# Patient Record
Sex: FEMALE | Race: WHITE | Employment: OTHER | URBAN - NONMETROPOLITAN AREA
[De-identification: names, ages, dates, MRNs, and addresses within clinical notes are randomized per-mention and may not be internally consistent; named-entity substitution may affect disease eponyms.]

---

## 2017-02-01 RX ORDER — LANCETS 30 GAUGE
1 EACH MISCELLANEOUS DAILY
Qty: 100 EACH | Refills: 3 | Status: SHIPPED | OUTPATIENT
Start: 2017-02-01 | End: 2021-02-09 | Stop reason: SDUPTHER

## 2017-02-01 RX ORDER — DIPHENHYDRAMINE HCL 25 MG
TABLET ORAL
Qty: 1 KIT | Refills: 0 | Status: SHIPPED | OUTPATIENT
Start: 2017-02-01 | End: 2019-09-03 | Stop reason: ALTCHOICE

## 2017-03-01 RX ORDER — CYCLOBENZAPRINE HCL 10 MG
10 TABLET ORAL 2 TIMES DAILY PRN
Qty: 60 TABLET | Refills: 1 | Status: SHIPPED | OUTPATIENT
Start: 2017-03-01 | End: 2018-06-18 | Stop reason: ALTCHOICE

## 2017-03-21 ENCOUNTER — HOSPITAL ENCOUNTER (OUTPATIENT)
Dept: PHARMACY | Age: 72
Setting detail: THERAPIES SERIES
Discharge: HOME | End: 2017-03-21
Payer: MEDICARE

## 2017-03-21 VITALS
SYSTOLIC BLOOD PRESSURE: 122 MMHG | HEART RATE: 72 BPM | WEIGHT: 263.2 LBS | DIASTOLIC BLOOD PRESSURE: 80 MMHG | BODY MASS INDEX: 43.8 KG/M2

## 2017-03-21 DIAGNOSIS — Z79.01 LONG TERM CURRENT USE OF ANTICOAGULANT THERAPY: ICD-10-CM

## 2017-03-21 LAB — INR BLD: 2.1

## 2017-03-21 PROCEDURE — 85610 PROTHROMBIN TIME: CPT

## 2017-03-21 PROCEDURE — G0463 HOSPITAL OUTPT CLINIC VISIT: HCPCS

## 2017-03-28 ENCOUNTER — TELEPHONE (OUTPATIENT)
Dept: PHARMACY | Age: 72
End: 2017-03-28

## 2017-04-24 DIAGNOSIS — E11.9 TYPE 2 DIABETES MELLITUS WITHOUT COMPLICATION, WITHOUT LONG-TERM CURRENT USE OF INSULIN (HCC): ICD-10-CM

## 2017-04-25 RX ORDER — GLIPIZIDE 5 MG/1
5 TABLET, FILM COATED, EXTENDED RELEASE ORAL DAILY
Qty: 90 TABLET | Refills: 1 | Status: SHIPPED | OUTPATIENT
Start: 2017-04-25 | End: 2017-09-11 | Stop reason: SDUPTHER

## 2017-04-28 ENCOUNTER — OFFICE VISIT (OUTPATIENT)
Dept: FAMILY MEDICINE CLINIC | Age: 72
End: 2017-04-28
Payer: MEDICARE

## 2017-04-28 ENCOUNTER — HOSPITAL ENCOUNTER (OUTPATIENT)
Age: 72
Setting detail: SPECIMEN
Discharge: HOME | End: 2017-04-28
Payer: MEDICARE

## 2017-04-28 VITALS
WEIGHT: 263 LBS | OXYGEN SATURATION: 98 % | TEMPERATURE: 97.8 F | BODY MASS INDEX: 43.77 KG/M2 | SYSTOLIC BLOOD PRESSURE: 124 MMHG | DIASTOLIC BLOOD PRESSURE: 70 MMHG

## 2017-04-28 DIAGNOSIS — R30.0 DYSURIA: ICD-10-CM

## 2017-04-28 DIAGNOSIS — N30.00 ACUTE CYSTITIS WITHOUT HEMATURIA: Primary | ICD-10-CM

## 2017-04-28 LAB
BILIRUBIN, POC: ABNORMAL
BLOOD URINE, POC: ABNORMAL
CLARITY, POC: ABNORMAL
COLOR, POC: ABNORMAL
GLUCOSE URINE, POC: ABNORMAL
KETONES, POC: ABNORMAL
LEUKOCYTE EST, POC: ABNORMAL
NITRITE, POC: POSITIVE
PH, POC: 6
PROTEIN, POC: ABNORMAL
SPECIFIC GRAVITY, POC: 1.02
UROBILINOGEN, POC: 1

## 2017-04-28 PROCEDURE — G8427 DOCREV CUR MEDS BY ELIG CLIN: HCPCS | Performed by: NURSE PRACTITIONER

## 2017-04-28 PROCEDURE — 99213 OFFICE O/P EST LOW 20 MIN: CPT | Performed by: NURSE PRACTITIONER

## 2017-04-28 PROCEDURE — 81002 URINALYSIS NONAUTO W/O SCOPE: CPT | Performed by: NURSE PRACTITIONER

## 2017-04-28 PROCEDURE — 4040F PNEUMOC VAC/ADMIN/RCVD: CPT | Performed by: NURSE PRACTITIONER

## 2017-04-28 PROCEDURE — 1036F TOBACCO NON-USER: CPT | Performed by: NURSE PRACTITIONER

## 2017-04-28 PROCEDURE — 3014F SCREEN MAMMO DOC REV: CPT | Performed by: NURSE PRACTITIONER

## 2017-04-28 PROCEDURE — G8417 CALC BMI ABV UP PARAM F/U: HCPCS | Performed by: NURSE PRACTITIONER

## 2017-04-28 PROCEDURE — 87077 CULTURE AEROBIC IDENTIFY: CPT

## 2017-04-28 PROCEDURE — 1090F PRES/ABSN URINE INCON ASSESS: CPT | Performed by: NURSE PRACTITIONER

## 2017-04-28 PROCEDURE — 3017F COLORECTAL CA SCREEN DOC REV: CPT | Performed by: NURSE PRACTITIONER

## 2017-04-28 PROCEDURE — 87186 SC STD MICRODIL/AGAR DIL: CPT

## 2017-04-28 PROCEDURE — 87086 URINE CULTURE/COLONY COUNT: CPT

## 2017-04-28 PROCEDURE — 1123F ACP DISCUSS/DSCN MKR DOCD: CPT | Performed by: NURSE PRACTITIONER

## 2017-04-28 PROCEDURE — G8399 PT W/DXA RESULTS DOCUMENT: HCPCS | Performed by: NURSE PRACTITIONER

## 2017-04-28 RX ORDER — NITROFURANTOIN 25; 75 MG/1; MG/1
100 CAPSULE ORAL 2 TIMES DAILY
Qty: 10 CAPSULE | Refills: 0 | Status: SHIPPED | OUTPATIENT
Start: 2017-04-28 | End: 2017-05-03

## 2017-04-28 ASSESSMENT — ENCOUNTER SYMPTOMS
NAUSEA: 0
VOMITING: 0

## 2017-04-30 LAB
CULTURE: ABNORMAL
CULTURE: ABNORMAL
Lab: ABNORMAL
ORGANISM: ABNORMAL
SPECIMEN DESCRIPTION: ABNORMAL
SPECIMEN DESCRIPTION: ABNORMAL
STATUS: ABNORMAL

## 2017-05-02 ENCOUNTER — HOSPITAL ENCOUNTER (OUTPATIENT)
Dept: PHARMACY | Age: 72
Setting detail: THERAPIES SERIES
Discharge: HOME | End: 2017-05-02
Payer: MEDICARE

## 2017-05-02 VITALS
SYSTOLIC BLOOD PRESSURE: 116 MMHG | WEIGHT: 263.7 LBS | BODY MASS INDEX: 43.88 KG/M2 | HEART RATE: 68 BPM | DIASTOLIC BLOOD PRESSURE: 82 MMHG

## 2017-05-02 DIAGNOSIS — Z79.01 LONG TERM CURRENT USE OF ANTICOAGULANT THERAPY: ICD-10-CM

## 2017-05-02 DIAGNOSIS — I82.4Y9 DEEP VEIN THROMBOSIS (DVT) OF PROXIMAL LOWER EXTREMITY, UNSPECIFIED CHRONICITY, UNSPECIFIED LATERALITY (HCC): ICD-10-CM

## 2017-05-02 LAB — INR BLD: 1.7

## 2017-05-02 PROCEDURE — 99211 OFF/OP EST MAY X REQ PHY/QHP: CPT

## 2017-05-02 PROCEDURE — 85610 PROTHROMBIN TIME: CPT

## 2017-05-12 ENCOUNTER — TELEPHONE (OUTPATIENT)
Dept: FAMILY MEDICINE CLINIC | Age: 72
End: 2017-05-12

## 2017-05-12 ENCOUNTER — HOSPITAL ENCOUNTER (OUTPATIENT)
Age: 72
Discharge: HOME | End: 2017-05-12
Payer: MEDICARE

## 2017-05-12 ENCOUNTER — NURSE ONLY (OUTPATIENT)
Dept: FAMILY MEDICINE CLINIC | Age: 72
End: 2017-05-12
Payer: MEDICARE

## 2017-05-12 DIAGNOSIS — R10.9 FLANK PAIN: Primary | ICD-10-CM

## 2017-05-12 LAB
BILIRUBIN, POC: ABNORMAL
BLOOD URINE, POC: ABNORMAL
CLARITY, POC: ABNORMAL
COLOR, POC: YELLOW
GLUCOSE URINE, POC: ABNORMAL
KETONES, POC: ABNORMAL
LEUKOCYTE EST, POC: ABNORMAL
NITRITE, POC: POSITIVE
PH, POC: 7
PROTEIN, POC: ABNORMAL
SPECIFIC GRAVITY, POC: 1.02
UROBILINOGEN, POC: 0.2

## 2017-05-12 PROCEDURE — 87077 CULTURE AEROBIC IDENTIFY: CPT

## 2017-05-12 PROCEDURE — 87186 SC STD MICRODIL/AGAR DIL: CPT

## 2017-05-12 PROCEDURE — 87086 URINE CULTURE/COLONY COUNT: CPT

## 2017-05-12 PROCEDURE — 81003 URINALYSIS AUTO W/O SCOPE: CPT | Performed by: NURSE PRACTITIONER

## 2017-05-12 RX ORDER — CIPROFLOXACIN 500 MG/1
500 TABLET, FILM COATED ORAL 2 TIMES DAILY
Qty: 20 TABLET | Refills: 0 | Status: SHIPPED | OUTPATIENT
Start: 2017-05-12 | End: 2017-05-22

## 2017-05-23 ENCOUNTER — HOSPITAL ENCOUNTER (OUTPATIENT)
Dept: PHARMACY | Age: 72
Setting detail: THERAPIES SERIES
Discharge: HOME | End: 2017-05-23
Payer: MEDICARE

## 2017-05-23 VITALS — WEIGHT: 265.3 LBS | SYSTOLIC BLOOD PRESSURE: 132 MMHG | DIASTOLIC BLOOD PRESSURE: 80 MMHG | BODY MASS INDEX: 44.15 KG/M2

## 2017-05-23 DIAGNOSIS — Z79.01 LONG TERM CURRENT USE OF ANTICOAGULANT THERAPY: ICD-10-CM

## 2017-05-23 DIAGNOSIS — I82.4Y9 DEEP VEIN THROMBOSIS (DVT) OF PROXIMAL LOWER EXTREMITY, UNSPECIFIED CHRONICITY, UNSPECIFIED LATERALITY (HCC): ICD-10-CM

## 2017-05-23 LAB — INR BLD: 1.6

## 2017-05-23 PROCEDURE — 85610 PROTHROMBIN TIME: CPT

## 2017-05-23 PROCEDURE — 99211 OFF/OP EST MAY X REQ PHY/QHP: CPT

## 2017-06-01 ENCOUNTER — HOSPITAL ENCOUNTER (OUTPATIENT)
Age: 72
Discharge: HOME | End: 2017-06-01
Payer: MEDICARE

## 2017-06-01 DIAGNOSIS — E11.9 TYPE 2 DIABETES MELLITUS WITHOUT COMPLICATION, WITHOUT LONG-TERM CURRENT USE OF INSULIN (HCC): ICD-10-CM

## 2017-06-01 LAB
ANION GAP SERPL CALCULATED.3IONS-SCNC: 12 MMOL/L (ref 9–17)
BUN BLDV-MCNC: 28 MG/DL (ref 8–23)
BUN/CREAT BLD: 21 (ref 9–20)
CALCIUM SERPL-MCNC: 10 MG/DL (ref 8.6–10.4)
CHLORIDE BLD-SCNC: 102 MMOL/L (ref 98–107)
CHOLESTEROL/HDL RATIO: 2.5
CHOLESTEROL: 180 MG/DL
CO2: 28 MMOL/L (ref 20–31)
CREAT SERPL-MCNC: 1.36 MG/DL (ref 0.5–0.9)
ESTIMATED AVERAGE GLUCOSE: 137 MG/DL
GFR AFRICAN AMERICAN: 47 ML/MIN
GFR NON-AFRICAN AMERICAN: 39 ML/MIN
GFR SERPL CREATININE-BSD FRML MDRD: ABNORMAL ML/MIN/{1.73_M2}
GFR SERPL CREATININE-BSD FRML MDRD: ABNORMAL ML/MIN/{1.73_M2}
GLUCOSE BLD-MCNC: 152 MG/DL (ref 70–99)
HBA1C MFR BLD: 6.4 % (ref 4.8–5.9)
HDLC SERPL-MCNC: 72 MG/DL
LDL CHOLESTEROL: 91 MG/DL (ref 0–130)
PATIENT FASTING?: YES
POTASSIUM SERPL-SCNC: 4.3 MMOL/L (ref 3.7–5.3)
SODIUM BLD-SCNC: 142 MMOL/L (ref 135–144)
TRIGL SERPL-MCNC: 84 MG/DL
VLDLC SERPL CALC-MCNC: NORMAL MG/DL (ref 1–30)

## 2017-06-01 PROCEDURE — 80061 LIPID PANEL: CPT

## 2017-06-01 PROCEDURE — 36415 COLL VENOUS BLD VENIPUNCTURE: CPT

## 2017-06-01 PROCEDURE — 83036 HEMOGLOBIN GLYCOSYLATED A1C: CPT

## 2017-06-01 PROCEDURE — 80048 BASIC METABOLIC PNL TOTAL CA: CPT

## 2017-06-08 ENCOUNTER — OFFICE VISIT (OUTPATIENT)
Dept: FAMILY MEDICINE CLINIC | Age: 72
End: 2017-06-08
Payer: MEDICARE

## 2017-06-08 VITALS
SYSTOLIC BLOOD PRESSURE: 106 MMHG | DIASTOLIC BLOOD PRESSURE: 64 MMHG | WEIGHT: 267 LBS | HEIGHT: 67 IN | BODY MASS INDEX: 41.91 KG/M2

## 2017-06-08 DIAGNOSIS — I10 ESSENTIAL HYPERTENSION, BENIGN: ICD-10-CM

## 2017-06-08 DIAGNOSIS — E66.01 MORBID OBESITY WITH BMI OF 40.0-44.9, ADULT (HCC): ICD-10-CM

## 2017-06-08 DIAGNOSIS — H65.93 MIDDLE EAR EFFUSION, BILATERAL: ICD-10-CM

## 2017-06-08 DIAGNOSIS — E11.9 TYPE 2 DIABETES MELLITUS WITHOUT COMPLICATION, WITHOUT LONG-TERM CURRENT USE OF INSULIN (HCC): Primary | ICD-10-CM

## 2017-06-08 LAB
CREATININE URINE POCT: 100
MICROALBUMIN/CREAT 24H UR: 10 MG/G{CREAT}
MICROALBUMIN/CREAT UR-RTO: <30

## 2017-06-08 PROCEDURE — 1090F PRES/ABSN URINE INCON ASSESS: CPT | Performed by: FAMILY MEDICINE

## 2017-06-08 PROCEDURE — G8417 CALC BMI ABV UP PARAM F/U: HCPCS | Performed by: FAMILY MEDICINE

## 2017-06-08 PROCEDURE — 99214 OFFICE O/P EST MOD 30 MIN: CPT | Performed by: FAMILY MEDICINE

## 2017-06-08 PROCEDURE — G8427 DOCREV CUR MEDS BY ELIG CLIN: HCPCS | Performed by: FAMILY MEDICINE

## 2017-06-08 PROCEDURE — 3014F SCREEN MAMMO DOC REV: CPT | Performed by: FAMILY MEDICINE

## 2017-06-08 PROCEDURE — 1036F TOBACCO NON-USER: CPT | Performed by: FAMILY MEDICINE

## 2017-06-08 PROCEDURE — 4040F PNEUMOC VAC/ADMIN/RCVD: CPT | Performed by: FAMILY MEDICINE

## 2017-06-08 PROCEDURE — 82044 UR ALBUMIN SEMIQUANTITATIVE: CPT | Performed by: FAMILY MEDICINE

## 2017-06-08 PROCEDURE — 3017F COLORECTAL CA SCREEN DOC REV: CPT | Performed by: FAMILY MEDICINE

## 2017-06-08 PROCEDURE — 1123F ACP DISCUSS/DSCN MKR DOCD: CPT | Performed by: FAMILY MEDICINE

## 2017-06-08 PROCEDURE — 3044F HG A1C LEVEL LT 7.0%: CPT | Performed by: FAMILY MEDICINE

## 2017-06-08 PROCEDURE — G8399 PT W/DXA RESULTS DOCUMENT: HCPCS | Performed by: FAMILY MEDICINE

## 2017-06-08 RX ORDER — FLUTICASONE PROPIONATE 50 MCG
2 SPRAY, SUSPENSION (ML) NASAL DAILY
Qty: 1 BOTTLE | Refills: 3 | Status: SHIPPED | OUTPATIENT
Start: 2017-06-08 | End: 2019-09-03 | Stop reason: SDUPTHER

## 2017-06-08 RX ORDER — LOSARTAN POTASSIUM 25 MG/1
TABLET ORAL
Qty: 90 TABLET | Refills: 1 | Status: SHIPPED | OUTPATIENT
Start: 2017-06-08 | End: 2017-10-23 | Stop reason: SDUPTHER

## 2017-06-08 RX ORDER — ALLOPURINOL 100 MG/1
TABLET ORAL
Qty: 180 TABLET | Refills: 1 | Status: SHIPPED | OUTPATIENT
Start: 2017-06-08 | End: 2017-10-23 | Stop reason: SDUPTHER

## 2017-06-08 ASSESSMENT — ENCOUNTER SYMPTOMS
RESPIRATORY NEGATIVE: 1
GASTROINTESTINAL NEGATIVE: 1

## 2017-06-20 ENCOUNTER — HOSPITAL ENCOUNTER (OUTPATIENT)
Dept: PHARMACY | Age: 72
Setting detail: THERAPIES SERIES
Discharge: HOME | End: 2017-06-20
Payer: MEDICARE

## 2017-06-20 VITALS
WEIGHT: 265.4 LBS | BODY MASS INDEX: 41.57 KG/M2 | DIASTOLIC BLOOD PRESSURE: 68 MMHG | SYSTOLIC BLOOD PRESSURE: 110 MMHG | HEART RATE: 88 BPM

## 2017-06-20 DIAGNOSIS — I82.4Y9 DEEP VEIN THROMBOSIS (DVT) OF PROXIMAL LOWER EXTREMITY, UNSPECIFIED CHRONICITY, UNSPECIFIED LATERALITY (HCC): ICD-10-CM

## 2017-06-20 DIAGNOSIS — Z79.01 LONG TERM CURRENT USE OF ANTICOAGULANT THERAPY: ICD-10-CM

## 2017-06-20 DIAGNOSIS — Z79.01 LONG TERM (CURRENT) USE OF ANTICOAGULANTS: ICD-10-CM

## 2017-06-20 LAB — INR BLD: 2.3

## 2017-06-20 PROCEDURE — 85610 PROTHROMBIN TIME: CPT

## 2017-06-20 PROCEDURE — 99211 OFF/OP EST MAY X REQ PHY/QHP: CPT

## 2017-06-20 RX ORDER — WARFARIN SODIUM 5 MG/1
TABLET ORAL
Qty: 90 TABLET | Refills: 3 | Status: SHIPPED
Start: 2017-06-20 | End: 2017-06-20 | Stop reason: SDUPTHER

## 2017-06-20 RX ORDER — WARFARIN SODIUM 5 MG/1
TABLET ORAL
Qty: 90 TABLET | Refills: 3 | OUTPATIENT
Start: 2017-06-20 | End: 2017-08-01 | Stop reason: DRUGHIGH

## 2017-07-11 DIAGNOSIS — N18.30 BENIGN HYPERTENSION WITH CHRONIC KIDNEY DISEASE, STAGE III (HCC): ICD-10-CM

## 2017-07-11 DIAGNOSIS — I12.9 BENIGN HYPERTENSION WITH CHRONIC KIDNEY DISEASE, STAGE III (HCC): ICD-10-CM

## 2017-07-11 RX ORDER — RANITIDINE 150 MG/1
TABLET ORAL
Qty: 90 TABLET | Refills: 3 | Status: SHIPPED | OUTPATIENT
Start: 2017-07-11 | End: 2018-04-11 | Stop reason: SDUPTHER

## 2017-07-11 RX ORDER — OMEPRAZOLE 20 MG/1
CAPSULE, DELAYED RELEASE ORAL
Qty: 90 CAPSULE | Refills: 3 | Status: SHIPPED | OUTPATIENT
Start: 2017-07-11 | End: 2018-04-11 | Stop reason: SDUPTHER

## 2017-07-11 RX ORDER — PROPRANOLOL HYDROCHLORIDE 60 MG/1
TABLET ORAL
Qty: 180 TABLET | Refills: 3 | Status: SHIPPED | OUTPATIENT
Start: 2017-07-11 | End: 2018-04-11 | Stop reason: SDUPTHER

## 2017-07-11 RX ORDER — HYDROCHLOROTHIAZIDE 12.5 MG/1
CAPSULE, GELATIN COATED ORAL
Qty: 90 CAPSULE | Refills: 3 | Status: SHIPPED | OUTPATIENT
Start: 2017-07-11 | End: 2018-04-11 | Stop reason: SDUPTHER

## 2017-08-01 ENCOUNTER — HOSPITAL ENCOUNTER (OUTPATIENT)
Age: 72
Discharge: HOME | End: 2017-08-01
Payer: MEDICARE

## 2017-08-01 ENCOUNTER — HOSPITAL ENCOUNTER (OUTPATIENT)
Dept: PHARMACY | Age: 72
Setting detail: THERAPIES SERIES
Discharge: HOME | End: 2017-08-01
Payer: MEDICARE

## 2017-08-01 VITALS
HEART RATE: 79 BPM | WEIGHT: 266.5 LBS | DIASTOLIC BLOOD PRESSURE: 67 MMHG | BODY MASS INDEX: 41.74 KG/M2 | SYSTOLIC BLOOD PRESSURE: 125 MMHG

## 2017-08-01 DIAGNOSIS — Z79.01 LONG TERM CURRENT USE OF ANTICOAGULANT THERAPY: ICD-10-CM

## 2017-08-01 DIAGNOSIS — I82.4Y9 DEEP VEIN THROMBOSIS (DVT) OF PROXIMAL LOWER EXTREMITY, UNSPECIFIED CHRONICITY, UNSPECIFIED LATERALITY (HCC): ICD-10-CM

## 2017-08-01 DIAGNOSIS — Z79.01 LONG TERM (CURRENT) USE OF ANTICOAGULANTS: ICD-10-CM

## 2017-08-01 LAB
ALBUMIN SERPL-MCNC: 3.5 G/DL (ref 3.5–5.2)
ANION GAP SERPL CALCULATED.3IONS-SCNC: 11 MMOL/L (ref 9–17)
BUN BLDV-MCNC: 38 MG/DL (ref 8–23)
CALCIUM SERPL-MCNC: 10.1 MG/DL (ref 8.6–10.4)
CHLORIDE BLD-SCNC: 102 MMOL/L (ref 98–107)
CO2: 28 MMOL/L (ref 20–31)
CREAT SERPL-MCNC: 1.63 MG/DL (ref 0.5–0.9)
FERRITIN: 62 UG/L (ref 13–150)
GFR AFRICAN AMERICAN: 38 ML/MIN
GFR NON-AFRICAN AMERICAN: 31 ML/MIN
GFR SERPL CREATININE-BSD FRML MDRD: ABNORMAL ML/MIN/{1.73_M2}
GFR SERPL CREATININE-BSD FRML MDRD: ABNORMAL ML/MIN/{1.73_M2}
HCT VFR BLD CALC: 39.1 % (ref 36–46)
HEMOGLOBIN: 12.8 G/DL (ref 12–16)
INR BLD: 2.4
IRON SATURATION: 37 % (ref 20–55)
IRON: 117 UG/DL (ref 37–145)
PHOSPHORUS: 3 MG/DL (ref 2.6–4.5)
POTASSIUM SERPL-SCNC: 4.5 MMOL/L (ref 3.7–5.3)
PTH INTACT: 92.62 PG/ML (ref 15–65)
SODIUM BLD-SCNC: 141 MMOL/L (ref 135–144)
TOTAL IRON BINDING CAPACITY: 320 UG/DL (ref 250–450)
TRANSFERRIN: 278 MG/DL (ref 200–360)
UNSATURATED IRON BINDING CAPACITY: 203 UG/DL (ref 112–347)
VITAMIN D 25-HYDROXY: 42.9 NG/ML (ref 30–100)

## 2017-08-01 PROCEDURE — 82040 ASSAY OF SERUM ALBUMIN: CPT

## 2017-08-01 PROCEDURE — 85014 HEMATOCRIT: CPT

## 2017-08-01 PROCEDURE — 84100 ASSAY OF PHOSPHORUS: CPT

## 2017-08-01 PROCEDURE — 83550 IRON BINDING TEST: CPT

## 2017-08-01 PROCEDURE — 99211 OFF/OP EST MAY X REQ PHY/QHP: CPT

## 2017-08-01 PROCEDURE — 82306 VITAMIN D 25 HYDROXY: CPT

## 2017-08-01 PROCEDURE — 84466 ASSAY OF TRANSFERRIN: CPT

## 2017-08-01 PROCEDURE — 82310 ASSAY OF CALCIUM: CPT

## 2017-08-01 PROCEDURE — 80051 ELECTROLYTE PANEL: CPT

## 2017-08-01 PROCEDURE — 82565 ASSAY OF CREATININE: CPT

## 2017-08-01 PROCEDURE — 36415 COLL VENOUS BLD VENIPUNCTURE: CPT

## 2017-08-01 PROCEDURE — 85018 HEMOGLOBIN: CPT

## 2017-08-01 PROCEDURE — 83540 ASSAY OF IRON: CPT

## 2017-08-01 PROCEDURE — 83970 ASSAY OF PARATHORMONE: CPT

## 2017-08-01 PROCEDURE — 85610 PROTHROMBIN TIME: CPT

## 2017-08-01 PROCEDURE — 84520 ASSAY OF UREA NITROGEN: CPT

## 2017-08-01 PROCEDURE — 82728 ASSAY OF FERRITIN: CPT

## 2017-08-01 RX ORDER — WARFARIN SODIUM 5 MG/1
TABLET ORAL
Qty: 90 TABLET | Refills: 3 | Status: SHIPPED
Start: 2017-08-01 | End: 2017-12-12 | Stop reason: DRUGHIGH

## 2017-09-11 ENCOUNTER — TELEPHONE (OUTPATIENT)
Dept: FAMILY MEDICINE CLINIC | Age: 72
End: 2017-09-11

## 2017-09-11 ENCOUNTER — HOSPITAL ENCOUNTER (OUTPATIENT)
Age: 72
Discharge: HOME | End: 2017-09-11
Payer: MEDICARE

## 2017-09-11 DIAGNOSIS — R30.0 DYSURIA: ICD-10-CM

## 2017-09-11 DIAGNOSIS — R30.0 DYSURIA: Primary | ICD-10-CM

## 2017-09-11 DIAGNOSIS — E11.9 TYPE 2 DIABETES MELLITUS WITHOUT COMPLICATION, WITHOUT LONG-TERM CURRENT USE OF INSULIN (HCC): ICD-10-CM

## 2017-09-11 LAB
-: ABNORMAL
AMORPHOUS: ABNORMAL
BACTERIA: ABNORMAL
BILIRUBIN URINE: ABNORMAL
CASTS UA: ABNORMAL /LPF
COLOR: ABNORMAL
COMMENT UA: ABNORMAL
CRYSTALS, UA: ABNORMAL /HPF
EPITHELIAL CELLS UA: ABNORMAL /HPF
GLUCOSE URINE: NEGATIVE
KETONES, URINE: NEGATIVE
LEUKOCYTE ESTERASE, URINE: ABNORMAL
MUCUS: ABNORMAL
NITRITE, URINE: POSITIVE
OTHER OBSERVATIONS UA: ABNORMAL
PH UA: 6.5 (ref 5–8)
PROTEIN UA: ABNORMAL
RBC UA: ABNORMAL /HPF (ref 0–2)
RENAL EPITHELIAL, UA: ABNORMAL /HPF
SPECIFIC GRAVITY UA: 1.01 (ref 1–1.03)
TRICHOMONAS: ABNORMAL
TURBIDITY: ABNORMAL
URINE HGB: NEGATIVE
UROBILINOGEN, URINE: ABNORMAL
WBC UA: ABNORMAL /HPF
YEAST: ABNORMAL

## 2017-09-11 PROCEDURE — 87086 URINE CULTURE/COLONY COUNT: CPT

## 2017-09-11 PROCEDURE — 81001 URINALYSIS AUTO W/SCOPE: CPT

## 2017-09-11 RX ORDER — CEPHALEXIN 500 MG/1
500 CAPSULE ORAL 2 TIMES DAILY
Qty: 14 CAPSULE | Refills: 0 | Status: SHIPPED | OUTPATIENT
Start: 2017-09-11 | End: 2017-09-19 | Stop reason: ALTCHOICE

## 2017-09-11 RX ORDER — GLIPIZIDE 5 MG/1
TABLET, EXTENDED RELEASE ORAL
Qty: 90 TABLET | Refills: 1 | Status: SHIPPED | OUTPATIENT
Start: 2017-09-11 | End: 2018-01-29 | Stop reason: SDUPTHER

## 2017-09-12 LAB
CULTURE: NORMAL
CULTURE: NORMAL
Lab: NORMAL
SPECIMEN DESCRIPTION: NORMAL
SPECIMEN DESCRIPTION: NORMAL
STATUS: NORMAL

## 2017-09-19 ENCOUNTER — HOSPITAL ENCOUNTER (OUTPATIENT)
Dept: PHARMACY | Age: 72
Setting detail: THERAPIES SERIES
Discharge: HOME | End: 2017-09-19
Payer: MEDICARE

## 2017-09-19 VITALS
DIASTOLIC BLOOD PRESSURE: 78 MMHG | BODY MASS INDEX: 41.49 KG/M2 | WEIGHT: 264.9 LBS | SYSTOLIC BLOOD PRESSURE: 114 MMHG | HEART RATE: 82 BPM

## 2017-09-19 DIAGNOSIS — Z79.01 LONG TERM CURRENT USE OF ANTICOAGULANT THERAPY: ICD-10-CM

## 2017-09-19 DIAGNOSIS — I82.4Y9 DEEP VEIN THROMBOSIS (DVT) OF PROXIMAL LOWER EXTREMITY, UNSPECIFIED CHRONICITY, UNSPECIFIED LATERALITY (HCC): ICD-10-CM

## 2017-09-19 LAB — INR BLD: 2.5

## 2017-09-19 PROCEDURE — 85610 PROTHROMBIN TIME: CPT

## 2017-09-19 PROCEDURE — 99211 OFF/OP EST MAY X REQ PHY/QHP: CPT

## 2017-10-10 ENCOUNTER — HOSPITAL ENCOUNTER (OUTPATIENT)
Age: 72
Discharge: HOME | End: 2017-10-10
Payer: MEDICARE

## 2017-10-10 ENCOUNTER — OFFICE VISIT (OUTPATIENT)
Dept: FAMILY MEDICINE CLINIC | Age: 72
End: 2017-10-10
Payer: MEDICARE

## 2017-10-10 VITALS
SYSTOLIC BLOOD PRESSURE: 130 MMHG | BODY MASS INDEX: 42.85 KG/M2 | WEIGHT: 273 LBS | DIASTOLIC BLOOD PRESSURE: 78 MMHG | HEIGHT: 67 IN

## 2017-10-10 DIAGNOSIS — R60.0 BILATERAL EDEMA OF LOWER EXTREMITY: Primary | ICD-10-CM

## 2017-10-10 DIAGNOSIS — R20.9 DISTURBANCE OF SKIN SENSATION: ICD-10-CM

## 2017-10-10 DIAGNOSIS — Z23 NEED FOR INFLUENZA VACCINATION: ICD-10-CM

## 2017-10-10 DIAGNOSIS — E11.9 TYPE 2 DIABETES MELLITUS WITHOUT COMPLICATION, WITHOUT LONG-TERM CURRENT USE OF INSULIN (HCC): ICD-10-CM

## 2017-10-10 DIAGNOSIS — R53.83 EASY FATIGABILITY: ICD-10-CM

## 2017-10-10 DIAGNOSIS — B07.8 COMMON WART: ICD-10-CM

## 2017-10-10 DIAGNOSIS — R06.09 DYSPNEA ON EXERTION: ICD-10-CM

## 2017-10-10 LAB
ABSOLUTE EOS #: 0.3 K/UL (ref 0–0.4)
ABSOLUTE LYMPH #: 3.2 K/UL (ref 1–4.8)
ABSOLUTE MONO #: 0.7 K/UL (ref 0–1)
ALBUMIN SERPL-MCNC: 3.8 G/DL (ref 3.5–5.2)
ALBUMIN/GLOBULIN RATIO: ABNORMAL (ref 1–2.5)
ALP BLD-CCNC: 51 U/L (ref 35–104)
ALT SERPL-CCNC: 16 U/L (ref 5–33)
ANION GAP SERPL CALCULATED.3IONS-SCNC: 11 MMOL/L (ref 9–17)
AST SERPL-CCNC: 13 U/L
BASOPHILS # BLD: 1 %
BASOPHILS ABSOLUTE: 0.1 K/UL (ref 0–0.2)
BILIRUB SERPL-MCNC: 0.22 MG/DL (ref 0.3–1.2)
BNP INTERPRETATION: ABNORMAL
BUN BLDV-MCNC: 26 MG/DL (ref 8–23)
BUN/CREAT BLD: 17 (ref 9–20)
CALCIUM SERPL-MCNC: 10.1 MG/DL (ref 8.6–10.4)
CHLORIDE BLD-SCNC: 104 MMOL/L (ref 98–107)
CO2: 27 MMOL/L (ref 20–31)
CREAT SERPL-MCNC: 1.49 MG/DL (ref 0.5–0.9)
DIFFERENTIAL TYPE: YES
EOSINOPHILS RELATIVE PERCENT: 3 %
ESTIMATED AVERAGE GLUCOSE: 146 MG/DL
GFR AFRICAN AMERICAN: 42 ML/MIN
GFR NON-AFRICAN AMERICAN: 35 ML/MIN
GFR SERPL CREATININE-BSD FRML MDRD: ABNORMAL ML/MIN/{1.73_M2}
GFR SERPL CREATININE-BSD FRML MDRD: ABNORMAL ML/MIN/{1.73_M2}
GLUCOSE BLD-MCNC: 120 MG/DL (ref 70–99)
HBA1C MFR BLD: 6.7 % (ref 4.8–5.9)
HCT VFR BLD CALC: 38.3 % (ref 36–46)
HEMOGLOBIN: 12.5 G/DL (ref 12–16)
LYMPHOCYTES # BLD: 29 %
MCH RBC QN AUTO: 32.4 PG (ref 26–34)
MCHC RBC AUTO-ENTMCNC: 32.7 G/DL (ref 31–37)
MCV RBC AUTO: 99.3 FL (ref 80–100)
MONOCYTES # BLD: 6 %
PDW BLD-RTO: 15.8 % (ref 12.1–15.2)
PLATELET # BLD: 290 K/UL (ref 140–450)
PLATELET ESTIMATE: ABNORMAL
PMV BLD AUTO: ABNORMAL FL (ref 6–12)
POTASSIUM SERPL-SCNC: 4.4 MMOL/L (ref 3.7–5.3)
PRO-BNP: 366 PG/ML
RBC # BLD: 3.86 M/UL (ref 4–5.2)
RBC # BLD: ABNORMAL 10*6/UL
SEG NEUTROPHILS: 61 %
SEGMENTED NEUTROPHILS ABSOLUTE COUNT: 7.1 K/UL (ref 2.5–7)
SODIUM BLD-SCNC: 142 MMOL/L (ref 135–144)
TOTAL PROTEIN: 6.3 G/DL (ref 6.4–8.3)
TSH SERPL DL<=0.05 MIU/L-ACNC: 2.99 MIU/L (ref 0.3–5)
WBC # BLD: 11.4 K/UL (ref 3.5–11)
WBC # BLD: ABNORMAL 10*3/UL

## 2017-10-10 PROCEDURE — 99214 OFFICE O/P EST MOD 30 MIN: CPT | Performed by: FAMILY MEDICINE

## 2017-10-10 PROCEDURE — 36415 COLL VENOUS BLD VENIPUNCTURE: CPT

## 2017-10-10 PROCEDURE — 80053 COMPREHEN METABOLIC PANEL: CPT

## 2017-10-10 PROCEDURE — 4040F PNEUMOC VAC/ADMIN/RCVD: CPT | Performed by: FAMILY MEDICINE

## 2017-10-10 PROCEDURE — G8484 FLU IMMUNIZE NO ADMIN: HCPCS | Performed by: FAMILY MEDICINE

## 2017-10-10 PROCEDURE — 85025 COMPLETE CBC W/AUTO DIFF WBC: CPT

## 2017-10-10 PROCEDURE — 1036F TOBACCO NON-USER: CPT | Performed by: FAMILY MEDICINE

## 2017-10-10 PROCEDURE — 1090F PRES/ABSN URINE INCON ASSESS: CPT | Performed by: FAMILY MEDICINE

## 2017-10-10 PROCEDURE — 90686 IIV4 VACC NO PRSV 0.5 ML IM: CPT | Performed by: FAMILY MEDICINE

## 2017-10-10 PROCEDURE — G8427 DOCREV CUR MEDS BY ELIG CLIN: HCPCS | Performed by: FAMILY MEDICINE

## 2017-10-10 PROCEDURE — 3017F COLORECTAL CA SCREEN DOC REV: CPT | Performed by: FAMILY MEDICINE

## 2017-10-10 PROCEDURE — G8417 CALC BMI ABV UP PARAM F/U: HCPCS | Performed by: FAMILY MEDICINE

## 2017-10-10 PROCEDURE — G8399 PT W/DXA RESULTS DOCUMENT: HCPCS | Performed by: FAMILY MEDICINE

## 2017-10-10 PROCEDURE — 3014F SCREEN MAMMO DOC REV: CPT | Performed by: FAMILY MEDICINE

## 2017-10-10 PROCEDURE — 84443 ASSAY THYROID STIM HORMONE: CPT

## 2017-10-10 PROCEDURE — 83880 ASSAY OF NATRIURETIC PEPTIDE: CPT

## 2017-10-10 PROCEDURE — 17110 DESTRUCTION B9 LES UP TO 14: CPT | Performed by: FAMILY MEDICINE

## 2017-10-10 PROCEDURE — 3046F HEMOGLOBIN A1C LEVEL >9.0%: CPT | Performed by: FAMILY MEDICINE

## 2017-10-10 PROCEDURE — 83036 HEMOGLOBIN GLYCOSYLATED A1C: CPT

## 2017-10-10 PROCEDURE — 1123F ACP DISCUSS/DSCN MKR DOCD: CPT | Performed by: FAMILY MEDICINE

## 2017-10-10 PROCEDURE — G0008 ADMIN INFLUENZA VIRUS VAC: HCPCS | Performed by: FAMILY MEDICINE

## 2017-10-10 ASSESSMENT — ENCOUNTER SYMPTOMS: GASTROINTESTINAL NEGATIVE: 1

## 2017-10-10 NOTE — PATIENT INSTRUCTIONS
SURVEY:    You may be receiving a survey from "[employer]" regarding your visit today. Please complete the survey to enable us to provide the highest quality of care to you and your family. If you cannot score us as very good on any question, please call the office to discuss how we could have made your experience exceptional.     Thank you.

## 2017-10-10 NOTE — PROGRESS NOTES
needed (Arthritic pain.) 3/1/17   Best Dawson DO   glucose blood VI test strips (TRUE METRIX BLOOD GLUCOSE TEST) strip Use once daily and as needed. 2/1/17   Best Dawson DO   Lancets MISC 1 each by Does not apply route daily 2/1/17   Best Dawson DO   Blood Glucose Monitoring Suppl (TRUE METRIX AIR GLUCOSE METER) W/DEVICE KIT Use to test sugar once daily 2/1/17   Best Dawson DO   FREESTYLE LANCETS MISC Use to test daily 12/8/16   Best Dawson DO   glucose blood VI test strips (FREESTYLE LITE) strip Test blood sugar once daily and as needed 12/8/16   Best Dawson DO   acetaminophen (TYLENOL) 650 MG CR tablet Take 1,300 mg by mouth every 8 hours as needed for Pain    Historical Provider, MD   Magnesium 400 MG TABS Take 800 mg by mouth nightly     Historical Provider, MD   folic acid (FOLVITE) 575 MCG tablet Take 400 mcg by mouth daily 2/8/16   Historical Provider, MD   Calcium Carb-Cholecalciferol (CALCIUM 600/VITAMIN D3) 600-800 MG-UNIT TABS Take 2 tablets by mouth daily. Historical Provider, MD        Allergies:       Codeine; Percocet [oxycodone-acetaminophen]; Adhesive tape; and Norco [hydrocodone-acetaminophen]    Social History:     Tobacco:    reports that she has never smoked. She has never used smokeless tobacco.  Alcohol:      reports that she does not drink alcohol. Drug Use:  reports that she does not use drugs. Family History:     Family History   Problem Relation Age of Onset    Diabetes Father     Cancer Paternal Uncle      colon       Review of Systems:     Positive and Negative as described in HPI    Review of Systems   Constitutional: Positive for fatigue. Negative for fever. Gastrointestinal: Negative. Genitourinary: Negative. Physical Exam:     Vitals:  /78   Ht 5' 7\" (1.702 m)   Wt 273 lb (123.8 kg)   BMI 42.76 kg/m²   Physical Exam   Constitutional: She is oriented to person, place, and time.  She appears well-developed and

## 2017-10-11 ENCOUNTER — TELEPHONE (OUTPATIENT)
Dept: FAMILY MEDICINE CLINIC | Age: 72
End: 2017-10-11

## 2017-10-11 DIAGNOSIS — R53.83 EASY FATIGABILITY: Primary | ICD-10-CM

## 2017-10-11 RX ORDER — FUROSEMIDE 20 MG/1
20 TABLET ORAL DAILY
Qty: 30 TABLET | Refills: 3 | Status: SHIPPED | OUTPATIENT
Start: 2017-10-11 | End: 2018-06-18 | Stop reason: SDUPTHER

## 2017-10-16 ENCOUNTER — HOSPITAL ENCOUNTER (OUTPATIENT)
Dept: NUCLEAR MEDICINE | Age: 72
Discharge: HOME | End: 2017-10-16
Payer: MEDICARE

## 2017-10-16 ENCOUNTER — HOSPITAL ENCOUNTER (OUTPATIENT)
Dept: NON INVASIVE DIAGNOSTICS | Age: 72
Discharge: HOME | End: 2017-10-16
Payer: MEDICARE

## 2017-10-16 VITALS — HEART RATE: 80 BPM | DIASTOLIC BLOOD PRESSURE: 63 MMHG | SYSTOLIC BLOOD PRESSURE: 148 MMHG

## 2017-10-16 DIAGNOSIS — R53.83 EASY FATIGABILITY: ICD-10-CM

## 2017-10-16 PROCEDURE — 6360000002 HC RX W HCPCS: Performed by: INTERNAL MEDICINE

## 2017-10-16 PROCEDURE — 2580000003 HC RX 258: Performed by: INTERNAL MEDICINE

## 2017-10-16 PROCEDURE — 93017 CV STRESS TEST TRACING ONLY: CPT

## 2017-10-16 PROCEDURE — A9500 TC99M SESTAMIBI: HCPCS | Performed by: FAMILY MEDICINE

## 2017-10-16 PROCEDURE — 3430000000 HC RX DIAGNOSTIC RADIOPHARMACEUTICAL: Performed by: FAMILY MEDICINE

## 2017-10-16 PROCEDURE — 78452 HT MUSCLE IMAGE SPECT MULT: CPT

## 2017-10-16 RX ORDER — AMINOPHYLLINE DIHYDRATE 25 MG/ML
100 INJECTION, SOLUTION INTRAVENOUS
Status: ACTIVE | OUTPATIENT
Start: 2017-10-16 | End: 2017-10-16

## 2017-10-16 RX ORDER — AMINOPHYLLINE DIHYDRATE 25 MG/ML
50 INJECTION, SOLUTION INTRAVENOUS
Status: ACTIVE | OUTPATIENT
Start: 2017-10-16 | End: 2017-10-16

## 2017-10-16 RX ORDER — 0.9 % SODIUM CHLORIDE 0.9 %
10 VIAL (ML) INJECTION PRN
Status: DISCONTINUED | OUTPATIENT
Start: 2017-10-16 | End: 2017-10-17 | Stop reason: HOSPADM

## 2017-10-16 RX ADMIN — Medication 10 ML: at 09:22

## 2017-10-16 RX ADMIN — TETRAKIS(2-METHOXYISOBUTYLISOCYANIDE)COPPER(I) TETRAFLUOROBORATE 10 MILLICURIE: 1 INJECTION, POWDER, LYOPHILIZED, FOR SOLUTION INTRAVENOUS at 08:20

## 2017-10-16 RX ADMIN — REGADENOSON 0.4 MG: 0.08 INJECTION, SOLUTION INTRAVENOUS at 09:22

## 2017-10-16 RX ADMIN — TETRAKIS(2-METHOXYISOBUTYLISOCYANIDE)COPPER(I) TETRAFLUOROBORATE 30 MILLICURIE: 1 INJECTION, POWDER, LYOPHILIZED, FOR SOLUTION INTRAVENOUS at 09:23

## 2017-10-16 NOTE — PROGRESS NOTES
\"Some\" shortness of breath after pushing lexiscan. Denies any symptoms at present time. Skin warm and dry. Respirations even and unlabored.

## 2017-10-17 NOTE — PROCEDURES
[facility]                           [address], [place] [address]                             CARDIAC STRESS TEST    PATIENT NAME: Zeenat Dooley                      :             1945  MED REC NO:   [id number]                               ROOM:  ACCOUNT NO:   [de-identified]                            ADMISSION DATE:  10/16/2017  PROVIDER:     Chaparrita Goel      DATE OF STUDY:  10/16/2017    Cardiovascular Diagnostics Department    Ordering Provider:  Colby Olszewski, DO    Primary Care Provider:  Colby Olszewski, DO    Interpreting Physician:   Chaparrita Thomas. Bharathi Mead MD    MYOCARDIAL PERFUSION STRESS IMAGING    The stress ECG results are reported separately. NUCLEAR IMAGING RESULTS:  The overall quality of the study is fair. Mild attenuation artifact was seen. There is no evidence of abnormal  lung uptake. Additionally, the right ventricle appears normal.  The  left ventricular cavity is noted to be normal in size on stress  images. There is no evidence of transient ischemic dilatation (TID)  of the left ventricle. Gated SPECT imaging reveals normal myocardial thickening and wall  motion with a calculated left ventricular ejection fraction (EF) of  61%. The rest images demonstrated a small/moderate perfusion abnormality of  mild intensity in the inferior region(s) which is most likely due to  artifact. On stress imaging, a small/moderate perfusion abnormality of mild  intensity was noted in the lateral, inferior, and inferoseptal  region(s) which may be due to artifact. IMPRESSION:  1. Abnormal myocardial perfusion study. There is a small/moderate  perfusion defect of mild intensity in the lateral, inferior and  inferoseptal region(s) during stress imaging, which is most consistent  with ischemia but may be due to artifact.     2.  Global left ventricular systolic function was normal with an EF of  61% without regional wall

## 2017-10-19 ENCOUNTER — TELEPHONE (OUTPATIENT)
Dept: FAMILY MEDICINE CLINIC | Age: 72
End: 2017-10-19

## 2017-10-19 DIAGNOSIS — R94.39 ABNORMAL STRESS TEST: Primary | ICD-10-CM

## 2017-10-19 DIAGNOSIS — R53.83 EASY FATIGABILITY: ICD-10-CM

## 2017-10-19 NOTE — PROCEDURES
[facility]                           [address], [place] [address]                             CARDIAC STRESS TEST    PATIENT NAME: Homero Huerta                      :             1945  MED REC NO:   [id number]                               ROOM:  ACCOUNT NO:   [de-identified]                            ADMISSION DATE:  10/16/2017  PROVIDER:     Gilmar Fermin      DATE OF STUDY:  10/16/2017    NAME OF TEST:  LEXISCAN CARDIOLITE STRESS TEST:    INDICATION:  Chest pain. IMPRESSION:  1. We gave 0.4 mg of Lexiscan intravenously. 2.  This was followed in 20 seconds by Cardiolite infusion. 3.  There was no chest pain. 4.  There was no ST depression. 5.  It was an overall negative Lexiscan stress test.  6.  Cardiolite to follow. Carol Armenta    D: 10/19/2017 7:18:33       T: 10/19/2017 11:09:34     [email or web address]  Job#: [id number]     Doc#: [id number]    CC:  Dr. Saeid Haynes.

## 2017-10-20 NOTE — TELEPHONE ENCOUNTER
Patient notified and referral pending.
site     Irritable bowel syndrome     Seasonal allergies     Deep vein thrombosis (DVT) (HCC)     Long term current use of anticoagulant therapy     Gout     Rectocele     Tubular adenoma of colon     Hiatal hernia     Sigmoid diverticulosis     Sleep disorder     Chronic back pain     Constipation     Hypomagnesemia

## 2017-10-23 RX ORDER — ALLOPURINOL 100 MG/1
TABLET ORAL
Qty: 180 TABLET | Refills: 1 | Status: SHIPPED | OUTPATIENT
Start: 2017-10-23 | End: 2018-03-12 | Stop reason: SDUPTHER

## 2017-10-23 RX ORDER — LOSARTAN POTASSIUM 25 MG/1
TABLET ORAL
Qty: 90 TABLET | Refills: 1 | Status: SHIPPED | OUTPATIENT
Start: 2017-10-23 | End: 2018-03-12 | Stop reason: SDUPTHER

## 2017-10-25 ENCOUNTER — TELEPHONE (OUTPATIENT)
Dept: CARDIOLOGY CLINIC | Age: 72
End: 2017-10-25

## 2017-10-25 DIAGNOSIS — R06.02 SOB (SHORTNESS OF BREATH): ICD-10-CM

## 2017-10-25 DIAGNOSIS — I10 ESSENTIAL HYPERTENSION, BENIGN: Primary | ICD-10-CM

## 2017-10-25 DIAGNOSIS — R94.39 ABNORMAL STRESS TEST: ICD-10-CM

## 2017-10-31 ENCOUNTER — HOSPITAL ENCOUNTER (OUTPATIENT)
Dept: GENERAL RADIOLOGY | Age: 72
Discharge: HOME | End: 2017-10-31
Payer: MEDICARE

## 2017-10-31 ENCOUNTER — HOSPITAL ENCOUNTER (OUTPATIENT)
Age: 72
Discharge: HOME | End: 2017-10-31
Payer: MEDICARE

## 2017-10-31 ENCOUNTER — HOSPITAL ENCOUNTER (OUTPATIENT)
Dept: PHARMACY | Age: 72
Setting detail: THERAPIES SERIES
Discharge: HOME | End: 2017-10-31
Payer: MEDICARE

## 2017-10-31 VITALS
WEIGHT: 268.2 LBS | BODY MASS INDEX: 42.01 KG/M2 | SYSTOLIC BLOOD PRESSURE: 154 MMHG | HEART RATE: 69 BPM | DIASTOLIC BLOOD PRESSURE: 79 MMHG

## 2017-10-31 DIAGNOSIS — R06.02 SOB (SHORTNESS OF BREATH): ICD-10-CM

## 2017-10-31 DIAGNOSIS — I10 ESSENTIAL HYPERTENSION, BENIGN: ICD-10-CM

## 2017-10-31 DIAGNOSIS — R94.39 ABNORMAL STRESS TEST: ICD-10-CM

## 2017-10-31 DIAGNOSIS — I82.4Y9 DEEP VEIN THROMBOSIS (DVT) OF PROXIMAL LOWER EXTREMITY, UNSPECIFIED CHRONICITY, UNSPECIFIED LATERALITY (HCC): ICD-10-CM

## 2017-10-31 DIAGNOSIS — Z79.01 LONG TERM CURRENT USE OF ANTICOAGULANT THERAPY: ICD-10-CM

## 2017-10-31 LAB
EKG ATRIAL RATE: 63 BPM
EKG P AXIS: 41 DEGREES
EKG P-R INTERVAL: 150 MS
EKG Q-T INTERVAL: 386 MS
EKG QRS DURATION: 86 MS
EKG QTC CALCULATION (BAZETT): 395 MS
EKG T AXIS: 10 DEGREES
EKG VENTRICULAR RATE: 63 BPM
INR BLD: 2.4

## 2017-10-31 PROCEDURE — 71020 XR CHEST STANDARD TWO VW: CPT

## 2017-10-31 PROCEDURE — 85610 PROTHROMBIN TIME: CPT

## 2017-10-31 PROCEDURE — 99211 OFF/OP EST MAY X REQ PHY/QHP: CPT

## 2017-10-31 PROCEDURE — 93005 ELECTROCARDIOGRAM TRACING: CPT

## 2017-10-31 NOTE — PROGRESS NOTES
Fingerstick INR drawn per clinic protocol. Patient states no visible blood in urine and no black tarry stool. Ying Miguel states she had a pain injection in her back on 10/19/17 and missed 5 doses of warfarin prior to the injection. No change in diet. Ragini saw Dr. Skip Negron on 10/10/17 for bilateral leg/feet swelling and was prescribed lasix 20 mg daily as needed and received her seasonal influenza vaccine. Ying Miguel had a chemical stress test on 10/16/17, and CXR and EKG today, and will follow up with Dr. Anahy Traylor on 11/14/17. Will continue current weekly warfarin regimen and recheck INR in 6 week(s).

## 2017-11-09 ENCOUNTER — TELEPHONE (OUTPATIENT)
Dept: CARDIOLOGY CLINIC | Age: 72
End: 2017-11-09

## 2017-11-09 DIAGNOSIS — E55.9 VITAMIN D DEFICIENCY: ICD-10-CM

## 2017-11-09 DIAGNOSIS — E83.42 HYPOMAGNESEMIA: ICD-10-CM

## 2017-11-09 DIAGNOSIS — Z13.220 SCREENING FOR HYPERLIPIDEMIA: Primary | ICD-10-CM

## 2017-11-09 DIAGNOSIS — I10 ESSENTIAL HYPERTENSION, BENIGN: ICD-10-CM

## 2017-11-13 ENCOUNTER — HOSPITAL ENCOUNTER (OUTPATIENT)
Age: 72
Discharge: HOME | End: 2017-11-13
Payer: MEDICARE

## 2017-11-13 DIAGNOSIS — Z79.01 LONG TERM CURRENT USE OF ANTICOAGULANT THERAPY: ICD-10-CM

## 2017-11-13 DIAGNOSIS — I10 ESSENTIAL HYPERTENSION, BENIGN: ICD-10-CM

## 2017-11-13 DIAGNOSIS — Z13.220 SCREENING FOR HYPERLIPIDEMIA: ICD-10-CM

## 2017-11-13 DIAGNOSIS — E83.42 HYPOMAGNESEMIA: ICD-10-CM

## 2017-11-13 DIAGNOSIS — E55.9 VITAMIN D DEFICIENCY: ICD-10-CM

## 2017-11-13 DIAGNOSIS — E55.9 VITAMIN D DEFICIENCY: Primary | ICD-10-CM

## 2017-11-13 LAB
CHOLESTEROL/HDL RATIO: 2.5
CHOLESTEROL: 167 MG/DL
HDLC SERPL-MCNC: 66 MG/DL
LDL CHOLESTEROL: 76 MG/DL (ref 0–130)
MAGNESIUM: 1.6 MG/DL (ref 1.6–2.6)
PATIENT FASTING?: YES
TRIGL SERPL-MCNC: 126 MG/DL
VITAMIN D 25-HYDROXY: 40.5 NG/ML (ref 30–100)
VLDLC SERPL CALC-MCNC: NORMAL MG/DL (ref 1–30)

## 2017-11-13 PROCEDURE — 80061 LIPID PANEL: CPT

## 2017-11-13 PROCEDURE — 83735 ASSAY OF MAGNESIUM: CPT

## 2017-11-13 PROCEDURE — 36415 COLL VENOUS BLD VENIPUNCTURE: CPT

## 2017-11-13 PROCEDURE — 82306 VITAMIN D 25 HYDROXY: CPT

## 2017-11-14 ENCOUNTER — OFFICE VISIT (OUTPATIENT)
Dept: CARDIOLOGY CLINIC | Age: 72
End: 2017-11-14
Payer: MEDICARE

## 2017-11-14 ENCOUNTER — HOSPITAL ENCOUNTER (OUTPATIENT)
Age: 72
Discharge: HOME | End: 2017-11-14
Payer: MEDICARE

## 2017-11-14 VITALS
BODY MASS INDEX: 41.5 KG/M2 | HEART RATE: 98 BPM | WEIGHT: 265 LBS | OXYGEN SATURATION: 99 % | SYSTOLIC BLOOD PRESSURE: 140 MMHG | DIASTOLIC BLOOD PRESSURE: 80 MMHG

## 2017-11-14 DIAGNOSIS — R60.0 LEG EDEMA: Primary | ICD-10-CM

## 2017-11-14 DIAGNOSIS — R94.39 ABNORMAL STRESS TEST: ICD-10-CM

## 2017-11-14 DIAGNOSIS — R60.0 LEG EDEMA: ICD-10-CM

## 2017-11-14 LAB
ANION GAP SERPL CALCULATED.3IONS-SCNC: 16 MMOL/L (ref 9–17)
BUN BLDV-MCNC: 29 MG/DL (ref 8–23)
BUN/CREAT BLD: 21 (ref 9–20)
CALCIUM SERPL-MCNC: 10.6 MG/DL (ref 8.6–10.4)
CHLORIDE BLD-SCNC: 102 MMOL/L (ref 98–107)
CO2: 22 MMOL/L (ref 20–31)
CREAT SERPL-MCNC: 1.38 MG/DL (ref 0.5–0.9)
GFR AFRICAN AMERICAN: 46 ML/MIN
GFR NON-AFRICAN AMERICAN: 38 ML/MIN
GFR SERPL CREATININE-BSD FRML MDRD: ABNORMAL ML/MIN/{1.73_M2}
GFR SERPL CREATININE-BSD FRML MDRD: ABNORMAL ML/MIN/{1.73_M2}
GLUCOSE BLD-MCNC: 145 MG/DL (ref 70–99)
POTASSIUM SERPL-SCNC: 4.7 MMOL/L (ref 3.7–5.3)
SODIUM BLD-SCNC: 140 MMOL/L (ref 135–144)

## 2017-11-14 PROCEDURE — G8399 PT W/DXA RESULTS DOCUMENT: HCPCS | Performed by: INTERNAL MEDICINE

## 2017-11-14 PROCEDURE — 1123F ACP DISCUSS/DSCN MKR DOCD: CPT | Performed by: INTERNAL MEDICINE

## 2017-11-14 PROCEDURE — 36415 COLL VENOUS BLD VENIPUNCTURE: CPT

## 2017-11-14 PROCEDURE — 1090F PRES/ABSN URINE INCON ASSESS: CPT | Performed by: INTERNAL MEDICINE

## 2017-11-14 PROCEDURE — G8484 FLU IMMUNIZE NO ADMIN: HCPCS | Performed by: INTERNAL MEDICINE

## 2017-11-14 PROCEDURE — 4040F PNEUMOC VAC/ADMIN/RCVD: CPT | Performed by: INTERNAL MEDICINE

## 2017-11-14 PROCEDURE — 99204 OFFICE O/P NEW MOD 45 MIN: CPT | Performed by: INTERNAL MEDICINE

## 2017-11-14 PROCEDURE — 80048 BASIC METABOLIC PNL TOTAL CA: CPT

## 2017-11-14 PROCEDURE — 3017F COLORECTAL CA SCREEN DOC REV: CPT | Performed by: INTERNAL MEDICINE

## 2017-11-14 PROCEDURE — G8427 DOCREV CUR MEDS BY ELIG CLIN: HCPCS | Performed by: INTERNAL MEDICINE

## 2017-11-14 PROCEDURE — 1036F TOBACCO NON-USER: CPT | Performed by: INTERNAL MEDICINE

## 2017-11-14 PROCEDURE — 3014F SCREEN MAMMO DOC REV: CPT | Performed by: INTERNAL MEDICINE

## 2017-11-14 PROCEDURE — G8417 CALC BMI ABV UP PARAM F/U: HCPCS | Performed by: INTERNAL MEDICINE

## 2017-11-14 RX ORDER — AMLODIPINE BESYLATE 2.5 MG/1
2.5 TABLET ORAL DAILY
Qty: 30 TABLET | Refills: 6 | Status: SHIPPED | OUTPATIENT
Start: 2017-11-14 | End: 2017-11-20 | Stop reason: CLARIF

## 2017-11-14 NOTE — LETTER
Jj Wyatt M.D. [address] [place] [address], [facility]  [phone]          2017          DO Adonay [facility], [facility]    RE:  Bob Peres  : 1945    Dear Dr. Edel Hebert:    CHIEF COMPLAINT:  1.  Marked fatigue. 2.  Abnormal Cardiolite stress test with a moderate perfusion defect in the lateral, inferolateral wall consistent with ischemia with an intermediate risk for coronary artery disease. HISTORY OF PRESENT ILLNESS:  I had the pleasure of seeing Mrs. Jacob Peres in our office on 2017. She is a pleasant 82-year-old female who is fairly inactive and lives with her son. She has a history of mild chronic renal insufficiency with a creatinine in the 1.3 range. She has never had a cardiac catheterization, never had a Cardiolite stress test until recently. She began developing marked fatigue in the last several months associated also with some shortness of breath getting worse in the last several months. She notices her shortness of breath mainly when she attempts to do some walking. She occasionally has sharp discomfort, not necessarily related to activity. It is felt that her fatigue and her shortness of breath with activity is her anginal equivalent. She developed bilateral ankle edema and was given Lasix, which she took for 3 days with improvement. She still has mild edema in both lower ankles, but it is improved. She does not take her Lasix as she is afraid of hurting her kidneys. Because of her shortness of breath, she had a Lexiscan Cardiolite stress test done on 10/16/2017, which was abnormal showing an intermediate risk of coronary artery disease with inferior wall ischemia. She denies PND or orthopnea. Again, she is fairly inactive but is able to do some housework.   She has found doing housework very difficult in the last several months because of marked shortness of breath with activity, which is a distinct change. Her son has noted a marked loss of energy and increased shortness of breath over the last several months also, which has been worse in the last month. She does have a history of DVT and has been on Coumadin for the last 6 years. She does have a Valarie filter. CARDIAC RISK FACTORS:  Hypertension:  Positive. Diabetes:  Positive. Hyperlipidemia:  Positive. Other Family Members:  Negative. Smoking:  Negative. Peripheral Vascular Disease:  Negative. MEDICATIONS AT HOME:  She is currently on Zyloprim 100 mg b.i.d., Flexeril 10 mg b.i.d. p.r.n., Flonase p.r.n., Folvite 400 mcg daily, Lasix 20 mg p.r.n. for ankle edema, glipizide XL 5 mg daily, Microzide 12.5 mg daily, Cozaar 25 mg daily, magnesium 800 mg daily, Prilosec 20 mg daily, Inderal 60 mg b.i.d., Zantac 150 mg daily, and Coumadin as directed. PAST MEDICAL HISTORY:  She has noninsulin-dependent diabetes, history of hypertension, hyperlipidemia. She had DVT in the left leg 6 years ago,been on Coumadin since that time. She fell and fractured her elbow. She has had EGD, right total knee arthroplasty and left knee arthroplasty. She had a rotator cuff repair, partial nephrectomy with half of her left kidney removed. Hysterectomy. She had a breast biopsy in both left and right and she has a history of back surgery with fusion. Her chronic renal insufficiency is followed by Dr. Sravanthi Bain and she has CKD, stage III secondary to chronic interstitial nephritis. Her creatinine has been stable. FAMILY HISTORY:  Her father has diabetes. No significant coronary artery disease in her family. SOCIAL HISTORY:  She is 81years old, . She lives with her son, Olga Roman. He is her only child and actually got laid off the railroad yesterday and, therefore, is quite discouraged today.   She has no grandchildren as he is not . She is able to do some housework but has been very limited in the last several months because of marked shortness of breath. She does not smoke or drink alcohol. REVIEW OF SYSTEMS:  Cardiac as above. Other 10 systems reviewed including neurologic, cardiac, renal, musculoskeletal, and pulmonary. She has chronic back pain and arthritis in both knees but is able to walk with a cane. Denies any syncope or near syncope. No lightheadedness or dizziness. She does have ankle edema, which is better after taking Lasix for 3 days. PHYSICAL EXAMINATION:  VITAL SIGNS:  Her blood pressure was 140/80 with a heart rate of 90 and regular. Respiratory rate 18. O2 saturation 99%. Weight 265 pounds. GENERAL:  She is a pleasant 74-year-old female. Denied pain. She was oriented to person, place and time. Answered questions appropriately. Accompanied by her son, Raoul Hu, who she lives with. SKIN:  No unusual skin changes. HEENT:  The pupils are equally round and reactive to light and accommodation. Extraocular movements were intact. Mucous membranes were dry. NECK:  No JVD. Good carotid pulses. No carotid bruits. No lymphadenopathy or thyromegaly. CARDIOVASCULAR EXAM:  S1 and S2 were normal.  No S3 or S4. Soft systolic blowing type murmur. No diastolic murmur. PMI was normal.  No lifts, thrusts or pericardial friction rub. LUNGS:  Quite clear to auscultation and percussion. ABDOMEN:  Soft and nontender. Good bowel sounds. The aorta was not enlarged. No hepatomegaly, splenomegaly. EXTREMITIES:  Good femoral pulses. Good pedal pulses. She had mild pedal edema. Skin was warm and dry. No calf tenderness. Nail beds pink. Good cap refill. PULSES:  Bilateral symmetrical radial, brachial and carotid pulses. No carotid bruits. Good femoral and pedal pulses. NEUROLOGIC EXAM:  Within normal limits. PSYCHIATRIC EXAM:  Within normal limits. LABORATORY DATA:  Her sodium was 140, potassium 4.7, BUN 29, creatinine was 1.38, magnesium was 1.6, calcium 10.6. Cholesterol 167 with an HDL 66, LDL 76, triglycerides 126. Vitamin D was 40.5. Her hemoglobin A1c was 6.7. TSH 2.99. White count 11.4, hemoglobin 12.5 with a platelet count of 377,614. A Lexiscan Cardiolite stress test was done on 10/16/2017 that showed decreased perfusion in the inferoseptal region consistent with ischemia with an intermediate risk of CAD. Bedside echocardiogram showed normal LV function with mild mitral valve and tricuspid regurgitation by color flow and pulsed Doppler. It was somewhat difficult to image. Chest x-ray was unremarkable with previous spine fusion. There was an IVC filter in the abdomen. IMPRESSION:  1.  Marked shortness of breath in the last several months along with marked fatigue, which is her anginal equivalent with also chest pain now limited to walking approximately 30 to 40 feet before having to stop because of shortness of breath consistent with class III angina. 2.  Abnormal Cardiolite stress test on 10/16/2017 showing inferoseptal wall ischemia, intermediate risk of coronary artery disease. 3.  Noninsulin-dependent diabetes with a hemoglobin A1c of 6.7.  4.  Hypertension, well controlled. 5.  Pedal edema, which is controlled with Lasix intermittently. 6.  Chronic renal insufficiency with the creatinine about at baseline at 1.38, followed by Dr. Cy Ramirez. 7.  Arthritis of both knees making walking difficult. 8.  Normal LV function by bedside echocardiogram.    PLAN:  1. Add Norvasc 2.5 mg daily for maximum medical therapy for her class III angina. 2.  We will see again in 1 month and if she is still having symptoms consistent with class III angina manifested by marked shortness of breath with activity along with marked fatigue and her chest pain, then would consider cardiac catheterization.

## 2017-11-20 ENCOUNTER — TELEPHONE (OUTPATIENT)
Dept: CARDIOLOGY CLINIC | Age: 72
End: 2017-11-20

## 2017-11-20 NOTE — TELEPHONE ENCOUNTER
Patient called to state that she can't take the Amlopidine 2.5. She states that she feels weak and shaky. She states that she has pain in her neck and shoulder and these are symptoms she has when a medication does not agree with her. Lynnette Bhatti Please call patient. Lynnette Bhatti

## 2017-11-20 NOTE — TELEPHONE ENCOUNTER
Will stop amlodipine.   Will add Lopressor 25mg 1/2 tablet daily  Pt notified  \"dont give me many\" not sure that   Will work either-per pt  Per Dr. Enid Velasquez

## 2017-11-27 NOTE — PROGRESS NOTES
shortness of breath with activity, which is a distinct change. Her son has noted a marked loss of energy and increased shortness of breath over the last several months also, which has been worse in the last month. She does have a history of DVT and has been on Coumadin for the last 6 years. She does have a Valarie filter. CARDIAC RISK FACTORS:  Hypertension:  Positive. Diabetes:  Positive. Hyperlipidemia:  Positive. Other Family Members:  Negative. Smoking:  Negative. Peripheral Vascular Disease:  Negative. MEDICATIONS AT HOME:  She is currently on Zyloprim 100 mg b.i.d., Flexeril 10 mg b.i.d. p.r.n., Flonase p.r.n., Folvite 400 mcg daily, Lasix 20 mg p.r.n. for ankle edema, glipizide XL 5 mg daily, Microzide 12.5 mg daily, Cozaar 25 mg daily, magnesium 800 mg daily, Prilosec 20 mg daily, Inderal 60 mg b.i.d., Zantac 150 mg daily, and Coumadin as directed. PAST MEDICAL HISTORY:  She has noninsulin-dependent diabetes, history of hypertension, hyperlipidemia. She had DVT in the left leg 6 years ago,been on Coumadin since that time. She fell and fractured her elbow. She has had EGD, right total knee arthroplasty and left knee arthroplasty. She had a rotator cuff repair, partial nephrectomy with half of her left kidney removed. Hysterectomy. She had a breast biopsy in both left and right and she has a history of back surgery with fusion. Her chronic renal insufficiency is followed by Dr. Renuka Alonso and she has CKD, stage III secondary to chronic interstitial nephritis. Her creatinine has been stable. FAMILY HISTORY:  Her father has diabetes. No significant coronary artery disease in her family. SOCIAL HISTORY:  She is 81years old, . She lives with her son, Edna Miranda. He is her only child and actually got laid off the railroad yesterday and, therefore, is quite discouraged today. She has no grandchildren as he is not .   She is able to do some housework but has been very limited in the last several months because of marked shortness of breath. She does not smoke or drink alcohol. REVIEW OF SYSTEMS:  Cardiac as above. Other 10 systems reviewed including neurologic, cardiac, renal, musculoskeletal, and pulmonary. She has chronic back pain and arthritis in both knees but is able to walk with a cane. Denies any syncope or near syncope. No lightheadedness or dizziness. She does have ankle edema, which is better after taking Lasix for 3 days. PHYSICAL EXAMINATION:  VITAL SIGNS:  Her blood pressure was 140/80 with a heart rate of 90 and regular. Respiratory rate 18. O2 saturation 99%. Weight 265 pounds. GENERAL:  She is a pleasant 79-year-old female. Denied pain. She was oriented to person, place and time. Answered questions appropriately. Accompanied by her son, Luiza East, who she lives with. SKIN:  No unusual skin changes. HEENT:  The pupils are equally round and reactive to light and accommodation. Extraocular movements were intact. Mucous membranes were dry. NECK:  No JVD. Good carotid pulses. No carotid bruits. No lymphadenopathy or thyromegaly. CARDIOVASCULAR EXAM:  S1 and S2 were normal.  No S3 or S4. Soft systolic blowing type murmur. No diastolic murmur. PMI was normal.  No lifts, thrusts or pericardial friction rub. LUNGS:  Quite clear to auscultation and percussion. ABDOMEN:  Soft and nontender. Good bowel sounds. The aorta was not enlarged. No hepatomegaly, splenomegaly. EXTREMITIES:  Good femoral pulses. Good pedal pulses. She had mild pedal edema. Skin was warm and dry. No calf tenderness. Nail beds pink. Good cap refill. PULSES:  Bilateral symmetrical radial, brachial and carotid pulses. No carotid bruits. Good femoral and pedal pulses. NEUROLOGIC EXAM:  Within normal limits. PSYCHIATRIC EXAM:  Within normal limits.     LABORATORY DATA:  Her sodium was 140, potassium 4.7, BUN 29, creatinine was 1.38, magnesium was 1.6, calcium 10.6.  Cholesterol 167 with an HDL 66, LDL 76, triglycerides 126. Vitamin D was 40.5. Her hemoglobin A1c was 6.7. TSH 2.99. White count 11.4, hemoglobin 12.5 with a platelet count of 014,194. A Lexiscan Cardiolite stress test was done on 10/16/2017 that showed decreased perfusion in the inferoseptal region consistent with ischemia with an intermediate risk of CAD. Bedside echocardiogram showed normal LV function with mild mitral valve and tricuspid regurgitation by color flow and pulsed Doppler. It was somewhat difficult to image. Chest x-ray was unremarkable with previous spine fusion. There was an IVC filter in the abdomen. IMPRESSION:  1.  Marked shortness of breath in the last several months along with marked fatigue, which is her anginal equivalent with also chest pain now limited to walking approximately 30 to 40 feet before having to stop because of shortness of breath consistent with class III angina. 2.  Abnormal Cardiolite stress test on 10/16/2017 showing inferoseptal wall ischemia, intermediate risk of coronary artery disease. 3.  Noninsulin-dependent diabetes with a hemoglobin A1c of 6.7.  4.  Hypertension, well controlled. 5.  Pedal edema, which is controlled with Lasix intermittently. 6.  Chronic renal insufficiency with the creatinine about at baseline at 1.38, followed by Dr. Frederic Aponte. 7.  Arthritis of both knees making walking difficult. 8.  Normal LV function by bedside echocardiogram.    PLAN:  1. Add Norvasc 2.5 mg daily for maximum medical therapy for her class III angina. 2.  We will see again in 1 month and if she is still having symptoms consistent with class III angina manifested by marked shortness of breath with activity along with marked fatigue and her chest pain, then would consider cardiac catheterization. DISCUSSION:  Mrs. Jacob Peres has had marked change in her activity level in the last several months.   She has developed marked shortness of breath with

## 2017-12-05 ENCOUNTER — TELEPHONE (OUTPATIENT)
Dept: CARDIOLOGY CLINIC | Age: 72
End: 2017-12-05

## 2017-12-05 RX ORDER — ISOSORBIDE DINITRATE 5 MG/1
5 TABLET ORAL 2 TIMES DAILY
Qty: 60 TABLET | Refills: 0 | Status: SHIPPED | OUTPATIENT
Start: 2017-12-05 | End: 2017-12-29 | Stop reason: SDUPTHER

## 2017-12-05 NOTE — TELEPHONE ENCOUNTER
Pt called stating she can not take  The metoprolol either  Will try ISORDIL 5MG BID  Pt stated she wants to just not   Take anything for a couple day and  See how she does. And then will   Start the Isordil.  Also has appt on fri  Per dr. Vidya Canseco

## 2017-12-08 ENCOUNTER — OFFICE VISIT (OUTPATIENT)
Dept: FAMILY MEDICINE CLINIC | Age: 72
End: 2017-12-08
Payer: MEDICARE

## 2017-12-08 VITALS
WEIGHT: 261 LBS | OXYGEN SATURATION: 98 % | HEART RATE: 78 BPM | SYSTOLIC BLOOD PRESSURE: 112 MMHG | DIASTOLIC BLOOD PRESSURE: 70 MMHG | HEIGHT: 67 IN | BODY MASS INDEX: 40.97 KG/M2

## 2017-12-08 DIAGNOSIS — E11.21 DIABETIC NEPHROPATHY ASSOCIATED WITH TYPE 2 DIABETES MELLITUS (HCC): ICD-10-CM

## 2017-12-08 DIAGNOSIS — E11.9 TYPE 2 DIABETES MELLITUS WITHOUT COMPLICATION, WITHOUT LONG-TERM CURRENT USE OF INSULIN (HCC): Primary | ICD-10-CM

## 2017-12-08 DIAGNOSIS — R53.83 EASY FATIGABILITY: ICD-10-CM

## 2017-12-08 PROCEDURE — 1036F TOBACCO NON-USER: CPT | Performed by: FAMILY MEDICINE

## 2017-12-08 PROCEDURE — G8417 CALC BMI ABV UP PARAM F/U: HCPCS | Performed by: FAMILY MEDICINE

## 2017-12-08 PROCEDURE — 99213 OFFICE O/P EST LOW 20 MIN: CPT | Performed by: FAMILY MEDICINE

## 2017-12-08 PROCEDURE — 4040F PNEUMOC VAC/ADMIN/RCVD: CPT | Performed by: FAMILY MEDICINE

## 2017-12-08 PROCEDURE — 3017F COLORECTAL CA SCREEN DOC REV: CPT | Performed by: FAMILY MEDICINE

## 2017-12-08 PROCEDURE — 1090F PRES/ABSN URINE INCON ASSESS: CPT | Performed by: FAMILY MEDICINE

## 2017-12-08 PROCEDURE — G8484 FLU IMMUNIZE NO ADMIN: HCPCS | Performed by: FAMILY MEDICINE

## 2017-12-08 PROCEDURE — G8399 PT W/DXA RESULTS DOCUMENT: HCPCS | Performed by: FAMILY MEDICINE

## 2017-12-08 PROCEDURE — 3044F HG A1C LEVEL LT 7.0%: CPT | Performed by: FAMILY MEDICINE

## 2017-12-08 PROCEDURE — 3014F SCREEN MAMMO DOC REV: CPT | Performed by: FAMILY MEDICINE

## 2017-12-08 PROCEDURE — 1123F ACP DISCUSS/DSCN MKR DOCD: CPT | Performed by: FAMILY MEDICINE

## 2017-12-08 PROCEDURE — G8427 DOCREV CUR MEDS BY ELIG CLIN: HCPCS | Performed by: FAMILY MEDICINE

## 2017-12-08 ASSESSMENT — PATIENT HEALTH QUESTIONNAIRE - PHQ9
2. FEELING DOWN, DEPRESSED OR HOPELESS: 0
1. LITTLE INTEREST OR PLEASURE IN DOING THINGS: 0
SUM OF ALL RESPONSES TO PHQ QUESTIONS 1-9: 0
SUM OF ALL RESPONSES TO PHQ9 QUESTIONS 1 & 2: 0

## 2017-12-08 ASSESSMENT — ENCOUNTER SYMPTOMS: GASTROINTESTINAL NEGATIVE: 1

## 2017-12-08 NOTE — PROGRESS NOTES
METRIX AIR GLUCOSE METER) W/DEVICE KIT Use to test sugar once daily 2/1/17  Yes Mignon Byrd DO   acetaminophen (TYLENOL) 650 MG CR tablet Take 1,300 mg by mouth every 8 hours as needed for Pain   Yes Historical Provider, MD   Magnesium 400 MG TABS Take 800 mg by mouth nightly    Yes Historical Provider, MD   folic acid (FOLVITE) 229 MCG tablet Take 400 mcg by mouth daily 2/8/16  Yes Historical Provider, MD   Calcium Carb-Cholecalciferol (CALCIUM 600/VITAMIN D3) 600-800 MG-UNIT TABS Take 2 tablets by mouth daily. Yes Historical Provider, MD        Allergies:       Codeine; Percocet [oxycodone-acetaminophen]; Adhesive tape; and Norco [hydrocodone-acetaminophen]    Social History:     Tobacco:    reports that she has never smoked. She has never used smokeless tobacco.  Alcohol:      reports that she does not drink alcohol. Drug Use:  reports that she does not use drugs. Family History:     Family History   Problem Relation Age of Onset    Diabetes Father     Cancer Paternal Uncle      colon       Review of Systems:     Positive and Negative as described in HPI    Review of Systems   Constitutional: Negative. Gastrointestinal: Negative. Genitourinary: Negative. Physical Exam:     Vitals:  /70   Pulse 78   Ht 5' 7\" (1.702 m)   Wt 261 lb (118.4 kg)   SpO2 98%   BMI 40.88 kg/m²   Physical Exam   Constitutional: She is oriented to person, place, and time. She appears well-developed and well-nourished. No distress. HENT:   Head: Normocephalic and atraumatic. Mouth/Throat: Oropharynx is clear and moist.   Eyes: Conjunctivae and EOM are normal. Pupils are equal, round, and reactive to light. Neck: Neck supple. Cardiovascular: Normal rate, regular rhythm and normal heart sounds. No peripheral edema. Pulmonary/Chest: Effort normal and breath sounds normal.   Lymphadenopathy:     She has no cervical adenopathy. Neurological: She is alert and oriented to person, place, and time.

## 2017-12-12 ENCOUNTER — HOSPITAL ENCOUNTER (OUTPATIENT)
Dept: PHARMACY | Age: 72
Setting detail: THERAPIES SERIES
Discharge: HOME | End: 2017-12-12
Payer: MEDICARE

## 2017-12-12 VITALS
BODY MASS INDEX: 40.42 KG/M2 | DIASTOLIC BLOOD PRESSURE: 84 MMHG | SYSTOLIC BLOOD PRESSURE: 120 MMHG | WEIGHT: 258.1 LBS | HEART RATE: 78 BPM

## 2017-12-12 DIAGNOSIS — Z79.01 LONG TERM CURRENT USE OF ANTICOAGULANT THERAPY: ICD-10-CM

## 2017-12-12 DIAGNOSIS — I82.4Y9 DEEP VEIN THROMBOSIS (DVT) OF PROXIMAL LOWER EXTREMITY, UNSPECIFIED CHRONICITY, UNSPECIFIED LATERALITY (HCC): ICD-10-CM

## 2017-12-12 LAB — INR BLD: 3.4

## 2017-12-12 PROCEDURE — 99211 OFF/OP EST MAY X REQ PHY/QHP: CPT

## 2017-12-12 PROCEDURE — 85610 PROTHROMBIN TIME: CPT

## 2017-12-12 RX ORDER — WARFARIN SODIUM 5 MG/1
TABLET ORAL
Qty: 90 TABLET | Refills: 3 | Status: SHIPPED
Start: 2017-12-12 | End: 2018-09-25 | Stop reason: SDUPTHER

## 2017-12-12 NOTE — PROGRESS NOTES
Fingerstick INR drawn per clinic protocol. Patient states no visible blood in urine and no black tarry stool. Denies any missed doses of warfarin. Marry Contreras says that she had abnormal stress test and was started on Amlodipine 2.5 mg once daily per Dr. Bee Brasher on 11-. She says it made her \"weak\" and \"shaky\" so she called Dr. George Vieira office and was switched to Metoprolol 2.5 mg daily. She says this also made her \"feel the same way\" and she also developed \"pain in (her) neck and shoulder\". After calling Dr. Harley Hernández office again, her Metoprolol was also stopped and she was switched to Isordil 5 mg 2 times daily. She says she is still taking this medication \"for 2 more days\" until she sees Dr. Bee Brasher in the office this Thursday, 12-. She still says she \"feels the same\" (weak and shaky) and thinks the medications are making her feel this way. No change in other maintenance medications or in diet. Since her INR has jumped slightly supra-therapeutic today, we will decrease current weekly warfarin regimen (8%) as noted on her doing calendar and then we will recheck INR in 3 weeks.

## 2017-12-14 ENCOUNTER — HOSPITAL ENCOUNTER (OUTPATIENT)
Age: 72
Discharge: HOME | End: 2017-12-14
Payer: MEDICARE

## 2017-12-14 ENCOUNTER — OFFICE VISIT (OUTPATIENT)
Dept: CARDIOLOGY CLINIC | Age: 72
End: 2017-12-14
Payer: MEDICARE

## 2017-12-14 VITALS
OXYGEN SATURATION: 93 % | HEART RATE: 72 BPM | BODY MASS INDEX: 40.57 KG/M2 | DIASTOLIC BLOOD PRESSURE: 80 MMHG | WEIGHT: 259 LBS | SYSTOLIC BLOOD PRESSURE: 120 MMHG

## 2017-12-14 DIAGNOSIS — R60.0 LEG EDEMA: ICD-10-CM

## 2017-12-14 DIAGNOSIS — R06.02 SOB (SHORTNESS OF BREATH): Primary | ICD-10-CM

## 2017-12-14 LAB
ANION GAP SERPL CALCULATED.3IONS-SCNC: 15 MMOL/L (ref 9–17)
BUN BLDV-MCNC: 32 MG/DL (ref 8–23)
BUN/CREAT BLD: 20 (ref 9–20)
CALCIUM SERPL-MCNC: 10.6 MG/DL (ref 8.6–10.4)
CHLORIDE BLD-SCNC: 101 MMOL/L (ref 98–107)
CO2: 26 MMOL/L (ref 20–31)
CREAT SERPL-MCNC: 1.63 MG/DL (ref 0.5–0.9)
GFR AFRICAN AMERICAN: 38 ML/MIN
GFR NON-AFRICAN AMERICAN: 31 ML/MIN
GFR SERPL CREATININE-BSD FRML MDRD: ABNORMAL ML/MIN/{1.73_M2}
GFR SERPL CREATININE-BSD FRML MDRD: ABNORMAL ML/MIN/{1.73_M2}
GLUCOSE BLD-MCNC: 173 MG/DL (ref 70–99)
POTASSIUM SERPL-SCNC: 3.9 MMOL/L (ref 3.7–5.3)
SODIUM BLD-SCNC: 142 MMOL/L (ref 135–144)

## 2017-12-14 PROCEDURE — 36415 COLL VENOUS BLD VENIPUNCTURE: CPT

## 2017-12-14 PROCEDURE — 3014F SCREEN MAMMO DOC REV: CPT | Performed by: INTERNAL MEDICINE

## 2017-12-14 PROCEDURE — 4040F PNEUMOC VAC/ADMIN/RCVD: CPT | Performed by: INTERNAL MEDICINE

## 2017-12-14 PROCEDURE — 80048 BASIC METABOLIC PNL TOTAL CA: CPT

## 2017-12-14 PROCEDURE — 1036F TOBACCO NON-USER: CPT | Performed by: INTERNAL MEDICINE

## 2017-12-14 PROCEDURE — G8417 CALC BMI ABV UP PARAM F/U: HCPCS | Performed by: INTERNAL MEDICINE

## 2017-12-14 PROCEDURE — G8399 PT W/DXA RESULTS DOCUMENT: HCPCS | Performed by: INTERNAL MEDICINE

## 2017-12-14 PROCEDURE — 1123F ACP DISCUSS/DSCN MKR DOCD: CPT | Performed by: INTERNAL MEDICINE

## 2017-12-14 PROCEDURE — 99213 OFFICE O/P EST LOW 20 MIN: CPT | Performed by: INTERNAL MEDICINE

## 2017-12-14 PROCEDURE — 3017F COLORECTAL CA SCREEN DOC REV: CPT | Performed by: INTERNAL MEDICINE

## 2017-12-14 PROCEDURE — G8427 DOCREV CUR MEDS BY ELIG CLIN: HCPCS | Performed by: INTERNAL MEDICINE

## 2017-12-14 PROCEDURE — G8484 FLU IMMUNIZE NO ADMIN: HCPCS | Performed by: INTERNAL MEDICINE

## 2017-12-14 PROCEDURE — 1090F PRES/ABSN URINE INCON ASSESS: CPT | Performed by: INTERNAL MEDICINE

## 2017-12-14 NOTE — LETTER
Demond Bullock M.D. [address] [place] [address], [facility] [facility]  [phone]          2017          Dorene Rosenberg, DO  [address] [facility] [place], [place] [address]      RE:  Ranjith Aguilar  :     Dear Dr. Travis Eaton:    CHIEF COMPLAINT:  Fatigue, shortness of breath, abnormal stress test.    HISTORY OF PRESENT ILLNESS:  I had the pleasure of seeing Mrs. Samantha Guajardo in the office on 2017 for evaluation of her angina consistent with shortness of breath and loss of energy. My full H and P is from 2017. In general, she has developed marked fatigue in the last several months and shortness of breath with any activity. She does have chest pain, but it seems somewhat atypical.    Because of shortness of breath, we did a Cardiolite stress test on 10/16/2017, which was abnormal showing inferior wall ischemia with an intermediate risk of coronary artery disease. She does have a history of DVT and has been on Coumadin for the last 6 years. She has been on Inderal 60 mg b.i.d.  I added Norvasc 2.5 mg daily, which she could not tolerate. We added Isordil 5 mg b.i.d., which she has tolerated. When I see her today, she continues to have marked fatigue with shortness of breath. She has some dizziness. She does have some headaches with Imdur. Her fatigue has continued and her shortness of breath has continued. This is confirmed by her son and she still has marked change in her exercise capacity from where she was approximately 3 to 4 months ago. This is all consistent with class III angina.     MEDICATIONS:  Her medications are Tylenol p.r.n., Zyloprim 100 mg b.i.d., Flexeril 10 mg b.i.d. p.r.n., Flonase p.r.n., Lasix 20 mg daily p.r.n., glipizide XL 5 mg daily, Microzide 12.5 mg daily, Isordil 5 mg b.i.d., Cozaar 25 mg daily, magnesium 800 mg nightly, Prilosec 20 mg daily, PLAN:  1. Continue to consider cardiac catheterization versus continue medical therapy. 2.  We will reevaluate in January to review her symptoms and decide on cardiac catheterization. DISCUSSION:  Mrs. Yulia Vaca continues to struggle. Energy level is still poor and she continues to be short of breath. She has some chest pain that is somewhat atypical and I am not sure the chest pain itself is angina. We are somewhat at a standstill with antianginal medication. She is tolerating her Inderal and she is tolerating a very low dose of Isordil 5 mg at b.i.d. We have tried to increase the dose, which she does not tolerate as she states she gets dizzy. We have also tried to add calcium channel blocker, which she did not tolerate. I did discuss cardiac catheterization. She is somewhat hesitant and the other option is to continue medical therapy and watch for any change in symptoms. She wishes to wait at this time and I will reevaluate her in January. She is discouraged that her energy level is still low, but again I have been unable to increase her antianginal medication. She does have severe arthritis and does not wish to attempt cardiac rehab. Thank you very much for allowing me the privilege of seeing Mrs. Yulia Vaca. If you have any questions on my thoughts, please do not hesitate to contact me.     Sincerely,        Wayne Phillips    D: 12/15/2017 7:21:44       T: 12/15/2017 9:33:16     [id number]  Job#: [id number]     Doc#: [id number]

## 2017-12-14 NOTE — PROGRESS NOTES
Ov Dr Tyrel Lutz 1 mth follow up  Still having sob and dizziness   Nothing changed even on med  Cont have headaches  With isordil  Main concern fatigue  To call with list of medications  Will discuss cath after sons  Cath is done  Will see pt in January

## 2017-12-19 NOTE — PROGRESS NOTES
Jj Wyatt M.D. [address] [place] [address]  [phone]          2017          Rachel Corral, DO  [address] [address]      RE:  Victoria Stark  :     Dear Dr. Edel Hebert:    CHIEF COMPLAINT:  Fatigue, shortness of breath, abnormal stress test.    HISTORY OF PRESENT ILLNESS:  I had the pleasure of seeing Mrs. Jacob Peres in the office on 2017 for evaluation of her angina consistent with shortness of breath and loss of energy. My full H and P is from 2017. In general, she has developed marked fatigue in the last several months and shortness of breath with any activity. She does have chest pain, but it seems somewhat atypical.    Because of shortness of breath, we did a Cardiolite stress test on 10/16/2017, which was abnormal showing inferior wall ischemia with an intermediate risk of coronary artery disease. She does have a history of DVT and has been on Coumadin for the last 6 years. She has been on Inderal 60 mg b.i.d.  I added Norvasc 2.5 mg daily, which she could not tolerate. We added Isordil 5 mg b.i.d., which she has tolerated. When I see her today, she continues to have marked fatigue with shortness of breath. She has some dizziness. She does have some headaches with Imdur. Her fatigue has continued and her shortness of breath has continued. This is confirmed by her son and she still has marked change in her exercise capacity from where she was approximately 3 to 4 months ago. This is all consistent with class III angina.     MEDICATIONS:  Her medications are Tylenol p.r.n., Zyloprim 100 mg b.i.d., Flexeril 10 mg b.i.d. p.r.n., Flonase p.r.n., Lasix 20 mg daily p.r.n., glipizide XL 5 mg daily, Microzide 12.5 mg daily, Isordil 5 mg b.i.d., Cozaar 25 mg daily, magnesium 800 mg nightly, Prilosec 20 mg daily, Inderal 60 mg b.i.d., Zantac 150 mg daily and Coumadin as directed. PHYSICAL EXAMINATION:  VITAL SIGNS:  Her blood pressure was 120/80 with a heart rate of 72 and regular, respiratory rate 18, O2 saturation 93%, weight 259 pounds. GENERAL:  She is a pleasant 59-year-old female. Denied pain. She was oriented to person, place and time. Answered questions appropriately. SKIN:  No unusual skin changes. HEENT:  The pupils are equally round and reactive to light and accommodation. Extraocular movements were intact. Mucous membranes were dry. NECK:  No JVD. Good carotid pulses. No carotid bruits. No lymphadenopathy or thyromegaly. CARDIOVASCULAR EXAM:  S1 and S2 were normal.  No S3 or S4. Soft systolic blowing type murmur. No diastolic murmur. PMI was normal.  No lift, thrust, or pericardial friction rub. LUNGS:  Quite clear to auscultation and percussion. ABDOMEN:  Soft and nontender. Good bowel sounds. EXTREMITIES:  Good femoral pulses. Good pedal pulses. Mild pedal edema. Skin was warm and dry. No calf tenderness. Nail beds pink. Good cap refill. PULSES:  Bilateral symmetrical radial, brachial and carotid pulses. No carotid bruits. Good femoral and pedal pulses. NEUROLOGIC EXAM:  Within normal limits. PSYCHIATRIC EXAM:  Within normal limits. LABORATORY DATA:  From 12/14/2017, sodium 142, potassium 3.9, BUN 32, creatinine 1.62, calcium 10.6. IMPRESSION:  1.  Marked shortness of breath with marked loss of energy with marked decrease in exercise capacity over the last 3 months, all consistent with class III angina. 2.  Abnormal stress test on 10/16/2017 showing inferoseptal ischemia, intermediate risk of CAD. 3.  Continued symptoms with full medical therapy. 4.  Non-insulin dependent diabetes. 5.  Hypertension, well controlled. 6.  Chronic renal insufficiency followed by Dr. Sravanthi Bain with a creatinine of 1.6 at this time. 7.  Arthritis of both knees. 8.  Normal LV function. PLAN:  1.   Continue to consider cardiac

## 2017-12-21 ENCOUNTER — TELEPHONE (OUTPATIENT)
Dept: FAMILY MEDICINE CLINIC | Age: 72
End: 2017-12-21

## 2017-12-21 ENCOUNTER — NURSE ONLY (OUTPATIENT)
Dept: FAMILY MEDICINE CLINIC | Age: 72
End: 2017-12-21
Payer: MEDICARE

## 2017-12-21 DIAGNOSIS — R35.0 URINARY FREQUENCY: Primary | ICD-10-CM

## 2017-12-21 DIAGNOSIS — R30.0 BURNING WITH URINATION: ICD-10-CM

## 2017-12-21 LAB
BILIRUBIN, POC: ABNORMAL
BLOOD URINE, POC: ABNORMAL
CLARITY, POC: ABNORMAL
COLOR, POC: ABNORMAL
GLUCOSE URINE, POC: ABNORMAL
KETONES, POC: ABNORMAL
LEUKOCYTE EST, POC: ABNORMAL
NITRITE, POC: ABNORMAL
PH, POC: 7
PROTEIN, POC: >=300
SPECIFIC GRAVITY, POC: 1.02
UROBILINOGEN, POC: 0.2

## 2017-12-21 PROCEDURE — 81002 URINALYSIS NONAUTO W/O SCOPE: CPT | Performed by: FAMILY MEDICINE

## 2017-12-21 RX ORDER — CEPHALEXIN 500 MG/1
500 CAPSULE ORAL 2 TIMES DAILY
Qty: 14 CAPSULE | Refills: 0 | Status: SHIPPED | OUTPATIENT
Start: 2017-12-21 | End: 2018-01-04 | Stop reason: SDUPTHER

## 2017-12-21 NOTE — TELEPHONE ENCOUNTER
Essential hypertension, benign     Esophageal reflux     Generalized osteoarthrosis, unspecified site     Irritable bowel syndrome     Seasonal allergies     Deep vein thrombosis (DVT) (HCC)     Long term current use of anticoagulant therapy     Gout     Rectocele     Tubular adenoma of colon     Hiatal hernia     Sigmoid diverticulosis     Sleep disorder     Chronic back pain     Constipation     Hypomagnesemia     Leg edema     Abnormal stress test

## 2017-12-21 NOTE — TELEPHONE ENCOUNTER
----- Message from Adelina Sommer DO sent at 12/21/2017 11:05 AM EST -----  Keflex 500 bid x 7 please

## 2017-12-29 ENCOUNTER — TELEPHONE (OUTPATIENT)
Dept: FAMILY MEDICINE CLINIC | Age: 72
End: 2017-12-29

## 2017-12-29 RX ORDER — ISOSORBIDE DINITRATE 5 MG/1
5 TABLET ORAL 2 TIMES DAILY
Qty: 60 TABLET | Refills: 0 | Status: SHIPPED | OUTPATIENT
Start: 2017-12-29 | End: 2018-01-24 | Stop reason: SDUPTHER

## 2018-01-04 ENCOUNTER — TELEPHONE (OUTPATIENT)
Dept: FAMILY MEDICINE CLINIC | Age: 73
End: 2018-01-04

## 2018-01-04 RX ORDER — CEPHALEXIN 500 MG/1
500 CAPSULE ORAL 2 TIMES DAILY
Qty: 14 CAPSULE | Refills: 0 | Status: SHIPPED | OUTPATIENT
Start: 2018-01-04 | End: 2019-03-12 | Stop reason: SDUPTHER

## 2018-01-09 ENCOUNTER — HOSPITAL ENCOUNTER (OUTPATIENT)
Dept: PHARMACY | Age: 73
Setting detail: THERAPIES SERIES
Discharge: HOME | End: 2018-01-09
Payer: MEDICARE

## 2018-01-09 VITALS
BODY MASS INDEX: 40.22 KG/M2 | DIASTOLIC BLOOD PRESSURE: 82 MMHG | SYSTOLIC BLOOD PRESSURE: 130 MMHG | HEART RATE: 76 BPM | WEIGHT: 256.8 LBS

## 2018-01-09 DIAGNOSIS — I82.4Y9 DEEP VEIN THROMBOSIS (DVT) OF PROXIMAL LOWER EXTREMITY, UNSPECIFIED CHRONICITY, UNSPECIFIED LATERALITY (HCC): ICD-10-CM

## 2018-01-09 DIAGNOSIS — Z79.01 LONG TERM CURRENT USE OF ANTICOAGULANT THERAPY: ICD-10-CM

## 2018-01-09 LAB — INR BLD: 3.2

## 2018-01-09 PROCEDURE — 85610 PROTHROMBIN TIME: CPT

## 2018-01-09 PROCEDURE — 99211 OFF/OP EST MAY X REQ PHY/QHP: CPT

## 2018-01-11 ENCOUNTER — OFFICE VISIT (OUTPATIENT)
Dept: CARDIOLOGY CLINIC | Age: 73
End: 2018-01-11
Payer: MEDICARE

## 2018-01-11 VITALS
WEIGHT: 256 LBS | DIASTOLIC BLOOD PRESSURE: 80 MMHG | SYSTOLIC BLOOD PRESSURE: 140 MMHG | BODY MASS INDEX: 40.1 KG/M2 | OXYGEN SATURATION: 97 % | HEART RATE: 88 BPM

## 2018-01-11 DIAGNOSIS — E55.9 VITAMIN D DEFICIENCY DISEASE: ICD-10-CM

## 2018-01-11 DIAGNOSIS — E83.42 HYPOMAGNESEMIA: ICD-10-CM

## 2018-01-11 DIAGNOSIS — I10 ESSENTIAL HYPERTENSION, BENIGN: ICD-10-CM

## 2018-01-11 DIAGNOSIS — E11.9 TYPE 2 DIABETES MELLITUS WITHOUT COMPLICATION, WITHOUT LONG-TERM CURRENT USE OF INSULIN (HCC): Primary | ICD-10-CM

## 2018-01-11 PROCEDURE — 99212 OFFICE O/P EST SF 10 MIN: CPT | Performed by: INTERNAL MEDICINE

## 2018-01-11 PROCEDURE — 1090F PRES/ABSN URINE INCON ASSESS: CPT | Performed by: INTERNAL MEDICINE

## 2018-01-11 PROCEDURE — G8484 FLU IMMUNIZE NO ADMIN: HCPCS | Performed by: INTERNAL MEDICINE

## 2018-01-11 PROCEDURE — G8417 CALC BMI ABV UP PARAM F/U: HCPCS | Performed by: INTERNAL MEDICINE

## 2018-01-11 PROCEDURE — 4040F PNEUMOC VAC/ADMIN/RCVD: CPT | Performed by: INTERNAL MEDICINE

## 2018-01-11 PROCEDURE — 3014F SCREEN MAMMO DOC REV: CPT | Performed by: INTERNAL MEDICINE

## 2018-01-11 PROCEDURE — 3046F HEMOGLOBIN A1C LEVEL >9.0%: CPT | Performed by: INTERNAL MEDICINE

## 2018-01-11 PROCEDURE — 1036F TOBACCO NON-USER: CPT | Performed by: INTERNAL MEDICINE

## 2018-01-11 PROCEDURE — 1123F ACP DISCUSS/DSCN MKR DOCD: CPT | Performed by: INTERNAL MEDICINE

## 2018-01-11 PROCEDURE — 3017F COLORECTAL CA SCREEN DOC REV: CPT | Performed by: INTERNAL MEDICINE

## 2018-01-11 PROCEDURE — G8427 DOCREV CUR MEDS BY ELIG CLIN: HCPCS | Performed by: INTERNAL MEDICINE

## 2018-01-11 PROCEDURE — G8399 PT W/DXA RESULTS DOCUMENT: HCPCS | Performed by: INTERNAL MEDICINE

## 2018-01-11 NOTE — PROGRESS NOTES
Ov DR Karl Seth 1 mth follow up  Still sob and no energy  Being treated for UTI right now  occ chest pain different areas of  Chest   Still would like to hold off on heart cath  Until This spring  Needing injection for back this month  appt with DR Driver on 1/24  Will see in 4-5 mths.

## 2018-01-11 NOTE — LETTER
Marilou Mckinley M.D. [address] [place], [place] [address]  [phone]        2018        Kimberly Hopkins, DO  [address] [facility] [address]    RE:  Carron Shock  :      Dear Dr. Curry Benson:    CHIEF COMPLAINT:  Chest pain. HISTORY OF PRESENT ILLNESS:  I met with Mrs. Johnny Johnson to decide on cardiac catheterization. She continued to have chest pain, both with typical and atypical features. I wanted to try full medical therapy and if she continued to have discomfort, consider cardiac catheterization. When I see her today, she states that she is doing well enough that she does not want to proceed with a catheterization. She would like to continue medical therapy. She needs an injection in her back for severe back pain and does not want to complicate the issue. Therefore, I will hold off on any procedure at this time. I will see her back in approximately 5 months and we will make a decision at that time concerning cardiac catheterization. Thank you very much for allowing me the privilege of seeing Mrs. Johnny Johnson. If you have any questions on my thoughts, please do not hesitate to contact me.     Sincerely,        Derick Honeycutt    D: 2018 11:57:03       T: 2018 23:29:34     [id number]  Job#: [id number]     Doc#: [id number]

## 2018-01-24 RX ORDER — ISOSORBIDE DINITRATE 5 MG/1
5 TABLET ORAL 2 TIMES DAILY
Qty: 60 TABLET | Refills: 11 | Status: SHIPPED | OUTPATIENT
Start: 2018-01-24 | End: 2018-02-20 | Stop reason: SDUPTHER

## 2018-01-29 DIAGNOSIS — E11.9 TYPE 2 DIABETES MELLITUS WITHOUT COMPLICATION, WITHOUT LONG-TERM CURRENT USE OF INSULIN (HCC): ICD-10-CM

## 2018-01-30 RX ORDER — GLIPIZIDE 5 MG/1
TABLET, FILM COATED, EXTENDED RELEASE ORAL
Qty: 90 TABLET | Refills: 1 | Status: SHIPPED | OUTPATIENT
Start: 2018-01-30 | End: 2018-06-18 | Stop reason: SDUPTHER

## 2018-02-06 ENCOUNTER — HOSPITAL ENCOUNTER (OUTPATIENT)
Age: 73
Discharge: HOME | End: 2018-02-06
Payer: MEDICARE

## 2018-02-06 ENCOUNTER — HOSPITAL ENCOUNTER (OUTPATIENT)
Dept: PHARMACY | Age: 73
Setting detail: THERAPIES SERIES
Discharge: HOME | End: 2018-02-06
Payer: MEDICARE

## 2018-02-06 VITALS
HEART RATE: 74 BPM | BODY MASS INDEX: 39.08 KG/M2 | SYSTOLIC BLOOD PRESSURE: 122 MMHG | DIASTOLIC BLOOD PRESSURE: 78 MMHG | WEIGHT: 249.5 LBS

## 2018-02-06 DIAGNOSIS — Z79.01 LONG TERM CURRENT USE OF ANTICOAGULANT THERAPY: ICD-10-CM

## 2018-02-06 DIAGNOSIS — I82.4Y9 DEEP VEIN THROMBOSIS (DVT) OF PROXIMAL LOWER EXTREMITY, UNSPECIFIED CHRONICITY, UNSPECIFIED LATERALITY (HCC): ICD-10-CM

## 2018-02-06 LAB
ALBUMIN SERPL-MCNC: 3.8 G/DL (ref 3.5–5.2)
ANION GAP SERPL CALCULATED.3IONS-SCNC: 14 MMOL/L (ref 9–17)
BUN BLDV-MCNC: 38 MG/DL (ref 8–23)
CALCIUM SERPL-MCNC: 10.8 MG/DL (ref 8.6–10.4)
CHLORIDE BLD-SCNC: 101 MMOL/L (ref 98–107)
CO2: 26 MMOL/L (ref 20–31)
CREAT SERPL-MCNC: 1.69 MG/DL (ref 0.5–0.9)
CREATININE URINE: 213.9 MG/DL (ref 28–217)
GFR AFRICAN AMERICAN: 36 ML/MIN
GFR NON-AFRICAN AMERICAN: 30 ML/MIN
GFR SERPL CREATININE-BSD FRML MDRD: ABNORMAL ML/MIN/{1.73_M2}
GFR SERPL CREATININE-BSD FRML MDRD: ABNORMAL ML/MIN/{1.73_M2}
HCT VFR BLD CALC: 41 % (ref 36–46)
HEMOGLOBIN: 13.4 G/DL (ref 12–16)
INR BLD: 1.4
MAGNESIUM: 1.6 MG/DL (ref 1.6–2.6)
PHOSPHORUS: 3.4 MG/DL (ref 2.6–4.5)
POTASSIUM SERPL-SCNC: 4 MMOL/L (ref 3.7–5.3)
SODIUM BLD-SCNC: 141 MMOL/L (ref 135–144)
TOTAL PROTEIN, URINE: 25 MG/DL
URIC ACID: 5.4 MG/DL (ref 2.4–5.7)
VITAMIN D 25-HYDROXY: 45.6 NG/ML (ref 30–100)

## 2018-02-06 PROCEDURE — 82565 ASSAY OF CREATININE: CPT

## 2018-02-06 PROCEDURE — 85610 PROTHROMBIN TIME: CPT

## 2018-02-06 PROCEDURE — 82570 ASSAY OF URINE CREATININE: CPT

## 2018-02-06 PROCEDURE — 83735 ASSAY OF MAGNESIUM: CPT

## 2018-02-06 PROCEDURE — 85018 HEMOGLOBIN: CPT

## 2018-02-06 PROCEDURE — 82306 VITAMIN D 25 HYDROXY: CPT

## 2018-02-06 PROCEDURE — 85014 HEMATOCRIT: CPT

## 2018-02-06 PROCEDURE — 84520 ASSAY OF UREA NITROGEN: CPT

## 2018-02-06 PROCEDURE — 84156 ASSAY OF PROTEIN URINE: CPT

## 2018-02-06 PROCEDURE — 82040 ASSAY OF SERUM ALBUMIN: CPT

## 2018-02-06 PROCEDURE — 36415 COLL VENOUS BLD VENIPUNCTURE: CPT

## 2018-02-06 PROCEDURE — 99211 OFF/OP EST MAY X REQ PHY/QHP: CPT

## 2018-02-06 PROCEDURE — 83970 ASSAY OF PARATHORMONE: CPT

## 2018-02-06 PROCEDURE — 84550 ASSAY OF BLOOD/URIC ACID: CPT

## 2018-02-06 PROCEDURE — 84100 ASSAY OF PHOSPHORUS: CPT

## 2018-02-06 PROCEDURE — 82310 ASSAY OF CALCIUM: CPT

## 2018-02-06 PROCEDURE — 80051 ELECTROLYTE PANEL: CPT

## 2018-02-07 LAB — PTH INTACT: 62.95 PG/ML (ref 15–65)

## 2018-02-20 ENCOUNTER — HOSPITAL ENCOUNTER (OUTPATIENT)
Dept: PHARMACY | Age: 73
Setting detail: THERAPIES SERIES
Discharge: HOME | End: 2018-02-20
Payer: MEDICARE

## 2018-02-20 VITALS
SYSTOLIC BLOOD PRESSURE: 122 MMHG | DIASTOLIC BLOOD PRESSURE: 74 MMHG | BODY MASS INDEX: 38.45 KG/M2 | HEART RATE: 78 BPM | WEIGHT: 245.5 LBS

## 2018-02-20 DIAGNOSIS — I82.4Y9 DEEP VEIN THROMBOSIS (DVT) OF PROXIMAL LOWER EXTREMITY, UNSPECIFIED CHRONICITY, UNSPECIFIED LATERALITY (HCC): ICD-10-CM

## 2018-02-20 DIAGNOSIS — Z79.01 LONG TERM CURRENT USE OF ANTICOAGULANT THERAPY: ICD-10-CM

## 2018-02-20 LAB — INR BLD: 2

## 2018-02-20 PROCEDURE — 85610 PROTHROMBIN TIME: CPT

## 2018-02-20 PROCEDURE — 99211 OFF/OP EST MAY X REQ PHY/QHP: CPT

## 2018-02-20 RX ORDER — ISOSORBIDE DINITRATE 5 MG/1
5 TABLET ORAL 2 TIMES DAILY
Qty: 180 TABLET | Refills: 3 | Status: SHIPPED | OUTPATIENT
Start: 2018-02-20 | End: 2018-12-11 | Stop reason: SDUPTHER

## 2018-02-26 ENCOUNTER — OFFICE VISIT (OUTPATIENT)
Dept: CARDIOLOGY CLINIC | Age: 73
End: 2018-02-26
Payer: MEDICARE

## 2018-02-26 ENCOUNTER — HOSPITAL ENCOUNTER (OUTPATIENT)
Age: 73
Discharge: HOME | End: 2018-02-26
Payer: MEDICARE

## 2018-02-26 VITALS
DIASTOLIC BLOOD PRESSURE: 80 MMHG | BODY MASS INDEX: 38.06 KG/M2 | OXYGEN SATURATION: 98 % | HEART RATE: 108 BPM | SYSTOLIC BLOOD PRESSURE: 130 MMHG | WEIGHT: 243 LBS

## 2018-02-26 DIAGNOSIS — R94.39 ABNORMAL STRESS ECG: ICD-10-CM

## 2018-02-26 DIAGNOSIS — I82.4Y9 DEEP VEIN THROMBOSIS (DVT) OF PROXIMAL LOWER EXTREMITY, UNSPECIFIED CHRONICITY, UNSPECIFIED LATERALITY (HCC): Primary | ICD-10-CM

## 2018-02-26 DIAGNOSIS — I10 ESSENTIAL HYPERTENSION, BENIGN: ICD-10-CM

## 2018-02-26 DIAGNOSIS — E11.9 TYPE 2 DIABETES MELLITUS WITHOUT COMPLICATION, WITHOUT LONG-TERM CURRENT USE OF INSULIN (HCC): ICD-10-CM

## 2018-02-26 LAB
ABSOLUTE EOS #: 0.2 K/UL (ref 0–0.4)
ABSOLUTE IMMATURE GRANULOCYTE: ABNORMAL K/UL (ref 0–0.3)
ABSOLUTE LYMPH #: 3.1 K/UL (ref 1–4.8)
ABSOLUTE MONO #: 0.7 K/UL (ref 0–1)
ALBUMIN SERPL-MCNC: 3.9 G/DL (ref 3.5–5.2)
ALBUMIN/GLOBULIN RATIO: ABNORMAL (ref 1–2.5)
ALP BLD-CCNC: 48 U/L (ref 35–104)
ALT SERPL-CCNC: 29 U/L (ref 5–33)
ANION GAP SERPL CALCULATED.3IONS-SCNC: 13 MMOL/L (ref 9–17)
AST SERPL-CCNC: 20 U/L
BASOPHILS # BLD: 1 % (ref 0–2)
BASOPHILS ABSOLUTE: 0.1 K/UL (ref 0–0.2)
BILIRUB SERPL-MCNC: 0.51 MG/DL (ref 0.3–1.2)
BUN BLDV-MCNC: 37 MG/DL (ref 8–23)
BUN/CREAT BLD: 21 (ref 9–20)
CALCIUM SERPL-MCNC: 11.1 MG/DL (ref 8.6–10.4)
CHLORIDE BLD-SCNC: 102 MMOL/L (ref 98–107)
CHOLESTEROL/HDL RATIO: 2.8
CHOLESTEROL: 182 MG/DL
CO2: 28 MMOL/L (ref 20–31)
CREAT SERPL-MCNC: 1.75 MG/DL (ref 0.5–0.9)
DIFFERENTIAL TYPE: YES
EKG ATRIAL RATE: 76 BPM
EKG P AXIS: 50 DEGREES
EKG P-R INTERVAL: 154 MS
EKG Q-T INTERVAL: 372 MS
EKG QRS DURATION: 94 MS
EKG QTC CALCULATION (BAZETT): 418 MS
EKG R AXIS: 16 DEGREES
EKG T AXIS: 26 DEGREES
EKG VENTRICULAR RATE: 76 BPM
EOSINOPHILS RELATIVE PERCENT: 2 % (ref 0–5)
GFR AFRICAN AMERICAN: 35 ML/MIN
GFR NON-AFRICAN AMERICAN: 29 ML/MIN
GFR SERPL CREATININE-BSD FRML MDRD: ABNORMAL ML/MIN/{1.73_M2}
GFR SERPL CREATININE-BSD FRML MDRD: ABNORMAL ML/MIN/{1.73_M2}
GLUCOSE BLD-MCNC: 156 MG/DL (ref 70–99)
HCT VFR BLD CALC: 42.3 % (ref 36–46)
HDLC SERPL-MCNC: 66 MG/DL
HEMOGLOBIN: 13.8 G/DL (ref 12–16)
IMMATURE GRANULOCYTES: ABNORMAL %
LDL CHOLESTEROL: 92 MG/DL (ref 0–130)
LYMPHOCYTES # BLD: 31 % (ref 15–40)
MCH RBC QN AUTO: 32.7 PG (ref 26–34)
MCHC RBC AUTO-ENTMCNC: 32.7 G/DL (ref 31–37)
MCV RBC AUTO: 100 FL (ref 80–100)
MONOCYTES # BLD: 7 % (ref 4–8)
NRBC AUTOMATED: ABNORMAL PER 100 WBC
PATIENT FASTING?: YES
PDW BLD-RTO: 16.2 % (ref 12.1–15.2)
PLATELET # BLD: 256 K/UL (ref 140–450)
PLATELET ESTIMATE: ABNORMAL
PMV BLD AUTO: ABNORMAL FL (ref 6–12)
POTASSIUM SERPL-SCNC: 4.2 MMOL/L (ref 3.7–5.3)
RBC # BLD: 4.23 M/UL (ref 4–5.2)
RBC # BLD: ABNORMAL 10*6/UL
SEG NEUTROPHILS: 59 % (ref 47–75)
SEGMENTED NEUTROPHILS ABSOLUTE COUNT: 6 K/UL (ref 2.5–7)
SODIUM BLD-SCNC: 143 MMOL/L (ref 135–144)
TOTAL PROTEIN: 6.5 G/DL (ref 6.4–8.3)
TRIGL SERPL-MCNC: 119 MG/DL
VLDLC SERPL CALC-MCNC: NORMAL MG/DL (ref 1–30)
WBC # BLD: 10 K/UL (ref 3.5–11)
WBC # BLD: ABNORMAL 10*3/UL

## 2018-02-26 PROCEDURE — 3014F SCREEN MAMMO DOC REV: CPT | Performed by: INTERNAL MEDICINE

## 2018-02-26 PROCEDURE — 3017F COLORECTAL CA SCREEN DOC REV: CPT | Performed by: INTERNAL MEDICINE

## 2018-02-26 PROCEDURE — 36415 COLL VENOUS BLD VENIPUNCTURE: CPT

## 2018-02-26 PROCEDURE — 1036F TOBACCO NON-USER: CPT | Performed by: INTERNAL MEDICINE

## 2018-02-26 PROCEDURE — 99214 OFFICE O/P EST MOD 30 MIN: CPT | Performed by: INTERNAL MEDICINE

## 2018-02-26 PROCEDURE — 4040F PNEUMOC VAC/ADMIN/RCVD: CPT | Performed by: INTERNAL MEDICINE

## 2018-02-26 PROCEDURE — 85025 COMPLETE CBC W/AUTO DIFF WBC: CPT

## 2018-02-26 PROCEDURE — G8427 DOCREV CUR MEDS BY ELIG CLIN: HCPCS | Performed by: INTERNAL MEDICINE

## 2018-02-26 PROCEDURE — G8399 PT W/DXA RESULTS DOCUMENT: HCPCS | Performed by: INTERNAL MEDICINE

## 2018-02-26 PROCEDURE — 93005 ELECTROCARDIOGRAM TRACING: CPT

## 2018-02-26 PROCEDURE — G8484 FLU IMMUNIZE NO ADMIN: HCPCS | Performed by: INTERNAL MEDICINE

## 2018-02-26 PROCEDURE — 3046F HEMOGLOBIN A1C LEVEL >9.0%: CPT | Performed by: INTERNAL MEDICINE

## 2018-02-26 PROCEDURE — 1123F ACP DISCUSS/DSCN MKR DOCD: CPT | Performed by: INTERNAL MEDICINE

## 2018-02-26 PROCEDURE — G8417 CALC BMI ABV UP PARAM F/U: HCPCS | Performed by: INTERNAL MEDICINE

## 2018-02-26 PROCEDURE — 80053 COMPREHEN METABOLIC PANEL: CPT

## 2018-02-26 PROCEDURE — 80061 LIPID PANEL: CPT

## 2018-02-26 PROCEDURE — 1090F PRES/ABSN URINE INCON ASSESS: CPT | Performed by: INTERNAL MEDICINE

## 2018-02-26 NOTE — PROGRESS NOTES
Ov Dr. Nithya Velasquez for follow up  Wanting to proceed with heart cath  Her friend (Michael Husain) has scared  her and now wants to proceed  No chest pain  Sob is about the same-worse when walking  Was dizzy yesterday am  No edema      Will proceed with heart cath on March 7 900am  Will get labs and ekg done today
Respiratory rate 18. O2 saturation 98%. Weight 243 pounds. GENERAL:  She is a pleasant 68-year-old female. Denied pain. She was oriented to person, place and time. Answered questions appropriately. SKIN:  No unusual skin changes. HEENT:  The pupils are equally round and intact. Mucous membranes were dry. NECK:  No JVD. Good carotid pulses. No carotid bruits. No lymphadenopathy or thyromegaly. CARDIOVASCULAR EXAM:  S1 and S2 were normal.  No S3 or S4. Soft systolic blowing type murmur. No diastolic murmur. PMI was normal.  No lift, thrust, or pericardial friction rub. LUNGS:  Quite clear to auscultation and percussion. ABDOMEN:  Soft and nontender. Good bowel sounds. EXTREMITIES:  Good femoral pulses. Good pedal pulses. No pedal edema. Skin was warm and dry. No calf tenderness. Nail beds pink. Good cap refill. PULSES:  Bilateral symmetrical radial, brachial and carotid pulses. No carotid bruits. Good femoral and pedal pulses. NEUROLOGIC EXAM:  Within normal limits. PSYCHIATRIC EXAM:  Within normal limits. IMPRESSION:  1.  Marked fatigue and shortness of breath, which are her anginal equivalents along with chest pain, now able to walk approximately 30 feet before stopping with shortness of breath consistent with class III angina. 2.  Abnormal Cardiolite stress test on 10/16/2017 showing inferoseptal wall ischemia with intermediate risk of CAD. 3.  Non-insulin-dependent diabetes with a hemoglobin A1c of 6.7.  4.  Hypertension, well controlled. 5.  Chronic renal insufficiency with a baseline creatinine at approximately 1.38, followed by Dr. Dion Beatty. 6.  Status post arthroplasty of both knees. 7.  Normal LV function by bedside echocardiogram.    PLAN:  1. Proceed with a cardiac catheterization to define her anatomy in view of class III angina on full medical therapy.   2.  Stop Coumadin 5 days prior to the procedure and check an INR on Tuesday, the day before the

## 2018-02-27 ENCOUNTER — TELEPHONE (OUTPATIENT)
Dept: PHARMACY | Age: 73
End: 2018-02-27

## 2018-03-06 ENCOUNTER — HOSPITAL ENCOUNTER (OUTPATIENT)
Dept: PHARMACY | Age: 73
Setting detail: THERAPIES SERIES
Discharge: HOME | End: 2018-03-06
Payer: MEDICARE

## 2018-03-06 VITALS
WEIGHT: 243.1 LBS | BODY MASS INDEX: 38.07 KG/M2 | SYSTOLIC BLOOD PRESSURE: 112 MMHG | HEART RATE: 80 BPM | DIASTOLIC BLOOD PRESSURE: 72 MMHG

## 2018-03-06 DIAGNOSIS — I82.4Y9 DEEP VEIN THROMBOSIS (DVT) OF PROXIMAL LOWER EXTREMITY, UNSPECIFIED CHRONICITY, UNSPECIFIED LATERALITY (HCC): ICD-10-CM

## 2018-03-06 DIAGNOSIS — Z79.01 LONG TERM CURRENT USE OF ANTICOAGULANT THERAPY: ICD-10-CM

## 2018-03-06 LAB — INR BLD: 1.1

## 2018-03-06 PROCEDURE — 99211 OFF/OP EST MAY X REQ PHY/QHP: CPT

## 2018-03-06 PROCEDURE — 85610 PROTHROMBIN TIME: CPT

## 2018-03-08 ENCOUNTER — TELEPHONE (OUTPATIENT)
Dept: CARDIOLOGY CLINIC | Age: 73
End: 2018-03-08

## 2018-03-12 RX ORDER — ALLOPURINOL 100 MG/1
TABLET ORAL
Qty: 180 TABLET | Refills: 1 | Status: SHIPPED | OUTPATIENT
Start: 2018-03-12 | End: 2018-07-30 | Stop reason: SDUPTHER

## 2018-03-12 RX ORDER — LOSARTAN POTASSIUM 25 MG/1
TABLET ORAL
Qty: 90 TABLET | Refills: 1 | Status: SHIPPED | OUTPATIENT
Start: 2018-03-12 | End: 2018-07-30 | Stop reason: SDUPTHER

## 2018-03-20 ENCOUNTER — HOSPITAL ENCOUNTER (OUTPATIENT)
Dept: PHARMACY | Age: 73
Setting detail: THERAPIES SERIES
Discharge: HOME | End: 2018-03-20
Payer: MEDICARE

## 2018-03-20 VITALS — BODY MASS INDEX: 37.75 KG/M2 | WEIGHT: 241 LBS

## 2018-03-20 DIAGNOSIS — Z79.01 LONG TERM CURRENT USE OF ANTICOAGULANT THERAPY: ICD-10-CM

## 2018-03-20 DIAGNOSIS — I82.4Y9 DEEP VEIN THROMBOSIS (DVT) OF PROXIMAL LOWER EXTREMITY, UNSPECIFIED CHRONICITY, UNSPECIFIED LATERALITY (HCC): ICD-10-CM

## 2018-03-20 LAB — INR BLD: 1.7

## 2018-03-20 PROCEDURE — 99211 OFF/OP EST MAY X REQ PHY/QHP: CPT

## 2018-03-20 PROCEDURE — 85610 PROTHROMBIN TIME: CPT

## 2018-03-20 NOTE — PROGRESS NOTES
Fingerstick INR drawn per clinic protocol. Patient states no visible blood in urine and no black tarry stool. Ragini missed 4 doses of warfarin after her heart catheterization on 3/7/18 as instructed by Dr. Henrik Rodriguez and restarted warfarin on 3/11/18. No change in other maintenance medications. Brad Figueredo states she did have \"fried cabbage\" last night. Brad Figueredo continues to try to loose weight, and has lost 25 pounds in the last 7 months. Brad Figueredo states her heart catheterization was good and she is \"heartbroke\" she won't get to see Dr. Henrik Rodriguez anymore. Brad Figueredo has had 9 doses of warfarin since restarting prior to today's INR. Since Ragini's INR is slightly subtherapeutic, we will have her take warfarin 7.5 mg tonight, then resume current weekly warfarin regimen and recheck INR in 4 week(s).

## 2018-04-11 DIAGNOSIS — N18.30 BENIGN HYPERTENSION WITH CHRONIC KIDNEY DISEASE, STAGE III (HCC): ICD-10-CM

## 2018-04-11 DIAGNOSIS — I12.9 BENIGN HYPERTENSION WITH CHRONIC KIDNEY DISEASE, STAGE III (HCC): ICD-10-CM

## 2018-04-11 RX ORDER — PROPRANOLOL HYDROCHLORIDE 60 MG/1
TABLET ORAL
Qty: 180 TABLET | Refills: 3 | Status: SHIPPED | OUTPATIENT
Start: 2018-04-11 | End: 2019-01-28 | Stop reason: SDUPTHER

## 2018-04-11 RX ORDER — HYDROCHLOROTHIAZIDE 12.5 MG/1
CAPSULE, GELATIN COATED ORAL
Qty: 90 CAPSULE | Refills: 3 | Status: SHIPPED | OUTPATIENT
Start: 2018-04-11 | End: 2019-01-28 | Stop reason: SDUPTHER

## 2018-04-11 RX ORDER — RANITIDINE 150 MG/1
TABLET ORAL
Qty: 90 TABLET | Refills: 3 | Status: SHIPPED | OUTPATIENT
Start: 2018-04-11 | End: 2019-01-28 | Stop reason: SDUPTHER

## 2018-04-11 RX ORDER — OMEPRAZOLE 20 MG/1
CAPSULE, DELAYED RELEASE ORAL
Qty: 90 CAPSULE | Refills: 3 | Status: SHIPPED | OUTPATIENT
Start: 2018-04-11 | End: 2019-01-28 | Stop reason: SDUPTHER

## 2018-04-17 ENCOUNTER — HOSPITAL ENCOUNTER (OUTPATIENT)
Dept: PHARMACY | Age: 73
Setting detail: THERAPIES SERIES
Discharge: HOME | End: 2018-04-17
Payer: MEDICARE

## 2018-04-17 VITALS
BODY MASS INDEX: 37.62 KG/M2 | HEART RATE: 84 BPM | WEIGHT: 240.2 LBS | SYSTOLIC BLOOD PRESSURE: 114 MMHG | DIASTOLIC BLOOD PRESSURE: 76 MMHG

## 2018-04-17 DIAGNOSIS — I82.4Y9 DEEP VEIN THROMBOSIS (DVT) OF PROXIMAL LOWER EXTREMITY, UNSPECIFIED CHRONICITY, UNSPECIFIED LATERALITY (HCC): ICD-10-CM

## 2018-04-17 DIAGNOSIS — Z79.01 LONG TERM CURRENT USE OF ANTICOAGULANT THERAPY: ICD-10-CM

## 2018-04-17 LAB — INR BLD: 2.6

## 2018-04-17 PROCEDURE — 99211 OFF/OP EST MAY X REQ PHY/QHP: CPT

## 2018-04-17 PROCEDURE — 85610 PROTHROMBIN TIME: CPT

## 2018-05-22 ENCOUNTER — HOSPITAL ENCOUNTER (OUTPATIENT)
Dept: PHARMACY | Age: 73
Setting detail: THERAPIES SERIES
Discharge: HOME | End: 2018-05-22
Payer: MEDICARE

## 2018-05-22 VITALS
BODY MASS INDEX: 37.45 KG/M2 | SYSTOLIC BLOOD PRESSURE: 122 MMHG | DIASTOLIC BLOOD PRESSURE: 69 MMHG | HEART RATE: 75 BPM | WEIGHT: 239.1 LBS

## 2018-05-22 DIAGNOSIS — I82.4Y9 DEEP VEIN THROMBOSIS (DVT) OF PROXIMAL LOWER EXTREMITY, UNSPECIFIED CHRONICITY, UNSPECIFIED LATERALITY (HCC): ICD-10-CM

## 2018-05-22 DIAGNOSIS — Z79.01 LONG TERM CURRENT USE OF ANTICOAGULANT THERAPY: ICD-10-CM

## 2018-05-22 LAB — INR BLD: 3.1

## 2018-05-22 PROCEDURE — 99211 OFF/OP EST MAY X REQ PHY/QHP: CPT

## 2018-05-22 PROCEDURE — 85610 PROTHROMBIN TIME: CPT

## 2018-06-18 ENCOUNTER — OFFICE VISIT (OUTPATIENT)
Dept: FAMILY MEDICINE CLINIC | Age: 73
End: 2018-06-18
Payer: MEDICARE

## 2018-06-18 VITALS
DIASTOLIC BLOOD PRESSURE: 70 MMHG | SYSTOLIC BLOOD PRESSURE: 110 MMHG | BODY MASS INDEX: 37.2 KG/M2 | HEIGHT: 67 IN | WEIGHT: 237 LBS

## 2018-06-18 DIAGNOSIS — Z12.31 ENCOUNTER FOR SCREENING MAMMOGRAM FOR MALIGNANT NEOPLASM OF BREAST: ICD-10-CM

## 2018-06-18 DIAGNOSIS — Z86.718 HISTORY OF DVT OF LOWER EXTREMITY: ICD-10-CM

## 2018-06-18 DIAGNOSIS — M79.89 SWELLING OF LEFT LOWER EXTREMITY: ICD-10-CM

## 2018-06-18 DIAGNOSIS — E11.9 TYPE 2 DIABETES MELLITUS WITHOUT COMPLICATION, WITHOUT LONG-TERM CURRENT USE OF INSULIN (HCC): Primary | ICD-10-CM

## 2018-06-18 LAB
CREATININE URINE POCT: 200
HBA1C MFR BLD: 6 %
MICROALBUMIN/CREAT 24H UR: 30 MG/G{CREAT}
MICROALBUMIN/CREAT UR-RTO: <30

## 2018-06-18 PROCEDURE — 1090F PRES/ABSN URINE INCON ASSESS: CPT | Performed by: FAMILY MEDICINE

## 2018-06-18 PROCEDURE — 3017F COLORECTAL CA SCREEN DOC REV: CPT | Performed by: FAMILY MEDICINE

## 2018-06-18 PROCEDURE — G8427 DOCREV CUR MEDS BY ELIG CLIN: HCPCS | Performed by: FAMILY MEDICINE

## 2018-06-18 PROCEDURE — 82044 UR ALBUMIN SEMIQUANTITATIVE: CPT | Performed by: FAMILY MEDICINE

## 2018-06-18 PROCEDURE — 83036 HEMOGLOBIN GLYCOSYLATED A1C: CPT | Performed by: FAMILY MEDICINE

## 2018-06-18 PROCEDURE — 1036F TOBACCO NON-USER: CPT | Performed by: FAMILY MEDICINE

## 2018-06-18 PROCEDURE — 4040F PNEUMOC VAC/ADMIN/RCVD: CPT | Performed by: FAMILY MEDICINE

## 2018-06-18 PROCEDURE — 1123F ACP DISCUSS/DSCN MKR DOCD: CPT | Performed by: FAMILY MEDICINE

## 2018-06-18 PROCEDURE — 99214 OFFICE O/P EST MOD 30 MIN: CPT | Performed by: FAMILY MEDICINE

## 2018-06-18 PROCEDURE — G8399 PT W/DXA RESULTS DOCUMENT: HCPCS | Performed by: FAMILY MEDICINE

## 2018-06-18 PROCEDURE — G8417 CALC BMI ABV UP PARAM F/U: HCPCS | Performed by: FAMILY MEDICINE

## 2018-06-18 PROCEDURE — 3044F HG A1C LEVEL LT 7.0%: CPT | Performed by: FAMILY MEDICINE

## 2018-06-18 PROCEDURE — 2022F DILAT RTA XM EVC RTNOPTHY: CPT | Performed by: FAMILY MEDICINE

## 2018-06-18 RX ORDER — GLIPIZIDE 5 MG/1
TABLET, FILM COATED, EXTENDED RELEASE ORAL
Qty: 90 TABLET | Refills: 1 | Status: SHIPPED | OUTPATIENT
Start: 2018-06-18 | End: 2018-06-27 | Stop reason: SDUPTHER

## 2018-06-18 RX ORDER — FUROSEMIDE 20 MG/1
20 TABLET ORAL DAILY
Qty: 30 TABLET | Refills: 3 | Status: SHIPPED | OUTPATIENT
Start: 2018-06-18 | End: 2019-09-03 | Stop reason: SDUPTHER

## 2018-06-18 RX ORDER — POTASSIUM CHLORIDE 20 MEQ/1
20 TABLET, EXTENDED RELEASE ORAL DAILY
Qty: 30 TABLET | Refills: 3 | Status: SHIPPED | OUTPATIENT
Start: 2018-06-18 | End: 2019-09-03 | Stop reason: SDUPTHER

## 2018-06-18 ASSESSMENT — ENCOUNTER SYMPTOMS: GASTROINTESTINAL NEGATIVE: 1

## 2018-06-18 ASSESSMENT — PATIENT HEALTH QUESTIONNAIRE - PHQ9
SUM OF ALL RESPONSES TO PHQ QUESTIONS 1-9: 0
2. FEELING DOWN, DEPRESSED OR HOPELESS: 0
1. LITTLE INTEREST OR PLEASURE IN DOING THINGS: 0
SUM OF ALL RESPONSES TO PHQ9 QUESTIONS 1 & 2: 0

## 2018-06-19 ENCOUNTER — HOSPITAL ENCOUNTER (OUTPATIENT)
Dept: PHARMACY | Age: 73
Setting detail: THERAPIES SERIES
Discharge: HOME | End: 2018-06-19
Payer: MEDICARE

## 2018-06-19 ENCOUNTER — HOSPITAL ENCOUNTER (OUTPATIENT)
Dept: MAMMOGRAPHY | Age: 73
Discharge: HOME | End: 2018-06-21
Payer: MEDICARE

## 2018-06-19 VITALS
BODY MASS INDEX: 37.23 KG/M2 | WEIGHT: 237.7 LBS | SYSTOLIC BLOOD PRESSURE: 128 MMHG | HEART RATE: 66 BPM | DIASTOLIC BLOOD PRESSURE: 74 MMHG

## 2018-06-19 DIAGNOSIS — I82.4Y9 DEEP VEIN THROMBOSIS (DVT) OF PROXIMAL LOWER EXTREMITY, UNSPECIFIED CHRONICITY, UNSPECIFIED LATERALITY (HCC): ICD-10-CM

## 2018-06-19 DIAGNOSIS — Z12.31 ENCOUNTER FOR SCREENING MAMMOGRAM FOR MALIGNANT NEOPLASM OF BREAST: ICD-10-CM

## 2018-06-19 DIAGNOSIS — Z79.01 LONG TERM CURRENT USE OF ANTICOAGULANT THERAPY: ICD-10-CM

## 2018-06-19 LAB — INR BLD: 2.8

## 2018-06-19 PROCEDURE — 99211 OFF/OP EST MAY X REQ PHY/QHP: CPT

## 2018-06-19 PROCEDURE — 77067 SCR MAMMO BI INCL CAD: CPT

## 2018-06-19 PROCEDURE — 85610 PROTHROMBIN TIME: CPT

## 2018-06-20 DIAGNOSIS — E11.9 TYPE 2 DIABETES MELLITUS WITHOUT COMPLICATION, WITHOUT LONG-TERM CURRENT USE OF INSULIN (HCC): ICD-10-CM

## 2018-06-21 RX ORDER — GLIPIZIDE 5 MG/1
TABLET, FILM COATED, EXTENDED RELEASE ORAL
Qty: 90 TABLET | Refills: 1 | OUTPATIENT
Start: 2018-06-21

## 2018-06-25 ENCOUNTER — HOSPITAL ENCOUNTER (OUTPATIENT)
Dept: VASCULAR LAB | Age: 73
Discharge: HOME | End: 2018-06-27
Payer: MEDICARE

## 2018-06-25 ENCOUNTER — HOSPITAL ENCOUNTER (OUTPATIENT)
Dept: ULTRASOUND IMAGING | Age: 73
Discharge: HOME | End: 2018-06-27
Payer: MEDICARE

## 2018-06-25 DIAGNOSIS — R60.9 SWELLING: ICD-10-CM

## 2018-06-25 DIAGNOSIS — Z86.718 HISTORY OF DVT OF LOWER EXTREMITY: ICD-10-CM

## 2018-06-25 DIAGNOSIS — M79.89 SWELLING OF LEFT LOWER EXTREMITY: ICD-10-CM

## 2018-06-25 PROCEDURE — 93971 EXTREMITY STUDY: CPT

## 2018-06-27 ENCOUNTER — TELEPHONE (OUTPATIENT)
Dept: FAMILY MEDICINE CLINIC | Age: 73
End: 2018-06-27

## 2018-06-27 DIAGNOSIS — E11.9 TYPE 2 DIABETES MELLITUS WITHOUT COMPLICATION, WITHOUT LONG-TERM CURRENT USE OF INSULIN (HCC): ICD-10-CM

## 2018-06-27 RX ORDER — GLIPIZIDE 5 MG/1
TABLET, FILM COATED, EXTENDED RELEASE ORAL
Qty: 90 TABLET | Refills: 1 | Status: SHIPPED | OUTPATIENT
Start: 2018-06-27 | End: 2018-11-12 | Stop reason: SDUPTHER

## 2018-07-25 ENCOUNTER — HOSPITAL ENCOUNTER (OUTPATIENT)
Age: 73
Discharge: HOME | End: 2018-07-25
Payer: MEDICARE

## 2018-07-25 LAB
ALBUMIN SERPL-MCNC: 3.5 G/DL (ref 3.5–5.2)
ANION GAP SERPL CALCULATED.3IONS-SCNC: 13 MMOL/L (ref 9–17)
BUN BLDV-MCNC: 34 MG/DL (ref 8–23)
CALCIUM SERPL-MCNC: 10.2 MG/DL (ref 8.6–10.4)
CHLORIDE BLD-SCNC: 106 MMOL/L (ref 98–107)
CO2: 26 MMOL/L (ref 20–31)
CREAT SERPL-MCNC: 1.36 MG/DL (ref 0.5–0.9)
GFR AFRICAN AMERICAN: 47 ML/MIN
GFR NON-AFRICAN AMERICAN: 38 ML/MIN
GFR SERPL CREATININE-BSD FRML MDRD: ABNORMAL ML/MIN/{1.73_M2}
GFR SERPL CREATININE-BSD FRML MDRD: ABNORMAL ML/MIN/{1.73_M2}
HCT VFR BLD CALC: 38.6 % (ref 36–46)
HEMOGLOBIN: 12.8 G/DL (ref 12–16)
MAGNESIUM: 1.6 MG/DL (ref 1.6–2.6)
PHOSPHORUS: 3.2 MG/DL (ref 2.6–4.5)
POTASSIUM SERPL-SCNC: 4.4 MMOL/L (ref 3.7–5.3)
PTH INTACT: 83.16 PG/ML (ref 15–65)
SODIUM BLD-SCNC: 145 MMOL/L (ref 135–144)
URIC ACID: 5.3 MG/DL (ref 2.4–5.7)
VITAMIN D 25-HYDROXY: 35.6 NG/ML (ref 30–100)

## 2018-07-25 PROCEDURE — 83735 ASSAY OF MAGNESIUM: CPT

## 2018-07-25 PROCEDURE — 84100 ASSAY OF PHOSPHORUS: CPT

## 2018-07-25 PROCEDURE — 84550 ASSAY OF BLOOD/URIC ACID: CPT

## 2018-07-25 PROCEDURE — 84520 ASSAY OF UREA NITROGEN: CPT

## 2018-07-25 PROCEDURE — 85018 HEMOGLOBIN: CPT

## 2018-07-25 PROCEDURE — 80051 ELECTROLYTE PANEL: CPT

## 2018-07-25 PROCEDURE — 85014 HEMATOCRIT: CPT

## 2018-07-25 PROCEDURE — 36415 COLL VENOUS BLD VENIPUNCTURE: CPT

## 2018-07-25 PROCEDURE — 82310 ASSAY OF CALCIUM: CPT

## 2018-07-25 PROCEDURE — 82565 ASSAY OF CREATININE: CPT

## 2018-07-25 PROCEDURE — 82306 VITAMIN D 25 HYDROXY: CPT

## 2018-07-25 PROCEDURE — 82040 ASSAY OF SERUM ALBUMIN: CPT

## 2018-07-25 PROCEDURE — 83970 ASSAY OF PARATHORMONE: CPT

## 2018-07-30 RX ORDER — LOSARTAN POTASSIUM 25 MG/1
TABLET ORAL
Qty: 90 TABLET | Refills: 1 | Status: SHIPPED | OUTPATIENT
Start: 2018-07-30 | End: 2018-12-11 | Stop reason: SDUPTHER

## 2018-07-30 RX ORDER — ALLOPURINOL 100 MG/1
TABLET ORAL
Qty: 180 TABLET | Refills: 1 | Status: SHIPPED | OUTPATIENT
Start: 2018-07-30 | End: 2018-12-11 | Stop reason: SDUPTHER

## 2018-07-31 ENCOUNTER — HOSPITAL ENCOUNTER (OUTPATIENT)
Dept: PHARMACY | Age: 73
Setting detail: THERAPIES SERIES
Discharge: HOME | End: 2018-07-31
Payer: MEDICARE

## 2018-07-31 VITALS
BODY MASS INDEX: 36.82 KG/M2 | HEART RATE: 69 BPM | DIASTOLIC BLOOD PRESSURE: 74 MMHG | SYSTOLIC BLOOD PRESSURE: 112 MMHG | WEIGHT: 235.1 LBS

## 2018-07-31 DIAGNOSIS — Z79.01 LONG TERM CURRENT USE OF ANTICOAGULANT THERAPY: ICD-10-CM

## 2018-07-31 DIAGNOSIS — I82.4Y9 DEEP VEIN THROMBOSIS (DVT) OF PROXIMAL LOWER EXTREMITY, UNSPECIFIED CHRONICITY, UNSPECIFIED LATERALITY (HCC): ICD-10-CM

## 2018-07-31 LAB — INR BLD: 1.8

## 2018-07-31 PROCEDURE — 85610 PROTHROMBIN TIME: CPT

## 2018-07-31 PROCEDURE — 99211 OFF/OP EST MAY X REQ PHY/QHP: CPT

## 2018-07-31 NOTE — PROGRESS NOTES
Fingerstick INR drawn per clinic protocol. Patient states no visible blood in urine and no black tarry stool. Denies any missed doses of warfarin. No change in other maintenance medications or in diet. Ragini states she hasn't had coleslaw in a week. Tino Crum had a mammogram on 6/19/18. Tino Crum had an ultrasound of left lower leg and right groin on 6/25/18 that was negetive for DVT. Ragini has been stable of this weekly warfarin dosage x 8 months. Since her INR is slightly subtherapeutic today, we will have her take warfarin 5 mg tonight, then will continue current weekly warfarin regimen and recheck INR in 4 week(s). Patient acknowledges working in consult agreement with pharmacist as referred by his/her physician.

## 2018-07-31 NOTE — PATIENT INSTRUCTIONS
Boost today's dosage and then continue current dose of warfarin as instructed on dosing calendar provided. Continue to monitor urine and stool for signs and symptoms of bleeding. Please notify the clinic of any medication changes. Please remember to bring all medications (both prescription and OTC) to your next visit. Kindly notify the clinic if you are unable to make to your next appointment.

## 2018-08-28 ENCOUNTER — HOSPITAL ENCOUNTER (OUTPATIENT)
Dept: PHARMACY | Age: 73
Setting detail: THERAPIES SERIES
Discharge: HOME | End: 2018-08-28
Payer: MEDICARE

## 2018-08-28 VITALS
SYSTOLIC BLOOD PRESSURE: 113 MMHG | WEIGHT: 238.5 LBS | BODY MASS INDEX: 37.35 KG/M2 | HEART RATE: 72 BPM | DIASTOLIC BLOOD PRESSURE: 70 MMHG

## 2018-08-28 DIAGNOSIS — I82.4Y9 DEEP VEIN THROMBOSIS (DVT) OF PROXIMAL LOWER EXTREMITY, UNSPECIFIED CHRONICITY, UNSPECIFIED LATERALITY (HCC): ICD-10-CM

## 2018-08-28 DIAGNOSIS — Z79.01 LONG TERM CURRENT USE OF ANTICOAGULANT THERAPY: ICD-10-CM

## 2018-08-28 LAB — INR BLD: 2

## 2018-08-28 PROCEDURE — 99211 OFF/OP EST MAY X REQ PHY/QHP: CPT

## 2018-08-28 PROCEDURE — 85610 PROTHROMBIN TIME: CPT

## 2018-08-31 ENCOUNTER — APPOINTMENT (OUTPATIENT)
Dept: GENERAL RADIOLOGY | Age: 73
End: 2018-08-31
Payer: MEDICARE

## 2018-08-31 ENCOUNTER — HOSPITAL ENCOUNTER (EMERGENCY)
Age: 73
Discharge: HOME | End: 2018-08-31
Attending: INTERNAL MEDICINE
Payer: MEDICARE

## 2018-08-31 VITALS
OXYGEN SATURATION: 100 % | RESPIRATION RATE: 15 BRPM | BODY MASS INDEX: 37.05 KG/M2 | DIASTOLIC BLOOD PRESSURE: 80 MMHG | SYSTOLIC BLOOD PRESSURE: 152 MMHG | HEART RATE: 70 BPM | TEMPERATURE: 98 F | WEIGHT: 236.55 LBS
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
## 2018-08-31 DIAGNOSIS — I10 ESSENTIAL HYPERTENSION: ICD-10-CM

## 2018-08-31 DIAGNOSIS — R07.81 RIB PAIN ON LEFT SIDE: ICD-10-CM

## 2018-08-31 DIAGNOSIS — S51.011A SKIN TEAR OF RIGHT ELBOW WITHOUT COMPLICATION, INITIAL ENCOUNTER: ICD-10-CM

## 2018-08-31 DIAGNOSIS — S80.00XA CONTUSION OF KNEE, UNSPECIFIED LATERALITY, INITIAL ENCOUNTER: ICD-10-CM

## 2018-08-31 DIAGNOSIS — Z79.01 CURRENT USE OF LONG TERM ANTICOAGULATION: ICD-10-CM

## 2018-08-31 PROCEDURE — 71101 X-RAY EXAM UNILAT RIBS/CHEST: CPT

## 2018-08-31 PROCEDURE — 12001 RPR S/N/AX/GEN/TRNK 2.5CM/<: CPT

## 2018-08-31 PROCEDURE — 99284 EMERGENCY DEPT VISIT MOD MDM: CPT

## 2018-08-31 ASSESSMENT — PAIN DESCRIPTION - PAIN TYPE: TYPE: ACUTE PAIN

## 2018-08-31 ASSESSMENT — PAIN DESCRIPTION - ORIENTATION: ORIENTATION: LEFT

## 2018-08-31 ASSESSMENT — PAIN DESCRIPTION - LOCATION: LOCATION: RIB CAGE

## 2018-08-31 ASSESSMENT — PAIN SCALES - GENERAL: PAINLEVEL_OUTOF10: 2

## 2018-08-31 NOTE — ED PROVIDER NOTES
Neurological: Negative for dizziness, speech difficulty, weakness, numbness and headaches. Hematological: Positive for anticoagulation Negative for adenopathy. All other systems reviewed and are negative. Except as noted above the remainder of the review of systems was reviewed and negative. PAST MEDICAL HISTORY     Past Medical History:   Diagnosis Date    Allergic rhinitis     Chronic kidney disease, stage III (moderate)     Elbow fracture, left     Fall     Gastric ulcer     Gout     Hx of blood clots     Following last back surgery     Hypertension     Nausea & vomiting     Presence of IVC filter     Type II or unspecified type diabetes mellitus without mention of complication, not stated as uncontrolled          SURGICAL HISTORY       Past Surgical History:   Procedure Laterality Date    BACK SURGERY      BACK SURGERY      BACK SURGERY      BACK SURGERY      BACK SURGERY      BACK SURGERY      Fusion     BREAST BIOPSY Left     BREAST BIOPSY Right     CARDIAC CATHETERIZATION Left 03/07/2018    Dr. Funmilayo Calderon @ [facility]--There is 30% disease of the origin of the lateral anterior descending. Mild 10% -20% plaque disease in the circumflex & right coronary artery. Normal left ventricular functino, ejection fraction of 60%.       CARPAL TUNNEL RELEASE Right     CATARACT REMOVAL Right     CATARACT REMOVAL Left     COLONOSCOPY      CYSTOSCOPY      \"Multiple\"     EYE SURGERY Right     Removed fluid from behind eye    HYSTERECTOMY      KIDNEY SURGERY      left side 1/2 of kidney removed    PARTIAL NEPHRECTOMY Left     RETINAL DETACHMENT SURGERY Left     ROTATOR CUFF REPAIR      TOTAL KNEE ARTHROPLASTY Right     TOTAL KNEE ARTHROPLASTY Left     UPPER GASTROINTESTINAL ENDOSCOPY      VENA CAVA FILTER PLACEMENT           CURRENT MEDICATIONS       Current Discharge Medication List      CONTINUE these medications which have NOT CHANGED    Details   vitamin D 1000 units CAPS Take 1 capsule by mouth daily      allopurinol (ZYLOPRIM) 100 MG tablet TAKE 1 TABLET TWICE DAILY  Qty: 180 tablet, Refills: 1      losartan (COZAAR) 25 MG tablet TAKE 1 TABLET EVERY DAY  Qty: 90 tablet, Refills: 1      glipiZIDE (GLUCOTROL XL) 5 MG extended release tablet TAKE 1 TABLET EVERY DAY  Qty: 90 tablet, Refills: 1    Associated Diagnoses: Type 2 diabetes mellitus without complication, without long-term current use of insulin (HCC)      furosemide (LASIX) 20 MG tablet Take 1 tablet by mouth daily As needed for swelling  Qty: 30 tablet, Refills: 3      potassium chloride (KLOR-CON M) 20 MEQ extended release tablet Take 1 tablet by mouth daily On the days that you take lasix  Qty: 30 tablet, Refills: 3      hydrochlorothiazide (MICROZIDE) 12.5 MG capsule TAKE 1 CAPSULE EVERY DAY  Qty: 90 capsule, Refills: 3      omeprazole (PRILOSEC) 20 MG delayed release capsule TAKE 1 CAPSULE EVERY DAY  Qty: 90 capsule, Refills: 3    Associated Diagnoses: Benign hypertension with chronic kidney disease, stage III      ranitidine (ZANTAC) 150 MG tablet TAKE 1 TABLET EVERY NIGHT  Qty: 90 tablet, Refills: 3      propranolol (INDERAL) 60 MG tablet TAKE 1 TABLET TWICE DAILY  Qty: 180 tablet, Refills: 3      isosorbide dinitrate (ISORDIL) 5 MG tablet Take 1 tablet by mouth 2 times daily  Qty: 180 tablet, Refills: 3      warfarin (COUMADIN) 5 MG tablet Take 1/2 tablet on Tuesdays, Thursdays, and Saturdays and 1 whole tablet daily all other days or as directed by [employer] [facility]. Qty: 90 tablet, Refills: 3    Comments: Called in new Rx (90 days supply) to physician refill line at [address] [facility], 6- at 1630, TM  Associated Diagnoses: Long term current use of anticoagulant therapy      fluticasone (FLONASE) 50 MCG/ACT nasal spray 2 sprays by Nasal route daily  Qty: 1 Bottle, Refills: 3      glucose blood VI test strips (TRUE METRIX BLOOD GLUCOSE TEST) strip Use once daily and as needed.   Qty: 100 each, Refills: 3      Lancets MISC 1 each by Does not apply route daily  Qty: 100 each, Refills: 3      Blood Glucose Monitoring Suppl (TRUE METRIX AIR GLUCOSE METER) W/DEVICE KIT Use to test sugar once daily  Qty: 1 kit, Refills: 0      acetaminophen (TYLENOL) 650 MG CR tablet Take 1,300 mg by mouth every 8 hours as needed for Pain      Magnesium 400 MG TABS Take 800 mg by mouth nightly       folic acid (FOLVITE) 646 MCG tablet Take 400 mcg by mouth daily             ALLERGIES     Codeine; Percocet [oxycodone-acetaminophen]; Adhesive tape; and Norco [hydrocodone-acetaminophen]    FAMILY HISTORY       Family History   Problem Relation Age of Onset    Diabetes Father     Cancer Paternal Uncle         colon          SOCIAL HISTORY       Social History     Social History    Marital status:      Spouse name: N/A    Number of children: N/A    Years of education: N/A     Social History Main Topics    Smoking status: Never Smoker    Smokeless tobacco: Never Used    Alcohol use No    Drug use: No    Sexual activity: Not Asked     Other Topics Concern    None     Social History Narrative    None       SCREENINGS    Christy Coma Scale  Eye Opening: Spontaneous  Best Verbal Response: Oriented  Best Motor Response: Obeys commands  Christy Coma Scale Score: 15        PHYSICAL EXAM    (up to 7 for level 4, 8 or more for level 5)     ED Triage Vitals [08/31/18 0825]   BP Temp Temp Source Pulse Resp SpO2 Height Weight   (!) 159/76 98 °F (36.7 °C) Oral 79 20 100 % -- 236 lb 8.9 oz (107.3 kg)       Physical Exam  Physical Exam   Constitutional:  Appears Well, well-developed and well-nourished. No distress noted. Non toxic in appearance. Morbidly obese. HENT:     Head: Normocephalic and atraumatic. Right Ear: External ear normal. no Yepez sign. Left Ear: External ear normal. No Yepez sign    Nose: Nose normal.     Mouth/Throat: Oropharynx is clear and mucosa moist. No oropharyngeal exudate noted.    Eyes: Conjunctivae and EOM are normal. Pupils are equal, round, and reactive to light. No scleral icterus. Neck: Normal range of motion. Neck supple. No tracheal deviation present. Cardiovascular: Normal rate, regular rhythm, normal heart sounds and intact distal pulses. Exam reveals no gallop or friction rub. No murmur heard. Pulmonary/Chest: Effort normal and breath sounds are symmetric and normal. No respiratory distress. There are no wheezes, rales or rhonchi. There is minimal tenderness exhibited upon palpation of the left chest wall. along the axillary line without deformities. Abdominal: Soft. Bowel sounds are normal. No distension or no mass exhibitted. There is no tenderness, rebound, rigidity or guarding. Genitourinary:   No CVA tenderness   Musculoskeletal: Left knee full range of motion with tenderness and  ecchymosis on the lateral side of the patella. No edema, or deformity. well-healed surgical scar of the left knee   Lymphadenopathy:  No cervical adenopathy. Neurological:   alert and oriented to person, place, and time. Reflexes are normal.  There are no cranial nerve deficits. Normal muscle tone, motor and sensory function exhibitted. Coordination and gait normal.   Skin: Right proximal forearm there is a 2 cm skin tear, bleeding controlled. No foreign bodies done 5. Skin is warm and dry. No rash noted. No diaphoresis. No erythema. No pallor. Psychiatric:  normal mood and affect.  Behavior is  normal. Judgment and thought content normal.     DIAGNOSTIC RESULTS     EKG: All EKG's are interpreted by the Emergency Department Physician who either signs or Co-signs this chart in the absence of a cardiologist.    Not indicated    RADIOLOGY:   Non-plain film images such as CT, Ultrasound and MRI are read by the radiologist.     Interpretation per the Radiologist below, if available at the time of this note:    XR RIBS LEFT INCLUDE CHEST (MIN 3 VIEWS)   Preliminary Result   Preliminary report Tolerated well, no immediate complications  Comments:      Performed by Jennifer Vigil RN          Summation    Patient status post accidental fall at home with superficial skin tear of right arm, left knee contusion and left chest wall contusion. Patient is on anticoagulation which was recently monitored. No reports of head or neck injuries including left consciousness. I instructed the patient to followup with the PCP for evaluation of response to management of acute injury, and management of hypertension,    Patient Course:        ED Medications administered this visit:  Medications - No data to display    New Prescriptions from this visit:    Current Discharge Medication List          Follow-up:  Fredrick Jimenez DO  [address]     In 5 days      HOSP [facility]  [address] [address]  [phone]    As needed, If symptoms worsen        Final Impression:   1. Fall, initial encounter    2. Essential hypertension    3. Contusion of knee, unspecified laterality, initial encounter    4. Skin tear of right elbow without complication, initial encounter    5. Current use of long term anticoagulation    6. Rib pain on left side               (Please note that portions of this note were completed with a voice recognition program.  Efforts were made to edit the dictations but occasionally words are mis-transcribed.)    FINAL IMPRESSION      1. Fall, initial encounter    2. Essential hypertension    3. Contusion of knee, unspecified laterality, initial encounter    4. Skin tear of right elbow without complication, initial encounter    5. Current use of long term anticoagulation    6.  Rib pain on left side          DISPOSITION/PLAN   DISPOSITION        PATIENT REFERRED TO:  DO Racquel [address]     In 5 days      HOSP [facility] ED  [address] [address]  [phone]    As needed, If symptoms worsen      DISCHARGE MEDICATIONS:  Current Discharge Medication List             (Please note that portions of this note were completed with a voice recognition program.  Efforts were made to edit the dictations but occasionally words are mis-transcribed.)    Mazin Edwards MD (electronically signed)  Attending Emergency Physician            Mazin Edwards MD  08/31/18 [facility] [address], MD  08/31/18 4423

## 2018-09-04 ENCOUNTER — TELEPHONE (OUTPATIENT)
Dept: FAMILY MEDICINE CLINIC | Age: 73
End: 2018-09-04

## 2018-09-04 DIAGNOSIS — R07.81 RIB PAIN: Primary | ICD-10-CM

## 2018-09-04 RX ORDER — TRAMADOL HYDROCHLORIDE 50 MG/1
50 TABLET ORAL EVERY 6 HOURS PRN
Qty: 20 TABLET | Refills: 0 | Status: SHIPPED | OUTPATIENT
Start: 2018-09-04 | End: 2018-09-09

## 2018-09-04 NOTE — TELEPHONE ENCOUNTER
Pt reports today that she is still having a lot of pain from her fall. She states she tripped going into the bathroom and fell onto the toilet and hit her ribs.    Pt states she cannot sleep but they told her she didn't have any fractures   Please adivse

## 2018-09-04 NOTE — TELEPHONE ENCOUNTER
Patient left message on machine 08/31 asking for something for pain - she had a fall at home and went to the ER     Health Maintenance   Topic Date Due    Shingles Vaccine (1 of 2 - 2 Dose Series) 11/04/1997    Diabetic retinal exam  04/27/2017    Flu vaccine (1) 09/01/2018    Lipid screen  02/26/2019    Diabetic foot exam  06/18/2019    A1C test (Diabetic or Prediabetic)  06/18/2019    Diabetic microalbuminuria test  06/18/2019    Potassium monitoring  07/25/2019    Creatinine monitoring  07/25/2019    Breast cancer screen  06/19/2020    Colon cancer screen colonoscopy  02/27/2021    DTaP/Tdap/Td vaccine (2 - Td) 12/10/2025    DEXA (modify frequency per FRAX score)  Completed    Pneumococcal low/med risk  Completed    Hepatitis C screen  Addressed             (applicable per patient's age: Cancer Screenings, Depression Screening, Fall Risk Screening, Immunizations)    Hemoglobin A1C (%)   Date Value   06/18/2018 6.0   10/10/2017 6.7 (H)   06/01/2017 6.4 (H)     Microalb/Crt.  Ratio (mcg/mg creat)   Date Value   09/09/2014 7     LDL Cholesterol (mg/dL)   Date Value   02/26/2018 92     AST (U/L)   Date Value   02/26/2018 20     ALT (U/L)   Date Value   02/26/2018 29     BUN (mg/dL)   Date Value   07/25/2018 34 (H)      (goal A1C is < 7)   (goal LDL is <100) need 30-50% reduction from baseline     BP Readings from Last 3 Encounters:   08/31/18 (!) 152/80   08/28/18 113/70   07/31/18 112/74    (goal /80)      All Future Testing planned in [facility]:  Lab Frequency Next Occurrence   CBC Auto Differential Once 10/10/2017   Comprehensive Metabolic Panel Once 99/27/8100   TSH with Reflex Once 10/10/2017   Hemoglobin A1C Once 10/10/2017   Brain Natriuretic Peptide Once 10/10/2017   Protime-INR Once 02/26/2019       Next Visit Date:  Future Appointments  Date Time Provider Natanael Austin   9/25/2018 9:00 AM [facility] [place] [facility] [place]            Patient Active Problem List:     Type 2

## 2018-09-25 ENCOUNTER — HOSPITAL ENCOUNTER (OUTPATIENT)
Dept: PHARMACY | Age: 73
Setting detail: THERAPIES SERIES
Discharge: HOME | End: 2018-09-25
Payer: MEDICARE

## 2018-09-25 VITALS
WEIGHT: 236 LBS | BODY MASS INDEX: 36.96 KG/M2 | HEART RATE: 76 BPM | DIASTOLIC BLOOD PRESSURE: 65 MMHG | SYSTOLIC BLOOD PRESSURE: 107 MMHG

## 2018-09-25 DIAGNOSIS — Z79.01 LONG TERM CURRENT USE OF ANTICOAGULANT THERAPY: ICD-10-CM

## 2018-09-25 DIAGNOSIS — I82.4Y9 DEEP VEIN THROMBOSIS (DVT) OF PROXIMAL LOWER EXTREMITY, UNSPECIFIED CHRONICITY, UNSPECIFIED LATERALITY (HCC): ICD-10-CM

## 2018-09-25 LAB — INR BLD: 2.2

## 2018-09-25 PROCEDURE — 99212 OFFICE O/P EST SF 10 MIN: CPT

## 2018-09-25 PROCEDURE — 99211 OFF/OP EST MAY X REQ PHY/QHP: CPT

## 2018-09-25 PROCEDURE — 85610 PROTHROMBIN TIME: CPT

## 2018-09-25 RX ORDER — WARFARIN SODIUM 5 MG/1
TABLET ORAL
Qty: 90 TABLET | Refills: 3 | OUTPATIENT
Start: 2018-09-25 | End: 2018-11-20 | Stop reason: DRUGHIGH

## 2018-09-25 RX ORDER — TRAMADOL HYDROCHLORIDE 50 MG/1
50 TABLET ORAL EVERY 6 HOURS PRN
COMMUNITY
End: 2018-12-04 | Stop reason: ALTCHOICE

## 2018-09-25 NOTE — PROGRESS NOTES
Fingerstick INR drawn per clinic protocol. Patient states no visible blood in urine and no black tarry stool. Denies any missed doses of warfarin. No change in diet. Tino Crum states she fell on 8/31/18 and came to [facility] ED. She was prescribed tramadol 50 mg every 6 hours as needed for pain on 9/4/18. Tino Crum states she will get a shot in her back on 10/3/18 and will hold warfarin x 5 days before the procedure, as she has done in the past. Since Ragini's INR is therapeutic again today, we will continue current weekly warfarin regimen and recheck INR in 4 week(s). Patient acknowledges working in consult agreement with pharmacist as referred by his/her physician.

## 2018-10-23 ENCOUNTER — HOSPITAL ENCOUNTER (OUTPATIENT)
Dept: PHARMACY | Age: 73
Setting detail: THERAPIES SERIES
Discharge: HOME | End: 2018-10-23
Payer: MEDICARE

## 2018-10-23 VITALS
HEART RATE: 72 BPM | WEIGHT: 235.6 LBS | SYSTOLIC BLOOD PRESSURE: 132 MMHG | BODY MASS INDEX: 36.9 KG/M2 | DIASTOLIC BLOOD PRESSURE: 78 MMHG

## 2018-10-23 DIAGNOSIS — I82.4Y9 DEEP VEIN THROMBOSIS (DVT) OF PROXIMAL LOWER EXTREMITY, UNSPECIFIED CHRONICITY, UNSPECIFIED LATERALITY (HCC): ICD-10-CM

## 2018-10-23 DIAGNOSIS — Z79.01 LONG TERM CURRENT USE OF ANTICOAGULANT THERAPY: ICD-10-CM

## 2018-10-23 LAB — INR BLD: 2

## 2018-10-23 PROCEDURE — 99211 OFF/OP EST MAY X REQ PHY/QHP: CPT

## 2018-10-23 PROCEDURE — 85610 PROTHROMBIN TIME: CPT

## 2018-10-26 ENCOUNTER — OFFICE VISIT (OUTPATIENT)
Dept: FAMILY MEDICINE CLINIC | Age: 73
End: 2018-10-26
Payer: MEDICARE

## 2018-10-26 VITALS
OXYGEN SATURATION: 98 % | SYSTOLIC BLOOD PRESSURE: 122 MMHG | BODY MASS INDEX: 36.81 KG/M2 | HEART RATE: 72 BPM | DIASTOLIC BLOOD PRESSURE: 70 MMHG | TEMPERATURE: 97.4 F | WEIGHT: 235 LBS

## 2018-10-26 DIAGNOSIS — J01.00 ACUTE NON-RECURRENT MAXILLARY SINUSITIS: Primary | ICD-10-CM

## 2018-10-26 PROCEDURE — 1101F PT FALLS ASSESS-DOCD LE1/YR: CPT | Performed by: NURSE PRACTITIONER

## 2018-10-26 PROCEDURE — G8399 PT W/DXA RESULTS DOCUMENT: HCPCS | Performed by: NURSE PRACTITIONER

## 2018-10-26 PROCEDURE — G8484 FLU IMMUNIZE NO ADMIN: HCPCS | Performed by: NURSE PRACTITIONER

## 2018-10-26 PROCEDURE — 1036F TOBACCO NON-USER: CPT | Performed by: NURSE PRACTITIONER

## 2018-10-26 PROCEDURE — G8417 CALC BMI ABV UP PARAM F/U: HCPCS | Performed by: NURSE PRACTITIONER

## 2018-10-26 PROCEDURE — 4040F PNEUMOC VAC/ADMIN/RCVD: CPT | Performed by: NURSE PRACTITIONER

## 2018-10-26 PROCEDURE — 1090F PRES/ABSN URINE INCON ASSESS: CPT | Performed by: NURSE PRACTITIONER

## 2018-10-26 PROCEDURE — 99213 OFFICE O/P EST LOW 20 MIN: CPT | Performed by: NURSE PRACTITIONER

## 2018-10-26 PROCEDURE — G8427 DOCREV CUR MEDS BY ELIG CLIN: HCPCS | Performed by: NURSE PRACTITIONER

## 2018-10-26 PROCEDURE — 1123F ACP DISCUSS/DSCN MKR DOCD: CPT | Performed by: NURSE PRACTITIONER

## 2018-10-26 PROCEDURE — 3017F COLORECTAL CA SCREEN DOC REV: CPT | Performed by: NURSE PRACTITIONER

## 2018-10-26 RX ORDER — CEFDINIR 300 MG/1
300 CAPSULE ORAL 2 TIMES DAILY
Qty: 20 CAPSULE | Refills: 0 | Status: SHIPPED | OUTPATIENT
Start: 2018-10-26 | End: 2018-11-05

## 2018-10-26 ASSESSMENT — ENCOUNTER SYMPTOMS
SHORTNESS OF BREATH: 0
DIARRHEA: 0
SORE THROAT: 0
VOMITING: 0
NAUSEA: 0
HOARSE VOICE: 0
COUGH: 0
SINUS PRESSURE: 1

## 2018-11-12 DIAGNOSIS — E11.9 TYPE 2 DIABETES MELLITUS WITHOUT COMPLICATION, WITHOUT LONG-TERM CURRENT USE OF INSULIN (HCC): ICD-10-CM

## 2018-11-13 RX ORDER — GLIPIZIDE 5 MG/1
TABLET, FILM COATED, EXTENDED RELEASE ORAL
Qty: 90 TABLET | Refills: 1 | Status: SHIPPED | OUTPATIENT
Start: 2018-11-13 | End: 2019-04-01 | Stop reason: SDUPTHER

## 2018-11-20 ENCOUNTER — HOSPITAL ENCOUNTER (OUTPATIENT)
Dept: PHARMACY | Age: 73
Setting detail: THERAPIES SERIES
Discharge: HOME | End: 2018-11-20
Payer: MEDICARE

## 2018-11-20 VITALS
BODY MASS INDEX: 37.81 KG/M2 | DIASTOLIC BLOOD PRESSURE: 86 MMHG | HEART RATE: 72 BPM | WEIGHT: 241.4 LBS | SYSTOLIC BLOOD PRESSURE: 124 MMHG

## 2018-11-20 DIAGNOSIS — I82.4Y9 DEEP VEIN THROMBOSIS (DVT) OF PROXIMAL LOWER EXTREMITY, UNSPECIFIED CHRONICITY, UNSPECIFIED LATERALITY (HCC): ICD-10-CM

## 2018-11-20 DIAGNOSIS — Z79.01 LONG TERM CURRENT USE OF ANTICOAGULANT THERAPY: ICD-10-CM

## 2018-11-20 LAB — INR BLD: 1.5

## 2018-11-20 PROCEDURE — 99211 OFF/OP EST MAY X REQ PHY/QHP: CPT

## 2018-11-20 PROCEDURE — 85610 PROTHROMBIN TIME: CPT

## 2018-11-20 RX ORDER — WARFARIN SODIUM 5 MG/1
TABLET ORAL
Qty: 90 TABLET | Refills: 3 | Status: SHIPPED
Start: 2018-11-20 | End: 2018-12-04 | Stop reason: DRUGHIGH

## 2018-12-04 ENCOUNTER — HOSPITAL ENCOUNTER (OUTPATIENT)
Dept: PHARMACY | Age: 73
Setting detail: THERAPIES SERIES
Discharge: HOME | End: 2018-12-04
Payer: MEDICARE

## 2018-12-04 VITALS
SYSTOLIC BLOOD PRESSURE: 132 MMHG | DIASTOLIC BLOOD PRESSURE: 78 MMHG | HEART RATE: 72 BPM | WEIGHT: 238.5 LBS | BODY MASS INDEX: 37.35 KG/M2

## 2018-12-04 DIAGNOSIS — Z79.01 LONG TERM CURRENT USE OF ANTICOAGULANT THERAPY: ICD-10-CM

## 2018-12-04 DIAGNOSIS — I82.4Y9 DEEP VEIN THROMBOSIS (DVT) OF PROXIMAL LOWER EXTREMITY, UNSPECIFIED CHRONICITY, UNSPECIFIED LATERALITY (HCC): ICD-10-CM

## 2018-12-04 LAB — INR BLD: 1.9

## 2018-12-04 PROCEDURE — 85610 PROTHROMBIN TIME: CPT

## 2018-12-04 PROCEDURE — 99211 OFF/OP EST MAY X REQ PHY/QHP: CPT

## 2018-12-04 RX ORDER — WARFARIN SODIUM 5 MG/1
TABLET ORAL
Qty: 90 TABLET | Refills: 3 | Status: SHIPPED
Start: 2018-12-04 | End: 2019-01-22 | Stop reason: DRUGHIGH

## 2018-12-04 NOTE — PROGRESS NOTES
Fingerstick INR drawn per clinic protocol. Patient states no visible blood in urine and no black tarry stool. Denies any missed doses of warfarin. No change in other maintenance medications or in diet. Jory Tovar states she has been taking 2 tylenol up to twice daily. Jory Tovar has not got her seasonal influenza vaccine yet, but intends to. Ragini states she has NOT been eating any greens. Since Ragini's INR is slightly subtherapeutic, we will increase current weekly warfarin regimen by 8.3% and recheck INR in 2 week(s). Patient acknowledges working in consult agreement with pharmacist as referred by his/her physician.

## 2018-12-12 RX ORDER — ALLOPURINOL 100 MG/1
TABLET ORAL
Qty: 180 TABLET | Refills: 1 | Status: SHIPPED | OUTPATIENT
Start: 2018-12-12 | End: 2019-04-30 | Stop reason: SDUPTHER

## 2018-12-12 RX ORDER — ISOSORBIDE DINITRATE 5 MG/1
TABLET ORAL
Qty: 180 TABLET | Refills: 3 | Status: SHIPPED | OUTPATIENT
Start: 2018-12-12 | End: 2019-10-01 | Stop reason: SDUPTHER

## 2018-12-12 RX ORDER — LOSARTAN POTASSIUM 25 MG/1
TABLET ORAL
Qty: 90 TABLET | Refills: 1 | Status: SHIPPED | OUTPATIENT
Start: 2018-12-12 | End: 2019-04-30 | Stop reason: SDUPTHER

## 2018-12-12 NOTE — TELEPHONE ENCOUNTER
as needed. 2/1/17   Tiki Rosas, DO   Lancets MISC 1 each by Does not apply route daily 2/1/17   Tiki Rosas DO   Blood Glucose Monitoring Suppl (TRUE METRIX AIR GLUCOSE METER) W/DEVICE KIT Use to test sugar once daily 2/1/17   Tiki Rosas, DO   acetaminophen (TYLENOL) 650 MG CR tablet Take 1,300 mg by mouth every 8 hours as needed for Pain    Historical Provider, MD   Magnesium 400 MG TABS Take 800 mg by mouth nightly     Historical Provider, MD   folic acid (FOLVITE) 507 MCG tablet Take 400 mcg by mouth daily 2/8/16   Historical Provider, MD       Allergies:  Codeine; Percocet [oxycodone-acetaminophen]; Adhesive tape; and Norco [hydrocodone-acetaminophen]    Hemoglobin A1C (%)   Date Value   06/18/2018 6.0   10/10/2017 6.7 (H)   06/01/2017 6.4 (H)             ( goal A1C is < 7)   Microalb/Crt.  Ratio (mcg/mg creat)   Date Value   09/09/2014 7     LDL Cholesterol (mg/dL)   Date Value   02/26/2018 92   11/13/2017 76       (goal LDL is <100)   AST (U/L)   Date Value   02/26/2018 20     ALT (U/L)   Date Value   02/26/2018 29     BUN (mg/dL)   Date Value   07/25/2018 34 (H)     BP Readings from Last 3 Encounters:   12/04/18 132/78   11/20/18 124/86   10/26/18 122/70          (goal 120/80)

## 2018-12-18 ENCOUNTER — HOSPITAL ENCOUNTER (OUTPATIENT)
Dept: PHARMACY | Age: 73
Setting detail: THERAPIES SERIES
Discharge: HOME | End: 2018-12-18
Payer: MEDICARE

## 2018-12-18 VITALS
BODY MASS INDEX: 38.07 KG/M2 | WEIGHT: 243.1 LBS | DIASTOLIC BLOOD PRESSURE: 68 MMHG | HEART RATE: 74 BPM | SYSTOLIC BLOOD PRESSURE: 110 MMHG

## 2018-12-18 DIAGNOSIS — I82.4Y9 DEEP VEIN THROMBOSIS (DVT) OF PROXIMAL LOWER EXTREMITY, UNSPECIFIED CHRONICITY, UNSPECIFIED LATERALITY (HCC): ICD-10-CM

## 2018-12-18 DIAGNOSIS — Z79.01 LONG TERM CURRENT USE OF ANTICOAGULANT THERAPY: ICD-10-CM

## 2018-12-18 LAB — INR BLD: 2.3

## 2018-12-18 PROCEDURE — 85610 PROTHROMBIN TIME: CPT

## 2018-12-18 PROCEDURE — 99211 OFF/OP EST MAY X REQ PHY/QHP: CPT

## 2018-12-18 NOTE — PROGRESS NOTES
Fingerstick INR drawn per clinic protocol. Patient states no visible blood in urine and no black tarry stool. Denies any missed doses of warfarin. No change in other maintenance medications or in diet. Will continue current warfarin regimen and recheck INR in 5 weeks. Patient acknowledges working in consult agreement with pharmacist as referred by his/her physician.

## 2019-01-04 ENCOUNTER — OFFICE VISIT (OUTPATIENT)
Dept: PRIMARY CARE CLINIC | Age: 74
End: 2019-01-04
Payer: MEDICARE

## 2019-01-04 VITALS
DIASTOLIC BLOOD PRESSURE: 74 MMHG | BODY MASS INDEX: 38.22 KG/M2 | WEIGHT: 244 LBS | TEMPERATURE: 97.9 F | OXYGEN SATURATION: 98 % | SYSTOLIC BLOOD PRESSURE: 124 MMHG | HEART RATE: 88 BPM

## 2019-01-04 DIAGNOSIS — R05.9 COUGH: ICD-10-CM

## 2019-01-04 DIAGNOSIS — J06.9 VIRAL UPPER RESPIRATORY TRACT INFECTION: Primary | ICD-10-CM

## 2019-01-04 DIAGNOSIS — Z23 NEED FOR INFLUENZA VACCINATION: ICD-10-CM

## 2019-01-04 LAB
INFLUENZA A ANTIBODY: NEGATIVE
INFLUENZA B ANTIBODY: NEGATIVE

## 2019-01-04 PROCEDURE — G8427 DOCREV CUR MEDS BY ELIG CLIN: HCPCS | Performed by: NURSE PRACTITIONER

## 2019-01-04 PROCEDURE — 1101F PT FALLS ASSESS-DOCD LE1/YR: CPT | Performed by: NURSE PRACTITIONER

## 2019-01-04 PROCEDURE — 90686 IIV4 VACC NO PRSV 0.5 ML IM: CPT | Performed by: NURSE PRACTITIONER

## 2019-01-04 PROCEDURE — 99213 OFFICE O/P EST LOW 20 MIN: CPT | Performed by: NURSE PRACTITIONER

## 2019-01-04 PROCEDURE — G8482 FLU IMMUNIZE ORDER/ADMIN: HCPCS | Performed by: NURSE PRACTITIONER

## 2019-01-04 PROCEDURE — 1036F TOBACCO NON-USER: CPT | Performed by: NURSE PRACTITIONER

## 2019-01-04 PROCEDURE — 87804 INFLUENZA ASSAY W/OPTIC: CPT | Performed by: NURSE PRACTITIONER

## 2019-01-04 PROCEDURE — 1090F PRES/ABSN URINE INCON ASSESS: CPT | Performed by: NURSE PRACTITIONER

## 2019-01-04 PROCEDURE — G8399 PT W/DXA RESULTS DOCUMENT: HCPCS | Performed by: NURSE PRACTITIONER

## 2019-01-04 PROCEDURE — 1123F ACP DISCUSS/DSCN MKR DOCD: CPT | Performed by: NURSE PRACTITIONER

## 2019-01-04 PROCEDURE — G0008 ADMIN INFLUENZA VIRUS VAC: HCPCS | Performed by: NURSE PRACTITIONER

## 2019-01-04 PROCEDURE — 3017F COLORECTAL CA SCREEN DOC REV: CPT | Performed by: NURSE PRACTITIONER

## 2019-01-04 PROCEDURE — 4040F PNEUMOC VAC/ADMIN/RCVD: CPT | Performed by: NURSE PRACTITIONER

## 2019-01-04 PROCEDURE — G8417 CALC BMI ABV UP PARAM F/U: HCPCS | Performed by: NURSE PRACTITIONER

## 2019-01-04 ASSESSMENT — ENCOUNTER SYMPTOMS
SINUS PRESSURE: 0
VOMITING: 0
SORE THROAT: 1
DIARRHEA: 1
SHORTNESS OF BREATH: 1
COUGH: 1
RHINORRHEA: 1
NAUSEA: 0
WHEEZING: 1
SINUS PAIN: 0

## 2019-01-22 ENCOUNTER — OFFICE VISIT (OUTPATIENT)
Dept: FAMILY MEDICINE CLINIC | Age: 74
End: 2019-01-22
Payer: MEDICARE

## 2019-01-22 ENCOUNTER — HOSPITAL ENCOUNTER (OUTPATIENT)
Dept: PHARMACY | Age: 74
Setting detail: THERAPIES SERIES
Discharge: HOME | End: 2019-01-22
Payer: MEDICARE

## 2019-01-22 VITALS
BODY MASS INDEX: 38.37 KG/M2 | WEIGHT: 245 LBS | HEART RATE: 62 BPM | SYSTOLIC BLOOD PRESSURE: 108 MMHG | OXYGEN SATURATION: 98 % | TEMPERATURE: 97.8 F | DIASTOLIC BLOOD PRESSURE: 70 MMHG

## 2019-01-22 VITALS
BODY MASS INDEX: 38.39 KG/M2 | HEART RATE: 73 BPM | SYSTOLIC BLOOD PRESSURE: 134 MMHG | WEIGHT: 245.1 LBS | DIASTOLIC BLOOD PRESSURE: 76 MMHG

## 2019-01-22 DIAGNOSIS — I82.4Y9 DEEP VEIN THROMBOSIS (DVT) OF PROXIMAL LOWER EXTREMITY, UNSPECIFIED CHRONICITY, UNSPECIFIED LATERALITY (HCC): ICD-10-CM

## 2019-01-22 DIAGNOSIS — Z79.01 LONG TERM CURRENT USE OF ANTICOAGULANT THERAPY: ICD-10-CM

## 2019-01-22 DIAGNOSIS — S29.012A STRAIN OF LATISSIMUS DORSI MUSCLE, INITIAL ENCOUNTER: Primary | ICD-10-CM

## 2019-01-22 LAB
BILIRUBIN, POC: ABNORMAL
BLOOD URINE, POC: ABNORMAL
CLARITY, POC: CLEAR
COLOR, POC: YELLOW
GLUCOSE URINE, POC: ABNORMAL
INR BLD: 3.9
KETONES, POC: ABNORMAL
LEUKOCYTE EST, POC: ABNORMAL
NITRITE, POC: ABNORMAL
PH, POC: 7
PROTEIN, POC: ABNORMAL
SPECIFIC GRAVITY, POC: 1.02
UROBILINOGEN, POC: 0.2

## 2019-01-22 PROCEDURE — 99212 OFFICE O/P EST SF 10 MIN: CPT

## 2019-01-22 PROCEDURE — 1101F PT FALLS ASSESS-DOCD LE1/YR: CPT | Performed by: NURSE PRACTITIONER

## 2019-01-22 PROCEDURE — 1123F ACP DISCUSS/DSCN MKR DOCD: CPT | Performed by: NURSE PRACTITIONER

## 2019-01-22 PROCEDURE — 99213 OFFICE O/P EST LOW 20 MIN: CPT | Performed by: NURSE PRACTITIONER

## 2019-01-22 PROCEDURE — G8482 FLU IMMUNIZE ORDER/ADMIN: HCPCS | Performed by: NURSE PRACTITIONER

## 2019-01-22 PROCEDURE — G8427 DOCREV CUR MEDS BY ELIG CLIN: HCPCS | Performed by: NURSE PRACTITIONER

## 2019-01-22 PROCEDURE — 81002 URINALYSIS NONAUTO W/O SCOPE: CPT | Performed by: NURSE PRACTITIONER

## 2019-01-22 PROCEDURE — G8417 CALC BMI ABV UP PARAM F/U: HCPCS | Performed by: NURSE PRACTITIONER

## 2019-01-22 PROCEDURE — 4040F PNEUMOC VAC/ADMIN/RCVD: CPT | Performed by: NURSE PRACTITIONER

## 2019-01-22 PROCEDURE — 1090F PRES/ABSN URINE INCON ASSESS: CPT | Performed by: NURSE PRACTITIONER

## 2019-01-22 PROCEDURE — G8399 PT W/DXA RESULTS DOCUMENT: HCPCS | Performed by: NURSE PRACTITIONER

## 2019-01-22 PROCEDURE — 85610 PROTHROMBIN TIME: CPT

## 2019-01-22 PROCEDURE — 3017F COLORECTAL CA SCREEN DOC REV: CPT | Performed by: NURSE PRACTITIONER

## 2019-01-22 PROCEDURE — 1036F TOBACCO NON-USER: CPT | Performed by: NURSE PRACTITIONER

## 2019-01-22 RX ORDER — WARFARIN SODIUM 5 MG/1
TABLET ORAL
Qty: 90 TABLET | Refills: 3 | Status: SHIPPED
Start: 2019-01-22 | End: 2019-02-20 | Stop reason: DRUGHIGH

## 2019-01-22 RX ORDER — TIZANIDINE 4 MG/1
4 TABLET ORAL 3 TIMES DAILY PRN
Qty: 30 TABLET | Refills: 0 | Status: SHIPPED | OUTPATIENT
Start: 2019-01-22 | End: 2019-02-19 | Stop reason: SINTOL

## 2019-01-22 ASSESSMENT — ENCOUNTER SYMPTOMS
SHORTNESS OF BREATH: 0
COUGH: 0
VOMITING: 0
BOWEL INCONTINENCE: 0
DIARRHEA: 0
BACK PAIN: 1
NAUSEA: 0

## 2019-01-23 ENCOUNTER — TELEPHONE (OUTPATIENT)
Dept: FAMILY MEDICINE CLINIC | Age: 74
End: 2019-01-23

## 2019-01-23 RX ORDER — CYCLOBENZAPRINE HCL 10 MG
10 TABLET ORAL 2 TIMES DAILY PRN
Qty: 60 TABLET | Refills: 2 | Status: SHIPPED | OUTPATIENT
Start: 2019-01-23 | End: 2019-09-03 | Stop reason: SDUPTHER

## 2019-01-28 DIAGNOSIS — I12.9 BENIGN HYPERTENSION WITH CHRONIC KIDNEY DISEASE, STAGE III (HCC): ICD-10-CM

## 2019-01-28 DIAGNOSIS — N18.30 BENIGN HYPERTENSION WITH CHRONIC KIDNEY DISEASE, STAGE III (HCC): ICD-10-CM

## 2019-01-28 RX ORDER — RANITIDINE 150 MG/1
TABLET ORAL
Qty: 90 TABLET | Refills: 3 | Status: SHIPPED | OUTPATIENT
Start: 2019-01-28 | End: 2019-11-11 | Stop reason: SDUPTHER

## 2019-01-28 RX ORDER — OMEPRAZOLE 20 MG/1
CAPSULE, DELAYED RELEASE ORAL
Qty: 90 CAPSULE | Refills: 3 | Status: SHIPPED | OUTPATIENT
Start: 2019-01-28 | End: 2019-11-11 | Stop reason: SDUPTHER

## 2019-01-28 RX ORDER — PROPRANOLOL HYDROCHLORIDE 60 MG/1
TABLET ORAL
Qty: 180 TABLET | Refills: 3 | Status: SHIPPED | OUTPATIENT
Start: 2019-01-28 | End: 2019-11-11 | Stop reason: SDUPTHER

## 2019-01-28 RX ORDER — HYDROCHLOROTHIAZIDE 12.5 MG/1
CAPSULE, GELATIN COATED ORAL
Qty: 90 CAPSULE | Refills: 3 | Status: SHIPPED | OUTPATIENT
Start: 2019-01-28 | End: 2019-11-11 | Stop reason: SDUPTHER

## 2019-02-11 ENCOUNTER — HOSPITAL ENCOUNTER (OUTPATIENT)
Age: 74
Discharge: HOME | End: 2019-02-11
Payer: MEDICARE

## 2019-02-11 LAB
ALBUMIN SERPL-MCNC: 3.8 G/DL (ref 3.5–5.2)
ANION GAP SERPL CALCULATED.3IONS-SCNC: 10 MMOL/L (ref 9–17)
BUN BLDV-MCNC: 38 MG/DL (ref 8–23)
BUN/CREAT BLD: 26 (ref 9–20)
CALCIUM SERPL-MCNC: 10.2 MG/DL (ref 8.6–10.4)
CHLORIDE BLD-SCNC: 104 MMOL/L (ref 98–107)
CO2: 27 MMOL/L (ref 20–31)
CREAT SERPL-MCNC: 1.47 MG/DL (ref 0.5–0.9)
CREATININE URINE: 96 MG/DL (ref 28–217)
GFR AFRICAN AMERICAN: 42 ML/MIN
GFR NON-AFRICAN AMERICAN: 35 ML/MIN
GFR SERPL CREATININE-BSD FRML MDRD: ABNORMAL ML/MIN/{1.73_M2}
GFR SERPL CREATININE-BSD FRML MDRD: ABNORMAL ML/MIN/{1.73_M2}
GLUCOSE BLD-MCNC: 158 MG/DL (ref 70–99)
HCT VFR BLD CALC: 40 % (ref 36–46)
HEMOGLOBIN: 12.8 G/DL (ref 12–16)
MAGNESIUM: 1.8 MG/DL (ref 1.6–2.6)
PHOSPHORUS: 3 MG/DL (ref 2.6–4.5)
POTASSIUM SERPL-SCNC: 4.6 MMOL/L (ref 3.7–5.3)
PTH INTACT: 92.43 PG/ML (ref 15–65)
SODIUM BLD-SCNC: 141 MMOL/L (ref 135–144)
TOTAL PROTEIN, URINE: 11 MG/DL
URINE TOTAL PROTEIN CREATININE RATIO: 0.11 (ref 0–0.2)
VITAMIN D 25-HYDROXY: 34.6 NG/ML (ref 30–100)

## 2019-02-11 PROCEDURE — 85014 HEMATOCRIT: CPT

## 2019-02-11 PROCEDURE — 80069 RENAL FUNCTION PANEL: CPT

## 2019-02-11 PROCEDURE — 85018 HEMOGLOBIN: CPT

## 2019-02-11 PROCEDURE — 82570 ASSAY OF URINE CREATININE: CPT

## 2019-02-11 PROCEDURE — 82306 VITAMIN D 25 HYDROXY: CPT

## 2019-02-11 PROCEDURE — 36415 COLL VENOUS BLD VENIPUNCTURE: CPT

## 2019-02-11 PROCEDURE — 84156 ASSAY OF PROTEIN URINE: CPT

## 2019-02-11 PROCEDURE — 83970 ASSAY OF PARATHORMONE: CPT

## 2019-02-11 PROCEDURE — 83735 ASSAY OF MAGNESIUM: CPT

## 2019-02-19 ENCOUNTER — HOSPITAL ENCOUNTER (OUTPATIENT)
Dept: PHARMACY | Age: 74
Setting detail: THERAPIES SERIES
Discharge: HOME | End: 2019-02-19
Payer: MEDICARE

## 2019-02-19 VITALS
SYSTOLIC BLOOD PRESSURE: 129 MMHG | WEIGHT: 240.9 LBS | BODY MASS INDEX: 37.73 KG/M2 | HEART RATE: 73 BPM | DIASTOLIC BLOOD PRESSURE: 73 MMHG

## 2019-02-19 DIAGNOSIS — I82.4Y9 DEEP VEIN THROMBOSIS (DVT) OF PROXIMAL LOWER EXTREMITY, UNSPECIFIED CHRONICITY, UNSPECIFIED LATERALITY (HCC): ICD-10-CM

## 2019-02-19 DIAGNOSIS — Z79.01 LONG TERM CURRENT USE OF ANTICOAGULANT THERAPY: ICD-10-CM

## 2019-02-19 LAB — INR BLD: 1.8

## 2019-02-19 PROCEDURE — 85610 PROTHROMBIN TIME: CPT

## 2019-02-19 PROCEDURE — 99212 OFFICE O/P EST SF 10 MIN: CPT

## 2019-02-20 RX ORDER — WARFARIN SODIUM 5 MG/1
TABLET ORAL
Qty: 90 TABLET | Refills: 3 | Status: SHIPPED
Start: 2019-02-20 | End: 2019-05-29 | Stop reason: DRUGHIGH

## 2019-03-11 ENCOUNTER — HOSPITAL ENCOUNTER (OUTPATIENT)
Dept: VASCULAR LAB | Age: 74
Discharge: HOME | End: 2019-03-13
Payer: MEDICARE

## 2019-03-11 ENCOUNTER — OFFICE VISIT (OUTPATIENT)
Dept: FAMILY MEDICINE CLINIC | Age: 74
End: 2019-03-11
Payer: MEDICARE

## 2019-03-11 VITALS
WEIGHT: 239 LBS | SYSTOLIC BLOOD PRESSURE: 120 MMHG | BODY MASS INDEX: 37.51 KG/M2 | HEIGHT: 67 IN | DIASTOLIC BLOOD PRESSURE: 70 MMHG

## 2019-03-11 DIAGNOSIS — M79.605 ACUTE PAIN OF LEFT LOWER EXTREMITY: ICD-10-CM

## 2019-03-11 DIAGNOSIS — E11.9 TYPE 2 DIABETES MELLITUS WITHOUT COMPLICATION, WITH LONG-TERM CURRENT USE OF INSULIN (HCC): Primary | ICD-10-CM

## 2019-03-11 DIAGNOSIS — I83.812 VARICOSE VEINS OF LEFT LOWER EXTREMITY WITH PAIN: ICD-10-CM

## 2019-03-11 DIAGNOSIS — Z86.718 HISTORY OF DVT OF LOWER EXTREMITY: ICD-10-CM

## 2019-03-11 DIAGNOSIS — Z79.4 TYPE 2 DIABETES MELLITUS WITHOUT COMPLICATION, WITH LONG-TERM CURRENT USE OF INSULIN (HCC): Primary | ICD-10-CM

## 2019-03-11 LAB — HBA1C MFR BLD: 7.1 %

## 2019-03-11 PROCEDURE — 83036 HEMOGLOBIN GLYCOSYLATED A1C: CPT | Performed by: FAMILY MEDICINE

## 2019-03-11 PROCEDURE — G8417 CALC BMI ABV UP PARAM F/U: HCPCS | Performed by: FAMILY MEDICINE

## 2019-03-11 PROCEDURE — 1090F PRES/ABSN URINE INCON ASSESS: CPT | Performed by: FAMILY MEDICINE

## 2019-03-11 PROCEDURE — 1101F PT FALLS ASSESS-DOCD LE1/YR: CPT | Performed by: FAMILY MEDICINE

## 2019-03-11 PROCEDURE — 2022F DILAT RTA XM EVC RTNOPTHY: CPT | Performed by: FAMILY MEDICINE

## 2019-03-11 PROCEDURE — 3017F COLORECTAL CA SCREEN DOC REV: CPT | Performed by: FAMILY MEDICINE

## 2019-03-11 PROCEDURE — 3045F PR MOST RECENT HEMOGLOBIN A1C LEVEL 7.0-9.0%: CPT | Performed by: FAMILY MEDICINE

## 2019-03-11 PROCEDURE — 4040F PNEUMOC VAC/ADMIN/RCVD: CPT | Performed by: FAMILY MEDICINE

## 2019-03-11 PROCEDURE — G8427 DOCREV CUR MEDS BY ELIG CLIN: HCPCS | Performed by: FAMILY MEDICINE

## 2019-03-11 PROCEDURE — 99214 OFFICE O/P EST MOD 30 MIN: CPT | Performed by: FAMILY MEDICINE

## 2019-03-11 PROCEDURE — G8399 PT W/DXA RESULTS DOCUMENT: HCPCS | Performed by: FAMILY MEDICINE

## 2019-03-11 PROCEDURE — 1123F ACP DISCUSS/DSCN MKR DOCD: CPT | Performed by: FAMILY MEDICINE

## 2019-03-11 PROCEDURE — 93971 EXTREMITY STUDY: CPT

## 2019-03-11 PROCEDURE — 1036F TOBACCO NON-USER: CPT | Performed by: FAMILY MEDICINE

## 2019-03-11 PROCEDURE — G8482 FLU IMMUNIZE ORDER/ADMIN: HCPCS | Performed by: FAMILY MEDICINE

## 2019-03-11 ASSESSMENT — PATIENT HEALTH QUESTIONNAIRE - PHQ9
2. FEELING DOWN, DEPRESSED OR HOPELESS: 0
SUM OF ALL RESPONSES TO PHQ9 QUESTIONS 1 & 2: 0
SUM OF ALL RESPONSES TO PHQ QUESTIONS 1-9: 0
SUM OF ALL RESPONSES TO PHQ QUESTIONS 1-9: 0
1. LITTLE INTEREST OR PLEASURE IN DOING THINGS: 0

## 2019-03-11 ASSESSMENT — ENCOUNTER SYMPTOMS
RESPIRATORY NEGATIVE: 1
GASTROINTESTINAL NEGATIVE: 1

## 2019-03-12 ENCOUNTER — TELEPHONE (OUTPATIENT)
Dept: FAMILY MEDICINE CLINIC | Age: 74
End: 2019-03-12

## 2019-03-12 RX ORDER — GABAPENTIN 100 MG/1
100 CAPSULE ORAL 2 TIMES DAILY
Qty: 60 CAPSULE | Refills: 0 | Status: SHIPPED | OUTPATIENT
Start: 2019-03-12 | End: 2019-04-01 | Stop reason: SDUPTHER

## 2019-03-12 RX ORDER — CEPHALEXIN 500 MG/1
500 CAPSULE ORAL 2 TIMES DAILY
Qty: 14 CAPSULE | Refills: 0 | Status: SHIPPED | OUTPATIENT
Start: 2019-03-12 | End: 2019-03-19

## 2019-03-14 ENCOUNTER — TELEPHONE (OUTPATIENT)
Dept: PHARMACY | Age: 74
End: 2019-03-14

## 2019-03-18 ENCOUNTER — TELEPHONE (OUTPATIENT)
Dept: FAMILY MEDICINE CLINIC | Age: 74
End: 2019-03-18

## 2019-03-18 RX ORDER — GABAPENTIN 100 MG/1
200 CAPSULE ORAL 3 TIMES DAILY
Qty: 60 CAPSULE | Refills: 0 | Status: CANCELLED | OUTPATIENT
Start: 2019-03-18 | End: 2019-04-17

## 2019-03-20 ENCOUNTER — HOSPITAL ENCOUNTER (OUTPATIENT)
Dept: PHARMACY | Age: 74
Setting detail: THERAPIES SERIES
Discharge: HOME | End: 2019-03-20
Payer: MEDICARE

## 2019-03-20 VITALS — HEART RATE: 80 BPM | SYSTOLIC BLOOD PRESSURE: 143 MMHG | DIASTOLIC BLOOD PRESSURE: 81 MMHG

## 2019-03-20 DIAGNOSIS — Z79.01 LONG TERM CURRENT USE OF ANTICOAGULANT THERAPY: ICD-10-CM

## 2019-03-20 DIAGNOSIS — I82.4Y9 DEEP VEIN THROMBOSIS (DVT) OF PROXIMAL LOWER EXTREMITY, UNSPECIFIED CHRONICITY, UNSPECIFIED LATERALITY (HCC): ICD-10-CM

## 2019-03-20 LAB — INR BLD: 3.4

## 2019-03-20 PROCEDURE — 85610 PROTHROMBIN TIME: CPT

## 2019-03-20 PROCEDURE — 99211 OFF/OP EST MAY X REQ PHY/QHP: CPT

## 2019-04-01 DIAGNOSIS — E11.9 TYPE 2 DIABETES MELLITUS WITHOUT COMPLICATION, WITHOUT LONG-TERM CURRENT USE OF INSULIN (HCC): ICD-10-CM

## 2019-04-01 RX ORDER — GABAPENTIN 100 MG/1
200 CAPSULE ORAL 2 TIMES DAILY
Qty: 120 CAPSULE | Refills: 2 | Status: SHIPPED | OUTPATIENT
Start: 2019-04-01 | End: 2020-05-02

## 2019-04-01 NOTE — TELEPHONE ENCOUNTER
Gabapentin 100 mg    DM-matthew Eid said they do help her. She would also like to know if she can get an xray that sees if she has any blood clots in her lungs. Can this be ordered for her or does she need an appointment? please let Ragini know. Health Maintenance   Topic Date Due    Shingles Vaccine (1 of 2) 11/04/1997    Diabetic retinal exam  04/27/2017    Lipid screen  02/26/2019    Diabetic microalbuminuria test  06/18/2019    Diabetic foot exam  09/21/2019    Potassium monitoring  02/11/2020    Creatinine monitoring  02/11/2020    A1C test (Diabetic or Prediabetic)  03/11/2020    Breast cancer screen  06/19/2020    Colon cancer screen colonoscopy  02/27/2021    DTaP/Tdap/Td vaccine (2 - Td) 12/10/2025    DEXA (modify frequency per FRAX score)  Completed    Flu vaccine  Completed    Pneumococcal 65+ years Vaccine  Completed    Hepatitis C screen  Addressed             (applicable per patient's age: Cancer Screenings, Depression Screening, Fall Risk Screening, Immunizations)    Hemoglobin A1C (%)   Date Value   03/11/2019 7.1   06/18/2018 6.0   10/10/2017 6.7 (H)     Microalb/Crt.  Ratio (mcg/mg creat)   Date Value   09/09/2014 7     LDL Cholesterol (mg/dL)   Date Value   02/26/2018 92     AST (U/L)   Date Value   02/26/2018 20     ALT (U/L)   Date Value   02/26/2018 29     BUN (mg/dL)   Date Value   02/11/2019 38 (H)      (goal A1C is < 7)   (goal LDL is <100) need 30-50% reduction from baseline     BP Readings from Last 3 Encounters:   03/20/19 (!) 143/81   03/11/19 120/70   02/19/19 129/73    (goal /80)      All Future Testing planned in [facility]:  Lab Frequency Next Occurrence       Next Visit Date:  Future Appointments   Date Time Provider Natanael Austin   4/17/2019 11:00 AM [facility]W [facility]            Patient Active Problem List:     Type 2 diabetes mellitus without complication ([address]     Essential hypertension, benign     Esophageal reflux

## 2019-04-02 RX ORDER — GLIPIZIDE 5 MG/1
TABLET, FILM COATED, EXTENDED RELEASE ORAL
Qty: 90 TABLET | Refills: 1 | Status: SHIPPED | OUTPATIENT
Start: 2019-04-02 | End: 2019-08-19 | Stop reason: SDUPTHER

## 2019-04-05 ENCOUNTER — OFFICE VISIT (OUTPATIENT)
Dept: FAMILY MEDICINE CLINIC | Age: 74
End: 2019-04-05
Payer: MEDICARE

## 2019-04-05 VITALS
DIASTOLIC BLOOD PRESSURE: 70 MMHG | BODY MASS INDEX: 39.08 KG/M2 | HEART RATE: 90 BPM | OXYGEN SATURATION: 98 % | WEIGHT: 249 LBS | HEIGHT: 67 IN | SYSTOLIC BLOOD PRESSURE: 120 MMHG

## 2019-04-05 DIAGNOSIS — R47.89 WORD FINDING DIFFICULTY: ICD-10-CM

## 2019-04-05 DIAGNOSIS — Z95.828 PRESENCE OF IVC FILTER: ICD-10-CM

## 2019-04-05 DIAGNOSIS — D36.9 TUBULAR ADENOMA: ICD-10-CM

## 2019-04-05 DIAGNOSIS — R19.5 CHANGE IN STOOL CALIBER: Primary | ICD-10-CM

## 2019-04-05 DIAGNOSIS — R10.10 UPPER ABDOMINAL PAIN: ICD-10-CM

## 2019-04-05 PROCEDURE — 1090F PRES/ABSN URINE INCON ASSESS: CPT | Performed by: FAMILY MEDICINE

## 2019-04-05 PROCEDURE — G8427 DOCREV CUR MEDS BY ELIG CLIN: HCPCS | Performed by: FAMILY MEDICINE

## 2019-04-05 PROCEDURE — 1123F ACP DISCUSS/DSCN MKR DOCD: CPT | Performed by: FAMILY MEDICINE

## 2019-04-05 PROCEDURE — G8399 PT W/DXA RESULTS DOCUMENT: HCPCS | Performed by: FAMILY MEDICINE

## 2019-04-05 PROCEDURE — 1036F TOBACCO NON-USER: CPT | Performed by: FAMILY MEDICINE

## 2019-04-05 PROCEDURE — 3017F COLORECTAL CA SCREEN DOC REV: CPT | Performed by: FAMILY MEDICINE

## 2019-04-05 PROCEDURE — G8417 CALC BMI ABV UP PARAM F/U: HCPCS | Performed by: FAMILY MEDICINE

## 2019-04-05 PROCEDURE — 4040F PNEUMOC VAC/ADMIN/RCVD: CPT | Performed by: FAMILY MEDICINE

## 2019-04-05 PROCEDURE — 99214 OFFICE O/P EST MOD 30 MIN: CPT | Performed by: FAMILY MEDICINE

## 2019-04-05 NOTE — PROGRESS NOTES
Name: Marquez Umana  : 1945         Chief Complaint:     Chief Complaint   Patient presents with    Other     thinks she should have her IVC filter checked.  Memory Loss     worries since her mother had alzheimers       History of Present Illness:      Marquez Umana is a 72 y.o.  female who presents with Other (thinks she should have her IVC filter checked.) and Memory Loss (worries since her mother had alzheimers)      HPI     Stool changes recently, past couple wks lots of gas, and most of her stools are small squiggles. Occasional formed stool. Hadn't had any big diet changes or recent abx. H/o lower extremity Dvt x 2 and has IVC filter. Concerned there could be a problem with it. Has upper abd pain at times. Filter was placed by a dr (whom she didn't like) in the hospital but she followed up with Dr Nieves Charles afterwards. She does plan to see Dr Nieves Charles soon for LLE varicose veins anyway. She's been on coumadin. Concerned about memory trouble. Difficulty word-finding. One night woke up from falling asleep on the couch and asked son where \"dad\" (her  who passed away a few yrs ago) was. Other times feels like she's around people from her childhood. Doesn't have any actual visual or auditory hallucinations. Concerned because mom had alzheimer's.  Pt denies any times of disorientation or getting lost.    Past Medical History:     Past Medical History:   Diagnosis Date    Allergic rhinitis     Chronic kidney disease, stage III (moderate) ([address]     Elbow fracture, left     Fall     Gastric ulcer     Gout     Hx of blood clots     Following last back surgery     Hypertension     Nausea & vomiting     Presence of IVC filter     Type II or unspecified type diabetes mellitus without mention of complication, not stated as uncontrolled         Past Surgical History:     Past Surgical History:   Procedure Laterality Date    BACK SURGERY      BACK SURGERY      BACK SURGERY      BACK SURGERY  BACK SURGERY      BACK SURGERY      Fusion     BREAST BIOPSY Left     BREAST BIOPSY Right     CARDIAC CATHETERIZATION Left 03/07/2018    Dr. Sandra Durham @ [facility]--There is 30% disease of the origin of the lateral anterior descending. Mild 10% -20% plaque disease in the circumflex & right coronary artery. Normal left ventricular functino, ejection fraction of 60%.  CARPAL TUNNEL RELEASE Right     CATARACT REMOVAL Right     CATARACT REMOVAL Left     COLONOSCOPY      CYSTOSCOPY      \"Multiple\"     EYE SURGERY Right     Removed fluid from behind eye    HYSTERECTOMY      KIDNEY SURGERY      left side 1/2 of kidney removed    PARTIAL NEPHRECTOMY Left     RETINAL DETACHMENT SURGERY Left     ROTATOR CUFF REPAIR      TOTAL KNEE ARTHROPLASTY Right     TOTAL KNEE ARTHROPLASTY Left     UPPER GASTROINTESTINAL ENDOSCOPY      VENA CAVA FILTER PLACEMENT          Medications:       Prior to Admission medications    Medication Sig Start Date End Date Taking? Authorizing Provider   glipiZIDE (GLUCOTROL XL) 5 MG extended release tablet TAKE 1 TABLET EVERY DAY 4/2/19  Yes Cindy Pino, DO   gabapentin (NEURONTIN) 100 MG capsule Take 2 capsules by mouth 2 times daily for 90 days. 4/1/19 6/30/19 Yes Cindy Pino DO   warfarin (COUMADIN) 5 MG tablet Take 1/2 tablet on Saturdays and 1 whole tablet daily all other days or as directed by Manuel Hassan Medication Management.  2/20/19  Yes Cindy Pino, DO   hydrochlorothiazide (MICROZIDE) 12.5 MG capsule TAKE 1 CAPSULE EVERY DAY 1/28/19  Yes YOHAN Rankin CNP   omeprazole (PRILOSEC) 20 MG delayed release capsule TAKE 1 CAPSULE EVERY DAY 1/28/19  Yes YOHAN Rankin CNP   ranitidine (ZANTAC) 150 MG tablet TAKE 1 TABLET EVERY NIGHT 1/28/19  Yes YOHAN Rankin CNP   propranolol (INDERAL) 60 MG tablet TAKE 1 TABLET TWICE DAILY 1/28/19  Yes YOHAN Rankin - CNP   cyclobenzaprine (FLEXERIL) 10 MG tablet Take 1 tablet by mouth 2 times daily as needed (Arthritic pain.) 1/23/19  Yes YOHAN Armstrong - CNP   isosorbide dinitrate (ISORDIL) 5 MG tablet TAKE 1 TABLET TWICE DAILY 12/12/18  Yes Earl Landaverde MD   allopurinol (ZYLOPRIM) 100 MG tablet TAKE 1 TABLET TWICE DAILY 12/12/18  Yes Cozette Tia, DO   losartan (COZAAR) 25 MG tablet TAKE 1 TABLET EVERY DAY 12/12/18  Yes Cozette [place], DO   vitamin D 1000 units CAPS Take 1 capsule by mouth daily   Yes Historical Provider, MD   furosemide (LASIX) 20 MG tablet Take 1 tablet by mouth daily As needed for swelling 6/18/18  Yes Cozette [place], DO   potassium chloride (KLOR-CON M) 20 MEQ extended release tablet Take 1 tablet by mouth daily On the days that you take lasix 6/18/18  Yes Cozette [place], DO   fluticasone (FLONASE) 50 MCG/ACT nasal spray 2 sprays by Nasal route daily  Patient taking differently: 2 sprays by Nasal route daily as needed  6/8/17  Yes Cozette Tia, DO   glucose blood VI test strips (TRUE METRIX BLOOD GLUCOSE TEST) strip Use once daily and as needed. 2/1/17  Yes Cozette Tia, DO   Lancets MISC 1 each by Does not apply route daily 2/1/17  Yes Cozette [place], DO   Blood Glucose Monitoring Suppl (TRUE METRIX AIR GLUCOSE METER) W/DEVICE KIT Use to test sugar once daily 2/1/17  Yes Cozette Tia, DO   acetaminophen (TYLENOL) 650 MG CR tablet Take 1,300 mg by mouth every 8 hours as needed for Pain   Yes Historical Provider, MD   Magnesium 400 MG TABS Take 800 mg by mouth nightly    Yes Historical Provider, MD   folic acid (FOLVITE) 689 MCG tablet Take 400 mcg by mouth daily 2/8/16  Yes Historical Provider, MD        Allergies:       Codeine; Percocet [oxycodone-acetaminophen]; Adhesive tape; and Norco [hydrocodone-acetaminophen]    Social History:     Tobacco:    reports that she has never smoked. She has never used smokeless tobacco.  Alcohol:      reports that she does not drink alcohol.   Drug Use:  reports that she does not use Orders   1. Change in stool caliber  Ambulatory referral to General Surgery   2. Tubular adenoma  Ambulatory referral to General Surgery   3. Upper abdominal pain     4. Presence of IVC filter     5. Word finding difficulty     recent change in stool caliber, with upper abd pain that has been going on for longer. Reviewed previous colonoscopy records from 2014 indicating presence of tubular adenoma and that her next scope would be due in 5-7 yrs. Referred for eval.  Pt also concerned that her upper abd pain, which she's been complaining about for a while and has been rather nonspecific, could be r/t ivc filter. I know of no indication for routine monitoring of the filter. She will be seeing vascular soon for varicose veins anyway, so I recommended she ask them about the filter. Some memory loss beginning - discussed. Still oriented and safe to be at home (lives with son), managing meds ok. Advised to stay active physically and mentally. Cont anticoag. Advised statin med (for primary ASCVD prevention in diabetic pt, not dementia treatment/prevention) but pt has declined. Requested Prescriptions      No prescriptions requested or ordered in this encounter       There are no Patient Instructions on file for this visit. Ragini received counseling on the following healthy behaviors: medication adherence  Reviewed prior labs and health maintenance. Continue current medications, diet and exercise. Discussed use, benefit, and side effects of prescribed medications. Barriers to medication compliance addressed. Patient given educational materials - see patient instructions. All patient questions answered. Patient voiced understanding.      Electronically signed by Nathaniel Moore DO on 4/7/2019 at 9:50 PM   [address]  [address] [place] [address] [address] [place] [id number]  Dept: [phone]

## 2019-04-07 ASSESSMENT — ENCOUNTER SYMPTOMS: RESPIRATORY NEGATIVE: 1

## 2019-04-15 ENCOUNTER — OFFICE VISIT (OUTPATIENT)
Dept: SURGERY | Age: 74
End: 2019-04-15
Payer: MEDICARE

## 2019-04-15 VITALS
BODY MASS INDEX: 38.14 KG/M2 | SYSTOLIC BLOOD PRESSURE: 130 MMHG | HEART RATE: 72 BPM | WEIGHT: 243 LBS | HEIGHT: 67 IN | RESPIRATION RATE: 18 BRPM | DIASTOLIC BLOOD PRESSURE: 78 MMHG

## 2019-04-15 DIAGNOSIS — R19.4 CHANGE IN BOWEL HABITS: Primary | ICD-10-CM

## 2019-04-15 DIAGNOSIS — Z01.818 PREOPERATIVE CLEARANCE: Primary | ICD-10-CM

## 2019-04-15 DIAGNOSIS — Z01.818 PREPROCEDURAL EXAMINATION: ICD-10-CM

## 2019-04-15 DIAGNOSIS — Z80.0 FAMILY HISTORY OF COLON CANCER: ICD-10-CM

## 2019-04-15 DIAGNOSIS — Z86.010 HISTORY OF COLONIC POLYPS: ICD-10-CM

## 2019-04-15 PROCEDURE — G8399 PT W/DXA RESULTS DOCUMENT: HCPCS | Performed by: SURGERY

## 2019-04-15 PROCEDURE — G8427 DOCREV CUR MEDS BY ELIG CLIN: HCPCS | Performed by: SURGERY

## 2019-04-15 PROCEDURE — 99203 OFFICE O/P NEW LOW 30 MIN: CPT | Performed by: SURGERY

## 2019-04-15 PROCEDURE — G8417 CALC BMI ABV UP PARAM F/U: HCPCS | Performed by: SURGERY

## 2019-04-15 PROCEDURE — 1123F ACP DISCUSS/DSCN MKR DOCD: CPT | Performed by: SURGERY

## 2019-04-15 PROCEDURE — 3017F COLORECTAL CA SCREEN DOC REV: CPT | Performed by: SURGERY

## 2019-04-15 PROCEDURE — 1036F TOBACCO NON-USER: CPT | Performed by: SURGERY

## 2019-04-15 PROCEDURE — 1090F PRES/ABSN URINE INCON ASSESS: CPT | Performed by: SURGERY

## 2019-04-15 PROCEDURE — 4040F PNEUMOC VAC/ADMIN/RCVD: CPT | Performed by: SURGERY

## 2019-04-15 RX ORDER — SODIUM, POTASSIUM,MAG SULFATES 17.5-3.13G
1 SOLUTION, RECONSTITUTED, ORAL ORAL ONCE
Qty: 2 BOTTLE | Refills: 0 | Status: SHIPPED | OUTPATIENT
Start: 2019-04-15 | End: 2019-04-15

## 2019-04-17 ENCOUNTER — HOSPITAL ENCOUNTER (OUTPATIENT)
Dept: PHARMACY | Age: 74
Setting detail: THERAPIES SERIES
Discharge: HOME | End: 2019-04-17
Payer: MEDICARE

## 2019-04-17 VITALS — SYSTOLIC BLOOD PRESSURE: 120 MMHG | DIASTOLIC BLOOD PRESSURE: 87 MMHG | HEART RATE: 78 BPM

## 2019-04-17 DIAGNOSIS — I82.4Y9 DEEP VEIN THROMBOSIS (DVT) OF PROXIMAL LOWER EXTREMITY, UNSPECIFIED CHRONICITY, UNSPECIFIED LATERALITY (HCC): ICD-10-CM

## 2019-04-17 DIAGNOSIS — Z79.01 LONG TERM CURRENT USE OF ANTICOAGULANT THERAPY: ICD-10-CM

## 2019-04-17 LAB — INR BLD: 2.4

## 2019-04-17 PROCEDURE — 85610 PROTHROMBIN TIME: CPT

## 2019-04-17 PROCEDURE — 99211 OFF/OP EST MAY X REQ PHY/QHP: CPT

## 2019-04-17 NOTE — PROGRESS NOTES
Fingerstick INR drawn per clinic protocol. Patient states no visible blood in urine and no black tarry stool. Denies any missed doses of warfarin. No change in other maintenance medications or in diet. Ragini saw Dr. Walker Biswas on 4/5/19 and was referred to Dr. Destiny Garcia for a consult regarding colonoscopy. Ragini states she need to be cleared by Dr. Nilson Peres prior to the procedure. Norma Saranerica is scheduled to see Dr. Memo Mejia on 4/29/2019. Norma Landeros states she has been having increased \"urine problems. \" Norma Landeros states she had \"puss\" coming out in her urine x 3 on Saturday, then it resolved, but was having burning last night and will contact Dr. Walker Biswas if it continues. Since Ragini's INR is therapeutic today, we will continue current weekly warfarin regimen and recheck INR in 4 week(s). Patient acknowledges working in consult agreement with pharmacist as referred by his/her physician.

## 2019-04-25 ASSESSMENT — ENCOUNTER SYMPTOMS
SHORTNESS OF BREATH: 0
NAUSEA: 0
DIARRHEA: 1
COUGH: 0
TROUBLE SWALLOWING: 0
BACK PAIN: 0
VOMITING: 0
CHOKING: 0
ABDOMINAL DISTENTION: 1
ABDOMINAL PAIN: 0
BLOOD IN STOOL: 0
SORE THROAT: 0

## 2019-04-25 NOTE — PATIENT INSTRUCTIONS
is worse than the test. It may be uncomfortable, and you may feel hungry on the clear liquid diet. Some people do not go to work or do their usual activities on the day of the prep. Arrange to have someone take you home after the test.  What can you expect after a colonoscopy? The nurses will watch you for 1 to 2 hours until the medicines wear off. Then you can go home. You will need a ride. Your doctor will tell you when you can eat and do your usual activities. Your doctor will talk to you about when you will need your next colonoscopy. The results of your test and your risk for colorectal cancer will help your doctor decide how often you need to be checked. Follow-up care is a key part of your treatment and safety. Be sure to make and go to all appointments, and call your doctor if you are having problems. It's also a good idea to know your test results and keep a list of the medicines you take. Where can you learn more? Go to [email or web address] and sign in to your [employer] account. Enter Z293 in the HackMyPic box to learn more about \"Learning About Colonoscopy. \"     If you do not have an account, please click on the \"Sign Up Now\" link. Current as of: March 27, 2018  Content Version: 11.9  © 6065-6921 [employer]. Care instructions adapted under license by [facility] ([facility]). If you have questions about a medical condition or this instruction, always ask your healthcare professional. [place] [address] any warranty or liability for your use of this information.

## 2019-04-25 NOTE — PROGRESS NOTES
Surgical History:   Procedure Laterality Date    BACK SURGERY      BACK SURGERY      BACK SURGERY      BACK SURGERY      BACK SURGERY      BACK SURGERY      Fusion     BREAST BIOPSY Left     BREAST BIOPSY Right     CARDIAC CATHETERIZATION Left 03/07/2018    Dr. Jose Rafael Tucker @ [facility]--There is 30% disease of the origin of the lateral anterior descending. Mild 10% -20% plaque disease in the circumflex & right coronary artery. Normal left ventricular functino, ejection fraction of 60%.  CARPAL TUNNEL RELEASE Right     CATARACT REMOVAL Right     CATARACT REMOVAL Left     COLONOSCOPY  2014    CYSTOSCOPY      \"Multiple\"     EYE SURGERY Right     Removed fluid from behind eye    HYSTERECTOMY      KIDNEY SURGERY      left side 1/2 of kidney removed    PARTIAL NEPHRECTOMY Left     RETINAL DETACHMENT SURGERY Left     ROTATOR CUFF REPAIR      TOTAL KNEE ARTHROPLASTY Right     TOTAL KNEE ARTHROPLASTY Left     UPPER GASTROINTESTINAL ENDOSCOPY  2014    VENA CAVA FILTER PLACEMENT         Family History   Problem Relation Age of Onset    Diabetes Father     Cancer Father         Prostate Cancer    Cancer Paternal Aunt         Colon Cancer       Allergies:  See list    Current Outpatient Medications   Medication Sig Dispense Refill    glipiZIDE (GLUCOTROL XL) 5 MG extended release tablet TAKE 1 TABLET EVERY DAY 90 tablet 1    gabapentin (NEURONTIN) 100 MG capsule Take 2 capsules by mouth 2 times daily for 90 days. 120 capsule 2    warfarin (COUMADIN) 5 MG tablet Take 1/2 tablet on Saturdays and 1 whole tablet daily all other days or as directed by Daniel Lomas Medication Management.  90 tablet 3    hydrochlorothiazide (MICROZIDE) 12.5 MG capsule TAKE 1 CAPSULE EVERY DAY 90 capsule 3    omeprazole (PRILOSEC) 20 MG delayed release capsule TAKE 1 CAPSULE EVERY DAY 90 capsule 3    ranitidine (ZANTAC) 150 MG tablet TAKE 1 TABLET EVERY NIGHT 90 tablet 3    propranolol (INDERAL) 60 MG tablet TAKE 1 TABLET TWICE DAILY 180 tablet 3    cyclobenzaprine (FLEXERIL) 10 MG tablet Take 1 tablet by mouth 2 times daily as needed (Arthritic pain.) 60 tablet 2    isosorbide dinitrate (ISORDIL) 5 MG tablet TAKE 1 TABLET TWICE DAILY 180 tablet 3    allopurinol (ZYLOPRIM) 100 MG tablet TAKE 1 TABLET TWICE DAILY 180 tablet 1    losartan (COZAAR) 25 MG tablet TAKE 1 TABLET EVERY DAY 90 tablet 1    vitamin D 1000 units CAPS Take 1 capsule by mouth daily      furosemide (LASIX) 20 MG tablet Take 1 tablet by mouth daily As needed for swelling 30 tablet 3    potassium chloride (KLOR-CON M) 20 MEQ extended release tablet Take 1 tablet by mouth daily On the days that you take lasix 30 tablet 3    fluticasone (FLONASE) 50 MCG/ACT nasal spray 2 sprays by Nasal route daily (Patient taking differently: 2 sprays by Nasal route daily as needed ) 1 Bottle 3    glucose blood VI test strips (TRUE METRIX BLOOD GLUCOSE TEST) strip Use once daily and as needed. 100 each 3    Lancets MISC 1 each by Does not apply route daily 100 each 3    Blood Glucose Monitoring Suppl (TRUE METRIX AIR GLUCOSE METER) W/DEVICE KIT Use to test sugar once daily 1 kit 0    acetaminophen (TYLENOL) 650 MG CR tablet Take 1,300 mg by mouth every 8 hours as needed for Pain      Magnesium 400 MG TABS Take 800 mg by mouth nightly       folic acid (FOLVITE) 347 MCG tablet Take 400 mcg by mouth daily       No current facility-administered medications for this visit. Social History     Socioeconomic History    Marital status:       Spouse name: None    Number of children: None    Years of education: None    Highest education level: None   Occupational History    None   Social Needs    Financial resource strain: None    Food insecurity:     Worry: None     Inability: None    Transportation needs:     Medical: None     Non-medical: None   Tobacco Use    Smoking status: Never Smoker    Smokeless tobacco: Never Used   Substance and Sexual Activity    Alcohol use: No    Drug use: No    Sexual activity: None   Lifestyle    Physical activity:     Days per week: None     Minutes per session: None    Stress: None   Relationships    Social connections:     Talks on phone: None     Gets together: None     Attends Yarsani service: None     Active member of club or organization: None     Attends meetings of clubs or organizations: None     Relationship status: None    Intimate partner violence:     Fear of current or ex partner: None     Emotionally abused: None     Physically abused: None     Forced sexual activity: None   Other Topics Concern    None   Social History Narrative    None       Objective:   Physical Exam   Constitutional: She is oriented to person, place, and time. She appears well-developed and well-nourished. HENT:   Head: Normocephalic and atraumatic. Mouth/Throat: Oropharynx is clear and moist.   Eyes: Pupils are equal, round, and reactive to light. Conjunctivae and EOM are normal. No scleral icterus. Neck: Normal range of motion. Neck supple. No JVD present. No tracheal deviation present. Cardiovascular: Normal rate and regular rhythm. Pulmonary/Chest: Effort normal and breath sounds normal. No respiratory distress. She exhibits no tenderness. Abdominal: Soft. Bowel sounds are normal. She exhibits no distension and no mass. There is no tenderness. There is no rebound and no guarding. Musculoskeletal: Normal range of motion. She exhibits no edema. Lymphadenopathy:     She has no cervical adenopathy. Neurological: She is alert and oriented to person, place, and time. No cranial nerve deficit. Skin: Skin is warm and dry. No rash noted. No erythema. Psychiatric: She has a normal mood and affect. Her behavior is normal. Judgment and thought content normal.   Nursing note and vitals reviewed.        Result Date - 2/28/2014     Component Collected Lab   Surgical Pathology Report 02/27/2014  1:54 PM [facility]   (NOTE)  [facility]  [address] [place], [address] [facility]  [phone]  Fax: [phone]  SURGICAL PATHOLOGY REPORT    Patient Name: Eveline Mujica  MR#: [id number]  Specimen #[id number]      Final Diagnosis    A.     GASTROESOPHAGEAL JUNCTION, BIOPSY:             -  PROXIMAL GASTRIC MUCOSA WITH MILD CHRONIC INFLAMMATION               AND PARIETAL CELL HYPERPLASIA.               -  NO SQUAMOUS MUCOSA IDENTIFIED.          -  NO INTESTINAL METAPLASIA OR DYSPLASIA FOUND. B.     CECAL POLYP:             -  TUBULAR ADENOMA.             -  MILD MELANOSIS COLI. C.     COLON, 45 CM, POLYP:                 -  TUBULAR ADENOMA. Damaris Rosa M.D.  **Electronically Signed Out**         cd1/2/28/2014    Clinical Information  1) BLOOD/MUCOUS IN FECES & ABD. PAIN, 2) INTRACTABLE REFLUX   PEPTIC ULCER DISEASE    Pre-Operative Diagnosis  1) EGD, RANDOM XS  2) COLONOSCOPY; POLYPECTOMY X 2    Post-Operative Diagnosis  POLYPS    Source:  A: BIOPSY G E JUNCTION  B: CECAL POLYP  C: POLYP 45 CM    Gross Description  A.     Received in formalin, labeled with the patient's name and  marked random biopsies of GE junction.  The specimen       consists of three tan fragments of soft tissue measuring in  aggregate 0.2 x 0.1 x 0.1 cm.  Submitted in toto.  One cassette. B.     Received in formalin, labeled with the patient's name and  marked cecal polyp.  The specimen consists of a single tan   fragment of soft tissue measuring 0.2 x 0.1 x 0.1 cm.  Submitted in  toto.  One cassette. C.     Received in formalin, labeled with the patient's name and  marked polyp at 45 cm.  The specimen consists of four tan   fragments of soft tissue measuring in aggregate 0.4 x 0.3 x 0.2 cm.    Submitted in toto.  One cassette.   CE    Microscopic Description  A.     Sections demonstrate proximal gastric mucosa with areas of mild  chronic inflammation.  The chronic inflammation is   localized within the lamina propria, and consists of a predominance of  plasma cells and lymphocytes.  The parietal-type   glands show cellular enlargement and open glandular lumens, consistent  with parietal cell hyperplasia.  This finding is   often observed in association with proton pump inhibitor therapy. B.     Sections demonstrate polypoid colonic mucosa with focal  adenomatous change.  The lesion is characterized by irregular   crypt architecture, lined by multi-layered columnar epithelium with  nuclear crowding, mild hyperchromasia, and rare       mitotic figures.  The findings described are consistent with  tubular adenoma.  No high-grade dysplasia is identified.   The lamina  propria shows scattered pigment-laden histiocytes, consistent with  mild melanosis coli. C.     Sections demonstrate polypoid colonic mucosa with adenomatous  change.  The lesion demonstrates irregular crypt   architecture with variation in crypt size.  The adenomatous crypts are  lined by multi-layered columnar epithelium with       nuclear crowding, elongation, hyperchromasia and occasional  mitotic figures.  No high-grade dysplasia is identified. The findings  described are consistent with tubular adenoma.  CE/cd   Testing Performed By     Lab - Abbreviation Name Director Address Valid Date Range   [facility] 13 LAB Unknown Unknown 06/05/12 1209-08/30/17 2950   Lab and Collection     Surgical Pathology - 2/27/2014       Assessment:       Diagnosis Orders   1. Change in bowel habits     2. History of colonic polyps     3. Family history of colon cancer     4. Preprocedural examination  EKG 12 Lead         Plan: Will proceed with diagnostic colonoscopy over the next few weeks.   Risks, benefits, alternatives thoroughly reviewed and accepted by Ms Dahiana Paul, including remote risk of GI bleeding, perforation, missed lesions, etc.        Radha Claire MD

## 2019-04-30 RX ORDER — LOSARTAN POTASSIUM 25 MG/1
TABLET ORAL
Qty: 90 TABLET | Refills: 1 | Status: SHIPPED | OUTPATIENT
Start: 2019-04-30 | End: 2019-07-09 | Stop reason: ALTCHOICE

## 2019-04-30 RX ORDER — ALLOPURINOL 100 MG/1
TABLET ORAL
Qty: 180 TABLET | Refills: 1 | Status: CANCELLED | OUTPATIENT
Start: 2019-04-30

## 2019-04-30 RX ORDER — ALLOPURINOL 100 MG/1
TABLET ORAL
Qty: 180 TABLET | Refills: 1 | Status: SHIPPED | OUTPATIENT
Start: 2019-04-30 | End: 2019-09-17 | Stop reason: SDUPTHER

## 2019-04-30 RX ORDER — LOSARTAN POTASSIUM 25 MG/1
TABLET ORAL
Qty: 90 TABLET | Refills: 1 | Status: CANCELLED | OUTPATIENT
Start: 2019-04-30

## 2019-04-30 NOTE — TELEPHONE ENCOUNTER
Last visit:  4/5/2019  Next Visit Date:    Future Appointments   Date Time Provider Natanael Austin   5/15/2019 11:00 AM [facility] [place]   6/19/2019 12:40 PM DO Rosario Gayle [facility]     Last Med refill:    Medication List:  Prior to Admission medications    Medication Sig Start Date End Date Taking? Authorizing Provider   glipiZIDE (GLUCOTROL XL) 5 MG extended release tablet TAKE 1 TABLET EVERY DAY 4/2/19   Antionette Nicholas DO   gabapentin (NEURONTIN) 100 MG capsule Take 2 capsules by mouth 2 times daily for 90 days. 4/1/19 6/30/19  Antionette Nicholas DO   warfarin (COUMADIN) 5 MG tablet Take 1/2 tablet on Saturdays and 1 whole tablet daily all other days or as directed by Poppy Bardalesfast Medication Management.  2/20/19   Antionette Nicholas DO   hydrochlorothiazide (MICROZIDE) 12.5 MG capsule TAKE 1 CAPSULE EVERY DAY 1/28/19   Andre Fung, APRN - CNP   omeprazole (PRILOSEC) 20 MG delayed release capsule TAKE 1 CAPSULE EVERY DAY 1/28/19   Andre Blazing, APRN - CNP   ranitidine (ZANTAC) 150 MG tablet TAKE 1 TABLET EVERY NIGHT 1/28/19   Andre Blajelani, APRN - CNP   propranolol (INDERAL) 60 MG tablet TAKE 1 TABLET TWICE DAILY 1/28/19   Andre Blazing, APRN - CNP   cyclobenzaprine (FLEXERIL) 10 MG tablet Take 1 tablet by mouth 2 times daily as needed (Arthritic pain.) 1/23/19   Andre Fung, APRN - CNP   isosorbide dinitrate (ISORDIL) 5 MG tablet TAKE 1 TABLET TWICE DAILY 12/12/18   Olga Mendez MD   allopurinol (ZYLOPRIM) 100 MG tablet TAKE 1 TABLET TWICE DAILY 12/12/18   Antionette Nicholas DO   losartan (COZAAR) 25 MG tablet TAKE 1 TABLET EVERY DAY 12/12/18   Antionette Nicholas DO   vitamin D 1000 units CAPS Take 1 capsule by mouth daily    Historical Provider, MD   furosemide (LASIX) 20 MG tablet Take 1 tablet by mouth daily As needed for swelling 6/18/18   Antionette Nicholas DO   potassium chloride (KLOR-CON M) 20 MEQ extended release tablet Take 1 tablet by mouth daily On the days that you take lasix 6/18/18   Winston Richardson DO   fluticasone [facility]) 50 MCG/ACT nasal spray 2 sprays by Nasal route daily  Patient taking differently: 2 sprays by Nasal route daily as needed  6/8/17   Winston Richardson DO   glucose blood VI test strips (TRUE METRIX BLOOD GLUCOSE TEST) strip Use once daily and as needed. 2/1/17   Winston Richardson DO   Lancets MISC 1 each by Does not apply route daily 2/1/17   Winston Richardson DO   Blood Glucose Monitoring Suppl (TRUE METRIX AIR GLUCOSE METER) W/DEVICE KIT Use to test sugar once daily 2/1/17   Winston Richardson DO   acetaminophen (TYLENOL) 650 MG CR tablet Take 1,300 mg by mouth every 8 hours as needed for Pain    Historical Provider, MD   Magnesium 400 MG TABS Take 800 mg by mouth nightly     Historical Provider, MD   folic acid (FOLVITE) 394 MCG tablet Take 400 mcg by mouth daily 2/8/16   Historical Provider, MD       Allergies:  Codeine; Percocet [oxycodone-acetaminophen]; Adhesive tape; and Norco [hydrocodone-acetaminophen]    Hemoglobin A1C (%)   Date Value   03/11/2019 7.1   06/18/2018 6.0   10/10/2017 6.7 (H)             ( goal A1C is < 7)   Microalb/Crt.  Ratio (mcg/mg creat)   Date Value   09/09/2014 7     LDL Cholesterol (mg/dL)   Date Value   02/26/2018 92   11/13/2017 76       (goal LDL is <100)   AST (U/L)   Date Value   02/26/2018 20     ALT (U/L)   Date Value   02/26/2018 29     BUN (mg/dL)   Date Value   02/11/2019 38 (H)     BP Readings from Last 3 Encounters:   04/17/19 120/87   04/15/19 130/78   04/05/19 120/70          (goal 120/80)

## 2019-05-07 ENCOUNTER — HOSPITAL ENCOUNTER (OUTPATIENT)
Age: 74
Discharge: HOME | End: 2019-05-07
Payer: MEDICARE

## 2019-05-07 DIAGNOSIS — Z01.818 PREPROCEDURAL EXAMINATION: ICD-10-CM

## 2019-05-07 PROCEDURE — 93005 ELECTROCARDIOGRAM TRACING: CPT

## 2019-05-08 ENCOUNTER — ANESTHESIA EVENT (OUTPATIENT)
Dept: OPERATING ROOM | Age: 74
End: 2019-05-08
Payer: MEDICARE

## 2019-05-08 LAB
EKG ATRIAL RATE: 76 BPM
EKG P AXIS: 53 DEGREES
EKG P-R INTERVAL: 162 MS
EKG Q-T INTERVAL: 356 MS
EKG QRS DURATION: 96 MS
EKG QTC CALCULATION (BAZETT): 400 MS
EKG R AXIS: 15 DEGREES
EKG T AXIS: 45 DEGREES
EKG VENTRICULAR RATE: 76 BPM

## 2019-05-13 ENCOUNTER — HOSPITAL ENCOUNTER (OUTPATIENT)
Age: 74
Setting detail: OUTPATIENT SURGERY
Discharge: HOME | End: 2019-05-13
Attending: SURGERY | Admitting: SURGERY
Payer: MEDICARE

## 2019-05-13 ENCOUNTER — ANESTHESIA (OUTPATIENT)
Dept: OPERATING ROOM | Age: 74
End: 2019-05-13
Payer: MEDICARE

## 2019-05-13 VITALS
TEMPERATURE: 98 F | DIASTOLIC BLOOD PRESSURE: 68 MMHG | OXYGEN SATURATION: 97 % | RESPIRATION RATE: 16 BRPM | BODY MASS INDEX: 38.69 KG/M2 | HEIGHT: 67 IN | SYSTOLIC BLOOD PRESSURE: 135 MMHG | WEIGHT: 246.5 LBS | HEART RATE: 94 BPM

## 2019-05-13 VITALS
OXYGEN SATURATION: 98 % | TEMPERATURE: 98.6 F | DIASTOLIC BLOOD PRESSURE: 73 MMHG | RESPIRATION RATE: 11 BRPM | SYSTOLIC BLOOD PRESSURE: 122 MMHG

## 2019-05-13 PROBLEM — Z86.0100 HISTORY OF COLON POLYPS: Status: ACTIVE | Noted: 2019-05-13

## 2019-05-13 PROBLEM — Z86.010 HISTORY OF COLON POLYPS: Status: ACTIVE | Noted: 2019-05-13

## 2019-05-13 PROBLEM — R19.4 CHANGE IN BOWEL HABITS: Status: ACTIVE | Noted: 2019-05-13

## 2019-05-13 PROBLEM — Z80.0 FAMILY HISTORY OF COLON CANCER: Status: ACTIVE | Noted: 2019-05-13

## 2019-05-13 LAB
GLUCOSE BLD-MCNC: 195 MG/DL (ref 65–99)
INR BLD: 1.3
PROTHROMBIN TIME: 12.5 SEC (ref 9–11.6)

## 2019-05-13 PROCEDURE — 7100000011 HC PHASE II RECOVERY - ADDTL 15 MIN: Performed by: SURGERY

## 2019-05-13 PROCEDURE — 85610 PROTHROMBIN TIME: CPT

## 2019-05-13 PROCEDURE — 3700000001 HC ADD 15 MINUTES (ANESTHESIA): Performed by: SURGERY

## 2019-05-13 PROCEDURE — 36415 COLL VENOUS BLD VENIPUNCTURE: CPT

## 2019-05-13 PROCEDURE — 2580000003 HC RX 258: Performed by: SURGERY

## 2019-05-13 PROCEDURE — 82947 ASSAY GLUCOSE BLOOD QUANT: CPT

## 2019-05-13 PROCEDURE — 88305 TISSUE EXAM BY PATHOLOGIST: CPT

## 2019-05-13 PROCEDURE — 6360000002 HC RX W HCPCS: Performed by: NURSE ANESTHETIST, CERTIFIED REGISTERED

## 2019-05-13 PROCEDURE — 7100000010 HC PHASE II RECOVERY - FIRST 15 MIN: Performed by: SURGERY

## 2019-05-13 PROCEDURE — 2709999900 HC NON-CHARGEABLE SUPPLY: Performed by: SURGERY

## 2019-05-13 PROCEDURE — 3700000000 HC ANESTHESIA ATTENDED CARE: Performed by: SURGERY

## 2019-05-13 PROCEDURE — 3609010400 HC COLONOSCOPY POLYPECTOMY HOT BIOPSY: Performed by: SURGERY

## 2019-05-13 RX ORDER — 0.9 % SODIUM CHLORIDE 0.9 %
10 VIAL (ML) INJECTION PRN
Status: DISCONTINUED | OUTPATIENT
Start: 2019-05-13 | End: 2019-05-13 | Stop reason: HOSPADM

## 2019-05-13 RX ORDER — SODIUM CHLORIDE, SODIUM LACTATE, POTASSIUM CHLORIDE, CALCIUM CHLORIDE 600; 310; 30; 20 MG/100ML; MG/100ML; MG/100ML; MG/100ML
INJECTION, SOLUTION INTRAVENOUS CONTINUOUS
Status: DISCONTINUED | OUTPATIENT
Start: 2019-05-13 | End: 2019-05-13 | Stop reason: SDUPTHER

## 2019-05-13 RX ORDER — SODIUM CHLORIDE 0.9 % (FLUSH) 0.9 %
10 SYRINGE (ML) INJECTION EVERY 12 HOURS SCHEDULED
Status: DISCONTINUED | OUTPATIENT
Start: 2019-05-13 | End: 2019-05-13 | Stop reason: HOSPADM

## 2019-05-13 RX ORDER — SODIUM CHLORIDE, SODIUM LACTATE, POTASSIUM CHLORIDE, CALCIUM CHLORIDE 600; 310; 30; 20 MG/100ML; MG/100ML; MG/100ML; MG/100ML
INJECTION, SOLUTION INTRAVENOUS CONTINUOUS
Status: DISCONTINUED | OUTPATIENT
Start: 2019-05-13 | End: 2019-05-13 | Stop reason: HOSPADM

## 2019-05-13 RX ORDER — MIDAZOLAM HYDROCHLORIDE 1 MG/ML
INJECTION INTRAMUSCULAR; INTRAVENOUS PRN
Status: DISCONTINUED | OUTPATIENT
Start: 2019-05-13 | End: 2019-05-13 | Stop reason: SDUPTHER

## 2019-05-13 RX ORDER — ONDANSETRON 2 MG/ML
4 INJECTION INTRAMUSCULAR; INTRAVENOUS EVERY 6 HOURS PRN
Status: DISCONTINUED | OUTPATIENT
Start: 2019-05-13 | End: 2019-05-13 | Stop reason: HOSPADM

## 2019-05-13 RX ORDER — SODIUM CHLORIDE 0.9 % (FLUSH) 0.9 %
10 SYRINGE (ML) INJECTION EVERY 12 HOURS SCHEDULED
Status: DISCONTINUED | OUTPATIENT
Start: 2019-05-13 | End: 2019-05-13 | Stop reason: SDUPTHER

## 2019-05-13 RX ORDER — PROPOFOL 10 MG/ML
INJECTION, EMULSION INTRAVENOUS CONTINUOUS PRN
Status: DISCONTINUED | OUTPATIENT
Start: 2019-05-13 | End: 2019-05-13 | Stop reason: SDUPTHER

## 2019-05-13 RX ORDER — SODIUM CHLORIDE 0.9 % (FLUSH) 0.9 %
10 SYRINGE (ML) INJECTION PRN
Status: DISCONTINUED | OUTPATIENT
Start: 2019-05-13 | End: 2019-05-13 | Stop reason: HOSPADM

## 2019-05-13 RX ORDER — FENTANYL CITRATE 50 UG/ML
INJECTION, SOLUTION INTRAMUSCULAR; INTRAVENOUS PRN
Status: DISCONTINUED | OUTPATIENT
Start: 2019-05-13 | End: 2019-05-13 | Stop reason: SDUPTHER

## 2019-05-13 RX ADMIN — FENTANYL CITRATE 50 MCG: 50 INJECTION INTRAMUSCULAR; INTRAVENOUS at 08:23

## 2019-05-13 RX ADMIN — FENTANYL CITRATE 50 MCG: 50 INJECTION INTRAMUSCULAR; INTRAVENOUS at 08:19

## 2019-05-13 RX ADMIN — PROPOFOL 100 MCG/KG/MIN: 10 INJECTION, EMULSION INTRAVENOUS at 08:22

## 2019-05-13 RX ADMIN — MIDAZOLAM HYDROCHLORIDE 1 MG: 2 INJECTION, SOLUTION INTRAMUSCULAR; INTRAVENOUS at 08:19

## 2019-05-13 RX ADMIN — MIDAZOLAM HYDROCHLORIDE 1 MG: 2 INJECTION, SOLUTION INTRAMUSCULAR; INTRAVENOUS at 08:23

## 2019-05-13 RX ADMIN — SODIUM CHLORIDE, POTASSIUM CHLORIDE, SODIUM LACTATE AND CALCIUM CHLORIDE: 600; 310; 30; 20 INJECTION, SOLUTION INTRAVENOUS at 07:38

## 2019-05-13 ASSESSMENT — PAIN - FUNCTIONAL ASSESSMENT: PAIN_FUNCTIONAL_ASSESSMENT: 0-10

## 2019-05-13 ASSESSMENT — PAIN SCALES - GENERAL: PAINLEVEL_OUTOF10: 0

## 2019-05-13 NOTE — BRIEF OP NOTE
Brief Postoperative Note  ______________________________________________________________    Patient: Trisha Tolbert  YOB: 1945  MRN: [id number]  Date of Procedure: 5/13/2019    Pre-Op Diagnosis:      1. Change in bowel habits     2. Bloating     3. History colon polyps     4. Family history colon cancer (Paternal aunt)    Post-Op Diagnosis:      1. Large rectosigmoid pedunculated polyp (~15-20 cm from anal verge)     2. Sigmoid diverticulosis       Procedure(s):      1. Colonoscopy anus to cecum     2. Large pedunculated polypectomy with tattoo  (~15-20cm from anal verge)    Anesthesia: Monitor Anesthesia Care    Surgeon(s):  Holden Romero MD    Assistant:      Estimated Blood Loss (mL): less than 17OX     Complications: None    Specimens:   ID Type Source Tests Collected by Time Destination   A :  Tissue Recto sigmoid SURGICAL PATHOLOGY Holden Romero MD 5/13/2019 1810      Findings:   As above.     Dictated # [id number]    Holden Romero MD  Date: 5/13/2019  Time: 9:04 AM

## 2019-05-13 NOTE — ANESTHESIA PRE PROCEDURE
Department of Anesthesiology  Preprocedure Note       Name:  Jose Null   Age:  72 y.o.  :  1945                                          MRN:  [id number]         Date:  2019      Surgeon: Rodrigue Ordonez):  Alyson Samuel MD    Procedure: COLONOSCOPY (N/A Abdomen)    Medications prior to admission:   Prior to Admission medications    Medication Sig Start Date End Date Taking? Authorizing Provider   isosorbide dinitrate (ISORDIL) 5 MG tablet TAKE 1 TABLET TWICE DAILY 18  Yes Eneida Gresham MD   allopurinol (ZYLOPRIM) 100 MG tablet TAKE 1 TABLET TWICE DAILY 19   Evertt Greenup, DO   losartan (COZAAR) 25 MG tablet TAKE 1 TABLET EVERY DAY 19   Evertt [place], DO   glipiZIDE (GLUCOTROL XL) 5 MG extended release tablet TAKE 1 TABLET EVERY DAY 19   Evertt Andres, DO   gabapentin (NEURONTIN) 100 MG capsule Take 2 capsules by mouth 2 times daily for 90 days. 19  Evertt Greenup, DO   warfarin (COUMADIN) 5 MG tablet Take 1/2 tablet on  and 1 whole tablet daily all other days or as directed by [facility] Medication Management.  19   Evertt Andres, DO   hydrochlorothiazide (MICROZIDE) 12.5 MG capsule TAKE 1 CAPSULE EVERY DAY 19   YOHAN Day CNP   omeprazole (PRILOSEC) 20 MG delayed release capsule TAKE 1 CAPSULE EVERY DAY 19   YOHAN Day CNP   ranitidine (ZANTAC) 150 MG tablet TAKE 1 TABLET EVERY NIGHT 19   YOHAN Day CNP   propranolol (INDERAL) 60 MG tablet TAKE 1 TABLET TWICE DAILY 19   YOHAN Day CNP   cyclobenzaprine (FLEXERIL) 10 MG tablet Take 1 tablet by mouth 2 times daily as needed (Arthritic pain.) 19   YOHAN Day CNP   vitamin D 1000 units CAPS Take 1 capsule by mouth daily    Historical Provider, MD   furosemide (LASIX) 20 MG tablet Take 1 tablet by mouth daily As needed for swelling 18   Evertt Greenup, DO   potassium chloride (KLOR-CON M) 20 MEQ extended release tablet Take 1 tablet by mouth daily On the days that you take lasix 6/18/18   Evertt Andres, DO   fluticasone [facility]) 50 MCG/ACT nasal spray 2 sprays by Nasal route daily  Patient taking differently: 2 sprays by Nasal route daily as needed  6/8/17   Evertt Andres, DO   glucose blood VI test strips (TRUE METRIX BLOOD GLUCOSE TEST) strip Use once daily and as needed. 2/1/17   Evertt [place], DO   Lancets MISC 1 each by Does not apply route daily 2/1/17   Evertt Andres, DO   Blood Glucose Monitoring Suppl (TRUE METRIX AIR GLUCOSE METER) W/DEVICE KIT Use to test sugar once daily 2/1/17   Evertt [place], DO   acetaminophen (TYLENOL) 650 MG CR tablet Take 1,300 mg by mouth every 8 hours as needed for Pain    Historical Provider, MD   Magnesium 400 MG TABS Take 800 mg by mouth nightly     Historical Provider, MD   folic acid (FOLVITE) 716 MCG tablet Take 400 mcg by mouth daily 2/8/16   Historical Provider, MD       Current medications:    Current Facility-Administered Medications   Medication Dose Route Frequency Provider Last Rate Last Dose    lactated ringers infusion   Intravenous Continuous Alyson Samuel  mL/hr at 05/13/19 0738      sodium chloride flush 0.9 % injection 10 mL  10 mL Intravenous 2 times per day Alyson Samuel MD        sodium chloride (PF) 0.9 % injection 10 mL  10 mL Intravenous PRN Alyson Samuel MD           Allergies:     Allergies   Allergen Reactions    Codeine Itching    Percocet [Oxycodone-Acetaminophen] Itching and Nausea Only    Adhesive Tape Itching, Rash and Other (See Comments)     Tape \"takes the skin off\"    Norco [Hydrocodone-Acetaminophen] Nausea And Vomiting       Problem List:    Patient Active Problem List   Diagnosis Code    Type 2 diabetes mellitus without complication [facility] X54.7    Essential hypertension, benign I10    Esophageal reflux K21.9    Generalized osteoarthrosis, unspecified site M15.9    Irritable bowel syndrome K58.9    Seasonal allergies J30.2    Deep vein thrombosis (DVT) ([facility]) I82.409    Long term current use of anticoagulant therapy Z79.01    Gout M10.9    Rectocele N81.6    Tubular adenoma of colon D12.6    Hiatal hernia K44.9    Sigmoid diverticulosis K57.30    Sleep disorder G47.9    Chronic back pain M54.9, G89.29    Constipation K59.00    Hypomagnesemia E83.42    Leg edema R60.0    Abnormal stress test R94.39       Past Medical History:        Diagnosis Date    Allergic rhinitis     Chronic kidney disease, stage III (moderate) ([facility])     Elbow fracture, left     Fall     Gastric ulcer     Gout     Hx of blood clots     Following last back surgery     Hypertension     Nausea & vomiting     Presence of IVC filter     Type II or unspecified type diabetes mellitus without mention of complication, not stated as uncontrolled        Past Surgical History:        Procedure Laterality Date    BACK SURGERY      BACK SURGERY      BACK SURGERY      BACK SURGERY      BACK SURGERY      BACK SURGERY      Fusion     BREAST BIOPSY Left     BREAST BIOPSY Right     CARDIAC CATHETERIZATION Left 03/07/2018    Dr. Nicole Matamoros @ [facility]--There is 30% disease of the origin of the lateral anterior descending. Mild 10% -20% plaque disease in the circumflex & right coronary artery. Normal left ventricular functino, ejection fraction of 60%.       CARPAL TUNNEL RELEASE Right     CATARACT REMOVAL Right     CATARACT REMOVAL Left     COLONOSCOPY  2014    CYSTOSCOPY      \"Multiple\"     EYE SURGERY Right     Removed fluid from behind eye    HYSTERECTOMY      KIDNEY SURGERY      left side 1/2 of kidney removed    PARTIAL NEPHRECTOMY Left     RETINAL DETACHMENT SURGERY Left     ROTATOR CUFF REPAIR      TOTAL KNEE ARTHROPLASTY Right     TOTAL KNEE ARTHROPLASTY Left     UPPER GASTROINTESTINAL ENDOSCOPY  2014    VENA CAVA FILTER PLACEMENT         Social History:    Social History     Tobacco Use  Smoking status: Never Smoker    Smokeless tobacco: Never Used   Substance Use Topics    Alcohol use: No                                Counseling given: Not Answered      Vital Signs (Current):   Vitals:    05/13/19 0701 05/13/19 0707 05/13/19 0710   BP:  (!) 154/90    Pulse:  106    Resp:  20    Temp:  36.3 °C (97.4 °F)    TempSrc:  Temporal    SpO2:   98%   Weight: 246 lb 8 oz (111.8 kg)     Height: 5' 7\" (1.702 m)                                                BP Readings from Last 3 Encounters:   05/13/19 (!) 154/90   04/17/19 120/87   04/15/19 130/78       NPO Status: Time of last liquid consumption: 0500                        Time of last solid consumption: 1800                        Date of last liquid consumption: 05/13/19                        Date of last solid food consumption: 05/11/19    BMI:   Wt Readings from Last 3 Encounters:   05/13/19 246 lb 8 oz (111.8 kg)   04/15/19 243 lb (110.2 kg)   04/05/19 249 lb (112.9 kg)     Body mass index is 38.61 kg/m².     CBC:   Lab Results   Component Value Date    WBC 10.0 02/26/2018    RBC 4.23 02/26/2018    HGB 12.8 02/11/2019    HCT 40.0 02/11/2019    .0 02/26/2018    RDW 16.2 02/26/2018     02/26/2018       CMP:   Lab Results   Component Value Date     02/11/2019    K 4.6 02/11/2019     02/11/2019    CO2 27 02/11/2019    BUN 38 02/11/2019    CREATININE 1.47 02/11/2019    GFRAA 42 02/11/2019    LABGLOM 35 02/11/2019    GLUCOSE 158 02/11/2019    PROT 6.5 02/26/2018    CALCIUM 10.2 02/11/2019    BILITOT 0.51 02/26/2018    ALKPHOS 48 02/26/2018    AST 20 02/26/2018    ALT 29 02/26/2018       POC Tests:   Recent Labs     05/13/19  0704   POCGLU 195*       Coags:   Lab Results   Component Value Date    PROTIME 12.5 05/13/2019    INR 1.3 05/13/2019    APTT 33.0 12/10/2015       HCG (If Applicable): No results found for: PREGTESTUR, PREGSERUM, HCG, HCGQUANT     ABGs: No results found for: PHART, PO2ART, BFF0KAR, ERP6BZW, BEART, C2WMVVCV     Type & Screen (If Applicable):  No results found for: LABABO, LABRH    Anesthesia Evaluation  Patient summary reviewed and Nursing notes reviewed no history of anesthetic complications:   Airway: Mallampati: II  TM distance: >3 FB   Neck ROM: full  Mouth opening: > = 3 FB Dental: normal exam         Pulmonary:Negative Pulmonary ROS and normal exam  breath sounds clear to auscultation                             Cardiovascular:    (+) hypertension:,       ECG reviewed  Rhythm: regular  Rate: normal                    Neuro/Psych:   (+) neuromuscular disease:,             GI/Hepatic/Renal:   (+) hiatal hernia, GERD:, PUD,           Endo/Other:    (+) Diabetes, . Abdominal:   (+) obese,         Vascular: negative vascular ROS. Anesthesia Plan      MAC     ASA 3           MIPS: Postoperative opioids intended and Prophylactic antiemetics administered. Anesthetic plan and risks discussed with patient. Plan discussed with attending.                   YOHAN Chen - CRNA   5/13/2019

## 2019-05-13 NOTE — OP NOTE
[place] [address]                                OPERATIVE REPORT    PATIENT NAME: Fabian Ramos                     :        1945  MED REC NO:   [id number]                              ROOM:  ACCOUNT NO:   [de-identified]                           ADMIT DATE: 2019  PROVIDER:     Trent Rush        DATE OF PROCEDURE:  2019    ENDOSCOPY REPORT    ATTENDING SURGEON:  Dr. Trent Rush    PCP:  Velasquez Johnson    PREOPERATIVE DIAGNOSES:  1. Change in bowel habits. 2.  Abdominal bloating. 3.  History of colon polyps. 4.  Family history of colon cancer (paternal aunt). POSTOPERATIVE DIAGNOSES:  1.  Large rectosigmoid pedunculated polyp (approximately 15-20 cm from  the anal verge). 2.  Sigmoid diverticulosis. OPERATION:  1. Colonoscopy, anus to cecum. 2.  Large pedunculated polypectomy with tattoo (approximately 15-20 cm  from the anal verge). ANESTHESIA:  MAC.    ESTIMATED BLOOD LOSS:  Less than 20 mL. INTRAOPERATIVE FINDINGS:  As mentioned above, very redundant sigmoid  colon with diverticula and large pedunculated polyp. INDICATIONS:  The patient is a 68-year-old white female who had multiple  adenomatous polypectomies, removed from her cecum and sigmoid in . She has had some symptoms of abdominal bloating and a change in bowel  habits with loose stools, a paternal aunt treated for colon cancer. At  this time, colonoscopy is indicated. OPERATIVE PROCEDURE:  After obtaining informed consent with discussion  of risks, benefits and alternatives including a remote risk of GI  perforation and missed lesions, the patient was taken to the endoscopy  suite and placed in the left lateral recumbent position. Following  adequate IV sedation, a digital rectal exam was performed. Sphincter  tone was normal.  Some perianal skin tags were present.   A colonoscope  was passed transanally into the rectum and advanced with gentle  insufflation throughout the entirety of colon to the cecum. Cecal  position was confirmed by clear visualization of the ileocecal valve,  light in the right lower quadrant and transduction of manual pressure in  the right lower quadrant to the cecum. The bowel prep was excellent. All colonic mucosa was clearly visible. The cecum, ascending colon and  hepatic flexure were normal.  Transverse colon, splenic flexure were  also normal.  The descending colon and sigmoid were very redundant and  difficult to manipulate. Extensive sigmoid diverticulosis was present. At approximately 15-20 cm, a large pedunculated polyp measuring close to  2 cm in greatest diameter was removed by hot snare polypectomy. This  was taken at the base of the stalk, which had narrowed significantly. The polypectomy site was tattooed with carbon black in the submucosal  plane. Upper, middle and lower portions of the rectum were normal.  On  retroflexion, there were moderate internal hemorrhoids. The colon was  decompressed by suction as the scope was removed. The patient tolerated  the procedure well and was transferred to PACU in stable condition. SPECIMENS  Large pedunculated rectosigmoid polyp. DRAINS:  None. COMPLICATIONS:  None. DISPOSITION:  To PACU awake, alert and stable. Following recovery, we  will discharge the patient home with gradual advancement of diet and  activity as tolerated with instructions for a high-fiber, low-fat diet  with fiber supplementation. Followup will be with me in 1-2 weeks to  review pathology and to determine the next recommended colonoscopy  interval.    My thanks to Dr. Nu Rodarte for the consultation.         MARYJANE Leo    D: 05/13/2019 9:11:21       T: 05/13/2019 9:14:59     [email or web address]  Job#: [id number]     Doc#: [id number]    CC:  Torri Santiago

## 2019-05-13 NOTE — PROGRESS NOTES
IV removed and pt up to bathroom with steady gait. Voids and passes flatus. Drinking water without nausea. Discharge Criteria  Outpatients must meet criteria 1 through 7. Up to restroom, void sufficient amount. Yes    1. Minimum 30 minutes after last dose of sedative medication, minimum 120 minutes after last dose of reversal agent. Yes    2. Systolic BP stable within 20 mmHg for 30 minutes & systolic BP between 90 & 777 or within 10 mmHg of baseline. Yes    3. Pulse between 60 and 100 or within 10 bpm of baseline. Yes    4. Spontaneous respiratory rate >/= 10 per minute. Yes    5. SaO2 >/= 95 or  >/= baseline. Yes    6. Able to cough and swallow or return to baseline function. Yes    7. Alert and oriented or return to baseline mental status. Yes    8. Demonstrates controlled, coordinated movements, ambulates with steady gait, or return to baseline activity function. Yes    9. Minimal or no pain or nausea, or at a level tolerable and acceptable to patient. Yes    10. Takes and retains oral fluids as allowed. Yes    11. Procedural / perioperative site stable. Minimal or no bleeding. Yes        12. If GI endoscopy procedure, minimal or no abdominal distention or passing flatus. Yes    13. Written discharge instructions and emergency telephone number provided. Yes    14. Accompanied by a responsible adult. Yes    Adult patient discharged from facility without responsible person meets above criteria plus the following:   a) remains awake without stimulus for 30 minutes     b) oriented appropriate for age      c) all vital signs stable   d) no significant risk of losing protective reflexes      e) able to maintain pre-procedure mobility without assistance   f) no nausea or dizziness      g) transportation arrangements that do not require patient to operate motor   Vehicle.   Yes

## 2019-05-14 ENCOUNTER — TELEPHONE (OUTPATIENT)
Dept: PHARMACY | Age: 74
End: 2019-05-14

## 2019-05-15 ENCOUNTER — HOSPITAL ENCOUNTER (OUTPATIENT)
Dept: CT IMAGING | Age: 74
Discharge: HOME | End: 2019-05-17
Payer: MEDICARE

## 2019-05-15 DIAGNOSIS — M54.5 LOW BACK PAIN, UNSPECIFIED BACK PAIN LATERALITY, UNSPECIFIED CHRONICITY, WITH SCIATICA PRESENCE UNSPECIFIED: ICD-10-CM

## 2019-05-15 LAB — SURGICAL PATHOLOGY REPORT: NORMAL

## 2019-05-15 PROCEDURE — 72131 CT LUMBAR SPINE W/O DYE: CPT

## 2019-05-29 ENCOUNTER — OFFICE VISIT (OUTPATIENT)
Dept: FAMILY MEDICINE CLINIC | Age: 74
End: 2019-05-29
Payer: MEDICARE

## 2019-05-29 ENCOUNTER — HOSPITAL ENCOUNTER (OUTPATIENT)
Age: 74
Discharge: HOME | End: 2019-05-29
Payer: MEDICARE

## 2019-05-29 ENCOUNTER — TELEPHONE (OUTPATIENT)
Dept: FAMILY MEDICINE CLINIC | Age: 74
End: 2019-05-29

## 2019-05-29 ENCOUNTER — HOSPITAL ENCOUNTER (OUTPATIENT)
Dept: PHARMACY | Age: 74
Setting detail: THERAPIES SERIES
Discharge: HOME | End: 2019-05-29
Payer: MEDICARE

## 2019-05-29 ENCOUNTER — TELEPHONE (OUTPATIENT)
Dept: PHARMACY | Age: 74
End: 2019-05-29

## 2019-05-29 VITALS
DIASTOLIC BLOOD PRESSURE: 56 MMHG | HEART RATE: 88 BPM | BODY MASS INDEX: 39.08 KG/M2 | SYSTOLIC BLOOD PRESSURE: 88 MMHG | OXYGEN SATURATION: 97 % | HEIGHT: 67 IN | WEIGHT: 249 LBS

## 2019-05-29 VITALS
BODY MASS INDEX: 39.06 KG/M2 | WEIGHT: 249.4 LBS | SYSTOLIC BLOOD PRESSURE: 77 MMHG | DIASTOLIC BLOOD PRESSURE: 30 MMHG | HEART RATE: 90 BPM

## 2019-05-29 DIAGNOSIS — R53.1 GENERALIZED WEAKNESS: ICD-10-CM

## 2019-05-29 DIAGNOSIS — Z79.01 LONG TERM CURRENT USE OF ANTICOAGULANT THERAPY: ICD-10-CM

## 2019-05-29 DIAGNOSIS — I95.9 HYPOTENSION, UNSPECIFIED HYPOTENSION TYPE: Primary | ICD-10-CM

## 2019-05-29 DIAGNOSIS — I95.9 HYPOTENSION, UNSPECIFIED HYPOTENSION TYPE: ICD-10-CM

## 2019-05-29 DIAGNOSIS — E11.22 TYPE 2 DIABETES MELLITUS WITH STAGE 3 CHRONIC KIDNEY DISEASE, WITHOUT LONG-TERM CURRENT USE OF INSULIN (HCC): ICD-10-CM

## 2019-05-29 DIAGNOSIS — R06.02 SOB (SHORTNESS OF BREATH): ICD-10-CM

## 2019-05-29 DIAGNOSIS — I10 ESSENTIAL HYPERTENSION, BENIGN: Primary | ICD-10-CM

## 2019-05-29 DIAGNOSIS — N18.30 TYPE 2 DIABETES MELLITUS WITH STAGE 3 CHRONIC KIDNEY DISEASE, WITHOUT LONG-TERM CURRENT USE OF INSULIN (HCC): ICD-10-CM

## 2019-05-29 DIAGNOSIS — N18.30 CHRONIC KIDNEY DISEASE, STAGE III (MODERATE) (HCC): ICD-10-CM

## 2019-05-29 DIAGNOSIS — R53.83 EASY FATIGABILITY: ICD-10-CM

## 2019-05-29 DIAGNOSIS — R79.89 ELEVATED SERUM CREATININE: ICD-10-CM

## 2019-05-29 LAB
ABSOLUTE EOS #: 0.3 K/UL (ref 0–0.4)
ABSOLUTE IMMATURE GRANULOCYTE: ABNORMAL K/UL (ref 0–0.3)
ABSOLUTE LYMPH #: 2 K/UL (ref 1–4.8)
ABSOLUTE MONO #: 0.5 K/UL (ref 0–1)
ALBUMIN SERPL-MCNC: 3.9 G/DL (ref 3.5–5.2)
ALBUMIN/GLOBULIN RATIO: ABNORMAL (ref 1–2.5)
ALP BLD-CCNC: 73 U/L (ref 35–104)
ALT SERPL-CCNC: 11 U/L (ref 5–33)
ANION GAP SERPL CALCULATED.3IONS-SCNC: 13 MMOL/L (ref 9–17)
AST SERPL-CCNC: 10 U/L
BASOPHILS # BLD: 1 % (ref 0–2)
BASOPHILS ABSOLUTE: 0.1 K/UL (ref 0–0.2)
BILIRUB SERPL-MCNC: 0.4 MG/DL (ref 0.3–1.2)
BNP INTERPRETATION: NORMAL
BUN BLDV-MCNC: 41 MG/DL (ref 8–23)
BUN/CREAT BLD: 23 (ref 9–20)
CALCIUM SERPL-MCNC: 10.2 MG/DL (ref 8.6–10.4)
CHLORIDE BLD-SCNC: 96 MMOL/L (ref 98–107)
CO2: 27 MMOL/L (ref 20–31)
CREAT SERPL-MCNC: 1.82 MG/DL (ref 0.5–0.9)
DIFFERENTIAL TYPE: YES
EOSINOPHILS RELATIVE PERCENT: 3 % (ref 0–5)
GFR AFRICAN AMERICAN: 33 ML/MIN
GFR NON-AFRICAN AMERICAN: 27 ML/MIN
GFR SERPL CREATININE-BSD FRML MDRD: ABNORMAL ML/MIN/{1.73_M2}
GFR SERPL CREATININE-BSD FRML MDRD: ABNORMAL ML/MIN/{1.73_M2}
GLUCOSE BLD-MCNC: 277 MG/DL (ref 70–99)
HCT VFR BLD CALC: 36.1 % (ref 36–46)
HEMOGLOBIN: 11.9 G/DL (ref 12–16)
IMMATURE GRANULOCYTES: ABNORMAL %
INR BLD: 3.7
LYMPHOCYTES # BLD: 21 % (ref 15–40)
MCH RBC QN AUTO: 32.5 PG (ref 26–34)
MCHC RBC AUTO-ENTMCNC: 32.9 G/DL (ref 31–37)
MCV RBC AUTO: 98.8 FL (ref 80–100)
MONOCYTES # BLD: 5 % (ref 4–8)
NRBC AUTOMATED: ABNORMAL PER 100 WBC
PDW BLD-RTO: 14.8 % (ref 12.1–15.2)
PLATELET # BLD: 297 K/UL (ref 140–450)
PLATELET ESTIMATE: ABNORMAL
PMV BLD AUTO: ABNORMAL FL (ref 6–12)
POTASSIUM SERPL-SCNC: 4 MMOL/L (ref 3.7–5.3)
PRO-BNP: 280 PG/ML
RBC # BLD: 3.66 M/UL (ref 4–5.2)
RBC # BLD: ABNORMAL 10*6/UL
SEG NEUTROPHILS: 70 % (ref 47–75)
SEGMENTED NEUTROPHILS ABSOLUTE COUNT: 6.8 K/UL (ref 2.5–7)
SODIUM BLD-SCNC: 136 MMOL/L (ref 135–144)
TOTAL CK: 31 U/L (ref 26–192)
TOTAL PROTEIN: 6.4 G/DL (ref 6.4–8.3)
TROPONIN INTERP: NORMAL
TROPONIN T: <0.03 NG/ML
TROPONIN, HIGH SENSITIVITY: NORMAL NG/L (ref 0–14)
TSH SERPL DL<=0.05 MIU/L-ACNC: 2.47 MIU/L (ref 0.3–5)
WBC # BLD: 9.7 K/UL (ref 3.5–11)
WBC # BLD: ABNORMAL 10*3/UL

## 2019-05-29 PROCEDURE — 84443 ASSAY THYROID STIM HORMONE: CPT

## 2019-05-29 PROCEDURE — 83880 ASSAY OF NATRIURETIC PEPTIDE: CPT

## 2019-05-29 PROCEDURE — 4040F PNEUMOC VAC/ADMIN/RCVD: CPT | Performed by: FAMILY MEDICINE

## 2019-05-29 PROCEDURE — 36415 COLL VENOUS BLD VENIPUNCTURE: CPT

## 2019-05-29 PROCEDURE — 1036F TOBACCO NON-USER: CPT | Performed by: FAMILY MEDICINE

## 2019-05-29 PROCEDURE — 87086 URINE CULTURE/COLONY COUNT: CPT

## 2019-05-29 PROCEDURE — 1090F PRES/ABSN URINE INCON ASSESS: CPT | Performed by: FAMILY MEDICINE

## 2019-05-29 PROCEDURE — 99212 OFFICE O/P EST SF 10 MIN: CPT

## 2019-05-29 PROCEDURE — 1123F ACP DISCUSS/DSCN MKR DOCD: CPT | Performed by: FAMILY MEDICINE

## 2019-05-29 PROCEDURE — G8427 DOCREV CUR MEDS BY ELIG CLIN: HCPCS | Performed by: FAMILY MEDICINE

## 2019-05-29 PROCEDURE — 99214 OFFICE O/P EST MOD 30 MIN: CPT | Performed by: FAMILY MEDICINE

## 2019-05-29 PROCEDURE — 3017F COLORECTAL CA SCREEN DOC REV: CPT | Performed by: FAMILY MEDICINE

## 2019-05-29 PROCEDURE — G8399 PT W/DXA RESULTS DOCUMENT: HCPCS | Performed by: FAMILY MEDICINE

## 2019-05-29 PROCEDURE — 2022F DILAT RTA XM EVC RTNOPTHY: CPT | Performed by: FAMILY MEDICINE

## 2019-05-29 PROCEDURE — 82550 ASSAY OF CK (CPK): CPT

## 2019-05-29 PROCEDURE — G8417 CALC BMI ABV UP PARAM F/U: HCPCS | Performed by: FAMILY MEDICINE

## 2019-05-29 PROCEDURE — 84484 ASSAY OF TROPONIN QUANT: CPT

## 2019-05-29 PROCEDURE — 80053 COMPREHEN METABOLIC PANEL: CPT

## 2019-05-29 PROCEDURE — 3045F PR MOST RECENT HEMOGLOBIN A1C LEVEL 7.0-9.0%: CPT | Performed by: FAMILY MEDICINE

## 2019-05-29 PROCEDURE — 85025 COMPLETE CBC W/AUTO DIFF WBC: CPT

## 2019-05-29 PROCEDURE — 85610 PROTHROMBIN TIME: CPT

## 2019-05-29 RX ORDER — BLOOD PRESSURE TEST KIT
KIT MISCELLANEOUS
Qty: 1 KIT | Refills: 0 | Status: SHIPPED | OUTPATIENT
Start: 2019-05-29 | End: 2019-09-03 | Stop reason: ALTCHOICE

## 2019-05-29 RX ORDER — WARFARIN SODIUM 5 MG/1
TABLET ORAL
Qty: 90 TABLET | Refills: 3 | Status: SHIPPED
Start: 2019-05-29 | End: 2019-07-09 | Stop reason: DRUGHIGH

## 2019-05-29 NOTE — PROGRESS NOTES
coronary artery. Normal left ventricular functino, ejection fraction of 60%.  CARPAL TUNNEL RELEASE Right     CATARACT REMOVAL Right     CATARACT REMOVAL Left     COLONOSCOPY  2014    COLONOSCOPY N/A 5/13/2019    COLONOSCOPY POLYPECTOMY HOT BIOPSY performed by Johanna Donato MD at [address] [place] [address]      \"Multiple\"     EYE SURGERY Right     Removed fluid from behind eye    HYSTERECTOMY      KIDNEY SURGERY      left side 1/2 of kidney removed    PARTIAL NEPHRECTOMY Left     RETINAL DETACHMENT SURGERY Left     ROTATOR CUFF REPAIR      TOTAL KNEE ARTHROPLASTY Right     TOTAL KNEE ARTHROPLASTY Left     UPPER GASTROINTESTINAL ENDOSCOPY  2014    VENA CAVA FILTER PLACEMENT          Medications:       Prior to Admission medications    Medication Sig Start Date End Date Taking? Authorizing Provider   Blood Pressure KIT Check bp once to twice daily 5/30/19  Yes Kin Ferraro, DO   warfarin (COUMADIN) 5 MG tablet Take 1/2 tablet on Wednesdays and Saturdays and 1 whole tablet daily all other days or as directed by [facility] Medication Management. 5/29/19   Kin Ferraro, DO   Blood Pressure KIT Check bp once to twice daily 5/29/19   Kin Ferraro, DO   allopurinol (ZYLOPRIM) 100 MG tablet TAKE 1 TABLET TWICE DAILY 4/30/19   Kin Ferraro, DO   losartan (COZAAR) 25 MG tablet TAKE 1 TABLET EVERY DAY 4/30/19   Kin Ferraro, DO   glipiZIDE (GLUCOTROL XL) 5 MG extended release tablet TAKE 1 TABLET EVERY DAY 4/2/19   Kin Ferraro, DO   gabapentin (NEURONTIN) 100 MG capsule Take 2 capsules by mouth 2 times daily for 90 days.  4/1/19 6/30/19  Kin Ferraro,    hydrochlorothiazide (MICROZIDE) 12.5 MG capsule TAKE 1 CAPSULE EVERY DAY 1/28/19   YOHAN Paredes - CNP   omeprazole (PRILOSEC) 20 MG delayed release capsule TAKE 1 CAPSULE EVERY DAY 1/28/19   YOHAN Paredes - CNP   ranitidine (ZANTAC) 150 MG tablet TAKE 1 TABLET EVERY NIGHT 1/28/19   Bkeah Corona Age of Onset    Diabetes Father     Cancer Father         Prostate Cancer    Cancer Paternal Aunt         Colon Cancer       Review of Systems:     Positive and Negative as described in HPI    Review of Systems   Respiratory: Negative. Cardiovascular: Positive for leg swelling. Negative for palpitations. Genitourinary: Negative. Musculoskeletal: Positive for back pain. Physical Exam:     Vitals:  BP (!) 88/56 (Site: Left Upper Arm)   Pulse 88   Ht 5' 7\" (1.702 m)   Wt 249 lb (112.9 kg)   SpO2 97%   BMI 39.00 kg/m²   Physical Exam   Constitutional: She is oriented to person, place, and time. She appears well-developed and well-nourished. HENT:   Head: Normocephalic and atraumatic. Right Ear: Hearing and tympanic membrane normal.   Left Ear: Hearing and tympanic membrane normal.   Nose: No mucosal edema or rhinorrhea. Mouth/Throat: Oropharynx is clear and moist.   Eyes: Pupils are equal, round, and reactive to light. Conjunctivae and EOM are normal.   Neck: Neck supple. No thyroid mass and no thyromegaly present. Cardiovascular: Normal rate, regular rhythm, S1 normal and S2 normal.   No murmur heard. Ankles puffy but no pitting edema or erythema of either lower extremity   Pulmonary/Chest: Effort normal and breath sounds normal.   Abdominal: Soft. Bowel sounds are normal. There is no hepatosplenomegaly. There is no tenderness. Musculoskeletal:   In wheelchair   Lymphadenopathy:     She has no cervical adenopathy. Neurological: She is alert and oriented to person, place, and time. She has normal strength. Skin: Skin is warm and dry. No rash noted. Psychiatric: She has a normal mood and affect. Her behavior is normal.   Nursing note and vitals reviewed.       Data:     Lab Results   Component Value Date     05/31/2019    K 4.1 05/31/2019    CL 99 05/31/2019    CO2 27 05/31/2019    BUN 29 05/31/2019    CREATININE 1.63 05/31/2019    GLUCOSE 257 05/31/2019    PROT 6.4 05/29/2019 LABALBU 3.9 05/29/2019    BILITOT 0.40 05/29/2019    ALKPHOS 73 05/29/2019    AST 10 05/29/2019    ALT 11 05/29/2019     Lab Results   Component Value Date    WBC 9.7 05/29/2019    RBC 3.66 05/29/2019    HGB 11.9 05/29/2019    HCT 36.1 05/29/2019    MCV 98.8 05/29/2019    MCH 32.5 05/29/2019    MCHC 32.9 05/29/2019    RDW 14.8 05/29/2019     05/29/2019    MPV NOT REPORTED 05/29/2019     Lab Results   Component Value Date    TSH 2.47 05/29/2019     Lab Results   Component Value Date    CHOL 182 02/26/2018    HDL 66 02/26/2018    LABA1C 7.1 03/11/2019         Assessment & Plan:        Diagnosis Orders   1. Hypotension, unspecified hypotension type  CBC Auto Differential    Comprehensive Metabolic Panel    TSH with Reflex    Troponin I    CK    Urine Culture   2. Generalized weakness  CBC Auto Differential    Comprehensive Metabolic Panel    TSH with Reflex    Troponin I    CK    Urine Culture   3. Easy fatigability  CBC Auto Differential    Comprehensive Metabolic Panel    TSH with Reflex    Troponin I    CK   4. SOB (shortness of breath)  Brain Natriuretic Peptide   5. Elevated serum creatinine  Basic Metabolic Panel   6. Type 2 diabetes mellitus with diabetic nephropathy ([facility]     7. Chronic kidney disease, stage III (moderate) (HCC)     feeling unwell for a few wks, getting worse. Hypotensive today at coumadin appt and also here (improved). ddx dehydration, infection, heart failure, med error, blood loss (unknown source). UA not convincing for UTI but sending for culture. Testing ordered as above. Will consider echo, EGD, and will stop losartan. Follow closely. Quality & Risk Score Accuracy    Visit Dx:  E11.21 - Type 2 diabetes mellitus with diabetic nephropathy (HCC)  Assessment and plan:  Stable based upon symptoms and exam. Continue current treatment plan and follow up at least yearly.   Visit Dx:  N18.3 - Chronic kidney disease, stage III (moderate) ([address]  Assessment and plan:  Stable based upon symptoms and exam. Continue current treatment plan and follow up at least yearly. Last edited 06/01/19 22:53 EDT by Lori Collier DO             Requested Prescriptions     Signed Prescriptions Disp Refills    Blood Pressure KIT 1 kit 0     Sig: Check bp once to twice daily       There are no Patient Instructions on file for this visit. Ragini received counseling on the following healthy behaviors: medication adherence  Reviewed prior labs and health maintenance. Continue current medications, diet and exercise. Discussed use, benefit, and side effects of prescribed medications. Barriers to medication compliance addressed. Patient given educational materials - see patient instructions. All patient questions answered. Patient voiced understanding.      Electronically signed by Lori Collier DO on 6/1/2019 at 10:53 PM   [address]  [address] [place] [address]  Dept: [phone]

## 2019-05-29 NOTE — TELEPHONE ENCOUNTER
----- Message from Nathaniel Moore, DO sent at 5/29/2019  2:02 PM EDT -----  Patient appears to be dehydrated. Needs to  her fluid intake by quite a bit. Repeat BMP on Friday. Needs to be checking her blood pressure at home. Sugar is also very high. This could be due to an infection. Please culture the urine. No antibiotic just yet. Please also add on BNP if able, dx SOB. She can go home.

## 2019-05-30 LAB
CULTURE: NORMAL
Lab: NORMAL
SPECIMEN DESCRIPTION: NORMAL

## 2019-05-30 RX ORDER — BLOOD PRESSURE TEST KIT
KIT MISCELLANEOUS
Qty: 1 KIT | Refills: 0 | Status: SHIPPED | OUTPATIENT
Start: 2019-05-30 | End: 2019-09-03 | Stop reason: ALTCHOICE

## 2019-05-30 ASSESSMENT — ENCOUNTER SYMPTOMS
BACK PAIN: 1
RESPIRATORY NEGATIVE: 1

## 2019-05-31 ENCOUNTER — HOSPITAL ENCOUNTER (OUTPATIENT)
Age: 74
Discharge: HOME | End: 2019-05-31
Payer: MEDICARE

## 2019-05-31 ENCOUNTER — NURSE ONLY (OUTPATIENT)
Dept: FAMILY MEDICINE CLINIC | Age: 74
End: 2019-05-31

## 2019-05-31 VITALS — SYSTOLIC BLOOD PRESSURE: 118 MMHG | HEART RATE: 82 BPM | DIASTOLIC BLOOD PRESSURE: 72 MMHG | OXYGEN SATURATION: 98 %

## 2019-05-31 DIAGNOSIS — R79.89 ELEVATED SERUM CREATININE: ICD-10-CM

## 2019-05-31 DIAGNOSIS — Z01.30 BLOOD PRESSURE CHECK: Primary | ICD-10-CM

## 2019-05-31 LAB
ANION GAP SERPL CALCULATED.3IONS-SCNC: 13 MMOL/L (ref 9–17)
BUN BLDV-MCNC: 29 MG/DL (ref 8–23)
BUN/CREAT BLD: 18 (ref 9–20)
CALCIUM SERPL-MCNC: 10.7 MG/DL (ref 8.6–10.4)
CHLORIDE BLD-SCNC: 99 MMOL/L (ref 98–107)
CO2: 27 MMOL/L (ref 20–31)
CREAT SERPL-MCNC: 1.63 MG/DL (ref 0.5–0.9)
GFR AFRICAN AMERICAN: 38 ML/MIN
GFR NON-AFRICAN AMERICAN: 31 ML/MIN
GFR SERPL CREATININE-BSD FRML MDRD: ABNORMAL ML/MIN/{1.73_M2}
GFR SERPL CREATININE-BSD FRML MDRD: ABNORMAL ML/MIN/{1.73_M2}
GLUCOSE BLD-MCNC: 257 MG/DL (ref 70–99)
POTASSIUM SERPL-SCNC: 4.1 MMOL/L (ref 3.7–5.3)
SODIUM BLD-SCNC: 139 MMOL/L (ref 135–144)

## 2019-05-31 PROCEDURE — 80048 BASIC METABOLIC PNL TOTAL CA: CPT

## 2019-05-31 PROCEDURE — 36415 COLL VENOUS BLD VENIPUNCTURE: CPT

## 2019-05-31 NOTE — PROGRESS NOTES
Spoke with provider patient bp was 118/72 and Dr. Keith West was happy with that reading.  Patient was instructed to keep her appointment on 6/4/19

## 2019-06-04 ENCOUNTER — OFFICE VISIT (OUTPATIENT)
Dept: FAMILY MEDICINE CLINIC | Age: 74
End: 2019-06-04
Payer: MEDICARE

## 2019-06-04 VITALS
OXYGEN SATURATION: 98 % | WEIGHT: 250 LBS | HEIGHT: 67 IN | HEART RATE: 77 BPM | SYSTOLIC BLOOD PRESSURE: 112 MMHG | DIASTOLIC BLOOD PRESSURE: 70 MMHG | BODY MASS INDEX: 39.24 KG/M2

## 2019-06-04 DIAGNOSIS — R19.5 CHANGE IN STOOL: ICD-10-CM

## 2019-06-04 DIAGNOSIS — I95.9 HYPOTENSION, UNSPECIFIED HYPOTENSION TYPE: ICD-10-CM

## 2019-06-04 DIAGNOSIS — R10.11 RUQ PAIN: Primary | ICD-10-CM

## 2019-06-04 DIAGNOSIS — R53.83 EASY FATIGABILITY: ICD-10-CM

## 2019-06-04 DIAGNOSIS — H00.014 HORDEOLUM EXTERNUM OF LEFT UPPER EYELID: ICD-10-CM

## 2019-06-04 PROCEDURE — G8427 DOCREV CUR MEDS BY ELIG CLIN: HCPCS | Performed by: FAMILY MEDICINE

## 2019-06-04 PROCEDURE — G8417 CALC BMI ABV UP PARAM F/U: HCPCS | Performed by: FAMILY MEDICINE

## 2019-06-04 PROCEDURE — 99214 OFFICE O/P EST MOD 30 MIN: CPT | Performed by: FAMILY MEDICINE

## 2019-06-04 PROCEDURE — G8399 PT W/DXA RESULTS DOCUMENT: HCPCS | Performed by: FAMILY MEDICINE

## 2019-06-04 PROCEDURE — 4040F PNEUMOC VAC/ADMIN/RCVD: CPT | Performed by: FAMILY MEDICINE

## 2019-06-04 PROCEDURE — 3017F COLORECTAL CA SCREEN DOC REV: CPT | Performed by: FAMILY MEDICINE

## 2019-06-04 PROCEDURE — 1036F TOBACCO NON-USER: CPT | Performed by: FAMILY MEDICINE

## 2019-06-04 PROCEDURE — 1090F PRES/ABSN URINE INCON ASSESS: CPT | Performed by: FAMILY MEDICINE

## 2019-06-04 PROCEDURE — 1123F ACP DISCUSS/DSCN MKR DOCD: CPT | Performed by: FAMILY MEDICINE

## 2019-06-04 ASSESSMENT — ENCOUNTER SYMPTOMS: SHORTNESS OF BREATH: 1
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
## 2019-06-04 NOTE — PATIENT INSTRUCTIONS
SURVEY:    You may be receiving a survey from [employer] regarding your visit today. Please complete the survey to enable us to provide the highest quality of care to you and your family. If you cannot score us a very good on any question, please call the office to discuss how we could of made your experience a very good one. Thank you.

## 2019-06-10 ENCOUNTER — OFFICE VISIT (OUTPATIENT)
Dept: SURGERY | Age: 74
End: 2019-06-10
Payer: MEDICARE

## 2019-06-10 VITALS
RESPIRATION RATE: 18 BRPM | HEART RATE: 80 BPM | WEIGHT: 246 LBS | DIASTOLIC BLOOD PRESSURE: 90 MMHG | SYSTOLIC BLOOD PRESSURE: 140 MMHG | BODY MASS INDEX: 38.61 KG/M2 | HEIGHT: 67 IN

## 2019-06-10 DIAGNOSIS — Z98.890 S/P COLONOSCOPY WITH POLYPECTOMY: Primary | ICD-10-CM

## 2019-06-10 DIAGNOSIS — D12.5 ADENOMATOUS POLYP OF SIGMOID COLON: ICD-10-CM

## 2019-06-10 DIAGNOSIS — Z80.0 FAMILY HISTORY OF COLON CANCER: ICD-10-CM

## 2019-06-10 DIAGNOSIS — K57.30 DIVERTICULOSIS OF SIGMOID COLON: ICD-10-CM

## 2019-06-10 PROCEDURE — 4040F PNEUMOC VAC/ADMIN/RCVD: CPT | Performed by: SURGERY

## 2019-06-10 PROCEDURE — G8427 DOCREV CUR MEDS BY ELIG CLIN: HCPCS | Performed by: SURGERY

## 2019-06-10 PROCEDURE — 1123F ACP DISCUSS/DSCN MKR DOCD: CPT | Performed by: SURGERY

## 2019-06-10 PROCEDURE — G8399 PT W/DXA RESULTS DOCUMENT: HCPCS | Performed by: SURGERY

## 2019-06-10 PROCEDURE — 3017F COLORECTAL CA SCREEN DOC REV: CPT | Performed by: SURGERY

## 2019-06-10 PROCEDURE — 99212 OFFICE O/P EST SF 10 MIN: CPT | Performed by: SURGERY

## 2019-06-10 PROCEDURE — G8417 CALC BMI ABV UP PARAM F/U: HCPCS | Performed by: SURGERY

## 2019-06-10 PROCEDURE — 1036F TOBACCO NON-USER: CPT | Performed by: SURGERY

## 2019-06-10 PROCEDURE — 1090F PRES/ABSN URINE INCON ASSESS: CPT | Performed by: SURGERY

## 2019-06-10 NOTE — LETTER
[address]  [address] [address]  Phone: [phone]  Fax: [phone]    Arturo Valadez MD        June 22, 2019     Nay Chatterjee DO  [address]  [place] [address]    Patient: Quintin Chance  MR Number: [id number]  YOB: 1945  Date of Visit: 6/10/2019    Dear Dr. Nay Chatterjee:    Thank you for the request for consultation for Kathleen Flor to me for the evaluation of colonoscopy, history polyps. Below are the relevant portions of my assessment and plan of care. Assessment:      Diagnosis Orders   1. S/P colonoscopy with polypectomy     2. Adenomatous polyp of sigmoid colon     3. Diverticulosis of sigmoid colon     4. Family history of colon cancer           Plan:     Colonoscopy findings and pathology reviewed with Ms Sandra Sage. Recommend next screening colonoscopy in 1-2 years, given the large size (2 cm) of the rectosigmoid tubular adenoma removed. Discussed importance of a high fiber low fat diet with fiber supplementation, increased water intake, etc.    If you have questions, please do not hesitate to call me. I look forward to following Ragini along with you.     Sincerely,        Arturo Valadez MD

## 2019-06-11 ENCOUNTER — TELEPHONE (OUTPATIENT)
Dept: PHARMACY | Age: 74
End: 2019-06-11

## 2019-06-11 NOTE — TELEPHONE ENCOUNTER
Hipolito Agnes called to let us know she needs to change her appt for next week because she will be stopping warfarin Saturday thru Wednesday because of a \"back injection\" with Dr. Winsome Rice on 6/20/2019. Rescheduled for July.

## 2019-06-13 ENCOUNTER — HOSPITAL ENCOUNTER (OUTPATIENT)
Dept: NON INVASIVE DIAGNOSTICS | Age: 74
Discharge: HOME | End: 2019-06-13
Payer: MEDICARE

## 2019-06-13 ENCOUNTER — HOSPITAL ENCOUNTER (OUTPATIENT)
Dept: CT IMAGING | Age: 74
Discharge: HOME | End: 2019-06-15
Payer: MEDICARE

## 2019-06-13 DIAGNOSIS — R19.5 CHANGE IN STOOL: ICD-10-CM

## 2019-06-13 DIAGNOSIS — R10.11 RUQ PAIN: ICD-10-CM

## 2019-06-13 DIAGNOSIS — R53.83 EASY FATIGABILITY: ICD-10-CM

## 2019-06-13 DIAGNOSIS — I95.9 HYPOTENSION, UNSPECIFIED HYPOTENSION TYPE: ICD-10-CM

## 2019-06-13 LAB
LV EF: 55 %
LVEF MODALITY: NORMAL

## 2019-06-13 PROCEDURE — 6360000004 HC RX CONTRAST MEDICATION: Performed by: FAMILY MEDICINE

## 2019-06-13 PROCEDURE — 74176 CT ABD & PELVIS W/O CONTRAST: CPT

## 2019-06-13 PROCEDURE — 93306 TTE W/DOPPLER COMPLETE: CPT

## 2019-06-13 RX ADMIN — IOHEXOL 25 ML: 240 INJECTION, SOLUTION INTRATHECAL; INTRAVASCULAR; INTRAVENOUS; ORAL at 10:59

## 2019-06-22 ASSESSMENT — ENCOUNTER SYMPTOMS
BACK PAIN: 0
NAUSEA: 0
CHOKING: 0
BLOOD IN STOOL: 0
SHORTNESS OF BREATH: 0
SORE THROAT: 0
TROUBLE SWALLOWING: 0
VOMITING: 0
COUGH: 0
ABDOMINAL PAIN: 0

## 2019-06-22 NOTE — PROGRESS NOTES
Subjective:      Patient ID: Corey Mcdonough is a 72 y.o. female. HPI     Ms Yessica Olsen returns for follow up after colonoscopy with polypectomy May 13, 2019. PREOPERATIVE DIAGNOSES:  1. Change in bowel habits. 2.  Abdominal bloating. 3.  History of colon polyps. 4.  Family history of colon cancer (paternal aunt).     POSTOPERATIVE DIAGNOSES:  1.  Large 2cm rectosigmoid pedunculated polyp (approximately 15-20 cm from  the anal verge). Tubular adenoma  2. Sigmoid diverticulosis.     OPERATION:  1. Colonoscopy, anus to cecum. 2.  Large pedunculated polypectomy with tattoo (approximately 15-20 cm  from the anal verge). She is doing well. No current complaints. Abdominal bloating much improved. Review of Systems   Constitutional: Negative for activity change, appetite change, chills, fever and unexpected weight change. HENT: Negative for nosebleeds, sneezing, sore throat and trouble swallowing. Eyes: Negative for visual disturbance. Respiratory: Negative for cough, choking and shortness of breath. Cardiovascular: Negative for chest pain, palpitations and leg swelling. Gastrointestinal: Negative for abdominal pain, blood in stool, nausea and vomiting. Genitourinary: Negative for dysuria, flank pain and hematuria. Musculoskeletal: Positive for arthralgias. Negative for back pain, gait problem and myalgias. Allergic/Immunologic: Negative for immunocompromised state. Neurological: Negative for dizziness, seizures, syncope, weakness and headaches. Hematological: Does not bruise/bleed easily. Psychiatric/Behavioral: Negative for confusion and sleep disturbance.         Past Medical History:   Diagnosis Date    Allergic rhinitis     Chronic kidney disease, stage III (moderate) (HCC)     Elbow fracture, left     Fall     Gastric ulcer     Gout     Hx of blood clots     Following last back surgery     Hypertension     Nausea & vomiting     Presence of IVC filter     Type II or unspecified type diabetes mellitus without mention of complication, not stated as uncontrolled        Past Surgical History:   Procedure Laterality Date    BACK SURGERY      BACK SURGERY      BACK SURGERY      BACK SURGERY      BACK SURGERY      BACK SURGERY      Fusion     BREAST BIOPSY Left     BREAST BIOPSY Right     CARDIAC CATHETERIZATION Left 03/07/2018    Dr. Armando Hodges @ [facility]--There is 30% disease of the origin of the lateral anterior descending. Mild 10% -20% plaque disease in the circumflex & right coronary artery. Normal left ventricular functino, ejection fraction of 60%.  CARPAL TUNNEL RELEASE Right     CATARACT REMOVAL Right     CATARACT REMOVAL Left     COLONOSCOPY  2014    COLONOSCOPY N/A 5/13/2019    COLONOSCOPY POLYPECTOMY HOT BIOPSY performed by Luke Cavazos MD at [address] [place] [address]      \"Multiple\"     EYE SURGERY Right     Removed fluid from behind eye    HYSTERECTOMY      KIDNEY SURGERY      left side 1/2 of kidney removed    PARTIAL NEPHRECTOMY Left     RETINAL DETACHMENT SURGERY Left     ROTATOR CUFF REPAIR      TOTAL KNEE ARTHROPLASTY Right     TOTAL KNEE ARTHROPLASTY Left     UPPER GASTROINTESTINAL ENDOSCOPY  2014    VENA CAVA FILTER PLACEMENT         Family History   Problem Relation Age of Onset    Diabetes Father     Cancer Father         Prostate Cancer    Cancer Paternal Aunt         Colon Cancer       Allergies:  See list    Current Outpatient Medications   Medication Sig Dispense Refill    Blood Pressure KIT Check bp once to twice daily 1 kit 0    warfarin (COUMADIN) 5 MG tablet Take 1/2 tablet on Wednesdays and Saturdays and 1 whole tablet daily all other days or as directed by [facility].  90 tablet 3    Blood Pressure KIT Check bp once to twice daily 1 kit 0    allopurinol (ZYLOPRIM) 100 MG tablet TAKE 1 TABLET TWICE DAILY 180 tablet 1    glipiZIDE (GLUCOTROL XL) 5 MG extended release tablet TAKE 1 TABLET EVERY DAY 90 tablet 1    gabapentin (NEURONTIN) 100 MG capsule Take 2 capsules by mouth 2 times daily for 90 days. 120 capsule 2    hydrochlorothiazide (MICROZIDE) 12.5 MG capsule TAKE 1 CAPSULE EVERY DAY 90 capsule 3    omeprazole (PRILOSEC) 20 MG delayed release capsule TAKE 1 CAPSULE EVERY DAY 90 capsule 3    ranitidine (ZANTAC) 150 MG tablet TAKE 1 TABLET EVERY NIGHT 90 tablet 3    propranolol (INDERAL) 60 MG tablet TAKE 1 TABLET TWICE DAILY 180 tablet 3    cyclobenzaprine (FLEXERIL) 10 MG tablet Take 1 tablet by mouth 2 times daily as needed (Arthritic pain.) 60 tablet 2    isosorbide dinitrate (ISORDIL) 5 MG tablet TAKE 1 TABLET TWICE DAILY 180 tablet 3    vitamin D 1000 units CAPS Take 1 capsule by mouth daily      furosemide (LASIX) 20 MG tablet Take 1 tablet by mouth daily As needed for swelling 30 tablet 3    potassium chloride (KLOR-CON M) 20 MEQ extended release tablet Take 1 tablet by mouth daily On the days that you take lasix 30 tablet 3    fluticasone (FLONASE) 50 MCG/ACT nasal spray 2 sprays by Nasal route daily (Patient taking differently: 2 sprays by Nasal route daily as needed ) 1 Bottle 3    glucose blood VI test strips (TRUE METRIX BLOOD GLUCOSE TEST) strip Use once daily and as needed. 100 each 3    Lancets MISC 1 each by Does not apply route daily 100 each 3    Blood Glucose Monitoring Suppl (TRUE METRIX AIR GLUCOSE METER) W/DEVICE KIT Use to test sugar once daily 1 kit 0    acetaminophen (TYLENOL) 650 MG CR tablet Take 1,300 mg by mouth every 8 hours as needed for Pain      Magnesium 400 MG TABS Take 800 mg by mouth nightly       folic acid (FOLVITE) 364 MCG tablet Take 400 mcg by mouth daily      losartan (COZAAR) 25 MG tablet TAKE 1 TABLET EVERY DAY 90 tablet 1     No current facility-administered medications for this visit. Social History     Socioeconomic History    Marital status:       Spouse name: None    Number of children: None    Years of education: None    Highest education level: None   Occupational History    None   Social Needs    Financial resource strain: None    Food insecurity:     Worry: None     Inability: None    Transportation needs:     Medical: None     Non-medical: None   Tobacco Use    Smoking status: Never Smoker    Smokeless tobacco: Never Used   Substance and Sexual Activity    Alcohol use: No    Drug use: No    Sexual activity: None   Lifestyle    Physical activity:     Days per week: None     Minutes per session: None    Stress: None   Relationships    Social connections:     Talks on phone: None     Gets together: None     Attends Gnosticist service: None     Active member of club or organization: None     Attends meetings of clubs or organizations: None     Relationship status: None    Intimate partner violence:     Fear of current or ex partner: None     Emotionally abused: None     Physically abused: None     Forced sexual activity: None   Other Topics Concern    None   Social History Narrative    None       Objective:   Physical Exam   Constitutional: She is oriented to person, place, and time. She appears well-developed and well-nourished. No distress. HENT:   Head: Normocephalic and atraumatic. Eyes: Pupils are equal, round, and reactive to light. Conjunctivae are normal. No scleral icterus. Neck: No tracheal deviation present. Cardiovascular: Normal rate. Pulmonary/Chest: Effort normal. No respiratory distress. Musculoskeletal: She exhibits no edema. Neurological: She is alert and oriented to person, place, and time. Skin: Skin is warm and dry. Psychiatric: She has a normal mood and affect. Her behavior is normal. Judgment and thought content normal.   Nursing note and vitals reviewed.        5/15/2019  9:38 AM - Makenzie Barone Incoming Lab Results From [facility]     Component Collected Lab   Surgical Pathology Report 05/13/2019  8:44  [address]

## 2019-06-22 NOTE — PATIENT INSTRUCTIONS
Patient Education        Gas and Bloating: Care Instructions  Your Care Instructions    Gas and bloating can be uncomfortable and embarrassing problems. All people pass gas, but some people produce more gas than others, sometimes enough to cause distress. It is normal to pass gas from 6 to 20 times per day. Excess gas usually is not caused by a serious health problem. Gas and bloating usually are caused by something you eat or drink, including some food supplements and medicines. Gas and bloating are usually harmless and go away without treatment. However, changing your diet can help end the problem. Some over-the-counter medicines can help prevent gas and relieve bloating. Follow-up care is a key part of your treatment and safety. Be sure to make and go to all appointments, and call your doctor if you are having problems. It's also a good idea to know your test results and keep a list of the medicines you take. How can you care for yourself at home? · Keep a food diary if you think a food gives you gas. Write down what you eat or drink. Also record when you get gas. If you notice that a food seems to cause your gas each time, avoid it and see if the gas goes away. Examples of foods that cause gas include:  ? Fried and fatty foods. ? Beans. ? Vegetables such as artichokes, asparagus, broccoli, brussels sprouts, cabbage, cauliflower, cucumbers, green peppers, onions, peas, radishes, and raw potatoes. ? Fruits such as apricots, bananas, melons, peaches, pears, prunes, and raw apples. ? Wheat and wheat bran. · Soak dry beans in water overnight, then dump the water and cook the soaked beans in new water. This can help prevent gas and bloating. · If you have problems with lactose, avoid dairy products such as milk and cheese. · Try not to swallow air. Do not drink through a straw, gulp your food, or chew gum. · Take an over-the-counter medicine. Read and follow all instructions on the label. ?  Food enzymes, such as Beano, can be added to gas-producing foods to prevent gas. ? Antacids, such as Maalox Anti-Gas and Mylanta Gas, can relieve bloating by making you burp. Be careful when you take over-the-counter antacid medicines. Many of these medicines have aspirin in them. Read the label to make sure that you are not taking more than the recommended dose. Too much aspirin can be harmful. ? Activated charcoal tablets, such as CharcoCaps, may decrease odor from gas you pass. ? If you have problems with lactose, you can take medicines such as Dairy Ease and Lactaid with dairy products to prevent gas and bloating. · Get some exercise regularly. When should you call for help? Call 911 anytime you think you may need emergency care. For example, call if:    · You have gas and signs of a heart attack, such as:  ? Chest pain or pressure. ? Sweating. ? Shortness of breath. ? Nausea or vomiting. ? Pain that spreads from the chest to the neck, jaw, or one or both shoulders or arms. ? Dizziness or lightheadedness. ? A fast or uneven pulse. After calling 911, chew 1 adult-strength aspirin. Wait for an ambulance. Do not try to drive yourself.    Call your doctor now or seek immediate medical care if:    · You have severe belly pain.     · You have blood in your stool.    Watch closely for changes in your health, and be sure to contact your doctor if:    · You have blood or pus in your urine.     · Your urine is cloudy or smells bad.     · You are burping and have trouble swallowing.     · You feel bloated and have swelling in your belly.     · You do not get better as expected. Where can you learn more? Go to https://Fivetrankellieeb.Tripping. org and sign in to your [employer] account. Enter V926 in the [employer] box to learn more about \"Gas and Bloating: Care Instructions. \"     If you do not have an account, please click on the \"Sign Up Now\" link.   Current as of: September 23, 2018  Content Version: 12.0  © 4999-5935 [employer]. Care instructions adapted under license by [facility] ([facility]). If you have questions about a medical condition or this instruction, always ask your healthcare professional. [address] any warranty or liability for your use of this information.

## 2019-06-27 ENCOUNTER — HOSPITAL ENCOUNTER (OUTPATIENT)
Age: 74
Discharge: HOME | End: 2019-06-27
Payer: MEDICARE

## 2019-06-27 LAB
ALBUMIN SERPL-MCNC: 3.8 G/DL (ref 3.5–5.2)
ANION GAP SERPL CALCULATED.3IONS-SCNC: 13 MMOL/L (ref 9–17)
BUN BLDV-MCNC: 27 MG/DL (ref 8–23)
BUN/CREAT BLD: 19 (ref 9–20)
CALCIUM SERPL-MCNC: 10.3 MG/DL (ref 8.6–10.4)
CHLORIDE BLD-SCNC: 104 MMOL/L (ref 98–107)
CO2: 26 MMOL/L (ref 20–31)
CREAT SERPL-MCNC: 1.42 MG/DL (ref 0.5–0.9)
CREATININE URINE: 82.4 MG/DL (ref 28–217)
GFR AFRICAN AMERICAN: 44 ML/MIN
GFR NON-AFRICAN AMERICAN: 36 ML/MIN
GFR SERPL CREATININE-BSD FRML MDRD: ABNORMAL ML/MIN/{1.73_M2}
GFR SERPL CREATININE-BSD FRML MDRD: ABNORMAL ML/MIN/{1.73_M2}
GLUCOSE BLD-MCNC: 183 MG/DL (ref 70–99)
HCT VFR BLD CALC: 38.1 % (ref 36–46)
HEMOGLOBIN: 12.5 G/DL (ref 12–16)
MAGNESIUM: 2 MG/DL (ref 1.6–2.6)
PHOSPHORUS: 3.4 MG/DL (ref 2.6–4.5)
POTASSIUM SERPL-SCNC: 4.7 MMOL/L (ref 3.7–5.3)
PTH INTACT: 119.4 PG/ML (ref 15–65)
SODIUM BLD-SCNC: 143 MMOL/L (ref 135–144)
TOTAL PROTEIN, URINE: 11 MG/DL
URINE TOTAL PROTEIN CREATININE RATIO: 0.13 (ref 0–0.2)
VITAMIN D 25-HYDROXY: 46.8 NG/ML (ref 30–100)

## 2019-06-27 PROCEDURE — 83970 ASSAY OF PARATHORMONE: CPT

## 2019-06-27 PROCEDURE — 36415 COLL VENOUS BLD VENIPUNCTURE: CPT

## 2019-06-27 PROCEDURE — 82570 ASSAY OF URINE CREATININE: CPT

## 2019-06-27 PROCEDURE — 82306 VITAMIN D 25 HYDROXY: CPT

## 2019-06-27 PROCEDURE — 80069 RENAL FUNCTION PANEL: CPT

## 2019-06-27 PROCEDURE — 85018 HEMOGLOBIN: CPT

## 2019-06-27 PROCEDURE — 83735 ASSAY OF MAGNESIUM: CPT

## 2019-06-27 PROCEDURE — 85014 HEMATOCRIT: CPT

## 2019-06-27 PROCEDURE — 84156 ASSAY OF PROTEIN URINE: CPT

## 2019-07-09 ENCOUNTER — HOSPITAL ENCOUNTER (OUTPATIENT)
Dept: PHARMACY | Age: 74
Setting detail: THERAPIES SERIES
Discharge: HOME | End: 2019-07-09
Payer: MEDICARE

## 2019-07-09 VITALS
WEIGHT: 241 LBS | HEART RATE: 76 BPM | DIASTOLIC BLOOD PRESSURE: 78 MMHG | BODY MASS INDEX: 37.75 KG/M2 | SYSTOLIC BLOOD PRESSURE: 121 MMHG

## 2019-07-09 DIAGNOSIS — Z79.01 LONG TERM CURRENT USE OF ANTICOAGULANT THERAPY: ICD-10-CM

## 2019-07-09 LAB — INR BLD: 2.2

## 2019-07-09 PROCEDURE — 99211 OFF/OP EST MAY X REQ PHY/QHP: CPT

## 2019-07-09 PROCEDURE — 85610 PROTHROMBIN TIME: CPT

## 2019-07-09 RX ORDER — WARFARIN SODIUM 5 MG/1
TABLET ORAL
Qty: 90 TABLET | Refills: 3 | Status: SHIPPED
Start: 2019-07-09 | End: 2019-09-27 | Stop reason: DRUGHIGH

## 2019-07-10 ENCOUNTER — TELEPHONE (OUTPATIENT)
Dept: FAMILY MEDICINE CLINIC | Age: 74
End: 2019-07-10

## 2019-07-10 ENCOUNTER — NURSE ONLY (OUTPATIENT)
Dept: FAMILY MEDICINE CLINIC | Age: 74
End: 2019-07-10
Payer: MEDICARE

## 2019-07-10 DIAGNOSIS — R30.0 BURNING WITH URINATION: Primary | ICD-10-CM

## 2019-07-10 LAB
BILIRUBIN, POC: ABNORMAL
BLOOD URINE, POC: ABNORMAL
CLARITY, POC: ABNORMAL
COLOR, POC: ABNORMAL
GLUCOSE URINE, POC: ABNORMAL
KETONES, POC: ABNORMAL
LEUKOCYTE EST, POC: ABNORMAL
NITRITE, POC: NEGATIVE
PH, POC: 7
PROTEIN, POC: 100
SPECIFIC GRAVITY, POC: 1.02
UROBILINOGEN, POC: 0.2

## 2019-07-10 PROCEDURE — 81002 URINALYSIS NONAUTO W/O SCOPE: CPT | Performed by: FAMILY MEDICINE

## 2019-07-10 RX ORDER — CEPHALEXIN 500 MG/1
500 CAPSULE ORAL 2 TIMES DAILY
Qty: 14 CAPSULE | Refills: 0 | Status: SHIPPED | OUTPATIENT
Start: 2019-07-10 | End: 2019-07-17

## 2019-07-23 ENCOUNTER — HOSPITAL ENCOUNTER (OUTPATIENT)
Dept: PHARMACY | Age: 74
Setting detail: THERAPIES SERIES
Discharge: HOME | End: 2019-07-23
Payer: MEDICARE

## 2019-07-23 VITALS
HEART RATE: 74 BPM | SYSTOLIC BLOOD PRESSURE: 130 MMHG | WEIGHT: 240.8 LBS | BODY MASS INDEX: 37.71 KG/M2 | DIASTOLIC BLOOD PRESSURE: 79 MMHG

## 2019-07-23 DIAGNOSIS — Z79.01 LONG TERM CURRENT USE OF ANTICOAGULANT THERAPY: ICD-10-CM

## 2019-07-23 LAB — INR BLD: 2.6

## 2019-07-23 PROCEDURE — 99211 OFF/OP EST MAY X REQ PHY/QHP: CPT

## 2019-07-23 PROCEDURE — 85610 PROTHROMBIN TIME: CPT

## 2019-08-19 DIAGNOSIS — E11.9 TYPE 2 DIABETES MELLITUS WITHOUT COMPLICATION, WITHOUT LONG-TERM CURRENT USE OF INSULIN (HCC): ICD-10-CM

## 2019-08-19 RX ORDER — GLIPIZIDE 5 MG/1
TABLET, FILM COATED, EXTENDED RELEASE ORAL
Qty: 90 TABLET | Refills: 1 | Status: SHIPPED | OUTPATIENT
Start: 2019-08-19 | End: 2020-01-07

## 2019-08-20 ENCOUNTER — HOSPITAL ENCOUNTER (OUTPATIENT)
Dept: PHARMACY | Age: 74
Setting detail: THERAPIES SERIES
Discharge: HOME | End: 2019-08-20
Payer: MEDICARE

## 2019-08-20 VITALS
HEART RATE: 78 BPM | WEIGHT: 240.4 LBS | BODY MASS INDEX: 37.65 KG/M2 | DIASTOLIC BLOOD PRESSURE: 84 MMHG | SYSTOLIC BLOOD PRESSURE: 116 MMHG

## 2019-08-20 DIAGNOSIS — Z79.01 LONG TERM CURRENT USE OF ANTICOAGULANT THERAPY: ICD-10-CM

## 2019-08-20 LAB — INR BLD: 2.4

## 2019-08-20 PROCEDURE — 85610 PROTHROMBIN TIME: CPT

## 2019-08-20 PROCEDURE — 99211 OFF/OP EST MAY X REQ PHY/QHP: CPT

## 2019-09-03 ENCOUNTER — OFFICE VISIT (OUTPATIENT)
Dept: FAMILY MEDICINE CLINIC | Age: 74
End: 2019-09-03
Payer: MEDICARE

## 2019-09-03 VITALS
BODY MASS INDEX: 37.67 KG/M2 | DIASTOLIC BLOOD PRESSURE: 70 MMHG | HEIGHT: 67 IN | WEIGHT: 240 LBS | HEART RATE: 76 BPM | OXYGEN SATURATION: 97 % | SYSTOLIC BLOOD PRESSURE: 110 MMHG

## 2019-09-03 DIAGNOSIS — B02.9 HERPES ZOSTER WITHOUT COMPLICATION: ICD-10-CM

## 2019-09-03 DIAGNOSIS — I10 ESSENTIAL HYPERTENSION, BENIGN: ICD-10-CM

## 2019-09-03 DIAGNOSIS — E11.9 TYPE 2 DIABETES MELLITUS WITHOUT COMPLICATION, WITHOUT LONG-TERM CURRENT USE OF INSULIN (HCC): Primary | ICD-10-CM

## 2019-09-03 LAB
CREATININE URINE POCT: 300
HBA1C MFR BLD: 7.1 %
MICROALBUMIN/CREAT 24H UR: 80 MG/G{CREAT}
MICROALBUMIN/CREAT UR-RTO: NORMAL

## 2019-09-03 PROCEDURE — 1123F ACP DISCUSS/DSCN MKR DOCD: CPT | Performed by: FAMILY MEDICINE

## 2019-09-03 PROCEDURE — G8427 DOCREV CUR MEDS BY ELIG CLIN: HCPCS | Performed by: FAMILY MEDICINE

## 2019-09-03 PROCEDURE — 3045F PR MOST RECENT HEMOGLOBIN A1C LEVEL 7.0-9.0%: CPT | Performed by: FAMILY MEDICINE

## 2019-09-03 PROCEDURE — 3017F COLORECTAL CA SCREEN DOC REV: CPT | Performed by: FAMILY MEDICINE

## 2019-09-03 PROCEDURE — 2022F DILAT RTA XM EVC RTNOPTHY: CPT | Performed by: FAMILY MEDICINE

## 2019-09-03 PROCEDURE — 4040F PNEUMOC VAC/ADMIN/RCVD: CPT | Performed by: FAMILY MEDICINE

## 2019-09-03 PROCEDURE — G8399 PT W/DXA RESULTS DOCUMENT: HCPCS | Performed by: FAMILY MEDICINE

## 2019-09-03 PROCEDURE — 1090F PRES/ABSN URINE INCON ASSESS: CPT | Performed by: FAMILY MEDICINE

## 2019-09-03 PROCEDURE — 82044 UR ALBUMIN SEMIQUANTITATIVE: CPT | Performed by: FAMILY MEDICINE

## 2019-09-03 PROCEDURE — 83036 HEMOGLOBIN GLYCOSYLATED A1C: CPT | Performed by: FAMILY MEDICINE

## 2019-09-03 PROCEDURE — G8417 CALC BMI ABV UP PARAM F/U: HCPCS | Performed by: FAMILY MEDICINE

## 2019-09-03 PROCEDURE — 99214 OFFICE O/P EST MOD 30 MIN: CPT | Performed by: FAMILY MEDICINE

## 2019-09-03 PROCEDURE — 1036F TOBACCO NON-USER: CPT | Performed by: FAMILY MEDICINE

## 2019-09-03 RX ORDER — FUROSEMIDE 20 MG/1
20 TABLET ORAL DAILY
Qty: 30 TABLET | Refills: 3 | Status: SHIPPED
Start: 2019-09-03 | End: 2020-05-14 | Stop reason: ALTCHOICE

## 2019-09-03 RX ORDER — POTASSIUM CHLORIDE 20 MEQ/1
20 TABLET, EXTENDED RELEASE ORAL DAILY
Qty: 30 TABLET | Refills: 3 | Status: SHIPPED | OUTPATIENT
Start: 2019-09-03 | End: 2020-05-14 | Stop reason: ALTCHOICE

## 2019-09-03 RX ORDER — VALACYCLOVIR HYDROCHLORIDE 1 G/1
1000 TABLET, FILM COATED ORAL 3 TIMES DAILY
Qty: 21 TABLET | Refills: 0 | Status: SHIPPED | OUTPATIENT
Start: 2019-09-03 | End: 2019-09-10

## 2019-09-03 RX ORDER — CYCLOBENZAPRINE HCL 10 MG
10 TABLET ORAL 2 TIMES DAILY PRN
Qty: 60 TABLET | Refills: 2 | Status: SHIPPED | OUTPATIENT
Start: 2019-09-03 | End: 2020-08-14 | Stop reason: SDUPTHER

## 2019-09-03 RX ORDER — FLUTICASONE PROPIONATE 50 MCG
2 SPRAY, SUSPENSION (ML) NASAL DAILY PRN
Qty: 3 BOTTLE | Refills: 1 | Status: SHIPPED | OUTPATIENT
Start: 2019-09-03 | End: 2020-05-14 | Stop reason: SDUPTHER

## 2019-09-03 RX ORDER — PREDNISONE 20 MG/1
40 TABLET ORAL DAILY
Qty: 14 TABLET | Refills: 0 | Status: SHIPPED | OUTPATIENT
Start: 2019-09-03 | End: 2019-09-10

## 2019-09-03 ASSESSMENT — PATIENT HEALTH QUESTIONNAIRE - PHQ9
2. FEELING DOWN, DEPRESSED OR HOPELESS: 0
SUM OF ALL RESPONSES TO PHQ9 QUESTIONS 1 & 2: 0
SUM OF ALL RESPONSES TO PHQ QUESTIONS 1-9: 0
1. LITTLE INTEREST OR PLEASURE IN DOING THINGS: 0
SUM OF ALL RESPONSES TO PHQ QUESTIONS 1-9: 0

## 2019-09-03 NOTE — PROGRESS NOTES
the circumflex & right coronary artery. Normal left ventricular functino, ejection fraction of 60%.  CARPAL TUNNEL RELEASE Right     CATARACT REMOVAL Right     CATARACT REMOVAL Left     COLONOSCOPY  2014    COLONOSCOPY N/A 5/13/2019    COLONOSCOPY POLYPECTOMY HOT BIOPSY performed by Omaira Estrada MD at [address] [place] [address]      \"Multiple\"     EYE SURGERY Right     Removed fluid from behind eye    HYSTERECTOMY      KIDNEY SURGERY      left side 1/2 of kidney removed    PARTIAL NEPHRECTOMY Left     RETINAL DETACHMENT SURGERY Left     ROTATOR CUFF REPAIR      TOTAL KNEE ARTHROPLASTY Right     TOTAL KNEE ARTHROPLASTY Left     UPPER GASTROINTESTINAL ENDOSCOPY  2014    VENA CAVA FILTER PLACEMENT          Medications:       Prior to Admission medications    Medication Sig Start Date End Date Taking? Authorizing Provider   cyclobenzaprine (FLEXERIL) 10 MG tablet Take 1 tablet by mouth 2 times daily as needed (Arthritic pain.) 9/3/19  Yes Sherri Lewis, DO   furosemide (LASIX) 20 MG tablet Take 1 tablet by mouth daily As needed for swelling 9/3/19  Yes Sherri Shape, DO   potassium chloride (KLOR-CON M) 20 MEQ extended release tablet Take 1 tablet by mouth daily On the days that you take lasix 9/3/19  Yes Sherri Shape, DO   fluticasone (FLONASE) 50 MCG/ACT nasal spray 2 sprays by Nasal route daily as needed for Rhinitis 9/3/19  Yes Sherri Shape, DO   blood glucose test strips (TRUE METRIX BLOOD GLUCOSE TEST) strip Use once daily and as needed. 9/3/19  Yes Sherri Shape, DO   valACYclovir (VALTREX) 1 g tablet Take 1 tablet by mouth 3 times daily for 7 days 9/3/19 9/10/19 Yes Sherri Shape, DO   predniSONE (DELTASONE) 20 MG tablet Take 2 tablets by mouth daily for 7 days 9/3/19 9/10/19 Yes Sherri Shape, DO   gabapentin (NEURONTIN) 100 MG capsule Take 2 capsules by mouth 2 times daily for 90 days.  4/1/19 9/3/19 Yes Sherri Lewis, DO   hydrochlorothiazide (MICROZIDE) 12.5 MG capsule TAKE 1 CAPSULE EVERY DAY 1/28/19  Yes YOHAN Graves CNP   omeprazole (PRILOSEC) 20 MG delayed release capsule TAKE 1 CAPSULE EVERY DAY 1/28/19  Yes YOHAN Graves CNP   ranitidine (ZANTAC) 150 MG tablet TAKE 1 TABLET EVERY NIGHT 1/28/19  Yes YOHAN Graves CNP   propranolol (INDERAL) 60 MG tablet TAKE 1 TABLET TWICE DAILY 1/28/19  Yes YOHAN Graves CNP   isosorbide dinitrate (ISORDIL) 5 MG tablet TAKE 1 TABLET TWICE DAILY 12/12/18  Yes Art Fu MD   vitamin D 1000 units CAPS Take 1 capsule by mouth daily   Yes Historical Provider, MD   Lancets MISC 1 each by Does not apply route daily 2/1/17  Yes Maciel Parada DO   acetaminophen (TYLENOL) 650 MG CR tablet Take 1,300 mg by mouth every 8 hours as needed for Pain   Yes Historical Provider, MD   Magnesium 400 MG TABS Take 800 mg by mouth nightly    Yes Historical Provider, MD   folic acid (FOLVITE) 639 MCG tablet Take 400 mcg by mouth daily 2/8/16  Yes Historical Provider, MD   glipiZIDE (GLUCOTROL XL) 5 MG extended release tablet TAKE 1 TABLET EVERY DAY 8/19/19   Maciel Parada DO   warfarin (COUMADIN) 5 MG tablet Take 1/2 tablet on Sundays and 1 whole tablet daily all other days or as directed by [facility] Medication Management. 7/9/19   Maciel Parada DO   allopurinol (ZYLOPRIM) 100 MG tablet TAKE 1 TABLET TWICE DAILY 4/30/19   Maciel Parada DO        Allergies:       Codeine; Percocet [oxycodone-acetaminophen]; Adhesive tape; and Norco [hydrocodone-acetaminophen]    Social History:     Tobacco:    reports that she has never smoked. She has never used smokeless tobacco.  Alcohol:      reports that she does not drink alcohol. Drug Use:  reports that she does not use drugs.     Family History:     Family History   Problem Relation Age of Onset    Diabetes Father     Cancer Father         Prostate Cancer    Cancer Paternal Aunt         Colon Cancer       Review of Systems:     Positive and Negative as described in HPI    Review of Systems   Constitutional: Negative. Respiratory: Negative. Cardiovascular: Negative. Gastrointestinal: Negative. Physical Exam:     Vitals:  /70   Pulse 76   Ht 5' 7\" (1.702 m)   Wt 240 lb (108.9 kg)   SpO2 97%   BMI 37.59 kg/m²   Physical Exam   Constitutional: She is oriented to person, place, and time. She appears well-developed and well-nourished. No distress. HENT:   Head: Normocephalic and atraumatic. Eyes: Conjunctivae and EOM are normal.   Cardiovascular: Normal rate, regular rhythm and normal heart sounds. No peripheral edema. Pulmonary/Chest: Effort normal and breath sounds normal.   Neurological: She is alert and oriented to person, place, and time. Skin: Skin is warm and dry. Rash (Right lateral trunk over T12 distribution: 2 erythematous patches without any lesions present otherwise. Anteriorly, in the right lower quadrant, there is an erythematous patch several centimeters largest dimension with overlying vesicles present) noted. Psychiatric: She has a normal mood and affect. Judgment normal.   Nursing note and vitals reviewed.       Data:     Lab Results   Component Value Date     06/27/2019    K 4.7 06/27/2019     06/27/2019    CO2 26 06/27/2019    BUN 27 06/27/2019    CREATININE 1.42 06/27/2019    GLUCOSE 183 06/27/2019    PROT 6.4 05/29/2019    LABALBU 3.8 06/27/2019    BILITOT 0.40 05/29/2019    ALKPHOS 73 05/29/2019    AST 10 05/29/2019    ALT 11 05/29/2019     Lab Results   Component Value Date    WBC 9.7 05/29/2019    RBC 3.66 05/29/2019    HGB 12.5 06/27/2019    HCT 38.1 06/27/2019    MCV 98.8 05/29/2019    MCH 32.5 05/29/2019    MCHC 32.9 05/29/2019    RDW 14.8 05/29/2019     05/29/2019    MPV NOT REPORTED 05/29/2019     Lab Results   Component Value Date    TSH 2.47 05/29/2019     Lab Results   Component Value Date    CHOL 182 02/26/2018    HDL 66 02/26/2018    LABA1C 7.1 09/03/2019

## 2019-09-04 PROBLEM — R19.4 CHANGE IN BOWEL HABITS: Status: RESOLVED | Noted: 2019-05-13 | Resolved: 2019-09-04

## 2019-09-04 PROBLEM — R60.0 LEG EDEMA: Status: RESOLVED | Noted: 2017-11-14 | Resolved: 2019-09-04

## 2019-09-04 PROBLEM — R94.39 ABNORMAL STRESS TEST: Status: RESOLVED | Noted: 2017-11-14 | Resolved: 2019-09-04

## 2019-09-04 ASSESSMENT — ENCOUNTER SYMPTOMS
GASTROINTESTINAL NEGATIVE: 1
RESPIRATORY NEGATIVE: 1

## 2019-09-17 RX ORDER — ALLOPURINOL 100 MG/1
TABLET ORAL
Qty: 180 TABLET | Refills: 1 | Status: SHIPPED | OUTPATIENT
Start: 2019-09-17 | End: 2020-05-14 | Stop reason: SDUPTHER

## 2019-09-24 ENCOUNTER — HOSPITAL ENCOUNTER (OUTPATIENT)
Dept: PHARMACY | Age: 74
Setting detail: THERAPIES SERIES
Discharge: HOME | End: 2019-09-24
Payer: MEDICARE

## 2019-09-24 VITALS
HEART RATE: 78 BPM | BODY MASS INDEX: 38.18 KG/M2 | DIASTOLIC BLOOD PRESSURE: 63 MMHG | SYSTOLIC BLOOD PRESSURE: 102 MMHG | WEIGHT: 243.8 LBS

## 2019-09-24 DIAGNOSIS — Z79.01 LONG TERM CURRENT USE OF ANTICOAGULANT THERAPY: ICD-10-CM

## 2019-09-24 LAB — INR BLD: 5.3

## 2019-09-24 PROCEDURE — 85610 PROTHROMBIN TIME: CPT

## 2019-09-24 PROCEDURE — 99212 OFFICE O/P EST SF 10 MIN: CPT

## 2019-09-27 ENCOUNTER — HOSPITAL ENCOUNTER (OUTPATIENT)
Dept: PHARMACY | Age: 74
Setting detail: THERAPIES SERIES
Discharge: HOME | End: 2019-09-27
Payer: MEDICARE

## 2019-09-27 VITALS
WEIGHT: 244 LBS | SYSTOLIC BLOOD PRESSURE: 117 MMHG | BODY MASS INDEX: 38.22 KG/M2 | DIASTOLIC BLOOD PRESSURE: 79 MMHG | HEART RATE: 71 BPM

## 2019-09-27 DIAGNOSIS — Z79.01 LONG TERM CURRENT USE OF ANTICOAGULANT THERAPY: ICD-10-CM

## 2019-09-27 LAB — INR BLD: 1.9

## 2019-09-27 PROCEDURE — 85610 PROTHROMBIN TIME: CPT

## 2019-09-27 PROCEDURE — 99211 OFF/OP EST MAY X REQ PHY/QHP: CPT

## 2019-09-27 RX ORDER — WARFARIN SODIUM 5 MG/1
TABLET ORAL
Qty: 90 TABLET | Refills: 3 | Status: SHIPPED
Start: 2019-09-27 | End: 2019-10-08 | Stop reason: DRUGHIGH

## 2019-10-02 RX ORDER — ISOSORBIDE DINITRATE 5 MG/1
TABLET ORAL
Qty: 180 TABLET | Refills: 3 | Status: SHIPPED | OUTPATIENT
Start: 2019-10-02 | End: 2020-08-14 | Stop reason: SDUPTHER

## 2019-10-08 ENCOUNTER — HOSPITAL ENCOUNTER (OUTPATIENT)
Dept: PHARMACY | Age: 74
Setting detail: THERAPIES SERIES
Discharge: HOME | End: 2019-10-08
Payer: MEDICARE

## 2019-10-08 VITALS — BODY MASS INDEX: 38.69 KG/M2 | WEIGHT: 247 LBS

## 2019-10-08 DIAGNOSIS — Z79.01 LONG TERM CURRENT USE OF ANTICOAGULANT THERAPY: ICD-10-CM

## 2019-10-08 LAB
INR BLD: 3.7
INR BLD: 7

## 2019-10-08 PROCEDURE — 99212 OFFICE O/P EST SF 10 MIN: CPT

## 2019-10-08 PROCEDURE — 85610 PROTHROMBIN TIME: CPT

## 2019-10-08 RX ORDER — WARFARIN SODIUM 5 MG/1
TABLET ORAL
Qty: 90 TABLET | Refills: 3 | Status: SHIPPED
Start: 2019-10-08 | End: 2019-10-31 | Stop reason: DRUGHIGH

## 2019-10-31 ENCOUNTER — NURSE ONLY (OUTPATIENT)
Dept: FAMILY MEDICINE CLINIC | Age: 74
End: 2019-10-31
Payer: MEDICARE

## 2019-10-31 ENCOUNTER — HOSPITAL ENCOUNTER (OUTPATIENT)
Dept: PHARMACY | Age: 74
Setting detail: THERAPIES SERIES
Discharge: HOME | End: 2019-10-31
Payer: MEDICARE

## 2019-10-31 VITALS
SYSTOLIC BLOOD PRESSURE: 133 MMHG | HEART RATE: 71 BPM | BODY MASS INDEX: 37.31 KG/M2 | WEIGHT: 238.2 LBS | DIASTOLIC BLOOD PRESSURE: 80 MMHG

## 2019-10-31 DIAGNOSIS — Z79.01 LONG TERM CURRENT USE OF ANTICOAGULANT THERAPY: ICD-10-CM

## 2019-10-31 DIAGNOSIS — Z23 NEED FOR INFLUENZA VACCINATION: Primary | ICD-10-CM

## 2019-10-31 LAB — INR BLD: 1.5

## 2019-10-31 PROCEDURE — 90686 IIV4 VACC NO PRSV 0.5 ML IM: CPT | Performed by: FAMILY MEDICINE

## 2019-10-31 PROCEDURE — 99211 OFF/OP EST MAY X REQ PHY/QHP: CPT

## 2019-10-31 PROCEDURE — G0008 ADMIN INFLUENZA VIRUS VAC: HCPCS | Performed by: FAMILY MEDICINE

## 2019-10-31 PROCEDURE — 85610 PROTHROMBIN TIME: CPT

## 2019-10-31 RX ORDER — WARFARIN SODIUM 5 MG/1
TABLET ORAL
Qty: 90 TABLET | Refills: 3 | Status: SHIPPED
Start: 2019-10-31 | End: 2019-12-02 | Stop reason: SDUPTHER

## 2019-11-01 ENCOUNTER — HOSPITAL ENCOUNTER (EMERGENCY)
Age: 74
Discharge: HOME | End: 2019-11-01
Attending: FAMILY MEDICINE
Payer: MEDICARE

## 2019-11-01 ENCOUNTER — APPOINTMENT (OUTPATIENT)
Dept: GENERAL RADIOLOGY | Age: 74
End: 2019-11-01
Payer: MEDICARE

## 2019-11-01 VITALS
SYSTOLIC BLOOD PRESSURE: 156 MMHG | HEIGHT: 67 IN | BODY MASS INDEX: 37.35 KG/M2 | RESPIRATION RATE: 20 BRPM | TEMPERATURE: 97.5 F | WEIGHT: 238 LBS | OXYGEN SATURATION: 97 % | HEART RATE: 80 BPM | DIASTOLIC BLOOD PRESSURE: 77 MMHG

## 2019-11-01 DIAGNOSIS — S80.02XA CONTUSION OF LEFT KNEE, INITIAL ENCOUNTER: ICD-10-CM

## 2019-11-01 DIAGNOSIS — S50.12XA CONTUSION OF LEFT FOREARM, INITIAL ENCOUNTER: ICD-10-CM

## 2019-11-01 DIAGNOSIS — I10 HYPERTENSION, UNSPECIFIED TYPE: ICD-10-CM
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
## 2019-11-01 PROCEDURE — 73564 X-RAY EXAM KNEE 4 OR MORE: CPT

## 2019-11-01 PROCEDURE — 99284 EMERGENCY DEPT VISIT MOD MDM: CPT

## 2019-11-01 ASSESSMENT — PAIN DESCRIPTION - ONSET: ONSET: ON-GOING

## 2019-11-01 ASSESSMENT — PAIN DESCRIPTION - LOCATION: LOCATION: KNEE;ARM

## 2019-11-01 ASSESSMENT — PAIN DESCRIPTION - FREQUENCY: FREQUENCY: CONTINUOUS

## 2019-11-01 ASSESSMENT — PAIN DESCRIPTION - PAIN TYPE: TYPE: ACUTE PAIN

## 2019-11-01 ASSESSMENT — PAIN DESCRIPTION - ORIENTATION: ORIENTATION: LEFT

## 2019-11-01 ASSESSMENT — PAIN DESCRIPTION - PROGRESSION: CLINICAL_PROGRESSION: GRADUALLY WORSENING

## 2019-11-01 ASSESSMENT — PAIN SCALES - GENERAL: PAINLEVEL_OUTOF10: 3

## 2019-11-01 ASSESSMENT — PAIN DESCRIPTION - DESCRIPTORS: DESCRIPTORS: CONSTANT

## 2019-11-12 ENCOUNTER — HOSPITAL ENCOUNTER (OUTPATIENT)
Dept: PHARMACY | Age: 74
Setting detail: THERAPIES SERIES
Discharge: HOME | End: 2019-11-12
Payer: MEDICARE

## 2019-11-12 ENCOUNTER — HOSPITAL ENCOUNTER (OUTPATIENT)
Age: 74
Discharge: HOME | End: 2019-11-12
Payer: MEDICARE

## 2019-11-12 ENCOUNTER — HOSPITAL ENCOUNTER (OUTPATIENT)
Dept: CT IMAGING | Age: 74
Discharge: HOME | End: 2019-11-14
Payer: MEDICARE

## 2019-11-12 ENCOUNTER — OFFICE VISIT (OUTPATIENT)
Dept: FAMILY MEDICINE CLINIC | Age: 74
End: 2019-11-12
Payer: MEDICARE

## 2019-11-12 VITALS
HEIGHT: 67 IN | OXYGEN SATURATION: 98 % | DIASTOLIC BLOOD PRESSURE: 94 MMHG | SYSTOLIC BLOOD PRESSURE: 160 MMHG | BODY MASS INDEX: 37.35 KG/M2 | WEIGHT: 238 LBS | HEART RATE: 70 BPM

## 2019-11-12 VITALS
BODY MASS INDEX: 37.28 KG/M2 | SYSTOLIC BLOOD PRESSURE: 147 MMHG | WEIGHT: 238 LBS | HEART RATE: 67 BPM | DIASTOLIC BLOOD PRESSURE: 54 MMHG

## 2019-11-12 DIAGNOSIS — N39.41 URGE INCONTINENCE OF URINE: ICD-10-CM

## 2019-11-12 DIAGNOSIS — R44.1 HALLUCINATION, VISUAL: ICD-10-CM

## 2019-11-12 DIAGNOSIS — R30.0 DYSURIA: ICD-10-CM

## 2019-11-12 DIAGNOSIS — Z79.01 LONG TERM CURRENT USE OF ANTICOAGULANT THERAPY: ICD-10-CM

## 2019-11-12 DIAGNOSIS — Z86.718 HISTORY OF DVT OF LOWER EXTREMITY: ICD-10-CM

## 2019-11-12 DIAGNOSIS — I10 ESSENTIAL HYPERTENSION, BENIGN: ICD-10-CM

## 2019-11-12 DIAGNOSIS — R29.6 FREQUENT FALLS: ICD-10-CM

## 2019-11-12 DIAGNOSIS — N39.41 URGE INCONTINENCE OF URINE: Primary | ICD-10-CM

## 2019-11-12 LAB
ABSOLUTE EOS #: 0.2 K/UL (ref 0–0.4)
ABSOLUTE IMMATURE GRANULOCYTE: ABNORMAL K/UL (ref 0–0.3)
ABSOLUTE LYMPH #: 2.7 K/UL (ref 1–4.8)
ABSOLUTE MONO #: 0.5 K/UL (ref 0–1)
ALBUMIN SERPL-MCNC: 3.9 G/DL (ref 3.5–5.2)
ALBUMIN/GLOBULIN RATIO: ABNORMAL (ref 1–2.5)
ALP BLD-CCNC: 61 U/L (ref 35–104)
ALT SERPL-CCNC: 13 U/L (ref 5–33)
ANION GAP SERPL CALCULATED.3IONS-SCNC: 13 MMOL/L (ref 9–17)
AST SERPL-CCNC: 9 U/L
BASOPHILS # BLD: 0 % (ref 0–2)
BASOPHILS ABSOLUTE: 0 K/UL (ref 0–0.2)
BILIRUB SERPL-MCNC: 0.49 MG/DL (ref 0.3–1.2)
BILIRUBIN, POC: NORMAL
BLOOD URINE, POC: NORMAL
BUN BLDV-MCNC: 28 MG/DL (ref 8–23)
BUN/CREAT BLD: 18 (ref 9–20)
CALCIUM SERPL-MCNC: 10.9 MG/DL (ref 8.6–10.4)
CHLORIDE BLD-SCNC: 100 MMOL/L (ref 98–107)
CLARITY, POC: NORMAL
CO2: 26 MMOL/L (ref 20–31)
COLOR, POC: YELLOW
CREAT SERPL-MCNC: 1.52 MG/DL (ref 0.5–0.9)
DIFFERENTIAL TYPE: YES
EOSINOPHILS RELATIVE PERCENT: 2 % (ref 0–5)
FOLATE: >20 NG/ML
GFR AFRICAN AMERICAN: 41 ML/MIN
GFR NON-AFRICAN AMERICAN: 34 ML/MIN
GFR SERPL CREATININE-BSD FRML MDRD: ABNORMAL ML/MIN/{1.73_M2}
GFR SERPL CREATININE-BSD FRML MDRD: ABNORMAL ML/MIN/{1.73_M2}
GLUCOSE BLD-MCNC: 207 MG/DL (ref 70–99)
GLUCOSE URINE, POC: NORMAL
HCT VFR BLD CALC: 40.1 % (ref 36–46)
HEMOGLOBIN: 13.1 G/DL (ref 12–16)
IMMATURE GRANULOCYTES: ABNORMAL %
INR BLD: 2.5
INR BLD: 2.5
KETONES, POC: NORMAL
LEUKOCYTE EST, POC: NORMAL
LYMPHOCYTES # BLD: 27 % (ref 15–40)
MCH RBC QN AUTO: 32.3 PG (ref 26–34)
MCHC RBC AUTO-ENTMCNC: 32.6 G/DL (ref 31–37)
MCV RBC AUTO: 99.1 FL (ref 80–100)
MONOCYTES # BLD: 6 % (ref 4–8)
NITRITE, POC: NORMAL
NRBC AUTOMATED: ABNORMAL PER 100 WBC
PDW BLD-RTO: 16 % (ref 12.1–15.2)
PH, POC: 7
PLATELET # BLD: 268 K/UL (ref 140–450)
PLATELET ESTIMATE: ABNORMAL
PMV BLD AUTO: ABNORMAL FL (ref 6–12)
POTASSIUM SERPL-SCNC: 4.4 MMOL/L (ref 3.7–5.3)
PROTEIN, POC: NORMAL
PROTHROMBIN TIME: 24.4 SEC (ref 9–11.6)
RBC # BLD: 4.05 M/UL (ref 4–5.2)
RBC # BLD: ABNORMAL 10*6/UL
SEG NEUTROPHILS: 65 % (ref 47–75)
SEGMENTED NEUTROPHILS ABSOLUTE COUNT: 6.5 K/UL (ref 2.5–7)
SODIUM BLD-SCNC: 139 MMOL/L (ref 135–144)
SPECIFIC GRAVITY, POC: 1.01
TOTAL PROTEIN: 6.6 G/DL (ref 6.4–8.3)
TSH SERPL DL<=0.05 MIU/L-ACNC: 1.76 MIU/L (ref 0.3–5)
UROBILINOGEN, POC: 0.2
VITAMIN B-12: 319 PG/ML (ref 232–1245)
WBC # BLD: 10 K/UL (ref 3.5–11)
WBC # BLD: ABNORMAL 10*3/UL

## 2019-11-12 PROCEDURE — 3017F COLORECTAL CA SCREEN DOC REV: CPT | Performed by: FAMILY MEDICINE

## 2019-11-12 PROCEDURE — G8417 CALC BMI ABV UP PARAM F/U: HCPCS | Performed by: FAMILY MEDICINE

## 2019-11-12 PROCEDURE — 99211 OFF/OP EST MAY X REQ PHY/QHP: CPT

## 2019-11-12 PROCEDURE — 36415 COLL VENOUS BLD VENIPUNCTURE: CPT

## 2019-11-12 PROCEDURE — 99214 OFFICE O/P EST MOD 30 MIN: CPT | Performed by: FAMILY MEDICINE

## 2019-11-12 PROCEDURE — 80053 COMPREHEN METABOLIC PANEL: CPT

## 2019-11-12 PROCEDURE — 1036F TOBACCO NON-USER: CPT | Performed by: FAMILY MEDICINE

## 2019-11-12 PROCEDURE — G8482 FLU IMMUNIZE ORDER/ADMIN: HCPCS | Performed by: FAMILY MEDICINE

## 2019-11-12 PROCEDURE — 82607 VITAMIN B-12: CPT

## 2019-11-12 PROCEDURE — 1123F ACP DISCUSS/DSCN MKR DOCD: CPT | Performed by: FAMILY MEDICINE

## 2019-11-12 PROCEDURE — 70450 CT HEAD/BRAIN W/O DYE: CPT

## 2019-11-12 PROCEDURE — 85610 PROTHROMBIN TIME: CPT

## 2019-11-12 PROCEDURE — 85025 COMPLETE CBC W/AUTO DIFF WBC: CPT

## 2019-11-12 PROCEDURE — 0509F URINE INCON PLAN DOCD: CPT | Performed by: FAMILY MEDICINE

## 2019-11-12 PROCEDURE — 1090F PRES/ABSN URINE INCON ASSESS: CPT | Performed by: FAMILY MEDICINE

## 2019-11-12 PROCEDURE — 82746 ASSAY OF FOLIC ACID SERUM: CPT

## 2019-11-12 PROCEDURE — G8399 PT W/DXA RESULTS DOCUMENT: HCPCS | Performed by: FAMILY MEDICINE

## 2019-11-12 PROCEDURE — 84443 ASSAY THYROID STIM HORMONE: CPT

## 2019-11-12 PROCEDURE — 4040F PNEUMOC VAC/ADMIN/RCVD: CPT | Performed by: FAMILY MEDICINE

## 2019-11-12 PROCEDURE — 81002 URINALYSIS NONAUTO W/O SCOPE: CPT | Performed by: FAMILY MEDICINE

## 2019-11-12 PROCEDURE — G8427 DOCREV CUR MEDS BY ELIG CLIN: HCPCS | Performed by: FAMILY MEDICINE

## 2019-11-12 ASSESSMENT — ENCOUNTER SYMPTOMS: RESPIRATORY NEGATIVE: 1

## 2019-11-13 ENCOUNTER — TELEPHONE (OUTPATIENT)
Dept: FAMILY MEDICINE CLINIC | Age: 74
End: 2019-11-13

## 2019-11-13 DIAGNOSIS — R44.1 HALLUCINATION, VISUAL: ICD-10-CM

## 2019-11-13 DIAGNOSIS — R29.6 FREQUENT FALLS: Primary | ICD-10-CM

## 2019-11-13 RX ORDER — LANOLIN ALCOHOL/MO/W.PET/CERES
1000 CREAM (GRAM) TOPICAL DAILY
COMMUNITY
End: 2020-05-14 | Stop reason: ALTCHOICE

## 2019-11-25 ENCOUNTER — TELEPHONE (OUTPATIENT)
Dept: FAMILY MEDICINE CLINIC | Age: 74
End: 2019-11-25

## 2019-11-25 DIAGNOSIS — N39.41 URGE INCONTINENCE OF URINE: ICD-10-CM

## 2019-11-25 DIAGNOSIS — R29.6 FREQUENT FALLS: Primary | ICD-10-CM

## 2019-11-25 DIAGNOSIS — R44.1 HALLUCINATION, VISUAL: ICD-10-CM

## 2019-12-02 ENCOUNTER — TELEPHONE (OUTPATIENT)
Dept: PHARMACY | Age: 74
End: 2019-12-02

## 2019-12-02 DIAGNOSIS — Z79.01 LONG TERM CURRENT USE OF ANTICOAGULANT THERAPY: ICD-10-CM

## 2019-12-02 RX ORDER — WARFARIN SODIUM 5 MG/1
TABLET ORAL
Qty: 90 TABLET | Refills: 3 | OUTPATIENT
Start: 2019-12-02 | End: 2020-06-11 | Stop reason: SDUPTHER

## 2019-12-23 ENCOUNTER — HOSPITAL ENCOUNTER (OUTPATIENT)
Dept: PHARMACY | Age: 74
Setting detail: THERAPIES SERIES
Discharge: HOME | End: 2019-12-23
Payer: MEDICARE

## 2019-12-23 VITALS
WEIGHT: 238.4 LBS | SYSTOLIC BLOOD PRESSURE: 156 MMHG | HEART RATE: 83 BPM | BODY MASS INDEX: 37.34 KG/M2 | DIASTOLIC BLOOD PRESSURE: 77 MMHG

## 2019-12-23 DIAGNOSIS — Z79.01 LONG TERM CURRENT USE OF ANTICOAGULANT THERAPY: ICD-10-CM

## 2019-12-23 LAB — INR BLD: 2.6

## 2019-12-23 PROCEDURE — 85610 PROTHROMBIN TIME: CPT

## 2019-12-23 PROCEDURE — 99211 OFF/OP EST MAY X REQ PHY/QHP: CPT

## 2020-01-06 ENCOUNTER — HOSPITAL ENCOUNTER (OUTPATIENT)
Age: 75
Discharge: HOME | End: 2020-01-06
Payer: MEDICARE

## 2020-01-06 ENCOUNTER — OFFICE VISIT (OUTPATIENT)
Dept: NEUROLOGY | Age: 75
End: 2020-01-06
Payer: MEDICARE

## 2020-01-06 VITALS
SYSTOLIC BLOOD PRESSURE: 105 MMHG | HEART RATE: 79 BPM | RESPIRATION RATE: 18 BRPM | DIASTOLIC BLOOD PRESSURE: 68 MMHG | WEIGHT: 241.8 LBS | TEMPERATURE: 97.5 F | HEIGHT: 67 IN | BODY MASS INDEX: 37.95 KG/M2

## 2020-01-06 LAB
ALBUMIN SERPL-MCNC: 3.7 G/DL (ref 3.5–5.2)
ANION GAP SERPL CALCULATED.3IONS-SCNC: 13 MMOL/L (ref 9–17)
BUN BLDV-MCNC: 24 MG/DL (ref 8–23)
BUN/CREAT BLD: 14 (ref 9–20)
CALCIUM SERPL-MCNC: 11.1 MG/DL (ref 8.6–10.4)
CHLORIDE BLD-SCNC: 103 MMOL/L (ref 98–107)
CO2: 26 MMOL/L (ref 20–31)
CREAT SERPL-MCNC: 1.66 MG/DL (ref 0.5–0.9)
GFR AFRICAN AMERICAN: 37 ML/MIN
GFR NON-AFRICAN AMERICAN: 30 ML/MIN
GFR SERPL CREATININE-BSD FRML MDRD: ABNORMAL ML/MIN/{1.73_M2}
GFR SERPL CREATININE-BSD FRML MDRD: ABNORMAL ML/MIN/{1.73_M2}
GLUCOSE BLD-MCNC: 186 MG/DL (ref 70–99)
HCT VFR BLD CALC: 37.8 % (ref 36–46)
HEMOGLOBIN: 12.5 G/DL (ref 12–16)
MAGNESIUM: 1.8 MG/DL (ref 1.6–2.6)
PHOSPHORUS: 3.4 MG/DL (ref 2.6–4.5)
POTASSIUM SERPL-SCNC: 3.9 MMOL/L (ref 3.7–5.3)
PTH INTACT: 63.81 PG/ML (ref 15–65)
SODIUM BLD-SCNC: 142 MMOL/L (ref 135–144)
VITAMIN D 25-HYDROXY: 45.3 NG/ML (ref 30–100)

## 2020-01-06 PROCEDURE — G8399 PT W/DXA RESULTS DOCUMENT: HCPCS | Performed by: NEUROMUSCULOSKELETAL MEDICINE, SPORTS MEDICINE

## 2020-01-06 PROCEDURE — 3017F COLORECTAL CA SCREEN DOC REV: CPT | Performed by: NEUROMUSCULOSKELETAL MEDICINE, SPORTS MEDICINE

## 2020-01-06 PROCEDURE — 82306 VITAMIN D 25 HYDROXY: CPT

## 2020-01-06 PROCEDURE — G8482 FLU IMMUNIZE ORDER/ADMIN: HCPCS | Performed by: NEUROMUSCULOSKELETAL MEDICINE, SPORTS MEDICINE

## 2020-01-06 PROCEDURE — 4040F PNEUMOC VAC/ADMIN/RCVD: CPT | Performed by: NEUROMUSCULOSKELETAL MEDICINE, SPORTS MEDICINE

## 2020-01-06 PROCEDURE — 80069 RENAL FUNCTION PANEL: CPT

## 2020-01-06 PROCEDURE — 1090F PRES/ABSN URINE INCON ASSESS: CPT | Performed by: NEUROMUSCULOSKELETAL MEDICINE, SPORTS MEDICINE

## 2020-01-06 PROCEDURE — 36415 COLL VENOUS BLD VENIPUNCTURE: CPT

## 2020-01-06 PROCEDURE — 85018 HEMOGLOBIN: CPT

## 2020-01-06 PROCEDURE — 85014 HEMATOCRIT: CPT

## 2020-01-06 PROCEDURE — 1036F TOBACCO NON-USER: CPT | Performed by: NEUROMUSCULOSKELETAL MEDICINE, SPORTS MEDICINE

## 2020-01-06 PROCEDURE — G8427 DOCREV CUR MEDS BY ELIG CLIN: HCPCS | Performed by: NEUROMUSCULOSKELETAL MEDICINE, SPORTS MEDICINE

## 2020-01-06 PROCEDURE — 1123F ACP DISCUSS/DSCN MKR DOCD: CPT | Performed by: NEUROMUSCULOSKELETAL MEDICINE, SPORTS MEDICINE

## 2020-01-06 PROCEDURE — 83735 ASSAY OF MAGNESIUM: CPT

## 2020-01-06 PROCEDURE — G8417 CALC BMI ABV UP PARAM F/U: HCPCS | Performed by: NEUROMUSCULOSKELETAL MEDICINE, SPORTS MEDICINE

## 2020-01-06 PROCEDURE — 99204 OFFICE O/P NEW MOD 45 MIN: CPT | Performed by: NEUROMUSCULOSKELETAL MEDICINE, SPORTS MEDICINE

## 2020-01-06 PROCEDURE — 83970 ASSAY OF PARATHORMONE: CPT

## 2020-01-06 NOTE — PROGRESS NOTES
NEUROLOGY CONSULT    Patient Name:  Tu Murray  :     Clinic Visit Date: 2020    I saw Ms. Tu Murray  in the neurology clinic today for symptoms of recurrent falls over the past 6 months or more. Patient is a 79-year-old right-handed lady with a known history of hypertension, partial nephrectomy, diabetes for most 20 years,  varicose veins in the lower extremities  treated with sclerotic ejections of veins periodically, history of chronic lower back pain for many year status post at least 6 surgeries in her lower back,currently undergoing pain management with epidural cortisone injections once in 4 months, history of bilateral knee replacements and a history of DVT with pulmonary embolism many years ago currently on Coumadin, with  an IVC filter in place. Falls apparently have been recurrent. She describes no significant neurological symptoms such as dizziness vertigo headache abnormal visual symptoms, lower back pain or focal weakness of the extremities prior to the falls. Falls have been sporadic , and  recently he fell at a [employer] resulting in left knee blunt trauma with no fractures or any other injuries reported. She usually uses a walker to ambulate. No history of neck pain or cervical radicular symptoms, bladder or bowel symptoms. She was in the emergency room on 2019 afte the fall at the [facility] and had had a CT scan of the brain was unremarkable for any abnormality such as ischemic stroke hemorrhage or hydrocephalus. Also, recently she had episodes of visual hallucinations which have improved with vitamin 12. REVIEW OF SYSTEMS    Constitutional Weight changes: absent, change in appetite: absent Fatigue: absent; Fevers : absent, Any recent hospitalizations:  absent   HEENT Ears: normal,  Visual disturbance: absent   Respiratory Shortness of breath: absent, choking:  absent, Cough: absent, Snoring : absent   Cardiovascular Chest pain: absent, Leg swelling Not on file   Social History Narrative    Not on file       Family History   Problem Relation Age of Onset    Diabetes Father     Cancer Father         Prostate Cancer    Cancer Paternal Aunt         Colon Cancer       Current Outpatient Medications   Medication Sig Dispense Refill    warfarin (COUMADIN) 5 MG tablet Take 1/2 tablet on Saturdays, Tuesdays, and Thursdays and 1 whole tablet daily all other days or as directed by [facility]. 90 tablet 3    vitamin B-12 (CYANOCOBALAMIN) 1000 MCG tablet Take 1,000 mcg by mouth daily      hydrochlorothiazide (MICROZIDE) 12.5 MG capsule TAKE 1 CAPSULE EVERY DAY 90 capsule 1    omeprazole (PRILOSEC) 20 MG delayed release capsule TAKE 1 CAPSULE EVERY DAY 90 capsule 1    propranolol (INDERAL) 60 MG tablet TAKE 1 TABLET TWICE DAILY 180 tablet 1    ranitidine (ZANTAC) 150 MG tablet TAKE 1 TABLET EVERY NIGHT 90 tablet 1    isosorbide dinitrate (ISORDIL) 5 MG tablet TAKE 1 TABLET TWICE DAILY 180 tablet 3    allopurinol (ZYLOPRIM) 100 MG tablet TAKE 1 TABLET TWICE DAILY 180 tablet 1    cyclobenzaprine (FLEXERIL) 10 MG tablet Take 1 tablet by mouth 2 times daily as needed (Arthritic pain.) 60 tablet 2    furosemide (LASIX) 20 MG tablet Take 1 tablet by mouth daily As needed for swelling 30 tablet 3    potassium chloride (KLOR-CON M) 20 MEQ extended release tablet Take 1 tablet by mouth daily On the days that you take lasix 30 tablet 3    fluticasone (FLONASE) 50 MCG/ACT nasal spray 2 sprays by Nasal route daily as needed for Rhinitis 3 Bottle 1    blood glucose test strips (TRUE METRIX BLOOD GLUCOSE TEST) strip Use once daily and as needed. 100 each 3    glipiZIDE (GLUCOTROL XL) 5 MG extended release tablet TAKE 1 TABLET EVERY DAY 90 tablet 1    gabapentin (NEURONTIN) 100 MG capsule Take 2 capsules by mouth 2 times daily for 90 days.  120 capsule 2    vitamin D 1000 units CAPS Take 1 capsule by mouth daily      Lancets MISC 1 each by Does ankle jerk is absent, otherwise unremarkable reflexes. Babinski sign. GAIT: There is no ataxia, however her gait is unsteady without support. Romberg sign is negative. IMPRESSION:  In summary, patient is a 79-year-old lady history of multiple problems including chronic diabetes without clinical evidence of peripheral neuropathy, chronic neurologic back pain secondary to underlying degenerative disc disease of lumbar spine, currently undergoing pain management, osteoarthritic pain in both the knees history of recurrent falls. The etiology of the falls, I presume is secondary to  multifactorial causes including  obesity, deconditioning, arthritic pain/lumbar pain as well is orthostasis which could be related to combination of diabetic autonomic neuropathy,. Varicosity of the veins in the lower extremities could be contributory to orthostasis. PLAN:    1.  I believe that there is no further neurological work-up necessary at this time, as it would would not have much diagnostic value at this time   2. She should exercise her legs every time she gets up out of a chair or bed, to improve circulation in the lower extremities, improving muscle tone, and endurance  3. Have recommended that her son should check her blood pressure for orthostasis frequently and maintain a log or diary. If this is a persistent problem she may benefit from medications  4. Physical therapy to improve her gait, but she does not want to do this at this time    NOTE: This neurology evaluation is part of outpatient coverage at [place]  1-2 days per week. Patients requiring frequent evaluations or uncomfortable with potential 3-4 day turnaround on questions or calls  may be better served by a neurologist in the area full time. [facility]. Breonna is available for outpatient visits and procedures including EMG/NCS.   Non-[facility] system neurologists also practice in [facility] (Dr. Karl Goldsmith) and [place] (Eliseo Vidal).        Cherie Traore MD   1/6/2020  9:35 AM        CC: Allyn Avilez DO

## 2020-01-07 RX ORDER — GLIPIZIDE 5 MG/1
TABLET, FILM COATED, EXTENDED RELEASE ORAL
Qty: 90 TABLET | Refills: 1 | Status: SHIPPED | OUTPATIENT
Start: 2020-01-07 | End: 2020-05-14 | Stop reason: SDUPTHER

## 2020-01-07 NOTE — TELEPHONE ENCOUNTER
Last OV 9/3/19      Health Maintenance   Topic Date Due    Shingles Vaccine (1 of 2) 11/04/1997    Diabetic retinal exam  04/27/2017    Lipid screen  02/26/2019    Annual Wellness Visit (AWV)  05/29/2019    Breast cancer screen  06/19/2020    A1C test (Diabetic or Prediabetic)  09/03/2020    Diabetic microalbuminuria test  09/03/2020    Diabetic foot exam  10/22/2020    Potassium monitoring  11/12/2020    Creatinine monitoring  11/12/2020    Colon cancer screen colonoscopy  05/13/2021    DTaP/Tdap/Td vaccine (2 - Td) 12/10/2025    DEXA (modify frequency per FRAX score)  Completed    Flu vaccine  Completed    Pneumococcal 65+ years Vaccine  Completed    Hepatitis C screen  Addressed             (applicable per patient's age: Cancer Screenings, Depression Screening, Fall Risk Screening, Immunizations)    Hemoglobin A1C (%)   Date Value   09/03/2019 7.1   03/11/2019 7.1   06/18/2018 6.0     Microalb/Crt.  Ratio (mcg/mg creat)   Date Value   09/09/2014 7     LDL Cholesterol (mg/dL)   Date Value   02/26/2018 92     AST (U/L)   Date Value   11/12/2019 9     ALT (U/L)   Date Value   11/12/2019 13     BUN (mg/dL)   Date Value   01/06/2020 24 (H)      (goal A1C is < 7)   (goal LDL is <100) need 30-50% reduction from baseline     BP Readings from Last 3 Encounters:   01/06/20 105/68   12/23/19 (!) 156/77   11/12/19 (!) 147/54    (goal /80)      All Future Testing planned in [facility]:  Lab Frequency Next Occurrence   MRI BRAIN WO CONTRAST Once 11/13/2019       Next Visit Date:  Future Appointments   Date Time Provider Natanael Austin   1/28/2020  9:00 AM [facility] [place]   3/3/2020  9:40 AM [facility]            Patient Active Problem List:     Type 2 diabetes mellitus without complication ([facility] [address]     Essential hypertension, benign     Esophageal reflux     Irritable bowel syndrome     Seasonal allergies     Long term current use of anticoagulant therapy     Gout     Rectocele     Tubular adenoma of colon     Hiatal hernia     Sigmoid diverticulosis     Chronic back pain     Constipation     Hypomagnesemia     History of colon polyps     Family history of colon cancer

## 2020-01-13 ENCOUNTER — TELEPHONE (OUTPATIENT)
Dept: PHARMACY | Age: 75
End: 2020-01-13

## 2020-01-13 ENCOUNTER — HOSPITAL ENCOUNTER (OUTPATIENT)
Age: 75
Discharge: HOME | End: 2020-01-13
Payer: MEDICARE

## 2020-01-13 LAB
ANION GAP SERPL CALCULATED.3IONS-SCNC: 12 MMOL/L (ref 9–17)
BUN BLDV-MCNC: 22 MG/DL (ref 8–23)
CALCIUM SERPL-MCNC: 11 MG/DL (ref 8.6–10.4)
CHLORIDE BLD-SCNC: 102 MMOL/L (ref 98–107)
CO2: 27 MMOL/L (ref 20–31)
CREAT SERPL-MCNC: 1.61 MG/DL (ref 0.5–0.9)
GFR AFRICAN AMERICAN: 38 ML/MIN
GFR NON-AFRICAN AMERICAN: 31 ML/MIN
GFR SERPL CREATININE-BSD FRML MDRD: ABNORMAL ML/MIN/{1.73_M2}
GFR SERPL CREATININE-BSD FRML MDRD: ABNORMAL ML/MIN/{1.73_M2}
POTASSIUM SERPL-SCNC: 4.3 MMOL/L (ref 3.7–5.3)
SODIUM BLD-SCNC: 141 MMOL/L (ref 135–144)

## 2020-01-13 PROCEDURE — 84520 ASSAY OF UREA NITROGEN: CPT

## 2020-01-13 PROCEDURE — 36415 COLL VENOUS BLD VENIPUNCTURE: CPT

## 2020-01-13 PROCEDURE — 80051 ELECTROLYTE PANEL: CPT

## 2020-01-13 PROCEDURE — 82565 ASSAY OF CREATININE: CPT

## 2020-01-13 PROCEDURE — 82310 ASSAY OF CALCIUM: CPT

## 2020-01-28 ENCOUNTER — HOSPITAL ENCOUNTER (OUTPATIENT)
Dept: PHARMACY | Age: 75
Setting detail: THERAPIES SERIES
Discharge: HOME | End: 2020-01-28
Payer: MEDICARE

## 2020-01-28 VITALS — DIASTOLIC BLOOD PRESSURE: 77 MMHG | SYSTOLIC BLOOD PRESSURE: 128 MMHG | HEART RATE: 84 BPM

## 2020-01-28 LAB — INR BLD: 2.4

## 2020-01-28 PROCEDURE — 85610 PROTHROMBIN TIME: CPT

## 2020-01-28 PROCEDURE — 99211 OFF/OP EST MAY X REQ PHY/QHP: CPT

## 2020-01-28 NOTE — PROGRESS NOTES
Fingerstick INR drawn per clinic protocol. Patient states no visible blood in urine and no black tarry stool. Denies any missed doses of warfarin. No change in other maintenance medications or in diet. Ragini saw Dr. Esme Meza on 1/6/2020 and Dr. Dash Chowdary on 1/21/2020. Ragini was instructed to continue to hold calcium and vitamin D by Dr. Dash Chowdary as she has hypercalcemia. Dionicio Romberg states she had a procedure done to her legs by Dr. Andria Ortiz yesterday and the room was spinning when she went to get up. Since Ragnii's INR remains therapeutic, we will continue current weekly warfarin regimen and recheck INR in 5 week(s). Patient acknowledges working in consult agreement with pharmacist as referred by his/her physician.

## 2020-02-03 ENCOUNTER — HOSPITAL ENCOUNTER (OUTPATIENT)
Dept: NUCLEAR MEDICINE | Age: 75
Discharge: HOME | End: 2020-02-05
Payer: MEDICARE

## 2020-02-03 ENCOUNTER — HOSPITAL ENCOUNTER (OUTPATIENT)
Age: 75
Discharge: HOME | End: 2020-02-03
Payer: MEDICARE

## 2020-02-03 LAB
ALBUMIN SERPL-MCNC: 4.1 G/DL (ref 3.5–5.2)
ANION GAP SERPL CALCULATED.3IONS-SCNC: 13 MMOL/L (ref 9–17)
BUN BLDV-MCNC: 20 MG/DL (ref 8–23)
BUN/CREAT BLD: 13 (ref 9–20)
CALCIUM SERPL-MCNC: 10.9 MG/DL (ref 8.6–10.4)
CHLORIDE BLD-SCNC: 99 MMOL/L (ref 98–107)
CO2: 27 MMOL/L (ref 20–31)
CREAT SERPL-MCNC: 1.54 MG/DL (ref 0.5–0.9)
GFR AFRICAN AMERICAN: 40 ML/MIN
GFR NON-AFRICAN AMERICAN: 33 ML/MIN
GFR SERPL CREATININE-BSD FRML MDRD: ABNORMAL ML/MIN/{1.73_M2}
GFR SERPL CREATININE-BSD FRML MDRD: ABNORMAL ML/MIN/{1.73_M2}
GLUCOSE BLD-MCNC: 194 MG/DL (ref 70–99)
PHOSPHORUS: 3.1 MG/DL (ref 2.6–4.5)
POTASSIUM SERPL-SCNC: 4.2 MMOL/L (ref 3.7–5.3)
SODIUM BLD-SCNC: 139 MMOL/L (ref 135–144)
TSH SERPL DL<=0.05 MIU/L-ACNC: 3.26 MIU/L (ref 0.3–5)

## 2020-02-03 PROCEDURE — 3430000000 HC RX DIAGNOSTIC RADIOPHARMACEUTICAL: Performed by: INTERNAL MEDICINE

## 2020-02-03 PROCEDURE — 80069 RENAL FUNCTION PANEL: CPT

## 2020-02-03 PROCEDURE — 84156 ASSAY OF PROTEIN URINE: CPT

## 2020-02-03 PROCEDURE — A9500 TC99M SESTAMIBI: HCPCS | Performed by: INTERNAL MEDICINE

## 2020-02-03 PROCEDURE — 36415 COLL VENOUS BLD VENIPUNCTURE: CPT

## 2020-02-03 PROCEDURE — 86335 IMMUNFIX E-PHORSIS/URINE/CSF: CPT

## 2020-02-03 PROCEDURE — 86334 IMMUNOFIX E-PHORESIS SERUM: CPT

## 2020-02-03 PROCEDURE — 78070 PARATHYROID PLANAR IMAGING: CPT

## 2020-02-03 PROCEDURE — 84443 ASSAY THYROID STIM HORMONE: CPT

## 2020-02-03 RX ADMIN — TETRAKIS(2-METHOXYISOBUTYLISOCYANIDE)COPPER(I) TETRAFLUOROBORATE 25 MILLICURIE: 1 INJECTION, POWDER, LYOPHILIZED, FOR SOLUTION INTRAVENOUS at 09:10

## 2020-02-04 LAB
PATHOLOGIST: NORMAL
SERUM IFX INTERP: NORMAL
URINE IFX INTERP: NORMAL
URINE IFX SPECIMEN: NORMAL
URINE TOTAL PROTEIN: 12 MG/DL
VOLUME: NORMAL ML

## 2020-02-24 ENCOUNTER — TELEPHONE (OUTPATIENT)
Dept: FAMILY MEDICINE CLINIC | Age: 75
End: 2020-02-24

## 2020-02-24 NOTE — TELEPHONE ENCOUNTER
Alisha Peterson from Dr. Monty Soria office called. Dr. Bynum Held like to send Ragini to endcrinology for her hyperparathyroidism. Patient will not travel too far, who do you recommend? Last visit:  11/12/2019  Next Visit Date:    Future Appointments   Date Time Provider Natanael Austin   3/3/2020  9:00 AM Marika Chamber MEDICATION MGMT [facility] MED [facility] [place]   3/3/2020  9:40 AM Monica Wild DO Marika Chamber MED W     Last Med refill:    Medication List:  Prior to Admission medications    Medication Sig Start Date End Date Taking? Authorizing Provider   glipiZIDE (GLUCOTROL XL) 5 MG extended release tablet TAKE 1 TABLET EVERY DAY 1/7/20   Monica Wild DO   warfarin (COUMADIN) 5 MG tablet Take 1/2 tablet on Saturdays, Tuesdays, and Thursdays and 1 whole tablet daily all other days or as directed by [facility] Medication Management.  12/2/19   Monica Wild DO   vitamin B-12 (CYANOCOBALAMIN) 1000 MCG tablet Take 1,000 mcg by mouth daily    Historical Provider, MD   hydrochlorothiazide (MICROZIDE) 12.5 MG capsule TAKE 1 CAPSULE EVERY DAY 11/12/19   Monica Wild DO   omeprazole (PRILOSEC) 20 MG delayed release capsule TAKE 1 CAPSULE EVERY DAY 11/12/19   Monica Wild, DO   propranolol (INDERAL) 60 MG tablet TAKE 1 TABLET TWICE DAILY 11/12/19   Monica Wild DO   ranitidine (ZANTAC) 150 MG tablet TAKE 1 TABLET EVERY NIGHT 11/12/19   Monica Wild, DO   isosorbide dinitrate (ISORDIL) 5 MG tablet TAKE 1 TABLET TWICE DAILY 10/2/19   Scott Davies MD   allopurinol (ZYLOPRIM) 100 MG tablet TAKE 1 TABLET TWICE DAILY 9/17/19   Monica Wild DO   cyclobenzaprine (FLEXERIL) 10 MG tablet Take 1 tablet by mouth 2 times daily as needed (Arthritic pain.) 9/3/19   Monica Wild DO   furosemide (LASIX) 20 MG tablet Take 1 tablet by mouth daily As needed for swelling 9/3/19   Monica Wild DO   potassium chloride (KLOR-CON M) 20 MEQ extended release tablet Take 1 tablet by mouth daily On the days that you

## 2020-03-03 ENCOUNTER — OFFICE VISIT (OUTPATIENT)
Dept: FAMILY MEDICINE CLINIC | Age: 75
End: 2020-03-03
Payer: MEDICARE

## 2020-03-03 ENCOUNTER — HOSPITAL ENCOUNTER (OUTPATIENT)
Dept: PHARMACY | Age: 75
Setting detail: THERAPIES SERIES
Discharge: HOME | End: 2020-03-03
Payer: MEDICARE

## 2020-03-03 ENCOUNTER — HOSPITAL ENCOUNTER (OUTPATIENT)
Age: 75
Discharge: HOME | End: 2020-03-03
Payer: MEDICARE

## 2020-03-03 VITALS
HEART RATE: 77 BPM | WEIGHT: 236 LBS | HEIGHT: 67 IN | OXYGEN SATURATION: 98 % | DIASTOLIC BLOOD PRESSURE: 84 MMHG | SYSTOLIC BLOOD PRESSURE: 138 MMHG | BODY MASS INDEX: 37.04 KG/M2

## 2020-03-03 VITALS
BODY MASS INDEX: 37.09 KG/M2 | SYSTOLIC BLOOD PRESSURE: 139 MMHG | WEIGHT: 236.8 LBS | DIASTOLIC BLOOD PRESSURE: 74 MMHG | HEART RATE: 83 BPM

## 2020-03-03 LAB
CALCIUM IONIZED: 1.35 MMOL/L (ref 1.13–1.33)
CHOLESTEROL/HDL RATIO: 2.2
CHOLESTEROL: 156 MG/DL
ESTIMATED AVERAGE GLUCOSE: 160 MG/DL
HBA1C MFR BLD: 7.2 % (ref 4.8–5.9)
HDLC SERPL-MCNC: 71 MG/DL
INR BLD: 1.7
LDL CHOLESTEROL: 63 MG/DL (ref 0–130)
PATIENT FASTING?: YES
PTH INTACT: 97.55 PG/ML (ref 15–65)
TRIGL SERPL-MCNC: 110 MG/DL
VITAMIN B-12: >2000 PG/ML (ref 232–1245)
VLDLC SERPL CALC-MCNC: NORMAL MG/DL (ref 1–30)

## 2020-03-03 PROCEDURE — 85610 PROTHROMBIN TIME: CPT

## 2020-03-03 PROCEDURE — 83036 HEMOGLOBIN GLYCOSYLATED A1C: CPT

## 2020-03-03 PROCEDURE — G8427 DOCREV CUR MEDS BY ELIG CLIN: HCPCS | Performed by: FAMILY MEDICINE

## 2020-03-03 PROCEDURE — 36415 COLL VENOUS BLD VENIPUNCTURE: CPT

## 2020-03-03 PROCEDURE — 1123F ACP DISCUSS/DSCN MKR DOCD: CPT | Performed by: FAMILY MEDICINE

## 2020-03-03 PROCEDURE — 1036F TOBACCO NON-USER: CPT | Performed by: FAMILY MEDICINE

## 2020-03-03 PROCEDURE — 2022F DILAT RTA XM EVC RTNOPTHY: CPT | Performed by: FAMILY MEDICINE

## 2020-03-03 PROCEDURE — 1090F PRES/ABSN URINE INCON ASSESS: CPT | Performed by: FAMILY MEDICINE

## 2020-03-03 PROCEDURE — 83970 ASSAY OF PARATHORMONE: CPT

## 2020-03-03 PROCEDURE — 4040F PNEUMOC VAC/ADMIN/RCVD: CPT | Performed by: FAMILY MEDICINE

## 2020-03-03 PROCEDURE — G8399 PT W/DXA RESULTS DOCUMENT: HCPCS | Performed by: FAMILY MEDICINE

## 2020-03-03 PROCEDURE — 3017F COLORECTAL CA SCREEN DOC REV: CPT | Performed by: FAMILY MEDICINE

## 2020-03-03 PROCEDURE — G8417 CALC BMI ABV UP PARAM F/U: HCPCS | Performed by: FAMILY MEDICINE

## 2020-03-03 PROCEDURE — 3051F HG A1C>EQUAL 7.0%<8.0%: CPT | Performed by: FAMILY MEDICINE

## 2020-03-03 PROCEDURE — 82607 VITAMIN B-12: CPT

## 2020-03-03 PROCEDURE — 80061 LIPID PANEL: CPT

## 2020-03-03 PROCEDURE — 82330 ASSAY OF CALCIUM: CPT

## 2020-03-03 PROCEDURE — 99214 OFFICE O/P EST MOD 30 MIN: CPT | Performed by: FAMILY MEDICINE

## 2020-03-03 PROCEDURE — G8482 FLU IMMUNIZE ORDER/ADMIN: HCPCS | Performed by: FAMILY MEDICINE

## 2020-03-03 PROCEDURE — 99211 OFF/OP EST MAY X REQ PHY/QHP: CPT

## 2020-03-03 RX ORDER — TRIAMCINOLONE ACETONIDE 5 MG/G
CREAM TOPICAL
COMMUNITY
Start: 2020-01-31 | End: 2021-06-22 | Stop reason: ALTCHOICE

## 2020-03-03 ASSESSMENT — PATIENT HEALTH QUESTIONNAIRE - PHQ9
SUM OF ALL RESPONSES TO PHQ QUESTIONS 1-9: 1
1. LITTLE INTEREST OR PLEASURE IN DOING THINGS: 0
SUM OF ALL RESPONSES TO PHQ9 QUESTIONS 1 & 2: 1
SUM OF ALL RESPONSES TO PHQ QUESTIONS 1-9: 1
2. FEELING DOWN, DEPRESSED OR HOPELESS: 1

## 2020-03-03 ASSESSMENT — ENCOUNTER SYMPTOMS
RESPIRATORY NEGATIVE: 1
GASTROINTESTINAL NEGATIVE: 1

## 2020-03-03 NOTE — PATIENT INSTRUCTIONS
SURVEY:    You may be receiving a survey from [employer] regarding your visit today. You may get this in the mail, through your MyChart or in your email. Please complete the survey to enable us to provide the highest quality of care to you and your family. If you cannot score us as very good ( 5 Stars) on any question, please feel free to call the office to discuss how we could have made your experience exceptional.     Thank you.     Clinical Care Team:  Dr. Harrison Myers, [facility], [address] [place] [address] Team:  [facility]

## 2020-03-03 NOTE — PROGRESS NOTES
Name: Caryn Trimble  : 1945         Chief Complaint:     Chief Complaint   Patient presents with    Diabetes       History of Present Illness:      Caryn Trimble is a 69 y.o.  female who presents with Diabetes      HPI     Tripped and fell at [employer] 2 days ago but hasn't been having any unsteadiness, falls otherwise, or syncope. Saw neuro for falls and no further workup was done. Taking b12 but unsure whether it's helped anything. Chronic low back and left lower extremity pain. She had MAYRA by Dr Nevaeh Miller last wk. When she gets that, it takes the swelling of the left leg. The steroid also makes her very talkative and energetic for couple days according to her son, who is present at the visit today. F/u DM. Hasn't really been checking sugars because her glucometer doesn't work well. Hypoglycemia a couple mos ago while at Dr Kathie Gracia office. Taking glipizide as prescribed, no adverse effects. Seeing Dr Rafael Garcia and having varicose vein tx. Patient complains of nocturnal hallucinations, mainly seeing a lady and a little girl. Patient believes that the lady is her late mom, although she does not look like her. She does not know who the little girl is. She says these hallucinations occur overnight but while she is awake. They do not happen during the day. When asked about depression, patient admits she has been a little down the last couple months related to losing some of her best friends. Chronically she has been emotional and cries easily. She immediately says she does not want any \"nerve pills. \"    Hypercalcemia identified by nephrology, had a parathyroid scan showing an adenoma and was told she would need to see a surgeon, but as far as she knows no referral was made. Patient has had some recent decay show up on her lower front teeth and wonders if it is from the calcium issue.     Past Medical History:     Past Medical History:   Diagnosis Date    Allergic rhinitis     Chronic kidney (TRUE METRIX BLOOD GLUCOSE TEST) strip Use once daily and as needed. 9/3/19   Kelley Henao DO   gabapentin (NEURONTIN) 100 MG capsule Take 2 capsules by mouth 2 times daily for 90 days. Patient not taking: Reported on 3/3/2020 4/1/19 1/6/20  Kelley Henao DO        Allergies:       Codeine; Percocet [oxycodone-acetaminophen]; Adhesive tape; and Norco [hydrocodone-acetaminophen]    Social History:     Tobacco:    reports that she has never smoked. She has never used smokeless tobacco.  Alcohol:      reports no history of alcohol use. Drug Use:  reports no history of drug use. Family History:     Family History   Problem Relation Age of Onset    Diabetes Father     Cancer Father         Prostate Cancer    Cancer Paternal Aunt         Colon Cancer       Review of Systems:     Positive and Negative as described in HPI    Review of Systems   Constitutional: Negative. Respiratory: Negative. Gastrointestinal: Negative. Physical Exam:     Vitals:  /84   Pulse 77   Ht 5' 7\" (1.702 m)   Wt 236 lb (107 kg)   SpO2 98%   BMI 36.96 kg/m²   Physical Exam  Vitals signs and nursing note reviewed. Constitutional:       General: She is not in acute distress. Appearance: She is well-developed. She is obese. HENT:      Head: Normocephalic and atraumatic. Mouth/Throat:      Mouth: Mucous membranes are moist.      Pharynx: Oropharynx is clear. Comments: Yellow-brown debris present along gumline of lower incisors  Eyes:      Conjunctiva/sclera: Conjunctivae normal.   Cardiovascular:      Rate and Rhythm: Normal rate and regular rhythm. Heart sounds: Normal heart sounds. Comments: No peripheral edema. Pulmonary:      Effort: Pulmonary effort is normal.      Breath sounds: Normal breath sounds. Skin:     General: Skin is warm and dry. Neurological:      Mental Status: She is alert and oriented to person, place, and time.    Psychiatric:         Judgment: Judgment normal.         Data:     Lab Results   Component Value Date     02/03/2020    K 4.2 02/03/2020    CL 99 02/03/2020    CO2 27 02/03/2020    BUN 20 02/03/2020    CREATININE 1.54 02/03/2020    GLUCOSE 194 02/03/2020    PROT 6.6 11/12/2019    LABALBU 4.1 02/03/2020    BILITOT 0.49 11/12/2019    ALKPHOS 61 11/12/2019    AST 9 11/12/2019    ALT 13 11/12/2019     Lab Results   Component Value Date    WBC 10.0 11/12/2019    RBC 4.05 11/12/2019    HGB 12.5 01/06/2020    HCT 37.8 01/06/2020    MCV 99.1 11/12/2019    MCH 32.3 11/12/2019    MCHC 32.6 11/12/2019    RDW 16.0 11/12/2019     11/12/2019    MPV NOT REPORTED 11/12/2019     Lab Results   Component Value Date    TSH 3.26 02/03/2020     Lab Results   Component Value Date    CHOL 156 03/03/2020    HDL 71 03/03/2020    LABA1C 7.2 03/03/2020         Assessment & Plan:        Diagnosis Orders   1. Type 2 diabetes mellitus without complication, without long-term current use of insulin ([facility])  Lipid Panel    Hemoglobin A1C   2. Hypercalcemia  External Referral To ENT    PTH, Intact With Ionized Calcium   3. Parathyroid adenoma  External Referral To ENT   4. B12 deficiency  Vitamin B12   5. Hallucination, visual     6. Tooth decay     1. Diabetes which has been in okay control for her age and comorbidities. Updating labs.  2-3. Hypercalcemia which has not been severe. Reviewed recent testing showing that her last PTH was actually normal but her parathyroid scan did show an active adenoma. Referred to ENT for possible surgery. Updating labs. 4.  Borderline B12 deficiency, has been on oral supplementation. Still has some neurologic symptoms. Rechecking. 5.  Uncertain etiology of patient's nocturnal visual hallucinations. I had sent her to neurology for evaluation of possible Parkinson's, with her hallucinations, frequent falls, but no particular diagnosis was made. Reviewed recent MRI. May be having some psychosis associated with depression.   Patient

## 2020-03-03 NOTE — PROGRESS NOTES
Fingerstick INR drawn per clinic protocol. Patient states no visible blood in urine and no black tarry stool. Denies any missed doses of warfarin. Davie Ramachandran had a pain injection in her back per Dr. Giulia Tenorio Wednesday\" (2-) and says she skipped her warfarin for \"5 days\" prior to this procedure. She was told that by the nurse in Dr. Alize Benjamin office that she should only skip her warfarin for \"2 days\" prior to her next injection (which she says hasn't been scheduled yet). Ragini restarted her warfarin in the evening on 2- after the procedure according to her previously stable weekly regimen and denies any missed doses over the course of the past 7 doses. All other medications reviewed for accuracy. Davie Ramachandran is unsure if she is still taking Hydrochlorothiazide and does not have her medications with her today. She has an appointment with Dr. Cong Kasper after this visit today. No change in other maintenance medications or in diet. Ragini was also requested to see an endocrinologist per Dr. Ambar Garcia so she was referred to see Dr. Marcio Hill in [address] [place] [address] per Dr. Cong Kasper, but has not made an appointment yet. We will have her take 7.5 mg warfarin tonight x 1 booster dose, but then continue previously stable weekly warfarin regimen as noted on her dosing calendar. We will recheck INR in 4 weeks. Patient acknowledges working in consult agreement with pharmacist as referred by his/her physician.

## 2020-03-12 ENCOUNTER — TELEPHONE (OUTPATIENT)
Dept: FAMILY MEDICINE CLINIC | Age: 75
End: 2020-03-12

## 2020-03-26 ENCOUNTER — TELEPHONE (OUTPATIENT)
Dept: PHARMACY | Age: 75
End: 2020-03-26

## 2020-03-26 NOTE — TELEPHONE ENCOUNTER
Spoke with Ragini about upcoming appointment and gave her the option of coming to lab for venipuncture or delay appt. She states she had a \"temperature\" the other night and doesn't feel good. She was in agreeance to postpone next visit for 5 weeks. Her INR and warfarin dosage has been stable the last 4 months (with the exception of when she went off warfarin x 5 days for a back injection). Will call with any problems.

## 2020-03-31 ENCOUNTER — APPOINTMENT (OUTPATIENT)
Dept: PHARMACY | Age: 75
End: 2020-03-31
Payer: MEDICARE

## 2020-04-13 ENCOUNTER — TELEPHONE (OUTPATIENT)
Dept: PHARMACY | Age: 75
End: 2020-04-13

## 2020-04-15 ENCOUNTER — TELEPHONE (OUTPATIENT)
Dept: FAMILY MEDICINE CLINIC | Age: 75
End: 2020-04-15

## 2020-04-15 RX ORDER — FAMOTIDINE 20 MG/1
20 TABLET, FILM COATED ORAL 2 TIMES DAILY
Qty: 180 TABLET | Refills: 1 | Status: SHIPPED
Start: 2020-04-15 | End: 2020-07-10

## 2020-04-15 NOTE — TELEPHONE ENCOUNTER
Sent and patient notified
BARAK Castro            Patient Active Problem List:     Type 2 diabetes mellitus without complication ([address]     Essential hypertension, benign     Esophageal reflux     Irritable bowel syndrome     Seasonal allergies     Long term current use of anticoagulant therapy     Gout     Rectocele     Tubular adenoma of colon     Hiatal hernia     Sigmoid diverticulosis     Chronic back pain     Constipation     Hypomagnesemia     History of colon polyps     Family history of colon cancer

## 2020-05-02 ENCOUNTER — HOSPITAL ENCOUNTER (EMERGENCY)
Age: 75
Discharge: ANOTHER HOSPITAL | End: 2020-05-02
Attending: EMERGENCY MEDICINE
Payer: MEDICARE

## 2020-05-02 ENCOUNTER — APPOINTMENT (OUTPATIENT)
Dept: GENERAL RADIOLOGY | Age: 75
End: 2020-05-02
Payer: MEDICARE

## 2020-05-02 ENCOUNTER — APPOINTMENT (OUTPATIENT)
Dept: CT IMAGING | Age: 75
End: 2020-05-02
Payer: MEDICARE

## 2020-05-02 VITALS
HEART RATE: 128 BPM | RESPIRATION RATE: 20 BRPM | DIASTOLIC BLOOD PRESSURE: 86 MMHG | HEIGHT: 68 IN | OXYGEN SATURATION: 97 % | WEIGHT: 233 LBS | BODY MASS INDEX: 35.31 KG/M2 | SYSTOLIC BLOOD PRESSURE: 151 MMHG | TEMPERATURE: 99.2 F

## 2020-05-02 LAB
-: ABNORMAL
ABSOLUTE EOS #: 0.1 K/UL (ref 0–0.4)
ABSOLUTE IMMATURE GRANULOCYTE: ABNORMAL K/UL (ref 0–0.3)
ABSOLUTE LYMPH #: 0.9 K/UL (ref 1–4.8)
ABSOLUTE MONO #: 0 K/UL (ref 0–1)
ALBUMIN SERPL-MCNC: 4.1 G/DL (ref 3.5–5.2)
ALBUMIN/GLOBULIN RATIO: ABNORMAL (ref 1–2.5)
ALP BLD-CCNC: 77 U/L (ref 35–104)
ALT SERPL-CCNC: 17 U/L (ref 5–33)
AMORPHOUS: ABNORMAL
ANION GAP SERPL CALCULATED.3IONS-SCNC: 10 MMOL/L (ref 9–17)
AST SERPL-CCNC: 40 U/L
BACTERIA: ABNORMAL
BASOPHILS # BLD: 0 % (ref 0–2)
BASOPHILS ABSOLUTE: 0 K/UL (ref 0–0.2)
BILIRUB SERPL-MCNC: 0.36 MG/DL (ref 0.3–1.2)
BILIRUBIN URINE: NEGATIVE
BNP INTERPRETATION: NORMAL
BUN BLDV-MCNC: 22 MG/DL (ref 8–23)
BUN/CREAT BLD: ABNORMAL (ref 9–20)
CALCIUM SERPL-MCNC: 11.2 MG/DL (ref 8.6–10.4)
CASTS UA: ABNORMAL /LPF
CHLORIDE BLD-SCNC: 99 MMOL/L (ref 98–107)
CO2: 27 MMOL/L (ref 20–31)
COLOR: YELLOW
COMMENT UA: ABNORMAL
CREAT SERPL-MCNC: 1.64 MG/DL (ref 0.5–0.9)
CRYSTALS, UA: ABNORMAL /HPF
DIFFERENTIAL TYPE: YES
EOSINOPHILS RELATIVE PERCENT: 1 % (ref 0–5)
EPITHELIAL CELLS UA: ABNORMAL /HPF
GFR AFRICAN AMERICAN: 37 ML/MIN
GFR NON-AFRICAN AMERICAN: 31 ML/MIN
GFR SERPL CREATININE-BSD FRML MDRD: ABNORMAL ML/MIN/{1.73_M2}
GFR SERPL CREATININE-BSD FRML MDRD: ABNORMAL ML/MIN/{1.73_M2}
GLUCOSE BLD-MCNC: 161 MG/DL (ref 70–99)
GLUCOSE BLD-MCNC: 93 MG/DL (ref 65–99)
GLUCOSE URINE: NEGATIVE
HCT VFR BLD CALC: 43.8 % (ref 36–46)
HEMOGLOBIN: 14.2 G/DL (ref 12–16)
IMMATURE GRANULOCYTES: ABNORMAL %
INR BLD: 2.4
KETONES, URINE: NEGATIVE
LACTIC ACID: 2.2 MMOL/L (ref 0.5–2.2)
LEUKOCYTE ESTERASE, URINE: ABNORMAL
LIPASE: 26 U/L (ref 13–60)
LYMPHOCYTES # BLD: 10 % (ref 15–40)
MCH RBC QN AUTO: 31.5 PG (ref 26–34)
MCHC RBC AUTO-ENTMCNC: 32.5 G/DL (ref 31–37)
MCV RBC AUTO: 97 FL (ref 80–100)
MONOCYTES # BLD: 0 % (ref 4–8)
MUCUS: ABNORMAL
NITRITE, URINE: NEGATIVE
NRBC AUTOMATED: ABNORMAL PER 100 WBC
OTHER OBSERVATIONS UA: ABNORMAL
PDW BLD-RTO: 15.8 % (ref 12.1–15.2)
PH UA: 7 (ref 5–8)
PLATELET # BLD: 184 K/UL (ref 140–450)
PLATELET ESTIMATE: ABNORMAL
PMV BLD AUTO: ABNORMAL FL (ref 6–12)
POTASSIUM SERPL-SCNC: 3.8 MMOL/L (ref 3.7–5.3)
POTASSIUM SERPL-SCNC: 6.4 MMOL/L (ref 3.7–5.3)
PRO-BNP: 216 PG/ML
PROTEIN UA: ABNORMAL
PROTHROMBIN TIME: 24.1 SEC (ref 9–11.6)
RBC # BLD: 4.51 M/UL (ref 4–5.2)
RBC # BLD: ABNORMAL 10*6/UL
RBC UA: ABNORMAL /HPF (ref 0–2)
RENAL EPITHELIAL, UA: ABNORMAL /HPF
SEG NEUTROPHILS: 89 % (ref 47–75)
SEGMENTED NEUTROPHILS ABSOLUTE COUNT: 8.5 K/UL (ref 2.5–7)
SODIUM BLD-SCNC: 136 MMOL/L (ref 135–144)
SPECIFIC GRAVITY UA: 1.01 (ref 1–1.03)
TOTAL PROTEIN: 7.9 G/DL (ref 6.4–8.3)
TRICHOMONAS: ABNORMAL
TROPONIN INTERP: NORMAL
TROPONIN T: <0.03 NG/ML
TROPONIN, HIGH SENSITIVITY: NORMAL NG/L (ref 0–14)
TURBIDITY: ABNORMAL
URINE HGB: ABNORMAL
UROBILINOGEN, URINE: NORMAL
WBC # BLD: 9.5 K/UL (ref 3.5–11)
WBC # BLD: ABNORMAL 10*3/UL
WBC UA: ABNORMAL /HPF
YEAST: ABNORMAL

## 2020-05-02 PROCEDURE — 74176 CT ABD & PELVIS W/O CONTRAST: CPT

## 2020-05-02 PROCEDURE — 87086 URINE CULTURE/COLONY COUNT: CPT

## 2020-05-02 PROCEDURE — 81001 URINALYSIS AUTO W/SCOPE: CPT

## 2020-05-02 PROCEDURE — 83880 ASSAY OF NATRIURETIC PEPTIDE: CPT

## 2020-05-02 PROCEDURE — 2580000003 HC RX 258: Performed by: EMERGENCY MEDICINE

## 2020-05-02 PROCEDURE — 80053 COMPREHEN METABOLIC PANEL: CPT

## 2020-05-02 PROCEDURE — 93005 ELECTROCARDIOGRAM TRACING: CPT | Performed by: EMERGENCY MEDICINE

## 2020-05-02 PROCEDURE — 36415 COLL VENOUS BLD VENIPUNCTURE: CPT

## 2020-05-02 PROCEDURE — 84132 ASSAY OF SERUM POTASSIUM: CPT

## 2020-05-02 PROCEDURE — 2500000003 HC RX 250 WO HCPCS: Performed by: EMERGENCY MEDICINE

## 2020-05-02 PROCEDURE — 6370000000 HC RX 637 (ALT 250 FOR IP): Performed by: EMERGENCY MEDICINE

## 2020-05-02 PROCEDURE — 83605 ASSAY OF LACTIC ACID: CPT

## 2020-05-02 PROCEDURE — 85610 PROTHROMBIN TIME: CPT

## 2020-05-02 PROCEDURE — 85025 COMPLETE CBC W/AUTO DIFF WBC: CPT

## 2020-05-02 PROCEDURE — 96375 TX/PRO/DX INJ NEW DRUG ADDON: CPT

## 2020-05-02 PROCEDURE — 84484 ASSAY OF TROPONIN QUANT: CPT

## 2020-05-02 PROCEDURE — 87186 SC STD MICRODIL/AGAR DIL: CPT

## 2020-05-02 PROCEDURE — 87040 BLOOD CULTURE FOR BACTERIA: CPT

## 2020-05-02 PROCEDURE — 99285 EMERGENCY DEPT VISIT HI MDM: CPT

## 2020-05-02 PROCEDURE — 96365 THER/PROPH/DIAG IV INF INIT: CPT

## 2020-05-02 PROCEDURE — 6360000002 HC RX W HCPCS: Performed by: EMERGENCY MEDICINE

## 2020-05-02 PROCEDURE — 87205 SMEAR GRAM STAIN: CPT

## 2020-05-02 PROCEDURE — 83690 ASSAY OF LIPASE: CPT

## 2020-05-02 PROCEDURE — 70450 CT HEAD/BRAIN W/O DYE: CPT

## 2020-05-02 PROCEDURE — 71045 X-RAY EXAM CHEST 1 VIEW: CPT

## 2020-05-02 PROCEDURE — 87077 CULTURE AEROBIC IDENTIFY: CPT

## 2020-05-02 PROCEDURE — 82947 ASSAY GLUCOSE BLOOD QUANT: CPT

## 2020-05-02 RX ORDER — LORAZEPAM 2 MG/ML
INJECTION INTRAMUSCULAR
Status: DISCONTINUED
Start: 2020-05-02 | End: 2020-05-03 | Stop reason: HOSPADM

## 2020-05-02 RX ORDER — HALOPERIDOL 5 MG/ML
1 INJECTION INTRAMUSCULAR ONCE
Status: COMPLETED | OUTPATIENT
Start: 2020-05-02 | End: 2020-05-02

## 2020-05-02 RX ORDER — ONDANSETRON 2 MG/ML
4 INJECTION INTRAMUSCULAR; INTRAVENOUS ONCE
Status: COMPLETED | OUTPATIENT
Start: 2020-05-02 | End: 2020-05-02

## 2020-05-02 RX ORDER — 0.9 % SODIUM CHLORIDE 0.9 %
500 INTRAVENOUS SOLUTION INTRAVENOUS ONCE
Status: COMPLETED | OUTPATIENT
Start: 2020-05-02 | End: 2020-05-02

## 2020-05-02 RX ORDER — DEXTROSE MONOHYDRATE 25 G/50ML
25 INJECTION, SOLUTION INTRAVENOUS ONCE
Status: COMPLETED | OUTPATIENT
Start: 2020-05-02 | End: 2020-05-02

## 2020-05-02 RX ORDER — PROMETHAZINE HYDROCHLORIDE 25 MG/ML
6.25 INJECTION, SOLUTION INTRAMUSCULAR; INTRAVENOUS ONCE
Status: COMPLETED | OUTPATIENT
Start: 2020-05-02 | End: 2020-05-02

## 2020-05-02 RX ORDER — LORAZEPAM 2 MG/ML
0.5 INJECTION INTRAMUSCULAR ONCE
Status: COMPLETED | OUTPATIENT
Start: 2020-05-02 | End: 2020-05-02

## 2020-05-02 RX ADMIN — CALCIUM GLUCONATE 1 G: 98 INJECTION, SOLUTION INTRAVENOUS at 18:49

## 2020-05-02 RX ADMIN — LORAZEPAM 0.5 MG: 2 INJECTION, SOLUTION INTRAMUSCULAR; INTRAVENOUS at 18:06

## 2020-05-02 RX ADMIN — DEXTROSE MONOHYDRATE 25 G: 25 INJECTION, SOLUTION INTRAVENOUS at 18:38

## 2020-05-02 RX ADMIN — PROMETHAZINE HYDROCHLORIDE 6.25 MG: 25 INJECTION INTRAMUSCULAR; INTRAVENOUS at 17:38

## 2020-05-02 RX ADMIN — WATER 1 G: 1 INJECTION INTRAMUSCULAR; INTRAVENOUS; SUBCUTANEOUS at 20:36

## 2020-05-02 RX ADMIN — SODIUM CHLORIDE 500 ML: 9 INJECTION, SOLUTION INTRAVENOUS at 17:38

## 2020-05-02 RX ADMIN — HALOPERIDOL 1 MG: 5 INJECTION INTRAMUSCULAR at 18:37

## 2020-05-02 RX ADMIN — SODIUM BICARBONATE 50 MEQ: 84 INJECTION, SOLUTION INTRAVENOUS at 18:37

## 2020-05-02 RX ADMIN — INSULIN HUMAN 10 UNITS: 100 INJECTION, SOLUTION PARENTERAL at 18:38

## 2020-05-02 RX ADMIN — ONDANSETRON 4 MG: 2 INJECTION INTRAMUSCULAR; INTRAVENOUS at 18:05

## 2020-05-02 ASSESSMENT — PAIN DESCRIPTION - DESCRIPTORS: DESCRIPTORS: ACHING

## 2020-05-02 ASSESSMENT — PAIN DESCRIPTION - ORIENTATION: ORIENTATION: RIGHT

## 2020-05-02 ASSESSMENT — PAIN DESCRIPTION - FREQUENCY: FREQUENCY: CONTINUOUS

## 2020-05-02 ASSESSMENT — ENCOUNTER SYMPTOMS
WHEEZING: 0
BLOOD IN STOOL: 0
BACK PAIN: 0
CHEST TIGHTNESS: 0
SORE THROAT: 0
CONSTIPATION: 0
NAUSEA: 1
RHINORRHEA: 0
ABDOMINAL PAIN: 0
SHORTNESS OF BREATH: 0
VOMITING: 1
COUGH: 1
DIARRHEA: 1

## 2020-05-02 ASSESSMENT — PAIN SCALES - GENERAL: PAINLEVEL_OUTOF10: 7

## 2020-05-02 ASSESSMENT — PAIN DESCRIPTION - LOCATION: LOCATION: HIP;LEG

## 2020-05-02 NOTE — ED PROVIDER NOTES
Musculoskeletal: Negative for back pain, neck pain and neck stiffness. Skin: Negative for pallor, rash and wound. Neurological: Negative for dizziness, syncope, light-headedness and headaches. All other systems reviewed and are negative. PAST MEDICAL HISTORY     Past Medical History:   Diagnosis Date    Allergic rhinitis     Chronic kidney disease, stage III (moderate) (HCC)     Deep vein thrombosis (DVT) (HCC) 10/24/2012    Elbow fracture, left     Fall     Gastric ulcer     Gout     Hx of blood clots     Following last back surgery     Hypertension     Nausea & vomiting     Presence of IVC filter     Type II or unspecified type diabetes mellitus without mention of complication, not stated as uncontrolled        SURGICAL HISTORY       Past Surgical History:   Procedure Laterality Date    BACK SURGERY      BACK SURGERY      BACK SURGERY      BACK SURGERY      BACK SURGERY      BACK SURGERY      Fusion     BREAST BIOPSY Left     BREAST BIOPSY Right     CARDIAC CATHETERIZATION Left 03/07/2018    Dr. Myriam Aquino @ [facility]--There is 30% disease of the origin of the lateral anterior descending. Mild 10% -20% plaque disease in the circumflex & right coronary artery. Normal left ventricular functino, ejection fraction of 60%.       CARPAL TUNNEL RELEASE Right     CATARACT REMOVAL Right     CATARACT REMOVAL Left     COLONOSCOPY  2014    COLONOSCOPY N/A 5/13/2019    COLONOSCOPY POLYPECTOMY HOT BIOPSY performed by Aylin Liu MD at [address]      \"Multiple\"     EYE SURGERY Right     Removed fluid from behind eye    HYSTERECTOMY      KIDNEY SURGERY      left side 1/2 of kidney removed    PARTIAL NEPHRECTOMY Left     RETINAL DETACHMENT SURGERY Left     ROTATOR CUFF REPAIR      TOTAL KNEE ARTHROPLASTY Right     TOTAL KNEE ARTHROPLASTY Left     UPPER GASTROINTESTINAL ENDOSCOPY  2014    VENA CAVA FILTER PLACEMENT         CURRENT MEDICATIONS [unfilled]    ALLERGIES     Codeine; Percocet [oxycodone-acetaminophen]; Adhesive tape; and Norco [hydrocodone-acetaminophen]    FAMILY HISTORY       Family History   Problem Relation Age of Onset    Diabetes Father     Cancer Father         Prostate Cancer    Cancer Paternal Aunt         Colon Cancer        SOCIAL HISTORY       Social History     Socioeconomic History    Marital status:       Spouse name: Not on file    Number of children: Not on file    Years of education: Not on file    Highest education level: Not on file   Occupational History    Not on file   Social Needs    Financial resource strain: Not on file    Food insecurity     Worry: Not on file     Inability: Not on file    Transportation needs     Medical: Not on file     Non-medical: Not on file   Tobacco Use    Smoking status: Never Smoker    Smokeless tobacco: Never Used   Substance and Sexual Activity    Alcohol use: No    Drug use: No    Sexual activity: Not on file   Lifestyle    Physical activity     Days per week: Not on file     Minutes per session: Not on file    Stress: Not on file   Relationships    Social connections     Talks on phone: Not on file     Gets together: Not on file     Attends Church service: Not on file     Active member of club or organization: Not on file     Attends meetings of clubs or organizations: Not on file     Relationship status: Not on file    Intimate partner violence     Fear of current or ex partner: Not on file     Emotionally abused: Not on file     Physically abused: Not on file     Forced sexual activity: Not on file   Other Topics Concern    Not on file   Social History Narrative    Not on file       SCREENINGS    [place] Coma Scale  Eye Opening: Spontaneous  Best Verbal Response: Oriented  Best Motor Response: Obeys commands  Christy Coma Scale Score: 15      PHYSICAL EXAM    (up to 7 forlevel 4, 8 or more for level 5)     ED Triage Vitals   BP Temp Temp src Pulse Resp SpO2 Height Weight   -- -- -- -- -- -- -- --       Physical Exam  Vitals signs and nursing note reviewed. Constitutional:       General: She is not in acute distress. Appearance: Normal appearance. She is well-developed. She is obese. She is not ill-appearing, toxic-appearing or diaphoretic. HENT:      Head: Normocephalic and atraumatic. Right Ear: External ear normal.      Left Ear: External ear normal.      Mouth/Throat:      Mouth: Mucous membranes are moist.   Eyes:      General: No scleral icterus. Conjunctiva/sclera: Conjunctivae normal.      Pupils: Pupils are equal, round, and reactive to light. Neck:      Musculoskeletal: Normal range of motion and neck supple. Cardiovascular:      Rate and Rhythm: Regular rhythm. Tachycardia present. Heart sounds: Normal heart sounds. No murmur. No friction rub. No gallop. Pulmonary:      Effort: Pulmonary effort is normal. No respiratory distress. Breath sounds: Normal breath sounds. No stridor. No wheezing, rhonchi or rales. Chest:      Chest wall: No tenderness. Abdominal:      General: Abdomen is flat. There is no distension. Palpations: Abdomen is soft. There is no mass. Tenderness: There is abdominal tenderness. There is no right CVA tenderness, left CVA tenderness, guarding or rebound. Hernia: No hernia is present. Musculoskeletal: Normal range of motion. General: No tenderness or deformity. Right lower leg: Edema present. Left lower leg: Edema present. Comments: trace bilateral lower extremity edema   Skin:     General: Skin is warm and dry. Capillary Refill: Capillary refill takes less than 2 seconds. Coloration: Skin is not jaundiced or pale. Findings: No bruising, erythema, lesion or rash. Neurological:      General: No focal deficit present. Mental Status: She is alert and oriented to person, place, and time.    Psychiatric:         Mood and Affect: Mood American 31 (*)     GFR  37 (*)     All other components within normal limits   MICROSCOPIC URINALYSIS - Abnormal; Notable for the following components:    Bacteria, UA RARE (*)     All other components within normal limits   CULTURE, URINE   CULTURE, BLOOD 1   BRAIN NATRIURETIC PEPTIDE   TROPONIN   LACTIC ACID   LIPASE   POTASSIUM W/ REFLEX TO MAGNESIUM   GLUCOSE, WHOLE BLOOD   POCT GLUCOSE        All other labs were within normal range or not returned as of this dictation. EMERGENCY DEPARTMENT COURSE and DIFFERENTIALDIAGNOSIS/MDM:   Vitals:    Vitals:    05/02/20 1904 05/02/20 2019 05/02/20 2050 05/02/20 2120   BP: (!) 176/72 (!) 166/74 (!) 154/70 (!) 157/66   Pulse: 124 119 122 120   Resp:       Temp:       TempSrc:       SpO2: 100% 97% 93% 93%   Weight:       Height:           Medications   0.9 % sodium chloride bolus (0 mLs Intravenous Stopped 5/2/20 1909)   promethazine (PHENERGAN) injection 6.25 mg (6.25 mg Intravenous Given 5/2/20 1738)   ondansetron (ZOFRAN) injection 4 mg (4 mg Intravenous Given 5/2/20 1805)   LORazepam (ATIVAN) injection 0.5 mg (0 mg Intravenous Held 5/2/20 1807)   calcium gluconate 1 g in dextrose 5 % 100 mL IVPB (0 g Intravenous Stopped 5/2/20 1955)   sodium bicarbonate 8.4 % injection 50 mEq (50 mEq Intravenous Given 5/2/20 1837)   insulin regular (HUMULIN R;NOVOLIN R) injection 10 Units (10 Units Intravenous Given 5/2/20 1838)   dextrose 50 % IV solution (25 g Intravenous Given 5/2/20 1838)   haloperidol lactate (HALDOL) injection 1 mg (1 mg Intravenous Given 5/2/20 1837)   cefTRIAXone (ROCEPHIN) 1 g in sterile water 10 mL IV syringe (0 g Intravenous Stopped 5/2/20 2040)       MDM. Patient initially evaluated for vomiting and chills with cough. Patient actually confused and became agitated. Patient continued to try and crawl out of bed multiple times. IV established and lab work obtained.   Because she was not redirectable and risk to injuring herself by climbing out of bed, Ativan and then a small dose of Haldol to keep her from trying to climb out of bed and to get CT imaging. Chest x-ray negative. CT brain and CT abdomen and pelvis both negative. CXR negative for acute process. Lab work obtained and significant for potassium level 6.4. No EKG changes. She does have chronic renal failure which is baseline. Given calcium, bicarb, insulin and d50. No leukocytosis. Lactate normal at 2.2. Urinalysis does show signs of possible infection. Urine culture and blood cultures both pending. She was started on Rocephin. I do not believe that the possible urinary tract infection is the only cause  Of her significant change in mental status. I do feel she warrants further work-up with possible neurology. This is not available at this facility. Also with her hyperkalemia I feel she would benefit from a facility with a higher level of care. Repeat potassium improved after intervention. Spoke with admitting physician, Dr. Jess Szymanski who agrees with this plan of transfer. Patient son is present and requesting transfer to [facility]. Discussed with transfer line and accepting physician is Dr. Liyah Aguilar. Patient transferred in stable condition. Patients son updated. REVAL:         CRITICAL CARE TIME   Total Critical Care time was 40 minutes, excluding separatelyreportable procedures. There was a high probability of clinically significant/life threatening deterioration in the patient's condition which required my urgent intervention. CONSULTS:  [unfilled]    PROCEDURES:  Unless otherwise noted below, none     Procedures    FINAL IMPRESSION      1. Nausea and vomiting, intractability of vomiting not specified, unspecified vomiting type    2. Hyperkalemia    3. Confusion    4. Chronic renal failure, unspecified CKD stage    5. Abdominal pain, unspecified abdominal location    6.  Urinary tract infection with hematuria, site unspecified

## 2020-05-03 LAB
EKG ATRIAL RATE: 122 BPM
EKG P AXIS: 76 DEGREES
EKG P-R INTERVAL: 148 MS
EKG Q-T INTERVAL: 302 MS
EKG QRS DURATION: 82 MS
EKG QTC CALCULATION (BAZETT): 430 MS
EKG R AXIS: 3 DEGREES
EKG T AXIS: 43 DEGREES
EKG VENTRICULAR RATE: 122 BPM
INR BLD: 2.3

## 2020-05-03 PROCEDURE — 93010 ELECTROCARDIOGRAM REPORT: CPT | Performed by: INTERNAL MEDICINE

## 2020-05-04 ENCOUNTER — TELEPHONE (OUTPATIENT)
Dept: PHARMACY | Age: 75
End: 2020-05-04

## 2020-05-04 LAB
CULTURE: ABNORMAL
INR BLD: 2.1
Lab: ABNORMAL
SPECIMEN DESCRIPTION: ABNORMAL

## 2020-05-05 LAB
CULTURE: ABNORMAL
INR BLD: 1.8
Lab: ABNORMAL
SPECIMEN DESCRIPTION: ABNORMAL

## 2020-05-06 LAB — INR BLD: 2

## 2020-05-08 ENCOUNTER — TELEPHONE (OUTPATIENT)
Dept: FAMILY MEDICINE CLINIC | Age: 75
End: 2020-05-08

## 2020-05-11 ENCOUNTER — TELEPHONE (OUTPATIENT)
Dept: PHARMACY | Age: 75
End: 2020-05-11

## 2020-05-11 RX ORDER — CEFDINIR 300 MG/1
300 CAPSULE ORAL 2 TIMES DAILY
COMMUNITY
Start: 2020-05-06 | End: 2020-05-11

## 2020-05-11 RX ORDER — LISINOPRIL 10 MG/1
10 TABLET ORAL DAILY
COMMUNITY
Start: 2020-05-06 | End: 2020-05-14 | Stop reason: SDUPTHER

## 2020-05-14 ENCOUNTER — HOSPITAL ENCOUNTER (OUTPATIENT)
Dept: PHARMACY | Age: 75
Setting detail: THERAPIES SERIES
Discharge: HOME | End: 2020-05-14
Payer: MEDICARE

## 2020-05-14 ENCOUNTER — HOSPITAL ENCOUNTER (OUTPATIENT)
Age: 75
Discharge: HOME | End: 2020-05-14
Payer: MEDICARE

## 2020-05-14 ENCOUNTER — OFFICE VISIT (OUTPATIENT)
Dept: FAMILY MEDICINE CLINIC | Age: 75
End: 2020-05-14
Payer: MEDICARE

## 2020-05-14 VITALS
DIASTOLIC BLOOD PRESSURE: 64 MMHG | OXYGEN SATURATION: 99 % | HEART RATE: 72 BPM | HEIGHT: 68 IN | BODY MASS INDEX: 36.07 KG/M2 | SYSTOLIC BLOOD PRESSURE: 110 MMHG | WEIGHT: 238 LBS

## 2020-05-14 LAB
ALBUMIN SERPL-MCNC: 4 G/DL (ref 3.5–5.2)
ALBUMIN/GLOBULIN RATIO: ABNORMAL (ref 1–2.5)
ALP BLD-CCNC: 58 U/L (ref 35–104)
ALT SERPL-CCNC: 18 U/L (ref 5–33)
ANION GAP SERPL CALCULATED.3IONS-SCNC: 10 MMOL/L (ref 9–17)
AST SERPL-CCNC: 10 U/L
BILIRUB SERPL-MCNC: 0.44 MG/DL (ref 0.3–1.2)
BUN BLDV-MCNC: 21 MG/DL (ref 8–23)
BUN/CREAT BLD: 13 (ref 9–20)
CALCIUM SERPL-MCNC: 11.4 MG/DL (ref 8.6–10.4)
CHLORIDE BLD-SCNC: 102 MMOL/L (ref 98–107)
CO2: 26 MMOL/L (ref 20–31)
CREAT SERPL-MCNC: 1.64 MG/DL (ref 0.5–0.9)
GFR AFRICAN AMERICAN: 37 ML/MIN
GFR NON-AFRICAN AMERICAN: 31 ML/MIN
GFR SERPL CREATININE-BSD FRML MDRD: ABNORMAL ML/MIN/{1.73_M2}
GFR SERPL CREATININE-BSD FRML MDRD: ABNORMAL ML/MIN/{1.73_M2}
GLUCOSE BLD-MCNC: 218 MG/DL (ref 70–99)
INR BLD: 2.9
POTASSIUM SERPL-SCNC: 4.3 MMOL/L (ref 3.7–5.3)
PROTHROMBIN TIME: 28.5 SEC (ref 9–11.6)
SODIUM BLD-SCNC: 138 MMOL/L (ref 135–144)
TOTAL PROTEIN: 6.9 G/DL (ref 6.4–8.3)

## 2020-05-14 PROCEDURE — 99495 TRANSJ CARE MGMT MOD F2F 14D: CPT | Performed by: FAMILY MEDICINE

## 2020-05-14 PROCEDURE — 99211 OFF/OP EST MAY X REQ PHY/QHP: CPT

## 2020-05-14 PROCEDURE — 80053 COMPREHEN METABOLIC PANEL: CPT

## 2020-05-14 PROCEDURE — 36415 COLL VENOUS BLD VENIPUNCTURE: CPT

## 2020-05-14 PROCEDURE — 85610 PROTHROMBIN TIME: CPT

## 2020-05-14 RX ORDER — LISINOPRIL 10 MG/1
10 TABLET ORAL DAILY
Qty: 90 TABLET | Refills: 1 | Status: SHIPPED | OUTPATIENT
Start: 2020-05-14 | End: 2020-11-17 | Stop reason: SDUPTHER

## 2020-05-14 RX ORDER — OMEPRAZOLE 20 MG/1
CAPSULE, DELAYED RELEASE ORAL
Qty: 90 CAPSULE | Refills: 1 | Status: SHIPPED | OUTPATIENT
Start: 2020-05-14 | End: 2020-11-17 | Stop reason: SDUPTHER

## 2020-05-14 RX ORDER — GLIPIZIDE 5 MG/1
TABLET, FILM COATED, EXTENDED RELEASE ORAL
Qty: 90 TABLET | Refills: 1 | Status: SHIPPED | OUTPATIENT
Start: 2020-05-14 | End: 2020-11-04

## 2020-05-14 RX ORDER — PROPRANOLOL HYDROCHLORIDE 60 MG/1
TABLET ORAL
Qty: 180 TABLET | Refills: 1 | Status: SHIPPED | OUTPATIENT
Start: 2020-05-14 | End: 2020-10-08

## 2020-05-14 RX ORDER — ALLOPURINOL 100 MG/1
TABLET ORAL
Qty: 180 TABLET | Refills: 1 | Status: SHIPPED | OUTPATIENT
Start: 2020-05-14 | End: 2020-10-08

## 2020-05-14 RX ORDER — FLUTICASONE PROPIONATE 50 MCG
2 SPRAY, SUSPENSION (ML) NASAL DAILY PRN
Qty: 3 BOTTLE | Refills: 1 | Status: SHIPPED | OUTPATIENT
Start: 2020-05-14 | End: 2020-11-17 | Stop reason: SDUPTHER

## 2020-05-14 NOTE — PROGRESS NOTES
Yes Emaline Patch, DO   omeprazole (PRILOSEC) 20 MG delayed release capsule TAKE 1 CAPSULE EVERY DAY 5/14/20  Yes Emaline Patch, DO   famotidine (PEPCID) 20 MG tablet Take 1 tablet by mouth 2 times daily 4/15/20  Yes Emaline Patch, DO   triamcinolone (ARISTOCORT) 0.5 % cream Apply topically a thin layer to the affected area (s) TWICE DAILY 1/31/20  Yes Historical Provider, MD   warfarin (COUMADIN) 5 MG tablet Take 1/2 tablet on Saturdays, Tuesdays, and Thursdays and 1 whole tablet daily all other days or as directed by [facility]ication [facility]. 12/2/19  Yes Emaline Patch, DO   isosorbide dinitrate (ISORDIL) 5 MG tablet TAKE 1 TABLET TWICE DAILY 10/2/19  Yes Myriam Teixeira MD   cyclobenzaprine (FLEXERIL) 10 MG tablet Take 1 tablet by mouth 2 times daily as needed (Arthritic pain.) 9/3/19  Yes Emaline Patch, DO   blood glucose test strips (TRUE METRIX BLOOD GLUCOSE TEST) strip Use once daily and as needed. 9/3/19  Yes Emaline Patch, DO   acetaminophen (TYLENOL) 650 MG CR tablet Take 1,300 mg by mouth every 8 hours as needed for Pain   Yes Historical Provider, MD   Magnesium 400 MG TABS Take 800 mg by mouth nightly    Yes Historical Provider, MD   folic acid (FOLVITE) 456 MCG tablet Take 400 mcg by mouth daily 2/8/16  Yes Historical Provider, MD   Lancets MISC 1 each by Does not apply route daily 2/1/17   Emaline Patch, DO        Allergies:       Codeine; Percocet [oxycodone-acetaminophen]; Adhesive tape; and Norco [hydrocodone-acetaminophen]    Social History:     Tobacco:    reports that she has never smoked. She has never used smokeless tobacco.  Alcohol:      reports no history of alcohol use. Drug Use:  reports no history of drug use.     Family History:     Family History   Problem Relation Age of Onset    Diabetes Father     Cancer Father         Prostate Cancer    Cancer Paternal Aunt         Colon Cancer       Review of Systems:     Positive and Negative as described in HPI    Review of Systems   Constitutional: Negative. Respiratory: Negative. Gastrointestinal: Negative. Physical Exam:     Vitals:  /64   Pulse 72   Ht 5' 8\" (1.727 m)   Wt 238 lb (108 kg)   SpO2 99%   BMI 36.19 kg/m²   Physical Exam  Vitals signs and nursing note reviewed. Constitutional:       General: She is not in acute distress. Appearance: She is well-developed. HENT:      Head: Normocephalic and atraumatic. Eyes:      Conjunctiva/sclera: Conjunctivae normal.   Cardiovascular:      Rate and Rhythm: Normal rate and regular rhythm. Heart sounds: Normal heart sounds. Comments: No peripheral edema. Pulmonary:      Effort: Pulmonary effort is normal.      Breath sounds: Normal breath sounds. Skin:     General: Skin is warm and dry. Neurological:      Mental Status: She is alert and oriented to person, place, and time. Psychiatric:         Mood and Affect: Mood normal.         Behavior: Behavior normal.         Judgment: Judgment normal.      Comments: Rambles at times or speaks off-topic         Data:     Lab Results   Component Value Date     05/14/2020    K 4.3 05/14/2020     05/14/2020    CO2 26 05/14/2020    BUN 21 05/14/2020    CREATININE 1.64 05/14/2020    GLUCOSE 218 05/14/2020    PROT 6.9 05/14/2020    LABALBU 4.0 05/14/2020    BILITOT 0.44 05/14/2020    ALKPHOS 58 05/14/2020    AST 10 05/14/2020    ALT 18 05/14/2020     Lab Results   Component Value Date    WBC 9.5 05/02/2020    RBC 4.51 05/02/2020    HGB 14.2 05/02/2020    HCT 43.8 05/02/2020    MCV 97.0 05/02/2020    MCH 31.5 05/02/2020    MCHC 32.5 05/02/2020    RDW 15.8 05/02/2020     05/02/2020    MPV NOT REPORTED 05/02/2020     Lab Results   Component Value Date    TSH 3.26 02/03/2020     Lab Results   Component Value Date    CHOL 156 03/03/2020    HDL 71 03/03/2020    LABA1C 7.2 03/03/2020         Assessment & Plan:        Diagnosis Orders   1. Sepsis secondary to UTI ([address]     2. from [employer] regarding your visit today. You may get this in the mail, through your MyChart or in your email. Please complete the survey to enable us to provide the highest quality of care to you and your family. If you cannot score us as very good ( 5 Stars) on any question, please feel free to call the office to discuss how we could have made your experience exceptional.     Thank you. Clinical Care Team:  DO Ling Gilbert [place]    Clerical Team:                             Daniel Valladares received counseling on the following healthy behaviors: medication adherence  Reviewed prior labs and health maintenance. Continue current medications, diet and exercise. Discussed use, benefit, and side effects of prescribed medications. Barriers to medication compliance addressed. Patient given educational materials - see patient instructions. All patient questions answered. Patient voiced understanding.      Electronically signed by Mallory Hickman DO on 5/17/2020 at 9:10 PM   [address]  Dept: [phone]

## 2020-05-17 ASSESSMENT — ENCOUNTER SYMPTOMS
GASTROINTESTINAL NEGATIVE: 1
RESPIRATORY NEGATIVE: 1

## 2020-05-22 ENCOUNTER — TELEPHONE (OUTPATIENT)
Dept: FAMILY MEDICINE CLINIC | Age: 75
End: 2020-05-22

## 2020-05-22 NOTE — TELEPHONE ENCOUNTER
Stop med and monitor bp
Provider Natanael Austin   6/11/2020 10:00 AM ELIZABETH MEDICATION [facility] MED [facility] Shilpaalta [place]   8/14/2020 10:40 AM DO Hayley Swift MED [facility]            Patient Active Problem List:     Type 2 diabetes mellitus without complication (HCC)     Essential hypertension, benign     Esophageal reflux     Irritable bowel syndrome     Seasonal allergies     Long term current use of anticoagulant therapy     Gout     Rectocele     Tubular adenoma of colon     Hiatal hernia     Sigmoid diverticulosis     Chronic back pain     Constipation     Hypomagnesemia     History of colon polyps     Family history of colon cancer

## 2020-06-01 ENCOUNTER — TELEPHONE (OUTPATIENT)
Dept: FAMILY MEDICINE CLINIC | Age: 75
End: 2020-06-01

## 2020-06-01 ENCOUNTER — HOSPITAL ENCOUNTER (OUTPATIENT)
Age: 75
Setting detail: SPECIMEN
Discharge: HOME | End: 2020-06-01
Payer: MEDICARE

## 2020-06-01 ENCOUNTER — NURSE ONLY (OUTPATIENT)
Dept: FAMILY MEDICINE CLINIC | Age: 75
End: 2020-06-01
Payer: MEDICARE

## 2020-06-01 LAB
BILIRUBIN, POC: ABNORMAL
BLOOD URINE, POC: ABNORMAL
CLARITY, POC: ABNORMAL
COLOR, POC: YELLOW
GLUCOSE URINE, POC: ABNORMAL
KETONES, POC: ABNORMAL
LEUKOCYTE EST, POC: ABNORMAL
NITRITE, POC: NEGATIVE
PH, POC: 7
PROTEIN, POC: 100
SPECIFIC GRAVITY, POC: 1.02
UROBILINOGEN, POC: 0.2

## 2020-06-01 PROCEDURE — 87086 URINE CULTURE/COLONY COUNT: CPT

## 2020-06-01 PROCEDURE — 81002 URINALYSIS NONAUTO W/O SCOPE: CPT | Performed by: FAMILY MEDICINE

## 2020-06-01 RX ORDER — CIPROFLOXACIN 250 MG/1
250 TABLET, FILM COATED ORAL 2 TIMES DAILY
Qty: 14 TABLET | Refills: 0 | Status: SHIPPED | OUTPATIENT
Start: 2020-06-01 | End: 2020-06-08

## 2020-06-01 NOTE — TELEPHONE ENCOUNTER
AM Walker Renteria, DO Wilfred Corrigan MED [facility]            Patient Active Problem List:     Type 2 diabetes mellitus without complication (HCC)     Essential hypertension, benign     Esophageal reflux     Irritable bowel syndrome     Seasonal allergies     Long term current use of anticoagulant therapy     Gout     Rectocele     Tubular adenoma of colon     Hiatal hernia     Sigmoid diverticulosis     Chronic back pain     Constipation     Hypomagnesemia     History of colon polyps     Family history of colon cancer

## 2020-06-02 LAB
CULTURE: NORMAL
Lab: NORMAL
SPECIMEN DESCRIPTION: NORMAL

## 2020-06-10 ENCOUNTER — TELEPHONE (OUTPATIENT)
Dept: PHARMACY | Age: 75
End: 2020-06-10

## 2020-06-11 ENCOUNTER — HOSPITAL ENCOUNTER (OUTPATIENT)
Dept: PHARMACY | Age: 75
Setting detail: THERAPIES SERIES
Discharge: HOME | End: 2020-06-11
Payer: MEDICARE

## 2020-06-11 VITALS — TEMPERATURE: 97.8 F

## 2020-06-11 LAB — INR BLD: 3

## 2020-06-11 PROCEDURE — 99212 OFFICE O/P EST SF 10 MIN: CPT

## 2020-06-11 PROCEDURE — 85610 PROTHROMBIN TIME: CPT

## 2020-06-11 RX ORDER — WARFARIN SODIUM 5 MG/1
TABLET ORAL
Qty: 90 TABLET | Refills: 3 | OUTPATIENT
Start: 2020-06-11 | End: 2020-07-28 | Stop reason: DRUGHIGH

## 2020-06-17 RX ORDER — GABAPENTIN 100 MG/1
200 CAPSULE ORAL 2 TIMES DAILY
Qty: 120 CAPSULE | Refills: 2 | Status: SHIPPED
Start: 2020-06-17 | End: 2020-07-10

## 2020-06-17 NOTE — TELEPHONE ENCOUNTER
Patient is asking for a refill on Gabapentin 100 mg. She states she hasn't had it for a while but she is having hip pain and it helps. Drug [address] Maintenance   Topic Date Due    Shingles Vaccine (1 of 2) 11/04/1997    Annual Wellness Visit (AWV)  05/29/2019    Breast cancer screen  06/19/2020    Diabetic microalbuminuria test  09/03/2020    Diabetic foot exam  10/22/2020    A1C test (Diabetic or Prediabetic)  03/03/2021    Lipid screen  03/03/2021    Diabetic retinal exam  04/02/2021    Colon cancer screen colonoscopy  05/13/2021    Potassium monitoring  05/14/2021    Creatinine monitoring  05/14/2021    DTaP/Tdap/Td vaccine (2 - Td) 12/10/2025    DEXA (modify frequency per FRAX score)  Completed    Flu vaccine  Completed    Pneumococcal 65+ years Vaccine  Completed    Hepatitis C screen  Addressed    Hepatitis A vaccine  Aged Out    Hib vaccine  Aged Out    Meningococcal (ACWY) vaccine  Aged Out             (applicable per patient's age: Cancer Screenings, Depression Screening, Fall Risk Screening, Immunizations)    Hemoglobin A1C (%)   Date Value   03/03/2020 7.2 (H)   09/03/2019 7.1   03/11/2019 7.1     Microalb/Crt.  Ratio (mcg/mg creat)   Date Value   09/09/2014 7     LDL Cholesterol (mg/dL)   Date Value   03/03/2020 63     AST (U/L)   Date Value   05/14/2020 10     ALT (U/L)   Date Value   05/14/2020 18     BUN (mg/dL)   Date Value   05/14/2020 21      (goal A1C is < 7)   (goal LDL is <100) need 30-50% reduction from baseline     BP Readings from Last 3 Encounters:   05/14/20 110/64   05/02/20 (!) 151/86   03/03/20 139/74    (goal /80)      All Future Testing planned in [facility]:  Lab Frequency Next Occurrence   MRI BRAIN WO CONTRAST Once 11/13/2019   DESMOND DIGITAL SCREEN W CAD BILATERAL Once 08/12/2020   POCT INR     Protime-INR daily        Next Visit Date:  Future Appointments   Date Time Provider Natanael Austin   7/10/2020 10:00 AM ELIZABETH MEDICATION [facility]

## 2020-07-01 ENCOUNTER — HOSPITAL ENCOUNTER (OUTPATIENT)
Dept: ULTRASOUND IMAGING | Age: 75
Discharge: HOME | End: 2020-07-03
Payer: MEDICARE

## 2020-07-01 PROCEDURE — 76536 US EXAM OF HEAD AND NECK: CPT

## 2020-07-08 ENCOUNTER — HOSPITAL ENCOUNTER (OUTPATIENT)
Age: 75
Discharge: HOME | End: 2020-07-08
Payer: MEDICARE

## 2020-07-08 LAB
ALBUMIN SERPL-MCNC: 3.8 G/DL (ref 3.5–5.2)
ANION GAP SERPL CALCULATED.3IONS-SCNC: 8 MMOL/L (ref 9–17)
BUN BLDV-MCNC: 32 MG/DL (ref 8–23)
CALCIUM SERPL-MCNC: 10.8 MG/DL (ref 8.6–10.4)
CHLORIDE BLD-SCNC: 105 MMOL/L (ref 98–107)
CO2: 27 MMOL/L (ref 20–31)
CREAT SERPL-MCNC: 1.45 MG/DL (ref 0.5–0.9)
CREATININE URINE: 88.5 MG/DL (ref 28–217)
CREATININE, URINE: NORMAL
GFR AFRICAN AMERICAN: 43 ML/MIN
GFR NON-AFRICAN AMERICAN: 35 ML/MIN
GFR SERPL CREATININE-BSD FRML MDRD: ABNORMAL ML/MIN/{1.73_M2}
GFR SERPL CREATININE-BSD FRML MDRD: ABNORMAL ML/MIN/{1.73_M2}
HCT VFR BLD CALC: 36.2 % (ref 36–46)
HEMOGLOBIN: 12.1 G/DL (ref 12–16)
MAGNESIUM: 1.8 MG/DL (ref 1.6–2.6)
MICROALBUMIN/CREAT 24H UR: 88.5 MG/G{CREAT}
MICROALBUMIN/CREAT UR-RTO: 21
PHOSPHORUS: 3.3 MG/DL (ref 2.6–4.5)
POTASSIUM SERPL-SCNC: 4.1 MMOL/L (ref 3.7–5.3)
PTH INTACT: 86.09 PG/ML (ref 15–65)
SODIUM BLD-SCNC: 140 MMOL/L (ref 135–144)
TOTAL PROTEIN, URINE: 21 MG/DL
VITAMIN D 25-HYDROXY: 34.1 NG/ML (ref 30–100)

## 2020-07-08 PROCEDURE — 82040 ASSAY OF SERUM ALBUMIN: CPT

## 2020-07-08 PROCEDURE — 83970 ASSAY OF PARATHORMONE: CPT

## 2020-07-08 PROCEDURE — 85018 HEMOGLOBIN: CPT

## 2020-07-08 PROCEDURE — 82570 ASSAY OF URINE CREATININE: CPT

## 2020-07-08 PROCEDURE — 84100 ASSAY OF PHOSPHORUS: CPT

## 2020-07-08 PROCEDURE — 82310 ASSAY OF CALCIUM: CPT

## 2020-07-08 PROCEDURE — 36415 COLL VENOUS BLD VENIPUNCTURE: CPT

## 2020-07-08 PROCEDURE — 82565 ASSAY OF CREATININE: CPT

## 2020-07-08 PROCEDURE — 83735 ASSAY OF MAGNESIUM: CPT

## 2020-07-08 PROCEDURE — 82306 VITAMIN D 25 HYDROXY: CPT

## 2020-07-08 PROCEDURE — 80051 ELECTROLYTE PANEL: CPT

## 2020-07-08 PROCEDURE — 84520 ASSAY OF UREA NITROGEN: CPT

## 2020-07-08 PROCEDURE — 85014 HEMATOCRIT: CPT

## 2020-07-08 PROCEDURE — 84156 ASSAY OF PROTEIN URINE: CPT

## 2020-07-09 ENCOUNTER — TELEPHONE (OUTPATIENT)
Dept: PHARMACY | Age: 75
End: 2020-07-09

## 2020-07-09 NOTE — TELEPHONE ENCOUNTER
COVID-19 phone screening     Call placed to screen patient prior to upcoming Medication Management visit for Anticoagulation. Does patient have fever and/or lower respiratory symptoms (SOB, difficulty breathing, cough)? [] Yes    [x] No    If yes, complete Travel Screening and ask the following:   [] [Fever OR s/sxs of lower respiratory illness]  AND  [close contact with lab-confirmed COVID-19 patient within 14 days of symptom onset   [] [Fever AND s/sxs of lower respiratory illness requiring hospitalization] AND [history of travel to [place], [place], [place], [facility], [place] within 14 days of sx onset]  [] [Fever with severe acute lower respiratory illness (I.e. PNA, ARDS) requiring hospitalization and without alternative explanatory diagnosis (I.e. Influenza)] AND [no source of exposure identified]    [x] None of the following; patient confirmed for regularly scheduled INR check and instructed to call the clinic immediately if any symptoms develop prior to upcoming appointment.

## 2020-07-10 ENCOUNTER — HOSPITAL ENCOUNTER (OUTPATIENT)
Dept: PHARMACY | Age: 75
Setting detail: THERAPIES SERIES
Discharge: HOME | End: 2020-07-10
Payer: MEDICARE

## 2020-07-10 VITALS — TEMPERATURE: 97.7 F

## 2020-07-10 LAB — INR BLD: 1.7

## 2020-07-10 PROCEDURE — 85610 PROTHROMBIN TIME: CPT

## 2020-07-10 PROCEDURE — 99211 OFF/OP EST MAY X REQ PHY/QHP: CPT

## 2020-07-10 RX ORDER — GABAPENTIN 100 MG/1
200-300 CAPSULE ORAL NIGHTLY PRN
COMMUNITY
End: 2021-03-15 | Stop reason: SDUPTHER

## 2020-07-10 NOTE — PROGRESS NOTES
Fingerstick INR drawn per clinic protocol. Hui Godwin is accompanied by her son, Elvi Triplettan. Patient states no visible blood in urine and no black tarry stool. Denies any missed doses of warfarin. All other medications reviewed for accuracy. Hui Godwin tells me that she had another pain injection in her back per Dr. Celeste Smith on 6- and says she skipped her warfarin for \"4 days\" prior to this procedure. No change in other maintenance medications or in diet. Since Ragini's INR is slightly sub-therapeutic today, we will have her take 7.5 mg warfarin tonight x 1 booster dose, but then continue current weekly warfarin regimen thereafter as noted on her dosing calendar. We will recheck INR in 3 weeks.  Patient acknowledges working in consult agreement with pharmacist as referred by his/her physician.             CLINICAL PHARMACY CONSULT: MED RECONCILIATION/REVIEW [place]  22. Tracking Only     PHSO: Yes  Total # of Interventions Recommended: 1  - Refills Provided #: 1  - Maintenance Safety Lab Monitoring #: 1  Total Interventions Accepted: 1  Time Spent (min): 9[facility], [facility]

## 2020-07-27 ENCOUNTER — TELEPHONE (OUTPATIENT)
Dept: PHARMACY | Age: 75
End: 2020-07-27

## 2020-07-28 ENCOUNTER — TELEPHONE (OUTPATIENT)
Dept: FAMILY MEDICINE CLINIC | Age: 75
End: 2020-07-28

## 2020-07-28 ENCOUNTER — HOSPITAL ENCOUNTER (OUTPATIENT)
Dept: PHARMACY | Age: 75
Setting detail: THERAPIES SERIES
Discharge: HOME | End: 2020-07-28
Payer: MEDICARE

## 2020-07-28 VITALS — TEMPERATURE: 97.6 F

## 2020-07-28 LAB — INR BLD: 1.4

## 2020-07-28 PROCEDURE — 85610 PROTHROMBIN TIME: CPT

## 2020-07-28 PROCEDURE — 99212 OFFICE O/P EST SF 10 MIN: CPT

## 2020-07-28 RX ORDER — WARFARIN SODIUM 5 MG/1
TABLET ORAL
Qty: 90 TABLET | Refills: 3 | Status: SHIPPED
Start: 2020-07-28 | End: 2021-04-16 | Stop reason: DRUGHIGH

## 2020-07-28 NOTE — PROGRESS NOTES
Fingerstick INR drawn per clinic protocol. Patient states no visible blood in urine and no black tarry stool. Denies any missed doses of warfarin. No change in other maintenance medications. Nina Morales is complaining of here feet and legs swelling so much that she has to wear old sandals or her son's house slippers. Upon examination there is not much pitting. She saw Dr. Diania Hashimoto on 7/24 and her weight was reported at \"230 pounds\" but Nina Morales said that \"can't be right. \" she reports weighing \"244.4\" pounds this AM at home. She states she has \"water pills\" at home but doesn't take them because it causes \"leg cramps. \" Nina Morales saw Dr. Martha Neely on 7/17 regarding a parathyroid adenoma and recommended an endocrine surgeon in [place], but Nina Morales would prefer [place] instead. Ragini states she has been eating more vegetables including green beans and corn and occasional coleslaw. Since Ragini's INR is subtherapeutic again today, we will have her take warfarin 7.5 mg x 1 dose, then will increase current weekly warfarin regimen by 9.1%  and recheck INR in 1 week(s). Patient acknowledges working in consult agreement with pharmacist as referred by his/her physician.           CLINICAL PHARMACY CONSULT: MED RECONCILIATION/REVIEW ADDENDUM    For Pharmacy Admin Tracking Only    PHSO: No  Total # of Interventions Recommended: 1  - Increased Dose #: 1  - Maintenance Safety Lab Monitoring #: 1  Total Interventions Accepted: 1  Time Spent (min): 400 [facility], 251 [facility]

## 2020-07-28 NOTE — TELEPHONE ENCOUNTER
Ragini would like to know if she can get a referral to a Dr. Maximino Moore at [facility] for her parathyroid. She said she sees someone and they want her to have surgery but want her to go to [facility]. She said she does not want to go that far. Can we refer her elsewhere? Please let Ragini know. Health Maintenance   Topic Date Due    Shingles Vaccine (1 of 2) 11/04/1997    Annual Wellness Visit (AWV)  05/29/2019    Breast cancer screen  06/19/2020    Flu vaccine (1) 09/01/2020    Diabetic microalbuminuria test  09/03/2020    Diabetic foot exam  10/22/2020    A1C test (Diabetic or Prediabetic)  03/03/2021    Lipid screen  03/03/2021    Diabetic retinal exam  04/02/2021    Colon cancer screen colonoscopy  05/13/2021    Potassium monitoring  07/08/2021    Creatinine monitoring  07/08/2021    DTaP/Tdap/Td vaccine (2 - Td) 12/10/2025    DEXA (modify frequency per FRAX score)  Completed    Pneumococcal 65+ years Vaccine  Completed    Hepatitis C screen  Addressed    Hepatitis A vaccine  Aged Out    Hib vaccine  Aged Out    Meningococcal (ACWY) vaccine  Aged Out             (applicable per patient's age: Cancer Screenings, Depression Screening, Fall Risk Screening, Immunizations)    Hemoglobin A1C (%)   Date Value   03/03/2020 7.2 (H)   09/03/2019 7.1   03/11/2019 7.1     Microalb/Crt.  Ratio (mcg/mg creat)   Date Value   09/09/2014 7     LDL Cholesterol (mg/dL)   Date Value   03/03/2020 63     AST (U/L)   Date Value   05/14/2020 10     ALT (U/L)   Date Value   05/14/2020 18     BUN (mg/dL)   Date Value   07/08/2020 32 (H)      (goal A1C is < 7)   (goal LDL is <100) need 30-50% reduction from baseline     BP Readings from Last 3 Encounters:   05/14/20 110/64   05/02/20 (!) 151/86   03/03/20 139/74    (goal /80)      All Future Testing planned in [facility]:  Lab Frequency Next Occurrence   MRI BRAIN WO CONTRAST Once 11/13/2019   DESMOND DIGITAL SCREEN W CAD BILATERAL Once 08/12/2020   POCT INR     Protime-INR

## 2020-08-05 ENCOUNTER — TELEPHONE (OUTPATIENT)
Dept: PHARMACY | Age: 75
End: 2020-08-05

## 2020-08-05 NOTE — TELEPHONE ENCOUNTER
COVID-19 phone screening     Call placed to screen patient prior to upcoming Medication Management visit for Anticoagulation. Does patient have fever and/or lower respiratory symptoms (SOB, difficulty breathing, cough)? [] Yes    [x] No    If yes, complete Travel Screening and ask the following:   [] [Fever OR s/sxs of lower respiratory illness]  AND  [close contact with lab-confirmed COVID-19 patient within 14 days of symptom onset   [] [Fever AND s/sxs of lower respiratory illness requiring hospitalization] AND [history of travel to [place], [place], [place], [facility], [place] within 14 days of sx onset]  [] [Fever with severe acute lower respiratory illness (I.e. PNA, ARDS) requiring hospitalization and without alternative explanatory diagnosis (I.e. Influenza)] AND [no source of exposure identified]    [x] None of the following; patient confirmed for regularly scheduled INR check and instructed to call the clinic immediately if any symptoms develop prior to upcoming appointment.

## 2020-08-06 ENCOUNTER — HOSPITAL ENCOUNTER (OUTPATIENT)
Dept: PHARMACY | Age: 75
Setting detail: THERAPIES SERIES
Discharge: HOME | End: 2020-08-06
Payer: MEDICARE

## 2020-08-06 VITALS — TEMPERATURE: 97.5 F

## 2020-08-06 LAB — INR BLD: 2.4

## 2020-08-06 PROCEDURE — 85610 PROTHROMBIN TIME: CPT

## 2020-08-06 PROCEDURE — 99211 OFF/OP EST MAY X REQ PHY/QHP: CPT

## 2020-08-06 NOTE — PROGRESS NOTES
Fingerstick INR drawn per clinic protocol. Patient states no visible blood in urine and no black tarry stool. Denies any missed doses of warfarin. No change in other maintenance medications or in diet. Celeste Dhillon states she is scheduled for a consult with Dr. Rob Young tomorrow regarding parathyroid surgery. Celeste Dhillon has an appt with Dr. Ziyad Lopez on 8/14/2020. Ragini's INR has increased from 1.4 to 2.4 over the last 8 days. Since Ragini's is therapeutic today, we will continue current weekly warfarin regimen and recheck INR in 2 week(s). Patient acknowledges working in consult agreement with pharmacist as referred by his/her physician.           CLINICAL PHARMACY CONSULT: MED RECONCILIATION/REVIEW ADDENDUM    For Pharmacy Admin Tracking Only    PHSO: Yes  Total # of Interventions Recommended: 0  - Maintenance Safety Lab Monitoring #: 1  Total Interventions Accepted: 0  Time Spent (min): 400 [facility], 251 [facility]

## 2020-08-10 ENCOUNTER — TELEPHONE (OUTPATIENT)
Dept: PHARMACY | Age: 75
End: 2020-08-10

## 2020-08-14 ENCOUNTER — OFFICE VISIT (OUTPATIENT)
Dept: FAMILY MEDICINE CLINIC | Age: 75
End: 2020-08-14
Payer: MEDICARE

## 2020-08-14 VITALS
HEIGHT: 68 IN | OXYGEN SATURATION: 99 % | BODY MASS INDEX: 37.28 KG/M2 | WEIGHT: 246 LBS | SYSTOLIC BLOOD PRESSURE: 110 MMHG | HEART RATE: 68 BPM | DIASTOLIC BLOOD PRESSURE: 60 MMHG

## 2020-08-14 LAB — HBA1C MFR BLD: 7.7 %

## 2020-08-14 PROCEDURE — 4040F PNEUMOC VAC/ADMIN/RCVD: CPT | Performed by: FAMILY MEDICINE

## 2020-08-14 PROCEDURE — 1090F PRES/ABSN URINE INCON ASSESS: CPT | Performed by: FAMILY MEDICINE

## 2020-08-14 PROCEDURE — G8427 DOCREV CUR MEDS BY ELIG CLIN: HCPCS | Performed by: FAMILY MEDICINE

## 2020-08-14 PROCEDURE — 83036 HEMOGLOBIN GLYCOSYLATED A1C: CPT | Performed by: FAMILY MEDICINE

## 2020-08-14 PROCEDURE — G8417 CALC BMI ABV UP PARAM F/U: HCPCS | Performed by: FAMILY MEDICINE

## 2020-08-14 PROCEDURE — 2022F DILAT RTA XM EVC RTNOPTHY: CPT | Performed by: FAMILY MEDICINE

## 2020-08-14 PROCEDURE — G8399 PT W/DXA RESULTS DOCUMENT: HCPCS | Performed by: FAMILY MEDICINE

## 2020-08-14 PROCEDURE — 1036F TOBACCO NON-USER: CPT | Performed by: FAMILY MEDICINE

## 2020-08-14 PROCEDURE — 99214 OFFICE O/P EST MOD 30 MIN: CPT | Performed by: FAMILY MEDICINE

## 2020-08-14 PROCEDURE — 3051F HG A1C>EQUAL 7.0%<8.0%: CPT | Performed by: FAMILY MEDICINE

## 2020-08-14 PROCEDURE — 1123F ACP DISCUSS/DSCN MKR DOCD: CPT | Performed by: FAMILY MEDICINE

## 2020-08-14 PROCEDURE — 3017F COLORECTAL CA SCREEN DOC REV: CPT | Performed by: FAMILY MEDICINE

## 2020-08-14 RX ORDER — GLIPIZIDE 5 MG/1
TABLET, FILM COATED, EXTENDED RELEASE ORAL
Qty: 90 TABLET | Refills: 1 | Status: CANCELLED | OUTPATIENT
Start: 2020-08-14

## 2020-08-14 RX ORDER — ALLOPURINOL 100 MG/1
TABLET ORAL
Qty: 180 TABLET | Refills: 1 | Status: CANCELLED | OUTPATIENT
Start: 2020-08-14

## 2020-08-14 RX ORDER — GABAPENTIN 100 MG/1
200 CAPSULE ORAL 2 TIMES DAILY PRN
Qty: 90 CAPSULE | Status: CANCELLED | OUTPATIENT
Start: 2020-08-14

## 2020-08-14 RX ORDER — LISINOPRIL 10 MG/1
10 TABLET ORAL DAILY
Qty: 90 TABLET | Refills: 1 | Status: CANCELLED | OUTPATIENT
Start: 2020-08-14

## 2020-08-14 RX ORDER — ISOSORBIDE DINITRATE 5 MG/1
TABLET ORAL
Qty: 180 TABLET | Refills: 3 | Status: SHIPPED | OUTPATIENT
Start: 2020-08-14 | End: 2021-05-07

## 2020-08-14 RX ORDER — PROPRANOLOL HYDROCHLORIDE 60 MG/1
TABLET ORAL
Qty: 180 TABLET | Refills: 1 | Status: CANCELLED | OUTPATIENT
Start: 2020-08-14

## 2020-08-14 RX ORDER — CYCLOBENZAPRINE HCL 10 MG
10 TABLET ORAL 2 TIMES DAILY PRN
Qty: 60 TABLET | Refills: 2 | Status: SHIPPED | OUTPATIENT
Start: 2020-08-14 | End: 2021-06-22 | Stop reason: SDUPTHER

## 2020-08-14 RX ORDER — OMEPRAZOLE 20 MG/1
CAPSULE, DELAYED RELEASE ORAL
Qty: 90 CAPSULE | Refills: 1 | Status: CANCELLED | OUTPATIENT
Start: 2020-08-14

## 2020-08-14 RX ORDER — FLUTICASONE PROPIONATE 50 MCG
2 SPRAY, SUSPENSION (ML) NASAL DAILY PRN
Qty: 3 BOTTLE | Refills: 1 | Status: CANCELLED | OUTPATIENT
Start: 2020-08-14

## 2020-08-14 NOTE — PATIENT INSTRUCTIONS
SURVEY:    You may be receiving a survey from [employer] regarding your visit today. You may get this in the mail, through your MyChart or in your email. Please complete the survey to enable us to provide the highest quality of care to you and your family. If you cannot score us as very good ( 5 Stars) on any question, please feel free to call the office to discuss how we could have made your experience exceptional.     Thank you.     Clinical Care Team:  Dr. Reola Epley, DO [facility], [address] [place] [address] Team:  [address] [place] [address]

## 2020-08-14 NOTE — PROGRESS NOTES
Name: Nuris Cage  : 1945         Chief Complaint:     Chief Complaint   Patient presents with    Diabetes    Hypertension    Gastroesophageal Reflux       History of Present Illness:      Nuris Cage is a 69 y.o.  female who presents with Diabetes; Hypertension; and Gastroesophageal Reflux      HPI     Hypercalcemia, hyperparathyroidism, getting ready to have parathyroid surgery next week. Patient has overall felt well but does have some days of feeling very tired. Lately she has been getting up a little earlier because of workers at house doing home renovations in the mornings. Surgery next week and expected to go home the same day. covid test  and will stop coumadin as of tomorrow. F/u DM. Feels ok overall but doesn't check sugars because she can't get the glucometer to work. Admits she knows she can cut back on carbs further. Capable of very little exercise due to musculoskeletal difficulties. Has had 2 episodes of DVT, once when son was young and she fell chasing after him, and another time after a surgery. She has stayed on Coumadin long-term and has no current symptoms of blood loss.     Past Medical History:     Past Medical History:   Diagnosis Date    Allergic rhinitis     Chronic kidney disease, stage III (moderate) ([facility])     Deep vein thrombosis (DVT) ([facility]) 10/24/2012    Elbow fracture, left     Fall     Gastric ulcer     Gout     Hx of blood clots     Following last back surgery     Hypertension     Nausea & vomiting     Presence of IVC filter     Type II or unspecified type diabetes mellitus without mention of complication, not stated as uncontrolled         Past Surgical History:     Past Surgical History:   Procedure Laterality Date    BACK SURGERY      BACK SURGERY      BACK SURGERY      BACK SURGERY      BACK SURGERY      BACK SURGERY      Fusion     BREAST BIOPSY Left     BREAST BIOPSY Right     CARDIAC CATHETERIZATION Left 2018     ill-appearing. HENT:      Head: Normocephalic and atraumatic. Eyes:      Conjunctiva/sclera: Conjunctivae normal.   Cardiovascular:      Rate and Rhythm: Normal rate and regular rhythm. Heart sounds: Normal heart sounds. Comments: No peripheral edema. Pulmonary:      Effort: Pulmonary effort is normal.      Breath sounds: Normal breath sounds. Skin:     General: Skin is warm and dry. Neurological:      Mental Status: She is alert and oriented to person, place, and time. Psychiatric:         Mood and Affect: Mood normal.         Behavior: Behavior normal.         Judgment: Judgment normal.         Data:     Lab Results   Component Value Date     07/08/2020    K 4.1 07/08/2020     07/08/2020    CO2 27 07/08/2020    BUN 32 07/08/2020    CREATININE 1.45 07/08/2020    GLUCOSE 218 05/14/2020    PROT 6.9 05/14/2020    LABALBU 3.8 07/08/2020    BILITOT 0.44 05/14/2020    ALKPHOS 58 05/14/2020    AST 10 05/14/2020    ALT 18 05/14/2020     Lab Results   Component Value Date    WBC 9.5 05/02/2020    RBC 4.51 05/02/2020    HGB 12.1 07/08/2020    HCT 36.2 07/08/2020    MCV 97.0 05/02/2020    MCH 31.5 05/02/2020    MCHC 32.5 05/02/2020    RDW 15.8 05/02/2020     05/02/2020    MPV NOT REPORTED 05/02/2020     Lab Results   Component Value Date    TSH 3.26 02/03/2020     Lab Results   Component Value Date    CHOL 156 03/03/2020    HDL 71 03/03/2020    LABA1C 7.7 08/14/2020         Assessment & Plan:        Diagnosis Orders   1. Type 2 diabetes mellitus with stage 3 chronic kidney disease, without long-term current use of insulin ([facility])  POCT glycosylated hemoglobin (Hb A1C)   2. Hyperparathyroidism ([address]     3. History of recurrent deep vein thrombosis (DVT)     Diabetes uncontrolled. On higher doses of glipizide in the past, patient has had symptomatic hypoglycemia. Therefore I would like to avoid increasing dose if possible. She states she can make significant dietary changes.   Work on this and follow-up in 3 months. If still elevated we can increase glipizide dose versus start renal dosing of Januvia or similar. Hyperparathyroid with hypercalcemia, having parathyroidectomy 8/20. Advised patient that her thyroid and calcium levels will need to be followed postoperatively. History of 2 different DVTs, on long-term anticoagulation. Continue the same. Also has an IVC filter in place. Okay not to bridge preop. Restart Coumadin postop. Requested Prescriptions     Signed Prescriptions Disp Refills    cyclobenzaprine (FLEXERIL) 10 MG tablet 60 tablet 2     Sig: Take 1 tablet by mouth 2 times daily as needed (Arthritic pain.)    isosorbide dinitrate (ISORDIL) 5 MG tablet 180 tablet 3     Sig: TAKE 1 TABLET TWICE DAILY       Patient Instructions   SURVEY:    You may be receiving a survey from [employer] regarding your visit today. You may get this in the mail, through your MyChart or in your email. Please complete the survey to enable us to provide the highest quality of care to you and your family. If you cannot score us as very good ( 5 Stars) on any question, please feel free to call the office to discuss how we could have made your experience exceptional.     Thank you. Clinical Care Team:  [facility], [address] [place] [address] Team:  Candi 24 received counseling on the following healthy behaviors: medication adherence  Reviewed prior labs and health maintenance. Continue current medications, diet and exercise. Discussed use, benefit, and side effects of prescribed medications. Barriers to medication compliance addressed. Patient given educational materials - see patient instructions. All patient questions answered. Patient voiced understanding.      Electronically signed by Charissa Brown DO on 8/17/2020 at 5:44 PM   [facility]  [facility] [phone] [id number]  Dept: [phone]

## 2020-08-17 PROBLEM — Z86.718 HISTORY OF RECURRENT DEEP VEIN THROMBOSIS (DVT): Status: ACTIVE | Noted: 2020-08-17

## 2020-08-17 ASSESSMENT — ENCOUNTER SYMPTOMS
RESPIRATORY NEGATIVE: 1
GASTROINTESTINAL NEGATIVE: 1

## 2020-08-20 LAB — INR BLD: 1

## 2020-08-24 ENCOUNTER — HOSPITAL ENCOUNTER (OUTPATIENT)
Dept: MAMMOGRAPHY | Age: 75
Discharge: HOME | End: 2020-08-26
Payer: MEDICARE

## 2020-08-24 PROCEDURE — 77067 SCR MAMMO BI INCL CAD: CPT

## 2020-09-01 ENCOUNTER — HOSPITAL ENCOUNTER (EMERGENCY)
Age: 75
Discharge: HOME | End: 2020-09-01
Attending: EMERGENCY MEDICINE
Payer: MEDICARE

## 2020-09-01 VITALS
HEIGHT: 68 IN | WEIGHT: 243 LBS | OXYGEN SATURATION: 99 % | BODY MASS INDEX: 36.83 KG/M2 | HEART RATE: 89 BPM | RESPIRATION RATE: 20 BRPM | TEMPERATURE: 98.4 F | DIASTOLIC BLOOD PRESSURE: 77 MMHG | SYSTOLIC BLOOD PRESSURE: 167 MMHG

## 2020-09-01 LAB
ABSOLUTE EOS #: 0.2 K/UL (ref 0–0.4)
ABSOLUTE IMMATURE GRANULOCYTE: ABNORMAL K/UL (ref 0–0.3)
ABSOLUTE LYMPH #: 2.2 K/UL (ref 1–4.8)
ABSOLUTE MONO #: 0.5 K/UL (ref 0–1)
ALBUMIN SERPL-MCNC: 3.3 G/DL (ref 3.5–5.2)
ALBUMIN/GLOBULIN RATIO: ABNORMAL (ref 1–2.5)
ALP BLD-CCNC: 59 U/L (ref 35–104)
ALT SERPL-CCNC: 12 U/L (ref 5–33)
ANION GAP SERPL CALCULATED.3IONS-SCNC: 9 MMOL/L (ref 9–17)
AST SERPL-CCNC: 12 U/L
BASOPHILS # BLD: 1 % (ref 0–2)
BASOPHILS ABSOLUTE: 0 K/UL (ref 0–0.2)
BILIRUB SERPL-MCNC: 0.36 MG/DL (ref 0.3–1.2)
BUN BLDV-MCNC: 20 MG/DL (ref 8–23)
BUN/CREAT BLD: 15 (ref 9–20)
CALCIUM SERPL-MCNC: 9.2 MG/DL (ref 8.6–10.4)
CHLORIDE BLD-SCNC: 106 MMOL/L (ref 98–107)
CO2: 27 MMOL/L (ref 20–31)
CREAT SERPL-MCNC: 1.35 MG/DL (ref 0.5–0.9)
DIFFERENTIAL TYPE: YES
EOSINOPHILS RELATIVE PERCENT: 2 % (ref 0–5)
GFR AFRICAN AMERICAN: 47 ML/MIN
GFR NON-AFRICAN AMERICAN: 39 ML/MIN
GFR SERPL CREATININE-BSD FRML MDRD: ABNORMAL ML/MIN/{1.73_M2}
GFR SERPL CREATININE-BSD FRML MDRD: ABNORMAL ML/MIN/{1.73_M2}
GLUCOSE BLD-MCNC: 181 MG/DL (ref 70–99)
HCT VFR BLD CALC: 34.3 % (ref 36–46)
HEMOGLOBIN: 11.1 G/DL (ref 12–16)
IMMATURE GRANULOCYTES: ABNORMAL %
LACTIC ACID, WHOLE BLOOD: NORMAL MMOL/L (ref 0.7–2.1)
LACTIC ACID: 1 MMOL/L (ref 0.5–2.2)
LIPASE: 30 U/L (ref 13–60)
LYMPHOCYTES # BLD: 25 % (ref 15–40)
MCH RBC QN AUTO: 31.1 PG (ref 26–34)
MCHC RBC AUTO-ENTMCNC: 32.4 G/DL (ref 31–37)
MCV RBC AUTO: 96.2 FL (ref 80–100)
MONOCYTES # BLD: 6 % (ref 4–8)
NRBC AUTOMATED: ABNORMAL PER 100 WBC
PDW BLD-RTO: 14.7 % (ref 12.1–15.2)
PLATELET # BLD: 271 K/UL (ref 140–450)
PLATELET ESTIMATE: ABNORMAL
PMV BLD AUTO: ABNORMAL FL (ref 6–12)
POTASSIUM SERPL-SCNC: 4.1 MMOL/L (ref 3.7–5.3)
RBC # BLD: 3.57 M/UL (ref 4–5.2)
RBC # BLD: ABNORMAL 10*6/UL
SEG NEUTROPHILS: 66 % (ref 47–75)
SEGMENTED NEUTROPHILS ABSOLUTE COUNT: 5.7 K/UL (ref 2.5–7)
SODIUM BLD-SCNC: 142 MMOL/L (ref 135–144)
TOTAL PROTEIN: 5.8 G/DL (ref 6.4–8.3)
TROPONIN INTERP: NORMAL
TROPONIN T: <0.03 NG/ML
TROPONIN, HIGH SENSITIVITY: NORMAL NG/L (ref 0–14)
WBC # BLD: 8.5 K/UL (ref 3.5–11)
WBC # BLD: ABNORMAL 10*3/UL

## 2020-09-01 PROCEDURE — 36415 COLL VENOUS BLD VENIPUNCTURE: CPT

## 2020-09-01 PROCEDURE — 2580000003 HC RX 258: Performed by: EMERGENCY MEDICINE

## 2020-09-01 PROCEDURE — 6360000002 HC RX W HCPCS: Performed by: EMERGENCY MEDICINE

## 2020-09-01 PROCEDURE — 83690 ASSAY OF LIPASE: CPT

## 2020-09-01 PROCEDURE — 96374 THER/PROPH/DIAG INJ IV PUSH: CPT

## 2020-09-01 PROCEDURE — 84484 ASSAY OF TROPONIN QUANT: CPT

## 2020-09-01 PROCEDURE — 80053 COMPREHEN METABOLIC PANEL: CPT

## 2020-09-01 PROCEDURE — 85025 COMPLETE CBC W/AUTO DIFF WBC: CPT

## 2020-09-01 PROCEDURE — 83605 ASSAY OF LACTIC ACID: CPT

## 2020-09-01 PROCEDURE — 99284 EMERGENCY DEPT VISIT MOD MDM: CPT

## 2020-09-01 RX ORDER — ONDANSETRON 2 MG/ML
4 INJECTION INTRAMUSCULAR; INTRAVENOUS ONCE
Status: COMPLETED | OUTPATIENT
Start: 2020-09-01 | End: 2020-09-01

## 2020-09-01 RX ORDER — 0.9 % SODIUM CHLORIDE 0.9 %
1000 INTRAVENOUS SOLUTION INTRAVENOUS ONCE
Status: COMPLETED | OUTPATIENT
Start: 2020-09-01 | End: 2020-09-01

## 2020-09-01 RX ADMIN — ONDANSETRON 4 MG: 2 INJECTION, SOLUTION INTRAMUSCULAR; INTRAVENOUS at 09:21

## 2020-09-01 RX ADMIN — SODIUM CHLORIDE 1000 ML: 9 INJECTION, SOLUTION INTRAVENOUS at 09:21

## 2020-09-01 NOTE — ED PROVIDER NOTES
[address] COMPLAINT    Chief Complaint   Patient presents with    Nausea     several episodes of vomiting last night     Emesis    Abdominal Pain     RLQ abd pain since sunday worse with movement        HPI    Smitha Marquez is a 69 y.o. female who presentsto ED from home. By car. With complaint of nausea, vomiting last night. Onset last night. Intensity of symptoms moderate. Location of symptoms R lower abdomen. Patient of episode of diarrhea. Patient has right lower quadrant abdominal pain worse with movement. PAST MEDICAL HISTORY    Past Medical History:   Diagnosis Date    Allergic rhinitis     Chronic kidney disease, stage III (moderate) (HCC)     Deep vein thrombosis (DVT) ([facility]) 10/24/2012    Elbow fracture, left     Fall     Gastric ulcer     Gout     Hx of blood clots     Following last back surgery     Hypertension     Nausea & vomiting     Presence of IVC filter     Type II or unspecified type diabetes mellitus without mention of complication, not stated as uncontrolled        SURGICAL HISTORY    Past Surgical History:   Procedure Laterality Date    BACK SURGERY      BACK SURGERY      BACK SURGERY      BACK SURGERY      BACK SURGERY      BACK SURGERY      Fusion     BREAST BIOPSY Left     BREAST BIOPSY Right     CARDIAC CATHETERIZATION Left 03/07/2018    Dr. Sanjuana Green @ [facility]--There is 30% disease of the origin of the lateral anterior descending. Mild 10% -20% plaque disease in the circumflex & right coronary artery. Normal left ventricular functino, ejection fraction of 60%.       CARPAL TUNNEL RELEASE Right     CATARACT REMOVAL Right     CATARACT REMOVAL Left     COLONOSCOPY  2014    COLONOSCOPY N/A 5/13/2019    COLONOSCOPY POLYPECTOMY HOT BIOPSY performed by Sarah Eisenberg MD at [address] [place] [address]      \"Multiple\"     EYE SURGERY Right     Removed fluid from behind eye    HYSTERECTOMY      KIDNEY SURGERY      left side 1/2 of kidney removed    PARTIAL NEPHRECTOMY Left     RETINAL DETACHMENT SURGERY Left     ROTATOR CUFF REPAIR      TOTAL KNEE ARTHROPLASTY Right     TOTAL KNEE ARTHROPLASTY Left     UPPER GASTROINTESTINAL ENDOSCOPY  2014    VENA CAVA FILTER PLACEMENT         CURRENT MEDICATIONS    Current Outpatient Rx   Medication Sig Dispense Refill    cyclobenzaprine (FLEXERIL) 10 MG tablet Take 1 tablet by mouth 2 times daily as needed (Arthritic pain.) 60 tablet 2    isosorbide dinitrate (ISORDIL) 5 MG tablet TAKE 1 TABLET TWICE DAILY 180 tablet 3    warfarin (COUMADIN) 5 MG tablet Take 1/2 tablet on Wednesdays and Saturdays, and 1 whole tablet daily all other days or as directed by [facility]. 90 tablet 3    gabapentin (NEURONTIN) 100 MG capsule Take 200 mg by mouth 2 times daily as needed.  propranolol (INDERAL) 60 MG tablet TAKE 1 TABLET TWICE DAILY 180 tablet 1    glipiZIDE (GLUCOTROL XL) 5 MG extended release tablet TAKE 1 TABLET EVERY DAY 90 tablet 1    fluticasone (FLONASE) 50 MCG/ACT nasal spray 2 sprays by Nasal route daily as needed for Rhinitis 3 Bottle 1    allopurinol (ZYLOPRIM) 100 MG tablet 1 by mouth twice daily 180 tablet 1    lisinopril (PRINIVIL;ZESTRIL) 10 MG tablet Take 1 tablet by mouth daily 90 tablet 1    omeprazole (PRILOSEC) 20 MG delayed release capsule TAKE 1 CAPSULE EVERY DAY 90 capsule 1    acetaminophen (TYLENOL) 650 MG CR tablet Take 1,300 mg by mouth every 8 hours as needed for Pain      Magnesium 400 MG TABS Take 800 mg by mouth nightly       folic acid (FOLVITE) 922 MCG tablet Take 400 mcg by mouth daily      triamcinolone (ARISTOCORT) 0.5 % cream Apply topically a thin layer to the affected area (s) TWICE DAILY      blood glucose test strips (TRUE METRIX BLOOD GLUCOSE TEST) strip Use once daily and as needed.  100 each 3    Lancets MISC 1 each by Does not apply route daily 100 each 3       ALLERGIES    Allergies   Allergen Reactions    Codeine Itching    Percocet [Oxycodone-Acetaminophen] Itching and Nausea Only    Adhesive Tape Itching, Rash and Other (See Comments)     Tape \"takes the skin off\"    Norco [Hydrocodone-Acetaminophen] Nausea And Vomiting       FAMILY HISTORY    Family History   Problem Relation Age of Onset    Diabetes Father     Cancer Father         Prostate Cancer    Cancer Paternal Aunt         Colon Cancer       SOCIAL HISTORY    Social History     Socioeconomic History    Marital status:       Spouse name: None    Number of children: None    Years of education: None    Highest education level: None   Occupational History    None   Social Needs    Financial resource strain: None    Food insecurity     Worry: None     Inability: None    Transportation needs     Medical: None     Non-medical: None   Tobacco Use    Smoking status: Never Smoker    Smokeless tobacco: Never Used   Substance and Sexual Activity    Alcohol use: No    Drug use: No    Sexual activity: None   Lifestyle    Physical activity     Days per week: None     Minutes per session: None    Stress: None   Relationships    Social connections     Talks on phone: None     Gets together: None     Attends [employer] service: None     Active member of club or organization: None     Attends meetings of clubs or organizations: None     Relationship status: None    Intimate partner violence     Fear of current or ex partner: None     Emotionally abused: None     Physically abused: None     Forced sexual activity: None   Other Topics Concern    None   Social History Narrative    None           Review of Systems:  Constitutional:  Denies fever, chills, weight loss or weakness   Eyes:  Denies photophobia or discharge   HENT:  Denies sore throat or ear pain   Respiratory:  Denies cough or shortness of breath   Cardiovascular:  Denies chest pain, palpitations or swelling   GI: Positive for right lower quadrant abdominal pain, nausea and diarrhea  Musculoskeletal:  Denies back pain   Skin:  Denies rash   Neurologic:  Denies headache, focal weakness or sensory changes   Endocrine:  Denies polyuria or polydypsia   Lymphatic:  Denies swollen glands   Psychiatric:  Denies depression, suicidal ideation or homicidal ideation   All systems negative except as marked. PHYSICAL EXAM    VITAL SIGNS: BP (!) 175/82   Pulse 89   Temp 98.4 °F (36.9 °C)   Resp 20   Ht 5' 8\" (1.727 m)   Wt 243 lb (110.2 kg)   SpO2 98%   BMI 36.95 kg/m²    Constitutional: Elderly female in no acute distress hENT:  Normocephalic, Atraumatic, Bilateral external ears normal, Oropharynx moist, No oral exudates, Nose normal. Neck- Normal range of motion, No tenderness, Supple, No stridor. Eyes:  PERRL, EOMI, Conjunctiva normal, No discharge. Respiratory:  Normal breath sounds, No respiratory distress, No wheezing, No chest tenderness. Cardiovascular:  Normal heart rate, Normal rhythm, No murmurs, No rubs, No gallops. GI: Abdomen is soft, positive bowel sounds. Patient denies tenderness in the right lower quadrant. Patient has pain in the right lower quadrant only with movement   : External genitalia appear normal, No masses or lesions. No discharge. No CVA tenderness. Musculoskeletal:  Intact distal pulses, No edema, No tenderness, No cyanosis, No clubbing. Good range of motion in all major joints. No tenderness to palpation or major deformities noted. Back- No tenderness. Integument:  Warm, Dry, No erythema, No rash. Lymphatic:  No lymphadenopathy noted. Neurologic:  Alert & oriented x 3, Normal motor function, Normal sensory function, No focal deficits noted.    Psychiatric:  Affect normal, Judgment normal, Mood normal.     EKG        RADIOLOGY    No orders to display       PROCEDURES        Labs  Labs Reviewed   CBC WITH AUTO DIFFERENTIAL - Abnormal; Notable for the following components:       Result Value    RBC 3.57 (*)     Hemoglobin 11.1 (*) Hematocrit 34.3 (*)     All other components within normal limits   COMPREHENSIVE METABOLIC PANEL - Abnormal; Notable for the following components:    Glucose 181 (*)     CREATININE 1.35 (*)     Total Protein 5.8 (*)     Alb 3.3 (*)     GFR Non- 39 (*)     GFR  47 (*)     All other components within normal limits   TROPONIN   LIPASE   LACTIC ACID, PLASMA   URINALYSIS       .per      Summation      Patient Course: Patient is pain-free at rest.  Patient has pain in the right lower abdomen with movement only. Labs with no acute findings. Patient will be sent home to be on clear liquids today. Advance as tolerated. Warning signs were discussed. Return to ED if worse. ED Medications administered this visit:    Medications   0.9 % sodium chloride bolus (1,000 mLs Intravenous New Bag 9/1/20 3187)   ondansetron (ZOFRAN) injection 4 mg (4 mg Intravenous Given 9/1/20 7538)       New Prescriptions from this visit:    New Prescriptions    No medications on file       Follow-up:  [facility] ED  [address] [address]  [phone]    As needed, If symptoms worsen        Final Impression:   1. Nausea and vomiting, intractability of vomiting not specified, unspecified vomiting type    2.  RLQ abdominal pain               (Please note that portions of this note were completed with a voice recognition program.  Efforts were made to edit the dictations but occasionally words are mis-transcribed.)        Deborah Morales MD  09/01/20 5645

## 2020-09-02 ENCOUNTER — TELEPHONE (OUTPATIENT)
Dept: PHARMACY | Age: 75
End: 2020-09-02

## 2020-09-02 NOTE — TELEPHONE ENCOUNTER
COVID-19 phone screening     Call placed to screen patient prior to upcoming Medication Management visit for Anticoagulation. Does patient have fever and/or lower respiratory symptoms (SOB, difficulty breathing, cough)? [] Yes    [x] No    If yes, complete Travel Screening and ask the following:   [] [Fever OR s/sxs of lower respiratory illness]  AND  [close contact with lab-confirmed COVID-19 patient within 14 days of symptom onset   [] [Fever AND s/sxs of lower respiratory illness requiring hospitalization] AND [history of travel to [place], [place], [place], [facility], [place] within 14 days of sx onset]  [] [Fever with severe acute lower respiratory illness (I.e. PNA, ARDS) requiring hospitalization and without alternative explanatory diagnosis (I.e. Influenza)] AND [no source of exposure identified]    [x] None of the following; patient confirmed for regularly scheduled INR check and instructed to call the clinic immediately if any symptoms develop prior to upcoming appointment. No

## 2020-09-03 ENCOUNTER — HOSPITAL ENCOUNTER (OUTPATIENT)
Dept: PHARMACY | Age: 75
Setting detail: THERAPIES SERIES
Discharge: HOME | End: 2020-09-03
Payer: MEDICARE

## 2020-09-03 VITALS — TEMPERATURE: 97.7 F

## 2020-09-03 LAB — INR BLD: 2.4

## 2020-09-03 PROCEDURE — 85610 PROTHROMBIN TIME: CPT

## 2020-09-03 PROCEDURE — 99211 OFF/OP EST MAY X REQ PHY/QHP: CPT

## 2020-09-03 NOTE — PROGRESS NOTES
Fingerstick INR drawn per clinic protocol. Patient states no visible blood in urine and no black tarry stool. Ragini missed 6 doses of warfarin 815 through 8/20 prior to a parathyroidectomy on 8/20 by Dr. Cristina Beckwith. Alberto Aldridge came to [facility] ED on 9/1 for nausea and vomiting. No change in other maintenance medications or in diet. Alberto Aldridge states she will be going off warfarin again on 9/10 for an injection by Dr. Abhi Allison on 9/15. Alberto Aldridge has had 13 doses of warfarin prior to today's INR check. Since Ragini's INR is therapeutic today, we will continue current weekly warfarin regimen and recheck INR in 4 week(s). Patient acknowledges working in consult agreement with pharmacist as referred by his/her physician.           CLINICAL PHARMACY CONSULT: MED RECONCILIATION/REVIEW ADDENDUM    For Pharmacy Admin Tracking Only    PHSO: Yes  Total # of Interventions Recommended: 0  - Maintenance Safety Lab Monitoring #: 1  Total Interventions Accepted: 0  Time Spent (min): 400 [facility], 251 [facility]

## 2020-09-30 ENCOUNTER — TELEPHONE (OUTPATIENT)
Dept: PHARMACY | Age: 75
End: 2020-09-30

## 2020-09-30 NOTE — TELEPHONE ENCOUNTER
Called to remind pt of appt time, date, drive thru process and mask.  No answer- Unable to leave VM, phone just keeps ringing 9/30/20 8645

## 2020-10-01 ENCOUNTER — HOSPITAL ENCOUNTER (OUTPATIENT)
Dept: PHARMACY | Age: 75
Setting detail: THERAPIES SERIES
Discharge: HOME | End: 2020-10-01
Payer: MEDICARE

## 2020-10-01 VITALS — TEMPERATURE: 96.9 F

## 2020-10-01 LAB — INR BLD: 2.1

## 2020-10-01 PROCEDURE — 99211 OFF/OP EST MAY X REQ PHY/QHP: CPT

## 2020-10-01 PROCEDURE — 85610 PROTHROMBIN TIME: CPT

## 2020-10-01 NOTE — PROGRESS NOTES
Fingerstick INR drawn per clinic protocol. Patient states no visible blood in urine and no black tarry stool. Denies any missed doses of warfarin. No change in other maintenance medications or in diet. Ragini states while she was at Dr. Ronda Wakefield office her sugar was \"300\" but she states she ate little clarissa cake twice that day. Poppy Becerra states she will have stents placed in her left leg by Dr. Vianney Whitfield on 10/12 and was instructed to hold warfarin 5 days prior to the procedure. Since Ragini's INR remains therapeutic, we will continue current warfarin regimen and recheck INR in 4 week(s). Patient acknowledges working in consult agreement with pharmacist as referred by his/her physician.           CLINICAL PHARMACY CONSULT: MED RECONCILIATION/REVIEW ADDENDUM    For Pharmacy Admin Tracking Only    PHSO: Yes  Total # of Interventions Recommended: 0  - Maintenance Safety Lab Monitoring #: 1  Total Interventions Accepted: 0  Time Spent (min): 400 [facility], 251 [facility]
36.8

## 2020-10-05 ENCOUNTER — HOSPITAL ENCOUNTER (OUTPATIENT)
Dept: GENERAL RADIOLOGY | Age: 75
Discharge: HOME | End: 2020-10-07
Payer: MEDICARE

## 2020-10-05 ENCOUNTER — HOSPITAL ENCOUNTER (OUTPATIENT)
Age: 75
Discharge: HOME | End: 2020-10-05
Payer: MEDICARE

## 2020-10-05 ENCOUNTER — HOSPITAL ENCOUNTER (OUTPATIENT)
Age: 75
Discharge: HOME | End: 2020-10-07
Payer: MEDICARE

## 2020-10-05 LAB
ABSOLUTE EOS #: 0.1 K/UL (ref 0–0.4)
ABSOLUTE IMMATURE GRANULOCYTE: ABNORMAL K/UL (ref 0–0.3)
ABSOLUTE LYMPH #: 2.3 K/UL (ref 1–4.8)
ABSOLUTE MONO #: 0.5 K/UL (ref 0–1)
ALBUMIN SERPL-MCNC: 3.9 G/DL (ref 3.5–5.2)
ALBUMIN/GLOBULIN RATIO: ABNORMAL (ref 1–2.5)
ALP BLD-CCNC: 68 U/L (ref 35–104)
ALT SERPL-CCNC: 14 U/L (ref 5–33)
ANION GAP SERPL CALCULATED.3IONS-SCNC: 12 MMOL/L (ref 9–17)
AST SERPL-CCNC: 11 U/L
BASOPHILS # BLD: 1 % (ref 0–2)
BASOPHILS ABSOLUTE: 0.1 K/UL (ref 0–0.2)
BILIRUB SERPL-MCNC: 0.48 MG/DL (ref 0.3–1.2)
BUN BLDV-MCNC: 31 MG/DL (ref 8–23)
BUN/CREAT BLD: 22 (ref 9–20)
CALCIUM SERPL-MCNC: 9.2 MG/DL (ref 8.6–10.4)
CHLORIDE BLD-SCNC: 103 MMOL/L (ref 98–107)
CO2: 25 MMOL/L (ref 20–31)
CREAT SERPL-MCNC: 1.41 MG/DL (ref 0.5–0.9)
DIFFERENTIAL TYPE: YES
EOSINOPHILS RELATIVE PERCENT: 1 % (ref 0–5)
GFR AFRICAN AMERICAN: 44 ML/MIN
GFR NON-AFRICAN AMERICAN: 37 ML/MIN
GFR SERPL CREATININE-BSD FRML MDRD: ABNORMAL ML/MIN/{1.73_M2}
GFR SERPL CREATININE-BSD FRML MDRD: ABNORMAL ML/MIN/{1.73_M2}
GLUCOSE BLD-MCNC: 216 MG/DL (ref 70–99)
HCT VFR BLD CALC: 39.1 % (ref 36–46)
HEMOGLOBIN: 12.7 G/DL (ref 12–16)
IMMATURE GRANULOCYTES: ABNORMAL %
INR BLD: 1.9
LYMPHOCYTES # BLD: 23 % (ref 15–40)
MCH RBC QN AUTO: 31.2 PG (ref 26–34)
MCHC RBC AUTO-ENTMCNC: 32.5 G/DL (ref 31–37)
MCV RBC AUTO: 95.7 FL (ref 80–100)
MONOCYTES # BLD: 5 % (ref 4–8)
NRBC AUTOMATED: ABNORMAL PER 100 WBC
PARTIAL THROMBOPLASTIN TIME: 28 SEC (ref 23.9–33.8)
PDW BLD-RTO: 15.9 % (ref 12.1–15.2)
PLATELET # BLD: 277 K/UL (ref 140–450)
PLATELET ESTIMATE: ABNORMAL
PMV BLD AUTO: ABNORMAL FL (ref 6–12)
POTASSIUM SERPL-SCNC: 4.2 MMOL/L (ref 3.7–5.3)
PROTHROMBIN TIME: 21.1 SEC (ref 11.5–14.2)
RBC # BLD: 4.09 M/UL (ref 4–5.2)
RBC # BLD: ABNORMAL 10*6/UL
SEG NEUTROPHILS: 70 % (ref 47–75)
SEGMENTED NEUTROPHILS ABSOLUTE COUNT: 6.9 K/UL (ref 2.5–7)
SODIUM BLD-SCNC: 140 MMOL/L (ref 135–144)
TOTAL PROTEIN: 6.5 G/DL (ref 6.4–8.3)
WBC # BLD: 9.9 K/UL (ref 3.5–11)
WBC # BLD: ABNORMAL 10*3/UL

## 2020-10-05 PROCEDURE — 93005 ELECTROCARDIOGRAM TRACING: CPT | Performed by: THORACIC SURGERY (CARDIOTHORACIC VASCULAR SURGERY)

## 2020-10-05 PROCEDURE — 85610 PROTHROMBIN TIME: CPT

## 2020-10-05 PROCEDURE — 36415 COLL VENOUS BLD VENIPUNCTURE: CPT

## 2020-10-05 PROCEDURE — 85730 THROMBOPLASTIN TIME PARTIAL: CPT

## 2020-10-05 PROCEDURE — 71046 X-RAY EXAM CHEST 2 VIEWS: CPT

## 2020-10-05 PROCEDURE — 85025 COMPLETE CBC W/AUTO DIFF WBC: CPT

## 2020-10-05 PROCEDURE — 80053 COMPREHEN METABOLIC PANEL: CPT

## 2020-10-06 LAB
EKG ATRIAL RATE: 74 BPM
EKG P AXIS: 45 DEGREES
EKG P-R INTERVAL: 142 MS
EKG Q-T INTERVAL: 380 MS
EKG QRS DURATION: 86 MS
EKG QTC CALCULATION (BAZETT): 421 MS
EKG R AXIS: 11 DEGREES
EKG T AXIS: 43 DEGREES
EKG VENTRICULAR RATE: 74 BPM

## 2020-10-06 PROCEDURE — 93010 ELECTROCARDIOGRAM REPORT: CPT | Performed by: INTERNAL MEDICINE

## 2020-10-08 RX ORDER — ALLOPURINOL 100 MG/1
TABLET ORAL
Qty: 180 TABLET | Refills: 1 | Status: SHIPPED | OUTPATIENT
Start: 2020-10-08 | End: 2021-03-11

## 2020-10-08 RX ORDER — PROPRANOLOL HYDROCHLORIDE 60 MG/1
TABLET ORAL
Qty: 180 TABLET | Refills: 1 | Status: SHIPPED | OUTPATIENT
Start: 2020-10-08 | End: 2021-03-11

## 2020-10-08 NOTE — TELEPHONE ENCOUNTER
Last OV: 8/14/2020  Last RX: 05/14/2020   Next scheduled apt: 11/17/2020    Medication pending. Health Maintenance   Topic Date Due    Shingles Vaccine (1 of 2) 11/04/1997    Annual Wellness Visit (AWV)  05/29/2019    Flu vaccine (1) 09/01/2020    Diabetic foot exam  10/22/2020    Lipid screen  03/03/2021    Diabetic retinal exam  04/02/2021    Colon cancer screen colonoscopy  05/13/2021    A1C test (Diabetic or Prediabetic)  08/14/2021    Potassium monitoring  10/05/2021    Creatinine monitoring  10/05/2021    Breast cancer screen  08/24/2022    DTaP/Tdap/Td vaccine (2 - Td) 12/10/2025    DEXA (modify frequency per FRAX score)  Completed    Pneumococcal 65+ years Vaccine  Completed    Hepatitis C screen  Addressed    Hepatitis A vaccine  Aged Out    Hib vaccine  Aged Out    Meningococcal (ACWY) vaccine  Aged Out             (applicable per patient's age: Cancer Screenings, Depression Screening, Fall Risk Screening, Immunizations)    Hemoglobin A1C (%)   Date Value   08/14/2020 7.7   03/03/2020 7.2 (H)   09/03/2019 7.1     Microalb/Crt.  Ratio (mcg/mg creat)   Date Value   09/09/2014 7     LDL Cholesterol (mg/dL)   Date Value   03/03/2020 63     AST (U/L)   Date Value   10/05/2020 11     ALT (U/L)   Date Value   10/05/2020 14     BUN (mg/dL)   Date Value   10/05/2020 31 (H)      (goal A1C is < 7)   (goal LDL is <100) need 30-50% reduction from baseline     BP Readings from Last 3 Encounters:   09/01/20 (!) 167/77   08/14/20 110/60   05/14/20 110/64    (goal /80)      All Future Testing planned in [facility]:  Lab Frequency Next Occurrence   MRI BRAIN WO CONTRAST Once 11/13/2019   POCT INR     Protime-INR daily        Next Visit Date:  Future Appointments   Date Time Provider Natanael Austin   10/29/2020 10:30 AM Elva Jadwin MEDICATION [facility] MED [facility]   11/17/2020  1:00 PM Jessica Claire DO Elva Jadwin MED [facility]            Patient Active Problem List:     Type 2 diabetes mellitus with

## 2020-10-09 ENCOUNTER — HOSPITAL ENCOUNTER (OUTPATIENT)
Age: 75
Discharge: HOME | End: 2020-10-09
Payer: MEDICARE

## 2020-10-09 LAB
ABSOLUTE EOS #: 0.2 K/UL (ref 0–0.4)
ABSOLUTE IMMATURE GRANULOCYTE: ABNORMAL K/UL (ref 0–0.3)
ABSOLUTE LYMPH #: 2.8 K/UL (ref 1–4.8)
ABSOLUTE MONO #: 0.5 K/UL (ref 0–1)
ALBUMIN SERPL-MCNC: 3.8 G/DL (ref 3.5–5.2)
ALBUMIN/GLOBULIN RATIO: ABNORMAL (ref 1–2.5)
ALP BLD-CCNC: 68 U/L (ref 35–104)
ALT SERPL-CCNC: 16 U/L (ref 5–33)
ANION GAP SERPL CALCULATED.3IONS-SCNC: 9 MMOL/L (ref 9–17)
AST SERPL-CCNC: 11 U/L
BASOPHILS # BLD: 1 % (ref 0–2)
BASOPHILS ABSOLUTE: 0.1 K/UL (ref 0–0.2)
BILIRUB SERPL-MCNC: 0.38 MG/DL (ref 0.3–1.2)
BUN BLDV-MCNC: 28 MG/DL (ref 8–23)
BUN/CREAT BLD: 19 (ref 9–20)
CALCIUM SERPL-MCNC: 9.3 MG/DL (ref 8.6–10.4)
CHLORIDE BLD-SCNC: 102 MMOL/L (ref 98–107)
CO2: 28 MMOL/L (ref 20–31)
CREAT SERPL-MCNC: 1.45 MG/DL (ref 0.5–0.9)
DIFFERENTIAL TYPE: YES
EOSINOPHILS RELATIVE PERCENT: 2 % (ref 0–5)
GFR AFRICAN AMERICAN: 43 ML/MIN
GFR NON-AFRICAN AMERICAN: 35 ML/MIN
GFR SERPL CREATININE-BSD FRML MDRD: ABNORMAL ML/MIN/{1.73_M2}
GFR SERPL CREATININE-BSD FRML MDRD: ABNORMAL ML/MIN/{1.73_M2}
GLUCOSE BLD-MCNC: 237 MG/DL (ref 70–99)
HCT VFR BLD CALC: 39.6 % (ref 36–46)
HEMOGLOBIN: 12.8 G/DL (ref 12–16)
IMMATURE GRANULOCYTES: ABNORMAL %
INR BLD: 1.5
LYMPHOCYTES # BLD: 27 % (ref 15–40)
MCH RBC QN AUTO: 31.1 PG (ref 26–34)
MCHC RBC AUTO-ENTMCNC: 32.4 G/DL (ref 31–37)
MCV RBC AUTO: 96.1 FL (ref 80–100)
MONOCYTES # BLD: 5 % (ref 4–8)
NRBC AUTOMATED: ABNORMAL PER 100 WBC
PARTIAL THROMBOPLASTIN TIME: 25.2 SEC (ref 23.9–33.8)
PDW BLD-RTO: 15.9 % (ref 12.1–15.2)
PLATELET # BLD: 274 K/UL (ref 140–450)
PLATELET ESTIMATE: ABNORMAL
PMV BLD AUTO: ABNORMAL FL (ref 6–12)
POTASSIUM SERPL-SCNC: 4.7 MMOL/L (ref 3.7–5.3)
PROTHROMBIN TIME: 17.1 SEC (ref 11.5–14.2)
RBC # BLD: 4.12 M/UL (ref 4–5.2)
RBC # BLD: ABNORMAL 10*6/UL
SEG NEUTROPHILS: 65 % (ref 47–75)
SEGMENTED NEUTROPHILS ABSOLUTE COUNT: 6.8 K/UL (ref 2.5–7)
SODIUM BLD-SCNC: 139 MMOL/L (ref 135–144)
TOTAL PROTEIN: 6.5 G/DL (ref 6.4–8.3)
WBC # BLD: 10.3 K/UL (ref 3.5–11)
WBC # BLD: ABNORMAL 10*3/UL

## 2020-10-09 PROCEDURE — 36415 COLL VENOUS BLD VENIPUNCTURE: CPT

## 2020-10-09 PROCEDURE — 85730 THROMBOPLASTIN TIME PARTIAL: CPT

## 2020-10-09 PROCEDURE — 80053 COMPREHEN METABOLIC PANEL: CPT

## 2020-10-09 PROCEDURE — 85610 PROTHROMBIN TIME: CPT

## 2020-10-09 PROCEDURE — 85025 COMPLETE CBC W/AUTO DIFF WBC: CPT

## 2020-10-29 ENCOUNTER — TELEPHONE (OUTPATIENT)
Dept: PHARMACY | Age: 75
End: 2020-10-29

## 2020-10-29 ENCOUNTER — APPOINTMENT (OUTPATIENT)
Dept: PHARMACY | Age: 75
End: 2020-10-29
Payer: MEDICARE

## 2020-10-29 NOTE — TELEPHONE ENCOUNTER
COVID-19 phone screening     Call placed to screen patient prior to upcoming Medication Management visit for Anticoagulation. Does patient have fever and/or lower respiratory symptoms (SOB, difficulty breathing, cough)? [] Yes    [x] No    If yes, complete Travel Screening and ask the following:   [] [Fever OR s/sxs of lower respiratory illness]  AND  [close contact with lab-confirmed COVID-19 patient within 14 days of symptom onset   [] [Fever AND s/sxs of lower respiratory illness requiring hospitalization] AND [history of travel to [place], [place], [place], [facility], [place] within 14 days of sx onset]  [] [Fever with severe acute lower respiratory illness (I.e. PNA, ARDS) requiring hospitalization and without alternative explanatory diagnosis (I.e. Influenza)] AND [no source of exposure identified]    [x] None of the following; patient confirmed for regularly scheduled INR check and instructed to call the clinic immediately if any symptoms develop prior to upcoming appointment. Rotation Flap Text: The defect edges were debeveled with a #15 scalpel blade.  Given the location of the defect, shape of the defect and the proximity to free margins a rotation flap was deemed most appropriate.  Using a sterile surgical marker, an appropriate rotation flap was drawn incorporating the defect and placing the expected incisions within the relaxed skin tension lines where possible.    The area thus outlined was incised deep to adipose tissue with a #15 scalpel blade.  The skin margins were undermined to an appropriate distance in all directions utilizing iris scissors.

## 2020-10-30 ENCOUNTER — HOSPITAL ENCOUNTER (OUTPATIENT)
Dept: PHARMACY | Age: 75
Setting detail: THERAPIES SERIES
Discharge: HOME | End: 2020-10-30
Payer: MEDICARE

## 2020-10-30 VITALS
WEIGHT: 241.4 LBS | DIASTOLIC BLOOD PRESSURE: 99 MMHG | BODY MASS INDEX: 36.7 KG/M2 | HEART RATE: 79 BPM | SYSTOLIC BLOOD PRESSURE: 133 MMHG

## 2020-10-30 LAB — INR BLD: 2.7

## 2020-10-30 PROCEDURE — 85610 PROTHROMBIN TIME: CPT

## 2020-10-30 PROCEDURE — 99211 OFF/OP EST MAY X REQ PHY/QHP: CPT

## 2020-11-04 RX ORDER — GLIPIZIDE 5 MG/1
TABLET, FILM COATED, EXTENDED RELEASE ORAL
Qty: 90 TABLET | Refills: 1 | Status: SHIPPED | OUTPATIENT
Start: 2020-11-04 | End: 2020-11-17 | Stop reason: SDUPTHER

## 2020-11-04 NOTE — TELEPHONE ENCOUNTER
ELIZABETH DOS SANTOS [facility]   12/11/2020 11:00 AM ELIZABETH MEDICATION [facility] MED [facility] Elizabeth            Patient Active Problem List:     Type 2 diabetes mellitus with stage 3 chronic kidney disease, without long-term current use of insulin (HCC)     Essential hypertension, benign     Esophageal reflux     Irritable bowel syndrome     Seasonal allergies     Long term current use of anticoagulant therapy     Gout     Rectocele     Tubular adenoma of colon     Hiatal hernia     Sigmoid diverticulosis     Chronic back pain     Constipation     Hypomagnesemia     History of colon polyps     Family history of colon cancer     History of recurrent deep vein thrombosis (DVT)

## 2020-11-17 ENCOUNTER — OFFICE VISIT (OUTPATIENT)
Dept: FAMILY MEDICINE CLINIC | Age: 75
End: 2020-11-17
Payer: MEDICARE

## 2020-11-17 VITALS
WEIGHT: 246 LBS | DIASTOLIC BLOOD PRESSURE: 74 MMHG | HEART RATE: 54 BPM | BODY MASS INDEX: 37.28 KG/M2 | HEIGHT: 68 IN | OXYGEN SATURATION: 95 % | SYSTOLIC BLOOD PRESSURE: 128 MMHG

## 2020-11-17 PROBLEM — N18.30 STAGE 3 CHRONIC KIDNEY DISEASE (HCC): Status: ACTIVE | Noted: 2017-02-10

## 2020-11-17 LAB — HBA1C MFR BLD: 7.8 %

## 2020-11-17 PROCEDURE — 1090F PRES/ABSN URINE INCON ASSESS: CPT | Performed by: FAMILY MEDICINE

## 2020-11-17 PROCEDURE — 4040F PNEUMOC VAC/ADMIN/RCVD: CPT | Performed by: FAMILY MEDICINE

## 2020-11-17 PROCEDURE — 2022F DILAT RTA XM EVC RTNOPTHY: CPT | Performed by: FAMILY MEDICINE

## 2020-11-17 PROCEDURE — 1123F ACP DISCUSS/DSCN MKR DOCD: CPT | Performed by: FAMILY MEDICINE

## 2020-11-17 PROCEDURE — 90686 IIV4 VACC NO PRSV 0.5 ML IM: CPT | Performed by: FAMILY MEDICINE

## 2020-11-17 PROCEDURE — 99213 OFFICE O/P EST LOW 20 MIN: CPT | Performed by: FAMILY MEDICINE

## 2020-11-17 PROCEDURE — G8399 PT W/DXA RESULTS DOCUMENT: HCPCS | Performed by: FAMILY MEDICINE

## 2020-11-17 PROCEDURE — G8482 FLU IMMUNIZE ORDER/ADMIN: HCPCS | Performed by: FAMILY MEDICINE

## 2020-11-17 PROCEDURE — 83036 HEMOGLOBIN GLYCOSYLATED A1C: CPT | Performed by: FAMILY MEDICINE

## 2020-11-17 PROCEDURE — G0008 ADMIN INFLUENZA VIRUS VAC: HCPCS | Performed by: FAMILY MEDICINE

## 2020-11-17 PROCEDURE — G0438 PPPS, INITIAL VISIT: HCPCS | Performed by: FAMILY MEDICINE

## 2020-11-17 PROCEDURE — 1036F TOBACCO NON-USER: CPT | Performed by: FAMILY MEDICINE

## 2020-11-17 PROCEDURE — G8427 DOCREV CUR MEDS BY ELIG CLIN: HCPCS | Performed by: FAMILY MEDICINE

## 2020-11-17 PROCEDURE — G8417 CALC BMI ABV UP PARAM F/U: HCPCS | Performed by: FAMILY MEDICINE

## 2020-11-17 PROCEDURE — 3051F HG A1C>EQUAL 7.0%<8.0%: CPT | Performed by: FAMILY MEDICINE

## 2020-11-17 PROCEDURE — 3017F COLORECTAL CA SCREEN DOC REV: CPT | Performed by: FAMILY MEDICINE

## 2020-11-17 RX ORDER — GLIPIZIDE 5 MG/1
TABLET, FILM COATED, EXTENDED RELEASE ORAL
Qty: 90 TABLET | Refills: 1
Start: 2020-11-17 | End: 2021-03-10

## 2020-11-17 RX ORDER — LISINOPRIL 10 MG/1
10 TABLET ORAL DAILY
Qty: 90 TABLET | Refills: 1 | Status: SHIPPED | OUTPATIENT
Start: 2020-11-17 | End: 2021-01-12 | Stop reason: SINTOL

## 2020-11-17 RX ORDER — FUROSEMIDE 20 MG/1
20 TABLET ORAL DAILY
Qty: 30 TABLET | Refills: 3 | Status: SHIPPED | OUTPATIENT
Start: 2020-11-17 | End: 2021-02-09 | Stop reason: SDUPTHER

## 2020-11-17 RX ORDER — FLUTICASONE PROPIONATE 50 MCG
2 SPRAY, SUSPENSION (ML) NASAL DAILY PRN
Qty: 3 BOTTLE | Refills: 1 | Status: SHIPPED | OUTPATIENT
Start: 2020-11-17 | End: 2022-06-13

## 2020-11-17 RX ORDER — OMEPRAZOLE 20 MG/1
CAPSULE, DELAYED RELEASE ORAL
Qty: 90 CAPSULE | Refills: 1 | Status: SHIPPED | OUTPATIENT
Start: 2020-11-17 | End: 2021-03-24

## 2020-11-17 SDOH — ECONOMIC STABILITY: FOOD INSECURITY: WITHIN THE PAST 12 MONTHS, YOU WORRIED THAT YOUR FOOD WOULD RUN OUT BEFORE YOU GOT MONEY TO BUY MORE.: NEVER TRUE

## 2020-11-17 SDOH — ECONOMIC STABILITY: INCOME INSECURITY: HOW HARD IS IT FOR YOU TO PAY FOR THE VERY BASICS LIKE FOOD, HOUSING, MEDICAL CARE, AND HEATING?: NOT HARD AT ALL

## 2020-11-17 SDOH — ECONOMIC STABILITY: FOOD INSECURITY: WITHIN THE PAST 12 MONTHS, THE FOOD YOU BOUGHT JUST DIDN'T LAST AND YOU DIDN'T HAVE MONEY TO GET MORE.: NEVER TRUE

## 2020-11-17 ASSESSMENT — PATIENT HEALTH QUESTIONNAIRE - PHQ9
1. LITTLE INTEREST OR PLEASURE IN DOING THINGS: 0
SUM OF ALL RESPONSES TO PHQ QUESTIONS 1-9: 0
SUM OF ALL RESPONSES TO PHQ QUESTIONS 1-9: 0
2. FEELING DOWN, DEPRESSED OR HOPELESS: 0
SUM OF ALL RESPONSES TO PHQ QUESTIONS 1-9: 0
SUM OF ALL RESPONSES TO PHQ9 QUESTIONS 1 & 2: 0

## 2020-11-17 ASSESSMENT — LIFESTYLE VARIABLES: HOW OFTEN DO YOU HAVE A DRINK CONTAINING ALCOHOL: 0

## 2020-11-17 NOTE — PROGRESS NOTES
Name: Reyes Garcia  : 1945         Chief Complaint:     Chief Complaint   Patient presents with    Medicare AWV    Diabetes       History of Present Illness:      Reyes Garcia is a 70 y.o.  female who presents with Medicare AWV and Diabetes      HPI     Had vascular procedure LLE but doesn't think it worked because she still has swelling and numbness in the leg and foot. Rare pain just in toes, no pain in leg. Wears compression stockings intermittently. Having procedure soon to remove floaters from R eye by Dr Heri Freeman. Had same on L eye 10 yrs ago. F/u DM. Some high readings lately. Eats too many sweets. Dinner before 5pm and no snack afterwards. S/p parathyroidectomy  and thinks it's helped, no longer seeing things. Doesn't have to follow with surgeon anymore. Past Medical History:     Past Medical History:   Diagnosis Date    Allergic rhinitis     Chronic kidney disease, stage III (moderate)     Deep vein [facility] (DVT) ([facility]) 10/24/2012    Elbow fracture, left     Fall     Gastric ulcer     Gout     Hx of blood clots     Following last back surgery     Hypertension     Nausea & vomiting     Presence of IVC filter     Type II or unspecified type diabetes mellitus without mention of complication, not stated as uncontrolled         Past Surgical History:     Past Surgical History:   Procedure Laterality Date    BACK SURGERY      BACK SURGERY      BACK SURGERY      BACK SURGERY      BACK SURGERY      BACK SURGERY      Fusion     BREAST BIOPSY Left     BREAST BIOPSY Right     CARDIAC CATHETERIZATION Left 2018    Dr. Ronit Kiser @ [address] [place] [address] [facility]--There is 30% disease of the origin of the lateral anterior descending. Mild 10% -20% plaque disease in the circumflex & right coronary artery. Normal left ventricular functino, ejection fraction of 60%.       CARPAL TUNNEL RELEASE Right     CATARACT REMOVAL Right     CATARACT REMOVAL Left     COLONOSCOPY      COLONOSCOPY N/A 5/13/2019    COLONOSCOPY POLYPECTOMY HOT BIOPSY performed by Peyton Power MD at [address] [place] [address]      \"Multiple\"     EYE SURGERY Right     Removed fluid from behind eye    HYSTERECTOMY      KIDNEY SURGERY      left side 1/2 of kidney removed    PARTIAL NEPHRECTOMY Left     RETINAL DETACHMENT SURGERY Left     ROTATOR CUFF REPAIR      TOTAL KNEE ARTHROPLASTY Right     TOTAL KNEE ARTHROPLASTY Left     UPPER GASTROINTESTINAL ENDOSCOPY  2014    VENA CAVA FILTER PLACEMENT          Medications:       Prior to Admission medications    Medication Sig Start Date End Date Taking? Authorizing Provider   omeprazole (PRILOSEC) 20 MG delayed release capsule TAKE 1 CAPSULE EVERY DAY 11/17/20  Yes Loye Manual, DO   lisinopril (PRINIVIL;ZESTRIL) 10 MG tablet Take 1 tablet by mouth daily 11/17/20  Yes Loye Manual, DO   fluticasone (FLONASE) 50 MCG/ACT nasal spray 2 sprays by Nasal route daily as needed for Rhinitis 11/17/20  Yes Mamieye Manual, DO   glipiZIDE (GLUCOTROL XL) 5 MG extended release tablet 10 mg po daily with breakfast 11/17/20  Yes Mamieye Manual, DO   furosemide (LASIX) 20 MG tablet Take 1 tablet by mouth daily As needed for swelling 11/17/20  Yes Loye Manual, DO   allopurinol (ZYLOPRIM) 100 MG tablet TAKE 1 TABLET TWICE DAILY 10/8/20  Yes Loye Manual, DO   propranolol (INDERAL) 60 MG tablet TAKE 1 TABLET TWICE DAILY 10/8/20  Yes Loye Manual, DO   cyclobenzaprine (FLEXERIL) 10 MG tablet Take 1 tablet by mouth 2 times daily as needed (Arthritic pain.) 8/14/20  Yes Loye Manual, DO   isosorbide dinitrate (ISORDIL) 5 MG tablet TAKE 1 TABLET TWICE DAILY 8/14/20  Yes Mamieye Manual, DO   warfarin (COUMADIN) 5 MG tablet Take 1/2 tablet on Wednesdays and Saturdays, and 1 whole tablet daily all other days or as directed by Roger Raines Medication Management.  7/28/20  Yes Mamieye Manual, DO   gabapentin (NEURONTIN) 100 MG capsule Take 200 mg by mouth 2 times daily as needed. Yes Historical Provider, MD   triamcinolone (ARISTOCORT) 0.5 % cream Apply topically a thin layer to the affected area (s) TWICE DAILY 1/31/20  Yes Historical Provider, MD   blood glucose test strips (TRUE METRIX BLOOD GLUCOSE TEST) strip Use once daily and as needed. 9/3/19  Yes Sheri Valdez DO   Lancets MISC 1 each by Does not apply route daily 2/1/17  Yes Sheri Valdez DO   acetaminophen (TYLENOL) 650 MG CR tablet Take 1,300 mg by mouth every 8 hours as needed for Pain   Yes Historical Provider, MD   Magnesium 400 MG TABS Take 800 mg by mouth nightly    Yes Historical Provider, MD   folic acid (FOLVITE) 121 MCG tablet Take 400 mcg by mouth daily 2/8/16  Yes Historical Provider, MD        Allergies:       Codeine; Percocet [oxycodone-acetaminophen]; Adhesive tape; and Norco [hydrocodone-acetaminophen]    Social History:     Tobacco:    reports that she has never smoked. She has never used smokeless tobacco.  Alcohol:      reports no history of alcohol use. Drug Use:  reports no history of drug use. Family History:     Family History   Problem Relation Age of Onset    Diabetes Father     Cancer Father         Prostate Cancer    Cancer Paternal Aunt         Colon Cancer       Review of Systems:     Positive and Negative as described in HPI    Review of Systems   Constitutional: Negative. HENT: Negative. Eyes: Negative. Respiratory: Negative. Cardiovascular: Negative. Gastrointestinal: Negative. Musculoskeletal: Negative. Skin: Negative. Hematological: Negative. Psychiatric/Behavioral: Negative. Physical Exam:     Vitals:  /74   Pulse 54   Ht 5' 8\" (1.727 m)   Wt 246 lb (111.6 kg)   SpO2 95%   BMI 37.40 kg/m²   Physical Exam  Vitals signs and nursing note reviewed. Constitutional:       General: She is not in acute distress. Appearance: Normal appearance. She is well-developed. She is not ill-appearing.    HENT:      Head: Normocephalic and atraumatic. Right Ear: Tympanic membrane and ear canal normal.      Left Ear: Tympanic membrane and ear canal normal.      Nose: Nose normal.   Eyes:      Conjunctiva/sclera: Conjunctivae normal.   Neck:      Musculoskeletal: Neck supple. Cardiovascular:      Rate and Rhythm: Normal rate and regular rhythm. Heart sounds: Normal heart sounds. Pulmonary:      Effort: Pulmonary effort is normal.      Breath sounds: Normal breath sounds. Musculoskeletal:      Comments: ambulating with walker   Lymphadenopathy:      Cervical: No cervical adenopathy. Skin:     General: Skin is warm and dry. Comments: Healed L anterior neck surgical scar, no infection   Neurological:      Mental Status: She is alert and oriented to person, place, and time. Psychiatric:         Judgment: Judgment normal.         Data:     Lab Results   Component Value Date     10/09/2020    K 4.7 10/09/2020     10/09/2020    CO2 28 10/09/2020    BUN 28 10/09/2020    CREATININE 1.45 10/09/2020    GLUCOSE 237 10/09/2020    PROT 6.5 10/09/2020    LABALBU 3.8 10/09/2020    BILITOT 0.38 10/09/2020    ALKPHOS 68 10/09/2020    AST 11 10/09/2020    ALT 16 10/09/2020     Lab Results   Component Value Date    WBC 10.3 10/09/2020    RBC 4.12 10/09/2020    HGB 12.8 10/09/2020    HCT 39.6 10/09/2020    MCV 96.1 10/09/2020    MCH 31.1 10/09/2020    MCHC 32.4 10/09/2020    RDW 15.9 10/09/2020     10/09/2020    MPV NOT REPORTED 10/09/2020     Lab Results   Component Value Date    TSH 3.26 02/03/2020     Lab Results   Component Value Date    CHOL 156 03/03/2020    HDL 71 03/03/2020    LABA1C 7.8 11/17/2020         Assessment & Plan:        Diagnosis Orders   1. Routine general medical examination at a health care facility     2.  Type 2 diabetes mellitus with stage 3 chronic kidney disease, without long-term current use of insulin, unspecified whether stage 3a or 3b CKD (HCC)  POCT glycosylated hemoglobin (Hb A1C) Preventive Recommendations:    · A preventive eye exam performed by an eye specialist is recommended every 1-2 years to screen for glaucoma; cataracts, macular degeneration, and other eye disorders. · A preventive dental visit is recommended every 6 months. · Try to get at least 150 minutes of exercise per week or 10,000 steps per day on a pedometer . · Order or download the FREE \"Exercise & Physical Activity: Your Everyday Guide\" from [employer]. Call 8-656.597.8744 or search [employer] online. · You need 9110-3466 mg of calcium and 5389-6531 IU of vitamin D per day. It is possible to meet your calcium requirement with diet alone, but a vitamin D supplement is usually necessary to meet this goal.  · When exposed to the sun, use a sunscreen that protects against both UVA and UVB radiation with an SPF of 30 or greater. Reapply every 2 to 3 hours or after sweating, drying off with a towel, or swimming. · Always wear a seat belt when traveling in a car. Always wear a helmet when riding a bicycle or motorcycle. Ragini received counseling on the following healthy behaviors: medication adherence  Reviewed prior labs and health maintenance. Continue current medications, diet and exercise. Discussed use, benefit, and side effects of prescribed medications. Barriers to medication compliance addressed. Patient given educational materials - see patient instructions. All patient questions answered. Patient voiced understanding.      Electronically signed by Mica Dial DO on 11/19/2020 at 10:48 PM   [address]  Dept: [phone]

## 2020-11-17 NOTE — PATIENT INSTRUCTIONS
Personalized Preventive Plan for Malathi Acevedo - 11/17/2020  Medicare offers a range of preventive health benefits. Some of the tests and screenings are paid in full while other may be subject to a deductible, co-insurance, and/or copay. Some of these benefits include a comprehensive review of your medical history including lifestyle, illnesses that may run in your family, and various assessments and screenings as appropriate. After reviewing your medical record and screening and assessments performed today your provider may have ordered immunizations, labs, imaging, and/or referrals for you. A list of these orders (if applicable) as well as your Preventive Care list are included within your After Visit Summary for your review. Other Preventive Recommendations:    · A preventive eye exam performed by an eye specialist is recommended every 1-2 years to screen for glaucoma; cataracts, macular degeneration, and other eye disorders. · A preventive dental visit is recommended every 6 months. · Try to get at least 150 minutes of exercise per week or 10,000 steps per day on a pedometer . · Order or download the FREE \"Exercise & Physical Activity: Your Everyday Guide\" from [employer]. Call 8-223.660.9386 or search [employer] online. · You need 4399-7248 mg of calcium and 2484-1378 IU of vitamin D per day. It is possible to meet your calcium requirement with diet alone, but a vitamin D supplement is usually necessary to meet this goal.  · When exposed to the sun, use a sunscreen that protects against both UVA and UVB radiation with an SPF of 30 or greater. Reapply every 2 to 3 hours or after sweating, drying off with a towel, or swimming. · Always wear a seat belt when traveling in a car. Always wear a helmet when riding a bicycle or motorcycle.

## 2020-11-17 NOTE — PROGRESS NOTES
Medicare Annual Wellness Visit  Name: Uriel Sharp Date: 2020   MRN: [id number] Sex: Female   Age: 70 y.o. Ethnicity: Non-/Non    : 1945 Race: Luis Caraballo is here for Medicare AWV and Diabetes    Screenings for behavioral, psychosocial and functional/safety risks, and cognitive dysfunction are all negative except as indicated below. These results, as well as other patient data from the [address] form, are documented in Flowsheets linked to this Encounter. Allergies   Allergen Reactions    Codeine Itching    Percocet [Oxycodone-Acetaminophen] Itching and Nausea Only    Adhesive Tape Itching, Rash and Other (See Comments)     Tape \"takes the skin off\"    Norco [Hydrocodone-Acetaminophen] Nausea And Vomiting       Prior to Visit Medications    Medication Sig Taking?  Authorizing Provider   omeprazole (PRILOSEC) 20 MG delayed release capsule TAKE 1 CAPSULE EVERY DAY Yes Mumtaz Avitia DO   lisinopril (PRINIVIL;ZESTRIL) 10 MG tablet Take 1 tablet by mouth daily Yes Mumtaz Avitia DO   fluticasone (FLONASE) 50 MCG/ACT nasal spray 2 sprays by Nasal route daily as needed for Rhinitis Yes Mumtaz Avitia DO   glipiZIDE (GLUCOTROL XL) 5 MG extended release tablet 10 mg po daily with breakfast Yes Mumtaz Avitia DO   furosemide (LASIX) 20 MG tablet Take 1 tablet by mouth daily As needed for swelling Yes Mumtaz Avitia DO   allopurinol (ZYLOPRIM) 100 MG tablet TAKE 1 TABLET TWICE DAILY Yes Mumtaz Avitia DO   propranolol (INDERAL) 60 MG tablet TAKE 1 TABLET TWICE DAILY Yes Mumtaz Avitia DO   cyclobenzaprine (FLEXERIL) 10 MG tablet Take 1 tablet by mouth 2 times daily as needed (Arthritic pain.) Yes Mumtaz Avitia DO   isosorbide dinitrate (ISORDIL) 5 MG tablet TAKE 1 TABLET TWICE DAILY Yes Mumtaz Avitia DO   warfarin (COUMADIN) 5 MG tablet Take 1/2 tablet on  and , and 1 whole tablet daily all other days or as directed by [facility] Medication Management. Yes Venus Katelynn, DO   gabapentin (NEURONTIN) 100 MG capsule Take 200 mg by mouth 2 times daily as needed. Yes Historical Provider, MD   triamcinolone (ARISTOCORT) 0.5 % cream Apply topically a thin layer to the affected area (s) TWICE DAILY Yes Historical Provider, MD   blood glucose test strips (TRUE METRIX BLOOD GLUCOSE TEST) strip Use once daily and as needed. Yes Camila Katelynn, DO   Lancets MISC 1 each by Does not apply route daily Yes Venus Katelynn, DO   acetaminophen (TYLENOL) 650 MG CR tablet Take 1,300 mg by mouth every 8 hours as needed for Pain Yes Historical Provider, MD   Magnesium 400 MG TABS Take 800 mg by mouth nightly  Yes Historical Provider, MD   folic acid (FOLVITE) 436 MCG tablet Take 400 mcg by mouth daily Yes Historical Provider, MD       Past Medical History:   Diagnosis Date    Allergic rhinitis     Chronic kidney disease, stage III (moderate)     Deep vein thrombosis (DVT) ([facility] [address] 10/24/2012    Elbow fracture, left     Fall     Gastric ulcer     Gout     Hx of blood clots     Following last back surgery     Hypertension     Nausea & vomiting     Presence of IVC filter     Type II or unspecified type diabetes mellitus without mention of complication, not stated as uncontrolled        Past Surgical History:   Procedure Laterality Date    BACK SURGERY      BACK SURGERY      BACK SURGERY      BACK SURGERY      BACK SURGERY      BACK SURGERY      Fusion     BREAST BIOPSY Left     BREAST BIOPSY Right     CARDIAC CATHETERIZATION Left 03/07/2018    Dr. Rose Cabrera @ [facility]--There is 30% disease of the origin of the lateral anterior descending. Mild 10% -20% plaque disease in the circumflex & right coronary artery. Normal left ventricular functino, ejection fraction of 60%.       CARPAL TUNNEL RELEASE Right     CATARACT REMOVAL Right     CATARACT REMOVAL Left     COLONOSCOPY  2014    COLONOSCOPY N/A 5/13/2019 COLONOSCOPY POLYPECTOMY HOT BIOPSY performed [facility] Rosa Rivera [facility]      \"Multiple\"     EYE SURGERY Right     Removed fluid from behind eye    HYSTERECTOMY      KIDNEY SURGERY      left side 1/2 of kidney removed    PARTIAL NEPHRECTOMY Left     RETINAL DETACHMENT SURGERY Left     ROTATOR CUFF REPAIR      TOTAL KNEE ARTHROPLASTY Right     TOTAL KNEE ARTHROPLASTY Left     UPPER GASTROINTESTINAL ENDOSCOPY  2014    VENA CAVA FILTER PLACEMENT         Family History   Problem Relation Age of Onset    Diabetes Father     Cancer Father         Prostate Cancer    Cancer Paternal Aunt         Colon Cancer       CareTeam (Including outside providers/suppliers regularly involved in providing care):   Patient Care Team:  Tamela Garner DO as PCP - [address] [place], [place] [address]DO as PCP - [facility]  Annia Valle MD as Consulting Physician (Obstetrics & Gynecology)  Lauren Ruby MD (General Surgery)    Wt Readings from Last 3 Encounters:   11/17/20 246 lb (111.6 kg)   10/30/20 241 lb 6.4 oz (109.5 kg)   09/01/20 243 lb (110.2 kg)     Vitals:    11/17/20 1306   BP: 128/74   Pulse: 54   SpO2: 95%   Weight: 246 lb (111.6 kg)   Height: 5' 8\" (1.727 m)     Body mass index is 37.4 kg/m². Based upon direct observation of the patient, evaluation of cognition reveals recent and remote memory intact.     General Appearance: alert and oriented to person, place and time, well developed and well- nourished, in no acute distress  Skin: warm and dry, no rash or erythema  Head: normocephalic and atraumatic  Eyes: pupils equal, round, and reactive to light, extraocular eye movements intact, conjunctivae normal  ENT: tympanic membrane, external ear and ear canal normal bilaterally, nose without deformity, nasal mucosa and turbinates normal without polyps  Neck: supple and non-tender without mass, no thyromegaly or thyroid nodules, no cervical lymphadenopathy  Pulmonary/Chest: clear to auscultation bilaterally- no wheezes, rales or rhonchi, normal air movement, no respiratory distress  Cardiovascular: normal rate, regular rhythm, normal S1 and S2, no murmurs, rubs, clicks, or gallops, distal pulses intact, no carotid bruits  Abdomen: soft, non-tender, non-distended, normal bowel sounds, no masses or organomegaly  Extremities: no cyanosis, clubbing or edema  Musculoskeletal: normal range of motion, no joint swelling, deformity or tenderness  Neurologic: reflexes normal and symmetric, no cranial nerve deficit, gait, coordination and speech normal    Patient's complete Health Risk Assessment and screening values have been reviewed and are found in Flowsheets. The following problems were reviewed today and where indicated follow up appointments were made and/or referrals ordered. Positive Risk Factor Screenings with Interventions:     Fall Risk:  2 or more falls in past year?: no  Fall with injury in past year?: (!) yes  Fall Risk Interventions:    · Home safety tips provided    General Health and ACP:  General  In general, how would you say your health is?: Fair  In the past 7 days, have you experienced any of the following?  New or Increased Pain, New or Increased Fatigue, Loneliness, Social Isolation, Stress or Anger?: (!) New or Increased Fatigue  Do you get the social and emotional support that you need?: Yes  Do you have a Living Will?: (!) No  Advance Directives     Power of  Living Will ACP-Advance Directive ACP-Power of     Not on File Not on File Filed [address] [place] [address] Risk Interventions:  · No Living Will: Patient declines ACP discussion/assistance    Health Habits/Nutrition:  Health Habits/Nutrition  Do you exercise for at least 20 minutes 2-3 times per week?: (!) No  Have you lost any weight without trying in the past 3 months?: No  Do you eat fewer than 2 meals per day?: No  Have you seen a dentist within the past year?: (!) No  Body mass index: (!) 37.4  Health Habits/Nutrition Interventions:  · Inadequate physical activity:  patient is not ready to increase his/her physical activity level at this time    Hearing/Vision:  No exam data present  Hearing/Vision  Do you or your family notice any trouble with your hearing?: No  Do you have difficulty driving, watching TV, or doing any of your daily activities because of your eyesight?: (!) Yes  Have you had an eye exam within the past year?: Yes  Hearing/Vision Interventions:  · Vision concerns:  seeing ophthalmology and having procedure soon    ADL:  ADLs  In the past 7 days, did you need help from others to perform any of the following everyday activities? Eating, dressing, grooming, bathing, toileting, or walking/balance?: None  In the past 7 days, did you need help from others to take care of any of the following?  Laundry, housekeeping, banking/finances, shopping, telephone use, food preparation, transportation, or taking medications?: (!) Banking/Finances, Transportation  ADL Interventions:  · Patient declines any further evaluation/treatment for this issue    Personalized Preventive Plan   Current Health Maintenance Status  Immunization History   Administered Date(s) Administered    Influenza Virus Vaccine 10/14/2014, 01/06/2016, 10/10/2017, 01/04/2019    Influenza, Jackman Jacks, IM, (6 mo and older Fluzone, Flulaval, Fluarix and 3 yrs and older Afluria) 12/08/2016    Influenza, Quadv, IM, PF (6 mo and older Fluzone, Flulaval, Fluarix, and 3 yrs and older Afluria) 10/10/2017, 01/04/2019, 10/31/2019    Pneumococcal Conjugate 13-valent ([id number]) 06/30/2016    Pneumococcal Polysaccharide ([id number]) 12/04/2014    Tdap (Boostrix, Adacel) 12/10/2015        Health Maintenance   Topic Date Due    Shingles Vaccine (1 of 2) 11/04/1997    Annual Wellness Visit (AWV)  05/29/2019    Flu vaccine (1) 09/01/2020    Diabetic foot exam  10/22/2020    Lipid screen  03/03/2021    Diabetic retinal exam  04/02/2021    Colon cancer screen colonoscopy  05/13/2021    A1C test (Diabetic or Prediabetic)  08/14/2021    Potassium monitoring  10/09/2021    Creatinine monitoring  10/09/2021    Breast cancer screen  08/24/2022    DTaP/Tdap/Td vaccine (2 - Td) 12/10/2025    DEXA (modify frequency per FRAX score)  Completed    Pneumococcal 65+ years Vaccine  Completed    Hepatitis C screen  Addressed    Hepatitis A vaccine  Aged Out    Hib vaccine  Aged Out    Meningococcal (ACWY) vaccine  Aged Out     Recommendations for [employer] Due: see orders and patient instructions/AVS.  . Recommended screening schedule for the next 5-10 years is provided to the patient in written form: see Patient Joyce Patiño was seen today for medicare awv and diabetes. Diagnoses and all orders for this visit:    Type 2 diabetes mellitus with stage 3 chronic kidney disease, without long-term current use of insulin, unspecified whether stage 3a or 3b CKD (HCC)  -     POCT glycosylated hemoglobin (Hb A1C)    Benign hypertension with chronic kidney disease, stage III  -     omeprazole (PRILOSEC) 20 MG delayed release capsule; TAKE 1 CAPSULE EVERY DAY    Type 2 diabetes mellitus without complication, without long-term current use of insulin (HCC)  -     glipiZIDE (GLUCOTROL XL) 5 MG extended release tablet; 10 mg po daily with breakfast    Other orders  -     lisinopril (PRINIVIL;ZESTRIL) 10 MG tablet; Take 1 tablet by mouth daily  -     fluticasone (FLONASE) 50 MCG/ACT nasal spray; 2 sprays by Nasal route daily as needed for Rhinitis  -     furosemide (LASIX) 20 MG tablet;  Take 1 tablet by mouth daily As needed for swelling

## 2020-11-17 NOTE — PROGRESS NOTES
Vaccine Information Sheet, \"Influenza - Inactivated\"  given to Beatrice Cramer, or parent/legal guardian of  Beatrice Cramer and verbalized understanding. Patient responses:    Have you ever had a reaction to a flu vaccine? No  Are you able to eat eggs without adverse effects? Yes  Do you have any current illness? No  Have you ever had Guillian Eaton Syndrome? No    Flu vaccine given per order. Please see immunization tab.

## 2020-11-19 ASSESSMENT — ENCOUNTER SYMPTOMS
GASTROINTESTINAL NEGATIVE: 1
RESPIRATORY NEGATIVE: 1
EYES NEGATIVE: 1

## 2020-12-10 ENCOUNTER — TELEPHONE (OUTPATIENT)
Dept: PHARMACY | Age: 75
End: 2020-12-10

## 2020-12-10 NOTE — TELEPHONE ENCOUNTER
COVID-19 phone screening     Call placed to screen patient prior to upcoming Medication Management visit for Anticoagulation. Does patient have fever and/or lower respiratory symptoms (SOB, difficulty breathing, cough)? [] Yes    [] No    If yes, complete Travel Screening and ask the following:   [] [Fever OR s/sxs of lower respiratory illness]  AND  [close contact with lab-confirmed COVID-19 patient within 14 days of symptom onset   [] [Fever AND s/sxs of lower respiratory illness requiring hospitalization] AND [history of travel to [place], [place], [place], [facility], [place] within 14 days of sx onset]  [] [Fever with severe acute lower respiratory illness (I.e. PNA, ARDS) requiring hospitalization and without alternative explanatory diagnosis (I.e. Influenza)] AND [no source of exposure identified]    [x] None of the following; patient confirmed for regularly scheduled INR check and instructed to call the clinic immediately if any symptoms develop prior to upcoming appointment.

## 2020-12-11 ENCOUNTER — HOSPITAL ENCOUNTER (OUTPATIENT)
Dept: PHARMACY | Age: 75
Setting detail: THERAPIES SERIES
Discharge: HOME | End: 2020-12-11
Payer: MEDICARE

## 2020-12-11 VITALS
SYSTOLIC BLOOD PRESSURE: 143 MMHG | DIASTOLIC BLOOD PRESSURE: 74 MMHG | WEIGHT: 250.2 LBS | BODY MASS INDEX: 38.04 KG/M2 | HEART RATE: 77 BPM

## 2020-12-11 LAB — INR BLD: 2.3

## 2020-12-11 PROCEDURE — 99211 OFF/OP EST MAY X REQ PHY/QHP: CPT

## 2020-12-11 PROCEDURE — 85610 PROTHROMBIN TIME: CPT

## 2020-12-11 RX ORDER — CHOLECALCIFEROL (VITAMIN D3) 125 MCG
5 CAPSULE ORAL NIGHTLY PRN
COMMUNITY
End: 2021-03-05

## 2020-12-11 NOTE — PROGRESS NOTES
Fingerstick INR drawn per clinic protocol. Patient states no visible blood in urine and no black tarry stool. As discussed previously, Flori Monroy went to see her eye doctor, Dr. Boris Kennedy, in [place], for laser eye surgery on 11- to \"clean\" the \"black spots\" from her right eye. She skipped her warfarin for 3 days prior to this procedure as instructed. Flori Monroy resumed her previously stable weekly warfarin regimen on 12-1-2020 and denies any missed doses since that time. She says she has been having more trouble sleeping at night sometimes so she picked up a bottle of \"Melatonin 5 mg\" from [facility] yesterday and took a dose last night. I did explain that this medication can sometimes increase the INR, but she says she doesn't plan to use it every night and will only take PRN. No change in other maintenance medications or in diet. Given her therapeutic INR today, we will continue current warfarin regimen and recheck INR in 6 weeks. Patient acknowledges working in consult agreement with pharmacist as referred by his/her physician.       CLINICAL PHARMACY CONSULT: MED RECONCILIATION/REVIEW ADDENDUM    For Pharmacy Admin Tracking Only    PHSO: Yes  Total # of Interventions Recommended: 1  - New Order #: 1 New Medication Order Reason(s): Patient Preference  - Maintenance Safety Lab Monitoring #: 1     Total Interventions Accepted: 1  Time Spent (min): 30    Sandra Ricardo, AnjanaD

## 2020-12-15 ENCOUNTER — TELEPHONE (OUTPATIENT)
Dept: FAMILY MEDICINE CLINIC | Age: 75
End: 2020-12-15

## 2020-12-15 NOTE — TELEPHONE ENCOUNTER
Patient is having trouble sleeping. She states she is only getting about 1 hour of sleep a night. She currently is taking melatonin. Drug [address] Maintenance   Topic Date Due    Shingles Vaccine (1 of 2) 11/04/1997    Diabetic foot exam  10/22/2020    Lipid screen  03/03/2021    Diabetic retinal exam  04/02/2021    Colon cancer screen colonoscopy  05/13/2021    Potassium monitoring  10/09/2021    Creatinine monitoring  10/09/2021    A1C test (Diabetic or Prediabetic)  11/17/2021    Annual Wellness Visit (AWV)  11/18/2021    Breast cancer screen  08/24/2022    DTaP/Tdap/Td vaccine (2 - Td) 12/10/2025    DEXA (modify frequency per FRAX score)  Completed    Flu vaccine  Completed    Pneumococcal 65+ years Vaccine  Completed    Hepatitis C screen  Addressed    Hepatitis A vaccine  Aged Out    Hib vaccine  Aged Out    Meningococcal (ACWY) vaccine  Aged Out             (applicable per patient's age: Cancer Screenings, Depression Screening, Fall Risk Screening, Immunizations)    Hemoglobin A1C (%)   Date Value   11/17/2020 7.8   08/14/2020 7.7   03/03/2020 7.2 (H)     Microalb/Crt.  Ratio (mcg/mg creat)   Date Value   09/09/2014 7     LDL Cholesterol (mg/dL)   Date Value   03/03/2020 63     AST (U/L)   Date Value   10/09/2020 11     ALT (U/L)   Date Value   10/09/2020 16     BUN (mg/dL)   Date Value   10/09/2020 28 (H)      (goal A1C is < 7)   (goal LDL is <100) need 30-50% reduction from baseline     BP Readings from Last 3 Encounters:   12/11/20 (!) 143/74   11/17/20 128/74   10/30/20 (!) 133/99    (goal /80)      All Future Testing planned in [facility]:  Lab Frequency Next Occurrence   POCT INR     Protime-INR daily        Next Visit Date:  Future Appointments   Date Time Provider Natanael Austin   1/22/2021 11:00 AM ELIZABETH MEDICATION [facility] MGMT Elizabeth            Patient Active Problem List:     Type 2 diabetes mellitus with stage 3 chronic kidney disease, without long-term current use of insulin (HCC)     Essential hypertension, benign     Esophageal reflux     Irritable bowel syndrome     Seasonal allergies     Long term current use of anticoagulant therapy     Gout     Rectocele     Tubular adenoma of colon     Hiatal hernia     Sigmoid diverticulosis     Chronic back pain     Constipation     Hypomagnesemia     History of colon polyps     Family history of colon cancer     History of recurrent deep vein thrombosis (DVT)     Stage 3 chronic kidney disease

## 2020-12-16 RX ORDER — TRAZODONE HYDROCHLORIDE 50 MG/1
50 TABLET ORAL NIGHTLY PRN
Qty: 30 TABLET | Refills: 2 | Status: SHIPPED | OUTPATIENT
Start: 2020-12-16 | End: 2021-01-12

## 2020-12-16 NOTE — TELEPHONE ENCOUNTER
Rx sent for trazodone to use as needed. Please talk to patient about sleep hygiene, needs to go to bed around the same time every night, stay active and awake during the day, avoid caffeine for about 8 hours before bed, take trazodone about half an hour before she plans to go to bed.

## 2021-01-08 ENCOUNTER — HOSPITAL ENCOUNTER (OUTPATIENT)
Age: 76
Discharge: HOME | End: 2021-01-08
Payer: MEDICARE

## 2021-01-08 LAB
ALBUMIN SERPL-MCNC: 3.5 G/DL (ref 3.5–5.2)
ANION GAP SERPL CALCULATED.3IONS-SCNC: 10 MMOL/L (ref 9–17)
BUN BLDV-MCNC: 24 MG/DL (ref 8–23)
BUN/CREAT BLD: 16 (ref 9–20)
CALCIUM SERPL-MCNC: 8.9 MG/DL (ref 8.6–10.4)
CHLORIDE BLD-SCNC: 99 MMOL/L (ref 98–107)
CO2: 29 MMOL/L (ref 20–31)
CREAT SERPL-MCNC: 1.53 MG/DL (ref 0.5–0.9)
CREATININE URINE: 111.3 MG/DL (ref 28–217)
GFR AFRICAN AMERICAN: 40 ML/MIN
GFR NON-AFRICAN AMERICAN: 33 ML/MIN
GFR SERPL CREATININE-BSD FRML MDRD: ABNORMAL ML/MIN/{1.73_M2}
GFR SERPL CREATININE-BSD FRML MDRD: ABNORMAL ML/MIN/{1.73_M2}
GLUCOSE BLD-MCNC: 181 MG/DL (ref 70–99)
HCT VFR BLD CALC: 37.3 % (ref 36–46)
HEMOGLOBIN: 12.1 G/DL (ref 12–16)
MAGNESIUM: 1.7 MG/DL (ref 1.6–2.6)
PHOSPHORUS: 4.3 MG/DL (ref 2.6–4.5)
POTASSIUM SERPL-SCNC: 4.1 MMOL/L (ref 3.7–5.3)
PTH INTACT: 91.71 PG/ML (ref 15–65)
SODIUM BLD-SCNC: 138 MMOL/L (ref 135–144)
TOTAL PROTEIN, URINE: 16 MG/DL
URINE TOTAL PROTEIN CREATININE RATIO: 0.14 (ref 0–0.2)
VITAMIN D 25-HYDROXY: 28.6 NG/ML (ref 30–100)

## 2021-01-08 PROCEDURE — 80069 RENAL FUNCTION PANEL: CPT

## 2021-01-08 PROCEDURE — 84156 ASSAY OF PROTEIN URINE: CPT

## 2021-01-08 PROCEDURE — 83735 ASSAY OF MAGNESIUM: CPT

## 2021-01-08 PROCEDURE — 82570 ASSAY OF URINE CREATININE: CPT

## 2021-01-08 PROCEDURE — 85014 HEMATOCRIT: CPT

## 2021-01-08 PROCEDURE — 85018 HEMOGLOBIN: CPT

## 2021-01-08 PROCEDURE — 82306 VITAMIN D 25 HYDROXY: CPT

## 2021-01-08 PROCEDURE — 83970 ASSAY OF PARATHORMONE: CPT

## 2021-01-08 PROCEDURE — 36415 COLL VENOUS BLD VENIPUNCTURE: CPT

## 2021-01-12 ENCOUNTER — HOSPITAL ENCOUNTER (OUTPATIENT)
Age: 76
Setting detail: SPECIMEN
Discharge: HOME | End: 2021-01-12
Payer: MEDICARE

## 2021-01-12 ENCOUNTER — OFFICE VISIT (OUTPATIENT)
Dept: FAMILY MEDICINE CLINIC | Age: 76
End: 2021-01-12
Payer: MEDICARE

## 2021-01-12 VITALS
HEIGHT: 68 IN | HEART RATE: 80 BPM | DIASTOLIC BLOOD PRESSURE: 72 MMHG | BODY MASS INDEX: 38.04 KG/M2 | OXYGEN SATURATION: 99 % | WEIGHT: 251 LBS | SYSTOLIC BLOOD PRESSURE: 134 MMHG

## 2021-01-12 DIAGNOSIS — L65.9 HAIR LOSS: ICD-10-CM

## 2021-01-12 DIAGNOSIS — E11.22 TYPE 2 DIABETES MELLITUS WITH STAGE 4 CHRONIC KIDNEY DISEASE, WITHOUT LONG-TERM CURRENT USE OF INSULIN (HCC): ICD-10-CM

## 2021-01-12 DIAGNOSIS — N18.4 CHRONIC RENAL INSUFFICIENCY, STAGE 4 (SEVERE) (HCC): ICD-10-CM

## 2021-01-12 DIAGNOSIS — E89.2 S/P PARATHYROIDECTOMY (HCC): ICD-10-CM

## 2021-01-12 DIAGNOSIS — E21.3 HYPERPARATHYROIDISM (HCC): ICD-10-CM

## 2021-01-12 DIAGNOSIS — N18.4 TYPE 2 DIABETES MELLITUS WITH STAGE 4 CHRONIC KIDNEY DISEASE, WITHOUT LONG-TERM CURRENT USE OF INSULIN (HCC): ICD-10-CM

## 2021-01-12 DIAGNOSIS — F51.01 PRIMARY INSOMNIA: Primary | ICD-10-CM

## 2021-01-12 LAB — TSH SERPL DL<=0.05 MIU/L-ACNC: 3.77 MIU/L (ref 0.3–5)

## 2021-01-12 PROCEDURE — G8399 PT W/DXA RESULTS DOCUMENT: HCPCS | Performed by: FAMILY MEDICINE

## 2021-01-12 PROCEDURE — 84443 ASSAY THYROID STIM HORMONE: CPT

## 2021-01-12 PROCEDURE — 3017F COLORECTAL CA SCREEN DOC REV: CPT | Performed by: FAMILY MEDICINE

## 2021-01-12 PROCEDURE — G8427 DOCREV CUR MEDS BY ELIG CLIN: HCPCS | Performed by: FAMILY MEDICINE

## 2021-01-12 PROCEDURE — 2022F DILAT RTA XM EVC RTNOPTHY: CPT | Performed by: FAMILY MEDICINE

## 2021-01-12 PROCEDURE — 99214 OFFICE O/P EST MOD 30 MIN: CPT | Performed by: FAMILY MEDICINE

## 2021-01-12 PROCEDURE — G8482 FLU IMMUNIZE ORDER/ADMIN: HCPCS | Performed by: FAMILY MEDICINE

## 2021-01-12 PROCEDURE — 1090F PRES/ABSN URINE INCON ASSESS: CPT | Performed by: FAMILY MEDICINE

## 2021-01-12 PROCEDURE — 1036F TOBACCO NON-USER: CPT | Performed by: FAMILY MEDICINE

## 2021-01-12 PROCEDURE — 4040F PNEUMOC VAC/ADMIN/RCVD: CPT | Performed by: FAMILY MEDICINE

## 2021-01-12 PROCEDURE — 1123F ACP DISCUSS/DSCN MKR DOCD: CPT | Performed by: FAMILY MEDICINE

## 2021-01-12 PROCEDURE — 3046F HEMOGLOBIN A1C LEVEL >9.0%: CPT | Performed by: FAMILY MEDICINE

## 2021-01-12 PROCEDURE — G8417 CALC BMI ABV UP PARAM F/U: HCPCS | Performed by: FAMILY MEDICINE

## 2021-01-12 RX ORDER — MAGNESIUM OXIDE 400 MG/1
TABLET ORAL
COMMUNITY
End: 2021-01-12 | Stop reason: SDUPTHER

## 2021-01-12 ASSESSMENT — PATIENT HEALTH QUESTIONNAIRE - PHQ9
2. FEELING DOWN, DEPRESSED OR HOPELESS: 0
1. LITTLE INTEREST OR PLEASURE IN DOING THINGS: 0
SUM OF ALL RESPONSES TO PHQ QUESTIONS 1-9: 0

## 2021-01-12 NOTE — PATIENT INSTRUCTIONS
SURVEY:    You may be receiving a survey from [employer] regarding your visit today. You may get this in the mail, through your MyChart or in your email. Please complete the survey to enable us to provide the highest quality of care to you and your family. If you cannot score us as very good ( 5 Stars) on any question, please feel free to call the office to discuss how we could have made your experience exceptional.     Thank you.     Clinical Care Team:  Dr. Royce Olivares,                                            Formerly [facility], [facility], [address] [place] [address] Team:  [address] [place] [address]

## 2021-01-12 NOTE — PROGRESS NOTES
Name: Renny Bazzi  : 1945         Chief Complaint:     Chief Complaint   Patient presents with    Insomnia     not able to sleep for 3 weeks. no help with trazodone and melatonin. having trouble falling asleep. questions her parathyroid.  Diabetes     foot exam for shoes       History of Present Illness:      Renny Bazzi is a 70 y.o.  female who presents with Insomnia (not able to sleep for 3 weeks. no help with trazodone and melatonin. having trouble falling asleep. questions her parathyroid. ) and Diabetes (foot exam for shoes)      HPI     Patient complains of significant insomnia for about a month. She goes to bed at night and just lays there without falling asleep. After a while she will do a little bit of a word search or start watching TV. Then after quite a bit more time has passed and she feels like she can go to sleep, she will turn the TV off but still does not fall asleep. A lot of nights she does not fall asleep until it is about time to get up. Tired through the day. No hallucinations. Trazodone didn't help at all. No recent falls, has been over a year. 1 cup of coffee in the morning, rare pop, all the rest water. Hair thinning and some abnormalities with fingernails. Tired all the time, but again she hasn't been sleeping well. Sees nephro tomorrow.     Past Medical History:     Past Medical History:   Diagnosis Date    Allergic rhinitis     Chronic kidney disease, stage III (moderate)     Deep vein thrombosis (DVT) ([facility]) 10/24/2012    Elbow fracture, left     Fall     Gastric ulcer     Gout     Hx of blood clots     Following last back surgery     Hypertension     Nausea & vomiting     Presence of IVC filter     Type II or unspecified type diabetes mellitus without mention of complication, not stated as uncontrolled         Past Surgical History:     Past Surgical History:   Procedure Laterality Date    BACK SURGERY      BACK SURGERY      BACK SURGERY      BACK SURGERY      BACK SURGERY      BACK SURGERY      Fusion     BREAST BIOPSY Left     BREAST BIOPSY Right     CARDIAC CATHETERIZATION Left 03/07/2018    Dr. Milena Ceja @ [facility]--There is 30% disease of the origin of the lateral anterior descending. Mild 10% -20% plaque disease in the circumflex & right coronary artery. Normal left ventricular functino, ejection fraction of 60%.  CARPAL TUNNEL RELEASE Right     CATARACT REMOVAL Right     CATARACT REMOVAL Left     COLONOSCOPY  2014    COLONOSCOPY N/A 5/13/2019    COLONOSCOPY POLYPECTOMY HOT BIOPSY performed by Richard Chong MD at [address] [place] [address]      \"Multiple\"     EYE SURGERY Right     Removed fluid from behind eye    HYSTERECTOMY      KIDNEY SURGERY      left side 1/2 of kidney removed    PARTIAL NEPHRECTOMY Left     RETINAL DETACHMENT SURGERY Left     ROTATOR CUFF REPAIR      TOTAL KNEE ARTHROPLASTY Right     TOTAL KNEE ARTHROPLASTY Left     UPPER GASTROINTESTINAL ENDOSCOPY  2014    VENA CAVA FILTER PLACEMENT          Medications:       Prior to Admission medications    Medication Sig Start Date End Date Taking?  Authorizing Provider   melatonin 5 MG TABS tablet Take 5 mg by mouth nightly as needed   Yes Historical Provider, MD   omeprazole (PRILOSEC) 20 MG delayed release capsule TAKE 1 CAPSULE EVERY DAY 11/17/20  Yes Meagan Jones,    fluticasone (FLONASE) 50 MCG/ACT nasal spray 2 sprays by Nasal route daily as needed for Rhinitis 11/17/20  Yes Meagan Jones, DO   glipiZIDE (GLUCOTROL XL) 5 MG extended release tablet 10 mg po daily with breakfast 11/17/20  Yes Meagan Jones, DO   furosemide (LASIX) 20 MG tablet Take 1 tablet by mouth daily As needed for swelling 11/17/20  Yes Meagan Jones, DO   allopurinol (ZYLOPRIM) 100 MG tablet TAKE 1 TABLET TWICE DAILY 10/8/20  Yes Meagan Jones, DO   propranolol (INDERAL) 60 MG tablet TAKE 1 TABLET TWICE DAILY 10/8/20  Yes Meagan Jones, DO   cyclobenzaprine (FLEXERIL) 10 MG tablet Take 1 tablet by mouth 2 times daily as needed (Arthritic pain.) 8/14/20  Yes Maurilio Sow DO   isosorbide dinitrate (ISORDIL) 5 MG tablet TAKE 1 TABLET TWICE DAILY 8/14/20  Yes Maurilio Sow DO   warfarin (COUMADIN) 5 MG tablet Take 1/2 tablet on Wednesdays and Saturdays, and 1 whole tablet daily all other days or as directed by [facility]ication [facility]. 7/28/20  Yes Maurilio Sow DO   gabapentin (NEURONTIN) 100 MG capsule Take 200 mg by mouth 2 times daily as needed. Yes Historical Provider, MD   triamcinolone (ARISTOCORT) 0.5 % cream Apply topically a thin layer to the affected area (s) TWICE DAILY 1/31/20  Yes Historical Provider, MD   blood glucose test strips (TRUE METRIX BLOOD GLUCOSE TEST) strip Use once daily and as needed. 9/3/19  Yes Maurilio Sow DO   Lancets MISC 1 each by Does not apply route daily 2/1/17  Yes Maurilio Sow DO   acetaminophen (TYLENOL) 650 MG CR tablet Take 1,300 mg by mouth every 8 hours as needed for Pain   Yes Historical Provider, MD   Magnesium 400 MG TABS Take 800 mg by mouth nightly    Yes Historical Provider, MD   folic acid (FOLVITE) 049 MCG tablet Take 400 mcg by mouth daily 2/8/16  Yes Historical Provider, MD        Allergies:       Codeine, Lisinopril, Percocet [oxycodone-acetaminophen], Adhesive tape, and Norco [hydrocodone-acetaminophen]    Social History:     Tobacco:    reports that she has never smoked. She has never used smokeless tobacco.  Alcohol:      reports no history of alcohol use. Drug Use:  reports no history of drug use. Family History:     Family History   Problem Relation Age of Onset    Diabetes Father     Cancer Father         Prostate Cancer    Cancer Paternal Aunt         Colon Cancer       Review of Systems:     Positive and Negative as described in HPI    Review of Systems   Constitutional: Negative.         Physical Exam:     Vitals:  /72   Pulse 80   Ht 5' 8\" (1.727 m)   Wt 251 lb (113.9 kg)   SpO2 99%   BMI 38.16 kg/m²   Physical Exam  Vitals signs and nursing note reviewed. Constitutional:       General: She is not in acute distress. Appearance: Normal appearance. She is well-developed. She is not ill-appearing. Cardiovascular:      Rate and Rhythm: Normal rate and regular rhythm. Heart sounds: Normal heart sounds. Pulmonary:      Effort: Pulmonary effort is normal.      Breath sounds: Normal breath sounds. Neurological:      Mental Status: She is alert and oriented to person, place, and time. Psychiatric:         Mood and Affect: Mood normal.         Behavior: Behavior normal.         Data:     Lab Results   Component Value Date     01/08/2021    K 4.1 01/08/2021    CL 99 01/08/2021    CO2 29 01/08/2021    BUN 24 01/08/2021    CREATININE 1.53 01/08/2021    GLUCOSE 181 01/08/2021    PROT 6.5 10/09/2020    LABALBU 3.5 01/08/2021    BILITOT 0.38 10/09/2020    ALKPHOS 68 10/09/2020    AST 11 10/09/2020    ALT 16 10/09/2020     Lab Results   Component Value Date    WBC 10.3 10/09/2020    RBC 4.12 10/09/2020    HGB 12.1 01/08/2021    HCT 37.3 01/08/2021    MCV 96.1 10/09/2020    MCH 31.1 10/09/2020    MCHC 32.4 10/09/2020    RDW 15.9 10/09/2020     10/09/2020    MPV NOT REPORTED 10/09/2020     Lab Results   Component Value Date    TSH 3.77 01/08/2021     Lab Results   Component Value Date    CHOL 156 03/03/2020    HDL 71 03/03/2020    LABA1C 7.8 11/17/2020         Assessment & Plan:        Diagnosis Orders   1. Primary insomnia     2. S/P parathyroidectomy  TSH with Reflex   3. Hair loss  TSH with Reflex   4. Type 2 diabetes mellitus with stage 3 chronic kidney disease, without long-term current use of insulin, unspecified whether stage 3a or 3b CKD ([facility] [address]     5. Chronic renal insufficiency, stage 4 (severe) (HCC)     6. Hyperparathyroidism ([facility] [address]     1. Insomnia, uncertain etiology.   Has not been helped adequately by melatonin or by trazodone 50 mg which it seems patient only tried a couple of times. Advised patient to try 100 mg and try it for a few nights. Also discussed sleep hygiene. Call if not improving and we could try temazepam.  Advised patient of increased fall risk with sedative medications. 2-3. Hair loss, fatigue, history of parathyroidectomy. Checking thyroid. Advised hair loss may also be related to telogen effluvium from illness and parathyroidectomy. 4.  Diabetes uncontrolled, increased glipizide at her last visit which was mid November. We will recheck in the next couple months, sometime after February 17.  5. CKD stable, f/u with nephro  6. Hyperparathyroidism s/p parathyroidectomy. PTH still elevated but Ca WNL. Requested Prescriptions      No prescriptions requested or ordered in this encounter       Patient Instructions   SURVEY:    You may be receiving a survey from [employer] regarding your visit today. You may get this in the mail, through your MyChart or in your email. Please complete the survey to enable us to provide the highest quality of care to you and your family. If you cannot score us as very good ( 5 Stars) on any question, please feel free to call the office to discuss how we could have made your experience exceptional.     Thank you. Clinical Care Team:  Dr. Escobar Lennon, DO Jolanta Dumont, [address] [place] [address] Team:  Candi 24 received counseling on the following healthy behaviors: medication adherence  Reviewed prior labs and health maintenance. Continue current medications, diet and exercise. Discussed use, benefit, and side effects of prescribed medications. Barriers to medication compliance addressed. Patient given educational materials - see patient instructions. All patient questions answered. Patient voiced understanding.      Electronically signed by Finn Pal DO on 1/14/2021 at 10:17 PM   [facility]  [facility] [address] [address]  Dept: [phone]

## 2021-01-14 PROBLEM — E21.3 HYPERPARATHYROIDISM (HCC): Status: ACTIVE | Noted: 2021-01-14

## 2021-01-14 PROBLEM — N18.4 CHRONIC RENAL INSUFFICIENCY, STAGE 4 (SEVERE) (HCC): Status: ACTIVE | Noted: 2021-01-14

## 2021-01-19 ENCOUNTER — TELEPHONE (OUTPATIENT)
Dept: FAMILY MEDICINE CLINIC | Age: 76
End: 2021-01-19

## 2021-01-19 DIAGNOSIS — E87.6 HYPOPOTASSEMIA: Primary | ICD-10-CM

## 2021-01-19 PROBLEM — N18.4 CHRONIC RENAL INSUFFICIENCY, STAGE 4 (SEVERE) (HCC): Status: RESOLVED | Noted: 2021-01-14 | Resolved: 2021-01-19

## 2021-01-19 PROBLEM — N18.30 STAGE 3 CHRONIC KIDNEY DISEASE (HCC): Status: RESOLVED | Noted: 2017-02-10 | Resolved: 2021-01-19

## 2021-01-19 NOTE — TELEPHONE ENCOUNTER
She was already supposed to be taking the Lasix every day, so Dr Jesus Gipson was just reiterating that. Last potassium was normal.  We can recheck her potassium a week after she starts taking it every day.

## 2021-01-19 NOTE — TELEPHONE ENCOUNTER
Patient doesn't feel that her medication is working - sleeping pills only work every other day - Dr Amanda Hutson told her to take her water pill every day and Juan R Young said that she would need a new script and will need to add a potassium pill to that? - also said she had to take her compression stockings off because they were leaving sores on her legs - sounded as if she wears them all the time - needed to bathe because she was going to see Dr Bc Plasencia - please call patient    Health Maintenance   Topic Date Due    Shingles Vaccine (1 of 2) 11/04/1997    Diabetic foot exam  10/22/2020    Lipid screen  03/03/2021    Diabetic retinal exam  04/02/2021    Colon cancer screen colonoscopy  05/13/2021    A1C test (Diabetic or Prediabetic)  11/17/2021    Annual Wellness Visit (AWV)  11/18/2021    Potassium monitoring  01/08/2022    Creatinine monitoring  01/08/2022    Breast cancer screen  08/24/2022    DTaP/Tdap/Td vaccine (2 - Td) 12/10/2025    DEXA (modify frequency per FRAX score)  Completed    Flu vaccine  Completed    Pneumococcal 65+ yrs at Risk Vaccine  Completed    Hepatitis C screen  Addressed    Hepatitis A vaccine  Aged Out    Hib vaccine  Aged Out    Meningococcal (ACWY) vaccine  Aged Out             (applicable per patient's age: Cancer Screenings, Depression Screening, Fall Risk Screening, Immunizations)    Hemoglobin A1C (%)   Date Value   11/17/2020 7.8   08/14/2020 7.7   03/03/2020 7.2 (H)     Microalb/Crt.  Ratio (mcg/mg creat)   Date Value   09/09/2014 7     LDL Cholesterol (mg/dL)   Date Value   03/03/2020 63     AST (U/L)   Date Value   10/09/2020 11     ALT (U/L)   Date Value   10/09/2020 16     BUN (mg/dL)   Date Value   01/08/2021 24 (H)      (goal A1C is < 7)   (goal LDL is <100) need 30-50% reduction from baseline     BP Readings from Last 3 Encounters:   01/12/21 134/72   12/11/20 (!) 143/74   11/17/20 128/74    (goal /80)      All Future Testing planned in [facility]:  Lab Frequency

## 2021-01-21 ENCOUNTER — TELEPHONE (OUTPATIENT)
Dept: PHARMACY | Age: 76
End: 2021-01-21

## 2021-01-21 NOTE — TELEPHONE ENCOUNTER
COVID-19 phone screening     Call placed to screen patient prior to upcoming Medication Management visit for Anticoagulation. Does patient have fever and/or lower respiratory symptoms (SOB, difficulty breathing, cough)? [] Yes    [x] No    If yes, complete Travel Screening and ask the following:   [] [Fever OR s/sxs of lower respiratory illness]  AND  [close contact with lab-confirmed COVID-19 patient within 14 days of symptom onset   [] [Fever AND s/sxs of lower respiratory illness requiring hospitalization] AND [history of travel to [place], [place], [place], [facility], [place] within 14 days of sx onset]  [] [Fever with severe acute lower respiratory illness (I.e. PNA, ARDS) requiring hospitalization and without alternative explanatory diagnosis (I.e. Influenza)] AND [no source of exposure identified]    [x] None of the following; patient confirmed for regularly scheduled INR check and instructed to call the clinic immediately if any symptoms develop prior to upcoming appointment.

## 2021-01-22 ENCOUNTER — HOSPITAL ENCOUNTER (OUTPATIENT)
Dept: PHARMACY | Age: 76
Setting detail: THERAPIES SERIES
Discharge: HOME | End: 2021-01-22
Payer: MEDICARE

## 2021-01-22 VITALS
BODY MASS INDEX: 37.43 KG/M2 | HEART RATE: 76 BPM | WEIGHT: 246.2 LBS | DIASTOLIC BLOOD PRESSURE: 93 MMHG | SYSTOLIC BLOOD PRESSURE: 125 MMHG

## 2021-01-22 DIAGNOSIS — Z79.01 LONG TERM CURRENT USE OF ANTICOAGULANT THERAPY: ICD-10-CM

## 2021-01-22 LAB — INR BLD: 1.9

## 2021-01-22 PROCEDURE — 99211 OFF/OP EST MAY X REQ PHY/QHP: CPT

## 2021-01-22 PROCEDURE — 85610 PROTHROMBIN TIME: CPT

## 2021-01-22 NOTE — PROGRESS NOTES
Fingerstick INR drawn per clinic protocol. Patient states no visible blood in urine and no black tarry stool. As she has done several times in the past, Rachael Mercado had an injection in her back per Dr. Jorge Castro on 1/18/2021 and skipped her warfarin for \"4 days\" (1/14/21 through 1/17/21) prior to this procedure. She restarted her warfarin on 1/18/2021 after the procedure and denies any missed doses since that time. She says she has still been having trouble sleeping at night and was given a new Rx for Trazodone 50 mg nightly PRN. She says she has tried it several times, but it \"doesn't really help at all\" and she doesn't plan on continuing this medication. No change in other maintenance medications or in diet. Given her slightly sub-therapeutic INR today, we will have her take 7.5 mg warfarin tonight x 1 booster dose, but then continue current weekly warfarin regimen as noted on her dosing calendar and then we will recheck INR in 6 weeks.  Patient acknowledges working in consult agreement with pharmacist as referred by his/her physician.       CLINICAL PHARMACY CONSULT: MED RECONCILIATION/REVIEW ADDENDUM     For Pharmacy Admin Tracking Only     PHSO: Yes  Total # of Interventions Recommended: 1  - Maintenance Safety Lab Monitoring #: 1      Total Interventions Accepted: 1  Time Spent (min): 30     Melchor Palmer PharmD
yes

## 2021-02-09 ENCOUNTER — OFFICE VISIT (OUTPATIENT)
Dept: FAMILY MEDICINE CLINIC | Age: 76
End: 2021-02-09
Payer: MEDICARE

## 2021-02-09 ENCOUNTER — HOSPITAL ENCOUNTER (OUTPATIENT)
Age: 76
Discharge: HOME | End: 2021-02-09
Payer: MEDICARE

## 2021-02-09 VITALS
WEIGHT: 242 LBS | OXYGEN SATURATION: 98 % | BODY MASS INDEX: 36.68 KG/M2 | SYSTOLIC BLOOD PRESSURE: 116 MMHG | HEIGHT: 68 IN | HEART RATE: 80 BPM | DIASTOLIC BLOOD PRESSURE: 76 MMHG

## 2021-02-09 DIAGNOSIS — E87.6 HYPOPOTASSEMIA: ICD-10-CM

## 2021-02-09 DIAGNOSIS — F51.01 PRIMARY INSOMNIA: Primary | ICD-10-CM

## 2021-02-09 DIAGNOSIS — M1A.0790 IDIOPATHIC CHRONIC GOUT OF FOOT WITHOUT TOPHUS, UNSPECIFIED LATERALITY: ICD-10-CM

## 2021-02-09 DIAGNOSIS — N18.4 CHRONIC RENAL INSUFFICIENCY, STAGE 4 (SEVERE) (HCC): ICD-10-CM

## 2021-02-09 LAB
POTASSIUM SERPL-SCNC: 4.3 MMOL/L (ref 3.7–5.3)
URIC ACID: 4.1 MG/DL (ref 2.4–5.7)

## 2021-02-09 PROCEDURE — G8427 DOCREV CUR MEDS BY ELIG CLIN: HCPCS | Performed by: FAMILY MEDICINE

## 2021-02-09 PROCEDURE — 84132 ASSAY OF SERUM POTASSIUM: CPT

## 2021-02-09 PROCEDURE — 36415 COLL VENOUS BLD VENIPUNCTURE: CPT

## 2021-02-09 PROCEDURE — 4040F PNEUMOC VAC/ADMIN/RCVD: CPT | Performed by: FAMILY MEDICINE

## 2021-02-09 PROCEDURE — 1123F ACP DISCUSS/DSCN MKR DOCD: CPT | Performed by: FAMILY MEDICINE

## 2021-02-09 PROCEDURE — G8399 PT W/DXA RESULTS DOCUMENT: HCPCS | Performed by: FAMILY MEDICINE

## 2021-02-09 PROCEDURE — G8482 FLU IMMUNIZE ORDER/ADMIN: HCPCS | Performed by: FAMILY MEDICINE

## 2021-02-09 PROCEDURE — G8417 CALC BMI ABV UP PARAM F/U: HCPCS | Performed by: FAMILY MEDICINE

## 2021-02-09 PROCEDURE — 99213 OFFICE O/P EST LOW 20 MIN: CPT | Performed by: FAMILY MEDICINE

## 2021-02-09 PROCEDURE — 1036F TOBACCO NON-USER: CPT | Performed by: FAMILY MEDICINE

## 2021-02-09 PROCEDURE — 3017F COLORECTAL CA SCREEN DOC REV: CPT | Performed by: FAMILY MEDICINE

## 2021-02-09 PROCEDURE — 84550 ASSAY OF BLOOD/URIC ACID: CPT

## 2021-02-09 PROCEDURE — 1090F PRES/ABSN URINE INCON ASSESS: CPT | Performed by: FAMILY MEDICINE

## 2021-02-09 RX ORDER — FUROSEMIDE 20 MG/1
20 TABLET ORAL DAILY
Qty: 90 TABLET | Refills: 1 | Status: SHIPPED | OUTPATIENT
Start: 2021-02-09 | End: 2021-06-18

## 2021-02-09 RX ORDER — POTASSIUM CHLORIDE 20 MEQ/1
20 TABLET, EXTENDED RELEASE ORAL DAILY
Qty: 90 TABLET | Refills: 1 | Status: SHIPPED | OUTPATIENT
Start: 2021-02-09 | End: 2021-06-18

## 2021-02-09 RX ORDER — LANCETS 30 GAUGE
EACH MISCELLANEOUS
Qty: 100 EACH | Refills: 3 | Status: SHIPPED | OUTPATIENT
Start: 2021-02-09 | End: 2021-08-27 | Stop reason: SDUPTHER

## 2021-02-09 RX ORDER — CALCIUM CITRATE/VITAMIN D3 200MG-6.25
TABLET ORAL
Qty: 100 EACH | Refills: 3 | Status: SHIPPED | OUTPATIENT
Start: 2021-02-09 | End: 2021-08-27 | Stop reason: SDUPTHER

## 2021-02-09 RX ORDER — POTASSIUM CHLORIDE 20 MEQ/1
20 TABLET, EXTENDED RELEASE ORAL DAILY
Qty: 30 TABLET | Refills: 0 | Status: SHIPPED | OUTPATIENT
Start: 2021-02-09 | End: 2021-02-09 | Stop reason: SDUPTHER

## 2021-02-09 NOTE — PROGRESS NOTES
Name: Amalia Busby  : 1945         Chief Complaint:     Chief Complaint   Patient presents with    Diabetes       History of Present Illness:      Amalia Busby is a 70 y.o.  female who presents with Diabetes      HPI     F/u insomnia. Trazodone didn't help but she's dealing with it, falls asleep when she finally gets tired and then does sleep for a few hrs most nights. CKD, saw Dr Alayna Stevenson and was having a lot of swelling so lasix was increased to daily. Urinating a lot and having some improvement in lower extremity swelling. Jocelyn Almeida Has also been seeing vascular and had unna boot which helped but then compression stockings, even custom ones, don't fit well and don't help (then says she did wear a pair yesterday that worked ok). Son ordered her some ankle sleeves that she plans to try. History of gout, no flares recently. Takes allopurinol daily. Past Medical History:     Past Medical History:   Diagnosis Date    Allergic rhinitis     Chronic kidney disease, stage III (moderate)     [facility]T) ([facility]) 10/24/2012    Elbow fracture, left     Fall     Gastric ulcer     Gout     Hx of blood clots     Following last back surgery     Hypertension     Nausea & vomiting     Presence of IVC filter     Type II or unspecified type diabetes mellitus without mention of complication, not stated as uncontrolled         Past Surgical History:     Past Surgical History:   Procedure Laterality Date    BACK SURGERY      BACK SURGERY      BACK SURGERY      BACK SURGERY      BACK SURGERY      BACK SURGERY      Fusion     BREAST BIOPSY Left     BREAST BIOPSY Right     CARDIAC CATHETERIZATION Left 2018    Dr[facility] Naresh Remy @ [facility]--There is 30% disease of the origin of the lateral anterior descending. Mild 10% -20% plaque disease in the circumflex & right coronary artery. Normal left ventricular functino, ejection fraction of 60%.       CARPAL TUNNEL RELEASE Right     CATARACT needed (Arthritic pain.) 8/14/20   Atilio Barbra, DO   isosorbide dinitrate (ISORDIL) 5 MG tablet TAKE 1 TABLET TWICE DAILY 8/14/20   Atilio Barnwell, DO   warfarin (COUMADIN) 5 MG tablet Take 1/2 tablet on Wednesdays and Saturdays, and 1 whole tablet daily all other days or as directed by [facility]ication Management. 7/28/20   Atilio Barnwell, DO   gabapentin (NEURONTIN) 100 MG capsule Take 200 mg by mouth 2 times daily as needed. Historical Provider, MD   triamcinolone (ARISTOCORT) 0.5 % cream Apply topically a thin layer to the affected area (s) TWICE DAILY 1/31/20   Historical Provider, MD   acetaminophen (TYLENOL) 650 MG CR tablet Take 1,300 mg by mouth every 8 hours as needed for Pain    Historical Provider, MD   Magnesium 400 MG TABS Take 800 mg by mouth nightly     Historical Provider, MD   folic acid (FOLVITE) 092 MCG tablet Take 400 mcg by mouth daily 2/8/16   Historical Provider, MD        Allergies:       Codeine, Lisinopril, Percocet [oxycodone-acetaminophen], Adhesive tape, and Norco [hydrocodone-acetaminophen]    Social History:     Tobacco:    reports that she has never smoked. She has never used smokeless tobacco.  Alcohol:      reports no history of alcohol use. Drug Use:  reports no history of drug use. Family History:     Family History   Problem Relation Age of Onset    Diabetes Father     Cancer Father         Prostate Cancer    Cancer Paternal Aunt         Colon Cancer       Review of Systems:     Positive and Negative as described in HPI    Review of Systems    Physical Exam:     Vitals:  /76   Pulse 80   Ht 5' 8\" (1.727 m)   Wt 242 lb (109.8 kg)   SpO2 98%   BMI 36.80 kg/m²   Physical Exam  Vitals signs and nursing note reviewed. Constitutional:       General: She is not in acute distress. Appearance: Normal appearance. She is well-developed. She is not ill-appearing. Cardiovascular:      Rate and Rhythm: Normal rate and regular rhythm.       Heart sounds: Normal heart sounds. Pulmonary:      Effort: Pulmonary effort is normal.      Breath sounds: Normal breath sounds. Neurological:      Mental Status: She is alert and oriented to person, place, and time. Psychiatric:         Mood and Affect: Mood normal.         Behavior: Behavior normal.         Data:     Lab Results   Component Value Date     01/08/2021    K 4.3 02/09/2021    CL 99 01/08/2021    CO2 29 01/08/2021    BUN 24 01/08/2021    CREATININE 1.53 01/08/2021    GLUCOSE 181 01/08/2021    PROT 6.5 10/09/2020    LABALBU 3.5 01/08/2021    BILITOT 0.38 10/09/2020    ALKPHOS 68 10/09/2020    AST 11 10/09/2020    ALT 16 10/09/2020     Lab Results   Component Value Date    WBC 10.3 10/09/2020    RBC 4.12 10/09/2020    HGB 12.1 01/08/2021    HCT 37.3 01/08/2021    MCV 96.1 10/09/2020    MCH 31.1 10/09/2020    MCHC 32.4 10/09/2020    RDW 15.9 10/09/2020     10/09/2020    MPV NOT REPORTED 10/09/2020     Lab Results   Component Value Date    TSH 3.77 01/08/2021     Lab Results   Component Value Date    CHOL 156 03/03/2020    HDL 71 03/03/2020    LABA1C 7.8 11/17/2020         Assessment & Plan:        Diagnosis Orders   1. Primary insomnia     2. Idiopathic chronic gout of foot without tophus, unspecified laterality  Uric Acid   3. Chronic renal insufficiency, stage 4 (severe) ([facility])     Primary insomnia which still bothers patient to a degree but she is not seeking any further treatment at this time. Keep working on sleep hygiene and may try over-the-counter products if desired. History of gout, has been on allopurinol. Due for updated uric acid level. Ordered. Chronic kidney disease which has been stable, fluctuates between stage 3b and 4. Has been having more swelling, doing better on daily Lasix. After this change was made, I had ordered a potassium level. Advised patient to going to have that drawn today.           Requested Prescriptions     Signed Prescriptions Disp Refills    furosemide (LASIX) 20 MG tablet 90 tablet 1     Sig: Take 1 tablet by mouth daily    Blood Glucose Monitoring Suppl (TRUE METRIX METER) w/Device KIT 1 kit 0     Sig: Test once daily    blood glucose test strips (TRUE METRIX BLOOD GLUCOSE TEST) strip 100 each 3     Sig: Test once daily    Lancets MISC 100 each 3     Sig: Test once daily    potassium chloride (KLOR-CON M) 20 MEQ extended release tablet 90 tablet 1     Sig: Take 1 tablet by mouth daily       Patient Instructions   SURVEY:    You may be receiving a survey from [employer] regarding your visit today. You may get this in the mail, through your MyChart or in your email. Please complete the survey to enable us to provide the highest quality of care to you and your family. If you cannot score us as very good ( 5 Stars) on any question, please feel free to call the office to discuss how we could have made your experience exceptional.     Thank you. Clinical Care Team:  DO Simba Nunez, [address] [place] [address] Team:  Candi 22 received counseling on the following healthy behaviors: medication adherence  Reviewed prior labs and health maintenance. Continue current medications, diet and exercise. Discussed use, benefit, and side effects of prescribed medications. Barriers to medication compliance addressed. Patient given educational materials - see patient instructions. All patient questions answered. Patient voiced understanding.      Electronically signed by Jase Garibay DO on 2/11/2021 at 8:10 AM   [address]  Dept: [phone]

## 2021-03-05 ENCOUNTER — HOSPITAL ENCOUNTER (OUTPATIENT)
Dept: PHARMACY | Age: 76
Setting detail: THERAPIES SERIES
Discharge: HOME | End: 2021-03-05
Payer: MEDICARE

## 2021-03-05 VITALS
HEART RATE: 79 BPM | WEIGHT: 246.6 LBS | DIASTOLIC BLOOD PRESSURE: 73 MMHG | SYSTOLIC BLOOD PRESSURE: 125 MMHG | BODY MASS INDEX: 37.5 KG/M2

## 2021-03-05 DIAGNOSIS — Z79.01 LONG TERM CURRENT USE OF ANTICOAGULANT THERAPY: ICD-10-CM

## 2021-03-05 LAB — INR BLD: 3.2

## 2021-03-05 PROCEDURE — 85610 PROTHROMBIN TIME: CPT

## 2021-03-05 PROCEDURE — 99211 OFF/OP EST MAY X REQ PHY/QHP: CPT

## 2021-03-05 NOTE — PROGRESS NOTES
Fingerstick INR drawn per clinic protocol. Patient states no visible blood in urine and no black tarry stool. Denies any missed doses of warfarin. As discussed during her last visit, Sonya Jeong has still been having trouble sleeping at night and was given a new Rx for Trazodone 50 mg nightly PRN. She stopped taking it because \"it doesn't really help at all\" and also says she is no longer taking Melatonin because it \"doesn't help either\". She is still taking \"2 or 3\" capsules of Gabapentin 100 mg nightly at this time. No change in other maintenance medications or in diet. Given her slightly supra-therapeutic INR today, we will have her take 2.5 mg warfarin tonight, but then continue current weekly warfarin regimen as noted on her dosing calendar thereafter.  We will recheck INR in 6 weeks. Patient acknowledges working in consult agreement with pharmacist as referred by his/her physician.       CLINICAL PHARMACY CONSULT: MED RECONCILIATION/REVIEW ADDENDUM     For Pharmacy Admin Tracking Only     PHSO: Yes  Total # of Interventions Recommended: 2  - Decreased dose#:1  - Maintenance Safety Lab Monitoring #: 1      Total Interventions Accepted: 2  Time Spent (min): 30     Anjana AlejandroD

## 2021-03-10 RX ORDER — GLIPIZIDE 5 MG/1
TABLET, FILM COATED, EXTENDED RELEASE ORAL
Qty: 90 TABLET | Refills: 1 | Status: SHIPPED | OUTPATIENT
Start: 2021-03-10 | End: 2021-06-23 | Stop reason: SDUPTHER

## 2021-03-10 NOTE — TELEPHONE ENCOUNTER
Apply topically a thin layer to the affected area (s) TWICE DAILY 1/31/20   Historical Provider, MD   acetaminophen (TYLENOL) 650 MG CR tablet Take 1,300 mg by mouth every 8 hours as needed for Pain    Historical Provider, MD   Magnesium 400 MG TABS Take 800 mg by mouth nightly     Historical Provider, MD   folic acid (FOLVITE) 634 MCG tablet Take 400 mcg by mouth daily 2/8/16   Historical Provider, MD

## 2021-03-11 RX ORDER — PROPRANOLOL HYDROCHLORIDE 60 MG/1
TABLET ORAL
Qty: 180 TABLET | Refills: 1 | Status: SHIPPED | OUTPATIENT
Start: 2021-03-11 | End: 2021-07-30

## 2021-03-11 RX ORDER — ALLOPURINOL 100 MG/1
TABLET ORAL
Qty: 180 TABLET | Refills: 1 | Status: SHIPPED | OUTPATIENT
Start: 2021-03-11 | End: 2021-07-30

## 2021-03-11 NOTE — TELEPHONE ENCOUNTER
Last visit:  2/9/2021  Next Visit Date:    Future Appointments   Date Time Provider Natanael Austin   4/16/2021  9:30 AM Maile Villela MEDICATION MGMT [facility]         Medication List:  Prior to Admission medications    Medication Sig Start Date End Date Taking? Authorizing Provider   glipiZIDE (GLUCOTROL XL) 5 MG extended release tablet TAKE 1 TABLET EVERY DAY 3/10/21   Finn Pal, DO   furosemide (LASIX) 20 MG tablet Take 1 tablet by mouth daily 2/9/21   Finn Pal, DO   Blood Glucose Monitoring Suppl (TRUE METRIX METER) w/Device KIT Test once daily 2/9/21   Finn Pal, DO   blood glucose test strips (TRUE METRIX BLOOD GLUCOSE TEST) strip Test once daily 2/9/21   Finn Pal, DO   Lancets MISC Test once daily 2/9/21   Finn Pal, DO   potassium chloride (KLOR-CON M) 20 MEQ extended release tablet Take 1 tablet by mouth daily 2/9/21   Finn Pal, DO   omeprazole (PRILOSEC) 20 MG delayed release capsule TAKE 1 CAPSULE EVERY DAY 11/17/20   Finn Pla, DO   fluticasone (FLONASE) 50 MCG/ACT nasal spray 2 sprays by Nasal route daily as needed for Rhinitis 11/17/20   Finn Pal, DO   allopurinol (ZYLOPRIM) 100 MG tablet TAKE 1 TABLET TWICE DAILY 10/8/20   Finn Hatchyal, DO   propranolol (INDERAL) 60 MG tablet TAKE 1 TABLET TWICE DAILY 10/8/20   Finn Hatchyal, DO   cyclobenzaprine (FLEXERIL) 10 MG tablet Take 1 tablet by mouth 2 times daily as needed (Arthritic pain.) 8/14/20   Finn Pal, DO   isosorbide dinitrate (ISORDIL) 5 MG tablet TAKE 1 TABLET TWICE DAILY 8/14/20   Finn Pal, DO   warfarin (COUMADIN) 5 MG tablet Take 1/2 tablet on Wednesdays and Saturdays, and 1 whole tablet daily all other days or as directed by [facility] Medication Management. 7/28/20   Finn Pal, DO   gabapentin (NEURONTIN) 100 MG capsule Take 200-300 mg by mouth nightly as needed.      Historical Provider, MD   triamcinolone (ARISTOCORT) 0.5 % cream Apply topically a thin layer to the affected area (s) TWICE DAILY 1/31/20   Historical Provider, MD   acetaminophen (TYLENOL) 650 MG CR tablet Take 1,300 mg by mouth every 8 hours as needed for Pain    Historical Provider, MD   Magnesium 400 MG TABS Take 800 mg by mouth nightly     Historical Provider, MD   folic acid (FOLVITE) 354 MCG tablet Take 400 mcg by mouth daily 2/8/16   Historical Provider, MD

## 2021-03-15 RX ORDER — GABAPENTIN 100 MG/1
200 CAPSULE ORAL NIGHTLY
Qty: 180 CAPSULE | Refills: 1 | Status: SHIPPED | OUTPATIENT
Start: 2021-03-15 | End: 2022-05-16 | Stop reason: SDUPTHER

## 2021-03-15 NOTE — TELEPHONE ENCOUNTER
Last visit:  2/9/2021  Next Visit Date:    Future Appointments   Date Time Provider Natanael Austin   4/16/2021  9:30 AM Aisha Pino MEDICATION MGMT [facility] Michael Mauro         Medication List:  Prior to Admission medications    Medication Sig Start Date End Date Taking? Authorizing Provider   allopurinol (ZYLOPRIM) 100 MG tablet TAKE 1 TABLET TWICE DAILY 3/11/21   Saige Nullfty, DO   propranolol (INDERAL) 60 MG tablet TAKE 1 TABLET TWICE DAILY 3/11/21   Saige Hefty, DO   glipiZIDE (GLUCOTROL XL) 5 MG extended release tablet TAKE 1 TABLET EVERY DAY 3/10/21   Saige Hefty, DO   furosemide (LASIX) 20 MG tablet Take 1 tablet by mouth daily 2/9/21   Saige Hemitchel, DO   Blood Glucose Monitoring Suppl (TRUE METRIX METER) w/Device KIT Test once daily 2/9/21   Saige Hefty, DO   blood glucose test strips (TRUE METRIX BLOOD GLUCOSE TEST) strip Test once daily 2/9/21   Saige Hemihaelay, DO   Lancets MISC Test once daily 2/9/21   Saige Wisdom, DO   potassium chloride (KLOR-CON M) 20 MEQ extended release tablet Take 1 tablet by mouth daily 2/9/21   Saige Jimenezy, DO   omeprazole (PRILOSEC) 20 MG delayed release capsule TAKE 1 CAPSULE EVERY DAY 11/17/20   Saige Jimenezy, DO   fluticasone (FLONASE) 50 MCG/ACT nasal spray 2 sprays by Nasal route daily as needed for Rhinitis 11/17/20   Saige Wisdom, DO   cyclobenzaprine (FLEXERIL) 10 MG tablet Take 1 tablet by mouth 2 times daily as needed (Arthritic pain.) 8/14/20   Saige Wisdom, DO   isosorbide dinitrate (ISORDIL) 5 MG tablet TAKE 1 TABLET TWICE DAILY 8/14/20   Saige Wisdom, DO   warfarin (COUMADIN) 5 MG tablet Take 1/2 tablet on Wednesdays and Saturdays, and 1 whole tablet daily all other days or as directed by Christina Hernandez Medication Management. 7/28/20   Saige Wisdom, DO   gabapentin (NEURONTIN) 100 MG capsule Take 200-300 mg by mouth nightly as needed.      Historical Provider, MD   triamcinolone (ARISTOCORT) 0.5 % cream Apply

## 2021-03-15 NOTE — TELEPHONE ENCOUNTER
Gabapentin 100 mg BID - she said she takes 2 nightly. Dm-elissa    Last check up 2/9/21    No future appointment. Health Maintenance   Topic Date Due    Shingles Vaccine (1 of 2) Never done    Diabetic foot exam  10/22/2020    COVID-19 Vaccine (2 - Pfizer 2-dose series) 03/04/2021    Lipid screen  03/03/2021    Diabetic retinal exam  04/02/2021    Colon cancer screen colonoscopy  05/13/2021    Diabetic microalbuminuria test  07/08/2021    A1C test (Diabetic or Prediabetic)  11/17/2021    Annual Wellness Visit (AWV)  11/18/2021    Creatinine monitoring  01/08/2022    Potassium monitoring  02/09/2022    Breast cancer screen  08/24/2022    DTaP/Tdap/Td vaccine (2 - Td) 12/10/2025    DEXA (modify frequency per FRAX score)  Completed    Flu vaccine  Completed    Pneumococcal 65+ yrs at Risk Vaccine  Completed    Hepatitis C screen  Addressed    Hepatitis A vaccine  Aged Out    Hib vaccine  Aged Out    Meningococcal (ACWY) vaccine  Aged Out             (applicable per patient's age: Cancer Screenings, Depression Screening, Fall Risk Screening, Immunizations)    Hemoglobin A1C (%)   Date Value   11/17/2020 7.8   08/14/2020 7.7   03/03/2020 7.2 (H)     Microalb/Crt.  Ratio (mcg/mg creat)   Date Value   09/09/2014 7     LDL Cholesterol (mg/dL)   Date Value   03/03/2020 63     AST (U/L)   Date Value   10/09/2020 11     ALT (U/L)   Date Value   10/09/2020 16     BUN (mg/dL)   Date Value   01/08/2021 24 (H)      (goal A1C is < 7)   (goal LDL is <100) need 30-50% reduction from baseline     BP Readings from Last 3 Encounters:   03/05/21 125/73   02/09/21 116/76   01/22/21 (!) 125/93    (goal /80)      All Future Testing planned in [facility]:  Lab Frequency Next Occurrence   POCT INR     Protime-INR daily        Next Visit Date:  Future Appointments   Date Time Provider Natanael Austin   4/16/2021  9:30 AM Nona Shelton MEDICATION MGMT [facility] MED MGMT Elissa            Patient Active Problem List:

## 2021-03-15 NOTE — TELEPHONE ENCOUNTER
rx sent    Controlled Substance Monitoring:    Acute and Chronic Pain Monitoring:   RX Monitoring 3/15/2021   Periodic Controlled Substance Monitoring No signs of potential drug abuse or diversion identified.

## 2021-03-24 RX ORDER — OMEPRAZOLE 20 MG/1
CAPSULE, DELAYED RELEASE ORAL
Qty: 90 CAPSULE | Refills: 1 | Status: SHIPPED | OUTPATIENT
Start: 2021-03-24 | End: 2021-08-12

## 2021-03-24 NOTE — TELEPHONE ENCOUNTER
Last visit:  2/9/2021  Next Visit Date:    Future Appointments   Date Time Provider Natanael Austin   4/16/2021  9:30 AM Leonel MEDICATION MGMT [facility] MED MGMT Felix Herring         Medication List:  Prior to Admission medications    Medication Sig Start Date End Date Taking? Authorizing Provider   gabapentin (NEURONTIN) 100 MG capsule Take 2 capsules by mouth nightly for 180 days. 3/15/21 9/11/21  Chase Tovar, DO   allopurinol (ZYLOPRIM) 100 MG tablet TAKE 1 TABLET TWICE DAILY 3/11/21   Chase Tovar, DO   propranolol (INDERAL) 60 MG tablet TAKE 1 TABLET TWICE DAILY 3/11/21   Chase Tovar, DO   glipiZIDE (GLUCOTROL XL) 5 MG extended release tablet TAKE 1 TABLET EVERY DAY 3/10/21   Chase Tovar, DO   furosemide (LASIX) 20 MG tablet Take 1 tablet by mouth daily 2/9/21   Chase Tovar, DO   Blood Glucose Monitoring Suppl (TRUE METRIX METER) w/Device KIT Test once daily 2/9/21   Chase Guy, DO   blood glucose test strips (TRUE METRIX BLOOD GLUCOSE TEST) strip Test once daily 2/9/21   [facility], [facility]   Lancets MISC Test once daily 2/9/21   Chase Tovar, DO   potassium chloride (KLOR-CON M) 20 MEQ extended release tablet Take 1 tablet by mouth daily 2/9/21   Chase Tovar, DO   omeprazole (PRILOSEC) 20 MG delayed release capsule TAKE 1 CAPSULE EVERY DAY 11/17/20   Chase Tovar, DO   fluticasone (FLONASE) 50 MCG/ACT nasal spray 2 sprays by Nasal route daily as needed for Rhinitis 11/17/20   Chase Tovar, DO   cyclobenzaprine (FLEXERIL) 10 MG tablet Take 1 tablet by mouth 2 times daily as needed (Arthritic pain.) 8/14/20   Chase Tovar, DO   isosorbide dinitrate (ISORDIL) 5 MG tablet TAKE 1 TABLET TWICE DAILY 8/14/20   Chase Tovar, DO   warfarin (COUMADIN) 5 MG tablet Take 1/2 tablet on Wednesdays and Saturdays, and 1 whole tablet daily all other days or as directed by [facility] Medication Management.  7/28/20   Chase Tovar DO   triamcinolone (ARISTOCORT) 0.5 % cream Apply topically a thin layer to the affected area (s) TWICE DAILY 1/31/20   Historical Provider, MD   acetaminophen (TYLENOL) 650 MG CR tablet Take 1,300 mg by mouth every 8 hours as needed for Pain    Historical Provider, MD   Magnesium 400 MG TABS Take 800 mg by mouth nightly     Historical Provider, MD   folic acid (FOLVITE) 359 MCG tablet Take 400 mcg by mouth daily 2/8/16   Historical Provider, MD

## 2021-04-15 ENCOUNTER — TELEPHONE (OUTPATIENT)
Dept: PHARMACY | Age: 76
End: 2021-04-15

## 2021-04-16 ENCOUNTER — HOSPITAL ENCOUNTER (OUTPATIENT)
Dept: PHARMACY | Age: 76
Setting detail: THERAPIES SERIES
Discharge: HOME | End: 2021-04-16
Payer: MEDICARE

## 2021-04-16 VITALS
HEART RATE: 82 BPM | DIASTOLIC BLOOD PRESSURE: 65 MMHG | SYSTOLIC BLOOD PRESSURE: 107 MMHG | BODY MASS INDEX: 38.04 KG/M2 | WEIGHT: 250.2 LBS

## 2021-04-16 DIAGNOSIS — Z79.01 LONG TERM CURRENT USE OF ANTICOAGULANT THERAPY: ICD-10-CM

## 2021-04-16 LAB
INR BLD: 5.3
INR BLD: 6.9

## 2021-04-16 PROCEDURE — 99211 OFF/OP EST MAY X REQ PHY/QHP: CPT

## 2021-04-16 PROCEDURE — 85610 PROTHROMBIN TIME: CPT

## 2021-04-16 RX ORDER — WARFARIN SODIUM 5 MG/1
TABLET ORAL
Qty: 90 TABLET | Refills: 3 | Status: SHIPPED
Start: 2021-04-16 | End: 2021-04-21 | Stop reason: DRUGHIGH

## 2021-04-16 NOTE — PROGRESS NOTES
Elizabeth [address] [place] [address]/[place]  Medication Management  ANTICOAGULATION    Referring Doctor: Dr. Lupis Flores    GOAL INR: 2-3    TODAY'S INR: 5.3    WARFARIN Dosage: hold x 2 doses, then 2.5 mg x 3 doses    INR (no units)   Date Value   04/16/2021 5.3   04/16/2021 6.9   03/05/2021 3.2   01/22/2021 1.9   12/11/2020 2.3   10/30/2020 2.7   10/09/2020 1.5       Medication changes:  None    Notes:    Fingerstick INR drawn per clinic protocol. Patient states no visible blood in urine and no black tarry stool. She says she had a \"little\" blood in her nose \"1 day last week\", but it was \"not drippy\" and did not continue to bleed. Kanwal Chaparro says she took 5 mg warfarin this past Wednesday instead of 2.5 mg by mistake, but then took 2.5 mg last night instead of 5 mg \"to make up for it\". Denies any other missed or \"extra\" doses of warfarin since her last visit here in the clinic. No change in other maintenance medications or in diet. She says she continues to take OTC Acetaminophen \"2 tablets\" about \"1 or 2 times daily\", but denies having taken any other OTC pain medication or other supplements/ herbal medications. Since her INR has jumped supra-therapeutic for unknown reason, we will HOLD her warfarin dose for tonight and tomorrow and then have her take only 2.5 mg for the next 3 doses as noted on her dosing calendar. We will recheck INR in 5 days and reassess further warfarin dosing at that time. Patient acknowledges working in consult agreement with pharmacist as referred by his/her physician.                   CLINICAL PHARMACY CONSULT: MED RECONCILIATION/REVIEW ADDENDUM    For Pharmacy Admin Tracking Only    PHSO: Yes  Total # of Interventions Recommended: 2  - Decreased Dose #: 1  - Maintenance Safety Lab Monitoring #: 1  Total Interventions Accepted: 2  Time Spent (min): 30    Taniya Burrell PharmD

## 2021-04-20 ENCOUNTER — TELEPHONE (OUTPATIENT)
Dept: PHARMACY | Age: 76
End: 2021-04-20

## 2021-04-20 NOTE — TELEPHONE ENCOUNTER
COVID-19 phone screening     Call placed to screen patient prior to upcoming Medication Management visit for Anticoagulation. Does patient have fever and/or lower respiratory symptoms (SOB, difficulty breathing, cough)? [] Yes    [x] No    If yes, complete Travel Screening and ask the following:   [] [Fever OR s/sxs of lower respiratory illness]  AND  [close contact with lab-confirmed COVID-19 patient within 14 days of symptom onset   [] [Fever AND s/sxs of lower respiratory illness requiring hospitalization] AND [history of travel to [place], [place], [place], [facility], [place] within 14 days of sx onset]  [] [Fever with severe acute lower respiratory illness (I.e. PNA, ARDS) requiring hospitalization and without alternative explanatory diagnosis (I.e. Influenza)] AND [no source of exposure identified]    [x] None of the following; patient confirmed for regularly scheduled INR check and instructed to call the clinic immediately if any symptoms develop prior to upcoming appointment.

## 2021-04-21 ENCOUNTER — HOSPITAL ENCOUNTER (OUTPATIENT)
Dept: PHARMACY | Age: 76
Setting detail: THERAPIES SERIES
Discharge: HOME | End: 2021-04-21
Payer: MEDICARE

## 2021-04-21 VITALS — HEART RATE: 94 BPM | SYSTOLIC BLOOD PRESSURE: 106 MMHG | DIASTOLIC BLOOD PRESSURE: 80 MMHG

## 2021-04-21 DIAGNOSIS — Z79.01 LONG TERM CURRENT USE OF ANTICOAGULANT THERAPY: ICD-10-CM

## 2021-04-21 LAB — INR BLD: 1.9

## 2021-04-21 PROCEDURE — 99211 OFF/OP EST MAY X REQ PHY/QHP: CPT

## 2021-04-21 PROCEDURE — 85610 PROTHROMBIN TIME: CPT

## 2021-04-21 RX ORDER — WARFARIN SODIUM 5 MG/1
TABLET ORAL
Qty: 90 TABLET | Refills: 3 | Status: SHIPPED
Start: 2021-04-21 | End: 2021-04-28 | Stop reason: DRUGHIGH

## 2021-04-21 NOTE — PROGRESS NOTES
[address]  Medication Management  ANTICOAGULATION    Referring Doctor: Dr. Nadege Brunner INR: 2.0-3.0    TODAY'S INR: 1.9    WARFARIN Dosage: 2.5 mg Wed and Sat and 5 mg daily all other days (previously stable dosage x 9 month since July 2020)    INR (no units)   Date Value   04/21/2021 1.9   04/16/2021 5.3   04/16/2021 6.9   03/05/2021 3.2   01/22/2021 1.9   12/11/2020 2.3   10/30/2020 2.7       Medication changes:  none  Notes:    Fingerstick INR drawn per clinic protocol. Patient states no visible blood in urine and no black tarry stool. Ragini missed 2 doses of warfarin on 4/16 and 4/17 as instructed by our clinic because of supratherapeutic INR. No change in diet. Ragini states she has \"not been sleeping. \" She states she has been taking \"a little white pill\" that was prescribed for pain by Dr. Zoe Meredith to help her get some sleep, but stopped since Friday since her INR was elevated (later called in and was identified as gabapentin 100 mg). She states she did get some sleep Monday night, but \"didn't sleep at all last night. \" Karyn Kelly states she \"worries\" a lot, for example she has a friend from [place] who just had one leg amputated and may have to have the other amputated. And another friend in her 80s that her belly is filling up with fluid. We discussed anxiety and depression and how it can have an effect on sleep and she states she tried trazodone and failed therapy in January/February 2021. I told her I would copy my note to Dr. Zoe Meredith. She states she is due for another pain injection mid-May with Dr. Neo Dietrich. Ragini refused a weight today stating she just had one done on Friday, but states she has been having swelling in her feet. Since Ragini's INR has decreased from 2.3 to 1.9 over the last 5 days and is slightly subtherapeutic and was previously on stable warfarin dosage x 9 months, we will resume previously stable dosage and will recheck INR in 1 weeks.  Patient acknowledges working

## 2021-04-27 ENCOUNTER — TELEPHONE (OUTPATIENT)
Dept: PHARMACY | Age: 76
End: 2021-04-27

## 2021-04-27 NOTE — TELEPHONE ENCOUNTER
COVID-19 phone screening     Call placed to screen patient prior to upcoming Medication Management visit for Anticoagulation. Does patient have fever and/or lower respiratory symptoms (SOB, difficulty breathing, cough)? [] Yes    [x] No    If yes, complete Travel Screening and ask the following:   [] [Fever OR s/sxs of lower respiratory illness]  AND  [close contact with lab-confirmed COVID-19 patient within 14 days of symptom onset   [] [Fever AND s/sxs of lower respiratory illness requiring hospitalization] AND [history of travel to [place], [place], [place], [facility], [place] within 14 days of sx onset]  [] [Fever with severe acute lower respiratory illness (I.e. PNA, ARDS) requiring hospitalization and without alternative explanatory diagnosis (I.e. Influenza)] AND [no source of exposure identified]    [x] None of the following; patient confirmed for regularly scheduled INR check and instructed to call the clinic immediately if any symptoms develop prior to upcoming appointment.

## 2021-04-28 ENCOUNTER — HOSPITAL ENCOUNTER (OUTPATIENT)
Dept: PHARMACY | Age: 76
Setting detail: THERAPIES SERIES
Discharge: HOME | End: 2021-04-28
Payer: MEDICARE

## 2021-04-28 VITALS — WEIGHT: 252.4 LBS | BODY MASS INDEX: 38.38 KG/M2

## 2021-04-28 DIAGNOSIS — Z79.01 LONG TERM CURRENT USE OF ANTICOAGULANT THERAPY: ICD-10-CM

## 2021-04-28 LAB — INR BLD: 4.9

## 2021-04-28 PROCEDURE — 99212 OFFICE O/P EST SF 10 MIN: CPT

## 2021-04-28 PROCEDURE — 85610 PROTHROMBIN TIME: CPT

## 2021-04-28 RX ORDER — WARFARIN SODIUM 5 MG/1
TABLET ORAL
Qty: 90 TABLET | Refills: 3 | Status: SHIPPED
Start: 2021-04-28 | End: 2021-05-05 | Stop reason: DRUGHIGH

## 2021-04-28 NOTE — PATIENT INSTRUCTIONS
HOLD x 2 doses and then decrease current dose of warfarin as instructed on dosing calendar provided. Continue to monitor urine and stool for signs and symptoms of bleeding. Please notify the clinic of any medication changes. Please remember to bring all medications (both prescription and OTC) to your next visit. Kindly notify the clinic if you are unable to make to your next appointment.

## 2021-04-28 NOTE — PROGRESS NOTES
Elizabeth [facility]/[place]  Medication Management  ANTICOAGULATION    Referring Provider: Dr. Jessica Ayers INR: 2.0-3.0    TODAY'S INR: 4.9    WARFARIN Dosage: HOLD x 2 days 2.5 mg T,R, Sat and 5 mg daily all other days of the week     INR (no units)   Date Value   04/28/2021 4.9   04/21/2021 1.9   04/16/2021 5.3   04/16/2021 6.9   03/05/2021 3.2   01/22/2021 1.9   12/11/2020 2.3       Medication changes:  none  Notes:    Fingerstick INR drawn per clinic protocol. Patient states no visible blood in urine and no black tarry stool. Denies any missed doses of warfarin, although Ragini states she took 5 mg a day she was supposed to take 2.5 mg so she took 2.5 mg the next day instead of 5 mg. No change in diet. Ragini states she is sleeping better, but is taking \"2 of those pills\" that she called me about (gabapentin 100 mg capsules). Jen Cameron states she did take a total of 4 tylenol yesterday for pain. Jen Cameron states she is \"swollen\" all over today. She states she could get her hand around her wrist yesterday but can't today. She states she still takes her water pill every day. I asked who manages and she states Dr. Bonita Rollins and Dr. Misha Castillo and I recommended she get in touch with them. Since Ragini's INR has increased from 1.9 to 4.9 over the last 7 days and is supratherapeutic again today, we will HOLD x 2 days then restart weekly warfarin dosage at 8.3% lower dosage and will recheck INR in 1 weeks. Patient acknowledges working in consult agreement with pharmacist as referred by his/her physician.                   CLINICAL PHARMACY CONSULT: MED RECONCILIATION/REVIEW ADDENDUM    For Pharmacy Amirah Carlisle 22 Tracking Only     Intervention Detail: Dose Adjustment: 1: reason: Therapy De-escalation   Total # of Interventions Recommended: 1   Total # of Interventions Accepted: 1   Time Spent (min): 66 Grady Ortiz, PharmD

## 2021-05-05 ENCOUNTER — HOSPITAL ENCOUNTER (OUTPATIENT)
Dept: PHARMACY | Age: 76
Setting detail: THERAPIES SERIES
Discharge: HOME | End: 2021-05-05
Payer: MEDICARE

## 2021-05-05 VITALS
SYSTOLIC BLOOD PRESSURE: 94 MMHG | WEIGHT: 251.8 LBS | HEART RATE: 87 BPM | BODY MASS INDEX: 38.29 KG/M2 | DIASTOLIC BLOOD PRESSURE: 68 MMHG

## 2021-05-05 DIAGNOSIS — Z79.01 LONG TERM CURRENT USE OF ANTICOAGULANT THERAPY: ICD-10-CM

## 2021-05-05 LAB — INR BLD: 2.6

## 2021-05-05 PROCEDURE — 99212 OFFICE O/P EST SF 10 MIN: CPT

## 2021-05-05 PROCEDURE — 85610 PROTHROMBIN TIME: CPT

## 2021-05-05 RX ORDER — WARFARIN SODIUM 5 MG/1
TABLET ORAL
Qty: 90 TABLET | Refills: 3 | Status: SHIPPED
Start: 2021-05-05 | End: 2021-08-11 | Stop reason: DRUGHIGH

## 2021-05-05 NOTE — PROGRESS NOTES
Elizabeth [address] [facility]/[place]  Medication Management  ANTICOAGULATION    Referring Provider: Dr. Rudd Phlegm INR: 2.0-3.0    TODAY'S INR: 2.6    WARFARIN Dosage: 5 mg M,W,F and 2.5 mg daily all other days of the week    INR (no units)   Date Value   2021 2.6   2021 4.9   2021 1.9   2021 5.3   2021 6.9   2021 3.2   2021 1.9       Medication changes:  none  Notes:    Fingerstick INR drawn per clinic protocol. Patient states no visible blood in urine and no black tarry stool. Ragini missed 2 doses of warfarin on  and  as instructed by our clinic because of supratherapeutic INR. No change in other maintenance medications or in diet. Cari Julian is scheduled to have a pain injection on  and will hold warfarin x 4 days prior to the injection (-). Since Ragini's INR has decreased from 4.9 to 2.6 over the last week and is therapeutic today, we will continue 16.7% lower weekly warfarin dosage than previously stable on and will recheck INR in 4 weeks (2 weeks after restarting after pain injection on ). Patient acknowledges working in consult agreement with pharmacist as referred by his/her physician.                   CLINICAL PHARMACY CONSULT: MED RECONCILIATION/REVIEW ADDENDUM    For Pharmacy Amirah Mckeej 22 Tracking Only     Intervention Detail: Adherence Monitorin and Dose Adjustment: 1: reason: Therapy De-escalation   Total # of Interventions Recommended: 1   Total # of Interventions Accepted: 1   Time Spent (min): 66 Grady Ortiz, PharmD

## 2021-05-07 RX ORDER — ISOSORBIDE DINITRATE 5 MG/1
TABLET ORAL
Qty: 180 TABLET | Refills: 3 | Status: SHIPPED | OUTPATIENT
Start: 2021-05-07 | End: 2022-05-13 | Stop reason: ALTCHOICE

## 2021-05-07 NOTE — TELEPHONE ENCOUNTER
Last OV: 2/9/2021 DM   Last RX:   Next scheduled apt: Visit date not found      Sure scripts request    RX pending

## 2021-06-01 ENCOUNTER — TELEPHONE (OUTPATIENT)
Dept: PHARMACY | Age: 76
End: 2021-06-01

## 2021-06-01 NOTE — TELEPHONE ENCOUNTER
COVID-19 phone screening     Call placed to screen patient prior to upcoming Medication Management visit for Anticoagulation. Does patient have fever and/or lower respiratory symptoms (SOB, difficulty breathing, cough)? [] Yes    [x] No    If yes, complete Travel Screening and ask the following:   [] [Fever OR s/sxs of lower respiratory illness]  AND  [close contact with lab-confirmed COVID-19 patient within 14 days of symptom onset   [] [Fever AND s/sxs of lower respiratory illness requiring hospitalization] AND [history of travel to [place], [place], [place], [facility], [place] within 14 days of sx onset]  [] [Fever with severe acute lower respiratory illness (I.e. PNA, ARDS) requiring hospitalization and without alternative explanatory diagnosis (I.e. Influenza)] AND [no source of exposure identified]    [x] None of the following; patient confirmed for regularly scheduled INR check and instructed to call the clinic immediately if any symptoms develop prior to upcoming appointment.

## 2021-06-02 ENCOUNTER — HOSPITAL ENCOUNTER (OUTPATIENT)
Dept: PHARMACY | Age: 76
Setting detail: THERAPIES SERIES
Discharge: HOME | End: 2021-06-02
Payer: MEDICARE

## 2021-06-02 VITALS
DIASTOLIC BLOOD PRESSURE: 72 MMHG | BODY MASS INDEX: 37.22 KG/M2 | SYSTOLIC BLOOD PRESSURE: 113 MMHG | WEIGHT: 244.8 LBS | HEART RATE: 82 BPM

## 2021-06-02 DIAGNOSIS — Z79.01 LONG TERM CURRENT USE OF ANTICOAGULANT THERAPY: Primary | ICD-10-CM

## 2021-06-02 LAB — INR BLD: 2.1

## 2021-06-02 PROCEDURE — 85610 PROTHROMBIN TIME: CPT

## 2021-06-02 PROCEDURE — 99211 OFF/OP EST MAY X REQ PHY/QHP: CPT

## 2021-06-02 NOTE — PROGRESS NOTES
Elizabeth [address] [place] [address]/[place]  Medication Management  ANTICOAGULATION    Referring Provider: Dr. Kitty Fernandez    GOAL INR: 2.0-3.0    TODAY'S INR: 2.1    WARFARIN Dosage: 5 mg M, W, F and 2.5 mg daily all other days of the week     INR (no units)   Date Value   2021 2.1   2021 2.6   2021 4.9   2021 1.9   2021 5.3   2021 6.9   2021 3.2       Medication changes:  none  Notes:    Fingerstick INR drawn per clinic protocol. Patient states no visible blood in urine and no black tarry stool. Denies any missed doses of warfarin. No change in other maintenance medications or in diet. Debbie Whalen has lost 7 pounds in the last 4 weeks and states that some of her swelling has went down and she can now get her fingers around her right wrist, where she could not before. Debbie Whalen is scheduled to see her kidney doctor at the end of this month and state she needs to make an appointment with Dr. Julianne Godinez. Since Ragini's INR remains therapeutic, we will continue current weekly warfarin regimen and will recheck INR in 5 weeks. Patient acknowledges working in consult agreement with pharmacist as referred by his/her physician.                   CLINICAL PHARMACY CONSULT: MED RECONCILIATION/REVIEW ADDENDUM    For Pharmacy Admin Tracking Only     Intervention Detail: Adherence Monitorin   Total # of Interventions Recommended: 0   Total # of Interventions Accepted: 0   Time Spent (min): 400 [facility] Harinder, [facility], PharmD

## 2021-06-14 ENCOUNTER — HOSPITAL ENCOUNTER (OUTPATIENT)
Age: 76
Discharge: HOME | End: 2021-06-14
Payer: MEDICARE

## 2021-06-14 ENCOUNTER — TELEPHONE (OUTPATIENT)
Dept: FAMILY MEDICINE CLINIC | Age: 76
End: 2021-06-14

## 2021-06-14 DIAGNOSIS — R30.0 DYSURIA: ICD-10-CM

## 2021-06-14 DIAGNOSIS — R30.0 DYSURIA: Primary | ICD-10-CM

## 2021-06-14 LAB
-: ABNORMAL
ALBUMIN SERPL-MCNC: 3.7 G/DL (ref 3.5–5.2)
AMORPHOUS: ABNORMAL
ANION GAP SERPL CALCULATED.3IONS-SCNC: 10 MMOL/L (ref 9–17)
BACTERIA: ABNORMAL
BILIRUBIN URINE: NEGATIVE
BUN BLDV-MCNC: 34 MG/DL (ref 8–23)
BUN/CREAT BLD: 20 (ref 9–20)
CALCIUM SERPL-MCNC: 9.5 MG/DL (ref 8.6–10.4)
CASTS UA: ABNORMAL /LPF
CHLORIDE BLD-SCNC: 98 MMOL/L (ref 98–107)
CO2: 29 MMOL/L (ref 20–31)
COLOR: ABNORMAL
COMMENT UA: ABNORMAL
CREAT SERPL-MCNC: 1.74 MG/DL (ref 0.5–0.9)
CRYSTALS, UA: ABNORMAL /HPF
EPITHELIAL CELLS UA: ABNORMAL /HPF
GFR AFRICAN AMERICAN: 35 ML/MIN
GFR NON-AFRICAN AMERICAN: 29 ML/MIN
GFR SERPL CREATININE-BSD FRML MDRD: ABNORMAL ML/MIN/{1.73_M2}
GFR SERPL CREATININE-BSD FRML MDRD: ABNORMAL ML/MIN/{1.73_M2}
GLUCOSE BLD-MCNC: 344 MG/DL (ref 70–99)
GLUCOSE URINE: NEGATIVE
HCT VFR BLD CALC: 38.8 % (ref 36–46)
HEMOGLOBIN: 12.8 G/DL (ref 12–16)
KETONES, URINE: NEGATIVE
LEUKOCYTE ESTERASE, URINE: ABNORMAL
MAGNESIUM: 2 MG/DL (ref 1.6–2.6)
MUCUS: ABNORMAL
NITRITE, URINE: NEGATIVE
OTHER OBSERVATIONS UA: ABNORMAL
PH UA: 7 (ref 5–8)
PHOSPHORUS: 3.1 MG/DL (ref 2.6–4.5)
POTASSIUM SERPL-SCNC: 4.3 MMOL/L (ref 3.7–5.3)
PROTEIN UA: ABNORMAL
RBC UA: ABNORMAL /HPF (ref 0–2)
RENAL EPITHELIAL, UA: ABNORMAL /HPF
SODIUM BLD-SCNC: 137 MMOL/L (ref 135–144)
SPECIFIC GRAVITY UA: 1.01 (ref 1–1.03)
TRICHOMONAS: ABNORMAL
TURBIDITY: ABNORMAL
URINE HGB: ABNORMAL
UROBILINOGEN, URINE: NORMAL
VITAMIN D 25-HYDROXY: 42.9 NG/ML (ref 30–100)
WBC UA: ABNORMAL /HPF
YEAST: ABNORMAL

## 2021-06-14 PROCEDURE — 82306 VITAMIN D 25 HYDROXY: CPT

## 2021-06-14 PROCEDURE — 83735 ASSAY OF MAGNESIUM: CPT

## 2021-06-14 PROCEDURE — 81001 URINALYSIS AUTO W/SCOPE: CPT

## 2021-06-14 PROCEDURE — 87086 URINE CULTURE/COLONY COUNT: CPT

## 2021-06-14 PROCEDURE — 85018 HEMOGLOBIN: CPT

## 2021-06-14 PROCEDURE — 87088 URINE BACTERIA CULTURE: CPT

## 2021-06-14 PROCEDURE — 85014 HEMATOCRIT: CPT

## 2021-06-14 PROCEDURE — 80069 RENAL FUNCTION PANEL: CPT

## 2021-06-14 PROCEDURE — 87186 SC STD MICRODIL/AGAR DIL: CPT

## 2021-06-14 PROCEDURE — 36415 COLL VENOUS BLD VENIPUNCTURE: CPT

## 2021-06-14 PROCEDURE — 83970 ASSAY OF PARATHORMONE: CPT

## 2021-06-14 NOTE — TELEPHONE ENCOUNTER
Patient is coming out to do some other labs and feels like she might have been dealing with a bladder infection all of last week (says it burns like fire) - asking if she can stop in to get her urine checked - please let patient know   Health Maintenance   Topic Date Due    COVID-19 Vaccine (1) Never done    Shingles Vaccine (1 of 2) Never done    Diabetic foot exam  10/22/2020    Lipid screen  03/03/2021    Diabetic retinal exam  04/02/2021    Colon cancer screen colonoscopy  05/13/2021    Diabetic microalbuminuria test  07/08/2021    A1C test (Diabetic or Prediabetic)  11/17/2021    Annual Wellness Visit (AWV)  11/18/2021    Creatinine monitoring  01/08/2022    Potassium monitoring  02/09/2022    Breast cancer screen  08/24/2022    DTaP/Tdap/Td vaccine (2 - Td or Tdap) 12/10/2025    DEXA (modify frequency per FRAX score)  Completed    Flu vaccine  Completed    Pneumococcal 65+ yrs at Risk Vaccine  Completed    Hepatitis C screen  Addressed    Hepatitis A vaccine  Aged Out    Hib vaccine  Aged Out    Meningococcal (ACWY) vaccine  Aged Out             (applicable per patient's age: Cancer Screenings, Depression Screening, Fall Risk Screening, Immunizations)    Hemoglobin A1C (%)   Date Value   11/17/2020 7.8   08/14/2020 7.7   03/03/2020 7.2 (H)     Microalb/Crt.  Ratio (mcg/mg creat)   Date Value   09/09/2014 7     LDL Cholesterol (mg/dL)   Date Value   03/03/2020 63     AST (U/L)   Date Value   10/09/2020 11     ALT (U/L)   Date Value   10/09/2020 16     BUN (mg/dL)   Date Value   01/08/2021 24 (H)      (goal A1C is < 7)   (goal LDL is <100) need 30-50% reduction from baseline     BP Readings from Last 3 Encounters:   06/02/21 113/72   05/05/21 94/68   04/21/21 106/80    (goal /80)      All Future Testing planned in [facility]:  Lab Frequency Next Occurrence       Next Visit Date:  Future Appointments   Date Time Provider Natanael Austin   7/7/2021 10:00 AM ELIZABETH MEDICATION MGMT [facility]

## 2021-06-15 ENCOUNTER — TELEPHONE (OUTPATIENT)
Dept: FAMILY MEDICINE CLINIC | Age: 76
End: 2021-06-15

## 2021-06-15 DIAGNOSIS — N39.46 MIXED INCONTINENCE: ICD-10-CM

## 2021-06-15 DIAGNOSIS — N39.0 FREQUENT UTI: Primary | ICD-10-CM

## 2021-06-15 LAB — PTH INTACT: 55.12 PG/ML (ref 15–65)

## 2021-06-15 RX ORDER — CIPROFLOXACIN 500 MG/1
500 TABLET, FILM COATED ORAL 2 TIMES DAILY
Qty: 14 TABLET | Refills: 0 | Status: SHIPPED | OUTPATIENT
Start: 2021-06-15 | End: 2021-06-22

## 2021-06-16 LAB
CULTURE: ABNORMAL
Lab: ABNORMAL
SPECIMEN DESCRIPTION: ABNORMAL

## 2021-06-16 NOTE — TELEPHONE ENCOUNTER
Last OV: 2/9/2021 DM   Last RX:   Next scheduled apt: 06/22/2021      Sure scripts request      RX pending

## 2021-06-18 RX ORDER — FUROSEMIDE 20 MG/1
TABLET ORAL
Qty: 90 TABLET | Refills: 1 | Status: SHIPPED | OUTPATIENT
Start: 2021-06-18 | End: 2021-11-04

## 2021-06-18 RX ORDER — POTASSIUM CHLORIDE 20 MEQ/1
TABLET, EXTENDED RELEASE ORAL
Qty: 90 TABLET | Refills: 1 | Status: SHIPPED | OUTPATIENT
Start: 2021-06-18 | End: 2021-11-04

## 2021-06-22 ENCOUNTER — HOSPITAL ENCOUNTER (OUTPATIENT)
Age: 76
Discharge: HOME | End: 2021-06-22
Payer: MEDICARE

## 2021-06-22 ENCOUNTER — OFFICE VISIT (OUTPATIENT)
Dept: FAMILY MEDICINE CLINIC | Age: 76
End: 2021-06-22
Payer: MEDICARE

## 2021-06-22 VITALS
BODY MASS INDEX: 38.33 KG/M2 | DIASTOLIC BLOOD PRESSURE: 60 MMHG | HEART RATE: 77 BPM | OXYGEN SATURATION: 97 % | HEIGHT: 67 IN | WEIGHT: 244.2 LBS | SYSTOLIC BLOOD PRESSURE: 102 MMHG

## 2021-06-22 DIAGNOSIS — I87.2 VENOUS INSUFFICIENCY: ICD-10-CM

## 2021-06-22 DIAGNOSIS — E11.22 TYPE 2 DIABETES MELLITUS WITH STAGE 3B CHRONIC KIDNEY DISEASE, WITHOUT LONG-TERM CURRENT USE OF INSULIN (HCC): Primary | ICD-10-CM

## 2021-06-22 DIAGNOSIS — N39.0 FREQUENT UTI: ICD-10-CM

## 2021-06-22 DIAGNOSIS — N18.32 TYPE 2 DIABETES MELLITUS WITH STAGE 3B CHRONIC KIDNEY DISEASE, WITHOUT LONG-TERM CURRENT USE OF INSULIN (HCC): Primary | ICD-10-CM

## 2021-06-22 DIAGNOSIS — N18.4 TYPE 2 DIABETES MELLITUS WITH STAGE 4 CHRONIC KIDNEY DISEASE, WITHOUT LONG-TERM CURRENT USE OF INSULIN (HCC): ICD-10-CM

## 2021-06-22 DIAGNOSIS — E66.09 NON MORBID OBESITY DUE TO EXCESS CALORIES: ICD-10-CM

## 2021-06-22 DIAGNOSIS — E11.22 TYPE 2 DIABETES MELLITUS WITH STAGE 4 CHRONIC KIDNEY DISEASE, WITHOUT LONG-TERM CURRENT USE OF INSULIN (HCC): ICD-10-CM

## 2021-06-22 LAB
CREATININE URINE: 42.7 MG/DL (ref 28–217)
MICROALBUMIN/CREAT 24H UR: <12 MG/L
MICROALBUMIN/CREAT UR-RTO: NORMAL MCG/MG CREAT

## 2021-06-22 PROCEDURE — 80061 LIPID PANEL: CPT

## 2021-06-22 PROCEDURE — 82570 ASSAY OF URINE CREATININE: CPT

## 2021-06-22 PROCEDURE — G8399 PT W/DXA RESULTS DOCUMENT: HCPCS | Performed by: FAMILY MEDICINE

## 2021-06-22 PROCEDURE — 82043 UR ALBUMIN QUANTITATIVE: CPT

## 2021-06-22 PROCEDURE — 83036 HEMOGLOBIN GLYCOSYLATED A1C: CPT

## 2021-06-22 PROCEDURE — 3017F COLORECTAL CA SCREEN DOC REV: CPT | Performed by: FAMILY MEDICINE

## 2021-06-22 PROCEDURE — 2022F DILAT RTA XM EVC RTNOPTHY: CPT | Performed by: FAMILY MEDICINE

## 2021-06-22 PROCEDURE — 1036F TOBACCO NON-USER: CPT | Performed by: FAMILY MEDICINE

## 2021-06-22 PROCEDURE — G8427 DOCREV CUR MEDS BY ELIG CLIN: HCPCS | Performed by: FAMILY MEDICINE

## 2021-06-22 PROCEDURE — 3046F HEMOGLOBIN A1C LEVEL >9.0%: CPT | Performed by: FAMILY MEDICINE

## 2021-06-22 PROCEDURE — 1090F PRES/ABSN URINE INCON ASSESS: CPT | Performed by: FAMILY MEDICINE

## 2021-06-22 PROCEDURE — 99214 OFFICE O/P EST MOD 30 MIN: CPT | Performed by: FAMILY MEDICINE

## 2021-06-22 PROCEDURE — 36415 COLL VENOUS BLD VENIPUNCTURE: CPT

## 2021-06-22 PROCEDURE — G8417 CALC BMI ABV UP PARAM F/U: HCPCS | Performed by: FAMILY MEDICINE

## 2021-06-22 PROCEDURE — 4040F PNEUMOC VAC/ADMIN/RCVD: CPT | Performed by: FAMILY MEDICINE

## 2021-06-22 PROCEDURE — 1123F ACP DISCUSS/DSCN MKR DOCD: CPT | Performed by: FAMILY MEDICINE

## 2021-06-22 RX ORDER — CYCLOBENZAPRINE HCL 10 MG
10 TABLET ORAL 2 TIMES DAILY PRN
Qty: 60 TABLET | Refills: 2 | Status: SHIPPED | OUTPATIENT
Start: 2021-06-22 | End: 2022-05-13 | Stop reason: ALTCHOICE

## 2021-06-22 ASSESSMENT — PATIENT HEALTH QUESTIONNAIRE - PHQ9
1. LITTLE INTEREST OR PLEASURE IN DOING THINGS: 0
2. FEELING DOWN, DEPRESSED OR HOPELESS: 0
SUM OF ALL RESPONSES TO PHQ QUESTIONS 1-9: 0
SUM OF ALL RESPONSES TO PHQ QUESTIONS 1-9: 0
SUM OF ALL RESPONSES TO PHQ9 QUESTIONS 1 & 2: 0
SUM OF ALL RESPONSES TO PHQ QUESTIONS 1-9: 0

## 2021-06-22 NOTE — PROGRESS NOTES
Name: Teri Villavicencio  : 1945         Chief Complaint:     Chief Complaint   Patient presents with    Diabetes       History of Present Illness:      Teri Villavicencio is a 70 y.o.  female who presents with Diabetes      HPI    Recent UTI with a lot of dysuria, has gotten better with abx. Reports she has had urine infections since she was a little girl, no particular triggers. Seeing urology . DM seems stable. Doesn't check sugar. Continues meds. Eating a lot of vegetables. Believes she's incapable of any type of exercise d/t MSK issues. Swelling better in legs but starting to increase again. Medical History:     Patient Active Problem List   Diagnosis    Type 2 diabetes mellitus with stage 3 chronic kidney disease, without long-term current use of insulin ([address]    Essential hypertension, benign    Esophageal reflux    Irritable bowel syndrome    Seasonal allergies    Long term current use of anticoagulant therapy    Gout    Rectocele    Tubular adenoma of colon    Hiatal hernia    Sigmoid diverticulosis    Chronic back pain    Hypomagnesemia    Family history of colon cancer    History of recurrent deep vein thrombosis (DVT)    Hyperparathyroidism (HCC)       Medications:       Prior to Admission medications    Medication Sig Start Date End Date Taking?  Authorizing Provider   cyclobenzaprine (FLEXERIL) 10 MG tablet Take 1 tablet by mouth 2 times daily as needed (Arthritic pain.) 21  Yes Fatoumata Streeter, DO   furosemide (LASIX) 20 MG tablet TAKE 1 TABLET EVERY DAY 21  Yes Fatoumata Streeter, DO   potassium chloride (KLOR-CON M) 20 MEQ extended release tablet TAKE 1 TABLET EVERY DAY 21  Yes Fatoumata Streeter, DO   isosorbide dinitrate (ISORDIL) 5 MG tablet TAKE 1 TABLET TWICE DAILY 21  Yes Fatoumata Streeter, DO   warfarin (COUMADIN) 5 MG tablet Take 1 tablet on Monday, Wednesday and Friday and take 1/2 tablet daily all other days of the week or as directed by [facility]. 5/5/21  Yes Fatoumata Streeter, DO   omeprazole (PRILOSEC) 20 MG delayed release capsule TAKE 1 CAPSULE EVERY DAY 3/24/21  Yes Fatoumata Streeter, DO   gabapentin (NEURONTIN) 100 MG capsule Take 2 capsules by mouth nightly for 180 days. 3/15/21 9/11/21 Yes Fatoumata Streeter, DO   allopurinol (ZYLOPRIM) 100 MG tablet TAKE 1 TABLET TWICE DAILY 3/11/21  Yes Fatoumata Streeter, DO   propranolol (INDERAL) 60 MG tablet TAKE 1 TABLET TWICE DAILY 3/11/21  Yes Fatoumata Streeter, DO   Blood Glucose Monitoring Suppl (TRUE METRIX METER) w/Device KIT Test once daily 2/9/21  Yes Fatoumata Streeter, DO   blood glucose test strips (TRUE METRIX BLOOD GLUCOSE TEST) strip Test once daily 2/9/21  Yes Fatoumata Streeter, DO   Lancets MISC Test once daily 2/9/21  Yes Fatoumata Streeter, DO   fluticasone (FLONASE) 50 MCG/ACT nasal spray 2 sprays by Nasal route daily as needed for Rhinitis 11/17/20  Yes Fatoumata Streeter, DO   acetaminophen (TYLENOL) 650 MG CR tablet Take 1,300 mg by mouth every 8 hours as needed for Pain   Yes Historical Provider, MD   Magnesium 400 MG TABS Take 800 mg by mouth nightly    Yes Historical Provider, MD   folic acid (FOLVITE) 291 MCG tablet Take 400 mcg by mouth daily 2/8/16  Yes Historical Provider, MD   glipiZIDE (GLUCOTROL XL) 10 MG extended release tablet TAKE 1 TABLET BID (with meals) 6/23/21   Fatoumata Streeter, DO        Allergies:       Codeine, Lisinopril, Percocet [oxycodone-acetaminophen], Adhesive tape, and Norco [hydrocodone-acetaminophen]    Physical Exam:     Vitals:  /60 (Site: Right Upper Arm, Position: Sitting, Cuff Size: Large Adult)   Pulse 77   Ht 5' 6.7\" (1.694 m)   Wt 244 lb 3.2 oz (110.8 kg)   SpO2 97%   BMI 38.59 kg/m²   Physical Exam  Vitals and nursing note reviewed. Constitutional:       General: She is not in acute distress. Appearance: She is well-developed. HENT:      Head: Normocephalic and atraumatic.       Right Ear: Tympanic membrane and ear canal normal.      Left Ear: Tympanic membrane and ear canal normal.      Nose: Nose normal.   Eyes:      Conjunctiva/sclera: Conjunctivae normal.   Cardiovascular:      Rate and Rhythm: Normal rate and regular rhythm. Heart sounds: Normal heart sounds. Pulmonary:      Effort: Pulmonary effort is normal.      Breath sounds: Normal breath sounds. Musculoskeletal:      Cervical back: Neck supple. Lymphadenopathy:      Cervical: No cervical adenopathy. Skin:     General: Skin is warm and dry. Neurological:      Mental Status: She is alert and oriented to person, place, and time. Psychiatric:         Judgment: Judgment normal.         Data:     Lab Results   Component Value Date     06/14/2021    K 4.3 06/14/2021    CL 98 06/14/2021    CO2 29 06/14/2021    BUN 34 06/14/2021    CREATININE 1.74 06/14/2021    GLUCOSE 344 06/14/2021    PROT 6.5 10/09/2020    LABALBU 3.7 06/14/2021    BILITOT 0.38 10/09/2020    ALKPHOS 68 10/09/2020    AST 11 10/09/2020    ALT 16 10/09/2020     Lab Results   Component Value Date    WBC 10.3 10/09/2020    RBC 4.12 10/09/2020    HGB 12.8 06/14/2021    HCT 38.8 06/14/2021    MCV 96.1 10/09/2020    MCH 31.1 10/09/2020    MCHC 32.4 10/09/2020    RDW 15.9 10/09/2020     10/09/2020    MPV NOT REPORTED 10/09/2020     Lab Results   Component Value Date    TSH 3.77 01/08/2021     Lab Results   Component Value Date    CHOL 167 06/22/2021    HDL 59 06/22/2021    LABA1C 9.5 06/22/2021         Assessment & Plan:        Diagnosis Orders   1. Type 2 diabetes mellitus with stage 3b chronic kidney disease, without long-term current use of insulin (HCC)  Lipid Panel    Hemoglobin A1C    Microalbumin, Ur   2. Frequent UTI     3. Non morbid obesity due to excess calories     4. Venous insufficiency     Diabetes, last A1c was in November and was 7.8%. Unable to take Metformin due to kidney function.   We have been adjusting her glipizide dosing and she has tolerated the 10 mg daily extended release dose. Updating labs. No exercise and I do not believe she has made much effort with diet either. May need to increase glipizide dose, add DPP 4 inhibitor, or eventually look at insulin initiation. We could also consider SGLT2 inhibitor, some of which now have an indication for chronic kidney disease, but I would be reluctant with her history of frequent UTIs. Frequent UTIs, incontinence, has been referred to urology. Finished Cipro and is doing better. No hypoglycemia while on Cipro and polyuria. Obesity, discussed diet and exercise. Chronic venous insufficiency, had seen vascular recently and had some procedures which were not helpful. Wears compression stockings at times. Continue following with podiatry. Requested Prescriptions     Signed Prescriptions Disp Refills    cyclobenzaprine (FLEXERIL) 10 MG tablet 60 tablet 2     Sig: Take 1 tablet by mouth 2 times daily as needed (Arthritic pain.)         There are no Patient Instructions on file for this visit. Ragini received counseling on the following healthy behaviors: medication adherence  Reviewed prior labs and health maintenance. Continue current medications, diet and exercise. Discussed use, benefit, and side effects of prescribed medications. Barriers to medication compliance addressed. Patient given educational materials - see patient instructions. All patient questions answered.   Patient voiced understanding.     signed by Robel Rick DO on 6/24/2021 at 12:35 PM  [address]  Dept: [phone]

## 2021-06-23 ENCOUNTER — TELEPHONE (OUTPATIENT)
Dept: FAMILY MEDICINE CLINIC | Age: 76
End: 2021-06-23

## 2021-06-23 LAB
CHOLESTEROL/HDL RATIO: 2.8
CHOLESTEROL: 167 MG/DL
ESTIMATED AVERAGE GLUCOSE: 226 MG/DL
HBA1C MFR BLD: 9.5 % (ref 4–6)
HDLC SERPL-MCNC: 59 MG/DL
LDL CHOLESTEROL: 87 MG/DL (ref 0–130)
TRIGL SERPL-MCNC: 104 MG/DL
VLDLC SERPL CALC-MCNC: NORMAL MG/DL (ref 1–30)

## 2021-06-23 RX ORDER — GLIPIZIDE 10 MG/1
TABLET, FILM COATED, EXTENDED RELEASE ORAL
Qty: 180 TABLET | Refills: 1 | Status: SHIPPED | OUTPATIENT
Start: 2021-06-23 | End: 2022-05-13

## 2021-06-23 NOTE — TELEPHONE ENCOUNTER
----- Message from Sandy Johnson DO sent at 6/23/2021 12:15 PM EDT -----  Cholesterol okay. Sugar the worst that I think it has ever been, A1c of 9.5%, which means her average sugar over the past 3 months has been about 226. Please increase glipizide extended release to 10 mg daily. Work on cutting back carbohydrates, starch and sugar. Follow-up 3 months.

## 2021-06-24 PROBLEM — Z86.0100 HISTORY OF COLON POLYPS: Status: RESOLVED | Noted: 2019-05-13 | Resolved: 2021-06-24

## 2021-06-24 PROBLEM — Z86.010 HISTORY OF COLON POLYPS: Status: RESOLVED | Noted: 2019-05-13 | Resolved: 2021-06-24

## 2021-07-06 ENCOUNTER — TELEPHONE (OUTPATIENT)
Dept: PHARMACY | Age: 76
End: 2021-07-06

## 2021-07-06 NOTE — TELEPHONE ENCOUNTER
COVID-19 phone screening     Call placed to screen patient prior to upcoming Medication Management visit for Anticoagulation. Does patient have fever and/or lower respiratory symptoms (SOB, difficulty breathing, cough)? [] Yes    [x] No    If yes, complete Travel Screening and ask the following:   [] [Fever OR s/sxs of lower respiratory illness]  AND  [close contact with lab-confirmed COVID-19 patient within 14 days of symptom onset   [] [Fever AND s/sxs of lower respiratory illness requiring hospitalization] AND [history of travel to [place], [place], [place], [facility], [place] [employer] [place] within 14 days of sx onset]  [] [Fever with severe acute lower respiratory illness (I.e. PNA, ARDS) requiring hospitalization and without alternative explanatory diagnosis (I.e. Influenza)] AND [no source of exposure identified]    [x] None of the following; patient confirmed for regularly scheduled INR check and instructed to call the clinic immediately if any symptoms develop prior to upcoming appointment.

## 2021-07-07 ENCOUNTER — HOSPITAL ENCOUNTER (OUTPATIENT)
Dept: PHARMACY | Age: 76
Setting detail: THERAPIES SERIES
Discharge: HOME | End: 2021-07-07
Payer: MEDICARE

## 2021-07-07 VITALS
BODY MASS INDEX: 38.84 KG/M2 | DIASTOLIC BLOOD PRESSURE: 73 MMHG | HEART RATE: 68 BPM | SYSTOLIC BLOOD PRESSURE: 113 MMHG | WEIGHT: 245.8 LBS

## 2021-07-07 DIAGNOSIS — Z79.01 LONG TERM CURRENT USE OF ANTICOAGULANT THERAPY: Primary | ICD-10-CM

## 2021-07-07 LAB — INR BLD: 2.2

## 2021-07-07 PROCEDURE — 99211 OFF/OP EST MAY X REQ PHY/QHP: CPT

## 2021-07-07 PROCEDURE — 85610 PROTHROMBIN TIME: CPT

## 2021-07-15 ENCOUNTER — OFFICE VISIT (OUTPATIENT)
Dept: UROLOGY | Age: 76
End: 2021-07-15
Payer: MEDICARE

## 2021-07-15 ENCOUNTER — HOSPITAL ENCOUNTER (OUTPATIENT)
Age: 76
Setting detail: SPECIMEN
Discharge: HOME | End: 2021-07-15
Payer: MEDICARE

## 2021-07-15 VITALS
HEIGHT: 66 IN | DIASTOLIC BLOOD PRESSURE: 80 MMHG | SYSTOLIC BLOOD PRESSURE: 122 MMHG | WEIGHT: 242 LBS | BODY MASS INDEX: 38.89 KG/M2

## 2021-07-15 DIAGNOSIS — N39.0 FREQUENT UTI: ICD-10-CM

## 2021-07-15 DIAGNOSIS — N39.46 MIXED INCONTINENCE: ICD-10-CM

## 2021-07-15 DIAGNOSIS — N18.30 STAGE 3 CHRONIC KIDNEY DISEASE, UNSPECIFIED WHETHER STAGE 3A OR 3B CKD (HCC): ICD-10-CM

## 2021-07-15 DIAGNOSIS — N39.46 MIXED INCONTINENCE: Primary | ICD-10-CM

## 2021-07-15 DIAGNOSIS — N20.0 KIDNEY STONES: ICD-10-CM

## 2021-07-15 DIAGNOSIS — Z90.5 H/O PARTIAL NEPHRECTOMY: ICD-10-CM

## 2021-07-15 LAB
-: NORMAL
AMORPHOUS: NORMAL
BACTERIA: NORMAL
BILIRUBIN URINE: NEGATIVE
CASTS UA: NORMAL /LPF
COLOR: YELLOW
COMMENT UA: NORMAL
CRYSTALS, UA: NORMAL /HPF
EPITHELIAL CELLS UA: NORMAL /HPF
GLUCOSE URINE: NEGATIVE
KETONES, URINE: NEGATIVE
LEUKOCYTE ESTERASE, URINE: NEGATIVE
MUCUS: NORMAL
NITRITE, URINE: NEGATIVE
OTHER OBSERVATIONS UA: NORMAL
PH UA: 7 (ref 5–8)
PROTEIN UA: NEGATIVE
RBC UA: NORMAL /HPF (ref 0–2)
RENAL EPITHELIAL, UA: NORMAL /HPF
SPECIFIC GRAVITY UA: 1.01 (ref 1–1.03)
TRICHOMONAS: NORMAL
TURBIDITY: CLEAR
URINE HGB: NEGATIVE
UROBILINOGEN, URINE: NORMAL
WBC UA: NORMAL /HPF
YEAST: NORMAL

## 2021-07-15 PROCEDURE — G8417 CALC BMI ABV UP PARAM F/U: HCPCS | Performed by: PHYSICIAN ASSISTANT

## 2021-07-15 PROCEDURE — 1036F TOBACCO NON-USER: CPT | Performed by: PHYSICIAN ASSISTANT

## 2021-07-15 PROCEDURE — 99204 OFFICE O/P NEW MOD 45 MIN: CPT | Performed by: PHYSICIAN ASSISTANT

## 2021-07-15 PROCEDURE — 51798 US URINE CAPACITY MEASURE: CPT | Performed by: PHYSICIAN ASSISTANT

## 2021-07-15 PROCEDURE — 4040F PNEUMOC VAC/ADMIN/RCVD: CPT | Performed by: PHYSICIAN ASSISTANT

## 2021-07-15 PROCEDURE — G8427 DOCREV CUR MEDS BY ELIG CLIN: HCPCS | Performed by: PHYSICIAN ASSISTANT

## 2021-07-15 PROCEDURE — G8399 PT W/DXA RESULTS DOCUMENT: HCPCS | Performed by: PHYSICIAN ASSISTANT

## 2021-07-15 PROCEDURE — 1090F PRES/ABSN URINE INCON ASSESS: CPT | Performed by: PHYSICIAN ASSISTANT

## 2021-07-15 PROCEDURE — 81001 URINALYSIS AUTO W/SCOPE: CPT

## 2021-07-15 PROCEDURE — 3017F COLORECTAL CA SCREEN DOC REV: CPT | Performed by: PHYSICIAN ASSISTANT

## 2021-07-15 PROCEDURE — 1123F ACP DISCUSS/DSCN MKR DOCD: CPT | Performed by: PHYSICIAN ASSISTANT

## 2021-07-15 PROCEDURE — 87086 URINE CULTURE/COLONY COUNT: CPT

## 2021-07-15 PROCEDURE — 0509F URINE INCON PLAN DOCD: CPT | Performed by: PHYSICIAN ASSISTANT

## 2021-07-15 RX ORDER — OXYBUTYNIN CHLORIDE 10 MG/1
10 TABLET, EXTENDED RELEASE ORAL EVERY EVENING
Qty: 30 TABLET | Refills: 3 | Status: SHIPPED | OUTPATIENT
Start: 2021-07-15 | End: 2021-11-08 | Stop reason: SDUPTHER

## 2021-07-15 ASSESSMENT — ENCOUNTER SYMPTOMS
SHORTNESS OF BREATH: 0
WHEEZING: 0
COUGH: 0
COLOR CHANGE: 0
CONSTIPATION: 0
BACK PAIN: 0
APNEA: 0
EYE REDNESS: 0
NAUSEA: 0
VOMITING: 0
ABDOMINAL PAIN: 0

## 2021-07-15 NOTE — PATIENT INSTRUCTIONS
BLADDER IRRITANTS     There are several changes you can make to your diet to help improve your urinary symptoms. The following have been shown to cause irritation to the bladder and should be AVOIDED if possible:  ~ Coffee (including decaffeinated)   ~ ALL Tea (including green teas and decaffeinated)  ~ Soda/Pop/carbonated beverages/Energy drinks (especially dark dyed andrea, root beers, mountain dew, etc)  ~These are MUCH worse if they have caffeine, but can also be irritative to the bladder even without caffeine  ~ Alcoholic beverages  ~ Spicy foods (peppers contain capsaicin, which is very irritating to the bladder)  ~ Acidic foods (for example: tomato based foods, orange juice, etc)    We encourage increased water intake, unless you have been placed on a fluid restriction by another provider.

## 2021-07-16 LAB
CULTURE: NORMAL
Lab: NORMAL
SPECIMEN DESCRIPTION: NORMAL

## 2021-07-19 ENCOUNTER — TELEPHONE (OUTPATIENT)
Dept: UROLOGY | Age: 76
End: 2021-07-19

## 2021-07-19 NOTE — RESULT ENCOUNTER NOTE
Please call pt - urine culture reviewed and does not show UTI    There was also no significant blood in her urine    Follow-up as scheduled

## 2021-07-29 NOTE — TELEPHONE ENCOUNTER
Last OV: 6/22/2021 Medicare   Last RX:  Next scheduled apt: 9/28/2021        Sure scripts request      RX pending

## 2021-07-30 RX ORDER — ALLOPURINOL 100 MG/1
TABLET ORAL
Qty: 180 TABLET | Refills: 1 | Status: SHIPPED | OUTPATIENT
Start: 2021-07-30 | End: 2021-12-16

## 2021-07-30 RX ORDER — PROPRANOLOL HYDROCHLORIDE 60 MG/1
TABLET ORAL
Qty: 180 TABLET | Refills: 1 | Status: SHIPPED | OUTPATIENT
Start: 2021-07-30 | End: 2021-12-16

## 2021-08-04 ENCOUNTER — HOSPITAL ENCOUNTER (OUTPATIENT)
Age: 76
Discharge: HOME | End: 2021-08-04
Payer: MEDICARE

## 2021-08-04 ENCOUNTER — TELEPHONE (OUTPATIENT)
Dept: UROLOGY | Age: 76
End: 2021-08-04

## 2021-08-04 DIAGNOSIS — R30.0 DYSURIA: Primary | ICD-10-CM

## 2021-08-04 DIAGNOSIS — R30.0 DYSURIA: ICD-10-CM

## 2021-08-04 LAB
-: ABNORMAL
AMORPHOUS: ABNORMAL
BACTERIA: ABNORMAL
BILIRUBIN URINE: NEGATIVE
CASTS UA: ABNORMAL /LPF
COLOR: YELLOW
COMMENT UA: ABNORMAL
CRYSTALS, UA: ABNORMAL /HPF
EPITHELIAL CELLS UA: ABNORMAL /HPF
GLUCOSE URINE: NEGATIVE
KETONES, URINE: NEGATIVE
LEUKOCYTE ESTERASE, URINE: ABNORMAL
MUCUS: ABNORMAL
NITRITE, URINE: NEGATIVE
OTHER OBSERVATIONS UA: ABNORMAL
PH UA: 7 (ref 5–8)
PROTEIN UA: ABNORMAL
RBC UA: ABNORMAL /HPF (ref 0–2)
RENAL EPITHELIAL, UA: ABNORMAL /HPF
SPECIFIC GRAVITY UA: 1.01 (ref 1–1.03)
TRICHOMONAS: ABNORMAL
TURBIDITY: ABNORMAL
URINE HGB: ABNORMAL
UROBILINOGEN, URINE: NORMAL
WBC UA: ABNORMAL /HPF
YEAST: ABNORMAL

## 2021-08-04 PROCEDURE — 87186 SC STD MICRODIL/AGAR DIL: CPT

## 2021-08-04 PROCEDURE — 81001 URINALYSIS AUTO W/SCOPE: CPT

## 2021-08-04 PROCEDURE — 87077 CULTURE AEROBIC IDENTIFY: CPT

## 2021-08-04 PROCEDURE — 87086 URINE CULTURE/COLONY COUNT: CPT

## 2021-08-04 NOTE — TELEPHONE ENCOUNTER
Patient called stating she is having burning and pain with urination and frequency. No fever or chills.

## 2021-08-04 NOTE — TELEPHONE ENCOUNTER
Please have her drop off a urinalysis and culture.   We will call her with the results    Please encourage her to increase her water intake    She may take over-the-counter Azo for pain control

## 2021-08-05 LAB
CULTURE: ABNORMAL
Lab: ABNORMAL
SPECIMEN DESCRIPTION: ABNORMAL

## 2021-08-06 ENCOUNTER — TELEPHONE (OUTPATIENT)
Dept: UROLOGY | Age: 76
End: 2021-08-06

## 2021-08-06 RX ORDER — CEFDINIR 300 MG/1
300 CAPSULE ORAL 2 TIMES DAILY
Qty: 20 CAPSULE | Refills: 0 | Status: SHIPPED | OUTPATIENT
Start: 2021-08-06 | End: 2021-08-16

## 2021-08-06 NOTE — TELEPHONE ENCOUNTER
Urine culture is positive for infection. We sent in culture specific cefdinir. Take this antibiotic until gone. Take this with food and eat yogurt once per day to prevent GI upset. If you develop nausea, vomiting, or fevers call the office or go to the ER. If your urinary symptoms do not improve once completing the antibiotics call our office.

## 2021-08-11 ENCOUNTER — HOSPITAL ENCOUNTER (OUTPATIENT)
Dept: PHARMACY | Age: 76
Setting detail: THERAPIES SERIES
Discharge: HOME | End: 2021-08-11
Payer: MEDICARE

## 2021-08-11 VITALS
DIASTOLIC BLOOD PRESSURE: 68 MMHG | HEART RATE: 79 BPM | SYSTOLIC BLOOD PRESSURE: 103 MMHG | WEIGHT: 243 LBS | BODY MASS INDEX: 39.22 KG/M2

## 2021-08-11 DIAGNOSIS — Z79.01 LONG TERM CURRENT USE OF ANTICOAGULANT THERAPY: Primary | ICD-10-CM

## 2021-08-11 LAB — INR BLD: 3.7

## 2021-08-11 PROCEDURE — 85610 PROTHROMBIN TIME: CPT

## 2021-08-11 PROCEDURE — 99212 OFFICE O/P EST SF 10 MIN: CPT

## 2021-08-11 RX ORDER — WARFARIN SODIUM 5 MG/1
TABLET ORAL
Qty: 90 TABLET | Refills: 3 | Status: SHIPPED
Start: 2021-08-11 | End: 2021-08-23 | Stop reason: DRUGHIGH

## 2021-08-11 NOTE — PROGRESS NOTES
[facility]  [facility]ication Management  ANTICOAGULATION    Referring Provider: Dr. Love Solid     GOAL INR: 2.0-3.0     TODAY'S INR: 3.7     WARFARIN Dosage: 5 mg M and F and 2.5 mg daily all other days of the week     INR (no units)   Date Value   2021 3.7   2021 2.2   2021 2.1   2021 2.6   2021 4.9   2021 1.9   2021 5.3       Medication changes:  Ditropan xl 10 mg daily started on 7/15 and cefdinir 300 mg po bid x 10 days started on 21  Notes:    Fingerstick INR drawn per clinic protocol. Patient states no visible blood in urine and no black tarry stool. Denies any missed doses of warfarin. No change in diet. Ragini saw Damaris CAMP on 7/15 and was started on ditropan xl 10 mg po daily. Ragini called urology on  stating she was having burning and urine was milky in color, had urinalysis and culture. On  was notified urine culture was positive for infection and was started on cefdinir 300 mg po bid x 10 days (which may increase INR). Since Ragini's INR is supratherapeutic today and could be due to drug-drug interaction between cefdinir and warfarin, we will HOLD x 1 dose, then decrease weekly warfarin dosage by 10% and will recheck INR in 2 weeks prior to Damaris Cao visit. Patient acknowledges working in consult agreement with pharmacist as referred by his/her physician.                   For Pharmacy Admin Tracking Only     Intervention Detail: Adherence Monitorin and Dose Adjustment: 1, reason: Therapy De-escalation   Total # of Interventions Recommended: 1   Total # of Interventions Accepted: 1   Time Spent (min): [address], [facility], PharmD

## 2021-08-12 RX ORDER — OMEPRAZOLE 20 MG/1
CAPSULE, DELAYED RELEASE ORAL
Qty: 90 CAPSULE | Refills: 1 | Status: SHIPPED | OUTPATIENT
Start: 2021-08-12 | End: 2021-12-30

## 2021-08-12 NOTE — TELEPHONE ENCOUNTER
Last OV: 6/22/2021 Medicare wellness visit  Last RX:    Next scheduled apt: 9/28/2021        Sure scripts request      Rx pending

## 2021-08-16 ENCOUNTER — HOSPITAL ENCOUNTER (OUTPATIENT)
Dept: ULTRASOUND IMAGING | Age: 76
Discharge: HOME | End: 2021-08-18
Payer: MEDICARE

## 2021-08-16 DIAGNOSIS — N18.30 STAGE 3 CHRONIC KIDNEY DISEASE, UNSPECIFIED WHETHER STAGE 3A OR 3B CKD (HCC): ICD-10-CM

## 2021-08-16 DIAGNOSIS — Z90.5 H/O PARTIAL NEPHRECTOMY: ICD-10-CM

## 2021-08-16 PROCEDURE — 76770 US EXAM ABDO BACK WALL COMP: CPT

## 2021-08-19 ENCOUNTER — HOSPITAL ENCOUNTER (EMERGENCY)
Age: 76
Discharge: HOME | End: 2021-08-19
Attending: EMERGENCY MEDICINE
Payer: MEDICARE

## 2021-08-19 ENCOUNTER — APPOINTMENT (OUTPATIENT)
Dept: GENERAL RADIOLOGY | Age: 76
End: 2021-08-19
Payer: MEDICARE

## 2021-08-19 ENCOUNTER — APPOINTMENT (OUTPATIENT)
Dept: CT IMAGING | Age: 76
End: 2021-08-19
Payer: MEDICARE

## 2021-08-19 VITALS
RESPIRATION RATE: 20 BRPM | BODY MASS INDEX: 39.05 KG/M2 | HEIGHT: 66 IN | SYSTOLIC BLOOD PRESSURE: 154 MMHG | DIASTOLIC BLOOD PRESSURE: 76 MMHG | TEMPERATURE: 98.4 F | HEART RATE: 76 BPM | OXYGEN SATURATION: 97 % | WEIGHT: 243 LBS

## 2021-08-19 DIAGNOSIS — S00.83XA CONTUSION OF FACE, INITIAL ENCOUNTER: ICD-10-CM

## 2021-08-19 DIAGNOSIS — M75.101 ROTATOR CUFF TEAR ARTHROPATHY OF RIGHT SHOULDER: Primary | ICD-10-CM

## 2021-08-19 DIAGNOSIS — M12.811 ROTATOR CUFF TEAR ARTHROPATHY OF RIGHT SHOULDER: Primary | ICD-10-CM

## 2021-08-19 DIAGNOSIS — S09.90XA CLOSED HEAD INJURY, INITIAL ENCOUNTER: ICD-10-CM

## 2021-08-19 PROCEDURE — 70486 CT MAXILLOFACIAL W/O DYE: CPT

## 2021-08-19 PROCEDURE — 6370000000 HC RX 637 (ALT 250 FOR IP): Performed by: EMERGENCY MEDICINE

## 2021-08-19 PROCEDURE — 73030 X-RAY EXAM OF SHOULDER: CPT

## 2021-08-19 PROCEDURE — 70450 CT HEAD/BRAIN W/O DYE: CPT

## 2021-08-19 PROCEDURE — 96372 THER/PROPH/DIAG INJ SC/IM: CPT

## 2021-08-19 PROCEDURE — 99285 EMERGENCY DEPT VISIT HI MDM: CPT

## 2021-08-19 PROCEDURE — 6360000002 HC RX W HCPCS: Performed by: EMERGENCY MEDICINE

## 2021-08-19 PROCEDURE — 72125 CT NECK SPINE W/O DYE: CPT

## 2021-08-19 RX ORDER — ONDANSETRON 4 MG/1
4 TABLET, ORALLY DISINTEGRATING ORAL ONCE
Status: COMPLETED | OUTPATIENT
Start: 2021-08-19 | End: 2021-08-19

## 2021-08-19 RX ORDER — TRAMADOL HYDROCHLORIDE 50 MG/1
50 TABLET ORAL EVERY 6 HOURS PRN
Qty: 12 TABLET | Refills: 0 | Status: SHIPPED | OUTPATIENT
Start: 2021-08-19 | End: 2021-08-24

## 2021-08-19 RX ORDER — MORPHINE SULFATE 4 MG/ML
4 INJECTION, SOLUTION INTRAMUSCULAR; INTRAVENOUS ONCE
Status: COMPLETED | OUTPATIENT
Start: 2021-08-19 | End: 2021-08-19

## 2021-08-19 RX ADMIN — ONDANSETRON 4 MG: 4 TABLET, ORALLY DISINTEGRATING ORAL at 10:44

## 2021-08-19 RX ADMIN — MORPHINE SULFATE 4 MG: 4 INJECTION, SOLUTION INTRAMUSCULAR; INTRAVENOUS at 10:44

## 2021-08-19 ASSESSMENT — PAIN DESCRIPTION - PAIN TYPE
TYPE: ACUTE PAIN
TYPE: ACUTE PAIN

## 2021-08-19 ASSESSMENT — PAIN SCALES - GENERAL
PAINLEVEL_OUTOF10: 7
PAINLEVEL_OUTOF10: 2
PAINLEVEL_OUTOF10: 7

## 2021-08-19 ASSESSMENT — PAIN DESCRIPTION - LOCATION
LOCATION_2: SHOULDER
LOCATION: FACE

## 2021-08-19 ASSESSMENT — PAIN DESCRIPTION - INTENSITY: RATING_2: 8

## 2021-08-19 ASSESSMENT — PAIN DESCRIPTION - ONSET: ONSET_2: ON-GOING

## 2021-08-19 ASSESSMENT — PAIN DESCRIPTION - PROGRESSION: CLINICAL_PROGRESSION_2: NOT CHANGED

## 2021-08-19 ASSESSMENT — PAIN DESCRIPTION - ORIENTATION
ORIENTATION: RIGHT
ORIENTATION_2: RIGHT

## 2021-08-19 ASSESSMENT — PAIN DESCRIPTION - DURATION: DURATION_2: CONTINUOUS

## 2021-08-19 ASSESSMENT — PAIN DESCRIPTION - DESCRIPTORS: DESCRIPTORS_2: CONSTANT

## 2021-08-19 NOTE — ED PROVIDER NOTES
HPI:  8/19/21,   Time: 10:45 AM EDT         Emelina Hooper is a 70 y.o. female presenting to the ED for right shoulder pain and facial swelling and chipped tooth after falling tripping in rooms, beginning this prior to arrival ago. The complaint has been constant, moderate in severity, and worsened by changing position. No fever chills night sweats no chest pain or shortness of breath no abdominal pain or vomiting or diarrhea. No loss of consciousness but patient is on Coumadin    ROS:   Pertinent positives and negatives are stated within HPI, all other systems reviewed and are negative.  --------------------------------------------- PAST HISTORY ---------------------------------------------  Past Medical History:  has a past medical history of Allergic rhinitis, Chronic kidney disease, stage III (moderate) ([facility]), Deep vein thrombosis (DVT) ([facility] Elbow fracture, left, Gastric ulcer, Gout, Hx of blood clots, Hypertension, Nausea & vomiting, Presence of IVC filter, and Type II or unspecified type diabetes mellitus without mention of complication, not stated as uncontrolled. Past Surgical History:  has a past surgical history that includes Rotator cuff repair; Cystoscopy; Hysterectomy; partial nephrectomy (Left); Colonoscopy (2014); Vena Cava Filter Placement; Total knee arthroplasty (Right); Total knee arthroplasty (Left); Cataract removal (Right); Cataract removal (Left); back surgery; back surgery; back surgery; back surgery; back surgery; back surgery; Breast biopsy (Left); Breast biopsy (Right); Carpal tunnel release (Right); Retinal detachment surgery (Left); Eye surgery (Right); Kidney surgery; Cardiac catheterization (Left, 03/07/2018); Upper gastrointestinal endoscopy (2014); and Colonoscopy (N/A, 5/13/2019). Social History:  reports that she has never smoked. She has never used smokeless tobacco. She reports that she does not drink alcohol and does not use drugs.     Family History: family history includes Cancer in her father and paternal aunt; Diabetes in her father. The patients home medications have been reviewed. Allergies: Codeine, Lisinopril, Percocet [oxycodone-acetaminophen], Adhesive tape, and Norco [hydrocodone-acetaminophen]    -------------------------------------------------- RESULTS -------------------------------------------------  All laboratory and radiology results have been personally reviewed by myself   LABS:  No results found for this visit on 08/19/21. RADIOLOGY:  Interpreted by Radiologist.  XR SHOULDER RIGHT (MIN 2 VIEWS)   Final Result   No definite evidence of acute fracture. There are degenerative    changes in the [facility] joint and there is suggestion of chronic rotator cuff tear. If clinical concern remains, consider further imaging with CT or MRI. CT Cervical Spine WO Contrast   Final Result      1. No fracture or subluxation of the cervical spine is seen. 2. There again appears to be mild spinal canal stenosis at the C4-C5 and C5-C6    levels and moderate to severe left foraminal narrowing at the C5-C6 level when    compared to the prior CT scan of the cervical spine of 12/10/2015. 3. There is partial visualization of a soft tissue hematoma in the anterior    right maxillary region. Please refer to the report of the maxillofacial CT of    the same date for further details. CT Head WO Contrast   Preliminary Result   Preliminary report only      IMPRESSION:          1. Soft tissue swelling on the right side of face. No skull fracture. 2. No acute intracranial findings. No intracranial hemorrhage. 3. Stable brain atrophy and chronic microvascular ischemic changes. CT MAXILLOFACIAL WO CONTRAST   Final Result   1. Right frontal facial soft tissue swelling and mild right frontal scalp soft    tissue swelling. 2. Linear lucency at the anterior aspect of the right [facility] arch.     However, margins of this radiolucency appear corticated. This finding likely    represents asymmetric appearance of the right zygomaticomaxillary suture and,    less likely, acute fracture. 3. Opacification of left mastoid air cells. ------------------------- NURSING NOTES AND VITALS REVIEWED ---------------------------   The nursing notes within the ED encounter and vital signs as below have been reviewed. BP (!) 147/55   Pulse 76   Temp 98.4 °F (36.9 °C) (Oral)   Resp 20   Ht 5' 6\" (1.676 m)   Wt 243 lb (110.2 kg)   SpO2 99%   BMI 39.22 kg/m²   Oxygen Saturation Interpretation: Normal      ---------------------------------------------------PHYSICAL EXAM--------------------------------------      Constitutional/General: Alert and oriented x3, well appearing, non toxic in NAD  Head: Moderate swelling of the right cheek and also chipped right lateral incisor  Eyes: PERRL, EOMI  Mouth: Oropharynx clear, handling secretions, no trismus, chipped right lateral incisor  Neck: Supple, full ROM, no meningeal signs  Pulmonary: Lungs clear to auscultation bilaterally, no wheezes, rales, or rhonchi. Not in respiratory distress  Cardiovascular:  Regular rate and rhythm, no murmurs, gallops, or rubs. 2+ distal pulses  Abdomen: Soft, non tender, non distended,   Extremities: Moves all extremities x 4 however right shoulder range of motion is limited secondary to pain and weakness patient has previous history of rotator cuff repair with scar.  Warm and well perfused  Skin: warm and dry without rash  Neurologic: GCS 15, no clear acute focal deficits  Psych: Normal Affect      ------------------------------ ED COURSE/MEDICAL DECISION MAKING----------------------  Medications   morphine sulfate (PF) injection 4 mg (4 mg Intramuscular Given 8/19/21 1044)   ondansetron (ZOFRAN-ODT) disintegrating tablet 4 mg (4 mg Oral Given 8/19/21 1044)         Medical Decision Making:    Right shoulder injury rule out fracture versus rotator cuff tear and also right facial injury rule out fracture    Counseling: The emergency provider has spoken with the patient and discussed todays results, in addition to providing specific details for the plan of care and counseling regarding the diagnosis and prognosis. Questions are answered at this time and they are agreeable with the plan.      --------------------------------- IMPRESSION AND DISPOSITION ---------------------------------    IMPRESSION  1. Rotator cuff tear arthropathy of right shoulder New Problem   2. Closed head injury, initial encounter New Problem   3.  Contusion of face, initial encounter        DISPOSITION  Disposition: Discharge to home  Patient condition is stable                  Margareth Sargent MD  08/19/21 [id number]

## 2021-08-23 ENCOUNTER — HOSPITAL ENCOUNTER (OUTPATIENT)
Dept: GENERAL RADIOLOGY | Age: 76
Discharge: HOME | End: 2021-08-25
Payer: MEDICARE

## 2021-08-23 ENCOUNTER — HOSPITAL ENCOUNTER (OUTPATIENT)
Age: 76
Discharge: HOME | End: 2021-08-23
Payer: MEDICARE

## 2021-08-23 ENCOUNTER — OFFICE VISIT (OUTPATIENT)
Dept: FAMILY MEDICINE CLINIC | Age: 76
End: 2021-08-23
Payer: MEDICARE

## 2021-08-23 ENCOUNTER — HOSPITAL ENCOUNTER (OUTPATIENT)
Age: 76
Discharge: HOME | End: 2021-08-25
Payer: MEDICARE

## 2021-08-23 ENCOUNTER — HOSPITAL ENCOUNTER (OUTPATIENT)
Dept: PHARMACY | Age: 76
Setting detail: THERAPIES SERIES
Discharge: HOME | End: 2021-08-23
Payer: MEDICARE

## 2021-08-23 VITALS — HEART RATE: 88 BPM | OXYGEN SATURATION: 98 % | SYSTOLIC BLOOD PRESSURE: 122 MMHG | DIASTOLIC BLOOD PRESSURE: 70 MMHG

## 2021-08-23 DIAGNOSIS — S04.42XS: ICD-10-CM

## 2021-08-23 DIAGNOSIS — S80.01XD CONTUSION OF RIGHT KNEE, SUBSEQUENT ENCOUNTER: ICD-10-CM
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
## 2021-08-23 DIAGNOSIS — M25.551 ACUTE HIP PAIN, RIGHT: ICD-10-CM

## 2021-08-23 DIAGNOSIS — I10 ESSENTIAL HYPERTENSION: ICD-10-CM
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
## 2021-08-23 DIAGNOSIS — R07.81 RIB PAIN ON RIGHT SIDE: ICD-10-CM

## 2021-08-23 DIAGNOSIS — M19.012 OSTEOARTHRITIS OF LEFT SHOULDER, UNSPECIFIED OSTEOARTHRITIS TYPE: ICD-10-CM

## 2021-08-23 DIAGNOSIS — E66.01 SEVERE OBESITY (BMI 35.0-35.9 WITH COMORBIDITY) (HCC): ICD-10-CM

## 2021-08-23 DIAGNOSIS — E89.2 H/O PARATHYROIDECTOMY (HCC): ICD-10-CM

## 2021-08-23 DIAGNOSIS — Z79.01 ANTICOAGULATED ON COUMADIN: ICD-10-CM

## 2021-08-23 DIAGNOSIS — R41.3 MEMORY CHANGES: ICD-10-CM
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
## 2021-08-23 DIAGNOSIS — N18.32 TYPE 2 DIABETES MELLITUS WITH STAGE 3B CHRONIC KIDNEY DISEASE, WITHOUT LONG-TERM CURRENT USE OF INSULIN (HCC): ICD-10-CM

## 2021-08-23 DIAGNOSIS — S00.83XS FACIAL CONTUSION, SEQUELA: ICD-10-CM

## 2021-08-23 DIAGNOSIS — Z79.01 LONG TERM CURRENT USE OF ANTICOAGULANT THERAPY: Primary | ICD-10-CM

## 2021-08-23 DIAGNOSIS — E11.22 TYPE 2 DIABETES MELLITUS WITH STAGE 3B CHRONIC KIDNEY DISEASE, WITHOUT LONG-TERM CURRENT USE OF INSULIN (HCC): ICD-10-CM

## 2021-08-23 DIAGNOSIS — S02.5XXB OPEN FRACTURE OF TOOTH, INITIAL ENCOUNTER: ICD-10-CM

## 2021-08-23 DIAGNOSIS — S06.0X0D CONCUSSION WITHOUT LOSS OF CONSCIOUSNESS, SUBSEQUENT ENCOUNTER: ICD-10-CM

## 2021-08-23 LAB
-: ABNORMAL
ABSOLUTE EOS #: 0.4 K/UL (ref 0–0.4)
ABSOLUTE IMMATURE GRANULOCYTE: ABNORMAL K/UL (ref 0–0.3)
ABSOLUTE LYMPH #: 2.3 K/UL (ref 1–4.8)
ABSOLUTE MONO #: 0.4 K/UL (ref 0–1)
ALBUMIN SERPL-MCNC: 3.7 G/DL (ref 3.5–5.2)
ALBUMIN/GLOBULIN RATIO: ABNORMAL (ref 1–2.5)
ALP BLD-CCNC: 63 U/L (ref 35–104)
ALT SERPL-CCNC: 10 U/L (ref 5–33)
AMORPHOUS: ABNORMAL
ANION GAP SERPL CALCULATED.3IONS-SCNC: 12 MMOL/L (ref 9–17)
AST SERPL-CCNC: 12 U/L
BACTERIA: ABNORMAL
BASOPHILS # BLD: 0 % (ref 0–2)
BASOPHILS ABSOLUTE: 0 K/UL (ref 0–0.2)
BILIRUB SERPL-MCNC: 0.4 MG/DL (ref 0.3–1.2)
BILIRUBIN URINE: NEGATIVE
BUN BLDV-MCNC: 27 MG/DL (ref 8–23)
BUN/CREAT BLD: 17 (ref 9–20)
CALCIUM SERPL-MCNC: 10.2 MG/DL (ref 8.6–10.4)
CASTS UA: ABNORMAL /LPF
CHLORIDE BLD-SCNC: 97 MMOL/L (ref 98–107)
CO2: 30 MMOL/L (ref 20–31)
COLOR: YELLOW
COMMENT UA: ABNORMAL
CREAT SERPL-MCNC: 1.61 MG/DL (ref 0.5–0.9)
CRYSTALS, UA: ABNORMAL /HPF
DIFFERENTIAL TYPE: YES
EOSINOPHILS RELATIVE PERCENT: 3 % (ref 0–5)
EPITHELIAL CELLS UA: ABNORMAL /HPF
GFR AFRICAN AMERICAN: 38 ML/MIN
GFR NON-AFRICAN AMERICAN: 31 ML/MIN
GFR SERPL CREATININE-BSD FRML MDRD: ABNORMAL ML/MIN/{1.73_M2}
GFR SERPL CREATININE-BSD FRML MDRD: ABNORMAL ML/MIN/{1.73_M2}
GLUCOSE BLD-MCNC: 163 MG/DL (ref 70–99)
GLUCOSE URINE: NEGATIVE
HCT VFR BLD CALC: 37.7 % (ref 36–46)
HEMOGLOBIN: 12.2 G/DL (ref 12–16)
IMMATURE GRANULOCYTES: ABNORMAL %
INR BLD: 3.8
KETONES, URINE: NEGATIVE
LEUKOCYTE ESTERASE, URINE: ABNORMAL
LYMPHOCYTES # BLD: 20 % (ref 15–40)
MCH RBC QN AUTO: 30.9 PG (ref 26–34)
MCHC RBC AUTO-ENTMCNC: 32.4 G/DL (ref 31–37)
MCV RBC AUTO: 95.5 FL (ref 80–100)
MONOCYTES # BLD: 4 % (ref 4–8)
MUCUS: ABNORMAL
NITRITE, URINE: NEGATIVE
NRBC AUTOMATED: ABNORMAL PER 100 WBC
OTHER OBSERVATIONS UA: ABNORMAL
PDW BLD-RTO: 16.4 % (ref 12.1–15.2)
PH UA: 6 (ref 5–8)
PLATELET # BLD: 344 K/UL (ref 140–450)
PLATELET ESTIMATE: ABNORMAL
PMV BLD AUTO: ABNORMAL FL (ref 6–12)
POTASSIUM SERPL-SCNC: 4.4 MMOL/L (ref 3.7–5.3)
PROTEIN UA: NEGATIVE
RBC # BLD: 3.94 M/UL (ref 4–5.2)
RBC # BLD: ABNORMAL 10*6/UL
RBC UA: ABNORMAL /HPF (ref 0–2)
RENAL EPITHELIAL, UA: ABNORMAL /HPF
SEG NEUTROPHILS: 73 % (ref 47–75)
SEGMENTED NEUTROPHILS ABSOLUTE COUNT: 8.1 K/UL (ref 2.5–7)
SODIUM BLD-SCNC: 139 MMOL/L (ref 135–144)
SPECIFIC GRAVITY UA: 1.01 (ref 1–1.03)
TOTAL PROTEIN: 6.9 G/DL (ref 6.4–8.3)
TRICHOMONAS: ABNORMAL
TURBIDITY: ABNORMAL
URINE HGB: ABNORMAL
UROBILINOGEN, URINE: NORMAL
WBC # BLD: 11.2 K/UL (ref 3.5–11)
WBC # BLD: ABNORMAL 10*3/UL
WBC UA: ABNORMAL /HPF
YEAST: ABNORMAL

## 2021-08-23 PROCEDURE — 36415 COLL VENOUS BLD VENIPUNCTURE: CPT

## 2021-08-23 PROCEDURE — 87077 CULTURE AEROBIC IDENTIFY: CPT

## 2021-08-23 PROCEDURE — 4040F PNEUMOC VAC/ADMIN/RCVD: CPT | Performed by: NURSE PRACTITIONER

## 2021-08-23 PROCEDURE — 99214 OFFICE O/P EST MOD 30 MIN: CPT | Performed by: NURSE PRACTITIONER

## 2021-08-23 PROCEDURE — 3046F HEMOGLOBIN A1C LEVEL >9.0%: CPT | Performed by: NURSE PRACTITIONER

## 2021-08-23 PROCEDURE — 81001 URINALYSIS AUTO W/SCOPE: CPT

## 2021-08-23 PROCEDURE — 85610 PROTHROMBIN TIME: CPT

## 2021-08-23 PROCEDURE — G8417 CALC BMI ABV UP PARAM F/U: HCPCS | Performed by: NURSE PRACTITIONER

## 2021-08-23 PROCEDURE — 85025 COMPLETE CBC W/AUTO DIFF WBC: CPT

## 2021-08-23 PROCEDURE — 71101 X-RAY EXAM UNILAT RIBS/CHEST: CPT

## 2021-08-23 PROCEDURE — 99212 OFFICE O/P EST SF 10 MIN: CPT

## 2021-08-23 PROCEDURE — 1036F TOBACCO NON-USER: CPT | Performed by: NURSE PRACTITIONER

## 2021-08-23 PROCEDURE — 2022F DILAT RTA XM EVC RTNOPTHY: CPT | Performed by: NURSE PRACTITIONER

## 2021-08-23 PROCEDURE — 1090F PRES/ABSN URINE INCON ASSESS: CPT | Performed by: NURSE PRACTITIONER

## 2021-08-23 PROCEDURE — G8399 PT W/DXA RESULTS DOCUMENT: HCPCS | Performed by: NURSE PRACTITIONER

## 2021-08-23 PROCEDURE — 1123F ACP DISCUSS/DSCN MKR DOCD: CPT | Performed by: NURSE PRACTITIONER

## 2021-08-23 PROCEDURE — G8427 DOCREV CUR MEDS BY ELIG CLIN: HCPCS | Performed by: NURSE PRACTITIONER

## 2021-08-23 PROCEDURE — 73502 X-RAY EXAM HIP UNI 2-3 VIEWS: CPT

## 2021-08-23 PROCEDURE — 87086 URINE CULTURE/COLONY COUNT: CPT

## 2021-08-23 PROCEDURE — 80053 COMPREHEN METABOLIC PANEL: CPT

## 2021-08-23 PROCEDURE — 87186 SC STD MICRODIL/AGAR DIL: CPT

## 2021-08-23 PROCEDURE — 3017F COLORECTAL CA SCREEN DOC REV: CPT | Performed by: NURSE PRACTITIONER

## 2021-08-23 RX ORDER — WARFARIN SODIUM 5 MG/1
TABLET ORAL
Qty: 90 TABLET | Refills: 3 | Status: SHIPPED
Start: 2021-08-23 | End: 2021-09-09 | Stop reason: DRUGHIGH

## 2021-08-23 ASSESSMENT — ENCOUNTER SYMPTOMS
ABDOMINAL DISTENTION: 0
SHORTNESS OF BREATH: 0
SORE THROAT: 0
NAUSEA: 0
COUGH: 0
RHINORRHEA: 1
DIARRHEA: 0
WHEEZING: 0
EYES NEGATIVE: 1

## 2021-08-23 NOTE — PATIENT INSTRUCTIONS
You may be receiving a survey from [employer] regarding your visit today. You may get this in the mail, through your MyChart or in your email. Please complete the survey to enable us to provide the highest quality of care to you and your family. If you cannot score us as very good ( 5 Stars) on any question, please feel free to call the office to discuss how we could have made your experience exceptional.     Thank You! Rell Gonzales, APRN-CNP  [address] [place], [address]    Phone: [phone]  Fax: [address] [place] [place] Office Hours:  Monday: [facility] [phone] ([phone]) Offering additional late hours the first Monday of the month until 7 pm.   Tuesday: 8-5 Wednesday: 8-5 Thursday:  Additional hours offered 2 Thursdays a month. Please call to inquire those dates.    Fridays: 7:30-4:30

## 2021-08-23 NOTE — PROGRESS NOTES
[facility] [id number]  Dept: [phone]  Dept Fax: [phone]    Last encounter 6/22/2021    ED Follow-up (Pt had a fall on Thursday, foot got the lip in a doorway. still very sore.)       HPI:   Toñito Arechiga is a 70 y.o. female who presentstoday for her medical conditions/complaints as noted below. Toñito Arechiga is c/o of ED Follow-up (Pt had a fall on Thursday, foot got the lip in a doorway. still very sore.)      HPI  Established patient with partner provider Dr. Gil Driver     Follow up ER visit on 8/19/21   fall in door of her bedroom with change in doorway, c/o floor doorway lip on floor with hardwood floors present between rooms and pt caughter her foot on it (slip on shoes were worn) fell down and unable to catch self,; injuring  right side body with  dominant  facial injury, injured right cheek of face, chipped tooth lateral incisor on right. Arms with bruising and right shoulder pain. ? Chronic rotator cuff injuryand sprain advised by R shoulder xray. Wearing right arm sling today. Pt is right arm dominant. Hx chronic Osteoarthritis L shoulder with impairment in motion cannot lift L arm to shoulder level which is pt's normal as she expresses today. No LOC. Able to call for help son was in home. EMS brought pt to hospital     Pt on coumadin and monitored by [facility]. Able to see clinic today with recent fall and hematoma present on right side face. Pain in right hip today, more difficult with weight bearing and ambulation. Pt with 4 prior back surgeries and cage intact in lumbar spine. Pt with large ecchymosis on lateral right knee today approx 6 inches in size. Pain with ambulation of right leg uses single point cane difficult due to injury to R shoulder and L shoulder impairment chronically. Pt in wheelchair today for visit by self.        Pt with right sided facial contusion/ecchymosis dominantly at maxillary sinus fullness, denies trismus, L eye with upper lid ptosis. L pupil 8 mm, R pupil 6 mm, may be caused by lid droop. Pt able to see EOM but limited in following on exam and had to repeat directions to follow finger several times. Upon darkening room pupillary response appropriate and even. Pt on coumadin for hx DVT after last back surgery and also has IVC filter. Due to tramadol as new medication from ER pt has been taking consistently since ER visit will request coumadin clinic today. Pt agreeable. CT maxillofacial w/o contrast 8/19/21  Impression   1. Right frontal facial soft tissue swelling and mild right frontal scalp soft    tissue swelling. 2. Linear lucency at the anterior aspect of the right [employer] arch. However, margins of this radiolucency appear corticated. This finding likely    represents asymmetric appearance of the right zygomaticomaxillary suture and,    less likely, acute fracture. 3. Opacification of left mastoid air cells.         CT head w/o contrast  Impression       1. Soft tissue swelling on the right side of the face. No skull fracture.       2. No acute intracranial findings. No intracranial hemorrhage.       3. Stable brain atrophy and chronic microvascular ischemic changes.                     CT cervical spine w/o contrast 8/19/21     Impression       1. No fracture or subluxation of the cervical spine is seen.       2. There again appears to be mild spinal canal stenosis at the C4-C5 and C5-C6    levels and moderate to severe left foraminal narrowing at the C5-C6 level when    compared to the prior CT scan of the cervical spine of 12/10/2015.       3. There is partial visualization of a soft tissue hematoma in the anterior    right maxillary region.  Please refer to the report of the maxillofacial CT of    the same date for further details.         Addendum:8/31/2021 11:35 am pt is poorly controlled diabetic   Lab Results   Component Value Date LABA1C 9.5 (H) 06/22/2021     Lab Results   Component Value Date     06/22/2021     Discovered through care coordination pt does not even have a glucose testing monitor. Pt willing to start checking numbers and request 2 x daily testing to improve sugar control. Orders sent to local pharmacy--Solange ALMANZAR CNP   Reviewed prior notes None  Reviewed previous Labs, Imaging and Hospital Records    Past Medical History:   Diagnosis Date    Allergic rhinitis     Chronic kidney disease, stage III (moderate) (HCC)     Deep vein thrombosis (DVT) ([facility] [address] 10/24/2012    Elbow fracture, left     Fall     Gastric ulcer     Gout     Hx of blood clots     Following last back surgery     Hypertension     Nausea & vomiting     Presence of IVC filter     Type II or unspecified type diabetes mellitus without mention of complication, not stated as uncontrolled       Past Surgical History:   Procedure Laterality Date    BACK SURGERY      BACK SURGERY      BACK SURGERY      BACK SURGERY      BACK SURGERY      BACK SURGERY      Fusion     BREAST BIOPSY Left     BREAST BIOPSY Right     CARDIAC CATHETERIZATION Left 03/07/2018    Dr. Janeth Fernandez @ [facility]--There is 30% disease of the origin of the lateral anterior descending. Mild 10% -20% plaque disease in the circumflex & right coronary artery. Normal left ventricular functino, ejection fraction of 60%.       CARPAL TUNNEL RELEASE Right     CATARACT REMOVAL Right     CATARACT REMOVAL Left     COLONOSCOPY  2014    COLONOSCOPY N/A 5/13/2019    COLONOSCOPY POLYPECTOMY HOT BIOPSY performed by Laya Johnson MD at [address] [place]      \"Multiple\"     EYE SURGERY Right     Removed fluid from behind eye    HYSTERECTOMY      KIDNEY SURGERY      left side 1/2 of kidney removed    PARTIAL NEPHRECTOMY Left     RETINAL DETACHMENT SURGERY Left     ROTATOR CUFF REPAIR      TOTAL KNEE ARTHROPLASTY Right     TOTAL KNEE ARTHROPLASTY Left     UPPER GASTROINTESTINAL ENDOSCOPY  2014    VENA CAVA FILTER PLACEMENT         Family History   Problem Relation Age of Onset    Diabetes Father     Cancer Father         Prostate Cancer    Cancer Paternal Aunt         Colon Cancer       Social History     Tobacco Use    Smoking status: Never Smoker    Smokeless tobacco: Never Used   Substance Use Topics    Alcohol use: No      Current Outpatient Medications   Medication Sig Dispense Refill    traMADol (ULTRAM) 50 MG tablet Take 1 tablet by mouth every 6 hours as needed for Pain for up to 5 days. 12 tablet 0    omeprazole (PRILOSEC) 20 MG delayed release capsule TAKE 1 CAPSULE EVERY DAY 90 capsule 1    propranolol (INDERAL) 60 MG tablet TAKE 1 TABLET TWICE DAILY 180 tablet 1    allopurinol (ZYLOPRIM) 100 MG tablet TAKE 1 TABLET TWICE DAILY 180 tablet 1    oxybutynin (DITROPAN XL) 10 MG extended release tablet Take 1 tablet by mouth every evening 30 tablet 3    glipiZIDE (GLUCOTROL XL) 10 MG extended release tablet TAKE 1 TABLET BID (with meals) 180 tablet 1    cyclobenzaprine (FLEXERIL) 10 MG tablet Take 1 tablet by mouth 2 times daily as needed (Arthritic pain.) 60 tablet 2    furosemide (LASIX) 20 MG tablet TAKE 1 TABLET EVERY DAY 90 tablet 1    potassium chloride (KLOR-CON M) 20 MEQ extended release tablet TAKE 1 TABLET EVERY DAY 90 tablet 1    isosorbide dinitrate (ISORDIL) 5 MG tablet TAKE 1 TABLET TWICE DAILY 180 tablet 3    gabapentin (NEURONTIN) 100 MG capsule Take 2 capsules by mouth nightly for 180 days.  180 capsule 1    Blood Glucose Monitoring Suppl (TRUE METRIX METER) w/Device KIT Test once daily 1 kit 0    blood glucose test strips (TRUE METRIX BLOOD GLUCOSE TEST) strip Test once daily 100 each 3    Lancets MISC Test once daily 100 each 3    fluticasone (FLONASE) 50 MCG/ACT nasal spray 2 sprays by Nasal route daily as needed for Rhinitis 3 Bottle 1    acetaminophen (TYLENOL) 650 MG CR tablet Take 1,300 mg by mouth every 8 hours as needed for Pain      Magnesium 400 MG TABS Take 800 mg by mouth nightly       folic acid (FOLVITE) 930 MCG tablet Take 400 mcg by mouth daily      warfarin (COUMADIN) 5 MG tablet Take 1 tablet on Friday and take 1/2 tablet daily all other days of the week or as directed by [facility]. 90 tablet 3     No current facility-administered medications for this visit. Allergies   Allergen Reactions    Codeine Itching    Lisinopril Other (See Comments)     cough    Percocet [Oxycodone-Acetaminophen] Itching and Nausea Only    Adhesive Tape Itching, Rash and Other (See Comments)     Tape \"takes the skin off\"    Norco [Hydrocodone-Acetaminophen] Nausea And Vomiting       Health Maintenance   Topic Date Due    Shingles Vaccine (1 of 2) Never done    Diabetic retinal exam  04/02/2021    Colon cancer screen colonoscopy  05/13/2021    Diabetic foot exam  06/22/2022 (Originally 10/22/2020)    Flu vaccine (1) 09/01/2021    A1C test (Diabetic or Prediabetic)  09/22/2021    Annual Wellness Visit (AWV)  11/18/2021    Diabetic microalbuminuria test  06/22/2022    Lipid screen  06/22/2022    Potassium monitoring  08/23/2022    Creatinine monitoring  08/23/2022    Breast cancer screen  08/24/2022    DTaP/Tdap/Td vaccine (2 - Td or Tdap) 12/10/2025    DEXA (modify frequency per FRAX score)  Completed    Pneumococcal 65+ yrs at Risk Vaccine  Completed    COVID-19 Vaccine  Completed    Hepatitis C screen  Addressed    Hepatitis A vaccine  Aged Out    Hib vaccine  Aged Out    Meningococcal (ACWY) vaccine  Aged Out       Subjective:      Review of Systems   Constitutional: Positive for activity change. Negative for appetite change, chills and fever. HENT: Positive for congestion, dental problem (chipped right tooth ) and rhinorrhea. Negative for sore throat. Eyes: Negative. Respiratory: Negative for cough, shortness of breath and wheezing.          C/o breast tenderness yarely from fall but no ecchymosis on breasts. Cardiovascular: Negative for chest pain and leg swelling. Gastrointestinal: Negative for abdominal distention, diarrhea and nausea. Endocrine: Negative for cold intolerance and heat intolerance. Genitourinary: Negative for difficulty urinating. Musculoskeletal: Positive for arthralgias, back pain, gait problem and joint swelling (right knee). Negative for neck pain and neck stiffness. Skin: Positive for wound (facial ecchymosis extends down on right side face neck, no san sign ). Negative for rash. Neurological: Positive for facial asymmetry (left upper eyelid droop ptosis ) and speech difficulty (word finding noted on exam). Negative for dizziness, tremors and headaches. Hematological: Negative for adenopathy. Psychiatric/Behavioral: Negative for sleep disturbance. The patient is nervous/anxious. Objective:     Physical Exam  Vitals and nursing note reviewed. Constitutional:       General: She is not in acute distress. Appearance: She is well-developed. Comments: Female of stated age with ecchymosis and facial swelling to right side face. HENT:      Head: Normocephalic and atraumatic. Right Ear: Tympanic membrane and external ear normal.      Left Ear: Tympanic membrane and external ear normal.      Nose: Congestion present. Mouth/Throat:      Mouth: Mucous membranes are moist.      Pharynx: No oropharyngeal exudate or posterior oropharyngeal erythema. Comments: Right upper lateral incisor with chip fracture present. Ecchymosis inner cheek on right and also on roof mouth with soft palate small amount ecchymosis. Tongue midline. Jaw aligns without difficulty  Eyes:      General: No scleral icterus. Extraocular Movements: Extraocular movements intact. Pupils: Pupils are equal, round, and reactive to light.       Comments: Left eye upper lid ptosis present , pupils on initial exam noted different sizes L 8 mm, R 6 mm with droop Motor: No weakness. Gait: Gait abnormal.      Comments: Pt alert and oriented x 3, aware months \"8th\" and date , time. Son Jacinta Vera here today with her. Pt difficulty finding words to describe prior injury event today , son also noticed some word finding , \"joey confusion\" he states. Upon further questioning remington Vera states \"some different from normal\" more in conversation. No behavior changes. Psychiatric:         Behavior: Behavior normal.         Thought Content: Thought content normal.         Judgment: Judgment normal.       /70 (Site: Left Upper Arm, Position: Sitting, Cuff Size: Large Adult)   Pulse 88   SpO2 98%     Data:     Lab Results   Component Value Date     08/23/2021    K 4.4 08/23/2021    CL 97 08/23/2021    CO2 30 08/23/2021    BUN 27 08/23/2021    CREATININE 1.61 08/23/2021    GLUCOSE 163 08/23/2021    PROT 6.9 08/23/2021    LABALBU 3.7 08/23/2021    BILITOT 0.40 08/23/2021    ALKPHOS 63 08/23/2021    AST 12 08/23/2021    ALT 10 08/23/2021     Lab Results   Component Value Date    WBC 11.2 08/23/2021    RBC 3.94 08/23/2021    HGB 12.2 08/23/2021    HCT 37.7 08/23/2021    MCV 95.5 08/23/2021    MCH 30.9 08/23/2021    MCHC 32.4 08/23/2021    RDW 16.4 08/23/2021     08/23/2021    MPV NOT REPORTED 08/23/2021     Lab Results   Component Value Date    TSH 3.77 01/08/2021     Lab Results   Component Value Date    CHOL 167 06/22/2021    HDL 59 06/22/2021    LABA1C 9.5 06/22/2021          Assessment & Plan     1. Fall as cause of accidental injury in home as place of occurrence, sequela    Needs assistance and eval for safety in home. Very limited mobility. May need placement for short time. - XR RIBS RIGHT INCLUDE CHEST (MIN 3 VIEWS); Future  - XR HIP RIGHT (2-3 VIEWS); Future  - Comprehensive Metabolic Panel; Future  - CBC Auto Differential; Future  - Urinalysis Reflex to Culture; Future  - [address]; Future    2.  Concussion without loss of consciousness, subsequent encounter  Concerning with age and fall. I have not seen pt prior so baseline son feels eye L is changes and also some confusion/word finding likely concussion but at risk for bleed in carlin/cavernous sinus with L eye changes. - [address]; Future    3. Traumatic sixth nerve palsy, left, sequela  Difficult to completely abduct L eye.     - MRI BRAIN WO CONTRAST; Future    4. Rib pain on right side  CXR with rib today    - Comprehensive Metabolic Panel; Future  - CBC Auto Differential; Future  - [address]    5. Acute hip pain, right  Rule out risk fx with difficulty walking. Pt walked with cane and chronic back pain prior with multiple back surgeries. - XR RIBS RIGHT INCLUDE CHEST (MIN 3 VIEWS); Future  - XR HIP RIGHT (2-3 VIEWS); Future  - [address]    6. Memory changes    - Comprehensive Metabolic Panel; Future  - CBC Auto Differential; Future  - Urinalysis Reflex to Culture; Future  - [address]; Future    7. Facial contusion, sequela  Time to heal needed, concern with ecchymosis into soft palate mouth/cheek. - [address]; Future    8. Open fracture of tooth, initial encounter  Will see dentist in future  1/2 broke off. - [address]; Future    9. Severe obesity (BMI 35.0-35.9 with comorbidity) ([address]    - [address]    10. Contusion of right knee, subsequent encounter    - [address]    11.  Type 2 diabetes mellitus with stage 3b chronic kidney disease, without long-term current use of insulin ([address]  Lab Results   Component Value Date    LABA1C 9.5 (H) 06/22/2021     Lab Results   Component Value Date     06/22/2021     Glipizide medication bid  Need to monitor for sugar lows with difficulty moving to feed self. Also uncontrolled with last A1C. Consider meals on wheels. - [address]; Future    12. Osteoarthritis of left shoulder, unspecified osteoarthritis type  Chronic poor mobility of arm.   - [address]    13. Anticoagulated on Coumadin  Sent to clinic today due to tramadol start from ER. Unfortunately found to be supratherapeutic and with recent trauma coumadin dose held and reduction given by pharmacist.   Monitor for bleeding. Hx DVT twice in lifetime once after fall when son was young and again after last lumbar spine surgery has IVC filter. - [address]; Future    14. Essential hypertension  Stable    - [address]; Future    15. H/O parathyroidectomy ([facility] [address]    - MRI BRAIN WO CONTRAST; Future                  Completed Refills   Requested Prescriptions      No prescriptions requested or ordered in this encounter     No follow-ups on file. No orders of the defined types were placed in this encounter. Orders Placed This Encounter   Procedures    XR RIBS RIGHT INCLUDE CHEST (MIN 3 VIEWS)     Standing Status:   Future     Number of Occurrences:   1     Standing Expiration Date:   8/23/2022     Order Specific Question:   Reason for exam:     Answer:   right rib pain s/p fall 8/19/21, ecchymosis lateral area below axillae, pain present worse with deep breath suspect rib 8-10 risk fx on lateral side.  XR HIP RIGHT (2-3 VIEWS)     Standing Status:   Future     Number of Occurrences:   1     Standing Expiration Date:   8/23/2022     Order Specific Question:   Reason for exam:     Answer:   right hip pain worse with walking, shorter leg with lying on right, suspect fx , hx fall 8/19/2021.     MRI BRAIN WO CONTRAST     Standing Status:   Future     Standing Expiration Date:   8/24/2022     Order Specific Question:   Reason for exam:     Answer:   pt with fall on 8/19/21 with facial impact and contusion , suspect concussion,new left eye ptosis, pupil L larger on exam than R, reactivity present,,Left 6th cranial nerve palsy noted. lack complete abduction EOM L eye. Order Specific Question:   Reason for exam:     Answer:   rule out infarct carlin/or cavernous sinus,    Comprehensive Metabolic Panel     Standing Status:   Future     Number of Occurrences:   1     Standing Expiration Date:   8/23/2022    CBC Auto Differential     Standing Status:   Future     Number of Occurrences:   1     Standing Expiration Date:   8/23/2022    Urinalysis Reflex to Culture     Standing Status:   Future     Number of Occurrences:   1     Standing Expiration Date:   8/23/2022     Order Specific Question:   SPECIFY(EX-CATH,MIDSTREAM,CYSTO,ETC)? Answer:   cc   [address]     Referral Priority:   Routine     Referral Type:   Home Health Care     Referral Reason:   Continuity of Care     Number of Visits Requested:   1         Ragini received counseling on the following healthy behaviors: nutrition, exercise and medication adherence  Reviewed prior labs and health maintenance. Continue current medications, diet and exercise. Discussed use, benefit, and side effects of prescribed medications. Barriers to medication compliance addressed. Patient given educational materials - see patient instructions. All patient questions answered. Patient voiced understanding. On this date 8/23/2021 I have spent 43 minutes reviewing previous notes, test results and face to face with the patient discussing the diagnosis and importance of compliance with the treatment plan as well as documenting on the day of the visit.      Electronically signed by YOHAN Garcia CNP on 8/24/2021 at 6:45 AM

## 2021-08-24 ENCOUNTER — HOSPITAL ENCOUNTER (OUTPATIENT)
Dept: MRI IMAGING | Age: 76
Discharge: HOME | End: 2021-08-26
Payer: MEDICARE

## 2021-08-24 DIAGNOSIS — I10 ESSENTIAL HYPERTENSION: ICD-10-CM

## 2021-08-24 DIAGNOSIS — Z79.01 ANTICOAGULATED ON COUMADIN: ICD-10-CM

## 2021-08-24 DIAGNOSIS — E89.2 H/O PARATHYROIDECTOMY (HCC): ICD-10-CM

## 2021-08-24 DIAGNOSIS — R41.3 MEMORY CHANGES: ICD-10-CM

## 2021-08-24 DIAGNOSIS — E11.22 TYPE 2 DIABETES MELLITUS WITH STAGE 3B CHRONIC KIDNEY DISEASE, WITHOUT LONG-TERM CURRENT USE OF INSULIN (HCC): ICD-10-CM

## 2021-08-24 DIAGNOSIS — S02.5XXB OPEN FRACTURE OF TOOTH, INITIAL ENCOUNTER: ICD-10-CM

## 2021-08-24 DIAGNOSIS — S04.42XS: ICD-10-CM

## 2021-08-24 DIAGNOSIS — S00.83XS FACIAL CONTUSION, SEQUELA: ICD-10-CM
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
## 2021-08-24 DIAGNOSIS — S06.0X0D CONCUSSION WITHOUT LOSS OF CONSCIOUSNESS, SUBSEQUENT ENCOUNTER: ICD-10-CM

## 2021-08-24 DIAGNOSIS — N18.32 TYPE 2 DIABETES MELLITUS WITH STAGE 3B CHRONIC KIDNEY DISEASE, WITHOUT LONG-TERM CURRENT USE OF INSULIN (HCC): ICD-10-CM
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
## 2021-08-24 PROCEDURE — 70551 MRI BRAIN STEM W/O DYE: CPT

## 2021-08-24 ASSESSMENT — ENCOUNTER SYMPTOMS: BACK PAIN: 1

## 2021-08-25 ENCOUNTER — TELEPHONE (OUTPATIENT)
Dept: PHARMACY | Age: 76
End: 2021-08-25

## 2021-08-25 ENCOUNTER — TELEPHONE (OUTPATIENT)
Dept: PRIMARY CARE CLINIC | Age: 76
End: 2021-08-25

## 2021-08-25 DIAGNOSIS — N18.32 TYPE 2 DIABETES MELLITUS WITH STAGE 3B CHRONIC KIDNEY DISEASE, WITHOUT LONG-TERM CURRENT USE OF INSULIN (HCC): Primary | ICD-10-CM

## 2021-08-25 DIAGNOSIS — S46.011D TRAUMATIC INCOMPLETE TEAR OF RIGHT ROTATOR CUFF, SUBSEQUENT ENCOUNTER: ICD-10-CM

## 2021-08-25 DIAGNOSIS — G89.29 CHRONIC MIDLINE LOW BACK PAIN WITH SCIATICA, SCIATICA LATERALITY UNSPECIFIED: ICD-10-CM

## 2021-08-25 DIAGNOSIS — M54.40 CHRONIC MIDLINE LOW BACK PAIN WITH SCIATICA, SCIATICA LATERALITY UNSPECIFIED: ICD-10-CM

## 2021-08-25 DIAGNOSIS — S40.011A CONTUSION OF MULTIPLE SITES OF RIGHT SHOULDER, INITIAL ENCOUNTER: ICD-10-CM

## 2021-08-25 DIAGNOSIS — R29.6 FREQUENT FALLS: ICD-10-CM

## 2021-08-25 DIAGNOSIS — E11.22 TYPE 2 DIABETES MELLITUS WITH STAGE 3B CHRONIC KIDNEY DISEASE, WITHOUT LONG-TERM CURRENT USE OF INSULIN (HCC): Primary | ICD-10-CM

## 2021-08-25 LAB
CULTURE: ABNORMAL
Lab: ABNORMAL
SPECIMEN DESCRIPTION: ABNORMAL

## 2021-08-25 RX ORDER — CIPROFLOXACIN 500 MG/1
500 TABLET, FILM COATED ORAL DAILY
Qty: 7 TABLET | Refills: 0 | Status: SHIPPED | OUTPATIENT
Start: 2021-08-25 | End: 2021-09-01

## 2021-08-25 NOTE — TELEPHONE ENCOUNTER
Rosy Yuan NP, called pharmacy to state that anticoagulation clinic pt Pedro Martins had another fall last night. She is also being started on Cipro 500 mg QD X7D today for a UTI. Dose selected based on estimated CrCl of 22 mL/min. Pt had fall last week on Thursday (8/19) with facial injury and INR was found to be 3.8 on Monday (8/23) and coumadin clinic notified. Pt's facial bruising has now expanded into mouth. 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 Information passed along to Coumadin clinic.    Lawayne Gottron, PharmD 8/25/2021 1:42 PM

## 2021-08-25 NOTE — TELEPHONE ENCOUNTER
Spoke with Pts son this morning, states that Pt had another fall last night, refused to go to the ER. Skinned her LT forearm on the outside about 2\", and her LT hand about . 25\"-.50\". They did clean and wrap the wounds. States Home health is to come out today and do a evaluation of pt, possibly talk about getting pt into a nursing home for rehab. Pt did cancel her appt with the Eye doctor due to her hip pain being really bad today, is currently sleeping. I did stress per PCP the importance of pt going to see eye doctor due to MRI results from yesterday and possibility that she could lose vision in that eye.

## 2021-08-26 ENCOUNTER — CARE COORDINATION (OUTPATIENT)
Dept: CARE COORDINATION | Age: 76
End: 2021-08-26

## 2021-08-26 NOTE — TELEPHONE ENCOUNTER
Spoke with Ragini via the phone regarding the Ciprofloxacin she had been prescribed for UTI yesterday. Since Ciprofloxacin can increase INR, we will have Ragini take only 2.5 mg warfarin daily until she completes the 7-day course of Ciprofloxacin (decreasing weekly warfarin regimen by 12.5%). Thereafter, she will resume current warfarin regimen of 5 mg on Fridays and 2.5 mg all other days of the week starting on Friday, 9/3/2021 and her INR will be rechecked in our clinic on 9/9/2021 as previously scheduled.

## 2021-08-26 NOTE — RESULT ENCOUNTER NOTE
Patient's urology appointment was rescheduled secondary to recent concussion and ophthalmologist appointment. Patient does have a current urinary tract infection for which she has been placed on culture specific Cipro for 7 days by PCP office.   We will follow up with her in the office on 9/9/2021

## 2021-08-26 NOTE — TELEPHONE ENCOUNTER
Attempted to call pt's home twice busy phone. Pt will need possible placement nursing home, or hire assistance in home for helper, see recent OV and notes. Also critical to see eye doctor with found abnormality too. Placed order for physical therapy. No speech pt's word finding and confusion felt to be related to concussion and UTI. MRI abnormality: hx chronic cerebellar lacunar infact will affect balance and Left optic nerve abnormality and papilledema can contribute too. I did not order OT since pt cannot lift arm to shoulder level, may not be able to feed herself well   cipro antibiotic given at reduced dose. Please realize this can also increase risk for hypoglycemia with being on glipizide medication. Pt appetite was reduced. Please monitor closely.      Thanks  Leoncio ALMANZAR CNP   Thanks

## 2021-08-27 ENCOUNTER — CARE COORDINATION (OUTPATIENT)
Dept: CARE COORDINATION | Age: 76
End: 2021-08-27

## 2021-08-27 DIAGNOSIS — E11.65 UNCONTROLLED TYPE 2 DIABETES MELLITUS WITH HYPERGLYCEMIA (HCC): Primary | ICD-10-CM

## 2021-08-27 RX ORDER — SYRING-NEEDL,DISP,INSUL,0.3 ML 30 GX5/16"
1 SYRINGE, EMPTY DISPOSABLE MISCELLANEOUS ONCE
Qty: 100 DEVICE | Refills: 0 | Status: SHIPPED | OUTPATIENT
Start: 2021-08-27 | End: 2022-05-13 | Stop reason: ALTCHOICE

## 2021-08-27 RX ORDER — GLUCOSAMINE HCL/CHONDROITIN SU 500-400 MG
CAPSULE ORAL
Qty: 200 STRIP | Refills: 0 | Status: SHIPPED | OUTPATIENT
Start: 2021-08-27 | End: 2021-10-29 | Stop reason: SDUPTHER

## 2021-08-27 RX ORDER — LANCETS 30 GAUGE
1 EACH MISCELLANEOUS 2 TIMES DAILY
Qty: 300 EACH | Refills: 1 | Status: SHIPPED | OUTPATIENT
Start: 2021-08-27

## 2021-08-27 NOTE — LETTER
8/27/2021    [address] [place] [address]  [place] [address]      Dear Porter José,    My name is Rosie Estrada RN and I am a registered nurse who partners with Bhaskar Sommer DO to improve patients' health. Bhaskar Sommer DO and Cesilia Woodard, believes you would benefit from working with me. I have recently spoke with you and your son, Jovanni Avina on the phone. As a member of your health care team, I would work with other providers involved in your care, offer education for your specific health conditions, and connect you with additional resources as needed. I will collaborate with Bhaskar Sommer DO to support you in following your treatment plan. The additional support I provide is no additional cost to you. My primary focus is to help you achieve specific goals and improve your health. We are committed to walk with you on this journey and look forward to working with you.  You can reach me at [phone] Monday-Friday from 8:30am-4:00pm.       In good health,     Rosie Estrada RN Ambulatory Care Manager

## 2021-08-27 NOTE — CARE COORDINATION
Ambulatory Care Coordination Note  8/27/2021  CM Risk Score: 7  Charlson 10 Year Mortality Risk Score: 100%     ACC: Richard Miranda RN    Summary Note:   Date Care Coordination Episode Started: Today, 8/27/2021    Reason For Call Today:   Enroll into care coordination per Kati Grace referral     Topics Reviewed Today:  1.) Medications  -Does the patient have all of their prescribed medications filled? Yes   -Is there any barriers to medication adherence? No  -Is there any financial issues with affording any medications? No     2.) Transportation  -Does the patient drive? Yes but not since she is sick   -How is patient transported to appointments? Family    -Any additional phone numbers for transportation in the area needed? No     3.) Living/Housing  -Does the patient live alone? No, son Meghana Harden (Kadeem Zavala) lives with her. Meghana Harden does not work as he is on disability   -What type of housing does the patient live in? Private home   -Does the patient feel safe in their home? Yes  -Who does the cooking, cleaning, housework? Patient/son      4.) DME  -What DME does patient currently have? Straight cane    -Any additional DME orders needed?  Yes- glucometer would be beneficial     5.) Home Health   -PT coming into home today for initial visit     6.) Coumadin   -Reviewed changes made with Coumadin dose  -Reviewed note from clinical pharmacist from 8/26   -Confirms that she will take 1/2 tablet today and all days until Friday the 9/3 she will resume normal dosing of 1 tablet Friday only.   -Coumadin clinic follow up scheduled 9/9Bcollette Lacey scheduled to take as well     7.) Eye apt   -Had to cancel appointment on 8/26 at 1 pm due to patient condition   -MRI done on 8/25 which was very worrisome about loosing vision- was critical to see optometry  -Ry requested writer call to make appointment     Call To:  -Call placed to Dr. Jon Murguia in [facility]. with [facility] [place],    -Appointment made for Monday August 30th at 9:30am Tao [facility] party confirms they have copy of MRI from 8/25        Chronic Conditions:   1.)Diabetes  -currently not monitoring blood sugars  -no glucometer  Lab Results   Component Value Date    LABA1C 9.5 (H) 06/22/2021     Lab Results   Component Value Date     06/22/2021     2.) Falls  Reviewed fall precautions with patient:   1)Getting up slowly when changing positions  2)Keeping pathways clear  3)Keep house well lit  4)Ambulate with assistive device  5)Keep a phone/life alert with you when ambulating      Zone Management tool reviewed today is applicable:   Yes, Diabetes         Reviewed Upcoming follow up appointments with Katy Hassan  Future Appointments   Date Time Provider Natanael Boyeristi   9/9/2021 10:00 AM [address] MGMT [facility] MED MGMT Anabel Dill   9/9/2021 11:15 AM SHARMILA Babcock urol [facility]   9/28/2021 10:00 AM Poornima Carbajal [facility]   Dr. Yudy Shaw (eye) 8/30/21 at 9:30am         Goals reviewed. Patient to continue to work to improve:   Goals      Reduce Falls       I will reduce my risk of falls by the following: Remove rugs or use non slip rugs  Install grab bars in bathroom  Use walking aids like cane or walker  Follow through on orders for PT    Barriers: fear of failure, overwhelmed by complexity of regimen, and lack of education  Plan for overcoming my barriers: working with this ambulatory care manager   Confidence: 9/10  Anticipated Goal Completion Date: 12/27/2021            Education Reviewed with patient today: See Education Module. Care Coordinator Plan of Care: This nurse CC will plan to:  -update Peyton Asheras with eye apt and request order for glucometer   -have CCSS mail introduction letter and zone tool sheet to patient   -follow up in 1 week  -follow up eye doctor apt   -follow up on UTI- taking cipro?  Any S/S ongoing?   -Follow up PT eval- help in home vs nursing home          [address] [place] [address] Protocol  Program Enrollment: Complex Care  Referral from Primary Care Provider: Yes  Week 1 - Initial Assessment     Do you have all of your prescriptions and are they filled?: Yes  Barriers to medication adherence: None  Are you able to afford your medications?: Yes  How often do you have trouble taking your medications the way you have been told to take them?: I always take them as prescribed. Do you have Home O2 Therapy?: No      Ability to seek help/take action for Emergent Urgent situations i.e. fire, crime, inclement weather or health crisis. : Independent  Ability to ambulate to restroom: Independent  Ability handle personal hygeine needs (bathing/dressing/grooming): Independent  Ability to manage Medications: Independent  Ability to prepare Food Preparation: Needs Assistance  Ability to maintain home (clean home, laundry): Needs Assistance  Ability to drive and/or has transportation: Needs Assistance  Ability to do shopping: Needs Assistance  Ability to manage finances: Needs Assistance  Is patient able to live independently?: Yes     Current Housing: Private Residence        Per the Fall Risk Screening, did the patient have 2 or more falls or 1 fall with injury in the past year?: Yes  How often do you think you are about to fall and you do NOT fall?  For example, you grab something to stabilize yourself or hold onto a wall/furniture?: Occasionally  Use of a Mobility Aid: Yes (Comment: straight cane)  Difficulty walking/impaired gait: No  Issues with feet or shoes like numbness, edema, shoes not fitting: No  Changes in vision, poor vision or poor lighting in environment: Yes  Dizziness: No  Other Fall Risk: No     Frequent urination at night?: No  Do you use rails/bars?: Yes  Do you have a non-slip tub mat?: Yes     Are you experiencing loss of meaning?: No  Are you experiencing loss of hope and peace?: No     Thinking about your patient's physical health needs, are there any symptoms or problems (risk indicators) you are unsure about that require further investigation?: No identified areas of uncertainly or problems already being investigated   Are the patients physical health problems impacting on their mental well-being?: No identified areas of concern   Are there any problems with your patients lifestyle behaviors (alcohol, drugs, diet, exercise) that are impacting on physical or mental well-being?: No identified areas of concern   Do you have any other concerns about your patients mental well-being? How would you rate their severity and impact on the patient?: No identified areas of concern   How would you rate their home environment in terms of safety and stability (including domestic violence, insecure housing, neighbor harassment)?: Consistently safe, supportive, stable, no identified problems   How do daily activities impact on the patient's well-being? (include current or anticipated unemployment, work, caregiving, access to transportation or other): No identified problems or perceived positive benefits   How would you rate their social network (family, work, friends)?: Good participation with social networks   How would you rate their financial resources (including ability to afford all required medical care)?: Financially secure, resources adequate, no identified problems   How wells does the patient now understand their health and well-being (symptoms, signs or risk factors) and what they need to do to manage their health?: Reasonable to good understanding and already engages in managing health or is willing to undertake better management   How well do you think your patient can engage in healthcare discussions?  (Barriers include language, deafness, aphasia, alcohol or drug problems, learning difficulties, concentration): Clear and open communication, no identified barriers   Do other services need to be involved to help this patient?: Other care/services in place and adequate   Are current services involved with this patient well-coordinated? (Include coordination with other services you are now recommendation): Required care/services in place and adequately coordinated   Suggested Interventions and Community Resources  Fall Risk Prevention: Completed Disease Specific Clinic: Completed (Comment: Dr. Bogdan Briggs optnii)   [address]: Completed (Comment: [facility])   Physical Therapy: Completed   Transportation Services: Completed   Zone Management Tools: In Process         Schedule an appointment with the patient's PCP, Set up/Review an Education Plan, Set up/Review Goals              Prior to Admission medications    Medication Sig Start Date End Date Taking? Authorizing Provider   ciprofloxacin (CIPRO) 500 MG tablet Take 1 tablet by mouth daily for 7 days For UTI 8/25/21 9/1/21  YOHAN Vaz CNP   warfarin (COUMADIN) 5 MG tablet Take 1 tablet on Friday and take 1/2 tablet daily all other days of the week or as directed by [facility] Medication Management.  8/23/21   Darletta Meigs, DO   omeprazole (PRILOSEC) 20 MG delayed release capsule TAKE 1 CAPSULE EVERY DAY 8/12/21   YOHAN Bosch CNP   propranolol (INDERAL) 60 MG tablet TAKE 1 TABLET TWICE DAILY 7/30/21   Darletta Meigs, DO   allopurinol (ZYLOPRIM) 100 MG tablet TAKE 1 TABLET TWICE DAILY 7/30/21   Darletta Meigs, DO   oxybutynin (DITROPAN XL) 10 MG extended release tablet Take 1 tablet by mouth every evening 7/15/21   Marnie Preston PA-C   glipiZIDE (GLUCOTROL XL) 10 MG extended release tablet TAKE 1 TABLET BID (with meals) 6/23/21   Darletta Meigs, DO   cyclobenzaprine (FLEXERIL) 10 MG tablet Take 1 tablet by mouth 2 times daily as needed (Arthritic pain.) 6/22/21   Darletta Meigs, DO   furosemide (LASIX) 20 MG tablet TAKE 1 TABLET EVERY DAY 6/18/21   Darletta Meigs, DO   potassium chloride (KLOR-CON M) 20 MEQ extended release tablet TAKE 1 TABLET EVERY DAY 6/18/21   Darletta Meigs, DO isosorbide dinitrate (ISORDIL) 5 MG tablet TAKE 1 TABLET TWICE DAILY 5/7/21   Lark Michelle, DO   gabapentin (NEURONTIN) 100 MG capsule Take 2 capsules by mouth nightly for 180 days. 3/15/21 9/11/21  Lark Michelle, DO   Blood Glucose Monitoring Suppl (TRUE METRIX METER) w/Device KIT Test once daily 2/9/21   Lark Michelle, DO   blood glucose test strips (TRUE METRIX BLOOD GLUCOSE TEST) strip Test once daily 2/9/21   Lark Michelle, DO   Lancets MISC Test once daily 2/9/21   Lark Michelle, DO   fluticasone Phyllistine Gallery) 50 MCG/ACT nasal spray 2 sprays by Nasal route daily as needed for Rhinitis 11/17/20   Lark Michelle, DO   acetaminophen (TYLENOL) 650 MG CR tablet Take 1,300 mg by mouth every 8 hours as needed for Pain    Historical Provider, MD   Magnesium 400 MG TABS Take 800 mg by mouth nightly     Historical Provider, MD   folic acid (FOLVITE) 737 MCG tablet Take 400 mcg by mouth daily 2/8/16   Historical Provider, MD      and   Diabetes Assessment    Medic Alert ID: No  Meal Planning: None   How often do you test your blood sugar?: No Testing   Do you have barriers with adherence to non-pharmacologic self-management interventions?  (Nutrition/Exercise/Self-Monitoring): Yes   Have you ever had to go to the ED for symptoms of low blood sugar?: No       No patient-reported symptoms   Do you have hyperglycemia symptoms?: No   Do you have hypoglycemia symptoms?: No   Blood Sugar Monitoring Regimen: Not Testing

## 2021-08-27 NOTE — CARE COORDINATION
Ambulatory Care Coordination Note  8/27/2021  CM Risk Score: 7  Charlson 10 Year Mortality Risk Score: 100%     ACC: Armando Prather, RN    Summary Note: Attempted to reach [facility] today to enroll into care coordination per Esteban Carrasquillo referral. Unable to reach [facility] today. Left a voicemail message with reason for call. Writer requesting a return phone call back to this AC when patient is able to [phone], office hours given. Future Appointments   Date Time Provider Natanael Austin   [facility]21 [facility]   9/9/2021 11:15 AM SHARMILA Meza urol [facility]   9/28/2021 10:00 AM Joaquin Mascorro DO [facility]       Care Coordination Plan of Care:    This nurse Care Coordinator will  - await call back from patient, and if no return call; will attempt to reach patient back again later today.   -follow up with [facility] PT   -follow up rescheduling eye dr anderson (missed 8/26)   -follow up taking Cipro for UTI - how are blood sugars since taking Glipizide as well  -Follow up Coumadin dosage- see Pharmacist note from 8/26 on dosing changes

## 2021-08-31 ENCOUNTER — CARE COORDINATION (OUTPATIENT)
Dept: CARE COORDINATION | Age: 76
End: 2021-08-31

## 2021-09-02 ENCOUNTER — HOSPITAL ENCOUNTER (OUTPATIENT)
Dept: GENERAL RADIOLOGY | Age: 76
Discharge: HOME | End: 2021-09-04
Payer: MEDICARE

## 2021-09-02 ENCOUNTER — HOSPITAL ENCOUNTER (OUTPATIENT)
Age: 76
Discharge: HOME | End: 2021-09-04
Payer: MEDICARE

## 2021-09-02 DIAGNOSIS — N20.0 KIDNEY STONES: ICD-10-CM

## 2021-09-02 PROCEDURE — 74018 RADEX ABDOMEN 1 VIEW: CPT

## 2021-09-03 ENCOUNTER — CARE COORDINATION (OUTPATIENT)
Dept: CARE COORDINATION | Age: 76
End: 2021-09-03

## 2021-09-03 NOTE — CARE COORDINATION
Ambulatory Care Coordination Note  9/3/2021  CM Risk Score: 3  Charlson 10 Year Mortality Risk Score: 100%     ACC: Jackie Santamaria RN    Summary Note: CC outreach call placed to patient. Spoke with Ragini who reports she guesses she id doing ok. She did attend eye appointment with Dr. Vimal Rausch on 8/30- see note under media tab. Reports she did complete her Cipro and has resumed her Coumadin as previous. Denies any S/S of UTI at this time. She did get KUB done for Urology yesterday and has follow up 9/9. Reports [facility] care PT has been coming in 2 times per week and really working her. Legs are sore today. Reports that her son did get glucometer and supplies from pharmacy. Son helping her poke her fingers and check readings. She is not sure what readings are at time of call. Reports that she needs to schedule her back injection soon- son going to be calling and he is going to call and get appointment for her at the dentist to fix her broken off tooth. Denies any needs today. Future Appointments   Date Time Provider Natanael Austin   9/9/2021 10:00 AM [facility] MEDICATION MGMT [facility] MED MGMT Keisha Nam   9/9/2021 11:15 AM SHARMILA Hooks urol W   9/28/2021 10:00 AM Makayla Mathis DO [facility] MED W       Care Coordination Plan of Care: This nurse Care Coordinator will  - plan outreach call in 1-2 weeks  -follow up blood sugar readings- when is she checking? What are readings? How is her diet?   -follow up scheduling dentist apt for broken off tooth   -PT still coming in 2 times/week?            Care Coordination Interventions    Program Enrollment: Complex Care  Referral from Primary Care Provider: Yes  Suggested Interventions and Community Resources  Fall Risk Prevention: Completed  Disease Specific Clinic: Completed (Comment: Dr. Vimal Rausch optomitry)  [address]: Completed (Comment: Rachel [facility])  Physical Therapy: Completed (Comment: [facility]- initial visit 8/27)  Transportation Support: Completed  Zone Management Tools: In Process (Comment: DM)         Goals Addressed                 This Visit's Progress     Reduce Falls    Improving     I will reduce my risk of falls by the following: Remove rugs or use non slip rugs  Install grab bars in bathroom  Use walking aids like cane or walker  Follow through on orders for PT    Barriers: fear of failure, overwhelmed by complexity of regimen, and lack of education  Plan for overcoming my barriers: working with this ambulatory care manager   Confidence: 9/10  Anticipated Goal Completion Date: 12/27/2021            Prior to Admission medications    Medication Sig Start Date End Date Taking? Authorizing Provider   blood glucose monitor kit and supplies 1 kit by Other route 2 times daily Monitor covered by insurance please. E11.9 8/27/21   YOHAN Metcalf CNP   blood glucose monitor strips Test 2 x daily for diabetes   E 11.9 8/27/21   YOHAN Metcalf CNP   Lancets MISC 1 each by Does not apply route 2 times daily 8/27/21   YOHAN Metcalf CNP   Lancet Device MISC 1 Device by Does not apply route once for 1 dose 8/27/21 8/27/21  YOHAN Metcalf CNP   warfarin (COUMADIN) 5 MG tablet Take 1 tablet on Friday and take 1/2 tablet daily all other days of the week or as directed by [facility] Medication Management.  8/23/21   Antonio Reich DO   omeprazole (PRILOSEC) 20 MG delayed release capsule TAKE 1 CAPSULE EVERY DAY 8/12/21   Katheran Saint, APRN - CNP   propranolol (INDERAL) 60 MG tablet TAKE 1 TABLET TWICE DAILY 7/30/21   Antonio Reich DO   allopurinol (ZYLOPRIM) 100 MG tablet TAKE 1 TABLET TWICE DAILY 7/30/21   Antonio Reich DO   oxybutynin (DITROPAN XL) 10 MG extended release tablet Take 1 tablet by mouth every evening 7/15/21   Bill Preston PA-C   glipiZIDE (GLUCOTROL XL) 10 MG extended release tablet TAKE 1 TABLET BID (with meals) 6/23/21   Antonio Reich DO   cyclobenzaprine (FLEXERIL) 10 MG tablet Take 1 tablet by mouth 2 times daily as needed (Arthritic pain.) 6/22/21   Mica Dial DO   furosemide (LASIX) 20 MG tablet TAKE 1 TABLET EVERY DAY 6/18/21   Mica Dial DO   potassium chloride (KLOR-CON M) 20 MEQ extended release tablet TAKE 1 TABLET EVERY DAY 6/18/21   Mica Dial DO   isosorbide dinitrate (ISORDIL) 5 MG tablet TAKE 1 TABLET TWICE DAILY 5/7/21   Mica Dial DO   gabapentin (NEURONTIN) 100 MG capsule Take 2 capsules by mouth nightly for 180 days.  3/15/21 9/11/21  Mica Dial DO   Blood Glucose Monitoring Suppl (TRUE METRIX METER) w/Device KIT Test once daily 2/9/21   Mica Dial DO   fluticasone [facility]) 50 MCG/ACT nasal spray 2 sprays by Nasal route daily as needed for Rhinitis 11/17/20   Mica Dial DO   acetaminophen (TYLENOL) 650 MG CR tablet Take 1,300 mg by mouth every 8 hours as needed for Pain    Historical Provider, MD   Magnesium 400 MG TABS Take 800 mg by mouth nightly     Historical Provider, MD   folic acid (FOLVITE) 961 MCG tablet Take 400 mcg by mouth daily 2/8/16   Historical Provider, MD

## 2021-09-07 ENCOUNTER — ANTI-COAG VISIT (OUTPATIENT)
Dept: PHARMACY | Age: 76
End: 2021-09-07

## 2021-09-09 ENCOUNTER — OFFICE VISIT (OUTPATIENT)
Dept: UROLOGY | Age: 76
End: 2021-09-09
Payer: MEDICARE

## 2021-09-09 ENCOUNTER — HOSPITAL ENCOUNTER (OUTPATIENT)
Dept: PHARMACY | Age: 76
Setting detail: THERAPIES SERIES
Discharge: HOME | End: 2021-09-09
Payer: MEDICARE

## 2021-09-09 VITALS
OXYGEN SATURATION: 92 % | HEART RATE: 80 BPM | HEIGHT: 66 IN | SYSTOLIC BLOOD PRESSURE: 118 MMHG | BODY MASS INDEX: 38.89 KG/M2 | DIASTOLIC BLOOD PRESSURE: 72 MMHG | WEIGHT: 242 LBS

## 2021-09-09 VITALS
WEIGHT: 224 LBS | SYSTOLIC BLOOD PRESSURE: 111 MMHG | BODY MASS INDEX: 36.15 KG/M2 | DIASTOLIC BLOOD PRESSURE: 77 MMHG | HEART RATE: 81 BPM

## 2021-09-09 DIAGNOSIS — Z79.01 LONG TERM CURRENT USE OF ANTICOAGULANT THERAPY: Primary | ICD-10-CM

## 2021-09-09 DIAGNOSIS — N20.0 KIDNEY STONES: ICD-10-CM

## 2021-09-09 DIAGNOSIS — N18.30 STAGE 3 CHRONIC KIDNEY DISEASE, UNSPECIFIED WHETHER STAGE 3A OR 3B CKD (HCC): ICD-10-CM

## 2021-09-09 DIAGNOSIS — Z90.5 H/O PARTIAL NEPHRECTOMY: ICD-10-CM

## 2021-09-09 DIAGNOSIS — N39.0 FREQUENT UTI: ICD-10-CM

## 2021-09-09 DIAGNOSIS — N39.46 MIXED INCONTINENCE: Primary | ICD-10-CM

## 2021-09-09 LAB — INR BLD: 2.4

## 2021-09-09 PROCEDURE — 51798 US URINE CAPACITY MEASURE: CPT | Performed by: PHYSICIAN ASSISTANT

## 2021-09-09 PROCEDURE — 85610 PROTHROMBIN TIME: CPT

## 2021-09-09 PROCEDURE — G8399 PT W/DXA RESULTS DOCUMENT: HCPCS | Performed by: PHYSICIAN ASSISTANT

## 2021-09-09 PROCEDURE — 99213 OFFICE O/P EST LOW 20 MIN: CPT | Performed by: PHYSICIAN ASSISTANT

## 2021-09-09 PROCEDURE — 3017F COLORECTAL CA SCREEN DOC REV: CPT | Performed by: PHYSICIAN ASSISTANT

## 2021-09-09 PROCEDURE — 1036F TOBACCO NON-USER: CPT | Performed by: PHYSICIAN ASSISTANT

## 2021-09-09 PROCEDURE — 0509F URINE INCON PLAN DOCD: CPT | Performed by: PHYSICIAN ASSISTANT

## 2021-09-09 PROCEDURE — G8427 DOCREV CUR MEDS BY ELIG CLIN: HCPCS | Performed by: PHYSICIAN ASSISTANT

## 2021-09-09 PROCEDURE — 99212 OFFICE O/P EST SF 10 MIN: CPT

## 2021-09-09 PROCEDURE — 1090F PRES/ABSN URINE INCON ASSESS: CPT | Performed by: PHYSICIAN ASSISTANT

## 2021-09-09 PROCEDURE — 1123F ACP DISCUSS/DSCN MKR DOCD: CPT | Performed by: PHYSICIAN ASSISTANT

## 2021-09-09 PROCEDURE — 99211 OFF/OP EST MAY X REQ PHY/QHP: CPT

## 2021-09-09 PROCEDURE — G8417 CALC BMI ABV UP PARAM F/U: HCPCS | Performed by: PHYSICIAN ASSISTANT

## 2021-09-09 PROCEDURE — 4040F PNEUMOC VAC/ADMIN/RCVD: CPT | Performed by: PHYSICIAN ASSISTANT

## 2021-09-09 RX ORDER — WARFARIN SODIUM 5 MG/1
TABLET ORAL
Qty: 90 TABLET | Refills: 3 | Status: SHIPPED
Start: 2021-09-09 | End: 2021-09-09 | Stop reason: DRUGHIGH

## 2021-09-09 RX ORDER — WARFARIN SODIUM 2.5 MG/1
TABLET ORAL
Qty: 90 TABLET | Refills: 3 | OUTPATIENT
Start: 2021-09-09 | End: 2021-09-28

## 2021-09-09 ASSESSMENT — ENCOUNTER SYMPTOMS
CONSTIPATION: 1
EYE REDNESS: 0
ABDOMINAL PAIN: 0
NAUSEA: 0
COUGH: 0
COLOR CHANGE: 0
SHORTNESS OF BREATH: 0
WHEEZING: 0
VOMITING: 0
BACK PAIN: 0
APNEA: 0

## 2021-09-09 NOTE — PATIENT INSTRUCTIONS
STONE PREVENTION    To prevent future stone formation:    1) DO drink ~65-80 oz (2-2.5L) of water per day to stay adequately hydrated     2) AVOID/LIMIT intake of \"bad fluids\": soda/pop, coffee, tea, alcohol, energy drinks, any beverage with caffeine can act to dehydrate you       \"BAD FLUIDS\" DO NOT COUNT TOWARD THE 65-80oz of water    3) REDUCE consumption of sodium/salt - DO NOT salt your food, read labels (lunch meats, canned soups, prepared meals are high in salt), choose low salt options    4) DO NOT EAT animal protein/meat more than 2 meals a day - this includes red meat, pork, poultry and fish        SURVEY:    You may be receiving a survey from [employer] regarding your visit today. Please complete the survey to enable us to provide the highest quality of care to you and your family. If you cannot score us a very good on any question, please call the office to discuss how we could have made your experience a very good one. Thank you.

## 2021-09-09 NOTE — PROGRESS NOTES
HPI:    Patient is a 70 y.o. female in no acute distress. She is alert and oriented to person, place, and time. 7/2021 Patient presents today as a new patient referral from Dr. Cindy Lopez for mixed incontinence and recurrent urinary tract infection. Patient has been seen by urology before. Has not seen urology in many years. She did have a culture proven urinary tract infection 1 month ago. Patient states that she was treated multiple times over the past year for urinary tract infections, again I have only seen one culture sent in the past year. Patient did have a left partial nephrectomy 50 years ago for atrophy/hematuria. Distant history of kidney stones she is also apparently has had \"multiple\" cystoscopies in the past.  She has had many back surgeries as well. He does have chronic kidney disease. She used to follow with nephrology, but no longer. Non smoker. Retired MTD . Patient did have labs approximately a month ago which did show a BUN of 34, creatinine of 1.74, GFR of 29. She does have history of hysterectomy. Patient did have a bladder sling in 1988. Patient does have incontinence throughout the day and night. She does wear a thick/heavy pad which she changes 3-4 times a day. She says that it is saturated almost every time she changes it. She states that some of the time her incontinence is due to not getting there in time. Most of the time her incontinence is when she stands up. She has never been on any anticholinergics. She does have a daily cough which is soft and easy to pass. She only drinks water. She urinates every 30 minutes to 1-1/2 hours. Patient does take Lasix. Patient is an uncontrolled diabetic, her  last hemoglobin A1c was 9.5. She denies any current fever, chills, gross hematuria, flank pain, dysuria. Patient voided - 20 mL, PVR - 0 mL.     Today:  Patient is here today for follow-up history of kidney stones, frequent urinary tract infection, history of partial nephrectomy, mixed incontinence. Patient did have a positive urine culture at last visit and she was treated with culture specific antibiotics. Patient did have renal ultrasound prior to visit today secondary to her history of left partial nephrectomy approximately 50 years ago. US was independently reviewed showing evidence of prior left partial nephrectomy. Bilateral ureteral jets noted. No hydronephrosis. There is a benign-appearing right renal cortical cyst.  Patient did have a KUB prior to visit which independently reviewed showing no distinct  calcifications. At last visit we did start her on oxybutynin 10 mg. She states that this is helped her significantly. She has mild dry mouth which is tolerable. She was using thick heavy pads which were saturated 3-4 times a day, but now she is down to 0-1 pads. She has no nocturia. She does continue to wear pad for insurance purposes. She feels she has enough time to get it to the bathroom. She states that she had a recent trip and fall and since then she has not been eating very well. She states that she is only having a bowel movement every other day after her fall. She denies any chest pain, shortness of breath, dizziness, lightheadedness. Patient last voided 60 mins ago, scan = 13mL.     Past Medical History:   Diagnosis Date    Allergic rhinitis     Chronic kidney disease, stage III (moderate) ([facility])     Deep vein thrombosis (DVT) ([facility]) 10/24/2012    Elbow fracture, left     Fall     Gastric ulcer     Gout     Hx of blood clots     Following last back surgery     Hypertension     Nausea & vomiting     Presence of IVC filter     Type II or unspecified type diabetes mellitus without mention of complication, not stated as uncontrolled      Past Surgical History:   Procedure Laterality Date    BACK SURGERY      BACK SURGERY      BACK SURGERY      BACK SURGERY      BACK SURGERY      BACK SURGERY      Fusion     BREAST BIOPSY Left  BREAST BIOPSY Right     CARDIAC CATHETERIZATION Left 03/07/2018    Dr. Rose Cabrera @ [facility]--There is 30% disease of the origin of the lateral anterior descending. Mild 10% -20% plaque disease in the circumflex & right coronary artery. Normal left ventricular functino, ejection fraction of 60%.  CARPAL TUNNEL RELEASE Right     CATARACT REMOVAL Right     CATARACT REMOVAL Left     COLONOSCOPY  2014    COLONOSCOPY N/A 5/13/2019    COLONOSCOPY POLYPECTOMY HOT BIOPSY performed by Brandon Salazar MD at [address] [place] [address]      \"Multiple\"     EYE SURGERY Right     Removed fluid from behind eye    HYSTERECTOMY      KIDNEY SURGERY      left side 1/2 of kidney removed    PARTIAL NEPHRECTOMY Left     RETINAL DETACHMENT SURGERY Left     ROTATOR CUFF REPAIR      TOTAL KNEE ARTHROPLASTY Right     TOTAL KNEE ARTHROPLASTY Left     UPPER GASTROINTESTINAL ENDOSCOPY  2014    VENA CAVA FILTER PLACEMENT       Outpatient Encounter Medications as of 9/9/2021   Medication Sig Dispense Refill    [DISCONTINUED] warfarin (COUMADIN) 5 MG tablet Take 1/2 tablet daily or as directed by [facility]. 90 tablet 3    blood glucose monitor kit and supplies 1 kit by Other route 2 times daily Monitor covered by insurance please.  E11.9 1 kit 0    blood glucose monitor strips Test 2 x daily for diabetes   E 11.9 200 strip 0    Lancets MISC 1 each by Does not apply route 2 times daily 300 each 1    omeprazole (PRILOSEC) 20 MG delayed release capsule TAKE 1 CAPSULE EVERY DAY 90 capsule 1    propranolol (INDERAL) 60 MG tablet TAKE 1 TABLET TWICE DAILY 180 tablet 1    allopurinol (ZYLOPRIM) 100 MG tablet TAKE 1 TABLET TWICE DAILY 180 tablet 1    oxybutynin (DITROPAN XL) 10 MG extended release tablet Take 1 tablet by mouth every evening 30 tablet 3    glipiZIDE (GLUCOTROL XL) 10 MG extended release tablet TAKE 1 TABLET BID (with meals) 180 tablet 1    cyclobenzaprine (FLEXERIL) 10 MG tablet Take 1 tablet by mouth 2 times daily as needed (Arthritic pain.) 60 tablet 2    furosemide (LASIX) 20 MG tablet TAKE 1 TABLET EVERY DAY 90 tablet 1    potassium chloride (KLOR-CON M) 20 MEQ extended release tablet TAKE 1 TABLET EVERY DAY 90 tablet 1    isosorbide dinitrate (ISORDIL) 5 MG tablet TAKE 1 TABLET TWICE DAILY 180 tablet 3    gabapentin (NEURONTIN) 100 MG capsule Take 2 capsules by mouth nightly for 180 days. 180 capsule 1    Blood Glucose Monitoring Suppl (TRUE METRIX METER) w/Device KIT Test once daily 1 kit 0    fluticasone (FLONASE) 50 MCG/ACT nasal spray 2 sprays by Nasal route daily as needed for Rhinitis 3 Bottle 1    acetaminophen (TYLENOL) 650 MG CR tablet Take 1,300 mg by mouth every 8 hours as needed for Pain      Magnesium 400 MG TABS Take 800 mg by mouth nightly       folic acid (FOLVITE) 545 MCG tablet Take 400 mcg by mouth daily      Lancet Device MISC 1 Device by Does not apply route once for 1 dose 100 Device 0     No facility-administered encounter medications on file as of 9/9/2021. Current Outpatient Medications on File Prior to Visit   Medication Sig Dispense Refill    blood glucose monitor kit and supplies 1 kit by Other route 2 times daily Monitor covered by insurance please.  E11.9 1 kit 0    blood glucose monitor strips Test 2 x daily for diabetes   E 11.9 200 strip 0    Lancets MISC 1 each by Does not apply route 2 times daily 300 each 1    omeprazole (PRILOSEC) 20 MG delayed release capsule TAKE 1 CAPSULE EVERY DAY 90 capsule 1    propranolol (INDERAL) 60 MG tablet TAKE 1 TABLET TWICE DAILY 180 tablet 1    allopurinol (ZYLOPRIM) 100 MG tablet TAKE 1 TABLET TWICE DAILY 180 tablet 1    oxybutynin (DITROPAN XL) 10 MG extended release tablet Take 1 tablet by mouth every evening 30 tablet 3    glipiZIDE (GLUCOTROL XL) 10 MG extended release tablet TAKE 1 TABLET BID (with meals) 180 tablet 1    cyclobenzaprine (FLEXERIL) 10 MG tablet Take 1 tablet by mouth 2 times Genitourinary: Negative for difficulty urinating, dyspareunia, dysuria, enuresis, flank pain, frequency, hematuria, pelvic pain, urgency, vaginal bleeding and vaginal discharge. Positive for urinary incontinence   Musculoskeletal: Negative for back pain, joint swelling and myalgias. Skin: Negative for color change, rash and wound. Neurological: Negative for dizziness, tremors and numbness. Hematological: Negative for adenopathy. Does not bruise/bleed easily. Psychiatric/Behavioral: Negative for sleep disturbance. /72 (Site: Left Upper Arm, Position: Sitting, Cuff Size: Medium Adult)   Pulse 80   Ht 5' 6\" (1.676 m)   Wt 242 lb (109.8 kg)   LMP  (LMP Unknown)   SpO2 92%   Breastfeeding No   BMI 39.06 kg/m²       PHYSICAL EXAM:  Constitutional: Patient resting comfortably, in no acute distress. Neuro: Alert and oriented to person place and time. Psych: Mood and affect normal.  HEENT: normocephalic, atraumatic  Lungs: Respiratory effort normal, unlabored  Abdomen: Soft, non-tender, non-distended  : No CVA tenderness. Pelvic: deferred    Lab Results   Component Value Date    BUN 27 (H) 08/23/2021     Lab Results   Component Value Date    CREATININE 1.61 (H) 08/23/2021       ASSESSMENT:   Diagnosis Orders   1. Mixed incontinence  NV MEASUREMENT,POST-VOID RESIDUAL VOLUME BY US,NON-IMAGING   2. Frequent UTI     3. Kidney stones  XR ABDOMEN (KUB) (SINGLE AP VIEW)   4. H/O partial nephrectomy     5. Stage 3 chronic kidney disease, unspecified whether stage 3a or 3b CKD ([facility])  NV MEASUREMENT,POST-VOID RESIDUAL VOLUME BY US,NON-IMAGING     PLAN:  Recommended stool softener daily and using MiraLAX as needed as her constipation is new since her recent fall. Continue oxybutynin 10 XL    Patient instructed to drink at least 80 ounces of \"good fluids\" daily (water, juice, Gatoraide, milk) and to avoid/minimize intake of \"bad fluids\" (soda pop, coffee, tea, alcohol, energy drinks).  Also advised to avoid excessive consumption of sodium and animal protein to decrease risk of recurrent kidney stones. Increase water intake    From the standpoint of her partial nephrectomy there is nothing that needs to be continued to be followed with further imaging.     Follow-up in 6 months with PVR    Patient will need KUB in 1 year for stone follow-up

## 2021-09-09 NOTE — PROGRESS NOTES
[facility] [address] [place] [address]/[place]  Medication Management  ANTICOAGULATION    Referring Provider: Dr. Xi Bauman     GOAL INR: 2.0-3.0     TODAY'S INR: 2.4     WARFARIN Dosage:  2.5 mg daily    INR (no units)   Date Value   2021 2.4   2021 3.8   2021 3.7   2021 2.2   2021 2.1   2021 2.6   2021 4.9       Medication changes:  cipro 500 mg daily x 7 days 21  Notes:    Fingerstick INR drawn per clinic protocol. Patient states no visible blood in urine and no black tarry stool. Denies any missed doses of warfarin. Ragini was started on cipro 500 mg daily x 7 days on 21. Since Ciprofloxacin can increase INR, we had Ragini take only 2.5 mg warfarin daily (decreasing weekly warfarin regimen by 12.5%). Van Betts states she has been doing therapy at the [facility] twice weekly. Van Betts states she has had 'no appetite\" and has lost 20 pounds in the last month. Van Betts is scheduled for an injection with Dr. Mike Hanson on  and will hold warfarin x 4 days prior to the procedure as she has done in the past. Since Ragini's INR is therapeutic today, we will continue current weekly warfarin regimen, but will call in new warfarin strength of 2.5 mg (green tablet) because Ragini states she has trouble cutting the pills in 1/2, and will recheck INR in 4 weeks. Patient acknowledges working in consult agreement with pharmacist as referred by his/her physician.                   For Pharmacy Admin Tracking Only     Intervention Detail: Adherence Monitorin, New Rx: 1, reason: Needs Additional Therapy and Refill(s) Provided   Total # of Interventions Recommended: 1   Total # of Interventions Accepted: 1   Time Spent (min): 0596 David Pugh, [facility], PharmD

## 2021-09-13 ENCOUNTER — CARE COORDINATION (OUTPATIENT)
Dept: CARE COORDINATION | Age: 76
End: 2021-09-13

## 2021-09-13 NOTE — CARE COORDINATION
Ambulatory Care Coordination Note  9/13/2021  CM Risk Score: 3  Charlson 10 Year Mortality Risk Score: 100%     ACC: Luh Bird RN    Summary Note: CC outreach call placed to patient today. Spoke with Ragini who reports she is doing ok at home. Speech clear and alert/oriented. Blood Sugar  -Since getting meter has been compliant with monitoring daily   -Reports this morning was 145- they are improving   -She is being more mindful of what she eats   Lab Results   Component Value Date    LABA1C 9.5 (H) 06/22/2021     Lab Results   Component Value Date     06/22/2021       PT  -Reports Fernando Brown continues to come to the home   -2 times/week   -Thinking this is beneficial   -PT actually coming at end of this call       Falls  -Denies any falls   -States she is getting around well at home   -Home health- PT coming in   Reviewed fall precautions with patient:   1)Getting up slowly when changing positions  2)Keeping pathways clear  3)Keep house well lit  4)Ambulate with assistive device  5)Keep a phone/life alert with you when ambulating      Back Injections  -Reports she called and was able to get scheduled for back injection tomorrow, 9/14   -Has been holding her Coumadin for 4 days prior to this injection   -Looking forward to getting this- has had in the past   -Dr. Isa Solis       Urology   -Seen Urology on 9/9   -No changes made   -Denies any burning, frequency or urge for urination   -Continues Oxybutynin with effectiveness   -Follow up in 6 months       Dentist  -Reports she has yet to call the dentist to get an appointment- she dreads going to the dentist   -Is an established patient with a local dentist   -Reports she will call once her appointments settle down   -By her descriptions sounds as if her crown on a tooth is broken and needs replaced.      Future Appointments   Date Time Provider Natanael Austin   9/28/2021 10:00 AM Julia Parker DO [facility] MED W   10/7/2021 10:00 AM [facility] CNP   Lancets MISC 1 each by Does not apply route 2 times daily 8/27/21   YOHAN Gregg CNP   Lancet Device MISC 1 Device by Does not apply route once for 1 dose 8/27/21 8/27/21  YOHAN Gregg CNP   omeprazole (PRILOSEC) 20 MG delayed release capsule TAKE 1 CAPSULE EVERY DAY 8/12/21   YOHAN Veloz CNP   propranolol (INDERAL) 60 MG tablet TAKE 1 TABLET TWICE DAILY 7/30/21   Marianela Avalos DO   allopurinol (ZYLOPRIM) 100 MG tablet TAKE 1 TABLET TWICE DAILY 7/30/21   Marianela Avalos DO   oxybutynin (DITROPAN XL) 10 MG extended release tablet Take 1 tablet by mouth every evening 7/15/21   [facility] FREEDOM Preston PA-C   glipiZIDE (GLUCOTROL XL) 10 MG extended release tablet TAKE 1 TABLET BID (with meals) 6/23/21   Marianela Avalos DO   cyclobenzaprine (FLEXERIL) 10 MG tablet Take 1 tablet by mouth 2 times daily as needed (Arthritic pain.) 6/22/21   Marianela Avalos DO   furosemide (LASIX) 20 MG tablet TAKE 1 TABLET EVERY DAY 6/18/21   Marianela Avalos DO   potassium chloride (KLOR-CON M) 20 MEQ extended release tablet TAKE 1 TABLET EVERY DAY 6/18/21   Marianela Avalos DO   isosorbide dinitrate (ISORDIL) 5 MG tablet TAKE 1 TABLET TWICE DAILY 5/7/21   Marianela Avalos DO   gabapentin (NEURONTIN) 100 MG capsule Take 2 capsules by mouth nightly for 180 days.  3/15/21 9/11/21  Marianela Avalos DO   Blood Glucose Monitoring Suppl (TRUE METRIX METER) w/Device KIT Test once daily 2/9/21   Marianela Avalos DO   fluticasone [facility]) 50 MCG/ACT nasal spray 2 sprays by Nasal route daily as needed for Rhinitis 11/17/20   Marianela Avalos DO   acetaminophen (TYLENOL) 650 MG CR tablet Take 1,300 mg by mouth every 8 hours as needed for Pain    Historical Provider, MD   Magnesium 400 MG TABS Take 800 mg by mouth nightly     Historical Provider, MD   folic acid (FOLVITE) 834 MCG tablet Take 400 mcg by mouth daily 2/8/16   Historical Provider, MD      and   Diabetes Assessment    Medic Alert ID: No  Meal Planning: Avoidance of concentrated sweets   How often do you test your blood sugar?: Daily   Do you have barriers with adherence to non-pharmacologic self-management interventions?  (Nutrition/Exercise/Self-Monitoring): Yes   Have you ever had to go to the ED for symptoms of low blood sugar?: No       No patient-reported symptoms   Do you have hyperglycemia symptoms?: No   Do you have hypoglycemia symptoms?: No   Last Blood Sugar Value: 145   Blood Sugar Monitoring Regimen: Morning Fasting   Blood Sugar Trends: No Change

## 2021-09-17 ENCOUNTER — TELEPHONE (OUTPATIENT)
Dept: PRIMARY CARE CLINIC | Age: 76
End: 2021-09-17

## 2021-09-28 ENCOUNTER — OFFICE VISIT (OUTPATIENT)
Dept: FAMILY MEDICINE CLINIC | Age: 76
End: 2021-09-28
Payer: MEDICARE

## 2021-09-28 VITALS
HEIGHT: 66 IN | WEIGHT: 222 LBS | HEART RATE: 77 BPM | SYSTOLIC BLOOD PRESSURE: 116 MMHG | BODY MASS INDEX: 35.68 KG/M2 | OXYGEN SATURATION: 96 % | DIASTOLIC BLOOD PRESSURE: 70 MMHG

## 2021-09-28 DIAGNOSIS — N18.32 TYPE 2 DIABETES MELLITUS WITH STAGE 3B CHRONIC KIDNEY DISEASE, WITHOUT LONG-TERM CURRENT USE OF INSULIN (HCC): Primary | ICD-10-CM

## 2021-09-28 DIAGNOSIS — Z79.01 LONG TERM (CURRENT) USE OF ANTICOAGULANTS: ICD-10-CM

## 2021-09-28 DIAGNOSIS — E11.22 TYPE 2 DIABETES MELLITUS WITH STAGE 3B CHRONIC KIDNEY DISEASE, WITHOUT LONG-TERM CURRENT USE OF INSULIN (HCC): Primary | ICD-10-CM

## 2021-09-28 DIAGNOSIS — R29.6 FREQUENT FALLS: ICD-10-CM

## 2021-09-28 LAB — HBA1C MFR BLD: 6.8 %

## 2021-09-28 PROCEDURE — G8417 CALC BMI ABV UP PARAM F/U: HCPCS | Performed by: FAMILY MEDICINE

## 2021-09-28 PROCEDURE — 99214 OFFICE O/P EST MOD 30 MIN: CPT | Performed by: FAMILY MEDICINE

## 2021-09-28 PROCEDURE — 3044F HG A1C LEVEL LT 7.0%: CPT | Performed by: FAMILY MEDICINE

## 2021-09-28 PROCEDURE — 4040F PNEUMOC VAC/ADMIN/RCVD: CPT | Performed by: FAMILY MEDICINE

## 2021-09-28 PROCEDURE — 1090F PRES/ABSN URINE INCON ASSESS: CPT | Performed by: FAMILY MEDICINE

## 2021-09-28 PROCEDURE — 1123F ACP DISCUSS/DSCN MKR DOCD: CPT | Performed by: FAMILY MEDICINE

## 2021-09-28 PROCEDURE — G8427 DOCREV CUR MEDS BY ELIG CLIN: HCPCS | Performed by: FAMILY MEDICINE

## 2021-09-28 PROCEDURE — 83036 HEMOGLOBIN GLYCOSYLATED A1C: CPT | Performed by: FAMILY MEDICINE

## 2021-09-28 PROCEDURE — 2022F DILAT RTA XM EVC RTNOPTHY: CPT | Performed by: FAMILY MEDICINE

## 2021-09-28 PROCEDURE — G8399 PT W/DXA RESULTS DOCUMENT: HCPCS | Performed by: FAMILY MEDICINE

## 2021-09-28 PROCEDURE — 3017F COLORECTAL CA SCREEN DOC REV: CPT | Performed by: FAMILY MEDICINE

## 2021-09-28 PROCEDURE — 1036F TOBACCO NON-USER: CPT | Performed by: FAMILY MEDICINE

## 2021-09-28 RX ORDER — GABAPENTIN 100 MG/1
200 CAPSULE ORAL NIGHTLY
Qty: 180 CAPSULE | Refills: 1 | Status: CANCELLED | OUTPATIENT
Start: 2021-09-28 | End: 2022-03-27

## 2021-09-28 RX ORDER — ASPIRIN 81 MG/1
81 TABLET ORAL DAILY
Qty: 90 TABLET | Refills: 3 | Status: SHIPPED | OUTPATIENT
Start: 2021-09-28 | End: 2022-05-16 | Stop reason: SDUPTHER

## 2021-09-28 NOTE — PATIENT INSTRUCTIONS
SURVEY:    You may be receiving a survey from [employer] regarding your visit today. You may get this in the mail, through your MyChart or in your email. Please complete the survey to enable us to provide the highest quality of care to you and your family. If you cannot score us as very good ( 5 Stars) on any question, please feel free to call the office to discuss how we could have made your experience exceptional.     Thank you.     Clinical Care Team:  Dr. Issac Mackay, DO Kamlesh Tom, [address] [place] [address] Team:  [address] [place] [address]

## 2021-09-28 NOTE — PROGRESS NOTES
Name: Ashley Avalos  : 1945         Chief Complaint:     Chief Complaint   Patient presents with    Diabetes    Gastroesophageal Reflux    Hypertension    Fall       History of Present Illness:      Ashley Avalos is a 70 y.o.  female who presents with Diabetes, Gastroesophageal Reflux, Hypertension, and Fall      HPI    3 falls since I last saw pt. First was while walking, second while getting out of bed, third while getting out of shower. Does have home PT now, working on lower ext strengthening. Brain MRI was abnormal but she didn't tell ophthalmologist about that. Saw him  and he thought everything was ok. Pt recalls something was abnl with R eye many yrs ago. 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 Has had cane for a long time but now has walker that works much better. F/u DM. Hasn't been eating much lately, doing better now but had a poor appetite after falling end of August. Continues rx as prescribed. R shoulder injury with fall in August. Saw Dr Ras Gary who didn't think RC was torn. Chronic anticoag, had DVT after a surgery. Previous episode wasn't a deep clot, was a big bruise on outside of leg. Has seen vascular in the past and had a stent placed. States Dr Jude Andino wants her to get off coumadin in favor of xarelto or eliquis.     Medical History:     Patient Active Problem List   Diagnosis    Type 2 diabetes mellitus with stage 3 chronic kidney disease, without long-term current use of insulin ([facility] [address]    Essential hypertension, benign    Esophageal reflux    Irritable bowel syndrome    Seasonal allergies    Long term current use of anticoagulant therapy    Gout    Rectocele    Tubular adenoma of colon    Hiatal hernia    Sigmoid diverticulosis    Chronic back pain    Hypomagnesemia    Family history of colon cancer    History of recurrent deep vein thrombosis (DVT)    Hyperparathyroidism (HCC)       Medications:       Prior to Admission medications    Medication Sig Start Date End Date Taking? Authorizing Provider   aspirin EC 81 MG EC tablet Take 1 tablet by mouth daily 9/28/21  Yes Juan Cristina DO   blood glucose monitor kit and supplies 1 kit by Other route 2 times daily Monitor covered by insurance please. E11.9 8/27/21  Yes YOHAN Strong CNP   blood glucose monitor strips Test 2 x daily for diabetes   E 11.9 8/27/21  Yes YOHAN Strong CNP   Lancets MISC 1 each by Does not apply route 2 times daily 8/27/21  Yes YOHAN Strong CNP   Lancet Device MISC 1 Device by Does not apply route once for 1 dose 8/27/21 9/28/21 Yes YOAHN Strong CNP   omeprazole (PRILOSEC) 20 MG delayed release capsule TAKE 1 CAPSULE EVERY DAY 8/12/21  Yes YOHAN Hernández CNP   propranolol (INDERAL) 60 MG tablet TAKE 1 TABLET TWICE DAILY 7/30/21  Yes Juan Cristina DO   allopurinol (ZYLOPRIM) 100 MG tablet TAKE 1 TABLET TWICE DAILY 7/30/21  Yes Juan Cristina DO   oxybutynin (DITROPAN XL) 10 MG extended release tablet Take 1 tablet by mouth every evening 7/15/21  Yes Bartolo Preston PA-C   glipiZIDE (GLUCOTROL XL) 10 MG extended release tablet TAKE 1 TABLET BID (with meals) 6/23/21  Yes Juan Cristina DO   cyclobenzaprine (FLEXERIL) 10 MG tablet Take 1 tablet by mouth 2 times daily as needed (Arthritic pain.) 6/22/21  Yes Juan Cristina DO   furosemide (LASIX) 20 MG tablet TAKE 1 TABLET EVERY DAY 6/18/21  Yes Juan Cristina DO   potassium chloride (KLOR-CON M) 20 MEQ extended release tablet TAKE 1 TABLET EVERY DAY 6/18/21  Yes Juan Cristina DO   isosorbide dinitrate (ISORDIL) 5 MG tablet TAKE 1 TABLET TWICE DAILY 5/7/21  Yes Juan Cristina DO   gabapentin (NEURONTIN) 100 MG capsule Take 2 capsules by mouth nightly for 180 days.  3/15/21 9/28/21 Yes Juan Cristina DO   Blood Glucose Monitoring Suppl (TRUE METRIX METER) w/Device KIT Test once daily 2/9/21  Yes Juan Highland, DO   fluticasone (FLONASE) 50 MCG/ACT nasal spray 2 3.77 01/08/2021     Lab Results   Component Value Date    CHOL 167 06/22/2021    HDL 59 06/22/2021    LABA1C 6.8 09/28/2021         Assessment & Plan:        Diagnosis Orders   1. Type 2 diabetes mellitus with stage 3b chronic kidney disease, without long-term current use of insulin (HCC)  POCT glycosylated hemoglobin (Hb A1C)   2. Frequent falls     3. Long term (current) use of anticoagulants     DM controlled and CKD stable, cont same rx. Frequent falls, poor lower extremity strength, h/o lumbar surgery. Continue home PT, use of walker. Has been on coumadin for a number of years but I\"m not convinced pt has a true indication - I believe she had only 1 episode of DVT. However, she's also had a vascular stent placed and Im unsure of its nature and whether she would need full anticoag for that. Contacting vascular to confirm but I would like to d/c coumadin and pt is in agreement. This is especially important with her falls, risk of intracranial hemorrhage. Would start baby aspirin. Requested Prescriptions     Signed Prescriptions Disp Refills    aspirin EC 81 MG EC tablet 90 tablet 3     Sig: Take 1 tablet by mouth daily         Patient Instructions   SURVEY:    You may be receiving a survey from [employer] regarding your visit today. You may get this in the mail, through your MyChart or in your email. Please complete the survey to enable us to provide the highest quality of care to you and your family. If you cannot score us as very good ( 5 Stars) on any question, please feel free to call the office to discuss how we could have made your experience exceptional.     Thank you.     Clinical Care Team:  DO Sravani Lewis [facility], [address] [place] [address] Team:  Candi Turcios received counseling on the following healthy behaviors: medication adherence  Reviewed prior labs and health maintenance. Continue current medications, diet and exercise. Discussed use, benefit, and side effects of prescribed medications. Barriers to medication compliance addressed. Patient given educational materials - see patient instructions. All patient questions answered.   Patient voiced understanding.     signed by Tamela Garner DO on 9/29/2021 at 10:27 PM  [address] [address]  Dept: [phone]

## 2021-10-01 ENCOUNTER — CARE COORDINATION (OUTPATIENT)
Dept: CARE COORDINATION | Age: 76
End: 2021-10-01

## 2021-10-01 NOTE — CARE COORDINATION
Ambulatory Care Coordination Note  10/1/2021  CM Risk Score: 3  Charlson 10 Year Mortality Risk Score: 100%     ACC: Meribeth Schilder, RN    Summary Note: CC outreach call placed to patient. Spoke with Ragini who reports she is doing well. Good spirits today, laughing throughout call. Falls  -Reports that she has had several falls within past few months   -Last one was when she was getting out of the shower. She had 1 foot in shower and other outside of shower and slipped  -Has grab bars in the bathroom   -Reports her shower has non-slip finish on bottom- got this not long ago   -Using walker at all times now   Reviewed fall precautions with patient:   1)Getting up slowly when changing positions  2)Keeping pathways clear  3)Keep house well lit  4)Ambulate with assistive device  5)Keep a phone/life alert with you when ambulating      Blood Sugar  -Reports blood sugars remain stable   -Today was 136  -Trying to be mindful of what she is eating- trying to control diabetes better  -Most recent A1C improved was 9.5, now 6.8  Lab Results   Component Value Date    LABA1C 6.8 09/28/2021     Lab Results   Component Value Date     06/22/2021     Dentist  -Reports she did call and get scheduled with her dentist  -Has appointment on 10/4       Medications  -Reports she did stop her Coumadin and started baby aspirin   -Very happy about this change  -Reminded Ragini to watch for signs of blood in stool or black tarry stools; patient stop the medication immediately and notify her doctor if this develops as this could be life threatening. Ragini denies any s/s of bleeding.      Flu Shot  -Reports that she got her flu shot this week  -Arm just a little sore but denies reaction     Injections  -Reports she did get injection for her back  -Typically works well but days after she got this is when she fell in shower       Future Appointments   Date Time Provider Natanael Austin   10/7/2021 10:00  S [facility]   1/4/2022 10:00 AM DO Nathanael Hodges [facility]   3/10/2022 10:30 [facility]       Care Coordination Plan of Care: This nurse Care Coordinator will  - plan outreach call in 2 weeks per protocol   -follow up her dentist appointment   -follow up falls-any more?   -any new care coordination needs        Care Coordination Interventions    Program Enrollment: Complex Care  Referral from Primary Care Provider: Yes  Suggested Interventions and Community Resources  Fall Risk Prevention: Completed  Disease Specific Clinic: Completed (Comment: Dr. Kim Castaneda optomjoe)  [address]: Completed (Comment: [facility])  Physical Therapy: Completed (Comment: [facility] care- initial visit 8/27)  Transportation Support: Completed  Zone Management Tools: In Process (Comment: DM)         Goals Addressed                 This Visit's Progress     Reduce Falls    Worsening     I will reduce my risk of falls by the following: Remove rugs or use non slip rugs  Install grab bars in bathroom  Use walking aids like cane or walker  Follow through on orders for PT    Barriers: fear of failure, overwhelmed by complexity of regimen, and lack of education  Plan for overcoming my barriers: working with this ambulatory care manager   Confidence: 9/10  Anticipated Goal Completion Date: 12/27/2021            Prior to Admission medications    Medication Sig Start Date End Date Taking? Authorizing Provider   aspirin EC 81 MG EC tablet Take 1 tablet by mouth daily 9/28/21   Annamarie Healy DO   blood glucose monitor kit and supplies 1 kit by Other route 2 times daily Monitor covered by insurance please.  E11.9 8/27/21   Oliva Overall, APRN - CNP   blood glucose monitor strips Test 2 x daily for diabetes   E 11.9 8/27/21   Oliva Overall, APRN - CNP   Lancets MISC 1 each by Does not apply route 2 times daily 8/27/21   Oliva Overall, APRN - CNP   Lancet Device MISC 1 Device by Does not apply route once for 1 dose 8/27/21 9/28/21  YOHAN Corey CNP   omeprazole (PRILOSEC) 20 MG delayed release capsule TAKE 1 CAPSULE EVERY DAY 8/12/21   YOHAN Wilburn CNP   propranolol (INDERAL) 60 MG tablet TAKE 1 TABLET TWICE DAILY 7/30/21   [facility], DO   allopurinol (ZYLOPRIM) 100 MG tablet TAKE 1 TABLET TWICE DAILY 7/30/21   [facility], DO   oxybutynin (DITROPAN XL) 10 MG extended release tablet Take 1 tablet by mouth every evening 7/15/21   Erika Preston PA-C   glipiZIDE (GLUCOTROL XL) 10 MG extended release tablet TAKE 1 TABLET BID (with meals) 6/23/21   [facility], DO   cyclobenzaprine (FLEXERIL) 10 MG tablet Take 1 tablet by mouth 2 times daily as needed (Arthritic pain.) 6/22/21   [facility], DO   furosemide (LASIX) 20 MG tablet TAKE 1 TABLET EVERY DAY 6/18/21   [facility], DO   potassium chloride (KLOR-CON M) 20 MEQ extended release tablet TAKE 1 TABLET EVERY DAY 6/18/21   [facility], DO   isosorbide dinitrate (ISORDIL) 5 MG tablet TAKE 1 TABLET TWICE DAILY 5/7/21   [facility], DO   gabapentin (NEURONTIN) 100 MG capsule Take 2 capsules by mouth nightly for 180 days.  3/15/21 9/28/21  [facility], DO   Blood Glucose Monitoring Suppl (TRUE METRIX METER) w/Device KIT Test once daily 2/9/21   [facility], DO   fluticasone [facility]) 50 MCG/ACT nasal spray 2 sprays by Nasal route daily as needed for Rhinitis 11/17/20   [facility], DO   acetaminophen (TYLENOL) 650 MG CR tablet Take 1,300 mg by mouth every 8 hours as needed for Pain    Historical Provider, MD   Magnesium 400 MG TABS Take 800 mg by mouth nightly     Historical Provider, MD   folic acid (FOLVITE) 736 MCG tablet Take 400 mcg by mouth daily 2/8/16   Historical Provider, MD      and   Diabetes Assessment    Medic Alert ID: No  Meal Planning: Avoidance of concentrated sweets   How often do you test your blood sugar?: Daily   Do you have barriers with adherence to non-pharmacologic self-management interventions?  (Nutrition/Exercise/Self-Monitoring): Yes   Have you ever had to go to the ED for symptoms of low blood sugar?: No       No patient-reported symptoms   Do you have hyperglycemia symptoms?: No   Do you have hypoglycemia symptoms?: No   Last Blood Sugar Value: 136   Blood Sugar Monitoring Regimen: Morning Fasting   Blood Sugar Trends: No Change

## 2021-10-07 ENCOUNTER — TELEPHONE (OUTPATIENT)
Dept: FAMILY MEDICINE CLINIC | Age: 76
End: 2021-10-07

## 2021-10-08 NOTE — TELEPHONE ENCOUNTER
Yes, coumadin discontinued d/t frequent falls and also reviewing history more with pt and finding out what she's been calling her first DVT was actually probably a hematoma. Did reach out to vascular Dr Mi Brothers to see if there was an indication for anticoag based on the vascular procedures she's had in lower extremities, but his office never got back to us. Pt placed on baby aspirin daily.

## 2021-10-21 ENCOUNTER — CARE COORDINATION (OUTPATIENT)
Dept: CARE COORDINATION | Age: 76
End: 2021-10-21

## 2021-10-21 NOTE — CARE COORDINATION
Ambulatory Care Coordination Note  10/21/2021  CM Risk Score: 3  Charlson 10 Year Mortality Risk Score: 100%     ACC: Marleny Mclaughlin RN    Summary Note: CC outreach call placed to patient. Spoke with Ragini who reports she is doing well at home. Denies any new needs today. PT  -Reports home health PT is done  -Noted was done on 10/9/21   -Reports she is feeling a little stronger  -Using her walker at all times in the home and then when out       Falls  -Denies any falls since last outreach call   -Using walker in home and when out  -Got a non-slip mat for in her shower  -has grab bars   Reviewed fall precautions with patient:   1)Getting up slowly when changing positions  2)Keeping pathways clear  3)Keep house well lit  4)Ambulate with assistive device  5)Keep a phone/life alert with you when ambulating      Blood Sugar  -Reports blood sugar remains stable   - 120's-130's morning fasting average  -Avoiding concentrated sweets and following carb controlled diet   -Follows with Dr. Martha Troy (optometry) in [address] [facility] with Dr. Walker Shook (podiatry)   Lab Results   Component Value Date    LABA1C 6.8 09/28/2021     Lab Results   Component Value Date     06/22/2021       Toe  -Reports when she had that fall several months ago she did break a toe  -Follows with Dr. Walker Shook   -Next appointment is 11/4/21   -Reports it is finally looking much better     Future Appointments   Date Time Provider Natanael Austin   [facility]22 [facility] [address]   3/10/2022 10:30 AM SHARMILA [facility]       Care Coordination Plan of Care: This nurse Care Coordinator will  - plan to graduate from care coordination at this time.  No further needs identified at this time   -Patient has this writer contact number if future needs arise           Care Coordination Interventions    Program Enrollment: Complex Care  Referral from Primary Care Provider: Yes  Suggested Interventions and Community Resources  Fall Risk Prevention: Completed  Disease Specific Clinic: Completed (Comment: Dr. Luís Agarwal optnii)  [address]: Completed (Comment: [facility])  Physical Therapy: Completed (Comment: [facility]- initial visit 8/27)  Transportation Support: Completed  Zone Management Tools: In Process (Comment: DM)         Goals Addressed                 This Visit's Progress     COMPLETED: Reduce Falls         I will reduce my risk of falls by the following: Remove rugs or use non slip rugs  Install grab bars in bathroom  Use walking aids like cane or walker  Follow through on orders for PT    Barriers: fear of failure, overwhelmed by complexity of regimen, and lack of education  Plan for overcoming my barriers: working with this ambulatory care manager   Confidence: 9/10  Anticipated Goal Completion Date: 12/27/2021            Prior to Admission medications    Medication Sig Start Date End Date Taking? Authorizing Provider   aspirin EC 81 MG EC tablet Take 1 tablet by mouth daily 9/28/21   Sam Mckinley DO   blood glucose monitor kit and supplies 1 kit by Other route 2 times daily Monitor covered by insurance please.  E11.9 8/27/21   YOHAN Akers CNP   blood glucose monitor strips Test 2 x daily for diabetes   E 11.9 8/27/21   YOHAN Akers CNP   Lancets MISC 1 each by Does not apply route 2 times daily 8/27/21   YOHAN Akers CNP   Lancet Device MISC 1 Device by Does not apply route once for 1 dose 8/27/21 9/28/21  YOHAN Akers CNP   omeprazole (PRILOSEC) 20 MG delayed release capsule TAKE 1 CAPSULE EVERY DAY 8/12/21   YOHAN Sauceda CNP   propranolol (INDERAL) 60 MG tablet TAKE 1 TABLET TWICE DAILY 7/30/21   Sam Mckinley DO   allopurinol (ZYLOPRIM) 100 MG tablet TAKE 1 TABLET TWICE DAILY 7/30/21   Sam Mckinley DO   oxybutynin (DITROPAN XL) 10 MG extended release tablet Take 1 tablet by mouth every evening 7/15/21   Sydnie Dimas SHARMILA Preston   glipiZIDE (GLUCOTROL XL) 10 MG extended release tablet TAKE 1 TABLET BID (with meals) 6/23/21   Mj Dimas, DO   cyclobenzaprine (FLEXERIL) 10 MG tablet Take 1 tablet by mouth 2 times daily as needed (Arthritic pain.) 6/22/21   Odsung Dimas, DO   furosemide (LASIX) 20 MG tablet TAKE 1 TABLET EVERY DAY 6/18/21   Odsung Dimas, DO   potassium chloride (KLOR-CON M) 20 MEQ extended release tablet TAKE 1 TABLET EVERY DAY 6/18/21   Odella Wing, DO   isosorbide dinitrate (ISORDIL) 5 MG tablet TAKE 1 TABLET TWICE DAILY 5/7/21   Odsung Dimas, DO   gabapentin (NEURONTIN) 100 MG capsule Take 2 capsules by mouth nightly for 180 days. 3/15/21 9/28/21  Mj Dimas DO   Blood Glucose Monitoring Suppl (TRUE METRIX METER) w/Device KIT Test once daily 2/9/21   Mj Dimas DO   fluticasone [facility]) 50 MCG/ACT nasal spray 2 sprays by Nasal route daily as needed for Rhinitis 11/17/20   Odsung Dimas DO   acetaminophen (TYLENOL) 650 MG CR tablet Take 1,300 mg by mouth every 8 hours as needed for Pain    Historical Provider, MD   Magnesium 400 MG TABS Take 800 mg by mouth nightly     Historical Provider, MD   folic acid (FOLVITE) 586 MCG tablet Take 400 mcg by mouth daily 2/8/16   Historical Provider, MD      and   Diabetes Assessment    Medic Alert ID: No  Meal Planning: Avoidance of concentrated sweets   How often do you test your blood sugar?: Daily   Do you have barriers with adherence to non-pharmacologic self-management interventions?  (Nutrition/Exercise/Self-Monitoring): Yes   Have you ever had to go to the ED for symptoms of low blood sugar?: No       No patient-reported symptoms   Do you have hyperglycemia symptoms?: No   Do you have hypoglycemia symptoms?: No   Blood Sugar Monitoring Regimen: Morning Fasting   Blood Sugar Trends: No Change

## 2021-10-25 ENCOUNTER — TELEPHONE (OUTPATIENT)
Dept: FAMILY MEDICINE CLINIC | Age: 76
End: 2021-10-25

## 2021-10-25 NOTE — TELEPHONE ENCOUNTER
Dr. Baldemar Pickering called regarding Luis Dc. She needs to have a tooth pulled and she would like a clearance letter from Dr. Kim Weinberg. She said if Dr. Kim Weinberg could also put on there why she has on and off her coumadin that would be great too. The fax number is [phone]. Health Maintenance   Topic Date Due    Shingles Vaccine (1 of 2) Never done    Colon cancer screen colonoscopy  05/13/2021    Flu vaccine (1) 09/01/2021    Annual Wellness Visit (AWV)  11/18/2021    Diabetic foot exam  06/22/2022 (Originally 10/22/2020)    Diabetic microalbuminuria test  06/22/2022    Lipid screen  06/22/2022    Potassium monitoring  08/23/2022    Creatinine monitoring  08/23/2022    Breast cancer screen  08/24/2022    Diabetic retinal exam  08/30/2022    A1C test (Diabetic or Prediabetic)  09/28/2022    DTaP/Tdap/Td vaccine (2 - Td or Tdap) 12/10/2025    DEXA (modify frequency per FRAX score)  Completed    Pneumococcal 65+ yrs at Risk Vaccine  Completed    COVID-19 Vaccine  Completed    Hepatitis C screen  Addressed    Hepatitis A vaccine  Aged Out    Hib vaccine  Aged Out    Meningococcal (ACWY) vaccine  Aged Out             (applicable per patient's age: Cancer Screenings, Depression Screening, Fall Risk Screening, Immunizations)    Hemoglobin A1C (%)   Date Value   09/28/2021 6.8   06/22/2021 9.5 (H)   11/17/2020 7.8     Microalb/Crt.  Ratio (mcg/mg creat)   Date Value   06/22/2021 CANNOT BE CALCULATED     LDL Cholesterol (mg/dL)   Date Value   06/22/2021 87     AST (U/L)   Date Value   08/23/2021 12     ALT (U/L)   Date Value   08/23/2021 10     BUN (mg/dL)   Date Value   08/23/2021 27 (H)      (goal A1C is < 7)   (goal LDL is <100) need 30-50% reduction from baseline     BP Readings from Last 3 Encounters:   09/28/21 116/70   09/09/21 111/77   09/09/21 118/72    (goal /80)      All Future Testing planned in [facility]:  Lab Frequency Next Occurrence   XR ABDOMEN (KUB) (SINGLE AP VIEW) Once 09/09/2022 Next Visit Date:  Future Appointments   Date Time Provider Natanael Austin   1/4/2022 10:00 AM DO Melani Mena [facility]   3/10/2022 10:30 AM SHARMILA Cisneros urol [facility]            Patient Active Problem List:     Type 2 diabetes mellitus with stage 3 chronic kidney disease, without long-term current use of insulin (HCC)     Essential hypertension, benign     Esophageal reflux     Irritable bowel syndrome     Seasonal allergies     Long term current use of anticoagulant therapy     Gout     Rectocele     Tubular adenoma of colon     Hiatal hernia     Sigmoid diverticulosis     Chronic back pain     Hypomagnesemia     Family history of colon cancer     History of recurrent deep vein thrombosis (DVT)     Hyperparathyroidism ([facility] [address]

## 2021-10-27 ENCOUNTER — HOSPITAL ENCOUNTER (OUTPATIENT)
Age: 76
Discharge: HOME | End: 2021-10-27
Payer: MEDICARE

## 2021-10-27 ENCOUNTER — TELEPHONE (OUTPATIENT)
Dept: FAMILY MEDICINE CLINIC | Age: 76
End: 2021-10-27

## 2021-10-27 DIAGNOSIS — R30.0 DYSURIA: Primary | ICD-10-CM

## 2021-10-27 DIAGNOSIS — R30.0 DYSURIA: ICD-10-CM

## 2021-10-27 LAB
-: ABNORMAL
AMORPHOUS: ABNORMAL
BACTERIA: ABNORMAL
BILIRUBIN URINE: NEGATIVE
CASTS UA: ABNORMAL /LPF
COLOR: YELLOW
COMMENT UA: ABNORMAL
CRYSTALS, UA: ABNORMAL /HPF
EPITHELIAL CELLS UA: ABNORMAL /HPF
GLUCOSE URINE: NEGATIVE
KETONES, URINE: NEGATIVE
LEUKOCYTE ESTERASE, URINE: ABNORMAL
MUCUS: ABNORMAL
NITRITE, URINE: POSITIVE
OTHER OBSERVATIONS UA: ABNORMAL
PH UA: 6.5 (ref 5–8)
PROTEIN UA: ABNORMAL
RBC UA: ABNORMAL /HPF (ref 0–2)
RENAL EPITHELIAL, UA: ABNORMAL /HPF
SPECIFIC GRAVITY UA: 1 (ref 1–1.03)
TRICHOMONAS: ABNORMAL
TURBIDITY: ABNORMAL
URINE HGB: ABNORMAL
UROBILINOGEN, URINE: NORMAL
WBC UA: ABNORMAL /HPF
YEAST: ABNORMAL

## 2021-10-27 PROCEDURE — 87077 CULTURE AEROBIC IDENTIFY: CPT

## 2021-10-27 PROCEDURE — 87186 SC STD MICRODIL/AGAR DIL: CPT

## 2021-10-27 PROCEDURE — 81001 URINALYSIS AUTO W/SCOPE: CPT

## 2021-10-27 PROCEDURE — 87086 URINE CULTURE/COLONY COUNT: CPT

## 2021-10-27 RX ORDER — LEVOFLOXACIN 750 MG/1
TABLET ORAL
Qty: 4 TABLET | Refills: 0 | Status: SHIPPED | OUTPATIENT
Start: 2021-10-27 | End: 2021-12-17

## 2021-10-27 NOTE — TELEPHONE ENCOUNTER
----- Message from Ellen Caceres DO sent at 10/27/2021  3:41 PM EDT -----  Does appear to have UTI. Needs Levaquin 750 mg 1 tab every 48 hours for 7 days, #4 with 0 refills.

## 2021-10-27 NOTE — TELEPHONE ENCOUNTER
Fredy Richards is c/o having another UTI. She is having the burning and itching for 3 days. She said she tried calling the urologist office but she could not get ahold of them. Can she come in and give a specimen? Please let patient know. Health Maintenance   Topic Date Due    Shingles Vaccine (1 of 2) Never done    Colon cancer screen colonoscopy  05/13/2021    Flu vaccine (1) 09/01/2021    Annual Wellness Visit (AWV)  11/18/2021    Diabetic foot exam  06/22/2022 (Originally 10/22/2020)    Diabetic microalbuminuria test  06/22/2022    Lipid screen  06/22/2022    Potassium monitoring  08/23/2022    Creatinine monitoring  08/23/2022    Breast cancer screen  08/24/2022    Diabetic retinal exam  08/30/2022    A1C test (Diabetic or Prediabetic)  09/28/2022    DTaP/Tdap/Td vaccine (2 - Td or Tdap) 12/10/2025    DEXA (modify frequency per FRAX score)  Completed    Pneumococcal 65+ yrs at Risk Vaccine  Completed    COVID-19 Vaccine  Completed    Hepatitis C screen  Addressed    Hepatitis A vaccine  Aged Out    Hib vaccine  Aged Out    Meningococcal (ACWY) vaccine  Aged Out             (applicable per patient's age: Cancer Screenings, Depression Screening, Fall Risk Screening, Immunizations)    Hemoglobin A1C (%)   Date Value   09/28/2021 6.8   06/22/2021 9.5 (H)   11/17/2020 7.8     Microalb/Crt.  Ratio (mcg/mg creat)   Date Value   06/22/2021 CANNOT BE CALCULATED     LDL Cholesterol (mg/dL)   Date Value   06/22/2021 87     AST (U/L)   Date Value   08/23/2021 12     ALT (U/L)   Date Value   08/23/2021 10     BUN (mg/dL)   Date Value   08/23/2021 27 (H)      (goal A1C is < 7)   (goal LDL is <100) need 30-50% reduction from baseline     BP Readings from Last 3 Encounters:   09/28/21 116/70   09/09/21 111/77   09/09/21 118/72    (goal /80)      All Future Testing planned in [facility]:  Lab Frequency Next Occurrence   XR ABDOMEN (KUB) (SINGLE AP VIEW) Once 09/09/2022       Next Visit Date:  Future Appointments Date Time Provider Natanael Austin   1/4/2022 10:00 AM DO Leonel Boudreaux [facility]   3/10/2022 10:30 AM SHARMILA Markham urol [facility]            Patient Active Problem List:     Type 2 diabetes mellitus with stage 3 chronic kidney disease, without long-term current use of insulin (HCC)     Essential hypertension, benign     Esophageal reflux     Irritable bowel syndrome     Seasonal allergies     Long term current use of anticoagulant therapy     Gout     Rectocele     Tubular adenoma of colon     Hiatal hernia     Sigmoid diverticulosis     Chronic back pain     Hypomagnesemia     Family history of colon cancer     History of recurrent deep vein thrombosis (DVT)     Hyperparathyroidism ([facility] [address]

## 2021-10-29 DIAGNOSIS — E11.65 UNCONTROLLED TYPE 2 DIABETES MELLITUS WITH HYPERGLYCEMIA (HCC): ICD-10-CM

## 2021-10-29 LAB
CULTURE: ABNORMAL
Lab: ABNORMAL
SPECIMEN DESCRIPTION: ABNORMAL

## 2021-10-29 RX ORDER — GLUCOSAMINE HCL/CHONDROITIN SU 500-400 MG
CAPSULE ORAL
Qty: 100 STRIP | Refills: 1 | Status: SHIPPED | OUTPATIENT
Start: 2021-10-29

## 2021-10-29 NOTE — TELEPHONE ENCOUNTER
True metrix test strips. DM-elissa    Last check up 9/28/21    Health Maintenance   Topic Date Due    Shingles Vaccine (1 of 2) Never done    Colon cancer screen colonoscopy  05/13/2021    Flu vaccine (1) 09/01/2021    Annual Wellness Visit (AWV)  11/18/2021    Diabetic foot exam  06/22/2022 (Originally 10/22/2020)    Diabetic microalbuminuria test  06/22/2022    Lipid screen  06/22/2022    Potassium monitoring  08/23/2022    Creatinine monitoring  08/23/2022    Breast cancer screen  08/24/2022    Diabetic retinal exam  08/30/2022    A1C test (Diabetic or Prediabetic)  09/28/2022    DTaP/Tdap/Td vaccine (2 - Td or Tdap) 12/10/2025    DEXA (modify frequency per FRAX score)  Completed    Pneumococcal 65+ yrs at Risk Vaccine  Completed    COVID-19 Vaccine  Completed    Hepatitis C screen  Addressed    Hepatitis A vaccine  Aged Out    Hib vaccine  Aged Out    Meningococcal (ACWY) vaccine  Aged Out             (applicable per patient's age: Cancer Screenings, Depression Screening, Fall Risk Screening, Immunizations)    Hemoglobin A1C (%)   Date Value   09/28/2021 6.8   06/22/2021 9.5 (H)   11/17/2020 7.8     Microalb/Crt.  Ratio (mcg/mg creat)   Date Value   06/22/2021 CANNOT BE CALCULATED     LDL Cholesterol (mg/dL)   Date Value   06/22/2021 87     AST (U/L)   Date Value   08/23/2021 12     ALT (U/L)   Date Value   08/23/2021 10     BUN (mg/dL)   Date Value   08/23/2021 27 (H)      (goal A1C is < 7)   (goal LDL is <100) need 30-50% reduction from baseline     BP Readings from Last 3 Encounters:   09/28/21 116/70   09/09/21 111/77   09/09/21 118/72    (goal /80)      All Future Testing planned in [facility]:  Lab Frequency Next Occurrence   XR ABDOMEN (KUB) (SINGLE AP VIEW) Once 09/09/2022       Next Visit Date:  Future Appointments   Date Time Provider Natanael Austin   1/4/2022 10:00 AM DO [place]ulkadir [place]   3/10/2022 10:30 [facility] Active Problem List:     Type 2 diabetes mellitus with stage 3 chronic kidney disease, without long-term current use of insulin (HCC)     Essential hypertension, benign     Esophageal reflux     Irritable bowel syndrome     Seasonal allergies     Long term current use of anticoagulant therapy     Gout     Rectocele     Tubular adenoma of colon     Hiatal hernia     Sigmoid diverticulosis     Chronic back pain     Hypomagnesemia     Family history of colon cancer     History of recurrent deep vein thrombosis (DVT)     Hyperparathyroidism ([facility] [address]

## 2021-11-04 RX ORDER — POTASSIUM CHLORIDE 20 MEQ/1
TABLET, EXTENDED RELEASE ORAL
Qty: 90 TABLET | Refills: 1 | Status: SHIPPED | OUTPATIENT
Start: 2021-11-04 | End: 2022-05-13

## 2021-11-04 RX ORDER — FUROSEMIDE 20 MG/1
TABLET ORAL
Qty: 90 TABLET | Refills: 1 | Status: SHIPPED | OUTPATIENT
Start: 2021-11-04 | End: 2022-05-16 | Stop reason: SDUPTHER

## 2021-11-04 NOTE — TELEPHONE ENCOUNTER
Last OV: 9/28/2021 dm  Last RX:   Next scheduled apt: 1/4/2022        Sure scripts request    RX pending

## 2021-11-08 DIAGNOSIS — N39.46 MIXED INCONTINENCE: ICD-10-CM

## 2021-11-08 RX ORDER — OXYBUTYNIN CHLORIDE 10 MG/1
10 TABLET, EXTENDED RELEASE ORAL EVERY EVENING
Qty: 90 TABLET | Refills: 1 | Status: SHIPPED | OUTPATIENT
Start: 2021-11-08 | End: 2022-05-16 | Stop reason: SDUPTHER

## 2021-11-08 NOTE — TELEPHONE ENCOUNTER
Patient's chart was reviewed. Patient tolerates Ditropan well. We will refill this medication. She was seen within the past year.

## 2021-12-15 ENCOUNTER — HOSPITAL ENCOUNTER (OUTPATIENT)
Age: 76
Discharge: HOME | End: 2021-12-15
Payer: MEDICARE

## 2021-12-15 LAB
ALBUMIN SERPL-MCNC: 3.3 G/DL (ref 3.5–5.2)
ANION GAP SERPL CALCULATED.3IONS-SCNC: 13 MMOL/L (ref 9–17)
BUN BLDV-MCNC: 28 MG/DL (ref 8–23)
BUN/CREAT BLD: 16 (ref 9–20)
CALCIUM SERPL-MCNC: 9.3 MG/DL (ref 8.6–10.4)
CHLORIDE BLD-SCNC: 97 MMOL/L (ref 98–107)
CO2: 25 MMOL/L (ref 20–31)
CREAT SERPL-MCNC: 1.77 MG/DL (ref 0.5–0.9)
CREATININE URINE: 133.5 MG/DL (ref 28–217)
GFR AFRICAN AMERICAN: 34 ML/MIN
GFR NON-AFRICAN AMERICAN: 28 ML/MIN
GFR SERPL CREATININE-BSD FRML MDRD: ABNORMAL ML/MIN/{1.73_M2}
GFR SERPL CREATININE-BSD FRML MDRD: ABNORMAL ML/MIN/{1.73_M2}
GLUCOSE BLD-MCNC: 212 MG/DL (ref 70–99)
HCT VFR BLD CALC: 38.5 % (ref 36–46)
HEMOGLOBIN: 12.5 G/DL (ref 12–16)
PHOSPHORUS: 3.5 MG/DL (ref 2.6–4.5)
POTASSIUM SERPL-SCNC: 4 MMOL/L (ref 3.7–5.3)
PTH INTACT: 43.85 PG/ML (ref 15–65)
SODIUM BLD-SCNC: 135 MMOL/L (ref 135–144)
TOTAL PROTEIN, URINE: 88 MG/DL

## 2021-12-15 PROCEDURE — 82570 ASSAY OF URINE CREATININE: CPT

## 2021-12-15 PROCEDURE — 83970 ASSAY OF PARATHORMONE: CPT

## 2021-12-15 PROCEDURE — 80069 RENAL FUNCTION PANEL: CPT

## 2021-12-15 PROCEDURE — 36415 COLL VENOUS BLD VENIPUNCTURE: CPT

## 2021-12-15 PROCEDURE — 85018 HEMOGLOBIN: CPT

## 2021-12-15 PROCEDURE — 84156 ASSAY OF PROTEIN URINE: CPT

## 2021-12-15 PROCEDURE — 85014 HEMATOCRIT: CPT

## 2021-12-16 RX ORDER — PROPRANOLOL HYDROCHLORIDE 60 MG/1
TABLET ORAL
Qty: 180 TABLET | Refills: 1 | Status: SHIPPED | OUTPATIENT
Start: 2021-12-16 | End: 2022-05-13

## 2021-12-16 RX ORDER — ALLOPURINOL 100 MG/1
TABLET ORAL
Qty: 180 TABLET | Refills: 1 | Status: SHIPPED | OUTPATIENT
Start: 2021-12-16 | End: 2022-05-16 | Stop reason: SDUPTHER

## 2021-12-17 ENCOUNTER — APPOINTMENT (OUTPATIENT)
Dept: GENERAL RADIOLOGY | Age: 76
End: 2021-12-17
Payer: MEDICARE

## 2021-12-17 ENCOUNTER — APPOINTMENT (OUTPATIENT)
Dept: CT IMAGING | Age: 76
End: 2021-12-17
Payer: MEDICARE

## 2021-12-17 ENCOUNTER — HOSPITAL ENCOUNTER (EMERGENCY)
Age: 76
Discharge: ANOTHER HOSPITAL | End: 2021-12-17
Attending: EMERGENCY MEDICINE
Payer: MEDICARE

## 2021-12-17 VITALS
RESPIRATION RATE: 18 BRPM | TEMPERATURE: 98.3 F | SYSTOLIC BLOOD PRESSURE: 133 MMHG | WEIGHT: 220 LBS | HEART RATE: 73 BPM | OXYGEN SATURATION: 100 % | DIASTOLIC BLOOD PRESSURE: 82 MMHG | BODY MASS INDEX: 35.51 KG/M2

## 2021-12-17 DIAGNOSIS — S09.90XA CLOSED HEAD INJURY, INITIAL ENCOUNTER: ICD-10-CM

## 2021-12-17 DIAGNOSIS — S01.01XA LACERATION OF SCALP, INITIAL ENCOUNTER: ICD-10-CM
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
## 2021-12-17 DIAGNOSIS — S72.352A CLOSED DISPLACED COMMINUTED FRACTURE OF SHAFT OF LEFT FEMUR, INITIAL ENCOUNTER (HCC): Primary | ICD-10-CM

## 2021-12-17 LAB
-: ABNORMAL
ABSOLUTE EOS #: 0 K/UL (ref 0–0.4)
ABSOLUTE IMMATURE GRANULOCYTE: ABNORMAL K/UL (ref 0–0.3)
ABSOLUTE LYMPH #: 3.2 K/UL (ref 1–4.8)
ABSOLUTE MONO #: 0.7 K/UL (ref 0–1)
ALBUMIN SERPL-MCNC: 3.6 G/DL (ref 3.5–5.2)
ALBUMIN/GLOBULIN RATIO: ABNORMAL (ref 1–2.5)
ALP BLD-CCNC: 57 U/L (ref 35–104)
ALT SERPL-CCNC: 10 U/L (ref 5–33)
AMORPHOUS: ABNORMAL
ANION GAP SERPL CALCULATED.3IONS-SCNC: 16 MMOL/L (ref 9–17)
AST SERPL-CCNC: 10 U/L
BACTERIA: ABNORMAL
BASOPHILS # BLD: 0 % (ref 0–2)
BASOPHILS ABSOLUTE: 0 K/UL (ref 0–0.2)
BILIRUB SERPL-MCNC: 0.28 MG/DL (ref 0.3–1.2)
BILIRUBIN URINE: NEGATIVE
BUN BLDV-MCNC: 30 MG/DL (ref 8–23)
BUN/CREAT BLD: 18 (ref 9–20)
CALCIUM SERPL-MCNC: 9.5 MG/DL (ref 8.6–10.4)
CASTS UA: ABNORMAL /LPF
CHLORIDE BLD-SCNC: 98 MMOL/L (ref 98–107)
CO2: 23 MMOL/L (ref 20–31)
COLOR: YELLOW
COMMENT UA: ABNORMAL
CREAT SERPL-MCNC: 1.69 MG/DL (ref 0.5–0.9)
CRYSTALS, UA: ABNORMAL /HPF
DIFFERENTIAL TYPE: YES
EOSINOPHILS RELATIVE PERCENT: 0 % (ref 0–5)
EPITHELIAL CELLS UA: ABNORMAL /HPF
GFR AFRICAN AMERICAN: 36 ML/MIN
GFR NON-AFRICAN AMERICAN: 30 ML/MIN
GFR SERPL CREATININE-BSD FRML MDRD: ABNORMAL ML/MIN/{1.73_M2}
GFR SERPL CREATININE-BSD FRML MDRD: ABNORMAL ML/MIN/{1.73_M2}
GLUCOSE BLD-MCNC: 170 MG/DL (ref 70–99)
GLUCOSE URINE: NEGATIVE
HCT VFR BLD CALC: 40.3 % (ref 36–46)
HEMOGLOBIN: 12.9 G/DL (ref 12–16)
IMMATURE GRANULOCYTES: ABNORMAL %
KETONES, URINE: NEGATIVE
LEUKOCYTE ESTERASE, URINE: ABNORMAL
LYMPHOCYTES # BLD: 33 % (ref 15–40)
MCH RBC QN AUTO: 32.2 PG (ref 26–34)
MCHC RBC AUTO-ENTMCNC: 32 G/DL (ref 31–37)
MCV RBC AUTO: 100.5 FL (ref 80–100)
MONOCYTES # BLD: 7 % (ref 4–8)
MUCUS: ABNORMAL
NITRITE, URINE: NEGATIVE
NRBC AUTOMATED: ABNORMAL PER 100 WBC
OTHER OBSERVATIONS UA: ABNORMAL
PDW BLD-RTO: 16.7 % (ref 12.1–15.2)
PH UA: 8 (ref 5–8)
PLATELET # BLD: 345 K/UL (ref 140–450)
PLATELET ESTIMATE: ABNORMAL
PMV BLD AUTO: ABNORMAL FL (ref 6–12)
POTASSIUM SERPL-SCNC: 4.4 MMOL/L (ref 3.7–5.3)
PROTEIN UA: ABNORMAL
RBC # BLD: 4.01 M/UL (ref 4–5.2)
RBC # BLD: ABNORMAL 10*6/UL
RBC UA: ABNORMAL /HPF (ref 0–2)
RENAL EPITHELIAL, UA: ABNORMAL /HPF
SARS-COV-2, RAPID: NOT DETECTED
SEG NEUTROPHILS: 60 % (ref 47–75)
SEGMENTED NEUTROPHILS ABSOLUTE COUNT: 5.9 K/UL (ref 2.5–7)
SODIUM BLD-SCNC: 137 MMOL/L (ref 135–144)
SPECIFIC GRAVITY UA: 1.01 (ref 1–1.03)
SPECIMEN DESCRIPTION: NORMAL
TOTAL PROTEIN: 6.1 G/DL (ref 6.4–8.3)
TRICHOMONAS: ABNORMAL
TURBIDITY: ABNORMAL
URINE HGB: NEGATIVE
UROBILINOGEN, URINE: NORMAL
WBC # BLD: 9.9 K/UL (ref 3.5–11)
WBC # BLD: ABNORMAL 10*3/UL
WBC UA: ABNORMAL /HPF
YEAST: ABNORMAL

## 2021-12-17 PROCEDURE — 80053 COMPREHEN METABOLIC PANEL: CPT

## 2021-12-17 PROCEDURE — 70450 CT HEAD/BRAIN W/O DYE: CPT

## 2021-12-17 PROCEDURE — 87086 URINE CULTURE/COLONY COUNT: CPT

## 2021-12-17 PROCEDURE — 72125 CT NECK SPINE W/O DYE: CPT

## 2021-12-17 PROCEDURE — 12001 RPR S/N/AX/GEN/TRNK 2.5CM/<: CPT

## 2021-12-17 PROCEDURE — 85025 COMPLETE CBC W/AUTO DIFF WBC: CPT

## 2021-12-17 PROCEDURE — C9803 HOPD COVID-19 SPEC COLLECT: HCPCS

## 2021-12-17 PROCEDURE — 96375 TX/PRO/DX INJ NEW DRUG ADDON: CPT

## 2021-12-17 PROCEDURE — 73552 X-RAY EXAM OF FEMUR 2/>: CPT

## 2021-12-17 PROCEDURE — 87635 SARS-COV-2 COVID-19 AMP PRB: CPT

## 2021-12-17 PROCEDURE — 87186 SC STD MICRODIL/AGAR DIL: CPT

## 2021-12-17 PROCEDURE — 2500000003 HC RX 250 WO HCPCS: Performed by: EMERGENCY MEDICINE

## 2021-12-17 PROCEDURE — 6360000002 HC RX W HCPCS: Performed by: EMERGENCY MEDICINE

## 2021-12-17 PROCEDURE — 87088 URINE BACTERIA CULTURE: CPT

## 2021-12-17 PROCEDURE — 96376 TX/PRO/DX INJ SAME DRUG ADON: CPT

## 2021-12-17 PROCEDURE — 99285 EMERGENCY DEPT VISIT HI MDM: CPT

## 2021-12-17 PROCEDURE — 96374 THER/PROPH/DIAG INJ IV PUSH: CPT

## 2021-12-17 PROCEDURE — 81001 URINALYSIS AUTO W/SCOPE: CPT

## 2021-12-17 RX ORDER — ONDANSETRON 2 MG/ML
4 INJECTION INTRAMUSCULAR; INTRAVENOUS ONCE
Status: COMPLETED | OUTPATIENT
Start: 2021-12-17 | End: 2021-12-17

## 2021-12-17 RX ORDER — MORPHINE SULFATE 4 MG/ML
4 INJECTION, SOLUTION INTRAMUSCULAR; INTRAVENOUS ONCE
Status: COMPLETED | OUTPATIENT
Start: 2021-12-17 | End: 2021-12-17

## 2021-12-17 RX ORDER — LIDOCAINE HYDROCHLORIDE AND EPINEPHRINE 10; 10 MG/ML; UG/ML
20 INJECTION, SOLUTION INFILTRATION; PERINEURAL ONCE
Status: COMPLETED | OUTPATIENT
Start: 2021-12-17 | End: 2021-12-17

## 2021-12-17 RX ADMIN — LIDOCAINE HYDROCHLORIDE,EPINEPHRINE BITARTRATE 20 ML: 10; .01 INJECTION, SOLUTION INFILTRATION; PERINEURAL at 17:09

## 2021-12-17 RX ADMIN — MORPHINE SULFATE 4 MG: 4 INJECTION, SOLUTION INTRAMUSCULAR; INTRAVENOUS at 16:21

## 2021-12-17 RX ADMIN — ONDANSETRON 4 MG: 2 INJECTION INTRAMUSCULAR; INTRAVENOUS at 11:46

## 2021-12-17 RX ADMIN — ONDANSETRON 4 MG: 2 INJECTION INTRAMUSCULAR; INTRAVENOUS at 16:22

## 2021-12-17 RX ADMIN — MORPHINE SULFATE 4 MG: 4 INJECTION, SOLUTION INTRAMUSCULAR; INTRAVENOUS at 11:46

## 2021-12-17 ASSESSMENT — PAIN DESCRIPTION - PAIN TYPE
TYPE: ACUTE PAIN
TYPE: ACUTE PAIN

## 2021-12-17 ASSESSMENT — PAIN SCALES - GENERAL
PAINLEVEL_OUTOF10: 10
PAINLEVEL_OUTOF10: 6
PAINLEVEL_OUTOF10: 6

## 2021-12-17 ASSESSMENT — PAIN DESCRIPTION - ORIENTATION: ORIENTATION: LEFT

## 2021-12-17 ASSESSMENT — PAIN DESCRIPTION - LOCATION: LOCATION: KNEE

## 2021-12-17 NOTE — ED NOTES
This nurse to speak with [facility] to request bed at [facility] for Dr baca which has accepted the patient but needs hospitalist to agree to admit for distal femur fracture and surgical candidate.        Hussein Rodriguez RN  12/17/21 [id number]

## 2021-12-17 NOTE — ED PROVIDER NOTES
[facility]  [address], [address]  Phone: [email or web address]  COMPLAINT    Chief Complaint   Patient presents with    Fall     pt brought per EMS for fall from standing, with head injury, stood up from toilet and fell to the floor, hitting head on laminate floor. MARIANNE Montesinos is a 78 y.o. female who presents above-noted complaint. Patient was doing fine. She had a fall from standing position. Hit her head she thinks on the register on the floor. She felt pain and discomfort in the left knee area which was severe. She has some skin tears on her arms. Denies syncope chest pain or other issues. PAST MEDICAL HISTORY    Past Medical History:   Diagnosis Date    Allergic rhinitis     Chronic kidney disease, stage III (moderate) ([facility])     Deep vein thrombosis (DVT) (Formerly [facility]) 10/24/2012    Elbow fracture, left     Fall     Gastric ulcer     Gout     Hx of blood clots     Following last back surgery     Hypertension     Nausea & vomiting     Presence of IVC filter     Type II or unspecified type diabetes mellitus without mention of complication, not stated as uncontrolled        SURGICAL HISTORY    Past Surgical History:   Procedure Laterality Date    BACK SURGERY      BACK SURGERY      BACK SURGERY      BACK SURGERY      BACK SURGERY      BACK SURGERY      Fusion     BREAST BIOPSY Left     BREAST BIOPSY Right     CARDIAC CATHETERIZATION Left 03/07/2018    Dr. Josefina Barillas @ [facility]--There is 30% disease of the origin of the lateral anterior descending. Mild 10% -20% plaque disease in the circumflex & right coronary artery. Normal left ventricular functino, ejection fraction of 60%.       CARPAL TUNNEL RELEASE Right     CATARACT REMOVAL Right     CATARACT REMOVAL Left     COLONOSCOPY  2014    COLONOSCOPY N/A 5/13/2019    COLONOSCOPY POLYPECTOMY HOT BIOPSY performed by Hernan Bell MD at [address] [place] [address] CYSTOSCOPY      \"Multiple\"     EYE SURGERY Right     Removed fluid from behind eye    HYSTERECTOMY      KIDNEY SURGERY      left side 1/2 of kidney removed    PARTIAL NEPHRECTOMY Left     RETINAL DETACHMENT SURGERY Left     ROTATOR CUFF REPAIR      TOTAL KNEE ARTHROPLASTY Right     TOTAL KNEE ARTHROPLASTY Left     UPPER GASTROINTESTINAL ENDOSCOPY  2014    VENA CAVA FILTER PLACEMENT         CURRENT MEDICATIONS    Current Outpatient Rx   Medication Sig Dispense Refill    allopurinol (ZYLOPRIM) 100 MG tablet TAKE 1 TABLET TWICE DAILY 180 tablet 1    propranolol (INDERAL) 60 MG tablet TAKE 1 TABLET TWICE DAILY 180 tablet 1    oxybutynin (DITROPAN XL) 10 MG extended release tablet Take 1 tablet by mouth every evening 90 tablet 1    potassium chloride (KLOR-CON M) 20 MEQ extended release tablet TAKE 1 TABLET EVERY DAY 90 tablet 1    furosemide (LASIX) 20 MG tablet TAKE 1 TABLET EVERY DAY 90 tablet 1    blood glucose monitor strips Test blood glucose daily 100 strip 1    aspirin EC 81 MG EC tablet Take 1 tablet by mouth daily 90 tablet 3    blood glucose monitor kit and supplies 1 kit by Other route 2 times daily Monitor covered by insurance please.  E11.9 1 kit 0    Lancets MISC 1 each by Does not apply route 2 times daily 300 each 1    omeprazole (PRILOSEC) 20 MG delayed release capsule TAKE 1 CAPSULE EVERY DAY 90 capsule 1    glipiZIDE (GLUCOTROL XL) 10 MG extended release tablet TAKE 1 TABLET BID (with meals) 180 tablet 1    cyclobenzaprine (FLEXERIL) 10 MG tablet Take 1 tablet by mouth 2 times daily as needed (Arthritic pain.) 60 tablet 2    isosorbide dinitrate (ISORDIL) 5 MG tablet TAKE 1 TABLET TWICE DAILY 180 tablet 3    Blood Glucose Monitoring Suppl (TRUE METRIX METER) w/Device KIT Test once daily 1 kit 0    fluticasone (FLONASE) 50 MCG/ACT nasal spray 2 sprays by Nasal route daily as needed for Rhinitis 3 Bottle 1    acetaminophen (TYLENOL) 650 MG CR tablet Take 1,300 mg by mouth every 8 hours as needed for Pain      Magnesium 400 MG TABS Take 800 mg by mouth nightly       folic acid (FOLVITE) 685 MCG tablet Take 400 mcg by mouth daily      Lancet Device MISC 1 Device by Does not apply route once for 1 dose 100 Device 0    gabapentin (NEURONTIN) 100 MG capsule Take 2 capsules by mouth nightly for 180 days. 180 capsule 1       ALLERGIES    Allergies   Allergen Reactions    Codeine Itching    Lisinopril Other (See Comments)     cough    Percocet [Oxycodone-Acetaminophen] Itching and Nausea Only    Adhesive Tape Itching, Rash and Other (See Comments)     Tape \"takes the skin off\"    Norco [Hydrocodone-Acetaminophen] Nausea And Vomiting       FAMILY HISTORY    Family History   Problem Relation Age of Onset    Diabetes Father     Cancer Father         Prostate Cancer    Cancer Paternal Aunt         Colon Cancer       SOCIAL HISTORY    Social History     Socioeconomic History    Marital status:      Spouse name: None    Number of children: None    Years of education: None    Highest education level: None   Occupational History    None   Tobacco Use    Smoking status: Never Smoker    Smokeless tobacco: Never Used   Vaping Use    Vaping Use: Never used   Substance and Sexual Activity    Alcohol use: No    Drug use: No    Sexual activity: None   Other Topics Concern    None   Social History Narrative    None     Social Determinants of Health     Financial Resource Strain:     Difficulty of Paying Living Expenses: Not on file   Food Insecurity:     Worried About Running Out of Food in the Last Year: Not on file    Jami of Food in the Last Year: Not on file   Transportation Needs:     Lack of Transportation (Medical): Not on file    Lack of Transportation (Non-Medical):  Not on file   Physical Activity:     Days of Exercise per Week: Not on file    Minutes of Exercise per Session: Not on file   Stress:     Feeling of Stress : Not on file   Social Connections:     both lower extremities. Back:No tenderness. Integument: Skin tears left forearm and right forearms. Lymphatic:  No lymphadenopathy noted. Neurologic:  Alert & oriented x 3, Normal motor function, Normal sensory function, No focal deficits noted. Psychiatric:  Affect normal, Judgment normal, Mood normal.                         RADIOLOGY    XR FEMUR LEFT (MIN 2 VIEWS)   Final Result   Comminuted displaced fracture of the distal femur. CT HEAD WO CONTRAST   Final Result   Soft tissue laceration left scalp. No other acute change. CT CERVICAL SPINE WO CONTRAST    (Results Pending)       PROCEDURES    Lac Repair    Date/Time: 12/17/2021 2:44 PM  Performed by: Dwaine Sanchez MD  Authorized by: Dwaine Sanchez MD     Consent:     Consent obtained:  Verbal    Consent given by:  Patient  Anesthesia (see MAR for exact dosages): Anesthesia method:  Local infiltration    Local anesthetic:  Lidocaine 1% WITH epi  Laceration details:     Location:  Scalp    Scalp location:  L temporal    Length (cm):  2    Depth (mm):  4  Repair type:     Repair type:  Simple  Exploration:     Hemostasis achieved with:  Epinephrine    Wound extent: no nerve damage noted, no tendon damage noted, no underlying fracture noted and no vascular damage noted    Treatment:     Area cleansed with:  Adrian-Clens    Amount of cleaning:  Standard    Visualized foreign bodies/material removed: no    Skin repair:     Repair method:  Staples  Approximation:     Approximation:  Loose  Post-procedure details:     Dressing:  Open (no dressing)    Patient tolerance of procedure:   Tolerated well, no immediate complications          CONSULTS:  Dr. Narendra Ulloa team at [address] [place] [address],[address]:    SCREENINGS  /82   Pulse 73   Temp 98.3 °F (36.8 °C)   Resp 18   Wt 220 lb (99.8 kg)   LMP  (LMP Unknown)   SpO2 100%   BMI 35.51 kg/m²        Screening For Hypertension and Follow-up (#317) previously diagnosed with hypertension and not applicable for screen      Screening For Tobacco Use and Cessation Intervention (#226):   reports that she has never smoked. She has never used smokeless tobacco.  Non-smoker not applicable for screen      CT CERVICAL SPINE WO CONTRAST   Final Result      Degenerative changes without fracture. XR FEMUR LEFT (MIN 2 VIEWS)   Final Result   Comminuted displaced fracture of the distal femur. CT HEAD WO CONTRAST   Final Result   Soft tissue laceration left scalp. No other acute change. CT Head usage for head injury(age >18 years) (#415):  CT was ordered after trauma because  Patient has multi-system trauma      ED COURSE & MEDICAL DECISION MAKING    Pertinent Labs & Imaging studies reviewed. (See chart for details)  Patient has fall head injury skin tears in complains maximal pain of the left knee. Exam is otherwise nonfocal.  She has GCS of 15. She has skin tears at lower addressed by the staff at this time. Checking head CT given dangerous mechanism and multiple trauma including a lower extremity injury. Has obvious pain and swelling to the left prosthetic knee area likely worrisome for femur fracture. REASSESSMENT  3:01 PM  Patient rechecked and updated on lab/xray status, progress and results. Patient was reassessed and condition was Improved after treatment . Needs nothing else at this time. Wound care was provided for the left temporal laceration scalp. CT of the head is negative. X-ray left knee shows distal femur fracture comminuted. Discussed the case at length with the patient regards to care. [facility] for further definitive care. I spoke with him and he will be happy to be consulted from a surgical standpoint. He deferred to the hospitalist.    Our transfer team has arranged [facility]   I spoke with the trauma team.  We discussed differential diagnosis treatment care and follow-up. We did not perform a CT neck care. We will have that performed prior to discharge in transfer. She is not complaining of neck pain and has no neurovascular defect although given the head trauma with her fall obviously is reasonable. They will be happy to accept her as a trauma patient through the emergency department. Dr. Babatunde Perez will be the accepting provider. 3:37 PM EST  CT cervical spine was obtained and is negative      FINAL IMPRESSION    1. Closed displaced comminuted fracture of shaft of left femur, initial encounter ([facility] [address]    2. Laceration of scalp, initial encounter    3. Closed head injury, initial encounter    4.  Fall, initial encounter         PATIENT REFERRED TO:  Transferred to [facility], Dr. Babatunde Perez and orthopedic surgeon Dr. Rosanna Alonso to evaluate      DISCHARGE MEDICATIONS:  New Prescriptions    No medications on file           Maddy Moreland MD  12/17/21 [id number]

## 2021-12-17 NOTE — ED NOTES
Spoke to radiology, they will push through CT scans to [facility].      Berkley Jay RN  12/17/21 [id number]

## 2021-12-17 NOTE — ED NOTES
Pt request to see Dr. Mily Solis for her fracture as she just scene Dr. Mily Solis on Tuesday. Dr. Mily Solis paged.       Veronica Sahu RN  12/17/21 8820

## 2021-12-19 LAB
CULTURE: ABNORMAL
Lab: ABNORMAL
SPECIMEN DESCRIPTION: ABNORMAL

## 2021-12-20 ENCOUNTER — TELEPHONE (OUTPATIENT)
Dept: FAMILY MEDICINE CLINIC | Age: 76
End: 2021-12-20

## 2021-12-20 NOTE — TELEPHONE ENCOUNTER
----- Message from Mary Hernandez DO sent at 12/19/2021  3:58 PM EST -----  Admitted in [place] - please fwd result

## 2021-12-30 RX ORDER — OMEPRAZOLE 20 MG/1
CAPSULE, DELAYED RELEASE ORAL
Qty: 90 CAPSULE | Refills: 1 | Status: SHIPPED | OUTPATIENT
Start: 2021-12-30 | End: 2022-05-16 | Stop reason: SDUPTHER

## 2022-01-03 ENCOUNTER — TELEPHONE (OUTPATIENT)
Dept: FAMILY MEDICINE CLINIC | Age: 77
End: 2022-01-03

## 2022-01-03 NOTE — TELEPHONE ENCOUNTER
----- Message from Leny Myra sent at 1/3/2022 11:06 AM EST -----  Subject: Message to Provider    QUESTIONS  Information for Provider? Pt of Dr. Correa (Renee)? pt fell on 12/17, broke her   leg is in [facility] pt had surgery on leg, hit her head If any question call   Yobany Grover her son  ---------------------------------------------------------------------------  --------------  [address]  What is the best way for the office to contact you? OK to leave message on   voicemail  Preferred Call Back Phone Number? [phone]  ---------------------------------------------------------------------------  --------------  SCRIPT ANSWERS  Relationship to Patient? Other  Representative Name? Yobany Grover  Is the Representative on the appropriate HIPAA document in Epic?  Yes

## 2022-01-03 NOTE — TELEPHONE ENCOUNTER
Message received from [facility] ([employer])-    Patient fell on 12/17/2021 and broke leg. Patient is in the [facility] and had surgery on her leg. She also hit her head with the fall. Son said if there are any questions to call him. Son's name is Martha Bentley. Call Martha Mc at [phone]    After looking in her chart, she had a Closed fracture of the distal end of the left femur which was repaired and a laceration to the left temporal scalp.      ----- Message from Dick or Bro sent at 1/3/2022 11:06 AM EST -----  Subject: Message to Provider    QUESTIONS  Information for Provider? Pt of Dr. Sina Melvin? pt fell on 12/17, broke her   leg is in [facility] pt had surgery on leg, hit her head If any question call   Martha Mc her son  ---------------------------------------------------------------------------  --------------  [address]  What is the best way for the office to contact you? OK to leave message on   voicemail  Preferred Call Back Phone Number? [phone]  ---------------------------------------------------------------------------  --------------  SCRIPT ANSWERS  Relationship to Patient? Other  Representative Name? Martha Bentley  Is the Representative on the appropriate HIPAA document in Epic?  Yes

## 2022-01-03 NOTE — TELEPHONE ENCOUNTER
----- Message from Heladio Whitten sent at 1/3/2022 11:06 AM EST -----  Subject: Message to Provider    QUESTIONS  Information for Provider? Pt of Dr. Valentin Young? pt fell on 12/17, broke her   leg is in [address] pt had surgery on leg, hit her head If any question call   Sven Clements her son  ---------------------------------------------------------------------------  --------------  [address]  What is the best way for the office to contact you? OK to leave message on   voicemail  Preferred Call Back Phone Number? [phone]  ---------------------------------------------------------------------------  --------------  SCRIPT ANSWERS  Relationship to Patient? Other  Representative Name? Sven Clements  Is the Representative on the appropriate HIPAA document in Epic?  Yes

## 2022-03-10 ENCOUNTER — OFFICE VISIT (OUTPATIENT)
Dept: UROLOGY | Age: 77
End: 2022-03-10
Payer: MEDICARE

## 2022-03-10 VITALS
HEIGHT: 66 IN | OXYGEN SATURATION: 96 % | WEIGHT: 242 LBS | HEART RATE: 87 BPM | BODY MASS INDEX: 38.89 KG/M2 | DIASTOLIC BLOOD PRESSURE: 68 MMHG | SYSTOLIC BLOOD PRESSURE: 140 MMHG

## 2022-03-10 DIAGNOSIS — N39.0 FREQUENT UTI: Primary | ICD-10-CM

## 2022-03-10 DIAGNOSIS — N18.30 STAGE 3 CHRONIC KIDNEY DISEASE, UNSPECIFIED WHETHER STAGE 3A OR 3B CKD (HCC): ICD-10-CM

## 2022-03-10 DIAGNOSIS — N39.46 MIXED INCONTINENCE: ICD-10-CM

## 2022-03-10 DIAGNOSIS — R30.0 DYSURIA: ICD-10-CM

## 2022-03-10 DIAGNOSIS — N20.0 KIDNEY STONES: ICD-10-CM

## 2022-03-10 DIAGNOSIS — Z90.5 H/O PARTIAL NEPHRECTOMY: ICD-10-CM

## 2022-03-10 PROCEDURE — 4040F PNEUMOC VAC/ADMIN/RCVD: CPT | Performed by: PHYSICIAN ASSISTANT

## 2022-03-10 PROCEDURE — 99213 OFFICE O/P EST LOW 20 MIN: CPT | Performed by: PHYSICIAN ASSISTANT

## 2022-03-10 PROCEDURE — 1036F TOBACCO NON-USER: CPT | Performed by: PHYSICIAN ASSISTANT

## 2022-03-10 PROCEDURE — G8417 CALC BMI ABV UP PARAM F/U: HCPCS | Performed by: PHYSICIAN ASSISTANT

## 2022-03-10 PROCEDURE — 3017F COLORECTAL CA SCREEN DOC REV: CPT | Performed by: PHYSICIAN ASSISTANT

## 2022-03-10 PROCEDURE — 51798 US URINE CAPACITY MEASURE: CPT | Performed by: PHYSICIAN ASSISTANT

## 2022-03-10 PROCEDURE — 0509F URINE INCON PLAN DOCD: CPT | Performed by: PHYSICIAN ASSISTANT

## 2022-03-10 PROCEDURE — G8399 PT W/DXA RESULTS DOCUMENT: HCPCS | Performed by: PHYSICIAN ASSISTANT

## 2022-03-10 PROCEDURE — 1090F PRES/ABSN URINE INCON ASSESS: CPT | Performed by: PHYSICIAN ASSISTANT

## 2022-03-10 PROCEDURE — G8484 FLU IMMUNIZE NO ADMIN: HCPCS | Performed by: PHYSICIAN ASSISTANT

## 2022-03-10 PROCEDURE — G8427 DOCREV CUR MEDS BY ELIG CLIN: HCPCS | Performed by: PHYSICIAN ASSISTANT

## 2022-03-10 PROCEDURE — 1123F ACP DISCUSS/DSCN MKR DOCD: CPT | Performed by: PHYSICIAN ASSISTANT

## 2022-03-10 ASSESSMENT — ENCOUNTER SYMPTOMS
BACK PAIN: 0
ABDOMINAL PAIN: 0
COUGH: 0
APNEA: 0
EYE REDNESS: 0
NAUSEA: 0
COLOR CHANGE: 0
VOMITING: 0
CONSTIPATION: 1
WHEEZING: 0
SHORTNESS OF BREATH: 0

## 2022-03-10 NOTE — PROGRESS NOTES
HPI:    Patient is a 78 y.o. female in no acute distress. She is alert and oriented to person, place, and time. 7/2021 Patient presents today as a new patient referral from Dr. Johnnie Echols for mixed incontinence and recurrent urinary tract infection. Patient has been seen by urology before. Has not seen urology in many years. She did have a culture proven urinary tract infection 1 month ago. Patient states that she was treated multiple times over the past year for urinary tract infections, again I have only seen one culture sent in the past year. Patient did have a left partial nephrectomy 50 years ago for atrophy/hematuria. Distant history of kidney stones she is also apparently has had \"multiple\" cystoscopies in the past.  She has had many back surgeries as well. He does have chronic kidney disease. She used to follow with nephrology, but no longer. Non smoker. Retired MTD . Patient did have labs approximately a month ago which did show a BUN of 34, creatinine of 1.74, GFR of 29. She does have history of hysterectomy. Patient did have a bladder sling in 1988. Patient does have incontinence throughout the day and night. She does wear a thick/heavy pad which she changes 3-4 times a day. She says that it is saturated almost every time she changes it. She states that some of the time her incontinence is due to not getting there in time. Most of the time her incontinence is when she stands up. She has never been on any anticholinergics. She does have a daily cough which is soft and easy to pass. She only drinks water. She urinates every 30 minutes to 1-1/2 hours. Patient does take Lasix. Patient is an uncontrolled diabetic, her  last hemoglobin A1c was 9.5. She denies any current fever, chills, gross hematuria, flank pain, dysuria. Patient voided - 20 mL, PVR - 0 mL.      Started on oxybutynin XL 10 mg    9/2021 - US was independently reviewed showing evidence of prior left partial nephrectomy. Bilateral ureteral jets noted. No hydronephrosis. There is a benign-appearing right renal cortical cyst.  Patient did have a KUB prior to visit which independently reviewed showing no distinct  calcifications. No further follow-up needed for history of left partial nephrectomy    Today:  Patient is here today for follow-up history of kidney stones, frequent urinary tract infection, history of partial nephrectomy, mixed incontinence. Patient is currently at the [facility] for rehab. She does have a left femur fracture. Patient currently has a e coli urinary tract infection and is being treated with culture specific cefdinir. Patient is having intermittent dysuria. Patient continues to take oxybutynin XL 10 mg. She does have dry mouth but is tolerable. She wears 0-1 pads. Denies nocturia. She does continue to wear pad for insurance purposes. She feels she has enough time to get it to the bathroom. She is having constipation issues since she has had her injury. Random bladderscan performed in office today: Pt last voided 2.5 hour ago, scan =  92 mL      Past Medical History:   Diagnosis Date    Allergic rhinitis     Chronic kidney disease, stage III (moderate) (HCC)     Deep vein thrombosis (DVT) ([address] 10/24/2012    Elbow fracture, left     Fall     Gastric ulcer     Gout     Hx of blood clots     Following last back surgery     Hypertension     Nausea & vomiting     Presence of IVC filter     Type II or unspecified type diabetes mellitus without mention of complication, not stated as uncontrolled      Past Surgical History:   Procedure Laterality Date    BACK SURGERY      BACK SURGERY      BACK SURGERY      BACK SURGERY      BACK SURGERY      BACK SURGERY      Fusion     BREAST BIOPSY Left     BREAST BIOPSY Right     CARDIAC CATHETERIZATION Left 03/07/2018    Dr. Veda Peralta @ [address] [place] [address] [facility]--There is 30% disease of the origin of the lateral anterior descending.   Mild 10% -20% plaque disease in the circumflex & right coronary artery. Normal left ventricular functino, ejection fraction of 60%.  CARPAL TUNNEL RELEASE Right     CATARACT REMOVAL Right     CATARACT REMOVAL Left     COLONOSCOPY  2014    COLONOSCOPY N/A 5/13/2019    COLONOSCOPY POLYPECTOMY HOT BIOPSY performed by Fiona Hyman MD at [facility]      \"Multiple\"     EYE SURGERY Right     Removed fluid from behind eye    HYSTERECTOMY      KIDNEY SURGERY      left side 1/2 of kidney removed    PARTIAL NEPHRECTOMY Left     RETINAL DETACHMENT SURGERY Left     ROTATOR CUFF REPAIR      TOTAL KNEE ARTHROPLASTY Right     TOTAL KNEE ARTHROPLASTY Left     UPPER GASTROINTESTINAL ENDOSCOPY  2014    VENA CAVA FILTER PLACEMENT       Outpatient Encounter Medications as of 3/10/2022   Medication Sig Dispense Refill    omeprazole (PRILOSEC) 20 MG delayed release capsule TAKE 1 CAPSULE EVERY DAY 90 capsule 1    allopurinol (ZYLOPRIM) 100 MG tablet TAKE 1 TABLET TWICE DAILY 180 tablet 1    propranolol (INDERAL) 60 MG tablet TAKE 1 TABLET TWICE DAILY 180 tablet 1    oxybutynin (DITROPAN XL) 10 MG extended release tablet Take 1 tablet by mouth every evening 90 tablet 1    potassium chloride (KLOR-CON M) 20 MEQ extended release tablet TAKE 1 TABLET EVERY DAY 90 tablet 1    furosemide (LASIX) 20 MG tablet TAKE 1 TABLET EVERY DAY 90 tablet 1    blood glucose monitor strips Test blood glucose daily 100 strip 1    aspirin EC 81 MG EC tablet Take 1 tablet by mouth daily 90 tablet 3    blood glucose monitor kit and supplies 1 kit by Other route 2 times daily Monitor covered by insurance please.  E11.9 1 kit 0    Lancets MISC 1 each by Does not apply route 2 times daily 300 each 1    glipiZIDE (GLUCOTROL XL) 10 MG extended release tablet TAKE 1 TABLET BID (with meals) 180 tablet 1    cyclobenzaprine (FLEXERIL) 10 MG tablet Take 1 tablet by mouth 2 times daily as needed (Arthritic pain.) 60 tablet 2    isosorbide dinitrate (ISORDIL) 5 MG tablet TAKE 1 TABLET TWICE DAILY 180 tablet 3    Blood Glucose Monitoring Suppl (TRUE METRIX METER) w/Device KIT Test once daily 1 kit 0    fluticasone (FLONASE) 50 MCG/ACT nasal spray 2 sprays by Nasal route daily as needed for Rhinitis 3 Bottle 1    acetaminophen (TYLENOL) 650 MG CR tablet Take 1,300 mg by mouth every 8 hours as needed for Pain      Magnesium 400 MG TABS Take 800 mg by mouth nightly       folic acid (FOLVITE) 679 MCG tablet Take 400 mcg by mouth daily      Lancet Device MISC 1 Device by Does not apply route once for 1 dose 100 Device 0    gabapentin (NEURONTIN) 100 MG capsule Take 2 capsules by mouth nightly for 180 days. 180 capsule 1     No facility-administered encounter medications on file as of 3/10/2022. Current Outpatient Medications on File Prior to Visit   Medication Sig Dispense Refill    omeprazole (PRILOSEC) 20 MG delayed release capsule TAKE 1 CAPSULE EVERY DAY 90 capsule 1    allopurinol (ZYLOPRIM) 100 MG tablet TAKE 1 TABLET TWICE DAILY 180 tablet 1    propranolol (INDERAL) 60 MG tablet TAKE 1 TABLET TWICE DAILY 180 tablet 1    oxybutynin (DITROPAN XL) 10 MG extended release tablet Take 1 tablet by mouth every evening 90 tablet 1    potassium chloride (KLOR-CON M) 20 MEQ extended release tablet TAKE 1 TABLET EVERY DAY 90 tablet 1    furosemide (LASIX) 20 MG tablet TAKE 1 TABLET EVERY DAY 90 tablet 1    blood glucose monitor strips Test blood glucose daily 100 strip 1    aspirin EC 81 MG EC tablet Take 1 tablet by mouth daily 90 tablet 3    blood glucose monitor kit and supplies 1 kit by Other route 2 times daily Monitor covered by insurance please.  E11.9 1 kit 0    Lancets MISC 1 each by Does not apply route 2 times daily 300 each 1    glipiZIDE (GLUCOTROL XL) 10 MG extended release tablet TAKE 1 TABLET BID (with meals) 180 tablet 1    cyclobenzaprine (FLEXERIL) 10 MG tablet Take 1 tablet by mouth 2 times daily as needed (Arthritic pain.) 60 tablet 2    isosorbide dinitrate (ISORDIL) 5 MG tablet TAKE 1 TABLET TWICE DAILY 180 tablet 3    Blood Glucose Monitoring Suppl (TRUE METRIX METER) w/Device KIT Test once daily 1 kit 0    fluticasone (FLONASE) 50 MCG/ACT nasal spray 2 sprays by Nasal route daily as needed for Rhinitis 3 Bottle 1    acetaminophen (TYLENOL) 650 MG CR tablet Take 1,300 mg by mouth every 8 hours as needed for Pain      Magnesium 400 MG TABS Take 800 mg by mouth nightly       folic acid (FOLVITE) 654 MCG tablet Take 400 mcg by mouth daily      Lancet Device MISC 1 Device by Does not apply route once for 1 dose 100 Device 0    gabapentin (NEURONTIN) 100 MG capsule Take 2 capsules by mouth nightly for 180 days. 180 capsule 1     No current facility-administered medications on file prior to visit. Codeine, Lisinopril, Percocet [oxycodone-acetaminophen], Adhesive tape, and Norco [hydrocodone-acetaminophen]  Family History   Problem Relation Age of Onset    Diabetes Father     Cancer Father         Prostate Cancer    Cancer Paternal Aunt         Colon Cancer     Social History     Tobacco Use   Smoking Status Never Smoker   Smokeless Tobacco Never Used       Social History     Substance and Sexual Activity   Alcohol Use No       Review of Systems   Constitutional: Negative for appetite change, chills and fever. Eyes: Negative for redness and visual disturbance. Respiratory: Negative for apnea, cough, shortness of breath and wheezing. Cardiovascular: Negative for chest pain and leg swelling. Gastrointestinal: Positive for constipation. Negative for abdominal pain, nausea and vomiting. Genitourinary: Positive for dysuria. Negative for difficulty urinating, dyspareunia, enuresis, flank pain, frequency, hematuria, pelvic pain, urgency, vaginal bleeding and vaginal discharge. Positive for incontinence and nocturia   Musculoskeletal: Negative for back pain, joint swelling and myalgias.    Skin: Negative for color change, rash and wound. Neurological: Negative for dizziness, tremors and numbness. Hematological: Negative for adenopathy. Does not bruise/bleed easily. Psychiatric/Behavioral: Negative for sleep disturbance. BP (!) 140/68 (Site: Left Upper Arm, Position: Sitting, Cuff Size: Medium Adult)   Pulse 87   Ht 5' 6\" (1.676 m)   Wt 242 lb (109.8 kg)   LMP  (LMP Unknown)   SpO2 96%   Breastfeeding No   BMI 39.06 kg/m²       PHYSICAL EXAM:  Constitutional: Patient resting comfortably, in no acute distress. Neuro: Alert and oriented to person place and time. Psych: Mood and affect normal.  HEENT: normocephalic, atraumatic  Lungs: Respiratory effort normal, unlabored  Abdomen: Soft, non-tender, non-distended  : No CVA tenderness. Left lower extremity brace in place  Pelvic: deferred    Lab Results   Component Value Date    BUN 30 (H) 12/17/2021     Lab Results   Component Value Date    CREATININE 1.69 (H) 12/17/2021       ASSESSMENT:   Diagnosis Orders   1. Frequent UTI     2. Mixed incontinence  [place] MEASUREMENT,POST-VOID RESIDUAL VOLUME BY US,NON-IMAGING   3. Kidney stones     4. H/O partial nephrectomy     5. Stage 3 chronic kidney disease, unspecified whether stage 3a or 3b CKD (HCC)  [place] MEASUREMENT,POST-VOID RESIDUAL VOLUME BY US,NON-IMAGING   6.  Dysuria             PLAN:  Continue culture specific cefdinir    Start MiraLAX daily    Continue oxybutynin 10 mg XL    Follow-up in 3 months with PVR    6 months needs KUB for stone follow-up

## 2022-03-31 ENCOUNTER — TELEPHONE (OUTPATIENT)
Dept: UROLOGY | Age: 77
End: 2022-03-31

## 2022-03-31 NOTE — TELEPHONE ENCOUNTER
Please let the facility know that urine culture was negative for UTI. Therefore no antibiotics need to be given to this patient. However there was significant blood in her urine under the microscope.   Please schedule her for an appointment to be seen in [facility] to discuss blood in urine

## 2022-04-21 ENCOUNTER — OFFICE VISIT (OUTPATIENT)
Dept: UROLOGY | Age: 77
End: 2022-04-21
Payer: MEDICARE

## 2022-04-21 VITALS — DIASTOLIC BLOOD PRESSURE: 90 MMHG | SYSTOLIC BLOOD PRESSURE: 130 MMHG | WEIGHT: 242 LBS | BODY MASS INDEX: 39.06 KG/M2

## 2022-04-21 DIAGNOSIS — E66.01 SEVERE OBESITY (BMI 35.0-39.9) WITH COMORBIDITY (HCC): ICD-10-CM

## 2022-04-21 DIAGNOSIS — N39.46 MIXED INCONTINENCE: ICD-10-CM

## 2022-04-21 DIAGNOSIS — N39.0 FREQUENT UTI: Primary | ICD-10-CM

## 2022-04-21 PROBLEM — N18.30 CHRONIC RENAL DISEASE, STAGE III (HCC): Status: ACTIVE | Noted: 2022-04-21

## 2022-04-21 PROCEDURE — 1123F ACP DISCUSS/DSCN MKR DOCD: CPT | Performed by: UROLOGY

## 2022-04-21 PROCEDURE — G8399 PT W/DXA RESULTS DOCUMENT: HCPCS | Performed by: UROLOGY

## 2022-04-21 PROCEDURE — G8417 CALC BMI ABV UP PARAM F/U: HCPCS | Performed by: UROLOGY

## 2022-04-21 PROCEDURE — G8427 DOCREV CUR MEDS BY ELIG CLIN: HCPCS | Performed by: UROLOGY

## 2022-04-21 PROCEDURE — 0509F URINE INCON PLAN DOCD: CPT | Performed by: UROLOGY

## 2022-04-21 PROCEDURE — 1090F PRES/ABSN URINE INCON ASSESS: CPT | Performed by: UROLOGY

## 2022-04-21 PROCEDURE — 3017F COLORECTAL CA SCREEN DOC REV: CPT | Performed by: UROLOGY

## 2022-04-21 PROCEDURE — 1036F TOBACCO NON-USER: CPT | Performed by: UROLOGY

## 2022-04-21 PROCEDURE — 4040F PNEUMOC VAC/ADMIN/RCVD: CPT | Performed by: UROLOGY

## 2022-04-21 PROCEDURE — 99213 OFFICE O/P EST LOW 20 MIN: CPT | Performed by: UROLOGY

## 2022-04-21 ASSESSMENT — ENCOUNTER SYMPTOMS
COLOR CHANGE: 0
EYE PAIN: 0
ABDOMINAL PAIN: 0
SHORTNESS OF BREATH: 0
EYE REDNESS: 0
VOMITING: 0
NAUSEA: 0
BACK PAIN: 0
COUGH: 0
WHEEZING: 0

## 2022-04-21 NOTE — PROGRESS NOTES
HPI:        Patient is a 78 y.o. female in no acute distress. She is alert and oriented to person, place, and time. 7/2021 Patient presents today as a new patient referral from Dr. Cristine Regalado for mixed incontinence and recurrent urinary tract infection. Patient has been seen by urology before. Has not seen urology in many years. She did have a culture proven urinary tract infection 1 month ago. Patient states that she was treated multiple times over the past year for urinary tract infections, again I have only seen one culture sent in the past year. Patient did have a left partial nephrectomy 50 years ago for atrophy/hematuria. Distant history of kidney stones she is also apparently has had \"multiple\" cystoscopies in the past.  She has had many back surgeries as well. He does have chronic kidney disease. She used to follow with nephrology, but no longer. Non smoker. Retired MTD . Patient did have labs approximately a month ago which did show a BUN of 34, creatinine of 1.74, GFR of 29. She does have history of hysterectomy. Patient did have a bladder sling in 1988. Patient does have incontinence throughout the day and night. She does wear a thick/heavy pad which she changes 3-4 times a day. She says that it is saturated almost every time she changes it. She states that some of the time her incontinence is due to not getting there in time. Most of the time her incontinence is when she stands up. She has never been on any anticholinergics. She does have a daily cough which is soft and easy to pass. She only drinks water. She urinates every 30 minutes to 1-1/2 hours. Patient does take Lasix. Patient is an uncontrolled diabetic, her  last hemoglobin A1c was 9.5. She denies any current fever, chills, gross hematuria, flank pain, dysuria.  Patient voided - 20 mL, PVR - 0 mL.                 Started on oxybutynin XL 10 mg     9/2021 - US was independently reviewed showing evidence of prior left partial nephrectomy. Bilateral ureteral jets noted. No hydronephrosis. There is a benign-appearing right renal cortical cyst.  Patient did have a KUB prior to visit which independently reviewed showing no distinct  calcifications. No further follow-up needed for history of left partial nephrectomy       Currently  Patient is here today for 6-week follow-up. Patient did have blood in her urine recently. At this point time we reviewed her laboratory values patient did have only 0-2 RBCs per high-power field. She recently finished course of antibiotics. She is doing well. No recent complaints. She does have approximately 2 weeks left of rehabilitation. She will then go home. No pain today. Past Medical History:   Diagnosis Date    Allergic rhinitis     Chronic kidney disease, stage III (moderate) (HCC)     Deep vein thrombosis (DVT) (HCC) 10/24/2012    Elbow fracture, left     Fall     Gastric ulcer     Gout     Hx of blood clots     Following last back surgery     Hypertension     Nausea & vomiting     Presence of IVC filter     Type II or unspecified type diabetes mellitus without mention of complication, not stated as uncontrolled      Past Surgical History:   Procedure Laterality Date    BACK SURGERY      BACK SURGERY      BACK SURGERY      BACK SURGERY      BACK SURGERY      BACK SURGERY      Fusion     BREAST BIOPSY Left     BREAST BIOPSY Right     CARDIAC CATHETERIZATION Left 03/07/2018    Dr. Shailesh Lange @ [address] [place] [address]--There is 30% disease of the origin of the lateral anterior descending. Mild 10% -20% plaque disease in the circumflex & right coronary artery. Normal left ventricular functino, ejection fraction of 60%.       CARPAL TUNNEL RELEASE Right     CATARACT REMOVAL Right     CATARACT REMOVAL Left     COLONOSCOPY  2014    COLONOSCOPY N/A 5/13/2019    COLONOSCOPY POLYPECTOMY HOT BIOPSY performed by Markos Davila MD at [address] [address] [employer] \"Multiple\"     EYE SURGERY Right     Removed fluid from behind eye    HYSTERECTOMY      KIDNEY SURGERY      left side 1/2 of kidney removed    PARTIAL NEPHRECTOMY Left     RETINAL DETACHMENT SURGERY Left     ROTATOR CUFF REPAIR      TOTAL KNEE ARTHROPLASTY Right     TOTAL KNEE ARTHROPLASTY Left     UPPER GASTROINTESTINAL ENDOSCOPY  2014    VENA CAVA FILTER PLACEMENT       Outpatient Encounter Medications as of 4/21/2022   Medication Sig Dispense Refill    omeprazole (PRILOSEC) 20 MG delayed release capsule TAKE 1 CAPSULE EVERY DAY 90 capsule 1    allopurinol (ZYLOPRIM) 100 MG tablet TAKE 1 TABLET TWICE DAILY 180 tablet 1    propranolol (INDERAL) 60 MG tablet TAKE 1 TABLET TWICE DAILY 180 tablet 1    oxybutynin (DITROPAN XL) 10 MG extended release tablet Take 1 tablet by mouth every evening 90 tablet 1    potassium chloride (KLOR-CON M) 20 MEQ extended release tablet TAKE 1 TABLET EVERY DAY 90 tablet 1    furosemide (LASIX) 20 MG tablet TAKE 1 TABLET EVERY DAY 90 tablet 1    blood glucose monitor strips Test blood glucose daily 100 strip 1    aspirin EC 81 MG EC tablet Take 1 tablet by mouth daily 90 tablet 3    blood glucose monitor kit and supplies 1 kit by Other route 2 times daily Monitor covered by insurance please.  E11.9 1 kit 0    Lancets MISC 1 each by Does not apply route 2 times daily 300 each 1    glipiZIDE (GLUCOTROL XL) 10 MG extended release tablet TAKE 1 TABLET BID (with meals) 180 tablet 1    cyclobenzaprine (FLEXERIL) 10 MG tablet Take 1 tablet by mouth 2 times daily as needed (Arthritic pain.) 60 tablet 2    isosorbide dinitrate (ISORDIL) 5 MG tablet TAKE 1 TABLET TWICE DAILY 180 tablet 3    Blood Glucose Monitoring Suppl (TRUE METRIX METER) w/Device KIT Test once daily 1 kit 0    fluticasone (FLONASE) 50 MCG/ACT nasal spray 2 sprays by Nasal route daily as needed for Rhinitis 3 Bottle 1    acetaminophen (TYLENOL) 650 MG CR tablet Take 1,300 mg by mouth every 8 hours as needed for Pain      Magnesium 400 MG TABS Take 800 mg by mouth nightly       folic acid (FOLVITE) 519 MCG tablet Take 400 mcg by mouth daily      Lancet Device MISC 1 Device by Does not apply route once for 1 dose 100 Device 0    gabapentin (NEURONTIN) 100 MG capsule Take 2 capsules by mouth nightly for 180 days. 180 capsule 1     No facility-administered encounter medications on file as of 4/21/2022. Current Outpatient Medications on File Prior to Visit   Medication Sig Dispense Refill    omeprazole (PRILOSEC) 20 MG delayed release capsule TAKE 1 CAPSULE EVERY DAY 90 capsule 1    allopurinol (ZYLOPRIM) 100 MG tablet TAKE 1 TABLET TWICE DAILY 180 tablet 1    propranolol (INDERAL) 60 MG tablet TAKE 1 TABLET TWICE DAILY 180 tablet 1    oxybutynin (DITROPAN XL) 10 MG extended release tablet Take 1 tablet by mouth every evening 90 tablet 1    potassium chloride (KLOR-CON M) 20 MEQ extended release tablet TAKE 1 TABLET EVERY DAY 90 tablet 1    furosemide (LASIX) 20 MG tablet TAKE 1 TABLET EVERY DAY 90 tablet 1    blood glucose monitor strips Test blood glucose daily 100 strip 1    aspirin EC 81 MG EC tablet Take 1 tablet by mouth daily 90 tablet 3    blood glucose monitor kit and supplies 1 kit by Other route 2 times daily Monitor covered by insurance please.  E11.9 1 kit 0    Lancets MISC 1 each by Does not apply route 2 times daily 300 each 1    glipiZIDE (GLUCOTROL XL) 10 MG extended release tablet TAKE 1 TABLET BID (with meals) 180 tablet 1    cyclobenzaprine (FLEXERIL) 10 MG tablet Take 1 tablet by mouth 2 times daily as needed (Arthritic pain.) 60 tablet 2    isosorbide dinitrate (ISORDIL) 5 MG tablet TAKE 1 TABLET TWICE DAILY 180 tablet 3    Blood Glucose Monitoring Suppl (TRUE METRIX METER) w/Device KIT Test once daily 1 kit 0    fluticasone (FLONASE) 50 MCG/ACT nasal spray 2 sprays by Nasal route daily as needed for Rhinitis 3 Bottle 1    acetaminophen (TYLENOL) 650 MG CR tablet Take grossly intact. Psych: Mood and affect normal.  Skin: Warm, dry  HEENT: normocephalic, atraumatic  Lymphatics: No palpable lymphadenopathy  Lungs: Respiratory effort normal, unlabored  Cardiovascular:  Normal peripheral pulses  Abdomen: Soft, non-tender, non-distended with no organomegaly or palpable masses. : No CVA tenderness. Bladder non-tender and not distended. Pelvic: deferred    Lab Results   Component Value Date    BUN 30 (H) 12/17/2021     Lab Results   Component Value Date    CREATININE 1.69 (H) 12/17/2021       ASSESSMENT:  This is a 78 y.o. female with the following diagnoses:   Diagnosis Orders   1. Frequent UTI     2. Severe obesity (BMI 35.0-39. 9) with comorbidity ([address]     3. Mixed incontinence           PLAN:  Patient will follow up with us in 6 months.   She will come back sooner with issues

## 2022-05-05 ENCOUNTER — TELEPHONE (OUTPATIENT)
Dept: FAMILY MEDICINE CLINIC | Age: 77
End: 2022-05-05

## 2022-05-05 NOTE — TELEPHONE ENCOUNTER
[facility] Transitions Initial Follow Up Call    Outreach made within 2 business days of discharge: Yes    Patient: Toñito Arechiga Patient : 1945   MRN: [id number]  Reason for Admission: femur fracture  Discharge Date: 22       Spoke with: Maximo Lott    Discharge department/facility: [facility]    TCM Interactive Patient Contact:  Was patient able to fill all prescriptions: Yes  Was patient instructed to bring all medications to the follow-up visit: Yes  Is patient taking all medications as directed in the discharge summary?  Yes  Does patient understand their discharge instructions: Yes  Does patient have questions or concerns that need addressed prior to 7-14 day follow up office visit: no    Scheduled appointment with PCP within 7-14 days    Follow Up  Future Appointments   Date Time Provider Natanael Austin   2022 12:40 PM Marianela Avalos DO [facility] [address]   10/27/2022  2:30 PM MD elissa De Los Santos [facility], [address] [place] [address]

## 2022-05-13 ENCOUNTER — OFFICE VISIT (OUTPATIENT)
Dept: FAMILY MEDICINE CLINIC | Age: 77
End: 2022-05-13
Payer: MEDICARE

## 2022-05-13 VITALS — SYSTOLIC BLOOD PRESSURE: 106 MMHG | HEART RATE: 95 BPM | DIASTOLIC BLOOD PRESSURE: 64 MMHG | OXYGEN SATURATION: 98 %

## 2022-05-13 DIAGNOSIS — Z87.440 H/O URINARY TRACT INFECTION: ICD-10-CM

## 2022-05-13 DIAGNOSIS — D48.5 NEOPLASM OF UNCERTAIN BEHAVIOR OF SCALP: ICD-10-CM

## 2022-05-13 DIAGNOSIS — E11.22 TYPE 2 DIABETES MELLITUS WITH STAGE 3B CHRONIC KIDNEY DISEASE, WITHOUT LONG-TERM CURRENT USE OF INSULIN (HCC): ICD-10-CM

## 2022-05-13 DIAGNOSIS — N18.32 TYPE 2 DIABETES MELLITUS WITH STAGE 3B CHRONIC KIDNEY DISEASE, WITHOUT LONG-TERM CURRENT USE OF INSULIN (HCC): ICD-10-CM

## 2022-05-13 DIAGNOSIS — Z09 HOSPITAL DISCHARGE FOLLOW-UP: ICD-10-CM

## 2022-05-13 DIAGNOSIS — I63.9 CEREBRAL INFARCTION, UNSPECIFIED MECHANISM (HCC): ICD-10-CM

## 2022-05-13 DIAGNOSIS — E89.2 H/O PARATHYROIDECTOMY (HCC): ICD-10-CM

## 2022-05-13 DIAGNOSIS — S72.042D CLOSED DISPLACED BASICERVICAL FRACTURE OF LEFT FEMUR WITH ROUTINE HEALING, SUBSEQUENT ENCOUNTER: Primary | ICD-10-CM

## 2022-05-13 DIAGNOSIS — N18.32 STAGE 3B CHRONIC KIDNEY DISEASE (HCC): ICD-10-CM

## 2022-05-13 LAB — HBA1C MFR BLD: 6.9 %

## 2022-05-13 PROCEDURE — 3017F COLORECTAL CA SCREEN DOC REV: CPT | Performed by: FAMILY MEDICINE

## 2022-05-13 PROCEDURE — G8427 DOCREV CUR MEDS BY ELIG CLIN: HCPCS | Performed by: FAMILY MEDICINE

## 2022-05-13 PROCEDURE — 2022F DILAT RTA XM EVC RTNOPTHY: CPT | Performed by: FAMILY MEDICINE

## 2022-05-13 PROCEDURE — 1036F TOBACCO NON-USER: CPT | Performed by: FAMILY MEDICINE

## 2022-05-13 PROCEDURE — G8417 CALC BMI ABV UP PARAM F/U: HCPCS | Performed by: FAMILY MEDICINE

## 2022-05-13 PROCEDURE — 3044F HG A1C LEVEL LT 7.0%: CPT | Performed by: FAMILY MEDICINE

## 2022-05-13 PROCEDURE — 99214 OFFICE O/P EST MOD 30 MIN: CPT | Performed by: FAMILY MEDICINE

## 2022-05-13 PROCEDURE — 4040F PNEUMOC VAC/ADMIN/RCVD: CPT | Performed by: FAMILY MEDICINE

## 2022-05-13 PROCEDURE — 83036 HEMOGLOBIN GLYCOSYLATED A1C: CPT | Performed by: FAMILY MEDICINE

## 2022-05-13 PROCEDURE — 1123F ACP DISCUSS/DSCN MKR DOCD: CPT | Performed by: FAMILY MEDICINE

## 2022-05-13 PROCEDURE — 1090F PRES/ABSN URINE INCON ASSESS: CPT | Performed by: FAMILY MEDICINE

## 2022-05-13 PROCEDURE — G8399 PT W/DXA RESULTS DOCUMENT: HCPCS | Performed by: FAMILY MEDICINE

## 2022-05-13 RX ORDER — AMOXICILLIN 250 MG
1 CAPSULE ORAL DAILY
COMMUNITY
End: 2022-05-16 | Stop reason: SDUPTHER

## 2022-05-13 RX ORDER — GLIPIZIDE 5 MG/1
1 TABLET ORAL 2 TIMES DAILY
COMMUNITY
Start: 2022-05-04 | End: 2022-05-16 | Stop reason: SDUPTHER

## 2022-05-13 RX ORDER — ATORVASTATIN CALCIUM 40 MG/1
TABLET, FILM COATED ORAL
COMMUNITY
Start: 2022-05-04 | End: 2022-05-16 | Stop reason: SDUPTHER

## 2022-05-13 RX ORDER — TRAZODONE HYDROCHLORIDE 50 MG/1
TABLET ORAL
COMMUNITY
Start: 2022-05-04 | End: 2022-05-16 | Stop reason: SDUPTHER

## 2022-05-13 RX ORDER — PROPRANOLOL HYDROCHLORIDE 20 MG/1
TABLET ORAL
COMMUNITY
Start: 2022-05-04 | End: 2022-05-16 | Stop reason: SDUPTHER

## 2022-05-13 RX ORDER — SODIUM CHLORIDE/ALOE VERA
GEL (GRAM) NASAL
COMMUNITY
End: 2022-06-13

## 2022-05-13 SDOH — ECONOMIC STABILITY: FOOD INSECURITY: WITHIN THE PAST 12 MONTHS, YOU WORRIED THAT YOUR FOOD WOULD RUN OUT BEFORE YOU GOT MONEY TO BUY MORE.: NEVER TRUE

## 2022-05-13 SDOH — ECONOMIC STABILITY: FOOD INSECURITY: WITHIN THE PAST 12 MONTHS, THE FOOD YOU BOUGHT JUST DIDN'T LAST AND YOU DIDN'T HAVE MONEY TO GET MORE.: NEVER TRUE

## 2022-05-13 ASSESSMENT — PATIENT HEALTH QUESTIONNAIRE - PHQ9
SUM OF ALL RESPONSES TO PHQ9 QUESTIONS 1 & 2: 0
SUM OF ALL RESPONSES TO PHQ QUESTIONS 1-9: 0
SUM OF ALL RESPONSES TO PHQ QUESTIONS 1-9: 0
2. FEELING DOWN, DEPRESSED OR HOPELESS: 0
SUM OF ALL RESPONSES TO PHQ QUESTIONS 1-9: 0
SUM OF ALL RESPONSES TO PHQ QUESTIONS 1-9: 0
1. LITTLE INTEREST OR PLEASURE IN DOING THINGS: 0

## 2022-05-13 ASSESSMENT — SOCIAL DETERMINANTS OF HEALTH (SDOH): HOW HARD IS IT FOR YOU TO PAY FOR THE VERY BASICS LIKE FOOD, HOUSING, MEDICAL CARE, AND HEATING?: NOT HARD AT ALL

## 2022-05-13 NOTE — PROGRESS NOTES
Name: Toñito Arechiga  : 1945         Chief Complaint:     Chief Complaint   Patient presents with    Leg Injury     shatter left femur    Diabetes       History of Present Illness:      Toñito Arechiga is a 78 y.o.  female who presents with Leg Injury (shatter left femur) and Diabetes      HPI    Patient and son present for follow-up from hospitalization and nursing home stay. Patient had fallen at home 2021 and suffered comminuted fracture of left distal femur. She was status post left TKA. Transferred to [facility] where she underwent ORIF . Per records, postop she developed delirium and underwent CT head. During her stay she did have a subacute stroke. She had also had urine testing in the [facility] ER  consistent with infection. However, patient and son are both convinced that \"the medicine you put me on was making me fall. \"  Neither of them know what medication this was. Patient says she thinks it was a little red pill that she was taking to above, but just a minute later she says that she has never been on a red pill. They say that when she was at the nursing home, she would get dizzy every time she stood from sitting or sometimes even with sitting up from lying down. Had some medication adjustments and is doing better now. Sugar ok. Ambulating with walker. Pt c/o yarely shoulder pain, believes she needs to have rotator cuff surgery on both of them as she had had in the past. Decreased use of R shoulder though ROM is decent. C/o scalp skin lesion which she thinks has been there for a while but has changed, bled a little bit. She admits to picking at it.     Medical History:     Patient Active Problem List   Diagnosis    Type 2 diabetes mellitus with stage 3 chronic kidney disease, without long-term current use of insulin (HCC)    Essential hypertension, benign    Esophageal reflux    Irritable bowel syndrome    Seasonal allergies    Long term current use of anticoagulant therapy    Gout    Rectocele    Tubular adenoma of colon    Hiatal hernia    Sigmoid diverticulosis    Chronic back pain    Hypomagnesemia    Family history of colon cancer    History of recurrent deep vein thrombosis (DVT)    Hyperparathyroidism (HCC)    Chronic renal disease, stage III ([facility] [[id number]]       Medications:       Prior to Admission medications    Medication Sig Start Date End Date Taking? Authorizing Provider   atorvastatin (LIPITOR) 40 MG tablet Take 1 tablet by mouth daily 5/16/22  Yes Tamela Garner DO   traZODone (DESYREL) 50 MG tablet Take 1 tablet by mouth nightly 5/16/22  Yes Tamela Garner DO   glipiZIDE (GLUCOTROL) 5 MG tablet Take 1 tablet by mouth in the morning and at bedtime 5/16/22  Yes Tamela Garner DO   propranolol (INDERAL) 20 MG tablet Take 1 tablet by mouth daily 5/16/22  Yes Tamela Garner DO   omeprazole (PRILOSEC) 20 MG delayed release capsule TAKE 1 CAPSULE EVERY DAY 5/16/22  Yes Tamela Garner DO   allopurinol (ZYLOPRIM) 100 MG tablet 2 tabs po daily 5/16/22  Yes Tamela Garner DO   oxybutynin (DITROPAN XL) 10 MG extended release tablet Take 1 tablet by mouth every evening 5/16/22  Yes Tamela Garner DO   aspirin EC 81 MG EC tablet Take 1 tablet by mouth daily 5/16/22  Yes Tamela Garner DO   gabapentin (NEURONTIN) 100 MG capsule Take 2 capsules by mouth nightly for 180 days.  5/16/22 11/12/22 Yes Tamela Garner DO   furosemide (LASIX) 20 MG tablet TAKE 1 TABLET EVERY DAY 5/16/22  Yes Tamela Garner DO   senna-docusate (PERICOLACE) 8.6-50 MG per tablet Take 1 tablet by mouth daily 5/16/22  Yes Tamela Garner DO   saline nasal gel (AYR) GEL by Nasal route   Yes Historical Provider, MD   Blood Glucose Monitoring Suppl (TRUE METRIX METER) w/Device KIT Test once daily 2/9/21  Yes Tamela Garner DO   fluticasone (FLONASE) 50 MCG/ACT nasal spray 2 sprays by Nasal route daily as needed for Rhinitis 11/17/20  Yes Campbell Flattery L Vandana Agosto, DO   acetaminophen (TYLENOL) 650 MG CR tablet Take 1,300 mg by mouth every 8 hours as needed for Pain   Yes Historical Provider, MD   Magnesium 400 MG TABS Take 800 mg by mouth nightly    Yes Historical Provider, MD   folic acid (FOLVITE) 330 MCG tablet Take 400 mcg by mouth daily 2/8/16  Yes Historical Provider, MD   blood glucose monitor strips Test blood glucose daily 10/29/21   Eric Abo, DO   Lancets MISC 1 each by Does not apply route 2 times daily 8/27/21   YOHAN Peralta - CNP        Allergies:       Codeine, Lisinopril, Percocet [oxycodone-acetaminophen], Adhesive tape, and Norco [hydrocodone-acetaminophen]    Physical Exam:     Vitals:  /64   Pulse 95   LMP  (LMP Unknown)   SpO2 98%   Physical Exam  Vitals and nursing note reviewed. Constitutional:       General: She is not in acute distress. Appearance: Normal appearance. She is well-developed. She is not ill-appearing. Cardiovascular:      Rate and Rhythm: Normal rate and regular rhythm. Heart sounds: Normal heart sounds. Pulmonary:      Effort: Pulmonary effort is normal.      Breath sounds: Normal breath sounds. Skin:     Comments: Long healed surgical scar distal L lateral thigh and knee  8x7mm pink lesion L crown, slightly raised, clear borders, central ulceration   Neurological:      Mental Status: She is alert and oriented to person, place, and time.    Psychiatric:         Mood and Affect: Mood normal.         Behavior: Behavior normal.         Data:     Lab Results   Component Value Date     12/17/2021    K 4.4 12/17/2021    CL 98 12/17/2021    CO2 23 12/17/2021    BUN 30 12/17/2021    CREATININE 1.69 12/17/2021    GLUCOSE 170 12/17/2021    PROT 6.1 12/17/2021    LABALBU 3.6 12/17/2021    BILITOT 0.28 12/17/2021    ALKPHOS 57 12/17/2021    AST 10 12/17/2021    ALT 10 12/17/2021     Lab Results   Component Value Date    WBC 9.9 12/17/2021    RBC 4.01 12/17/2021    HGB 12.9 12/17/2021    HCT 40.3 12/17/2021    .5 12/17/2021    MCH 32.2 12/17/2021    MCHC 32.0 12/17/2021    RDW 16.7 12/17/2021     12/17/2021    MPV NOT REPORTED 12/17/2021     Lab Results   Component Value Date    TSH 3.77 01/08/2021     Lab Results   Component Value Date    CHOL 167 06/22/2021    HDL 59 06/22/2021    LABA1C 6.9 05/13/2022 12/24/2021 MRI brain    1.  5 mm acute subacute infarct involving the medial most aspect of the right precentral gyrus cortical margin. 2.  Remote right cerebellar  infarct. 3.  Senescent changes detailed above. Assessment & Plan:        Diagnosis Orders   1. Closed displaced basicervical fracture of left femur with routine healing, subsequent encounter     2. Hospital discharge follow-up     3. Cerebral infarction, unspecified mechanism ([address]     4. H/O urinary tract infection     5. Type 2 diabetes mellitus with stage 3b chronic kidney disease, without long-term current use of insulin (HCC)  POCT glycosylated hemoglobin (Hb A1C)   6. Neoplasm of uncertain behavior of scalp     7. Stage 3b chronic kidney disease ([address]     8. H/O parathyroidectomy ([address]       1-2. Comminuted fracture of left distal femur, had fallen at home which patient states was due to dizzy spells from unknown medication. Appears only change in blood pressure meds at the hospital was going off of the low-dose of Imdur she had been taking and decreasing propranolol dose. I suspect meds may have been playing a role but deconditioning and UTI also contributed. Has healed well from ORIF. 3.  No residual deficits from stroke. Continue same Rx including Lipitor and aspirin. 4.  History of UTI, has had a number of them. No current symptoms. Monitor mental status. 5.  Diabetes controlled, continue same Rx  6. Follow-up 1 month for repeat evaluation of scalp lesion. Advised patient not to pick at the area.   If ulceration persists then I would perform shave biopsy versus send to dermatology. 7.  Chronic kidney disease stable, continue avoiding NSAIDs, working on good hydration, following with nephrology. 8.  History of parathyroidectomy, normal calcium levels      Requested Prescriptions     Signed Prescriptions Disp Refills    atorvastatin (LIPITOR) 40 MG tablet 90 tablet 1     Sig: Take 1 tablet by mouth daily    traZODone (DESYREL) 50 MG tablet 90 tablet 1     Sig: Take 1 tablet by mouth nightly    glipiZIDE (GLUCOTROL) 5 MG tablet 180 tablet 1     Sig: Take 1 tablet by mouth in the morning and at bedtime    propranolol (INDERAL) 20 MG tablet 90 tablet 1     Sig: Take 1 tablet by mouth daily    omeprazole (PRILOSEC) 20 MG delayed release capsule 90 capsule 1     Sig: TAKE 1 CAPSULE EVERY DAY    allopurinol (ZYLOPRIM) 100 MG tablet 180 tablet 1     Si tabs po daily    oxybutynin (DITROPAN XL) 10 MG extended release tablet 90 tablet 1     Sig: Take 1 tablet by mouth every evening    aspirin EC 81 MG EC tablet 90 tablet 3     Sig: Take 1 tablet by mouth daily    gabapentin (NEURONTIN) 100 MG capsule 180 capsule 1     Sig: Take 2 capsules by mouth nightly for 180 days.     furosemide (LASIX) 20 MG tablet 90 tablet 1     Sig: TAKE 1 TABLET EVERY DAY    senna-docusate (PERICOLACE) 8.6-50 MG per tablet 90 tablet 1     Sig: Take 1 tablet by mouth daily         There are no Patient Instructions on file for this visit.      signed by Makayla Mathis DO on 2022 at 8:50 PM  [address]  Dept: [phone]

## 2022-05-16 PROBLEM — N18.4 CHRONIC RENAL INSUFFICIENCY, STAGE 4 (SEVERE) (HCC): Status: RESOLVED | Noted: 2021-01-14 | Resolved: 2022-05-16

## 2022-05-16 RX ORDER — ATORVASTATIN CALCIUM 40 MG/1
40 TABLET, FILM COATED ORAL DAILY
Qty: 90 TABLET | Refills: 1 | Status: SHIPPED | OUTPATIENT
Start: 2022-05-16 | End: 2022-08-01 | Stop reason: SDUPTHER

## 2022-05-16 RX ORDER — OMEPRAZOLE 20 MG/1
CAPSULE, DELAYED RELEASE ORAL
Qty: 90 CAPSULE | Refills: 1 | Status: SHIPPED | OUTPATIENT
Start: 2022-05-16 | End: 2022-08-01 | Stop reason: SDUPTHER

## 2022-05-16 RX ORDER — ALLOPURINOL 100 MG/1
TABLET ORAL
Qty: 180 TABLET | Refills: 1 | Status: SHIPPED | OUTPATIENT
Start: 2022-05-16 | End: 2022-08-01 | Stop reason: SDUPTHER

## 2022-05-16 RX ORDER — GABAPENTIN 100 MG/1
200 CAPSULE ORAL NIGHTLY
Qty: 180 CAPSULE | Refills: 1 | Status: SHIPPED | OUTPATIENT
Start: 2022-05-16 | End: 2022-08-01 | Stop reason: SDUPTHER

## 2022-05-16 RX ORDER — PROPRANOLOL HYDROCHLORIDE 20 MG/1
20 TABLET ORAL DAILY
Qty: 90 TABLET | Refills: 1 | Status: SHIPPED | OUTPATIENT
Start: 2022-05-16 | End: 2022-08-01 | Stop reason: SDUPTHER

## 2022-05-16 RX ORDER — GLIPIZIDE 5 MG/1
5 TABLET ORAL 2 TIMES DAILY
Qty: 180 TABLET | Refills: 1 | Status: SHIPPED | OUTPATIENT
Start: 2022-05-16 | End: 2022-08-23

## 2022-05-16 RX ORDER — FUROSEMIDE 20 MG/1
TABLET ORAL
Qty: 90 TABLET | Refills: 1 | Status: SHIPPED | OUTPATIENT
Start: 2022-05-16 | End: 2022-08-23

## 2022-05-16 RX ORDER — TRAZODONE HYDROCHLORIDE 50 MG/1
50 TABLET ORAL NIGHTLY
Qty: 90 TABLET | Refills: 1 | Status: SHIPPED | OUTPATIENT
Start: 2022-05-16 | End: 2022-08-23

## 2022-05-16 RX ORDER — OXYBUTYNIN CHLORIDE 10 MG/1
10 TABLET, EXTENDED RELEASE ORAL EVERY EVENING
Qty: 90 TABLET | Refills: 1 | Status: SHIPPED | OUTPATIENT
Start: 2022-05-16 | End: 2022-08-23

## 2022-05-16 RX ORDER — AMOXICILLIN 250 MG
1 CAPSULE ORAL DAILY
Qty: 90 TABLET | Refills: 1 | Status: SHIPPED | OUTPATIENT
Start: 2022-05-16 | End: 2022-08-23

## 2022-05-16 RX ORDER — ASPIRIN 81 MG/1
81 TABLET ORAL DAILY
Qty: 90 TABLET | Refills: 3 | Status: SHIPPED | OUTPATIENT
Start: 2022-05-16 | End: 2022-10-28 | Stop reason: SDUPTHER

## 2022-06-09 ENCOUNTER — TELEPHONE (OUTPATIENT)
Dept: UROLOGY | Age: 77
End: 2022-06-09

## 2022-06-09 DIAGNOSIS — R30.0 DYSURIA: Primary | ICD-10-CM

## 2022-06-09 RX ORDER — CIPROFLOXACIN 500 MG/1
500 TABLET, FILM COATED ORAL 2 TIMES DAILY
Qty: 20 TABLET | Refills: 0 | Status: SHIPPED | OUTPATIENT
Start: 2022-06-09 | End: 2022-06-19

## 2022-06-09 SDOH — HEALTH STABILITY: PHYSICAL HEALTH: ON AVERAGE, HOW MANY DAYS PER WEEK DO YOU ENGAGE IN MODERATE TO STRENUOUS EXERCISE (LIKE A BRISK WALK)?: 0 DAYS

## 2022-06-09 ASSESSMENT — PATIENT HEALTH QUESTIONNAIRE - PHQ9
SUM OF ALL RESPONSES TO PHQ QUESTIONS 1-9: 0
2. FEELING DOWN, DEPRESSED OR HOPELESS: 0
SUM OF ALL RESPONSES TO PHQ9 QUESTIONS 1 & 2: 0
1. LITTLE INTEREST OR PLEASURE IN DOING THINGS: 0
SUM OF ALL RESPONSES TO PHQ QUESTIONS 1-9: 0

## 2022-06-09 ASSESSMENT — LIFESTYLE VARIABLES
HOW OFTEN DO YOU HAVE A DRINK CONTAINING ALCOHOL: 1
HOW OFTEN DO YOU HAVE A DRINK CONTAINING ALCOHOL: NEVER

## 2022-06-09 NOTE — TELEPHONE ENCOUNTER
Danica Winkler from Dr. Oxana Stephens office called. Dr. Obie St is out and patient thinks she has a UTI. They said it gets bad quickly. She has frequency and burning with urination.

## 2022-06-09 NOTE — TELEPHONE ENCOUNTER
Drop off a urine culture    We sent in cipro to start. Take this antibiotic until gone. Take this with food and eat yogurt once per day to prevent GI upset. If you develop nausea, vomiting, or fevers call the office or go to the ER. If your urinary symptoms do not improve once completing the antibiotics call our office.

## 2022-06-09 NOTE — TELEPHONE ENCOUNTER
Informed patient of the response. She said she might have to wait until morning to do the urine. I told her not to start the antibiotic until she has given the urine sample.

## 2022-06-13 ENCOUNTER — OFFICE VISIT (OUTPATIENT)
Dept: FAMILY MEDICINE CLINIC | Age: 77
End: 2022-06-13
Payer: MEDICARE

## 2022-06-13 ENCOUNTER — HOSPITAL ENCOUNTER (OUTPATIENT)
Age: 77
Discharge: HOME | End: 2022-06-13
Payer: MEDICARE

## 2022-06-13 VITALS — SYSTOLIC BLOOD PRESSURE: 76 MMHG | OXYGEN SATURATION: 97 % | DIASTOLIC BLOOD PRESSURE: 50 MMHG | HEART RATE: 98 BPM

## 2022-06-13 DIAGNOSIS — Z00.00 MEDICARE ANNUAL WELLNESS VISIT, SUBSEQUENT: Primary | ICD-10-CM

## 2022-06-13 DIAGNOSIS — R30.0 DYSURIA: ICD-10-CM

## 2022-06-13 DIAGNOSIS — N18.30 STAGE 3 CHRONIC KIDNEY DISEASE, UNSPECIFIED WHETHER STAGE 3A OR 3B CKD (HCC): ICD-10-CM

## 2022-06-13 DIAGNOSIS — I95.9 HYPOTENSION, UNSPECIFIED HYPOTENSION TYPE: ICD-10-CM

## 2022-06-13 DIAGNOSIS — M25.551 RIGHT HIP PAIN: ICD-10-CM

## 2022-06-13 LAB
-: ABNORMAL
ABSOLUTE EOS #: 0.3 K/UL (ref 0–0.4)
ABSOLUTE LYMPH #: 2.7 K/UL (ref 1–4.8)
ABSOLUTE MONO #: 0.5 K/UL (ref 0–1)
ANION GAP SERPL CALCULATED.3IONS-SCNC: 12 MMOL/L (ref 9–17)
BACTERIA: ABNORMAL
BASOPHILS # BLD: 1 % (ref 0–2)
BASOPHILS ABSOLUTE: 0.1 K/UL (ref 0–0.2)
BILIRUBIN URINE: NEGATIVE
BUN BLDV-MCNC: 24 MG/DL (ref 8–23)
BUN/CREAT BLD: 17 (ref 9–20)
CALCIUM SERPL-MCNC: 10.1 MG/DL (ref 8.6–10.4)
CHLORIDE BLD-SCNC: 95 MMOL/L (ref 98–107)
CO2: 27 MMOL/L (ref 20–31)
COLOR: YELLOW
COMMENT UA: ABNORMAL
CREAT SERPL-MCNC: 1.39 MG/DL (ref 0.5–0.9)
DIFFERENTIAL TYPE: YES
EOSINOPHILS RELATIVE PERCENT: 3 % (ref 0–5)
EPITHELIAL CELLS UA: ABNORMAL /HPF
GFR AFRICAN AMERICAN: 45 ML/MIN
GFR NON-AFRICAN AMERICAN: 37 ML/MIN
GFR SERPL CREATININE-BSD FRML MDRD: ABNORMAL ML/MIN/{1.73_M2}
GLUCOSE BLD-MCNC: 198 MG/DL (ref 70–99)
GLUCOSE URINE: NEGATIVE
HCT VFR BLD CALC: 36.5 % (ref 36–46)
HEMOGLOBIN: 11.6 G/DL (ref 12–16)
KETONES, URINE: NEGATIVE
LACTIC ACID: 1.8 MMOL/L (ref 0.5–2.2)
LEUKOCYTE ESTERASE, URINE: ABNORMAL
LYMPHOCYTES # BLD: 25 % (ref 15–40)
MCH RBC QN AUTO: 30.8 PG (ref 26–34)
MCHC RBC AUTO-ENTMCNC: 31.7 G/DL (ref 31–37)
MCV RBC AUTO: 97.1 FL (ref 80–100)
MONOCYTES # BLD: 5 % (ref 4–8)
NITRITE, URINE: NEGATIVE
PDW BLD-RTO: 16.4 % (ref 12.1–15.2)
PH UA: 6 (ref 5–8)
PLATELET # BLD: 348 K/UL (ref 140–450)
POTASSIUM SERPL-SCNC: 3.7 MMOL/L (ref 3.7–5.3)
PROTEIN UA: ABNORMAL
RBC # BLD: 3.76 M/UL (ref 4–5.2)
RBC UA: ABNORMAL /HPF (ref 0–2)
SEG NEUTROPHILS: 66 % (ref 47–75)
SEGMENTED NEUTROPHILS ABSOLUTE COUNT: 7.4 K/UL (ref 2.5–7)
SODIUM BLD-SCNC: 134 MMOL/L (ref 135–144)
SPECIFIC GRAVITY UA: 1.01 (ref 1–1.03)
TURBIDITY: ABNORMAL
URINE HGB: ABNORMAL
UROBILINOGEN, URINE: NORMAL
WBC # BLD: 11 K/UL (ref 3.5–11)
WBC UA: ABNORMAL /HPF

## 2022-06-13 PROCEDURE — G0439 PPPS, SUBSEQ VISIT: HCPCS | Performed by: FAMILY MEDICINE

## 2022-06-13 PROCEDURE — 87088 URINE BACTERIA CULTURE: CPT

## 2022-06-13 PROCEDURE — 1123F ACP DISCUSS/DSCN MKR DOCD: CPT | Performed by: FAMILY MEDICINE

## 2022-06-13 PROCEDURE — G8399 PT W/DXA RESULTS DOCUMENT: HCPCS | Performed by: FAMILY MEDICINE

## 2022-06-13 PROCEDURE — 80048 BASIC METABOLIC PNL TOTAL CA: CPT

## 2022-06-13 PROCEDURE — G8427 DOCREV CUR MEDS BY ELIG CLIN: HCPCS | Performed by: FAMILY MEDICINE

## 2022-06-13 PROCEDURE — G8417 CALC BMI ABV UP PARAM F/U: HCPCS | Performed by: FAMILY MEDICINE

## 2022-06-13 PROCEDURE — 99214 OFFICE O/P EST MOD 30 MIN: CPT | Performed by: FAMILY MEDICINE

## 2022-06-13 PROCEDURE — 83605 ASSAY OF LACTIC ACID: CPT

## 2022-06-13 PROCEDURE — 87186 SC STD MICRODIL/AGAR DIL: CPT

## 2022-06-13 PROCEDURE — 36415 COLL VENOUS BLD VENIPUNCTURE: CPT

## 2022-06-13 PROCEDURE — 3017F COLORECTAL CA SCREEN DOC REV: CPT | Performed by: FAMILY MEDICINE

## 2022-06-13 PROCEDURE — 87086 URINE CULTURE/COLONY COUNT: CPT

## 2022-06-13 PROCEDURE — 81001 URINALYSIS AUTO W/SCOPE: CPT

## 2022-06-13 PROCEDURE — 1090F PRES/ABSN URINE INCON ASSESS: CPT | Performed by: FAMILY MEDICINE

## 2022-06-13 PROCEDURE — 85025 COMPLETE CBC W/AUTO DIFF WBC: CPT

## 2022-06-13 PROCEDURE — 1036F TOBACCO NON-USER: CPT | Performed by: FAMILY MEDICINE

## 2022-06-13 RX ORDER — BLOOD-GLUCOSE METER
EACH MISCELLANEOUS
COMMUNITY

## 2022-06-13 NOTE — PROGRESS NOTES
Name: Nickolas Estrada  : 1945         Chief Complaint:     Chief Complaint   Patient presents with    Medicare AWV    Diabetes    Dizziness       History of Present Illness:      Nickolas Estrada is a 78 y.o.  female who presents with Medicare AWV, Diabetes, and Dizziness      HPI    R groin and thigh pain the past 2 days. Initially patient tells me she fell but then says she had fallen and injured this area a long time ago and we were unable to figure out what it was. It is feeling better now than it did 2 days ago. Yesterday started feeling dizzy. Urinary symptoms since Wednesday. Didn't eat much yesterday. Sugars ok. Medical History:     Patient Active Problem List   Diagnosis    Type 2 diabetes mellitus with stage 3 chronic kidney disease, without long-term current use of insulin ([facility]    Essential hypertension, benign    Esophageal reflux    Irritable bowel syndrome    Seasonal allergies    Long term current use of anticoagulant therapy    Gout    Rectocele    Tubular adenoma of colon    Hiatal hernia    Sigmoid diverticulosis    Chronic back pain    Hypomagnesemia    Family history of colon cancer    History of recurrent deep vein thrombosis (DVT)    Hyperparathyroidism (HCC)    Chronic renal disease, stage III ([address] [[id number]]       Medications:       Prior to Admission medications    Medication Sig Start Date End Date Taking? Authorizing Provider   Blood Glucose Monitoring Suppl (TRUE METRIX AIR GLUCOSE METER) SARAH True Metrix Glucose Meter   use TWICE DAILY AS DIRECTED.     Historical Provider, MD   ciprofloxacin (CIPRO) 500 mg tablet Take 1 tablet by mouth 2 times daily for 10 days 22  YOHAN Steve - CNP   atorvastatin (LIPITOR) 40 MG tablet Take 1 tablet by mouth daily 22   Marsha Manual, DO   traZODone (DESYREL) 50 MG tablet Take 1 tablet by mouth nightly 22   Marsha Manual, DO   glipiZIDE (GLUCOTROL) 5 MG tablet Take 1 tablet by mouth in the morning and at bedtime 5/16/22   Abdiia Abo, DO   propranolol (INDERAL) 20 MG tablet Take 1 tablet by mouth daily 5/16/22   [facility] Abo, DO   omeprazole (PRILOSEC) 20 MG delayed release capsule TAKE 1 CAPSULE EVERY DAY 5/16/22   [facility] Abo, DO   allopurinol (ZYLOPRIM) 100 MG tablet 2 tabs po daily 5/16/22   [facility] Abo, DO   oxybutynin (DITROPAN XL) 10 MG extended release tablet Take 1 tablet by mouth every evening 5/16/22   [facility] Abo, DO   aspirin EC 81 MG EC tablet Take 1 tablet by mouth daily 5/16/22   [facility] Abo, DO   gabapentin (NEURONTIN) 100 MG capsule Take 2 capsules by mouth nightly for 180 days. 5/16/22 11/12/22  Abdiia Abo, DO   furosemide (LASIX) 20 MG tablet TAKE 1 TABLET EVERY DAY 5/16/22   [facility] Abo, DO   senna-docusate (PERICOLACE) 8.6-50 MG per tablet Take 1 tablet by mouth daily 5/16/22   [facility], DO   blood glucose monitor strips Test blood glucose daily 10/29/21   [facility], DO   Lancets MISC 1 each by Does not apply route 2 times daily 8/27/21   YOHAN Peralta - CNP   Blood Glucose Monitoring Suppl (TRUE METRIX METER) w/Device KIT Test once daily 2/9/21   [facility], DO   acetaminophen (TYLENOL) 650 MG CR tablet Take 1,300 mg by mouth every 8 hours as needed for Pain    Historical Provider, MD   Magnesium 400 MG TABS Take 800 mg by mouth nightly     Historical Provider, MD   folic acid (FOLVITE) 638 MCG tablet Take 400 mcg by mouth daily 2/8/16   Historical Provider, MD        Allergies:       Codeine, Lisinopril, Percocet [oxycodone-acetaminophen], Adhesive tape, and Norco [hydrocodone-acetaminophen]    Physical Exam:     Vitals:  BP (!) 76/50   Pulse 98   LMP  (LMP Unknown)   SpO2 97%   Physical Exam  Vitals and nursing note reviewed. Constitutional:       General: She is not in acute distress. Appearance: She is well-developed. HENT:      Head: Normocephalic and atraumatic.       Right Ear: Tympanic membrane and ear canal normal.      Left Ear: Tympanic membrane and ear canal normal.      Nose: Nose normal.   Eyes:      Conjunctiva/sclera: Conjunctivae normal.   Cardiovascular:      Rate and Rhythm: Normal rate and regular rhythm. Heart sounds: Normal heart sounds. Pulmonary:      Effort: Pulmonary effort is normal.      Breath sounds: Normal breath sounds. Musculoskeletal:      Cervical back: Neck supple. Comments: In wheelchair. Pain with passive rotation of R hip. Lymphadenopathy:      Cervical: No cervical adenopathy. Skin:     General: Skin is warm and dry. Neurological:      Mental Status: She is alert. Comments: Very difficult historian - tangential and unclear on timing of history given   Psychiatric:         Mood and Affect: Mood normal.         Behavior: Behavior normal.         Data:     Lab Results   Component Value Date     06/13/2022    K 3.7 06/13/2022    CL 95 06/13/2022    CO2 27 06/13/2022    BUN 24 06/13/2022    CREATININE 1.39 06/13/2022    GLUCOSE 198 06/13/2022    PROT 6.1 12/17/2021    LABALBU 3.6 12/17/2021    BILITOT 0.28 12/17/2021    ALKPHOS 57 12/17/2021    AST 10 12/17/2021    ALT 10 12/17/2021     Lab Results   Component Value Date    WBC 11.0 06/13/2022    RBC 3.76 06/13/2022    HGB 11.6 06/13/2022    HCT 36.5 06/13/2022    MCV 97.1 06/13/2022    MCH 30.8 06/13/2022    MCHC 31.7 06/13/2022    RDW 16.4 06/13/2022     06/13/2022    MPV NOT REPORTED 12/17/2021     Lab Results   Component Value Date    TSH 3.77 01/08/2021     Lab Results   Component Value Date    CHOL 167 06/22/2021    HDL 59 06/22/2021    LABA1C 6.9 05/13/2022         Assessment & Plan:        Diagnosis Orders   1. Medicare annual wellness visit, subsequent     2. Hypotension, unspecified hypotension type  CBC with Auto Differential    Basic Metabolic Panel    Urinalysis with Microscopic    Lactic Acid   3.  Dysuria  CBC with Auto Differential    Basic Metabolic Panel .  · Order or download the FREE \"Exercise & Physical Activity: Your Everyday Guide\" from [employer]. Call 9-594.685.6283 or search [employer] online. · You need 4125-6949 mg of calcium and 9489-7312 IU of vitamin D per day. It is possible to meet your calcium requirement with diet alone, but a vitamin D supplement is usually necessary to meet this goal.  · When exposed to the sun, use a sunscreen that protects against both UVA and UVB radiation with an SPF of 30 or greater. Reapply every 2 to 3 hours or after sweating, drying off with a towel, or swimming. · Always wear a seat belt when traveling in a car.  Always wear a helmet when riding a bicycle or motorcycle.        signed by Anshu Blue DO on 6/14/2022 at 11:27 PM  [address] [address]  Dept: [phone]

## 2022-06-15 ENCOUNTER — TELEPHONE (OUTPATIENT)
Dept: UROLOGY | Age: 77
End: 2022-06-15

## 2022-06-15 LAB
CULTURE: ABNORMAL
SPECIMEN DESCRIPTION: ABNORMAL

## 2022-06-15 NOTE — PATIENT INSTRUCTIONS
Personalized Preventive Plan for Ivanna Bone - 6/13/2022  Medicare offers a range of preventive health benefits. Some of the tests and screenings are paid in full while other may be subject to a deductible, co-insurance, and/or copay. Some of these benefits include a comprehensive review of your medical history including lifestyle, illnesses that may run in your family, and various assessments and screenings as appropriate. After reviewing your medical record and screening and assessments performed today your provider may have ordered immunizations, labs, imaging, and/or referrals for you. A list of these orders (if applicable) as well as your Preventive Care list are included within your After Visit Summary for your review. Other Preventive Recommendations:    · A preventive eye exam performed by an eye specialist is recommended every 1-2 years to screen for glaucoma; cataracts, macular degeneration, and other eye disorders. · A preventive dental visit is recommended every 6 months. · Try to get at least 150 minutes of exercise per week or 10,000 steps per day on a pedometer . · Order or download the FREE \"Exercise & Physical Activity: Your Everyday Guide\" from [employer]. Call 8-412.966.6003 or search [employer] online. · You need 8540-0833 mg of calcium and 2747-4623 IU of vitamin D per day. It is possible to meet your calcium requirement with diet alone, but a vitamin D supplement is usually necessary to meet this goal.  · When exposed to the sun, use a sunscreen that protects against both UVA and UVB radiation with an SPF of 30 or greater. Reapply every 2 to 3 hours or after sweating, drying off with a towel, or swimming. · Always wear a seat belt when traveling in a car. Always wear a helmet when riding a bicycle or motorcycle.

## 2022-06-15 NOTE — PROGRESS NOTES
Interventions:  · Inadequate physical activity:  patient is not ready to increase his/her physical activity level at this time      ADLs:  In the past 7 days, did you need help from others to perform any of the following everyday activities: Eating, dressing, grooming, bathing, toileting, or walking/balance?: No  In the past 7 days, did you need help from others to take care of any of the following: Laundry, housekeeping, banking/finances, shopping, telephone use, food preparation, transportation, or taking medications?: (!) Yes  Select all that apply: (!) Laundry,Housekeeping,Shopping,Food Preparation,Transportation,Taking Medications    ADL Interventions:  · Patient declines any further evaluation/treatment for this issue          Objective   Vitals:    06/13/22 1306 06/13/22 1314   BP: (!) 78/50 (!) 76/50   Pulse: 98    SpO2: 97%       There is no height or weight on file to calculate BMI.         General Appearance: alert and oriented to person, place and time, well developed and well- nourished, in no acute distress  Skin: warm and dry, no rash or erythema  Head: normocephalic and atraumatic  Eyes: pupils equal, round, and reactive to light, extraocular eye movements intact, conjunctivae normal  ENT: tympanic membrane, external ear and ear canal normal bilaterally, nose without deformity, nasal mucosa and turbinates normal without polyps  Neck: supple and non-tender without mass, no thyromegaly or thyroid nodules, no cervical lymphadenopathy  Pulmonary/Chest: clear to auscultation bilaterally- no wheezes, rales or rhonchi, normal air movement, no respiratory distress  Cardiovascular: normal rate, regular rhythm, normal S1 and S2, no murmurs, rubs, clicks, or gallops, distal pulses intact, no carotid bruits  Abdomen: soft, non-tender, non-distended, normal bowel sounds, no masses or organomegaly  Extremities: no cyanosis, clubbing or edema  Neurologic: reflexes normal and symmetric, no cranial nerve deficit, gait, coordination and speech normal       Allergies   Allergen Reactions    Codeine Itching    Lisinopril Other (See Comments)     cough    Percocet [Oxycodone-Acetaminophen] Itching and Nausea Only    Adhesive Tape Itching, Rash and Other (See Comments)     Tape \"takes the skin off\"    Norco [Hydrocodone-Acetaminophen] Nausea And Vomiting     Prior to Visit Medications    Medication Sig Taking? Authorizing Provider   Blood Glucose Monitoring Suppl (TRUE METRIX AIR GLUCOSE METER) SARAH True Metrix Glucose Meter   use TWICE DAILY AS DIRECTED. Historical Provider, MD   ciprofloxacin (CIPRO) 500 mg tablet Take 1 tablet by mouth 2 times daily for 10 days  YOHAN Gomes CNP   atorvastatin (LIPITOR) 40 MG tablet Take 1 tablet by mouth daily  Rosa Marie,    traZODone (DESYREL) 50 MG tablet Take 1 tablet by mouth nightly  Rosa Marie DO   glipiZIDE (GLUCOTROL) 5 MG tablet Take 1 tablet by mouth in the morning and at bedtime  Rosa Marie,    propranolol (INDERAL) 20 MG tablet Take 1 tablet by mouth daily  Rosa Marie, DO   omeprazole (PRILOSEC) 20 MG delayed release capsule TAKE 1 CAPSULE EVERY DAY  Rosa Marie,    allopurinol (ZYLOPRIM) 100 MG tablet 2 tabs po daily  Rosa Marie, DO   oxybutynin (DITROPAN XL) 10 MG extended release tablet Take 1 tablet by mouth every evening  Rosa Maire DO   aspirin EC 81 MG EC tablet Take 1 tablet by mouth daily  Rosa Marie DO   gabapentin (NEURONTIN) 100 MG capsule Take 2 capsules by mouth nightly for 180 days.   Rosa Marie DO   furosemide (LASIX) 20 MG tablet TAKE 1 TABLET EVERY DAY  Rosa Marie DO   senna-docusate (PERICOLACE) 8.6-50 MG per tablet Take 1 tablet by mouth daily  Rosa Marie DO   blood glucose monitor strips Test blood glucose daily  Rosa Marie DO   Lancets MISC 1 each by Does not apply route 2 times daily  YOHAN Soto - CNP   Blood Glucose Monitoring Suppl (TRUE METRIX METER) w/Device KIT Test once daily  Marianela Avalos DO   acetaminophen (TYLENOL) 650 MG CR tablet Take 1,300 mg by mouth every 8 hours as needed for Pain  Historical Provider, MD   Magnesium 400 MG TABS Take 800 mg by mouth nightly   Historical Provider, MD   folic acid (FOLVITE) 028 MCG tablet Take 400 mcg by mouth daily  Historical Provider, MD Villagomez (Including outside providers/suppliers regularly involved in providing care):   Patient Care Team:  Marianela Avalos DO as PCP - [address] [place] [address] as PCP - [facility] Empaneled Provider  Lewis Steward MD as Consulting Physician (Obstetrics & Gynecology)  Geraldo Freedman MD (General Surgery)     Reviewed and updated this visit:  Tobacco  Allergies  Meds  Problems  Med Hx  Surg Hx  Soc Hx  Fam Hx

## 2022-06-18 ENCOUNTER — APPOINTMENT (OUTPATIENT)
Dept: GENERAL RADIOLOGY | Age: 77
End: 2022-06-18
Payer: MEDICARE

## 2022-06-18 ENCOUNTER — HOSPITAL ENCOUNTER (EMERGENCY)
Age: 77
Discharge: HOME | End: 2022-06-18
Attending: EMERGENCY MEDICINE
Payer: MEDICARE

## 2022-06-18 ENCOUNTER — APPOINTMENT (OUTPATIENT)
Dept: CT IMAGING | Age: 77
End: 2022-06-18
Payer: MEDICARE

## 2022-06-18 VITALS
WEIGHT: 230 LBS | BODY MASS INDEX: 34.86 KG/M2 | RESPIRATION RATE: 18 BRPM | HEART RATE: 82 BPM | SYSTOLIC BLOOD PRESSURE: 130 MMHG | TEMPERATURE: 98 F | HEIGHT: 68 IN | OXYGEN SATURATION: 87 % | DIASTOLIC BLOOD PRESSURE: 69 MMHG

## 2022-06-18 DIAGNOSIS — M79.604 RIGHT LEG PAIN: Primary | ICD-10-CM

## 2022-06-18 DIAGNOSIS — M54.31 RIGHT SIDED SCIATICA: ICD-10-CM

## 2022-06-18 LAB
ABSOLUTE EOS #: 0.3 K/UL (ref 0–0.4)
ABSOLUTE LYMPH #: 3.2 K/UL (ref 1–4.8)
ABSOLUTE MONO #: 0.6 K/UL (ref 0–1)
ALBUMIN SERPL-MCNC: 3.3 G/DL (ref 3.5–5.2)
ALP BLD-CCNC: 99 U/L (ref 35–104)
ALT SERPL-CCNC: 8 U/L (ref 5–33)
ANION GAP SERPL CALCULATED.3IONS-SCNC: 12 MMOL/L (ref 9–17)
AST SERPL-CCNC: 12 U/L
BASOPHILS # BLD: 1 % (ref 0–2)
BASOPHILS ABSOLUTE: 0.1 K/UL (ref 0–0.2)
BILIRUB SERPL-MCNC: 0.44 MG/DL (ref 0.3–1.2)
BUN BLDV-MCNC: 24 MG/DL (ref 8–23)
BUN/CREAT BLD: 20 (ref 9–20)
CALCIUM SERPL-MCNC: 9.7 MG/DL (ref 8.6–10.4)
CHLORIDE BLD-SCNC: 98 MMOL/L (ref 98–107)
CO2: 28 MMOL/L (ref 20–31)
CREAT SERPL-MCNC: 1.21 MG/DL (ref 0.5–0.9)
DIFFERENTIAL TYPE: YES
EOSINOPHILS RELATIVE PERCENT: 3 % (ref 0–5)
GFR AFRICAN AMERICAN: 53 ML/MIN
GFR NON-AFRICAN AMERICAN: 44 ML/MIN
GFR SERPL CREATININE-BSD FRML MDRD: ABNORMAL ML/MIN/{1.73_M2}
GLUCOSE BLD-MCNC: 98 MG/DL (ref 70–99)
HCT VFR BLD CALC: 33.5 % (ref 36–46)
HEMOGLOBIN: 10.9 G/DL (ref 12–16)
LYMPHOCYTES # BLD: 34 % (ref 15–40)
MCH RBC QN AUTO: 31.2 PG (ref 26–34)
MCHC RBC AUTO-ENTMCNC: 32.4 G/DL (ref 31–37)
MCV RBC AUTO: 96.3 FL (ref 80–100)
MONOCYTES # BLD: 6 % (ref 4–8)
PDW BLD-RTO: 16 % (ref 12.1–15.2)
PLATELET # BLD: 344 K/UL (ref 140–450)
POTASSIUM SERPL-SCNC: 3.5 MMOL/L (ref 3.7–5.3)
RBC # BLD: 3.48 M/UL (ref 4–5.2)
SEG NEUTROPHILS: 56 % (ref 47–75)
SEGMENTED NEUTROPHILS ABSOLUTE COUNT: 5.2 K/UL (ref 2.5–7)
SODIUM BLD-SCNC: 138 MMOL/L (ref 135–144)
TOTAL PROTEIN: 5.9 G/DL (ref 6.4–8.3)
WBC # BLD: 9.4 K/UL (ref 3.5–11)

## 2022-06-18 PROCEDURE — 96375 TX/PRO/DX INJ NEW DRUG ADDON: CPT

## 2022-06-18 PROCEDURE — 80053 COMPREHEN METABOLIC PANEL: CPT

## 2022-06-18 PROCEDURE — 73502 X-RAY EXAM HIP UNI 2-3 VIEWS: CPT

## 2022-06-18 PROCEDURE — 6360000002 HC RX W HCPCS: Performed by: EMERGENCY MEDICINE

## 2022-06-18 PROCEDURE — 99284 EMERGENCY DEPT VISIT MOD MDM: CPT

## 2022-06-18 PROCEDURE — 96374 THER/PROPH/DIAG INJ IV PUSH: CPT

## 2022-06-18 PROCEDURE — 72131 CT LUMBAR SPINE W/O DYE: CPT

## 2022-06-18 PROCEDURE — 73564 X-RAY EXAM KNEE 4 OR MORE: CPT

## 2022-06-18 PROCEDURE — 85025 COMPLETE CBC W/AUTO DIFF WBC: CPT

## 2022-06-18 PROCEDURE — 73552 X-RAY EXAM OF FEMUR 2/>: CPT

## 2022-06-18 RX ORDER — ONDANSETRON 2 MG/ML
4 INJECTION INTRAMUSCULAR; INTRAVENOUS ONCE
Status: COMPLETED | OUTPATIENT
Start: 2022-06-18 | End: 2022-06-18

## 2022-06-18 RX ORDER — KETOROLAC TROMETHAMINE 15 MG/ML
15 INJECTION, SOLUTION INTRAMUSCULAR; INTRAVENOUS ONCE
Status: COMPLETED | OUTPATIENT
Start: 2022-06-18 | End: 2022-06-18

## 2022-06-18 RX ORDER — MORPHINE SULFATE 8 MG/ML
5 INJECTION INTRAVENOUS ONCE
Status: COMPLETED | OUTPATIENT
Start: 2022-06-18 | End: 2022-06-18

## 2022-06-18 RX ORDER — BACLOFEN 10 MG/1
TABLET ORAL
Qty: 30 TABLET | Refills: 0 | Status: SHIPPED | OUTPATIENT
Start: 2022-06-18 | End: 2022-08-23 | Stop reason: ALTCHOICE

## 2022-06-18 RX ORDER — PREDNISONE 50 MG/1
TABLET ORAL
Qty: 5 TABLET | Refills: 0 | Status: SHIPPED | OUTPATIENT
Start: 2022-06-18 | End: 2022-08-23 | Stop reason: ALTCHOICE

## 2022-06-18 RX ORDER — ORPHENADRINE CITRATE 30 MG/ML
60 INJECTION INTRAMUSCULAR; INTRAVENOUS ONCE
Status: COMPLETED | OUTPATIENT
Start: 2022-06-18 | End: 2022-06-18

## 2022-06-18 RX ADMIN — ONDANSETRON 4 MG: 2 INJECTION INTRAMUSCULAR; INTRAVENOUS at 07:15

## 2022-06-18 RX ADMIN — KETOROLAC TROMETHAMINE 15 MG: 15 INJECTION, SOLUTION INTRAMUSCULAR; INTRAVENOUS at 07:21

## 2022-06-18 RX ADMIN — MORPHINE SULFATE 5 MG: 8 INJECTION INTRAVENOUS at 07:17

## 2022-06-18 RX ADMIN — ORPHENADRINE CITRATE 60 MG: 60 INJECTION INTRAMUSCULAR; INTRAVENOUS at 07:22

## 2022-06-18 ASSESSMENT — PAIN DESCRIPTION - ORIENTATION: ORIENTATION: RIGHT

## 2022-06-18 ASSESSMENT — PAIN SCALES - GENERAL
PAINLEVEL_OUTOF10: 8
PAINLEVEL_OUTOF10: 0

## 2022-06-18 ASSESSMENT — PAIN - FUNCTIONAL ASSESSMENT: PAIN_FUNCTIONAL_ASSESSMENT: 0-10

## 2022-06-18 ASSESSMENT — PAIN DESCRIPTION - DESCRIPTORS: DESCRIPTORS: POUNDING

## 2022-06-18 ASSESSMENT — PAIN DESCRIPTION - PAIN TYPE: TYPE: ACUTE PAIN

## 2022-06-18 ASSESSMENT — PAIN DESCRIPTION - LOCATION: LOCATION: LEG

## 2022-06-18 NOTE — ED PROVIDER NOTES
HPI:  6/18/22,   Time: 6:57 AM EDT         Claudy Sheppard is a 78 y.o. female presenting to the ED for gradual onset right leg pain, beginning for the last few days ago. The complaint has been intermittent, severe in severity, and worsened by changing position. Pain seems to go out the whole leg the leg is not really more swollen than usual she has no calf pain, pain is mainly anterior and worse with extension of the leg. Complains of mild right sided back pain    ROS:   Pertinent positives and negatives are stated within HPI, all other systems reviewed and are negative.  --------------------------------------------- PAST HISTORY ---------------------------------------------  Past Medical History:  has a past medical history of Allergic rhinitis, Chronic kidney disease, stage III (moderate) (HCC), Deep vein thrombosis (DVT) (HCC), Elbow fracture, left, Gastric ulcer, Gout, Hx of blood clots, Hypertension, Nausea & vomiting, Presence of IVC filter, and Type II or unspecified type diabetes mellitus without mention of complication, not stated as uncontrolled. Past Surgical History:  has a past surgical history that includes Rotator cuff repair; Cystoscopy; Hysterectomy; partial nephrectomy (Left); Colonoscopy (2014); Vena Cava Filter Placement; Total knee arthroplasty (Right); Total knee arthroplasty (Left); Cataract removal (Right); Cataract removal (Left); back surgery; back surgery; back surgery; back surgery; back surgery; back surgery; Breast biopsy (Left); Breast biopsy (Right); Carpal tunnel release (Right); Retinal detachment surgery (Left); Eye surgery (Right); Kidney surgery; Cardiac catheterization (Left, 03/07/2018); Upper gastrointestinal endoscopy (2014); and Colonoscopy (N/A, 5/13/2019). Social History:  reports that she has never smoked. She has never used smokeless tobacco. She reports that she does not drink alcohol and does not use drugs.     Family History: family history includes Cancer in her father and paternal aunt; Diabetes in her father. The patients home medications have been reviewed. Allergies: Codeine, Lisinopril, Percocet [oxycodone-acetaminophen], Adhesive tape, and Norco [hydrocodone-acetaminophen]    -------------------------------------------------- RESULTS -------------------------------------------------  All laboratory and radiology results have been personally reviewed by myself   LABS:  No results found for this visit on 06/18/22. RADIOLOGY:  Interpreted by Radiologist.  No orders to display       ------------------------- NURSING NOTES AND VITALS REVIEWED ---------------------------   The nursing notes within the ED encounter and vital signs as below have been reviewed. BP (!) 152/81   Pulse 82   Temp 98 °F (36.7 °C) (Oral)   Resp 18   Ht 5' 8\" (1.727 m)   Wt 230 lb (104.3 kg)   LMP  (LMP Unknown)   SpO2 98%   BMI 34.97 kg/m²   Oxygen Saturation Interpretation: Normal      ---------------------------------------------------PHYSICAL EXAM--------------------------------------      Constitutional/General: Alert and oriented x3, well appearing, non toxic in NAD  Head: NC/AT  Eyes: PERRL, EOMI  Mouth: Oropharynx clear, handling secretions, no trismus  Neck: Supple, full ROM, no meningeal signs  Pulmonary: Lungs clear to auscultation bilaterally, no wheezes, rales, or rhonchi. Not in respiratory distress  Cardiovascular:  Regular rate and rhythm, no murmurs, gallops, or rubs. 2+ distal pulses  Abdomen: Soft, non tender, non distended,   Extremities: Moves all extremities x 4.  Warm and well perfused  Skin: warm and dry without rash  Neurologic: GCS 15,  Psych: Normal Affect  Low back exam mild right-sided lumbar paraspinal tenderness to palpation tenderness over the sciatic notch and pain worse with straight leg raises at 30 degrees above the bed and reproduces the pain in the leg, no calf tenderness negative Homans' sign    ------------------------------ ED COURSE/MEDICAL DECISION MAKING----------------------  Medications   morphine sulfate injection 5 mg (5 mg IntraVENous Given 6/18/22 0717)   ondansetron (ZOFRAN) injection 4 mg (4 mg IntraVENous Given 6/18/22 0715)   ketorolac (TORADOL) injection 15 mg (15 mg IntraVENous Given 6/18/22 0721)   orphenadrine (NORFLEX) injection 60 mg (60 mg IntraVENous Given 6/18/22 0562)         Medical Decision Making:    Right leg pain seems to originate from the back and leg itself is not tender to touch and has no evidence of DVT with no calf tenderness or swelling and calf is entirely pliable    Counseling: The emergency provider has spoken with the spouse/SO and discussed todays results, in addition to providing specific details for the plan of care and counseling regarding the diagnosis and prognosis. Questions are answered at this time and they are agreeable with the plan.      --------------------------------- IMPRESSION AND DISPOSITION ---------------------------------    IMPRESSION  1. Right leg pain Stable   2.  Right sided sciatica Stable       DISPOSITION  Disposition: Discharge to home  Patient condition is stable                  Basilio Adhikari MD  06/18/22 0447

## 2022-06-20 ENCOUNTER — TELEPHONE (OUTPATIENT)
Dept: FAMILY MEDICINE CLINIC | Age: 77
End: 2022-06-20

## 2022-06-20 ENCOUNTER — APPOINTMENT (OUTPATIENT)
Dept: CT IMAGING | Age: 77
DRG: 193 | End: 2022-06-20
Payer: MEDICARE

## 2022-06-20 ENCOUNTER — HOSPITAL ENCOUNTER (INPATIENT)
Age: 77
LOS: 7 days | Discharge: ANOTHER HOSPITAL | DRG: 193 | End: 2022-06-27
Attending: EMERGENCY MEDICINE | Admitting: INTERNAL MEDICINE
Payer: MEDICARE

## 2022-06-20 DIAGNOSIS — R31.9 URINARY TRACT INFECTION WITH HEMATURIA, SITE UNSPECIFIED: ICD-10-CM

## 2022-06-20 DIAGNOSIS — N39.0 URINARY TRACT INFECTION WITH HEMATURIA, SITE UNSPECIFIED: ICD-10-CM

## 2022-06-20 DIAGNOSIS — N28.9 ACUTE ON CHRONIC RENAL INSUFFICIENCY: ICD-10-CM

## 2022-06-20 DIAGNOSIS — N18.9 ACUTE ON CHRONIC RENAL INSUFFICIENCY: ICD-10-CM

## 2022-06-20 DIAGNOSIS — R41.82 ALTERED MENTAL STATUS, UNSPECIFIED ALTERED MENTAL STATUS TYPE: Primary | ICD-10-CM

## 2022-06-20 LAB
ABSOLUTE EOS #: 0.1 K/UL (ref 0–0.4)
ABSOLUTE LYMPH #: 1.9 K/UL (ref 1–4.8)
ABSOLUTE MONO #: 0.7 K/UL (ref 0–1)
ALLEN TEST: POSITIVE
ANION GAP SERPL CALCULATED.3IONS-SCNC: 15 MMOL/L (ref 9–17)
BASOPHILS # BLD: 0 % (ref 0–2)
BASOPHILS ABSOLUTE: 0 K/UL (ref 0–0.2)
BUN BLDV-MCNC: 34 MG/DL (ref 8–23)
BUN/CREAT BLD: 24 (ref 9–20)
CALCIUM SERPL-MCNC: 9.3 MG/DL (ref 8.6–10.4)
CHLORIDE BLD-SCNC: 99 MMOL/L (ref 98–107)
CO2: 25 MMOL/L (ref 20–31)
CREAT SERPL-MCNC: 1.43 MG/DL (ref 0.5–0.9)
DIFFERENTIAL TYPE: YES
EOSINOPHILS RELATIVE PERCENT: 1 % (ref 0–5)
GFR AFRICAN AMERICAN: 43 ML/MIN
GFR NON-AFRICAN AMERICAN: 36 ML/MIN
GFR SERPL CREATININE-BSD FRML MDRD: ABNORMAL ML/MIN/{1.73_M2}
GLUCOSE BLD-MCNC: 135 MG/DL (ref 65–99)
GLUCOSE BLD-MCNC: 145 MG/DL (ref 65–99)
GLUCOSE BLD-MCNC: 184 MG/DL (ref 65–99)
GLUCOSE BLD-MCNC: 214 MG/DL (ref 70–99)
HCT VFR BLD CALC: 31.5 % (ref 36–46)
HEMOGLOBIN: 10 G/DL (ref 12–16)
LACTIC ACID: 1.2 MMOL/L (ref 0.5–2.2)
LYMPHOCYTES # BLD: 17 % (ref 15–40)
MCH RBC QN AUTO: 30.6 PG (ref 26–34)
MCHC RBC AUTO-ENTMCNC: 31.9 G/DL (ref 31–37)
MCV RBC AUTO: 96 FL (ref 80–100)
MONOCYTES # BLD: 6 % (ref 4–8)
PDW BLD-RTO: 15.9 % (ref 12.1–15.2)
PLATELET # BLD: 304 K/UL (ref 140–450)
POC HCO3: 25.4 MMOL/L (ref 21–28)
POC O2 SATURATION: 98 % (ref 94–98)
POC PCO2: 40.4 MM HG (ref 35–48)
POC PH: 7.41 (ref 7.35–7.45)
POC PO2: 97.2 MM HG (ref 83–108)
POSITIVE BASE EXCESS, ART: 1 (ref 0–3)
POTASSIUM SERPL-SCNC: 4.2 MMOL/L (ref 3.7–5.3)
RBC # BLD: 3.28 M/UL (ref 4–5.2)
SAMPLE SITE: NORMAL
SARS-COV-2, RAPID: NOT DETECTED
SEG NEUTROPHILS: 76 % (ref 47–75)
SEGMENTED NEUTROPHILS ABSOLUTE COUNT: 8.8 K/UL (ref 2.5–7)
SODIUM BLD-SCNC: 139 MMOL/L (ref 135–144)
SPECIMEN DESCRIPTION: NORMAL
WBC # BLD: 11.5 K/UL (ref 3.5–11)

## 2022-06-20 PROCEDURE — 80048 BASIC METABOLIC PNL TOTAL CA: CPT

## 2022-06-20 PROCEDURE — 36415 COLL VENOUS BLD VENIPUNCTURE: CPT

## 2022-06-20 PROCEDURE — 2580000003 HC RX 258: Performed by: INTERNAL MEDICINE

## 2022-06-20 PROCEDURE — 70450 CT HEAD/BRAIN W/O DYE: CPT

## 2022-06-20 PROCEDURE — 6360000002 HC RX W HCPCS: Performed by: INTERNAL MEDICINE

## 2022-06-20 PROCEDURE — 96365 THER/PROPH/DIAG IV INF INIT: CPT

## 2022-06-20 PROCEDURE — 36600 WITHDRAWAL OF ARTERIAL BLOOD: CPT

## 2022-06-20 PROCEDURE — 82947 ASSAY GLUCOSE BLOOD QUANT: CPT

## 2022-06-20 PROCEDURE — 1200000000 HC SEMI PRIVATE

## 2022-06-20 PROCEDURE — 2580000003 HC RX 258: Performed by: EMERGENCY MEDICINE

## 2022-06-20 PROCEDURE — 6370000000 HC RX 637 (ALT 250 FOR IP): Performed by: INTERNAL MEDICINE

## 2022-06-20 PROCEDURE — 87635 SARS-COV-2 COVID-19 AMP PRB: CPT

## 2022-06-20 PROCEDURE — 85025 COMPLETE CBC W/AUTO DIFF WBC: CPT

## 2022-06-20 PROCEDURE — 93005 ELECTROCARDIOGRAM TRACING: CPT

## 2022-06-20 PROCEDURE — 6360000002 HC RX W HCPCS: Performed by: EMERGENCY MEDICINE

## 2022-06-20 PROCEDURE — 94761 N-INVAS EAR/PLS OXIMETRY MLT: CPT

## 2022-06-20 PROCEDURE — 99285 EMERGENCY DEPT VISIT HI MDM: CPT

## 2022-06-20 PROCEDURE — C9803 HOPD COVID-19 SPEC COLLECT: HCPCS

## 2022-06-20 PROCEDURE — 82803 BLOOD GASES ANY COMBINATION: CPT

## 2022-06-20 PROCEDURE — 83605 ASSAY OF LACTIC ACID: CPT

## 2022-06-20 RX ORDER — HYDRALAZINE HYDROCHLORIDE 20 MG/ML
5 INJECTION INTRAMUSCULAR; INTRAVENOUS EVERY 6 HOURS PRN
Status: DISCONTINUED | OUTPATIENT
Start: 2022-06-20 | End: 2022-06-21

## 2022-06-20 RX ORDER — LANOLIN ALCOHOL/MO/W.PET/CERES
800 CREAM (GRAM) TOPICAL NIGHTLY
Status: DISCONTINUED | OUTPATIENT
Start: 2022-06-20 | End: 2022-06-27 | Stop reason: HOSPADM

## 2022-06-20 RX ORDER — BACLOFEN 10 MG/1
10 TABLET ORAL 3 TIMES DAILY PRN
Status: DISCONTINUED | OUTPATIENT
Start: 2022-06-20 | End: 2022-06-27 | Stop reason: HOSPADM

## 2022-06-20 RX ORDER — PANTOPRAZOLE SODIUM 40 MG/1
40 TABLET, DELAYED RELEASE ORAL
Status: DISCONTINUED | OUTPATIENT
Start: 2022-06-21 | End: 2022-06-25

## 2022-06-20 RX ORDER — ACETAMINOPHEN 325 MG/1
650 TABLET ORAL EVERY 6 HOURS PRN
Status: DISCONTINUED | OUTPATIENT
Start: 2022-06-20 | End: 2022-06-27 | Stop reason: HOSPADM

## 2022-06-20 RX ORDER — ATORVASTATIN CALCIUM 40 MG/1
40 TABLET, FILM COATED ORAL DAILY
Status: DISCONTINUED | OUTPATIENT
Start: 2022-06-20 | End: 2022-06-27 | Stop reason: HOSPADM

## 2022-06-20 RX ORDER — SENNA AND DOCUSATE SODIUM 50; 8.6 MG/1; MG/1
1 TABLET, FILM COATED ORAL DAILY
Status: DISCONTINUED | OUTPATIENT
Start: 2022-06-20 | End: 2022-06-27 | Stop reason: HOSPADM

## 2022-06-20 RX ORDER — DEXTROSE MONOHYDRATE 50 MG/ML
100 INJECTION, SOLUTION INTRAVENOUS PRN
Status: DISCONTINUED | OUTPATIENT
Start: 2022-06-20 | End: 2022-06-27 | Stop reason: HOSPADM

## 2022-06-20 RX ORDER — 0.9 % SODIUM CHLORIDE 0.9 %
1000 INTRAVENOUS SOLUTION INTRAVENOUS ONCE
Status: COMPLETED | OUTPATIENT
Start: 2022-06-20 | End: 2022-06-20

## 2022-06-20 RX ORDER — LANOLIN ALCOHOL/MO/W.PET/CERES
400 CREAM (GRAM) TOPICAL DAILY
Status: DISCONTINUED | OUTPATIENT
Start: 2022-06-20 | End: 2022-06-27 | Stop reason: HOSPADM

## 2022-06-20 RX ORDER — INSULIN LISPRO 100 [IU]/ML
0-6 INJECTION, SOLUTION INTRAVENOUS; SUBCUTANEOUS EVERY 4 HOURS
Status: DISCONTINUED | OUTPATIENT
Start: 2022-06-20 | End: 2022-06-23

## 2022-06-20 RX ORDER — ALLOPURINOL 100 MG/1
100 TABLET ORAL DAILY
Status: DISCONTINUED | OUTPATIENT
Start: 2022-06-20 | End: 2022-06-27 | Stop reason: HOSPADM

## 2022-06-20 RX ORDER — ASPIRIN 81 MG/1
81 TABLET ORAL DAILY
Status: DISCONTINUED | OUTPATIENT
Start: 2022-06-20 | End: 2022-06-27 | Stop reason: HOSPADM

## 2022-06-20 RX ORDER — SODIUM CHLORIDE 9 MG/ML
INJECTION, SOLUTION INTRAVENOUS PRN
Status: DISCONTINUED | OUTPATIENT
Start: 2022-06-20 | End: 2022-06-27 | Stop reason: HOSPADM

## 2022-06-20 RX ORDER — ONDANSETRON 2 MG/ML
4 INJECTION INTRAMUSCULAR; INTRAVENOUS EVERY 6 HOURS PRN
Status: DISCONTINUED | OUTPATIENT
Start: 2022-06-20 | End: 2022-06-27 | Stop reason: HOSPADM

## 2022-06-20 RX ORDER — ENOXAPARIN SODIUM 100 MG/ML
30 INJECTION SUBCUTANEOUS 2 TIMES DAILY
Status: DISCONTINUED | OUTPATIENT
Start: 2022-06-20 | End: 2022-06-23

## 2022-06-20 RX ORDER — HALOPERIDOL 5 MG/ML
5 INJECTION INTRAMUSCULAR EVERY 6 HOURS PRN
Status: DISCONTINUED | OUTPATIENT
Start: 2022-06-20 | End: 2022-06-27 | Stop reason: HOSPADM

## 2022-06-20 RX ORDER — POLYETHYLENE GLYCOL 3350 17 G/17G
17 POWDER, FOR SOLUTION ORAL DAILY PRN
Status: DISCONTINUED | OUTPATIENT
Start: 2022-06-20 | End: 2022-06-27 | Stop reason: HOSPADM

## 2022-06-20 RX ORDER — PROPRANOLOL HYDROCHLORIDE 10 MG/1
20 TABLET ORAL DAILY
Status: DISCONTINUED | OUTPATIENT
Start: 2022-06-20 | End: 2022-06-21

## 2022-06-20 RX ORDER — SODIUM CHLORIDE 0.9 % (FLUSH) 0.9 %
5-40 SYRINGE (ML) INJECTION PRN
Status: DISCONTINUED | OUTPATIENT
Start: 2022-06-20 | End: 2022-06-27 | Stop reason: HOSPADM

## 2022-06-20 RX ORDER — SODIUM CHLORIDE 0.9 % (FLUSH) 0.9 %
5-40 SYRINGE (ML) INJECTION EVERY 12 HOURS SCHEDULED
Status: DISCONTINUED | OUTPATIENT
Start: 2022-06-20 | End: 2022-06-27 | Stop reason: HOSPADM

## 2022-06-20 RX ORDER — SODIUM CHLORIDE 9 MG/ML
INJECTION, SOLUTION INTRAVENOUS CONTINUOUS
Status: DISCONTINUED | OUTPATIENT
Start: 2022-06-20 | End: 2022-06-22

## 2022-06-20 RX ORDER — ACETAMINOPHEN 650 MG/1
650 SUPPOSITORY RECTAL EVERY 6 HOURS PRN
Status: DISCONTINUED | OUTPATIENT
Start: 2022-06-20 | End: 2022-06-27 | Stop reason: HOSPADM

## 2022-06-20 RX ORDER — ENOXAPARIN SODIUM 100 MG/ML
30 INJECTION SUBCUTANEOUS DAILY
Status: DISCONTINUED | OUTPATIENT
Start: 2022-06-20 | End: 2022-06-20

## 2022-06-20 RX ORDER — ONDANSETRON 4 MG/1
4 TABLET, ORALLY DISINTEGRATING ORAL EVERY 8 HOURS PRN
Status: DISCONTINUED | OUTPATIENT
Start: 2022-06-20 | End: 2022-06-27 | Stop reason: HOSPADM

## 2022-06-20 RX ORDER — CIPROFLOXACIN 500 MG/1
500 TABLET, FILM COATED ORAL 2 TIMES DAILY
COMMUNITY
End: 2022-08-23 | Stop reason: ALTCHOICE

## 2022-06-20 RX ADMIN — CEFTRIAXONE SODIUM 1000 MG: 1 INJECTION, POWDER, FOR SOLUTION INTRAMUSCULAR; INTRAVENOUS at 10:18

## 2022-06-20 RX ADMIN — ENOXAPARIN SODIUM 30 MG: 100 INJECTION SUBCUTANEOUS at 23:31

## 2022-06-20 RX ADMIN — HYDRALAZINE HYDROCHLORIDE 5 MG: 20 INJECTION INTRAMUSCULAR; INTRAVENOUS at 19:46

## 2022-06-20 RX ADMIN — HALOPERIDOL LACTATE 5 MG: 5 INJECTION, SOLUTION INTRAMUSCULAR at 22:24

## 2022-06-20 RX ADMIN — SODIUM CHLORIDE 1000 ML: 9 INJECTION, SOLUTION INTRAVENOUS at 10:13

## 2022-06-20 RX ADMIN — HALOPERIDOL LACTATE 5 MG: 5 INJECTION, SOLUTION INTRAMUSCULAR at 15:29

## 2022-06-20 RX ADMIN — SODIUM CHLORIDE: 9 INJECTION, SOLUTION INTRAVENOUS at 13:42

## 2022-06-20 RX ADMIN — INSULIN LISPRO 1 UNITS: 100 INJECTION, SOLUTION INTRAVENOUS; SUBCUTANEOUS at 23:29

## 2022-06-20 ASSESSMENT — PAIN SCALES - PAIN ASSESSMENT IN ADVANCED DEMENTIA (PAINAD)
BODYLANGUAGE: 1
CONSOLABILITY: 0
CONSOLABILITY: 0
NEGVOCALIZATION: 1
BREATHING: 0
BODYLANGUAGE: 0
FACIALEXPRESSION: 0
FACIALEXPRESSION: 0
TOTALSCORE: 1
TOTALSCORE: 2
BREATHING: 0
NEGVOCALIZATION: 1

## 2022-06-20 ASSESSMENT — PAIN SCALES - GENERAL
PAINLEVEL_OUTOF10: 0
PAINLEVEL_OUTOF10: 2

## 2022-06-20 ASSESSMENT — PAIN - FUNCTIONAL ASSESSMENT: PAIN_FUNCTIONAL_ASSESSMENT: NONE - DENIES PAIN

## 2022-06-20 NOTE — PROGRESS NOTES
Pt arrives to room 258 via cart, 3 staff assist to transfer into bed. Disoriented x4, briefly acknowledges name when called. Agitated and mildly combative at this time, unable to follow commands. Son at bedside and answers all admission questions. VS stable, able to deny any pain at this time. Resting comfortably in bed with call light in reach, bed alarm on.

## 2022-06-20 NOTE — TELEPHONE ENCOUNTER
Pt's son called stating that pt is very confused and irritable, she is dumping out her medication and not cooperating. Pt was seen in the ER on  06/18/22 for Sciatica pain. Pt's son dose not know is she has a fever. He stated she acted like this with a UTI.  Advised pt needs to go to the ER to be evaluated

## 2022-06-20 NOTE — PLAN OF CARE
Problem: Safety - Adult  Goal: Free from fall injury  Outcome: Not Progressing     Problem: Skin/Tissue Integrity  Goal: Absence of new skin breakdown  Description: 1. Monitor for areas of redness and/or skin breakdown  2. Assess vascular access sites hourly  3. Every 4-6 hours minimum:  Change oxygen saturation probe site  4. Every 4-6 hours:  If on nasal continuous positive airway pressure, respiratory therapy assess nares and determine need for appliance change or resting period.   Outcome: Progressing

## 2022-06-20 NOTE — ED NOTES
Patient is confused and trying to get out of bed. Patients brief changed and repositioned with two assist. Annalisa Larsen RN is sitting cartside with patient at this time.        Chrissy Early RN  06/20/22 0531

## 2022-06-20 NOTE — ED NOTES
Medication list, history, allergies clarified with her son Lynn Santoyo. Lynn Santoyo is patients son and lives with her and takes care of all medications. Pt has not been taking her Cipro, her son found the pill bottle yesterday and started giving her antibiotics yesterday.        Phyllis Jackson RN  06/20/22 5936

## 2022-06-20 NOTE — ED PROVIDER NOTES
HPI:  6/20/22,   Time: 10:13 AM EDT         Heladio Booker is a 78 y.o. female presenting to the ED for altered mental status today with weakness, beginning last 1 to 2 days ago. Diagnosed with UTI 3 days ago 1 antibiotic taken the complaint has been constant, moderate in severity, and worsened by nothing. No alleviating factors. No fever chills night sweats no chest pain or difficulty breathing or abdominal pain or vomiting no known black or bloody stool. Had been planing of right leg pain 3 days ago    ROS:   Pertinent positives and negatives are stated within HPI, all other systems reviewed and are negative.  --------------------------------------------- PAST HISTORY ---------------------------------------------  Past Medical History:  has a past medical history of Allergic rhinitis, Chronic kidney disease, stage III (moderate) ([facility]), Deep vein thrombosis (DVT) ([address] Elbow fracture, left, Gastric ulcer, Gout, Hx of blood clots, Hypertension, Nausea & vomiting, Presence of IVC filter, and Type II or unspecified type diabetes mellitus without mention of complication, not stated as uncontrolled. Past Surgical History:  has a past surgical history that includes Rotator cuff repair; Cystoscopy; Hysterectomy; partial nephrectomy (Left); Colonoscopy (2014); Vena Cava Filter Placement; Total knee arthroplasty (Right); Total knee arthroplasty (Left); Cataract removal (Right); Cataract removal (Left); back surgery; back surgery; back surgery; back surgery; back surgery; back surgery; Breast biopsy (Left); Breast biopsy (Right); Carpal tunnel release (Right); Retinal detachment surgery (Left); Eye surgery (Right); Kidney surgery; Cardiac catheterization (Left, 03/07/2018); Upper gastrointestinal endoscopy (2014); and Colonoscopy (N/A, 5/13/2019). Social History:  reports that she has never smoked. She has never used smokeless tobacco. She reports that she does not drink alcohol and does not use drugs.     Family History: family history includes Cancer in her father and paternal aunt; Diabetes in her father. The patients home medications have been reviewed. Allergies: Codeine, Lisinopril, Percocet [oxycodone-acetaminophen], Adhesive tape, and Norco [hydrocodone-acetaminophen]    -------------------------------------------------- RESULTS -------------------------------------------------  All laboratory and radiology results have been personally reviewed by myself   LABS:  Results for orders placed or performed during the hospital encounter of 06/20/22   CBC with Auto Differential   Result Value Ref Range    WBC 11.5 (H) 3.5 - 11.0 k/uL    RBC 3.28 (L) 4.0 - 5.2 m/uL    Hemoglobin 10.0 (L) 12.0 - 16.0 g/dL    Hematocrit 31.5 (L) 36 - 46 %    MCV 96.0 80 - 100 fL    MCH 30.6 26 - 34 pg    MCHC 31.9 31 - 37 g/dL    RDW 15.9 (H) 12.1 - 15.2 %    Platelets 089 952 - 871 k/uL    Differential Type YES     Seg Neutrophils 76 (H) 47 - 75 %    Lymphocytes 17 15 - 40 %    Monocytes 6 4 - 8 %    Eosinophils % 1 0 - 5 %    Basophils 0 0 - 2 %    Segs Absolute 8.80 (H) 2.5 - 7.0 k/uL    Absolute Lymph # 1.90 1.0 - 4.8 k/uL    Absolute Mono # 0.70 0.0 - 1.0 k/uL    Absolute Eos # 0.10 0.0 - 0.4 k/uL    Basophils Absolute 0.00 0.0 - 0.2 k/uL   Basic Metabolic Panel w/ Reflex to MG   Result Value Ref Range    Glucose 214 (H) 70 - 99 mg/dL    BUN 34 (H) 8 - 23 mg/dL    CREATININE 1.43 (H) 0.50 - 0.90 mg/dL    Bun/Cre Ratio 24 (H) 9 - 20    Calcium 9.3 8.6 - 10.4 mg/dL    Sodium 139 135 - 144 mmol/L    Potassium 4.2 3.7 - 5.3 mmol/L    Chloride 99 98 - 107 mmol/L    CO2 25 20 - 31 mmol/L    Anion Gap 15 9 - 17 mmol/L    GFR Non-African American 36 (L) >60 mL/min    GFR  43 (L) >60 mL/min    GFR Comment         Lactic Acid   Result Value Ref Range    Lactic Acid 1.2 0.5 - 2.2 mmol/L       RADIOLOGY:  Interpreted by Radiologist.  CT Head WO Contrast   Final Result   1. Examination limited by motion degradation.    2. Cerebral atrophy and chronic small vessel ischemic disease. 3. No gross acute intracranial hemorrhage or acute large vessel ischemia by    noncontrast CT. 4. Intracranial atherosclerosis. If the patient has a focal neurologic deficit or there is clinical suspicion    for acute cerebrovascular accident, brain MRI would be recommended for further    evaluation.                 ------------------------- NURSING NOTES AND VITALS REVIEWED ---------------------------   The nursing notes within the ED encounter and vital signs as below have been reviewed. BP (!) 156/65   Pulse 89   Temp 98.3 °F (36.8 °C) (Axillary)   Resp 18   Ht 5' 6\" (1.676 m)   Wt 230 lb (104.3 kg)   LMP  (LMP Unknown)   SpO2 95%   BMI 37.12 kg/m²   Oxygen Saturation Interpretation: Normal      ---------------------------------------------------PHYSICAL EXAM--------------------------------------      Constitutional/General: Alert and oriented x1 to her person, moderately ill appearing/mild lethargy, non toxic in NAD  Head: NC/AT  Eyes: PERRL, EOMI  Mouth: Oropharynx clear, handling secretions, no trismus  Neck: Supple, full ROM, no meningeal signs  Pulmonary: Lungs clear to auscultation bilaterally, no wheezes, rales, or rhonchi. Not in respiratory distress  Cardiovascular:  Regular rate and rhythm, no murmurs, gallops, or rubs. 2+ distal pulses  Abdomen: Soft, non tender, non distended,   Extremities: Moves all extremities x 4.  Warm and well perfused  Skin: warm and dry without rash  Neurologic: GCS 14 no obvious acute focal deficits  Psych: Normal Affect      ------------------------------ ED COURSE/MEDICAL DECISION MAKING----------------------  Medications   0.9 % sodium chloride bolus (0 mLs IntraVENous Stopped 6/20/22 1113)   cefTRIAXone (ROCEPHIN) 1000 mg IVPB in 50 mL D5W minibag (0 mg IntraVENous Stopped 6/20/22 1048)         Medical Decision Making:    Altered mental status rule out metabolic along with known UTI also will check CT of head    Counseling: The emergency provider has spoken with the patient and discussed todays results, in addition to providing specific details for the plan of care and counseling regarding the diagnosis and prognosis. Questions are answered at this time and they are agreeable with the plan.      --------------------------------- IMPRESSION AND DISPOSITION ---------------------------------    IMPRESSION  1. Altered mental status, unspecified altered mental status type New Problem   2.  Urinary tract infection with hematuria, site unspecified New Problem       DISPOSITION  Disposition: Admit to med/surg floor  Patient condition is stable                  Tez Epstein MD  06/20/22 10 [place] MD  06/20/22 7483

## 2022-06-20 NOTE — PROGRESS NOTES
Writer and Raymonde Tarango PCA provides brandyn-care due to incontinence. Pt picking and grabbing at self, others and objects aggressively. Pt self inflicts skin tear to right forearm at this time. Nonadherent dressing placed at this time. Sitter at bedside. Bed alarm on.

## 2022-06-21 ENCOUNTER — APPOINTMENT (OUTPATIENT)
Dept: GENERAL RADIOLOGY | Age: 77
DRG: 193 | End: 2022-06-21
Payer: MEDICARE

## 2022-06-21 ENCOUNTER — APPOINTMENT (OUTPATIENT)
Dept: CT IMAGING | Age: 77
DRG: 193 | End: 2022-06-21
Payer: MEDICARE

## 2022-06-21 LAB
-: NORMAL
ABSOLUTE EOS #: 0.1 K/UL (ref 0–0.4)
ABSOLUTE LYMPH #: 2.7 K/UL (ref 1–4.8)
ABSOLUTE MONO #: 0.9 K/UL (ref 0–1)
ANION GAP SERPL CALCULATED.3IONS-SCNC: 17 MMOL/L (ref 9–17)
BASOPHILS # BLD: 1 % (ref 0–2)
BASOPHILS ABSOLUTE: 0.1 K/UL (ref 0–0.2)
BILIRUBIN URINE: NEGATIVE
BUN BLDV-MCNC: 22 MG/DL (ref 8–23)
BUN/CREAT BLD: 20 (ref 9–20)
CALCIUM SERPL-MCNC: 9.3 MG/DL (ref 8.6–10.4)
CHLORIDE BLD-SCNC: 101 MMOL/L (ref 98–107)
CO2: 24 MMOL/L (ref 20–31)
COLOR: YELLOW
COMMENT UA: ABNORMAL
CREAT SERPL-MCNC: 1.08 MG/DL (ref 0.5–0.9)
DIFFERENTIAL TYPE: YES
EOSINOPHILS RELATIVE PERCENT: 1 % (ref 0–5)
EPITHELIAL CELLS UA: NORMAL /HPF
GFR AFRICAN AMERICAN: >60 ML/MIN
GFR NON-AFRICAN AMERICAN: 50 ML/MIN
GFR SERPL CREATININE-BSD FRML MDRD: ABNORMAL ML/MIN/{1.73_M2}
GLUCOSE BLD-MCNC: 134 MG/DL (ref 65–99)
GLUCOSE BLD-MCNC: 151 MG/DL (ref 65–99)
GLUCOSE BLD-MCNC: 160 MG/DL (ref 65–99)
GLUCOSE BLD-MCNC: 163 MG/DL (ref 65–99)
GLUCOSE BLD-MCNC: 166 MG/DL (ref 70–99)
GLUCOSE BLD-MCNC: 172 MG/DL (ref 65–99)
GLUCOSE BLD-MCNC: 173 MG/DL (ref 65–99)
GLUCOSE URINE: NEGATIVE
HCT VFR BLD CALC: 32.7 % (ref 36–46)
HEMOGLOBIN: 10.8 G/DL (ref 12–16)
KETONES, URINE: ABNORMAL
LEUKOCYTE ESTERASE, URINE: NEGATIVE
LYMPHOCYTES # BLD: 21 % (ref 15–40)
MCH RBC QN AUTO: 31.4 PG (ref 26–34)
MCHC RBC AUTO-ENTMCNC: 33.1 G/DL (ref 31–37)
MCV RBC AUTO: 94.9 FL (ref 80–100)
MONOCYTES # BLD: 7 % (ref 4–8)
NITRITE, URINE: NEGATIVE
PDW BLD-RTO: 15.9 % (ref 12.1–15.2)
PH UA: 6 (ref 5–8)
PLATELET # BLD: 363 K/UL (ref 140–450)
POTASSIUM SERPL-SCNC: 3.6 MMOL/L (ref 3.7–5.3)
PROTEIN UA: ABNORMAL
RBC # BLD: 3.44 M/UL (ref 4–5.2)
RBC UA: NORMAL /HPF (ref 0–2)
SEG NEUTROPHILS: 70 % (ref 47–75)
SEGMENTED NEUTROPHILS ABSOLUTE COUNT: 9.1 K/UL (ref 2.5–7)
SODIUM BLD-SCNC: 142 MMOL/L (ref 135–144)
SPECIFIC GRAVITY UA: 1.01 (ref 1–1.03)
TSH SERPL DL<=0.05 MIU/L-ACNC: 2.51 UIU/ML (ref 0.3–5)
TURBIDITY: CLEAR
URINE HGB: ABNORMAL
UROBILINOGEN, URINE: NORMAL
WBC # BLD: 12.9 K/UL (ref 3.5–11)
WBC UA: NORMAL /HPF

## 2022-06-21 PROCEDURE — 74177 CT ABD & PELVIS W/CONTRAST: CPT

## 2022-06-21 PROCEDURE — 1200000000 HC SEMI PRIVATE

## 2022-06-21 PROCEDURE — 2500000003 HC RX 250 WO HCPCS: Performed by: INTERNAL MEDICINE

## 2022-06-21 PROCEDURE — 82947 ASSAY GLUCOSE BLOOD QUANT: CPT

## 2022-06-21 PROCEDURE — 6360000004 HC RX CONTRAST MEDICATION: Performed by: INTERNAL MEDICINE

## 2022-06-21 PROCEDURE — 94761 N-INVAS EAR/PLS OXIMETRY MLT: CPT

## 2022-06-21 PROCEDURE — 71045 X-RAY EXAM CHEST 1 VIEW: CPT

## 2022-06-21 PROCEDURE — 81001 URINALYSIS AUTO W/SCOPE: CPT

## 2022-06-21 PROCEDURE — 36415 COLL VENOUS BLD VENIPUNCTURE: CPT

## 2022-06-21 PROCEDURE — 80048 BASIC METABOLIC PNL TOTAL CA: CPT

## 2022-06-21 PROCEDURE — 6370000000 HC RX 637 (ALT 250 FOR IP): Performed by: INTERNAL MEDICINE

## 2022-06-21 PROCEDURE — 84443 ASSAY THYROID STIM HORMONE: CPT

## 2022-06-21 PROCEDURE — 2580000003 HC RX 258: Performed by: INTERNAL MEDICINE

## 2022-06-21 PROCEDURE — 6360000002 HC RX W HCPCS: Performed by: INTERNAL MEDICINE

## 2022-06-21 PROCEDURE — 85025 COMPLETE CBC W/AUTO DIFF WBC: CPT

## 2022-06-21 RX ORDER — POTASSIUM CHLORIDE 7.45 MG/ML
10 INJECTION INTRAVENOUS
Status: COMPLETED | OUTPATIENT
Start: 2022-06-21 | End: 2022-06-21

## 2022-06-21 RX ORDER — METOPROLOL TARTRATE 5 MG/5ML
5 INJECTION INTRAVENOUS EVERY 6 HOURS
Status: DISCONTINUED | OUTPATIENT
Start: 2022-06-21 | End: 2022-06-23

## 2022-06-21 RX ORDER — HYDRALAZINE HYDROCHLORIDE 20 MG/ML
10 INJECTION INTRAMUSCULAR; INTRAVENOUS EVERY 6 HOURS PRN
Status: DISCONTINUED | OUTPATIENT
Start: 2022-06-21 | End: 2022-06-22

## 2022-06-21 RX ADMIN — SODIUM CHLORIDE: 9 INJECTION, SOLUTION INTRAVENOUS at 00:04

## 2022-06-21 RX ADMIN — METOPROLOL TARTRATE 5 MG: 5 INJECTION INTRAVENOUS at 11:59

## 2022-06-21 RX ADMIN — HALOPERIDOL LACTATE 5 MG: 5 INJECTION, SOLUTION INTRAMUSCULAR at 15:04

## 2022-06-21 RX ADMIN — ENOXAPARIN SODIUM 30 MG: 100 INJECTION SUBCUTANEOUS at 09:04

## 2022-06-21 RX ADMIN — METOPROLOL TARTRATE 5 MG: 5 INJECTION INTRAVENOUS at 23:12

## 2022-06-21 RX ADMIN — INSULIN LISPRO 1 UNITS: 100 INJECTION, SOLUTION INTRAVENOUS; SUBCUTANEOUS at 06:55

## 2022-06-21 RX ADMIN — METOPROLOL TARTRATE 5 MG: 5 INJECTION INTRAVENOUS at 18:00

## 2022-06-21 RX ADMIN — METOPROLOL TARTRATE 5 MG: 5 INJECTION INTRAVENOUS at 05:16

## 2022-06-21 RX ADMIN — SODIUM CHLORIDE, PRESERVATIVE FREE 10 ML: 5 INJECTION INTRAVENOUS at 20:00

## 2022-06-21 RX ADMIN — IOPAMIDOL 75 ML: 755 INJECTION, SOLUTION INTRAVENOUS at 06:18

## 2022-06-21 RX ADMIN — AZITHROMYCIN MONOHYDRATE 500 MG: 500 INJECTION, POWDER, LYOPHILIZED, FOR SOLUTION INTRAVENOUS at 09:05

## 2022-06-21 RX ADMIN — ACETAMINOPHEN 650 MG: 650 SUPPOSITORY RECTAL at 06:29

## 2022-06-21 RX ADMIN — HYDRALAZINE HYDROCHLORIDE 10 MG: 20 INJECTION INTRAMUSCULAR; INTRAVENOUS at 19:51

## 2022-06-21 RX ADMIN — HYDRALAZINE HYDROCHLORIDE 5 MG: 20 INJECTION INTRAMUSCULAR; INTRAVENOUS at 01:18

## 2022-06-21 RX ADMIN — INSULIN LISPRO 1 UNITS: 100 INJECTION, SOLUTION INTRAVENOUS; SUBCUTANEOUS at 02:42

## 2022-06-21 RX ADMIN — POTASSIUM CHLORIDE 10 MEQ: 7.46 INJECTION, SOLUTION INTRAVENOUS at 06:50

## 2022-06-21 RX ADMIN — CEFTRIAXONE SODIUM 1000 MG: 1 INJECTION, POWDER, FOR SOLUTION INTRAMUSCULAR; INTRAVENOUS at 12:00

## 2022-06-21 RX ADMIN — HALOPERIDOL LACTATE 5 MG: 5 INJECTION, SOLUTION INTRAMUSCULAR at 05:56

## 2022-06-21 RX ADMIN — POTASSIUM CHLORIDE 10 MEQ: 7.46 INJECTION, SOLUTION INTRAVENOUS at 07:38

## 2022-06-21 RX ADMIN — SODIUM CHLORIDE: 9 INJECTION, SOLUTION INTRAVENOUS at 15:01

## 2022-06-21 RX ADMIN — INSULIN LISPRO 1 UNITS: 100 INJECTION, SOLUTION INTRAVENOUS; SUBCUTANEOUS at 22:05

## 2022-06-21 RX ADMIN — INSULIN LISPRO 1 UNITS: 100 INJECTION, SOLUTION INTRAVENOUS; SUBCUTANEOUS at 10:55

## 2022-06-21 RX ADMIN — INSULIN LISPRO 1 UNITS: 100 INJECTION, SOLUTION INTRAVENOUS; SUBCUTANEOUS at 14:25

## 2022-06-21 RX ADMIN — ACETAMINOPHEN 650 MG: 650 SUPPOSITORY RECTAL at 19:52

## 2022-06-21 RX ADMIN — HYDRALAZINE HYDROCHLORIDE 10 MG: 20 INJECTION INTRAMUSCULAR; INTRAVENOUS at 08:48

## 2022-06-21 RX ADMIN — ENOXAPARIN SODIUM 30 MG: 100 INJECTION SUBCUTANEOUS at 19:52

## 2022-06-21 ASSESSMENT — PAIN SCALES - PAIN ASSESSMENT IN ADVANCED DEMENTIA (PAINAD)
FACIALEXPRESSION: 0
BREATHING: 0
BODYLANGUAGE: 0
CONSOLABILITY: 2
BODYLANGUAGE: 1
TOTALSCORE: 4
BODYLANGUAGE: 1
FACIALEXPRESSION: 0
NEGVOCALIZATION: 0
BODYLANGUAGE: 0
TOTALSCORE: 4
CONSOLABILITY: 2
BREATHING: 0
BREATHING: 0
NEGVOCALIZATION: 1
NEGVOCALIZATION: 1
TOTALSCORE: 1
TOTALSCORE: 4
CONSOLABILITY: 2
NEGVOCALIZATION: 1
CONSOLABILITY: 0
TOTALSCORE: 0
BREATHING: 0
TOTALSCORE: 4
NEGVOCALIZATION: 1
BODYLANGUAGE: 1
NEGVOCALIZATION: 1
CONSOLABILITY: 0
NEGVOCALIZATION: 1
FACIALEXPRESSION: 0
BODYLANGUAGE: 1
CONSOLABILITY: 2
BREATHING: 0
BREATHING: 0
FACIALEXPRESSION: 0
FACIALEXPRESSION: 0
BODYLANGUAGE: 1
FACIALEXPRESSION: 0
FACIALEXPRESSION: 0
CONSOLABILITY: 2
TOTALSCORE: 4
BREATHING: 0

## 2022-06-21 ASSESSMENT — PAIN - FUNCTIONAL ASSESSMENT
PAIN_FUNCTIONAL_ASSESSMENT: PREVENTS OR INTERFERES WITH MANY ACTIVE NOT PASSIVE ACTIVITIES
PAIN_FUNCTIONAL_ASSESSMENT: PREVENTS OR INTERFERES WITH MANY ACTIVE NOT PASSIVE ACTIVITIES

## 2022-06-21 ASSESSMENT — PAIN SCALES - GENERAL: PAINLEVEL_OUTOF10: 5

## 2022-06-21 ASSESSMENT — PAIN DESCRIPTION - ORIENTATION: ORIENTATION: POSTERIOR

## 2022-06-21 ASSESSMENT — PAIN DESCRIPTION - LOCATION: LOCATION: ABDOMEN

## 2022-06-21 ASSESSMENT — PAIN DESCRIPTION - DESCRIPTORS: DESCRIPTORS: DISCOMFORT

## 2022-06-21 NOTE — PROGRESS NOTES
Pt down to CT per cart. Pt does well. Back to room 258. Pt given Tylenol suppository for mild discomfort. Bladder scan complete with 318 PVR. [place] [address] per physicians order using sterile technique. Clear urine return and sample sent to lab. Sitter is in room with pt.

## 2022-06-21 NOTE — PLAN OF CARE
Preventing Falls: Care Instructions  Your Care Instructions    Getting around your home safely can be a challenge if you have injuries or health problems that make it easy for you to fall. Loose rugs and furniture in walkways are among the dangers for many older people who have problems walking or who have poor eyesight. People who have conditions such as arthritis, osteoporosis, or dementia also have to be careful not to fall. You can make your home safer with a few simple measures. Follow-up care is a key part of your treatment and safety. Be sure to make and go to all appointments, and call your doctor if you are having problems. It's also a good idea to know your test results and keep a list of the medicines you take. How can you care for yourself at home? Taking care of yourself  · You may get dizzy if you do not drink enough water. To prevent dehydration, drink plenty of fluids, enough so that your urine is light yellow or clear like water. Choose water and other caffeine-free clear liquids. If you have kidney, heart, or liver disease and have to limit fluids, talk with your doctor before you increase the amount of fluids you drink. · Exercise regularly to improve your strength, muscle tone, and balance. Walk if you can. Swimming may be a good choice if you cannot walk easily. · Have your vision and hearing checked each year or any time you notice a change. If you have trouble seeing and hearing, you might not be able to avoid objects and could lose your balance. · Know the side effects of the medicines you take. Ask your doctor or pharmacist whether the medicines you take can affect your balance. Sleeping pills or sedatives can affect your balance. · Limit the amount of alcohol you drink. Alcohol can impair your balance and other senses. · Ask your doctor whether calluses or corns on your feet need to be removed.  If you wear loose-fitting shoes because of calluses or corns, you can lose your Problem: Pain  Goal: Verbalizes/displays adequate comfort level or baseline comfort level  Outcome: Not Progressing     Problem: ABCDS Injury Assessment  Goal: Absence of physical injury  Outcome: Not Progressing     Problem: Confusion  Goal: Confusion, delirium, dementia, or psychosis is improved or at baseline  Description: INTERVENTIONS:  1. Assess for possible contributors to thought disturbance, including medications, impaired vision or hearing, underlying metabolic abnormalities, dehydration, psychiatric diagnoses, and notify attending LIP  2. [place] high risk fall precautions, as indicated  3. Provide frequent short contacts to provide reality reorientation, refocusing and direction  4. Decrease environmental stimuli, including noise as appropriate  5. Monitor and intervene to maintain adequate nutrition, hydration, elimination, sleep and activity  6. If unable to ensure safety without constant attention obtain sitter and review sitter guidelines with assigned personnel  7.  Initiate Psychosocial CNS and Spiritual Care consult, as indicated  Outcome: Not Progressing balance and fall. · Talk to your doctor if you have numbness in your feet. Preventing falls at home  · Remove raised doorway thresholds, throw rugs, and clutter. Repair loose carpet or raised areas in the floor. · Move furniture and electrical cords to keep them out of walking paths. · Use nonskid floor wax, and wipe up spills right away, especially on ceramic tile floors. · If you use a walker or cane, put rubber tips on it. If you use crutches, clean the bottoms of them regularly with an abrasive pad, such as steel wool. · Keep your house well lit, especially [place] Catching, and outside walkways. Use night-lights in areas such as hallways and bathrooms. Add extra light switches or use remote switches (such as switches that go on or off when you clap your hands) to make it easier to turn lights on if you have to get up during the night. · Install sturdy handrails on stairways. · Move items in your cabinets so that the things you use a lot are on the lower shelves (about waist level). · Keep a cordless phone and a flashlight with new batteries by your bed. If possible, put a phone in each of the main rooms of your house, or carry a cell phone in case you fall and cannot reach a phone. Or, you can wear a device around your neck or wrist. You push a button that sends a signal for help. · Wear low-heeled shoes that fit well and give your feet good support. Use footwear with nonskid soles. Check the heels and soles of your shoes for wear. Repair or replace worn heels or soles. · Do not wear socks without shoes on wood floors. · Walk on the grass when the sidewalks are slippery. If you live in an area that gets snow and ice in the winter, sprinkle salt on slippery steps and sidewalks. Preventing falls in the bath  · Install grab bars and nonskid mats inside and outside your shower or tub and near the toilet and sinks. · Use shower chairs and bath benches.   · Use a hand-held shower head that will allow you to sit while showering. · Get into a tub or shower by putting the weaker leg in first. Get out of a tub or shower with your strong side first.  · Repair loose toilet seats and consider installing a raised toilet seat to make getting on and off the toilet easier. · Keep your bathroom door unlocked while you are in the shower. Where can you learn more? Go to http://uriel-percy.info/. Enter 0476 [id number] in the search box to learn more about \"Preventing Falls: Care Instructions. \"  Current as of: May 12, 2017  Content Version: 11.4  © 4411-1665 [employer]. Care instructions adapted under license by [employer] (which disclaims liability or warranty for this information). If you have questions about a medical condition or this instruction, always ask your healthcare professional. [place] [address] any warranty or liability for your use of this information. Acute High Blood Pressure: Care Instructions  Your Care Instructions    Acute high blood pressure is very high blood pressure. It's a serious problem. Very high blood pressure can damage your brain, heart, eyes, and kidneys. You may have been given medicines to lower your blood pressure. You may have gotten them as pills or through a needle in one of your veins. This is called an IV. And maybe you were given other medicines too. These can be needed when high blood pressure causes other problems. To keep your blood pressure at a lower level, you may need to make healthy lifestyle changes. And you will probably need to take medicines. Be sure to follow up with your doctor about your blood pressure and what you can do about it. Follow-up care is a key part of your treatment and safety. Be sure to make and go to all appointments, and call your doctor if you are having problems. It's also a good idea to know your test results and keep a list of the medicines you take.   How can you care for yourself at home?  · See your doctor as often as he or she recommends. This is to make sure your blood pressure is under control. You will probably go at least 2 times a year. But it may be more often at first.  · Take your blood pressure medicine exactly as prescribed. You may take one or more types. They include diuretics, beta-blockers, ACE inhibitors, calcium channel blockers, and angiotensin II receptor blockers. Call your doctor if you think you are having a problem with your medicine. · If you take blood pressure medicine, talk to your doctor before you take decongestants or anti-inflammatory medicine, such as ibuprofen. These can raise blood pressure. · Learn how to check your blood pressure at home. Check it often. · Ask your doctor if it's okay to drink alcohol. · Talk to your doctor about lifestyle changes that can help blood pressure. These include being active and not smoking. When should you call for help? Call 911 anytime you think you may need emergency care. This may mean having symptoms that suggest that your blood pressure is causing a serious heart or blood vessel problem. Your blood pressure may be over 180/110. ? For example, call 911 if:  ? · You have symptoms of a heart attack. These may include:  ¨ Chest pain or pressure, or a strange feeling in the chest.  ¨ Sweating. ¨ Shortness of breath. ¨ Nausea or vomiting. ¨ Pain, pressure, or a strange feeling in the back, neck, jaw, or upper belly or in one or both shoulders or arms. ¨ Lightheadedness or sudden weakness. ¨ A fast or irregular heartbeat. ? · You have symptoms of a stroke. These may include:  ¨ Sudden numbness, tingling, weakness, or loss of movement in your face, arm, or leg, especially on only one side of your body. ¨ Sudden vision changes. ¨ Sudden trouble speaking. ¨ Sudden confusion or trouble understanding simple statements. ¨ Sudden problems with walking or balance.   ¨ A sudden, severe headache that is different from past headaches. ? · You have severe back or belly pain. ?Do not wait until your blood pressure comes down on its own. Get help right away. ?Call your doctor now or seek immediate care if:  ? · Your blood pressure is much higher than normal (such as 180/110 or higher), but you don't have symptoms. ? · You think high blood pressure is causing symptoms, such as:  ¨ Severe headache. ¨ Blurry vision. ? Watch closely for changes in your health, and be sure to contact your doctor if:  ? · Your blood pressure measures 140/90 or higher at least 2 times. That means the top number is 140 or higher or the bottom number is 90 or higher, or both. ? · You think you may be having side effects from your blood pressure medicine. ? · Your blood pressure is usually normal, but it goes above normal at least 2 times. Where can you learn more? Go to http://uriel-percy.info/. Enter T049 in the search box to learn more about \"Acute High Blood Pressure: Care Instructions. \"  Current as of: September 21, 2016  Content Version: 11.4  © 3127-0643 [employer]. Care instructions adapted under license by [employer] (which disclaims liability or warranty for this information). If you have questions about a medical condition or this instruction, always ask your healthcare professional. [place] [address] any warranty or liability for your use of this information.

## 2022-06-21 NOTE — PROGRESS NOTES
Pt continues with confusion. Pt repeats, \"please don't hurt me\" and \"please help me. \" Pt incont of large amount of urine. She is turned, cleansed and clean brief is applied. Pt refuses oral care of mouth swabs and chap stick.

## 2022-06-21 NOTE — PROGRESS NOTES
Quality flow rounds held on 6/21/22     Jorge Sylvester is admitted for  Change in mental status. Length of stay 1. Education:    Needed Education: follow up      Do you have any questions regarding your plan of care while at the hospital? denies    Planned Disposition:               []  Home when able                [] Swing Bed                [] ECF/SNF               [x] Other/TBD: Son willing to consider SNF if needed or [facility] services. Preference would be to just take her home. Barriers to Discharge:    Can you afford your medications? yes   Do you have transportation to follow up appointments? yes   Do you need any new equipment at home? n/a   Current equipment includes   walker, wheelchair, ramp, toilet raiser    Do you have a living will or durable power of  for healthcare? no               If yes do we have a copy on file? n/a    Do you or your family have any questions or concerns we haven't already discussed? Denies    Son willing to consider discharge to SNF or home with [facility] if needed.

## 2022-06-21 NOTE — ACP (ADVANCE CARE PLANNING)
Advance Care Planning     Advance Care Planning Activator (Inpatient)  Conversation Note      Date of ACP Conversation: 6/21/2022     Conversation Conducted with: Patient with [address]: Next of Kin by law (only applies in absence of above) (name) Bennie Mcgowan    ACP Activator: Mikayla Dc    {When Decision Maker makes decisions on behalf of the incapacitated patient: Decision Maker is asked to consider and make decisions based on patient values, known preferences, or best interests. Health Care Decision Maker:     Current Designated Health Care Decision Maker:     Primary Decision Maker: Bhupinder Madie [facility] - [phone]  Click here to complete Healthcare Decision Makers including section of the Healthcare Decision Maker Relationship (ie \"Primary\")  Today we documented Decision Maker(s) consistent with Legal Next of Kin hierarchy. Care Preferences    Ventilation: \"If you were in your present state of health and suddenly became very ill and were unable to breathe on your own, what would your preference be about the use of a ventilator (breathing machine) if it were available to you? \"      Would the patient desire the use of ventilator (breathing machine)?: no    \"If your health worsens and it becomes clear that your chance of recovery is unlikely, what would your preference be about the use of a ventilator (breathing machine) if it were available to you? \"     Would the patient desire the use of ventilator (breathing machine)?: No      Resuscitation  \"CPR works best to restart the heart when there is a sudden event, like a heart attack, in someone who is otherwise healthy. Unfortunately, CPR does not typically restart the heart for people who have serious health conditions or who are very sick. \"    \"In the event your heart stopped as a result of an underlying serious health condition, would you want attempts to be made to restart your heart (answer \"yes\" for attempt to resuscitate) or would you prefer a natural death (answer \"no\" for do not attempt to resuscitate)? \" yes       [] Yes   [x] No   Educated Patient / Adriana Kong regarding differences between Advance Directives and portable DNR orders.     Length of ACP Conversation in minutes:  5    Conversation Outcomes:  [x] ACP discussion completed  [] Existing advance directive reviewed with patient; no changes to patient's previously recorded wishes  [] New Advance Directive completed  [] Portable Do Not Rescitate prepared for Provider review and signature  [] POLST/POST/MOLST/MOST prepared for Provider review and signature      Follow-up plan:    [] Schedule follow-up conversation to continue planning  [] Referred individual to Provider for additional questions/concerns   [] Advised patient/agent/surrogate to review completed ACP document and update if needed with changes in condition, patient preferences or care setting    [x] This note routed to one or more involved healthcare providers

## 2022-06-21 NOTE — H&P
Hospital Medicine  History and Physical    Patient:  Alison Soliz  MRN: [id number]    CHIEF COMPLAINT:  Confusion    History Obtained From:  electronic medical record  PCP: Sherwin Caceres DO    HISTORY OF PRESENT ILLNESS:   The patient is a 78 y.o. female who presents to the ER with increasing confusion and weakness. According to records from the ER, Marilin Valenzuela was diagnosed with a UTI 3 days ago and started on Cipro. Since that time she has progressively become weaker and more confused. She was seen in the ER on 6/18, complaining of right leg and pelvic pain. Xrays showed no fractures at that time and she was diagnosed with right side sciatica. This morning Ragini is unable to give any history. She yells out, \"help me\". Marilin Valenzuela does not answer questions or follow commands. Nursing reports that she refuses to take her medication. She appears to be moving all extremities. In the ER her BMP was normal other than a BUN of 34, creatinine 1.34, and glucose of 214. CBC showed a WBC of 11.5, Hgb 10, and Plt ct 304. COVID was negative. ABG showed a PH of 7.407, PaO2 97, PCO2 40 and HCO3 25.  CT of the head showed:  1. Examination limited by motion degradation. 2. Cerebral atrophy and chronic small vessel ischemic disease. 3. No gross acute intracranial hemorrhage or acute large vessel ischemia by   noncontrast CT. 4. Intracranial atherosclerosis. Urine culture from 6/13/22, grew out E.coli sensitive to Cipro and Rocephin. Marilin Valenzuela is admitted for IV fluids, IV antibiotics, and further evaluation of her confusion.     Past Medical History:        Diagnosis Date    Allergic rhinitis     Chronic kidney disease, stage III (moderate) ([facility])     Deep vein thrombosis (DVT) ([facility]) 10/24/2012    Elbow fracture, left     Fall     Gastric ulcer     Gout     Hx of blood clots     Following last back surgery     Hypertension     Nausea & vomiting     Presence of IVC filter     Type II or unspecified type diabetes mellitus without mention of complication, not stated as uncontrolled        Past Surgical History:        Procedure Laterality Date    BACK SURGERY      BACK SURGERY      BACK SURGERY      BACK SURGERY      BACK SURGERY      BACK SURGERY      Fusion     BREAST BIOPSY Left     BREAST BIOPSY Right     CARDIAC CATHETERIZATION Left 03/07/2018    Dr. Joy Hunter @ [facility]--There is 30% disease of the origin of the lateral anterior descending. Mild 10% -20% plaque disease in the circumflex & right coronary artery. Normal left ventricular functino, ejection fraction of 60%.  CARPAL TUNNEL RELEASE Right     CATARACT REMOVAL Right     CATARACT REMOVAL Left     COLONOSCOPY  2014    COLONOSCOPY N/A 5/13/2019    COLONOSCOPY POLYPECTOMY HOT BIOPSY performed by Colleen Bowles MD at [address] [place] [address]      \"Multiple\"     EYE SURGERY Right     Removed fluid from behind eye    HYSTERECTOMY (CERVIX STATUS UNKNOWN)      KIDNEY SURGERY      left side 1/2 of kidney removed    PARTIAL NEPHRECTOMY Left     RETINAL DETACHMENT SURGERY Left     ROTATOR CUFF REPAIR      TOTAL KNEE ARTHROPLASTY Right     TOTAL KNEE ARTHROPLASTY Left     UPPER GASTROINTESTINAL ENDOSCOPY  2014    VENA CAVA FILTER PLACEMENT         Medications Prior to Admission:  I obtained, documented, reviewed, and updated the patient's current medications. Prior to Admission medications    Medication Sig Start Date End Date Taking? Authorizing Provider   ciprofloxacin (CIPRO) 500 MG tablet Take 500 mg by mouth 2 times daily   Yes Historical Provider, MD   predniSONE (DELTASONE) 50 MG tablet Take 50mg po qd x 5 days  QS for 5 days 6/18/22   Sharon Tellez MD   baclofen (LIORESAL) 10 MG tablet 1 tablet 3 times a day as needed for spasm 6/18/22   Sharon Tellez MD   Blood Glucose Monitoring Suppl (TRUE METRIX AIR GLUCOSE METER) SARAH True Metrix Glucose Meter   use TWICE DAILY AS DIRECTED.     Historical Provider, MD   atorvastatin (LIPITOR) 40 MG tablet Take 1 tablet by mouth daily 5/16/22   Abby Verdugo, DO   traZODone (DESYREL) 50 MG tablet Take 1 tablet by mouth nightly 5/16/22 Janceleste Verdugo, DO   glipiZIDE (GLUCOTROL) 5 MG tablet Take 1 tablet by mouth in the morning and at bedtime 5/16/22 Janese Kartik, DO   propranolol (INDERAL) 20 MG tablet Take 1 tablet by mouth daily 5/16/22 Janceleste Verdugo, DO   omeprazole (PRILOSEC) 20 MG delayed release capsule TAKE 1 CAPSULE EVERY DAY 5/16/22 Janese Kartik, DO   allopurinol (ZYLOPRIM) 100 MG tablet 2 tabs po daily 5/16/22 Janese Kartik, DO   oxybutynin (DITROPAN XL) 10 MG extended release tablet Take 1 tablet by mouth every evening 5/16/22   Abby Cholaurel, DO   aspirin EC 81 MG EC tablet Take 1 tablet by mouth daily 5/16/22 Janese Kartik, DO   gabapentin (NEURONTIN) 100 MG capsule Take 2 capsules by mouth nightly for 180 days. 5/16/22 11/12/22  Abby Verdugo, DO   furosemide (LASIX) 20 MG tablet TAKE 1 TABLET EVERY DAY 5/16/22 Janese Kartik, DO   senna-docusate (PERICOLACE) 8.6-50 MG per tablet Take 1 tablet by mouth daily 5/16/22   Abby Verdugo, DO   blood glucose monitor strips Test blood glucose daily 10/29/21   Abbyceleste Verdugo, DO   Lancets MISC 1 each by Does not apply route 2 times daily 8/27/21   YOHAN Linares - CNP   Blood Glucose Monitoring Suppl (TRUE METRIX METER) w/Device KIT Test once daily 2/9/21   Abby Verdugo, DO   acetaminophen (TYLENOL) 650 MG CR tablet Take 1,300 mg by mouth every 8 hours as needed for Pain    Historical Provider, MD   Magnesium 400 MG TABS Take 800 mg by mouth nightly     Historical Provider, MD   folic acid (FOLVITE) 622 MCG tablet Take 400 mcg by mouth daily 2/8/16   Historical Provider, MD       Allergies:  Codeine, Lisinopril, Percocet [oxycodone-acetaminophen], Adhesive tape, and Norco [hydrocodone-acetaminophen]    Social History:   TOBACCO:   reports that she has never smoked.  She has never used smokeless tobacco.  ETOH:   reports no history of alcohol use. OCCUPATION:      Family History:       Problem Relation Age of Onset    Diabetes Father     Cancer Father         Prostate Cancer    Cancer Paternal Aunt         Colon Cancer       REVIEW OF SYSTEMS:  Ragini would not answer any questions. She is confused and continues to yell out help me. ROS were not able to be obtained but answered according to previous ER visit and what her son told the ER. Constitutional:  negative for  fevers and chills  HEENT:  negative for  nasal congestion and sore throat  Neck:  No lumps or masses  Cardiac:  negative for  chest pain  Respiratory:  negative for  chest pain  Gastrointestinal:  negative for nausea and vomiting, positive for pelvic pain  :  positive for dysuria  Musculoskeletal:  positive for  arthralgias  Neuro:  Positive for confusion      Physical Exam:    Vitals: BP (!) 188/86   Pulse (!) 112   Temp 98.4 °F (36.9 °C) (Axillary)   Resp 20   Ht 5' 6\" (1.676 m)   Wt 213 lb (96.6 kg)   LMP  (LMP Unknown)   SpO2 97%   BMI 34.38 kg/m²   General appearance: alert, appears stated age, not cooperative with exam.  Skin: Skin color, texture, turgor normal. No rashes or lesions  HEENT: Head: Normocephalic, no lesions, without obvious abnormality. Eye: Normal external eye, conjunctiva, lids cornea, JACLYN. Ears: Normal TM's bilaterally. Normal auditory canals and external ears. Non-tender. Nose: Normal external nose, mucus membranes and septum. Pharynx: Oral mucosa appears dry, no oral lesions were seen but the exam was limited since Ragini would not allow an exam.  Neck: no adenopathy and supple, symmetrical, trachea midline  Lungs: clear to auscultation bilaterally  Heart: regular rate and rhythm, S1, S2 normal and II/VI systolic murmur, tachycardic  Abdomen: soft, non-tender; bowel sounds normal; no masses,  no organomegaly and obese  Extremities: Bilateral edema, no calf tenderness.     Neurologic: Mental status: Alert but confused. CBC:   Recent Labs     06/18/22  0947 06/20/22  1032 06/21/22  0436   WBC 9.4 11.5* 12.9*   HGB 10.9* 10.0* 10.8*    304 363     BMP:    Recent Labs     06/18/22  0947 06/20/22  1032 06/21/22  0436    139 142   K 3.5* 4.2 3.6*   CL 98 99 101   CO2 28 25 24   BUN 24* 34* 22   CREATININE 1.21* 1.43* 1.08*   GLUCOSE 98 214* 166*     Hepatic:   Recent Labs     06/18/22  0947   AST 12   ALT 8   BILITOT 0.44   ALKPHOS 99     Troponin: No results for input(s): TROPONINI in the last 72 hours. BNP: No results for input(s): BNP in the last 72 hours. Lipids: No results for input(s): CHOL, HDL in the last 72 hours. Invalid input(s): LDLCALCU  ABGs: No results found for: PHART, PO2ART, OPU8XHI  INR: No results for input(s): INR in the last 72 hours. -----------------------------------------------------------------      Assessment and Plan   1. Mental status change - concerned about infection with the elevation in WBC. CXR, blood cultures, and UA pending this am.  Will evaluate a CT scan of the abdomen and pelvis with her recent pelvic / leg pain. 2.  Leukocytosis - WBC increased from yesterday despite getting IV Rocephin. Was on Prednisone for leg pain which could have contributed to the elevated WBC. 3.  Dehydration - on Normal saline. 4.  CKD stage III b - appears to be at baseline. 5.  Hypokalemia - replaced. 6.  H/O CVA right precentral gyrus - seen at [facility] 2021.  7.  DM II - placed on Humalog sliding scale. 8.  Anemia - will check stool heme occult. 9.  Hyperlipidemia - on Lipitor. 10.  HTN - elevated upon admission. Placed on IV Lopressor and Hydralazine. 11.  GERD - on PPI. 12.  H/O Gout - on Allopurinol. Plan:  1. Admit on IV hydration. 2.  IV Rocephin started in the ER. 3.  Will check UA, CXR, CT abdomen / pelvis, and blood culture this am.  4.  Check TSH. 5.  IV Lopressor / Hydralazine. 6.  Replace potassium. 7.  Lovenox for DVT prophylaxis.   8. Full code. Medical Necessity: In patient is appropriate for this patient secondary to the need for IV fluids, IV antibiotics, and continued evaluation for confusion. Estimated length of stay 3 days. The beneficiary may reasonably be expected to be discharged or transferred to a hospital within 96 hours after admission. Patient Active Problem List   Diagnosis Code    Type 2 diabetes mellitus with stage 3 chronic kidney disease, without long-term current use of insulin (HCC) E11.22, N18.30    Essential hypertension, benign I10    Esophageal reflux K21.9    Irritable bowel syndrome K58.9    Seasonal allergies J30.2    Long term current use of anticoagulant therapy Z79.01    Gout M10.9    Rectocele N81.6    Tubular adenoma of colon D12.6    Hiatal hernia K44.9    Sigmoid diverticulosis K57.30    Chronic back pain M54.9, G89.29    Hypomagnesemia E83.42    Family history of colon cancer Z80.0    History of recurrent deep vein thrombosis (DVT) Z86.718    Hyperparathyroidism (HCC) E21.3    Chronic renal disease, stage III ([facility] [address] [[id number]] N18.30    Change in mental status R41.82       DVT prophylaxis:   [x] Lovenox   [] SCDs   [] SQ Heparin   [] Encourage ambulation, low risk for DVT, no chemical or mechanical    prophylaxis necessary      [] Already on Anticoagulation      Documentation of the Current Medications in the Medical Record    (x)  I have utilized all available immediate resources to obtain, update, or review the patient's current medications. (Satisfies MIPS Performance)  If Yes, Stop Here  ( )  The patient is not eligible for medication reconciliation; the patient is in an emergent medical situation where delaying treatment would jeopardize the patient's health. (MIPS Performance exception / exclusion)  ( )  I did not confirm, update or review the patient's current list of medications today.   (Does not satisfy MIPS Performance)        Advanced Care Plan    (x)  I confirmed that the patient's Advanced Care Plan is present, code status documented, or surrogate decision maker is listed in the patient's medical record. ( )  The patient's advanced care plan is not present because:  (select)   ( ) I confirmed today that the patient does not wish or was not able to name a surrogate decision maker or provide an [address] ( ) Hospice care is currently being provided or has been provided this calender year. ( )  I did not confirm today the presence of an [address] or surrogate decision maker documented within the patient's medical record. (Does not satisfy MIPS performance).             Julia Cadet MD, MD  Admitting Hospitalist

## 2022-06-21 NOTE — PROGRESS NOTES
Pt restless and thrashing around. Pulls at IV. IV leaking and is removed. IM 5 mg Haldol given to right quad. Brief wet, brandyn care provide and clean brief applied. Pt turned with 3 staff as pt is pushing against side rails. Sips of H2O offered and pt refused. Pt continues to say, \"Help me, please help me. \" Will continue to monitor.

## 2022-06-22 ENCOUNTER — APPOINTMENT (OUTPATIENT)
Dept: MRI IMAGING | Age: 77
DRG: 193 | End: 2022-06-22
Payer: MEDICARE

## 2022-06-22 PROBLEM — E43 SEVERE MALNUTRITION (HCC): Status: ACTIVE | Noted: 2022-06-22

## 2022-06-22 LAB
ABSOLUTE EOS #: 0.1 K/UL (ref 0–0.4)
ABSOLUTE LYMPH #: 2.4 K/UL (ref 1–4.8)
ABSOLUTE MONO #: 0.3 K/UL (ref 0–1)
ALBUMIN SERPL-MCNC: 2.8 G/DL (ref 3.5–5.2)
ALP BLD-CCNC: 87 U/L (ref 35–104)
ALT SERPL-CCNC: 9 U/L (ref 5–33)
AMMONIA: <10 UMOL/L (ref 11–51)
ANION GAP SERPL CALCULATED.3IONS-SCNC: 14 MMOL/L (ref 9–17)
AST SERPL-CCNC: 14 U/L
BASOPHILS # BLD: 0 % (ref 0–2)
BASOPHILS ABSOLUTE: 0 K/UL (ref 0–0.2)
BILIRUB SERPL-MCNC: 0.49 MG/DL (ref 0.3–1.2)
BUN BLDV-MCNC: 18 MG/DL (ref 8–23)
BUN/CREAT BLD: 19 (ref 9–20)
CALCIUM SERPL-MCNC: 9.1 MG/DL (ref 8.6–10.4)
CHLORIDE BLD-SCNC: 105 MMOL/L (ref 98–107)
CO2: 24 MMOL/L (ref 20–31)
CREAT SERPL-MCNC: 0.94 MG/DL (ref 0.5–0.9)
DIFFERENTIAL TYPE: YES
EOSINOPHILS RELATIVE PERCENT: 1 % (ref 0–5)
GFR AFRICAN AMERICAN: >60 ML/MIN
GFR NON-AFRICAN AMERICAN: 58 ML/MIN
GFR SERPL CREATININE-BSD FRML MDRD: ABNORMAL ML/MIN/{1.73_M2}
GLUCOSE BLD-MCNC: 131 MG/DL (ref 65–99)
GLUCOSE BLD-MCNC: 135 MG/DL (ref 65–99)
GLUCOSE BLD-MCNC: 138 MG/DL (ref 65–99)
GLUCOSE BLD-MCNC: 141 MG/DL (ref 65–99)
GLUCOSE BLD-MCNC: 142 MG/DL (ref 65–99)
GLUCOSE BLD-MCNC: 154 MG/DL (ref 70–99)
GLUCOSE BLD-MCNC: 161 MG/DL (ref 65–99)
HCT VFR BLD CALC: 33.3 % (ref 36–46)
HEMOGLOBIN: 10.5 G/DL (ref 12–16)
LYMPHOCYTES # BLD: 23 % (ref 15–40)
MCH RBC QN AUTO: 30.4 PG (ref 26–34)
MCHC RBC AUTO-ENTMCNC: 31.6 G/DL (ref 31–37)
MCV RBC AUTO: 96.4 FL (ref 80–100)
MONOCYTES # BLD: 3 % (ref 4–8)
PDW BLD-RTO: 16.6 % (ref 12.1–15.2)
PLATELET # BLD: 335 K/UL (ref 140–450)
POTASSIUM SERPL-SCNC: 3.4 MMOL/L (ref 3.7–5.3)
RBC # BLD: 3.46 M/UL (ref 4–5.2)
SEG NEUTROPHILS: 73 % (ref 47–75)
SEGMENTED NEUTROPHILS ABSOLUTE COUNT: 7.8 K/UL (ref 2.5–7)
SODIUM BLD-SCNC: 143 MMOL/L (ref 135–144)
TOTAL PROTEIN: 5.6 G/DL (ref 6.4–8.3)
WBC # BLD: 10.6 K/UL (ref 3.5–11)

## 2022-06-22 PROCEDURE — 70551 MRI BRAIN STEM W/O DYE: CPT

## 2022-06-22 PROCEDURE — 2580000003 HC RX 258: Performed by: INTERNAL MEDICINE

## 2022-06-22 PROCEDURE — 1200000000 HC SEMI PRIVATE

## 2022-06-22 PROCEDURE — 6370000000 HC RX 637 (ALT 250 FOR IP): Performed by: INTERNAL MEDICINE

## 2022-06-22 PROCEDURE — 87040 BLOOD CULTURE FOR BACTERIA: CPT

## 2022-06-22 PROCEDURE — 82947 ASSAY GLUCOSE BLOOD QUANT: CPT

## 2022-06-22 PROCEDURE — 82140 ASSAY OF AMMONIA: CPT

## 2022-06-22 PROCEDURE — 94761 N-INVAS EAR/PLS OXIMETRY MLT: CPT

## 2022-06-22 PROCEDURE — 6360000002 HC RX W HCPCS: Performed by: INTERNAL MEDICINE

## 2022-06-22 PROCEDURE — 80053 COMPREHEN METABOLIC PANEL: CPT

## 2022-06-22 PROCEDURE — 2500000003 HC RX 250 WO HCPCS: Performed by: INTERNAL MEDICINE

## 2022-06-22 PROCEDURE — 36415 COLL VENOUS BLD VENIPUNCTURE: CPT

## 2022-06-22 PROCEDURE — 85025 COMPLETE CBC W/AUTO DIFF WBC: CPT

## 2022-06-22 RX ORDER — TRAZODONE HYDROCHLORIDE 50 MG/1
50 TABLET ORAL NIGHTLY
Status: DISCONTINUED | OUTPATIENT
Start: 2022-06-22 | End: 2022-06-25

## 2022-06-22 RX ORDER — HYDRALAZINE HYDROCHLORIDE 20 MG/ML
10 INJECTION INTRAMUSCULAR; INTRAVENOUS ONCE
Status: COMPLETED | OUTPATIENT
Start: 2022-06-22 | End: 2022-06-22

## 2022-06-22 RX ORDER — METOPROLOL TARTRATE 5 MG/5ML
5 INJECTION INTRAVENOUS ONCE
Status: DISCONTINUED | OUTPATIENT
Start: 2022-06-22 | End: 2022-06-22

## 2022-06-22 RX ORDER — SODIUM CHLORIDE AND POTASSIUM CHLORIDE .9; .15 G/100ML; G/100ML
SOLUTION INTRAVENOUS CONTINUOUS
Status: DISCONTINUED | OUTPATIENT
Start: 2022-06-22 | End: 2022-06-25

## 2022-06-22 RX ORDER — HYDRALAZINE HYDROCHLORIDE 20 MG/ML
20 INJECTION INTRAMUSCULAR; INTRAVENOUS EVERY 8 HOURS PRN
Status: DISCONTINUED | OUTPATIENT
Start: 2022-06-22 | End: 2022-06-25

## 2022-06-22 RX ORDER — METOPROLOL TARTRATE 5 MG/5ML
10 INJECTION INTRAVENOUS ONCE
Status: COMPLETED | OUTPATIENT
Start: 2022-06-22 | End: 2022-06-22

## 2022-06-22 RX ORDER — POTASSIUM CHLORIDE 7.45 MG/ML
10 INJECTION INTRAVENOUS
Status: COMPLETED | OUTPATIENT
Start: 2022-06-22 | End: 2022-06-22

## 2022-06-22 RX ADMIN — METOPROLOL TARTRATE 5 MG: 5 INJECTION INTRAVENOUS at 06:04

## 2022-06-22 RX ADMIN — POTASSIUM CHLORIDE 10 MEQ: 10 INJECTION, SOLUTION INTRAVENOUS at 08:38

## 2022-06-22 RX ADMIN — ENOXAPARIN SODIUM 30 MG: 100 INJECTION SUBCUTANEOUS at 21:04

## 2022-06-22 RX ADMIN — INSULIN LISPRO 1 UNITS: 100 INJECTION, SOLUTION INTRAVENOUS; SUBCUTANEOUS at 02:20

## 2022-06-22 RX ADMIN — SODIUM CHLORIDE, PRESERVATIVE FREE 10 ML: 5 INJECTION INTRAVENOUS at 21:04

## 2022-06-22 RX ADMIN — SODIUM CHLORIDE: 9 INJECTION, SOLUTION INTRAVENOUS at 00:59

## 2022-06-22 RX ADMIN — METOPROLOL TARTRATE 10 MG: 5 INJECTION INTRAVENOUS at 00:45

## 2022-06-22 RX ADMIN — CEFTRIAXONE SODIUM 1000 MG: 1 INJECTION, POWDER, FOR SOLUTION INTRAMUSCULAR; INTRAVENOUS at 11:40

## 2022-06-22 RX ADMIN — METOPROLOL TARTRATE 5 MG: 5 INJECTION INTRAVENOUS at 11:40

## 2022-06-22 RX ADMIN — POTASSIUM CHLORIDE AND SODIUM CHLORIDE: 900; 150 INJECTION, SOLUTION INTRAVENOUS at 23:02

## 2022-06-22 RX ADMIN — POTASSIUM CHLORIDE AND SODIUM CHLORIDE: 900; 150 INJECTION, SOLUTION INTRAVENOUS at 05:54

## 2022-06-22 RX ADMIN — METOPROLOL TARTRATE 5 MG: 5 INJECTION INTRAVENOUS at 22:01

## 2022-06-22 RX ADMIN — HYDRALAZINE HYDROCHLORIDE 10 MG: 20 INJECTION INTRAMUSCULAR; INTRAVENOUS at 19:32

## 2022-06-22 RX ADMIN — HYDRALAZINE HYDROCHLORIDE 10 MG: 20 INJECTION INTRAMUSCULAR; INTRAVENOUS at 13:57

## 2022-06-22 RX ADMIN — POTASSIUM CHLORIDE 10 MEQ: 10 INJECTION, SOLUTION INTRAVENOUS at 06:10

## 2022-06-22 RX ADMIN — METOPROLOL TARTRATE 5 MG: 5 INJECTION INTRAVENOUS at 16:10

## 2022-06-22 RX ADMIN — AZITHROMYCIN MONOHYDRATE 500 MG: 500 INJECTION, POWDER, LYOPHILIZED, FOR SOLUTION INTRAVENOUS at 10:30

## 2022-06-22 RX ADMIN — HYDRALAZINE HYDROCHLORIDE 10 MG: 20 INJECTION INTRAMUSCULAR; INTRAVENOUS at 05:51

## 2022-06-22 RX ADMIN — ENOXAPARIN SODIUM 30 MG: 100 INJECTION SUBCUTANEOUS at 08:31

## 2022-06-22 ASSESSMENT — PAIN SCALES - PAIN ASSESSMENT IN ADVANCED DEMENTIA (PAINAD)
NEGVOCALIZATION: 1
FACIALEXPRESSION: 0
BODYLANGUAGE: 1
BODYLANGUAGE: 1
FACIALEXPRESSION: 0
BODYLANGUAGE: 1
CONSOLABILITY: 2
FACIALEXPRESSION: 0
FACIALEXPRESSION: 0
BREATHING: 0
NEGVOCALIZATION: 1
NEGVOCALIZATION: 1
CONSOLABILITY: 2
BODYLANGUAGE: 1
BREATHING: 0
FACIALEXPRESSION: 0
BODYLANGUAGE: 1
BREATHING: 0
BODYLANGUAGE: 1
BODYLANGUAGE: 0
TOTALSCORE: 4
CONSOLABILITY: 2
NEGVOCALIZATION: 1
BREATHING: 0
BREATHING: 0
NEGVOCALIZATION: 1
BODYLANGUAGE: 0
NEGVOCALIZATION: 1
CONSOLABILITY: 2
TOTALSCORE: 0
TOTALSCORE: 4
BREATHING: 0
BODYLANGUAGE: 1
CONSOLABILITY: 0
CONSOLABILITY: 2
TOTALSCORE: 3
CONSOLABILITY: 2
NEGVOCALIZATION: 0
TOTALSCORE: 4
CONSOLABILITY: 2
NEGVOCALIZATION: 1
BREATHING: 0
NEGVOCALIZATION: 1
CONSOLABILITY: 2
FACIALEXPRESSION: 0
CONSOLABILITY: 0
TOTALSCORE: 4
NEGVOCALIZATION: 1
FACIALEXPRESSION: 0
NEGVOCALIZATION: 1
BODYLANGUAGE: 1
BREATHING: 0
TOTALSCORE: 0
TOTALSCORE: 4
BODYLANGUAGE: 1
TOTALSCORE: 4
BREATHING: 0
BREATHING: 0
TOTALSCORE: 4
NEGVOCALIZATION: 1
TOTALSCORE: 4
BODYLANGUAGE: 1
FACIALEXPRESSION: 0
NEGVOCALIZATION: 1
BREATHING: 0
FACIALEXPRESSION: 0
BREATHING: 0
FACIALEXPRESSION: 0
BODYLANGUAGE: 1
FACIALEXPRESSION: 0
BREATHING: 0
BREATHING: 0
TOTALSCORE: 4
CONSOLABILITY: 2
CONSOLABILITY: 2
BREATHING: 0
NEGVOCALIZATION: 1
CONSOLABILITY: 2
TOTALSCORE: 4
FACIALEXPRESSION: 0
FACIALEXPRESSION: 0
BODYLANGUAGE: 1
NEGVOCALIZATION: 1
NEGVOCALIZATION: 1
CONSOLABILITY: 2
FACIALEXPRESSION: 0
BODYLANGUAGE: 1
BODYLANGUAGE: 1
TOTALSCORE: 4
BREATHING: 0
FACIALEXPRESSION: 0
NEGVOCALIZATION: 0
BREATHING: 0
TOTALSCORE: 4
FACIALEXPRESSION: 0
BODYLANGUAGE: 1
NEGVOCALIZATION: 1
CONSOLABILITY: 2
CONSOLABILITY: 2
FACIALEXPRESSION: 0
CONSOLABILITY: 2
NEGVOCALIZATION: 1
CONSOLABILITY: 1
FACIALEXPRESSION: 0
FACIALEXPRESSION: 0
BREATHING: 0
CONSOLABILITY: 2
TOTALSCORE: 4
BODYLANGUAGE: 0
TOTALSCORE: 4
TOTALSCORE: 2
BREATHING: 0
BODYLANGUAGE: 0
BODYLANGUAGE: 1
FACIALEXPRESSION: 0
BREATHING: 0
BODYLANGUAGE: 1
CONSOLABILITY: 2
CONSOLABILITY: 2
FACIALEXPRESSION: 0
NEGVOCALIZATION: 1
CONSOLABILITY: 2
FACIALEXPRESSION: 0
TOTALSCORE: 4
BREATHING: 0
FACIALEXPRESSION: 0
TOTALSCORE: 4
NEGVOCALIZATION: 1
BREATHING: 0
BODYLANGUAGE: 1
CONSOLABILITY: 2
TOTALSCORE: 4
BODYLANGUAGE: 1
FACIALEXPRESSION: 0
BODYLANGUAGE: 1
TOTALSCORE: 4
NEGVOCALIZATION: 1
NEGVOCALIZATION: 1
BODYLANGUAGE: 1
BREATHING: 0
BREATHING: 0
CONSOLABILITY: 2
CONSOLABILITY: 2
FACIALEXPRESSION: 0
CONSOLABILITY: 2
TOTALSCORE: 4
TOTALSCORE: 4
BREATHING: 0
NEGVOCALIZATION: 1
FACIALEXPRESSION: 0
BREATHING: 0
BODYLANGUAGE: 1

## 2022-06-22 NOTE — PLAN OF CARE
Problem: Discharge Planning  Goal: Discharge to home or other facility with appropriate resources  6/22/2022 1948 by Gali Thurman RN  Outcome: Progressing  6/22/2022 0913 by Veronika Wright  Outcome: Progressing  Flowsheets (Taken 6/22/2022 0845)  Discharge to home or other facility with appropriate resources:   Arrange for needed discharge resources and transportation as appropriate   Identify barriers to discharge with patient and caregiver   Identify discharge learning needs (meds, wound care, etc)   Refer to discharge planning if patient needs post-hospital services based on physician order or complex needs related to functional status, cognitive ability or social support system     Problem: Skin/Tissue Integrity  Goal: Absence of new skin breakdown  Description: 1. Monitor for areas of redness and/or skin breakdown  2. Assess vascular access sites hourly  3. Every 4-6 hours minimum:  Change oxygen saturation probe site  4. Every 4-6 hours:  If on nasal continuous positive airway pressure, respiratory therapy assess nares and determine need for appliance change or resting period.   6/22/2022 1948 by Gali Thurman RN  Outcome: Progressing  6/22/2022 0913 by Veronika Wright  Outcome: Progressing     Problem: Safety - Adult  Goal: Free from fall injury  6/22/2022 1948 by Gali Thurman RN  Outcome: Progressing  6/22/2022 0913 by Veronika Wright  Outcome: Progressing  Flowsheets (Taken 6/22/2022 0904)  Free From Fall Injury: Based on caregiver fall risk screen, instruct family/caregiver to ask for assistance with transferring infant if caregiver noted to have fall risk factors     Problem: Pain  Goal: Verbalizes/displays adequate comfort level or baseline comfort level  6/22/2022 1948 by Gali Thurman RN  Outcome: Progressing  6/22/2022 0913 by Veronika rWight  Outcome: Progressing     Problem: ABCDS Injury Assessment  Goal: Absence of physical injury  6/22/2022 1948 by Gali Thurman RN  Outcome: Progressing  6/22/2022 0913 by Jas Nice  Outcome: Progressing  Flowsheets (Taken 6/22/2022 0904)  Absence of Physical Injury: Implement safety measures based on patient assessment     Problem: Confusion  Goal: Confusion, delirium, dementia, or psychosis is improved or at baseline  Description: INTERVENTIONS:  1. Assess for possible contributors to thought disturbance, including medications, impaired vision or hearing, underlying metabolic abnormalities, dehydration, psychiatric diagnoses, and notify attending LIP  2. [place] high risk fall precautions, as indicated  3. Provide frequent short contacts to provide reality reorientation, refocusing and direction  4. Decrease environmental stimuli, including noise as appropriate  5. Monitor and intervene to maintain adequate nutrition, hydration, elimination, sleep and activity  6. If unable to ensure safety without constant attention obtain sitter and review sitter guidelines with assigned personnel  7.  Initiate Psychosocial CNS and Spiritual Care consult, as indicated  6/22/2022 1948 by Milagros Meehan RN  Outcome: Progressing  6/22/2022 0913 by Jas Nice  Outcome: Progressing     Problem: Chronic Conditions and Co-morbidities  Goal: Patient's chronic conditions and co-morbidity symptoms are monitored and maintained or improved  6/22/2022 1948 by Milagros Meehan RN  Outcome: Progressing  6/22/2022 0913 by Jas Nice  Outcome: Progressing     Problem: Cardiovascular - Adult  Goal: Maintains optimal cardiac output and hemodynamic stability  6/22/2022 1948 by Milagros Meehan RN  Outcome: Progressing  6/22/2022 0913 by Jas Nice  Outcome: Progressing  Flowsheets (Taken 6/22/2022 0845)  Maintains optimal cardiac output and hemodynamic stability: Monitor blood pressure and heart rate     Problem: Musculoskeletal - Adult  Goal: Maintain proper alignment of affected body part  6/22/2022 1948 by Milagros Meehan RN  Outcome: Progressing  6/22/2022 0913 by BERYL Dallas  Outcome: Progressing     Problem: Metabolic/Fluid and Electrolytes - Adult  Goal: Electrolytes maintained within normal limits  6/22/2022 1948 by Mattie Favre, RN  Outcome: Progressing  6/22/2022 0913 by Albertina Zamudio  Outcome: Progressing  Flowsheets (Taken 6/22/2022 0845)  Electrolytes maintained within normal limits: Monitor labs and assess patient for signs and symptoms of electrolyte imbalances  Goal: Glucose maintained within prescribed range  6/22/2022 1948 by Mattie Favre, RN  Outcome: Progressing  6/22/2022 0913 by Albertina Zamudio  Outcome: Progressing  Flowsheets (Taken 6/22/2022 0845)  Glucose maintained within prescribed range: Monitor blood glucose as ordered     Problem: Nutrition Deficit:  Goal: Optimize nutritional status  6/22/2022 1948 by Mattie Favre, RN  Outcome: Progressing  6/22/2022 0913 by Nathanael Bonilla RD, LD  Flowsheets (Taken 6/22/2022 0913)  Nutrient intake appropriate for improving, restoring, or maintaining nutritional needs: Assess nutritional status and recommend course of action  Note: Nutrition Problem #1: Severe malnutrition  Intervention: Food and/or Nutrient Delivery: Continue NPO

## 2022-06-22 NOTE — PROGRESS NOTES
Hospitalist Progress Note  6/22/2022 5:28 AM  Subjective:   Admit Date: 6/20/2022  PCP: Annamarie Healy DO    Interval History: Nursing reported that patient was less responsive over night but this morning she is awake, following commands, and trying to answer questions. She is aware of her son's name and the President but unable to tell me what year it is. She denies chest pain or SOB. Diet: Diet NPO  Medications:   Scheduled Meds:   potassium chloride  10 mEq IntraVENous Q1H    traZODone  50 mg Oral Nightly    metoprolol  5 mg IntraVENous Q6H    azithromycin  500 mg IntraVENous Q24H    allopurinol  100 mg Oral Daily    aspirin EC  81 mg Oral Daily    atorvastatin  40 mg Oral Daily    folic acid  776 mcg Oral Daily    magnesium oxide  800 mg Oral Nightly    pantoprazole  40 mg Oral QAM AC    sennosides-docusate sodium  1 tablet Oral Daily    sodium chloride flush  5-40 mL IntraVENous 2 times per day    cefTRIAXone (ROCEPHIN) IV  1,000 mg IntraVENous Q24H    insulin lispro  0-6 Units SubCUTAneous Q4H    enoxaparin  30 mg SubCUTAneous BID     Continuous Infusions:   0.9% NaCl with KCl 20 mEq      sodium chloride      dextrose         Patient's current medications documented, reviewed, and updated. CBC:   Recent Labs     06/20/22  1032 06/21/22  0436 06/22/22  0400   WBC 11.5* 12.9* 10.6   HGB 10.0* 10.8* 10.5*    363 335     BMP:    Recent Labs     06/20/22  1032 06/21/22  0436 06/22/22  0400    142 143   K 4.2 3.6* 3.4*   CL 99 101 105   CO2 25 24 24   BUN 34* 22 18   CREATININE 1.43* 1.08* 0.94*   GLUCOSE 214* 166* 154*     Hepatic:   Recent Labs     06/22/22  0400   AST 14   ALT 9   BILITOT 0.49   ALKPHOS 87     Troponin: No results for input(s): TROPONINI in the last 72 hours. BNP: No results for input(s): BNP in the last 72 hours. Lipids: No results for input(s): CHOL, HDL in the last 72 hours.     Invalid input(s): LDLCALCU  INR: No results for input(s): INR in the during the day and to sleep at night. Will order Trazodone nightly. With her improvement, will try to give medication with sips of water this am..  3.  Start on PT / OT as tolerated. Patient continues to require in patient admission secondary to the need for IV antibiotics, IV fluid, and monitoring of condition.     DVT prophylaxis:   [x] Lovenox   [] SCDs   [] SQ Heparin   [] Encourage ambulation, low risk for DVT, no chemical or mechanical    prophylaxis necessary      [] Already on Anticoagulation    Patient Active Problem List:     Type 2 diabetes mellitus with stage 3 chronic kidney disease, without long-term current use of insulin (HCC)     Essential hypertension, benign     Esophageal reflux     Irritable bowel syndrome     Seasonal allergies     Long term current use of anticoagulant therapy     Gout     Rectocele     Tubular adenoma of colon     Hiatal hernia     Sigmoid diverticulosis     Chronic back pain     Hypomagnesemia     Family history of colon cancer     History of recurrent deep vein thrombosis (DVT)     Hyperparathyroidism ([address]     Chronic renal disease, stage III ([address] [[id number]]     Change in mental status      Jessica Buckner MD, MD  Rounding Hospitalist

## 2022-06-22 NOTE — PROGRESS NOTES
Son states that left facial drooping is abnormal for his mother and that he feels it looks more significant than it was yesterday; request to call Dr. Gasper Hawkins with concerns. New order received for MRI w/o contrast of the head.

## 2022-06-22 NOTE — PROGRESS NOTES
Comprehensive Nutrition Assessment    Type and Reason for Visit:  Initial    Nutrition Recommendations/Plan:   1. Continue NPO diet. 2. F/u diet progression. 3. Recommend addition of probiotic. Malnutrition Assessment:  Malnutrition Status:  Severe malnutrition (06/22/22 0820)    Context:  Acute Illness     Findings of the 6 clinical characteristics of malnutrition:  Energy Intake:  Mild decrease in energy intake (Comment) (3 days known decreased PO)  Weight Loss:   (12% weight loss x 2 months)     Body Fat Loss: Moderate body fat loss Fat Overlying Ribs   Muscle Mass Loss: Moderate muscle mass loss Clavicles (pectoralis & deltoids)  Fluid Accumulation:  Mild Extremities (trace R/L UE, + 2 R/L LE)   Strength:  Not Performed    Nutrition Assessment:    Severe malnutrition r/t inadequate oral intakes aeb weight loss 12% x 2 months, fluid retention, NPO, moderate fat/muscle losses. A1c 6.9, glucose 131. On ATB therapy for pneumonia, recommend addition of probiotic. Hx CKD III and DM II. Nurse reports Pt turns her head and purses her lips if offered anything to eat. Nutrition Related Findings:    hypoactive bowel sounds, moderate fat/muscle losses, bony prominences evident  Wound Type: Skin Tears       Current Nutrition Intake & Therapies:    Average Meal Intake: NPO  Average Supplements Intake: NPO  Diet NPO    Anthropometric Measures:  Height: 5' 6\" (167.6 cm)  Ideal Body Weight (IBW): 130 lbs (59 kg)    Admission Body Weight: 213 lb (96.6 kg)  Current Body Weight: 213 lb (96.6 kg), 163.8 % IBW. Weight Source: Bed Scale  Current BMI (kg/m2): 34.4  Usual Body Weight: 242 lb (109.8 kg)  % Weight Change (Calculated): -12  Weight Adjustment For: No Adjustment                 BMI Categories: Obese Class 1 (BMI 30.0-34. 9)    Estimated Daily Nutrient Needs:  Energy Requirements Based On: Kcal/kg  Weight Used for Energy Requirements: Current  Energy (kcal/day): 6922-0720 (20-23/kg)  Weight Used for Protein Requirements: Ideal  Protein (g/day): 77-95g (1.3-+1.5g/kg)  Method Used for Fluid Requirements: 1 ml/kcal  Fluid (ml/day): 2222 ml (23/kg)    Nutrition Diagnosis:   · Severe malnutrition related to inadequate protein-energy intake as evidenced by weight loss,localized or generalized fluid accumulation      Nutrition Interventions:   Food and/or Nutrient Delivery: Continue NPO  Nutrition Education/Counseling: Education not appropriate  Coordination of Nutrition Care: Continue to monitor while inpatient       Goals:     Goals: PO intake 75% or greater       Lab Results   Component Value Date    LABA1C 6.9 05/13/2022     Recent Labs     06/21/22  0238 06/21/22  0650 06/21/22  1045 06/21/22  1423 06/21/22  1759 06/21/22  2204 06/22/22  0215 06/22/22  0545   POCGLU 160* 172* 163* 151* 134* 173* 161* 131*     Lab Results   Component Value Date    TRIG 104 06/22/2021    HDL 59 06/22/2021     Recent Labs     06/20/22  1032 06/21/22  0436 06/22/22  0400    142 143   K 4.2 3.6* 3.4*   CL 99 101 105   CO2 25 24 24   BUN 34* 22 18   CREATININE 1.43* 1.08* 0.94*   GLUCOSE 214* 166* 154*   ALT  --   --  9   ALKPHOS  --   --  87   GFR                     Lab Results   Component Value Date    LABALBU 2.8 06/22/2022      Nutrition Monitoring and Evaluation:   Behavioral-Environmental Outcomes: Knowledge or Skill  Food/Nutrient Intake Outcomes: Diet Advancement/Tolerance  Physical Signs/Symptoms Outcomes: Biochemical Data,Weight    Discharge Planning:     Too soon to determine     Kathy Negro, [address] [place] [address],   Contact: [phone]

## 2022-06-22 NOTE — PLAN OF CARE
No skin breakdown noted at this time. Pt did shake her head no when asked if she was having pain. Pt quiet thus far this shift.

## 2022-06-22 NOTE — PROGRESS NOTES
Pt is turned and repositioned. Pt's gaze stays to the left. There is a slight facial droop noted on left side. Last Haldol was 1304. Pt not moving around and not picking at electrodes or IV sites. Pt has white spots on both side of her tongue. Pt is asked if she feels good and she shakes her head no. Pt does states, \"Just leave me alone. \" Dr. Gasper Hawkins made aware of the above. New order received and entered into the computer. Will continue to monitor.

## 2022-06-22 NOTE — PROGRESS NOTES
Pt temp 100.5 axillary. Pt given 650 mg RS and positioned to her left side. Mouth gently swabbed. Pt noted to be biting her upper top lip and the right side of her tongue. Son at bedside and plays music for her. Pemberton draining clear very light yellow urine.

## 2022-06-22 NOTE — PROGRESS NOTES
[facility]  Physical Therapy    Date: 2022  Patient Name: Heladio Booker        : 1945       [] Pt Refusal           [x] Pt Unavailable due to:  Patient admitted with altered mental status and UTI on 22. Patient has been confused and unresponsive at times. Received PT evaluation order this date. Per nursing, patient remains essentially unresponsive to commands and not appropriate for evaluation at this time. Will recheck tomorrow.          CARMELO CABALLERO, PT,  Date: 2022

## 2022-06-22 NOTE — PROGRESS NOTES
[facility]  Occupational Therapy    Date: 2022  Patient Name: Shanice Driver        : 1945       [] Pt Refusal           [x] Pt Unavailable due to:  Pt admitted with altered mental status and UTI on 22 and has needed a 1:1 secondary to confusion. Throughout pt's current admission she has been confused and unresponsive at times. OT evaluation order received this date. However, per nursing, is inappropriate for evaluation as she remains essentially unresponsive to commands at this time. Will recheck tomorrow.          NEHA Izaguirre,OTR/L Date: 2022

## 2022-06-23 LAB
ABSOLUTE EOS #: 0.14 K/UL (ref 0–0.4)
ABSOLUTE LYMPH #: 3.29 K/UL (ref 1–4.8)
ABSOLUTE MONO #: 1.23 K/UL (ref 0–1)
ALBUMIN SERPL-MCNC: 2.8 G/DL (ref 3.5–5.2)
ALP BLD-CCNC: 90 U/L (ref 35–104)
ALT SERPL-CCNC: 10 U/L (ref 5–33)
ANION GAP SERPL CALCULATED.3IONS-SCNC: 15 MMOL/L (ref 9–17)
AST SERPL-CCNC: 15 U/L
BASOPHILS # BLD: ABNORMAL % (ref 0–2)
BASOPHILS ABSOLUTE: ABNORMAL K/UL (ref 0–0.2)
BILIRUB SERPL-MCNC: 0.43 MG/DL (ref 0.3–1.2)
BUN BLDV-MCNC: 18 MG/DL (ref 8–23)
C-REACTIVE PROTEIN: 30.2 MG/L (ref 0–5)
CALCIUM SERPL-MCNC: 9.2 MG/DL (ref 8.6–10.4)
CHLORIDE BLD-SCNC: 106 MMOL/L (ref 98–107)
CO2: 22 MMOL/L (ref 20–31)
CREAT SERPL-MCNC: 0.87 MG/DL (ref 0.5–0.9)
EKG ATRIAL RATE: 119 BPM
EKG P AXIS: 57 DEGREES
EKG P-R INTERVAL: 140 MS
EKG Q-T INTERVAL: 338 MS
EKG QRS DURATION: 88 MS
EKG QTC CALCULATION (BAZETT): 475 MS
EKG R AXIS: -16 DEGREES
EKG T AXIS: 24 DEGREES
EKG VENTRICULAR RATE: 119 BPM
EOSINOPHILS RELATIVE PERCENT: 1 % (ref 0–5)
FOLATE: >20 NG/ML
GFR AFRICAN AMERICAN: >60 ML/MIN
GFR NON-AFRICAN AMERICAN: >60 ML/MIN
GFR SERPL CREATININE-BSD FRML MDRD: ABNORMAL ML/MIN/{1.73_M2}
GLUCOSE BLD-MCNC: 131 MG/DL (ref 65–99)
GLUCOSE BLD-MCNC: 135 MG/DL (ref 65–99)
GLUCOSE BLD-MCNC: 143 MG/DL (ref 70–99)
GLUCOSE BLD-MCNC: 145 MG/DL (ref 65–99)
GLUCOSE BLD-MCNC: 149 MG/DL (ref 65–99)
GLUCOSE BLD-MCNC: 170 MG/DL (ref 65–99)
HCT VFR BLD CALC: 33.9 % (ref 36–46)
HEMOGLOBIN: 10.9 G/DL (ref 12–16)
LACTIC ACID: 0.9 MMOL/L (ref 0.5–2.2)
LV EF: 53 %
LVEF MODALITY: NORMAL
LYMPHOCYTES # BLD: 24 % (ref 15–40)
MCH RBC QN AUTO: 30.9 PG (ref 26–34)
MCHC RBC AUTO-ENTMCNC: 32.2 G/DL (ref 31–37)
MCV RBC AUTO: 95.8 FL (ref 80–100)
MONOCYTES # BLD: 9 % (ref 4–8)
MORPHOLOGY: ABNORMAL
MORPHOLOGY: ABNORMAL
PDW BLD-RTO: 16.3 % (ref 12.1–15.2)
PLATELET # BLD: 392 K/UL (ref 140–450)
POTASSIUM SERPL-SCNC: 4.8 MMOL/L (ref 3.7–5.3)
PROCALCITONIN: 0.11 NG/ML
RBC # BLD: 3.54 M/UL (ref 4–5.2)
SEG NEUTROPHILS: 66 % (ref 47–75)
SEGMENTED NEUTROPHILS ABSOLUTE COUNT: 9.04 K/UL (ref 2.5–7)
SODIUM BLD-SCNC: 143 MMOL/L (ref 135–144)
TOTAL PROTEIN: 5.8 G/DL (ref 6.4–8.3)
TROPONIN, HIGH SENSITIVITY: 61 NG/L (ref 0–14)
TROPONIN, HIGH SENSITIVITY: 61 NG/L (ref 0–14)
TROPONIN, HIGH SENSITIVITY: 66 NG/L (ref 0–14)
VITAMIN B-12: 592 PG/ML (ref 232–1245)
WBC # BLD: 13.7 K/UL (ref 3.5–11)

## 2022-06-23 PROCEDURE — 99222 1ST HOSP IP/OBS MODERATE 55: CPT | Performed by: INTERNAL MEDICINE

## 2022-06-23 PROCEDURE — 92610 EVALUATE SWALLOWING FUNCTION: CPT

## 2022-06-23 PROCEDURE — 84478 ASSAY OF TRIGLYCERIDES: CPT

## 2022-06-23 PROCEDURE — 93010 ELECTROCARDIOGRAM REPORT: CPT | Performed by: INTERNAL MEDICINE

## 2022-06-23 PROCEDURE — 86140 C-REACTIVE PROTEIN: CPT

## 2022-06-23 PROCEDURE — 82746 ASSAY OF FOLIC ACID SERUM: CPT

## 2022-06-23 PROCEDURE — 83735 ASSAY OF MAGNESIUM: CPT

## 2022-06-23 PROCEDURE — 84145 PROCALCITONIN (PCT): CPT

## 2022-06-23 PROCEDURE — 2580000003 HC RX 258: Performed by: INTERNAL MEDICINE

## 2022-06-23 PROCEDURE — 2500000003 HC RX 250 WO HCPCS: Performed by: INTERNAL MEDICINE

## 2022-06-23 PROCEDURE — 83605 ASSAY OF LACTIC ACID: CPT

## 2022-06-23 PROCEDURE — 1200000000 HC SEMI PRIVATE

## 2022-06-23 PROCEDURE — 84484 ASSAY OF TROPONIN QUANT: CPT

## 2022-06-23 PROCEDURE — 94761 N-INVAS EAR/PLS OXIMETRY MLT: CPT

## 2022-06-23 PROCEDURE — 6370000000 HC RX 637 (ALT 250 FOR IP): Performed by: INTERNAL MEDICINE

## 2022-06-23 PROCEDURE — 93306 TTE W/DOPPLER COMPLETE: CPT

## 2022-06-23 PROCEDURE — 82947 ASSAY GLUCOSE BLOOD QUANT: CPT

## 2022-06-23 PROCEDURE — 84100 ASSAY OF PHOSPHORUS: CPT

## 2022-06-23 PROCEDURE — 6360000002 HC RX W HCPCS: Performed by: INTERNAL MEDICINE

## 2022-06-23 PROCEDURE — 93005 ELECTROCARDIOGRAM TRACING: CPT | Performed by: INTERNAL MEDICINE

## 2022-06-23 PROCEDURE — 36415 COLL VENOUS BLD VENIPUNCTURE: CPT

## 2022-06-23 PROCEDURE — 80053 COMPREHEN METABOLIC PANEL: CPT

## 2022-06-23 PROCEDURE — 80048 BASIC METABOLIC PNL TOTAL CA: CPT

## 2022-06-23 PROCEDURE — 82607 VITAMIN B-12: CPT

## 2022-06-23 PROCEDURE — 85025 COMPLETE CBC W/AUTO DIFF WBC: CPT

## 2022-06-23 RX ORDER — SODIUM CHLORIDE 0.9 % (FLUSH) 0.9 %
5-40 SYRINGE (ML) INJECTION PRN
Status: DISCONTINUED | OUTPATIENT
Start: 2022-06-23 | End: 2022-06-27 | Stop reason: HOSPADM

## 2022-06-23 RX ORDER — SODIUM CHLORIDE 9 MG/ML
25 INJECTION, SOLUTION INTRAVENOUS PRN
Status: DISCONTINUED | OUTPATIENT
Start: 2022-06-23 | End: 2022-06-27 | Stop reason: HOSPADM

## 2022-06-23 RX ORDER — METOPROLOL TARTRATE 5 MG/5ML
10 INJECTION INTRAVENOUS EVERY 6 HOURS
Status: DISCONTINUED | OUTPATIENT
Start: 2022-06-23 | End: 2022-06-25

## 2022-06-23 RX ORDER — METOPROLOL TARTRATE 5 MG/5ML
10 INJECTION INTRAVENOUS EVERY 6 HOURS
Status: DISCONTINUED | OUTPATIENT
Start: 2022-06-23 | End: 2022-06-23

## 2022-06-23 RX ORDER — ENOXAPARIN SODIUM 100 MG/ML
40 INJECTION SUBCUTANEOUS DAILY
Status: DISCONTINUED | OUTPATIENT
Start: 2022-06-24 | End: 2022-06-27 | Stop reason: HOSPADM

## 2022-06-23 RX ORDER — INSULIN LISPRO 100 [IU]/ML
0-6 INJECTION, SOLUTION INTRAVENOUS; SUBCUTANEOUS EVERY 6 HOURS SCHEDULED
Status: DISCONTINUED | OUTPATIENT
Start: 2022-06-23 | End: 2022-06-26

## 2022-06-23 RX ORDER — 0.9 % SODIUM CHLORIDE 0.9 %
500 INTRAVENOUS SOLUTION INTRAVENOUS ONCE
Status: COMPLETED | OUTPATIENT
Start: 2022-06-23 | End: 2022-06-23

## 2022-06-23 RX ORDER — LIDOCAINE HYDROCHLORIDE 10 MG/ML
5 INJECTION, SOLUTION INFILTRATION; PERINEURAL ONCE
Status: DISCONTINUED | OUTPATIENT
Start: 2022-06-23 | End: 2022-06-27 | Stop reason: HOSPADM

## 2022-06-23 RX ORDER — SODIUM CHLORIDE 0.9 % (FLUSH) 0.9 %
5-40 SYRINGE (ML) INJECTION EVERY 12 HOURS SCHEDULED
Status: DISCONTINUED | OUTPATIENT
Start: 2022-06-23 | End: 2022-06-27 | Stop reason: HOSPADM

## 2022-06-23 RX ORDER — METOPROLOL TARTRATE 5 MG/5ML
15 INJECTION INTRAVENOUS EVERY 6 HOURS
Status: DISCONTINUED | OUTPATIENT
Start: 2022-06-23 | End: 2022-06-23 | Stop reason: CLARIF

## 2022-06-23 RX ADMIN — METOPROLOL TARTRATE 5 MG: 5 INJECTION INTRAVENOUS at 05:04

## 2022-06-23 RX ADMIN — AZITHROMYCIN MONOHYDRATE 500 MG: 500 INJECTION, POWDER, LYOPHILIZED, FOR SOLUTION INTRAVENOUS at 13:53

## 2022-06-23 RX ADMIN — ASCORBIC ACID, VITAMIN A PALMITATE, CHOLECALCIFEROL, THIAMINE HYDROCHLORIDE, RIBOFLAVIN-5 PHOSPHATE SODIUM, PYRIDOXINE HYDROCHLORIDE, NIACINAMIDE, DEXPANTHENOL, ALPHA-TOCOPHEROL ACETATE, VITAMIN K1, FOLIC ACID, BIOTIN, CYANOCOBALAMIN: 200; 3300; 200; 6; 3.6; 6; 40; 15; 10; 150; 600; 60; 5 INJECTION, SOLUTION INTRAVENOUS at 13:46

## 2022-06-23 RX ADMIN — HYDRALAZINE HYDROCHLORIDE 20 MG: 20 INJECTION INTRAMUSCULAR; INTRAVENOUS at 02:05

## 2022-06-23 RX ADMIN — ACETAMINOPHEN 650 MG: 325 TABLET, FILM COATED ORAL at 20:56

## 2022-06-23 RX ADMIN — PIPERACILLIN SODIUM AND TAZOBACTAM SODIUM 3375 MG: 3; 375 INJECTION, POWDER, FOR SOLUTION INTRAVENOUS at 08:24

## 2022-06-23 RX ADMIN — INSULIN LISPRO 1 UNITS: 100 INJECTION, SOLUTION INTRAVENOUS; SUBCUTANEOUS at 02:03

## 2022-06-23 RX ADMIN — Medication 800 MG: at 20:58

## 2022-06-23 RX ADMIN — POTASSIUM CHLORIDE AND SODIUM CHLORIDE: 900; 150 INJECTION, SOLUTION INTRAVENOUS at 13:51

## 2022-06-23 RX ADMIN — PIPERACILLIN SODIUM AND TAZOBACTAM SODIUM 3375 MG: 3; 375 INJECTION, POWDER, FOR SOLUTION INTRAVENOUS at 16:25

## 2022-06-23 RX ADMIN — ENOXAPARIN SODIUM 30 MG: 100 INJECTION SUBCUTANEOUS at 08:25

## 2022-06-23 RX ADMIN — METOPROLOL TARTRATE 10 MG: 5 INJECTION INTRAVENOUS at 16:17

## 2022-06-23 RX ADMIN — INSULIN LISPRO 1 UNITS: 100 INJECTION, SOLUTION INTRAVENOUS; SUBCUTANEOUS at 14:33

## 2022-06-23 RX ADMIN — I.V. FAT EMULSION 100 ML: 20 EMULSION INTRAVENOUS at 13:47

## 2022-06-23 RX ADMIN — METOPROLOL TARTRATE 10 MG: 5 INJECTION INTRAVENOUS at 22:32

## 2022-06-23 RX ADMIN — BACLOFEN 10 MG: 10 TABLET ORAL at 20:58

## 2022-06-23 RX ADMIN — METOPROLOL TARTRATE 10 MG: 5 INJECTION INTRAVENOUS at 10:20

## 2022-06-23 RX ADMIN — SODIUM CHLORIDE 500 ML: 9 INJECTION, SOLUTION INTRAVENOUS at 06:33

## 2022-06-23 RX ADMIN — TRAZODONE HYDROCHLORIDE 50 MG: 50 TABLET ORAL at 20:58

## 2022-06-23 ASSESSMENT — PAIN SCALES - PAIN ASSESSMENT IN ADVANCED DEMENTIA (PAINAD)
BODYLANGUAGE: 0
CONSOLABILITY: 0
BODYLANGUAGE: 0
BODYLANGUAGE: 0
BREATHING: 0
CONSOLABILITY: 0
CONSOLABILITY: 0
BODYLANGUAGE: 0
BODYLANGUAGE: 0
BREATHING: 0
FACIALEXPRESSION: 0
TOTALSCORE: 0
FACIALEXPRESSION: 0
TOTALSCORE: 0
NEGVOCALIZATION: 0
NEGVOCALIZATION: 0
TOTALSCORE: 0
BREATHING: 0
NEGVOCALIZATION: 0
TOTALSCORE: 1
CONSOLABILITY: 0
BREATHING: 0
NEGVOCALIZATION: 1
TOTALSCORE: 0
CONSOLABILITY: 0
BREATHING: 0
BODYLANGUAGE: 0
FACIALEXPRESSION: 0
BODYLANGUAGE: 0
CONSOLABILITY: 0
NEGVOCALIZATION: 0
CONSOLABILITY: 0
FACIALEXPRESSION: 0
NEGVOCALIZATION: 0
BREATHING: 0
FACIALEXPRESSION: 0
TOTALSCORE: 0
NEGVOCALIZATION: 0
FACIALEXPRESSION: 0
BREATHING: 0
TOTALSCORE: 0
FACIALEXPRESSION: 0

## 2022-06-23 ASSESSMENT — PAIN SCALES - GENERAL
PAINLEVEL_OUTOF10: 0
PAINLEVEL_OUTOF10: 0
PAINLEVEL_OUTOF10: 5
PAINLEVEL_OUTOF10: 0

## 2022-06-23 ASSESSMENT — PAIN DESCRIPTION - LOCATION: LOCATION: LEG

## 2022-06-23 ASSESSMENT — PAIN DESCRIPTION - ORIENTATION: ORIENTATION: RIGHT

## 2022-06-23 ASSESSMENT — PAIN DESCRIPTION - DESCRIPTORS: DESCRIPTORS: ACHING

## 2022-06-23 NOTE — PROGRESS NOTES
[facility]  Occupational Therapy    Date: 2022  Patient Name: Nga Lima        : 1945       [] Pt Refusal           [x] Pt Unavailable due to: Spoke with RN regarding patients status. Per RN patient is not appropriate for therapy evaluation at this time. Spoke with OTR and informed her regarding patient's status. Will recheck tomorrow.        SERGE Gonzalez  Date: 2022

## 2022-06-23 NOTE — CONSULTS
Zosyn Dosing by Pharmacy - Initial Note   TODAY'S DATE:  6/23/2022  Patient name, age:  Jc Gomes, 78 y.o. Indication: CAP, possible UTI    Additional antimicrobials:  Rocephin (Zithromax d/c'd prior to Zosyn start)    Allergies:  Codeine, Lisinopril, Percocet [oxycodone-acetaminophen], Adhesive tape, and Norco [hydrocodone-acetaminophen]     Actual Weight:    Wt Readings from Last 1 Encounters:   06/23/22 204 lb 3.2 oz (92.6 kg)     Labs/Ancillary Data  Estimated Creatinine Clearance: 65 mL/min (based on SCr of 0.87 mg/dL). Recent Labs     06/21/22  0436 06/22/22  0400 06/23/22  0631   CREATININE 1.08* 0.94* 0.87   BUN 22 18 18   WBC 12.9* 10.6 13.7*     No results found for: PROCAL    Intake/Output Summary (Last 24 hours) at 6/23/2022 0734  Last data filed at 6/23/2022 0500  Gross per 24 hour   Intake 5743.45 ml   Output 875 ml   Net 4868.45 ml     Temp: 99.6      Culture Date / Source  /  Results  6/13                  Urine        E. coli  6/22                  Blood        No growth 13 hours      PLAN   Will initiate Zosyn 3.375 mg over 4 hours every 8 hours      Thank you for the consult. Pharmacy will continue to follow.     Wendy Whitaker, PharmD 6/23/2022 7:38 AM

## 2022-06-23 NOTE — PROGRESS NOTES
Hospitalist Progress Note  6/23/2022 6:08 AM  Subjective:   Admit Date: 6/20/2022  PCP: Veronika Guevara DO    Interval History: Eric Fernandes continues to be very confused. She denies having pain. Eric Fernandes will follow commands but very weak. She denies chest pain or SOB. Continued to be tachycardic overnight. Nursing unable to get her to attempt to take medication. MRI reviewed yesterday. Diet: Diet NPO  Medications:   Scheduled Meds:   cefTRIAXone (ROCEPHIN) IV  2,000 mg IntraVENous Q24H    metoprolol  10 mg IntraVENous Q6H    traZODone  50 mg Oral Nightly    azithromycin  500 mg IntraVENous Q24H    allopurinol  100 mg Oral Daily    aspirin EC  81 mg Oral Daily    atorvastatin  40 mg Oral Daily    folic acid  890 mcg Oral Daily    magnesium oxide  800 mg Oral Nightly    pantoprazole  40 mg Oral QAM AC    sennosides-docusate sodium  1 tablet Oral Daily    sodium chloride flush  5-40 mL IntraVENous 2 times per day    insulin lispro  0-6 Units SubCUTAneous Q4H    enoxaparin  30 mg SubCUTAneous BID     Continuous Infusions:   0.9% NaCl with KCl 20 mEq 75 mL/hr at 06/22/22 2302    sodium chloride      dextrose         Patient's current medications documented, reviewed, and updated. CBC:   Recent Labs     06/20/22  1032 06/21/22  0436 06/22/22  0400   WBC 11.5* 12.9* 10.6   HGB 10.0* 10.8* 10.5*    363 335     BMP:    Recent Labs     06/20/22  1032 06/21/22  0436 06/22/22  0400    142 143   K 4.2 3.6* 3.4*   CL 99 101 105   CO2 25 24 24   BUN 34* 22 18   CREATININE 1.43* 1.08* 0.94*   GLUCOSE 214* 166* 154*     Hepatic:   Recent Labs     06/22/22  0400   AST 14   ALT 9   BILITOT 0.49   ALKPHOS 87     Troponin: No results for input(s): TROPONINI in the last 72 hours. BNP: No results for input(s): BNP in the last 72 hours. Lipids: No results for input(s): CHOL, HDL in the last 72 hours.     Invalid input(s): LDLCALCU  INR: No results for input(s): INR in the last 72  Placed on IV Lopressor and Hydralazine. 11.  GERD - on PPI. 12.  H/O Gout - on Allopurinol.    13. Tachycardia - continues to be tachycardic despite using IV Lopressor 5 mg every 6 hours. Plan:  1. At this time Kalia Case is not showing significant improvement on her current antibiotics. Will change Rocephin to Zosyn. 2.  Increase IV Lopressor to 10 mg every 6 hours. Will consult Dr. Namrata Wiggins for further evaluation of tachycardia with an ECHO today. Repeat labs today with a troponin. 3.  Increase IV rate to 100 ml /hr with the drop in her urine output. Start on TPN today at 50 ml/hr. Will drop the IV rate at that time to keep total IV fluids at 100 ml /hr.  4.  Given 500 ml fluid bolus this am to see if heart rate improves with fluid. Although her creatinine has improved with fluids, she does look dry on exam.      Patient continues to require in patient admission secondary to the need for IV antibiotics, IV hydration, and close monitoring of condition.       DVT prophylaxis:   [x] Lovenox   [] SCDs   [] SQ Heparin   [] Encourage ambulation, low risk for DVT, no chemical or mechanical    prophylaxis necessary      [] Already on Anticoagulation    Patient Active Problem List:     Type 2 diabetes mellitus with stage 3 chronic kidney disease, without long-term current use of insulin (HCC)     Essential hypertension, benign     Esophageal reflux     Irritable bowel syndrome     Seasonal allergies     Long term current use of anticoagulant therapy     Gout     Rectocele     Tubular adenoma of colon     Hiatal hernia     Sigmoid diverticulosis     Chronic back pain     Hypomagnesemia     Family history of colon cancer     History of recurrent deep vein thrombosis (DVT)     Hyperparathyroidism ([address]     Chronic renal disease, stage III ([address] [203569]     Change in mental status     Severe malnutrition ([address]      Lloyd Richards MD, MD  Rounding Hospitalist

## 2022-06-23 NOTE — PROGRESS NOTES
[facility]  Facility/Department: [address] MED SURG TELEMETRY  Speech Language Pathology  Clinical Bedside Swallow Evaluation    NAME:Ragini Brambila  : [birth year] (78 y.o.) MRN: [id number]  ROOM: 0258/0258-01  ADMISSION DATE: 2022  PATIENT DIAGNOSIS(ES): Acute on chronic renal insufficiency [N28.9, N18.9]  Change in mental status [R41.82]  Urinary tract infection with hematuria, site unspecified [N39.0, R31.9]  Altered mental status, unspecified altered mental status type [R41.82]       Diagnosis    Severe malnutrition ([address]    Change in mental status    Chronic renal disease, stage III ([address] [013180]    Hyperparathyroidism ([address]    History of recurrent deep vein thrombosis (DVT)    Family history of colon cancer    Hypomagnesemia    Chronic back pain    Tubular adenoma of colon    Hiatal hernia    Sigmoid diverticulosis    Rectocele    Gout    Long term current use of anticoagulant therapy    Seasonal allergies    Type 2 diabetes mellitus with stage 3 chronic kidney disease, without long-term current use of insulin (HCC)    Essential hypertension, benign    Esophageal reflux    Irritable bowel syndrome       Past Medical History:   Diagnosis Date    Allergic rhinitis     Chronic kidney disease, stage III (moderate) (HCC)     Deep vein thrombosis (DVT) ([address] 10/24/2012    Elbow fracture, left     Fall     Gastric ulcer     Gout     Hx of blood clots     Following last back surgery     Hypertension     Nausea & vomiting     Presence of IVC filter     Type II or unspecified type diabetes mellitus without mention of complication, not stated as uncontrolled        Allergies   Allergen Reactions    Codeine Itching    Lisinopril Other (See Comments)     cough    Percocet [Oxycodone-Acetaminophen] Itching and Nausea Only    Adhesive Tape Itching, Rash and Other (See Comments)     Tape \"takes the skin off\"    Norco [Hydrocodone-Acetaminophen] Nausea And Vomiting     DATE ONSET: 06/20/22    Date of Evaluation: 6/23/2022    Evaluating Therapist: Gabrielle Early M.S., [facility]     Dysphagia Diagnosis  Dysphagia Diagnosis: Mild oral stage dysphagia  Dysphagia Impression : Pt presents with mild oral dysphagia. Recommended Diet  Recommendations: Total feed,Assistance with meals,Supervision with meals,Dysphagia treatment,Therapeutic feeds with SLP only  Diet Solids Recommendation: Pureed  Liquid Consistency Recommendation: Thin  Recommended Form of Meds: Crushed in puree as able  Compensatory Swallowing Strategies : Eat/Feed slowly,Upright as possible for all oral intake,Total feed,Small bites/sips,External pacing    Reason for Referral  Beryle Quirk was referred for a bedside swallow evaluation to assess the efficiency of her swallow function, identify signs and symptoms of aspiration, identify risk factors, and make recommendations regarding safe dietary consistencies, effective compensatory strategies, and safe eating environment.     General  Chart Reviewed: Yes  Behavior/Cognition: Alert,Cooperative,Confused  Communication Observation: Functional  Follows Directions: Simple  Dentition: Some missing teeth  Patient Positioning: Upright in bed  Baseline Vocal Quality: Normal  Volitional Cough: Strong  Prior Dysphagia History: None reported  Consistencies Administered: Soft and Bite-Sized,Pureed,Ice Chips,Thin - straw    Vision and Hearing  Vision  Vision: Within Functional Limits  Hearing  Hearing: Within functional limits    Current Diet level  Current Diet : NPO    Oral Motor  Labial: Left droop  Dentition: Other (comment) (missing teeth)  Oral Hygiene: Xerostomia  Lingual: Decreased strength,Other (comment) (reduced ROM)  Velum: Unable to visualize  Mandible: No impairment    Oral/Pharyngeal Phase  Oral Phase - Comment: Pt presents with oral dysphagia characterized by prolonged, inadequate mastication of soft solids with pt expectorating food, and minimal labial spillage with thin

## 2022-06-23 NOTE — PROGRESS NOTES
Cardiology   Full note dictated    1. Tachycardia which appears to be sinus tachycardia  2. Generalized weakness  3. Left facial droop, however, both sides appear to move symmetrically, perhaps slightly less on left  4. 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 Hx of PAF, not anticoagulated because of fall risk  6. UTI with encephalopathy   7. Symmetrical strength and movement of all 4 extremities    EKG: pending    She presents with confusion and UTI. Confusion has continued in spite of ATB;s. When I see her she remembers me. Answers questions appropriately although slowly. Was able to move all extremities and facial expressions on command. She is laying with her face turned towards left, and it appeared she had a left facial droop. However, the left side appeared to move with forced smile and raising of eyebrows, although, I didn't think it moved as much. Slight ptosis of left eye. MRI shows no new CVA. Echo is pending. No indication of acute cardiac event. Rhythm appears to be sinus tachycardia, although still awaiting EKG. Her ekg did show sinus tachycardia. Will review echo when done to see if there is any pathology which could be causing her sinus tachycardia such as pericardial effusion, etc.    Lopressor has just been increased to 10 mg IV q 6 hours. Will see response to this dose and adjust upward if necessary.     Thanks, Oscar Whitlock MD

## 2022-06-23 NOTE — PROGRESS NOTES
SW met with pt's son and sister to discuss code status and a little about discharge planning. SW presented code status explanation paper and went over this with them. Pt's son states that pt would not want any heroic measures or to be put on a ventilator. Pt's son chooses DNR CCA and signs code status paper. Nursing notified of son's decision and paper left for Dr Delfin Aragon. SW also spoke with son and sister regarding pt having exhausted all of her skilled days at [facility] per their admissions rep there. Son understands that he would have to private pay for pt to go there at discharge and then they could bill therapy through Part B. Also discussed that Dr Joe Genera decide to transfer her to another hospital where there are specialists if needed or if situation is not improving the possibility of hospice. Sister asks about location of pt with hospice and SW explained where hospice could be offered and out of pocket costs with this and also that family would be responsible for care if pt went home. Pt does not have any local family other than her son and everyone else lives in [facility]. Discussed option of medicaid as well. Son is not ready to make any decisions about discharge planning and wants to see how pt does today. FABIAN will continue to follow.  Benny Rodriguez MSW LSW 6/23/2022

## 2022-06-24 LAB
ABSOLUTE EOS #: 0.5 K/UL (ref 0–0.4)
ABSOLUTE LYMPH #: 2.7 K/UL (ref 1–4.8)
ABSOLUTE MONO #: 0.8 K/UL (ref 0–1)
ALBUMIN SERPL-MCNC: 2.7 G/DL (ref 3.5–5.2)
ALP BLD-CCNC: 80 U/L (ref 35–104)
ALT SERPL-CCNC: 9 U/L (ref 5–33)
ANION GAP SERPL CALCULATED.3IONS-SCNC: 11 MMOL/L (ref 9–17)
AST SERPL-CCNC: 10 U/L
BASOPHILS # BLD: 1 % (ref 0–2)
BASOPHILS ABSOLUTE: 0.1 K/UL (ref 0–0.2)
BILIRUB SERPL-MCNC: 0.56 MG/DL (ref 0.3–1.2)
BUN BLDV-MCNC: 21 MG/DL (ref 8–23)
BUN/CREAT BLD: 25 (ref 9–20)
C-REACTIVE PROTEIN: 21.6 MG/L (ref 0–5)
CALCIUM SERPL-MCNC: 8.9 MG/DL (ref 8.6–10.4)
CHLORIDE BLD-SCNC: 108 MMOL/L (ref 98–107)
CO2: 24 MMOL/L (ref 20–31)
CREAT SERPL-MCNC: 0.84 MG/DL (ref 0.5–0.9)
DIFFERENTIAL TYPE: YES
EOSINOPHILS RELATIVE PERCENT: 4 % (ref 0–5)
GFR AFRICAN AMERICAN: >60 ML/MIN
GFR NON-AFRICAN AMERICAN: >60 ML/MIN
GFR SERPL CREATININE-BSD FRML MDRD: ABNORMAL ML/MIN/{1.73_M2}
GLUCOSE BLD-MCNC: 153 MG/DL (ref 65–99)
GLUCOSE BLD-MCNC: 161 MG/DL (ref 65–99)
GLUCOSE BLD-MCNC: 163 MG/DL (ref 70–99)
GLUCOSE BLD-MCNC: 166 MG/DL (ref 65–99)
GLUCOSE BLD-MCNC: 183 MG/DL (ref 65–99)
HCT VFR BLD CALC: 30.3 % (ref 36–46)
HEMOGLOBIN: 9.7 G/DL (ref 12–16)
LYMPHOCYTES # BLD: 24 % (ref 15–40)
MCH RBC QN AUTO: 30.7 PG (ref 26–34)
MCHC RBC AUTO-ENTMCNC: 31.9 G/DL (ref 31–37)
MCV RBC AUTO: 96.2 FL (ref 80–100)
MONOCYTES # BLD: 7 % (ref 4–8)
PDW BLD-RTO: 16.9 % (ref 12.1–15.2)
PLATELET # BLD: 306 K/UL (ref 140–450)
POTASSIUM SERPL-SCNC: 3.9 MMOL/L (ref 3.7–5.3)
RBC # BLD: 3.15 M/UL (ref 4–5.2)
SEG NEUTROPHILS: 64 % (ref 47–75)
SEGMENTED NEUTROPHILS ABSOLUTE COUNT: 7.1 K/UL (ref 2.5–7)
SODIUM BLD-SCNC: 143 MMOL/L (ref 135–144)
TOTAL PROTEIN: 5 G/DL (ref 6.4–8.3)
WBC # BLD: 11.1 K/UL (ref 3.5–11)

## 2022-06-24 PROCEDURE — 6370000000 HC RX 637 (ALT 250 FOR IP): Performed by: INTERNAL MEDICINE

## 2022-06-24 PROCEDURE — 2500000003 HC RX 250 WO HCPCS: Performed by: INTERNAL MEDICINE

## 2022-06-24 PROCEDURE — 82947 ASSAY GLUCOSE BLOOD QUANT: CPT

## 2022-06-24 PROCEDURE — 1200000000 HC SEMI PRIVATE

## 2022-06-24 PROCEDURE — 80053 COMPREHEN METABOLIC PANEL: CPT

## 2022-06-24 PROCEDURE — 2580000003 HC RX 258: Performed by: INTERNAL MEDICINE

## 2022-06-24 PROCEDURE — 85025 COMPLETE CBC W/AUTO DIFF WBC: CPT

## 2022-06-24 PROCEDURE — 6360000002 HC RX W HCPCS: Performed by: INTERNAL MEDICINE

## 2022-06-24 PROCEDURE — 36415 COLL VENOUS BLD VENIPUNCTURE: CPT

## 2022-06-24 PROCEDURE — 94761 N-INVAS EAR/PLS OXIMETRY MLT: CPT

## 2022-06-24 PROCEDURE — 97163 PT EVAL HIGH COMPLEX 45 MIN: CPT

## 2022-06-24 PROCEDURE — 86140 C-REACTIVE PROTEIN: CPT

## 2022-06-24 RX ADMIN — I.V. FAT EMULSION 12.6 ML/HR: 20 EMULSION INTRAVENOUS at 14:22

## 2022-06-24 RX ADMIN — AZITHROMYCIN MONOHYDRATE 500 MG: 500 INJECTION, POWDER, LYOPHILIZED, FOR SOLUTION INTRAVENOUS at 06:46

## 2022-06-24 RX ADMIN — ASPIRIN 81 MG: 81 TABLET, COATED ORAL at 09:04

## 2022-06-24 RX ADMIN — METOPROLOL TARTRATE 10 MG: 5 INJECTION INTRAVENOUS at 21:44

## 2022-06-24 RX ADMIN — POTASSIUM CHLORIDE AND SODIUM CHLORIDE: 900; 150 INJECTION, SOLUTION INTRAVENOUS at 12:11

## 2022-06-24 RX ADMIN — ACETAMINOPHEN 650 MG: 325 TABLET, FILM COATED ORAL at 14:09

## 2022-06-24 RX ADMIN — ALLOPURINOL 100 MG: 100 TABLET ORAL at 09:04

## 2022-06-24 RX ADMIN — ACETAMINOPHEN 650 MG: 325 TABLET, FILM COATED ORAL at 21:43

## 2022-06-24 RX ADMIN — METOPROLOL TARTRATE 10 MG: 5 INJECTION INTRAVENOUS at 09:04

## 2022-06-24 RX ADMIN — DOCUSATE SODIUM 50 MG AND SENNOSIDES 8.6 MG 1 TABLET: 8.6; 5 TABLET, FILM COATED ORAL at 09:04

## 2022-06-24 RX ADMIN — METOPROLOL TARTRATE 10 MG: 5 INJECTION INTRAVENOUS at 16:28

## 2022-06-24 RX ADMIN — SODIUM CHLORIDE, PRESERVATIVE FREE 10 ML: 5 INJECTION INTRAVENOUS at 09:03

## 2022-06-24 RX ADMIN — INSULIN LISPRO 1 UNITS: 100 INJECTION, SOLUTION INTRAVENOUS; SUBCUTANEOUS at 11:52

## 2022-06-24 RX ADMIN — ATORVASTATIN CALCIUM 40 MG: 40 TABLET, FILM COATED ORAL at 09:04

## 2022-06-24 RX ADMIN — INSULIN LISPRO 1 UNITS: 100 INJECTION, SOLUTION INTRAVENOUS; SUBCUTANEOUS at 00:06

## 2022-06-24 RX ADMIN — TRAZODONE HYDROCHLORIDE 50 MG: 50 TABLET ORAL at 21:44

## 2022-06-24 RX ADMIN — Medication 800 MG: at 21:44

## 2022-06-24 RX ADMIN — INSULIN LISPRO 1 UNITS: 100 INJECTION, SOLUTION INTRAVENOUS; SUBCUTANEOUS at 17:58

## 2022-06-24 RX ADMIN — INSULIN LISPRO 1 UNITS: 100 INJECTION, SOLUTION INTRAVENOUS; SUBCUTANEOUS at 06:34

## 2022-06-24 RX ADMIN — PIPERACILLIN SODIUM AND TAZOBACTAM SODIUM 3375 MG: 3; 375 INJECTION, POWDER, FOR SOLUTION INTRAVENOUS at 16:34

## 2022-06-24 RX ADMIN — SODIUM CHLORIDE, PRESERVATIVE FREE 10 ML: 5 INJECTION INTRAVENOUS at 21:54

## 2022-06-24 RX ADMIN — PIPERACILLIN SODIUM AND TAZOBACTAM SODIUM 3375 MG: 3; 375 INJECTION, POWDER, FOR SOLUTION INTRAVENOUS at 00:12

## 2022-06-24 RX ADMIN — PANTOPRAZOLE SODIUM 40 MG: 40 TABLET, DELAYED RELEASE ORAL at 06:33

## 2022-06-24 RX ADMIN — FOLIC ACID TAB 400 MCG 400 MCG: 400 TAB at 09:04

## 2022-06-24 RX ADMIN — METOPROLOL TARTRATE 10 MG: 5 INJECTION INTRAVENOUS at 04:25

## 2022-06-24 RX ADMIN — ASCORBIC ACID, VITAMIN A PALMITATE, CHOLECALCIFEROL, THIAMINE HYDROCHLORIDE, RIBOFLAVIN-5 PHOSPHATE SODIUM, PYRIDOXINE HYDROCHLORIDE, NIACINAMIDE, DEXPANTHENOL, ALPHA-TOCOPHEROL ACETATE, VITAMIN K1, FOLIC ACID, BIOTIN, CYANOCOBALAMIN: 200; 3300; 200; 6; 3.6; 6; 40; 15; 10; 150; 600; 60; 5 INJECTION, SOLUTION INTRAVENOUS at 14:13

## 2022-06-24 RX ADMIN — PIPERACILLIN SODIUM AND TAZOBACTAM SODIUM 3375 MG: 3; 375 INJECTION, POWDER, FOR SOLUTION INTRAVENOUS at 09:21

## 2022-06-24 RX ADMIN — ENOXAPARIN SODIUM 40 MG: 100 INJECTION SUBCUTANEOUS at 09:03

## 2022-06-24 ASSESSMENT — PAIN SCALES - PAIN ASSESSMENT IN ADVANCED DEMENTIA (PAINAD)
TOTALSCORE: 0
FACIALEXPRESSION: 0
BREATHING: 0
NEGVOCALIZATION: 0
FACIALEXPRESSION: 0
TOTALSCORE: 0
TOTALSCORE: 0
CONSOLABILITY: 0
BODYLANGUAGE: 0
NEGVOCALIZATION: 0
CONSOLABILITY: 0
BREATHING: 0
BODYLANGUAGE: 0
FACIALEXPRESSION: 0
TOTALSCORE: 0
BODYLANGUAGE: 0
CONSOLABILITY: 0
TOTALSCORE: 0
BREATHING: 0
FACIALEXPRESSION: 0
BREATHING: 0
CONSOLABILITY: 0
BODYLANGUAGE: 0
BODYLANGUAGE: 0
TOTALSCORE: 0
BODYLANGUAGE: 0
FACIALEXPRESSION: 0
CONSOLABILITY: 0
CONSOLABILITY: 0
FACIALEXPRESSION: 0
CONSOLABILITY: 0
NEGVOCALIZATION: 0
BREATHING: 0
NEGVOCALIZATION: 0
BODYLANGUAGE: 0
TOTALSCORE: 0
FACIALEXPRESSION: 0
NEGVOCALIZATION: 0
BREATHING: 0
FACIALEXPRESSION: 0
BREATHING: 0
NEGVOCALIZATION: 0
NEGVOCALIZATION: 0
BREATHING: 0
NEGVOCALIZATION: 0
TOTALSCORE: 0
CONSOLABILITY: 0
NEGVOCALIZATION: 0
BODYLANGUAGE: 0
CONSOLABILITY: 0
TOTALSCORE: 0
BREATHING: 0
FACIALEXPRESSION: 0
TOTALSCORE: 0
NEGVOCALIZATION: 0
FACIALEXPRESSION: 0
BODYLANGUAGE: 0
BODYLANGUAGE: 0
BREATHING: 0
CONSOLABILITY: 0

## 2022-06-24 ASSESSMENT — PAIN SCALES - GENERAL
PAINLEVEL_OUTOF10: 0
PAINLEVEL_OUTOF10: 6
PAINLEVEL_OUTOF10: 0
PAINLEVEL_OUTOF10: 3
PAINLEVEL_OUTOF10: 0
PAINLEVEL_OUTOF10: 3

## 2022-06-24 ASSESSMENT — PAIN DESCRIPTION - FREQUENCY: FREQUENCY: CONTINUOUS

## 2022-06-24 ASSESSMENT — PAIN DESCRIPTION - ORIENTATION
ORIENTATION: LOWER
ORIENTATION: LOWER

## 2022-06-24 ASSESSMENT — PAIN DESCRIPTION - PAIN TYPE: TYPE: CHRONIC PAIN

## 2022-06-24 ASSESSMENT — PAIN DESCRIPTION - LOCATION
LOCATION: BACK
LOCATION: BACK

## 2022-06-24 ASSESSMENT — PAIN - FUNCTIONAL ASSESSMENT
PAIN_FUNCTIONAL_ASSESSMENT: PREVENTS OR INTERFERES WITH MANY ACTIVE NOT PASSIVE ACTIVITIES
PAIN_FUNCTIONAL_ASSESSMENT: PREVENTS OR INTERFERES SOME ACTIVE ACTIVITIES AND ADLS

## 2022-06-24 ASSESSMENT — PAIN SCALES - WONG BAKER: WONGBAKER_NUMERICALRESPONSE: 0

## 2022-06-24 ASSESSMENT — PAIN DESCRIPTION - DESCRIPTORS
DESCRIPTORS: ACHING
DESCRIPTORS: ACHING

## 2022-06-24 NOTE — CONSULTS
[facility]         Department of Pharmacy    Pharmacy Consult: Parenteral Nutrition   Patient:  Chao Curran  MRN: [id number]             Recent Labs     06/24/22  0355        Recent Labs     06/24/22  0355   *     Recent Labs     06/24/22  0355   K 3.9     No results for input(s): MG in the last 72 hours. No results for input(s): PHOS in the last 72 hours. Recent Labs     06/24/22  0355   GLUCOSE 163*     Recent Labs     06/24/22  0355   BUN 21       Macronutrients   DW: 180 grams over 24 hours (1.9 g/kg)   AA: 60 grams over 24 hours (0.65 g/kg)   Total Kcal: 1154 calories (12.5 Kcal/kg)   Lipids: 26 % of Total Kcal      PN Product Dispensed: CLINIMIX-E  5/15   Lipid Product Dispensed: Intralipid 20%    Rates of Infusion   Lipid Infusion Rate: 12.6 mL/hr x 12 hours daily    Clinimix-E Infusion Rate: 50 mL/hr    Electrolytes (per bag) Clinimix E   Na= 70 mEq/Bag (2000ml)    K= 60 mEq/Bag (2000ml)              Mg= 10 mEq/Bag (2000ml)              Ca= 4.4 mmol/Bag (2000ml)              Acetate= 160 mEq/Bag (2000ml)  (140mEq/Bag-Peripheral Formula)              Chloride= 78 mEq/Bag (2000ml)              Phosphate= 30mMol/Bag (2000ml)                            MVI= 10 mL/Bag 2000ml   Trace Elements= 1 mL/Bag (2000ml)    **NOTE: Electrolytes are based upon the total volume of the TPN bag (2000ml). The amount of electrolytes received is based upon the rate/volume of the total infusion. Assessment/ Plan:  1) We will switch formulation to central PN (Clinimix 5/15%) starting at 50 ml/hr with Intralipids 20% at 12.6 ml/hr x 12 hours per dietitian recommendations   2) Will order Mg, PO4, triglycerides, and BMP daily   3) Pharmacy will review daily electrolytes and adjust as needed. 4) Will continue to consult dietician for caloric needs and rate changes.     Thank you for the consult,  Sissy Velázquez, [facility]6/24/202212:17 PM

## 2022-06-24 NOTE — PROGRESS NOTES
Lab called up with a critical troponin of 61, which is the same as the previous lab draw. Per supervisor Elza Manzo, he will text doctor on call and go from there. Per Dr. Edith Lagos, he is aware of troponin, no new orders at this time.

## 2022-06-24 NOTE — PROGRESS NOTES
Therapy went in to work with with the patient who sat at the edge of the bed. RN attempts to reposition and turn patient who is adamantly refusing to turn and is angry with the RN. \"Just leave me alone\". Patient left alone at this time, multiple family members at bedside.

## 2022-06-24 NOTE — PROGRESS NOTES
[facility]    Date: 2022  Patient Name: Michelle Perez        : 1945       [] Pt Refusal           [x] Pt Unavailable due to:  Per nursing, patient remains inappropriate for PT evaluation      CARMELO CABALLERO, PT,  Date: 2022

## 2022-06-24 NOTE — PROGRESS NOTES
Dressing change done to skin tears on BUE. Telfa and tegaderm applied to sites. Patient tolerated well. Patient pulling at urinary catheter, states \"bring me that bucket so I can pee, I got somethin up inside me stopping me from peeing. \" Reminded patient that she has a Pemberton catheter. Patient disoriented to situation, place and time, oriented to self only. Patient began yelling for her , family member in room informed nurse that patients  has been dead for 11 years. Writer asked patient to tell a little more about her  and their past together. Distraction technique successful and patient was able to sleep for about 15 mins. Patient awake but resting quietly with eyes open at this time. Will continue to monitor.

## 2022-06-24 NOTE — PROGRESS NOTES
[facility]  Speech Language Pathology  No Visit Note      Patient: Oscar Benavides  Room: 8756/5287-45      Oscar Benavides was not seen for speech therapy on 6/24/2022. ST attempted to see pt for speech tx to assess toleration of current diet and assess for possible diet upgrade, however pt refusing PO intake. Pt oriented to self only on this date and agitated. ST attempting to encourage pt to try food and explaining purpose of ST, however pt continuing to refuse. Educated pt's family that pt will remain on pureed food diet until reassessment by speech; they expressed good understanding. ST to continue to follow.       Date: 06/24/22 Time: 10:04 AM  Signature: Masha Gutierrez M.S., CCC-SLP

## 2022-06-24 NOTE — PLAN OF CARE
[facility]  Inpatient Physical Therapy  Plan of Care  Date: 2022  Patient Name: Alison Soliz        : [id number]  (77 y.o.)  Referring Practitioner: Dr. Mynor Moya  Admission Date: 2022  Referral Date : 22  Diagnosis: Mental status change, pneumonia  Treatment Diagnosis: Generalized Weakness, difficultly ambulation  PT Orders Received and Evaluation Complete  Identified Problem Areas: Body Structures, Functions, Activity Limitations Requiring Skilled Therapeutic Intervention: Decreased functional mobility ,Decreased strength,Decreased ROM,Decreased cognition,Decreased balance,Increased pain  Therapy Prognosis: Poor    Justification for Skilled Services:  [] Reduce Falls   [x] Improve Ambulation  []  Complete Daily Tasks Safely   [x] Improve Balance   [x] Improve LE strength  [x]  Return to Prior Level of Function  [x] Improve Functional Mobility   []  Family/Caregiver Education  [x] Patient Education: []Assistive Devices []Home Exercise Program and Progression    Plan     [] Modalities:  [x] Therapeutic Exercise   [x] Gait Training  [x] Therapeutic Activity    [x] Home Safety Evaluation         [] Massage                        [x] Neuromuscular Re-education [] Back Education             [x] Patient Education [] Home Exercise Program       Rehab Potential:  []  Good [] Fair   [x]  Poor    Goals  Short Term Goals  Time Frame for Short term goals: NA STG=LTG    Long Term Goals  Time Frame for Long term goals :  3 days  Long term goal 1: Patient to tansfer supine to sit with mod assist  Long term goal 2: Patient to transfer sit to stand with mod assist  Long term goal 3: Patient to to stand with w.walker x60 sec with min assist    Upon Swingbed Transfer this Plan of Care will be followed until revision is complete.     James Shields, PT, PT   Date: 2022

## 2022-06-24 NOTE — PROGRESS NOTES
At bedside with patient, assessment done, vitals obtained, white board updated. Patient denies having pain. Repositioned legs/pillow support at patient request. No further needs at this time.

## 2022-06-24 NOTE — PROGRESS NOTES
Patient successfully swallowed crushed pills in vanilla pudding with no problems. She was given her routine meds plus prn tylenol and baclofen for muscle pain. Patient extremely anxious and keeps saying over and over again that \"I cant do this, Im gonna fall out of bed, Im scared of this place. \" Patient given PRN trazodone at this time. Will continue to monitor.

## 2022-06-24 NOTE — PROGRESS NOTES
Patient has exhausted skilled days available having been discharged from the [facility] on 5-4-[facility]22. Family aware of this and options available to them re: placement at private pay status vs home with home health or hospice. They are considering options but feel that Patient is too sick to be discharged from acute hospitalization at this point. Will continue to follow and assist with discharge planning as appropriate.     [address]  6/24/2022

## 2022-06-24 NOTE — PROGRESS NOTES
Comprehensive Nutrition Assessment    Type and Reason for Visit:  Reassess    Nutrition Recommendations/Plan:   1. PN 50 ml/hr 5/15% w/12.6 ml/hr 20% IL x 12 hours  2. Magic Cup for meals and meds (once thawed to pudding)  3. Encourage oral intakes     Malnutrition Assessment:  Malnutrition Status:  Severe malnutrition (06/22/22 0820)      Nutrition Assessment:    Continued malnutrition with PO initiated yesterday, with scant intakes per nursing (pureed peaches, pudding w/meds). Peripheral PN initiated r/t malnutrition. PICC line obtained. Will recommend to transition to Central PN at 50 ml/hr (cut NS to 50 ml/hr) with IL 20% at 12.6 ml x 12 hours. With tolerance (Mg++ and PO4 wnl), would recommend to increase to 75 ml/hr and 18.8 ml/hr for Clinimix and IL respectively, while trying to improve oral intakes. Will add Magic Cup for supplement, as family states a dislike of \"milk\" and Ensure may be greeted unfavoarably. Nutrition Related Findings:    facial droop Wound Type: Skin Tears       Current Nutrition Intake & Therapies:    Average Meal Intake: 1-25%  Average Supplements Intake: None Ordered  Current Parenteral Nutrition Orders:  · Type and Formula: Premix Peripheral (s/p Clinimix 4.25/5% at 50 ml/hr)   · Lipids: None (s/p 21ml/hr x24 hours)  · Duration: Continuous  · Rate/Volume: 100 ml/hr NS currently  · Current PN Order Provides: Recommended: Clinimix 5/15% 50 ml/hr, 12.6 ml/hr x 12 hours 20% IL, and NS at 50 ml/hr  · Goal PN Orders Provides: Saturday increases to goal if erlin: 75 ml/hr Clinimix 5/15%, 18.8 ml/hr x 12 hours 20% IL and 25 ml NS for volume    Anthropometric Measures:  Height: 5' 6\" (167.6 cm)  Ideal Body Weight (IBW): 130 lbs (59 kg)    Admission Body Weight: 213 lb (96.6 kg)  Current Body Weight: 204 lb 3.2 oz (92.6 kg), 163.8 % IBW.  Weight Source: Bed Scale  Current BMI (kg/m2): 33  Usual Body Weight: 242 lb (109.8 kg)  % Weight Change (Calculated): -15.6  Weight Adjustment For: No Adjustment                 BMI Categories: Obese Class 1 (BMI 30.0-34. 9)    Estimated Daily Nutrient Needs:  Energy Requirements Based On: Kcal/kg  Weight Used for Energy Requirements: Current  Energy (kcal/day): 1695-9758 (15-20)  Weight Used for Protein Requirements: Ideal  Protein (g/day): 77-95g (1.3-+1.5g/kg)  Method Used for Fluid Requirements: 1 ml/kcal  Fluid (ml/day): 2222 ml (23/kg)    Nutrition Diagnosis:   · Severe malnutrition related to inadequate protein-energy intake as evidenced by weight loss,localized or generalized fluid accumulation    Recent Labs     06/22/22  0400 06/23/22  0631 06/24/22  0355    143 143   K 3.4* 4.8 3.9    106 108*   CO2 24 22 24   BUN 18 18 21   CREATININE 0.94* 0.87 0.84   GLUCOSE 154* 143* 163*   ALT 9 10 9   ALKPHOS 87 90 80   GFR                     Lab Results   Component Value Date    LABALBU 2.7 06/24/2022      Lab Results   Component Value Date    PHOS 3.5 12/15/2021      Lab Results   Component Value Date    MG 2.0 06/14/2021    No results found for: PREALBUMIN    Lab Results   Component Value Date    CHOL 167 06/22/2021    CHOL 156 03/03/2020    CHOL 182 02/26/2018     Lab Results   Component Value Date    TRIG 104 06/22/2021    TRIG 110 03/03/2020    TRIG 119 02/26/2018     Lab Results   Component Value Date    HDL 59 06/22/2021    HDL 71 03/03/2020    HDL 66 02/26/2018     Lab Results   Component Value Date    LDLCHOLESTEROL 87 06/22/2021    LDLCHOLESTEROL 63 03/03/2020    LDLCHOLESTEROL 92 02/26/2018     Lab Results   Component Value Date    VLDL NOT REPORTED 06/22/2021    VLDL NOT REPORTED 03/03/2020    VLDL NOT REPORTED 02/26/2018     Lab Results   Component Value Date    CHOLHDLRATIO 2.8 06/22/2021    CHOLHDLRATIO 2.2 03/03/2020    CHOLHDLRATIO 2.8 02/26/2018     Nutrition Interventions:   Food and/or Nutrient Delivery: Start Oral Nutrition Supplement,Continue Current Diet,Modify Parenteral Nutrition  Nutrition Education/Counseling: Education initiated  Coordination of Nutrition Care: Speech Therapy,Coordination of Care  Plan of Care discussed with: family    Goals:  Previous Goal Met: Progressing toward Goal(s)  Goals: PO intake 75% or greater     Nutrition Monitoring and Evaluation:   Behavioral-Environmental Outcomes: Knowledge or Skill  Food/Nutrient Intake Outcomes: Food and Nutrient Intake,Parenteral Nutrition Intake/Tolerance,Supplement Intake  Physical Signs/Symptoms Outcomes: Chewing or Swallowing,Biochemical Data,Weight,Fluid Status or Edema    Discharge Planning:     Too soon to determine     Ulus Head, [address], LD  Contact: [phone]

## 2022-06-24 NOTE — PROGRESS NOTES
Catheter care performed at this time, water given and lip balm reapplied. Patient pillows readjusted, no further needs.

## 2022-06-24 NOTE — PROGRESS NOTES
[facility]    Date: 2022  Patient Name: Kori Crowe        : 1945       [] Pt Refusal           [x] Pt Unavailable due to:  Patient in bed with nursing just completing bed bath.    Patient asleep and based on cognition and agitation noted with PT evaluation earlier this morning, have opted to hold treatment and will continue in morning as patient's cognition and physical capabilities allow      CARMELO CABALLERO, PT,  Date: 2022

## 2022-06-24 NOTE — PROGRESS NOTES
Hospitalist Progress Note  6/24/2022 6:29 AM  Subjective:   Admit Date: 6/20/2022  PCP: Beatriz Reno, DO    Interval History: Katey Pagan is much more alert this am and talkative. She continues to be confused. Her sister is with her this morning. Katey Pagan has no complaints other than her stomach hurting a little bit. No chest pain or SOB. Reviewed speech evaluation from yesterday. Taking medication in pudding. Diet: PN-Adult Premix 4.25/5 - Standard Electrolytes- Peripheral Line  ADULT DIET; Dysphagia - Pureed  Medications:   Scheduled Meds:   fat emulsion  500 mL IntraVENous Daily    piperacillin-tazobactam  3,375 mg IntraVENous Q8H    lidocaine 1 % injection  5 mL IntraDERmal Once    sodium chloride flush  5-40 mL IntraVENous 2 times per day    metoprolol  10 mg IntraVENous Q6H    enoxaparin  40 mg SubCUTAneous Daily    insulin lispro  0-6 Units SubCUTAneous 4 times per day    traZODone  50 mg Oral Nightly    azithromycin  500 mg IntraVENous Q24H    allopurinol  100 mg Oral Daily    aspirin EC  81 mg Oral Daily    atorvastatin  40 mg Oral Daily    folic acid  580 mcg Oral Daily    magnesium oxide  800 mg Oral Nightly    pantoprazole  40 mg Oral QAM AC    sennosides-docusate sodium  1 tablet Oral Daily    sodium chloride flush  5-40 mL IntraVENous 2 times per day     Continuous Infusions:   PN-Adult Premix 4.25/5 - Standard Electrolytes- Peripheral Line 50 mL/hr at 06/23/22 1723    sodium chloride      0.9% NaCl with KCl 20 mEq 29 mL/hr at 06/23/22 1723    sodium chloride      dextrose         Patient's current medications documented, reviewed, and updated.       CBC:   Recent Labs     06/22/22  0400 06/23/22  0631 06/24/22  0355   WBC 10.6 13.7* 11.1*   HGB 10.5* 10.9* 9.7*    392 306     BMP:    Recent Labs     06/22/22  0400 06/23/22  0631 06/24/22  0355    143 143   K 3.4* 4.8 3.9    106 108*   CO2 24 22 24   BUN 18 18 21   CREATININE 0.94* 0.87 0.84   GLUCOSE 154* 143* 163*     Hepatic:   Recent Labs     06/22/22  0400 06/23/22  0631 06/24/22  0355   AST 14 15 10   ALT 9 10 9   BILITOT 0.49 0.43 0.56   ALKPHOS 87 90 80     Troponin: No results for input(s): TROPONINI in the last 72 hours. BNP: No results for input(s): BNP in the last 72 hours. Lipids: No results for input(s): CHOL, HDL in the last 72 hours. Invalid input(s): LDLCALCU  INR: No results for input(s): INR in the last 72 hours. Objective:   Vitals: BP (!) 155/76   Pulse (!) 101   Temp 99.5 °F (37.5 °C) (Temporal)   Resp 18   Ht 5' 6\" (1.676 m)   Wt 204 lb 3.2 oz (92.6 kg)   LMP  (LMP Unknown)   SpO2 94%   BMI 32.96 kg/m²   General appearance: alert and cooperative with exam  HEENT: Head: Normocephalic, no lesions, without obvious abnormality. Eye: Normal external eye, conjunctiva, lids cornea, JACLYN. Nose: Normal external nose, mucus membranes and septum. Neck: no adenopathy, no carotid bruit and supple, symmetrical, trachea midline  Lungs: clear to auscultation bilaterally  Heart: regular rate and rhythm, S1, S2 normal and II/VI systolic murmur  Abdomen: soft, non-tender; bowel sounds normal; no masses,  no organomegaly and over wt  Extremities: extremities normal, atraumatic, no cyanosis or edema and No calf tenderness. Neurologic: Mental status: Alert and knows her son's name and the President. Confused to where she is and the year. Assessment and Plan:   1.  Mental status change - Slowly improving. UA negative upon admission. CXR suggesting RUL pneumonia (placed on IV Rocephin / Zithromax).  IV Rocephin change to Zosyn on 6/23, secondary to slow improvement and elevation in WBC.   CT scan abdomen and pelvis negative for infection.  CT head 6/20 showed no acute changes and MRI on 6/22 showed no acute ischemic infarct with brain atrophy and chronic microvascular ischemic changes.   TSH and ammonia levels normal.    2.  Pneumonia / Leukocytosis - WBC increased on Rocephin and Zithromax with change to Zosyn on 6/23. WBC down to 11,000 this am.  3.  Dehydration -  Improving on Normal saline and TPN. 4.  CKD stage III b - creatinine improved with IV hydration. 5.  Hypokalemia - Replacement ordered. 6.  H/O CVA right precentral gyrus - seen at [facility] 2021. 7.  DM II - placed on Humalog sliding scale. 8.  Anemia -stool heme occult pending. Has been stable. 9.  Hyperlipidemia - on Lipitor. 10.  HTN - elevated upon admission.  Placed on IV Lopressor and Hydralazine. 11.  GERD - on PPI. 12.  H/O Gout - on Allopurinol.    13. Tachycardia - HR improved with increase in Lopressor. Dr. Josefina Barillas consulted. ECHO results pending. Plan:  1. Skilled care coordinator to see if Lawson Hilliard will qualify for swing bed. 2.  Continue TPN until Ragini is taking adequate nutrition. 3.  Continue IV hydration until taking adequate fluids. 4.  Start working with PT / OT. 5.  Change to oral BP medication when taking medication well.       Patient continues to require in patient admission secondary to the need for IV antibiotics, fluids, TPN, and therapy      DVT prophylaxis:   [x] Lovenox   [] SCDs   [] SQ Heparin   [] Encourage ambulation, low risk for DVT, no chemical or mechanical    prophylaxis necessary      [] Already on Anticoagulation    Patient Active Problem List:     Type 2 diabetes mellitus with stage 3 chronic kidney disease, without long-term current use of insulin ([address]     Essential hypertension, benign     Esophageal reflux     Irritable bowel syndrome     Seasonal allergies     Long term current use of anticoagulant therapy     Gout     Rectocele     Tubular adenoma of colon     Hiatal hernia     Sigmoid diverticulosis     Chronic back pain     Hypomagnesemia     Family history of colon cancer     History of recurrent deep vein thrombosis (DVT)     Hyperparathyroidism ([address]     Chronic renal disease, stage III ([address] [800038]     Change in mental status     Severe malnutrition [facility])      Stephanie Maldonado MD, MD  Rounding Hospitalist

## 2022-06-24 NOTE — PROGRESS NOTES
RN attempts several times to feed supper to pt. She flat out refuses multiple times. Will continiue to attempt to feed. Son in at bedside and given original [facility] paper. Support given.

## 2022-06-24 NOTE — PROGRESS NOTES
Pt orders pureed peaches only for breakfast and eats all with encouragement. Takes am meds crushed in pudding. Refuses to eat more than a couple bites of pudding. Refuses to order lunch. Multiple family members remain at bedside. Dr. Natalia Padilla called to clarify TPN/Lipid orders. Updated per  that pt will not qualify for swingbed. Family updated on above per [place] RN.

## 2022-06-24 NOTE — CONSULTS
Palliative Care Inpatient Consult    NAME:  [address] RECORD NUMBER:  [id number]  AGE: 78 y.o. GENDER: female  : 1945  TODAY'S DATE:  2022    Reason for Consult:  goals of care    History of Present Illness     The patient is a 78 y.o. Non- / non  female who presents with Altered Mental Status (per EMS pt was supose to be taking ATB for UTI but has not been taking them )      Referred to Palliative Care by  [x] Physician   [] Nursing  [] Family Request   [] Other:      She was admitted to the med/surg service for Acute on chronic renal insufficiency [N28.9, N18.9]  Change in mental status [R41.82]  Urinary tract infection with hematuria, site unspecified [N39.0, R31.9]  Altered mental status, unspecified altered mental status type [R41.82]. The patient has a complicated medical history and has been hospitalized since 2022  9:51 AM.    Active Hospital Problems    Diagnosis Date Noted    Severe malnutrition ([facility] [E43] 2022     Priority: Medium     Class: Present on Admission    Change in mental status [R41.82] 2022     Priority: Medium       Data         BP (!) 144/70   Pulse 90   Temp 98.3 °F (36.8 °C) (Axillary)   Resp 18   Ht 5' 6\" (1.676 m)   Wt 204 lb 3.2 oz (92.6 kg)   LMP  (LMP Unknown)   SpO2 96%   BMI 32.96 kg/m²     Wt Readings from Last 3 Encounters:   22 204 lb 3.2 oz (92.6 kg)   22 230 lb (104.3 kg)   22 242 lb (109.8 kg)        Code Status: DNR-CCA     ADVANCED CARE PLANNING:  Patient has capacity for medical decisions: no  Health Care Power of : no  Living Will: no     Personal, Social, and Family History  Marital Status:   Living situation:with family:  son  Importance of cornelio/Latter day/spiritual beliefs: [] Very [] Somewhat [] Not   Psychological Distress: pt is confused at this time  Does patient understand diagnosis/treatment? no  Does caregiver understand diagnosis/treatment?  yes    Assessment Symptom management/ pain control   Pain Assessment:  The patient is not having any pain. Anxiety:  none  Dyspnea:  none  Fatigue:  none  Other:    Palliative Performance Scale:     ___100% Full ambulation; normal activity and work; no evidence of disease; able to do own self care; normal intake; fully conscious  ___90% Full ambulation; normal activity and work; some evidence of disease; able to do own self care; normal intake; fully conscious  ___80% Full ambulation; normal activity with effort; some evidence of disease; able to do own self care; normal or reduced intake; fully conscious  ___70% Ambulation reduced; unable to perform normal job/work; significant disease; able to do own self care; normal or reduced intake; fully conscious  ___60%  Ambulation reduced; cannot do hobbies/housework; significant disease; occasional assist; intake normal or reduced; fully conscious/some confusion  ___50%  Mainly sit/lie; can't do any work; extensive disease; considerable assist; intake normal or reduced; fully conscious/some confusion  ___40%  Mainly in bed; extensive disease; mainly assist; intake normal or reduced; fully conscious/ some confusion   _x__30%  Bed bound; extensive disease; total care; intake reduced; fully conscious/some confusion  ___20%  Bed bound; extensive disease; total care; intake minimal; drowsy/coma  ___10%  Bed bound; extensive disease; total care; mouth care only; drowsy/coma  ___0       Death     Readmission Risk Score: 18    Plan      Palliative Interaction: Asked to see patient as a consult. Pt is confused at this time and unable to answer questions. Her sister Olita Schirmer and her son Enrique Lopez are in the room. ACP conversation was held yesterday per . I talk with her son today about his plan of care for her. He wishes her to stay in swing bed until she is stronger. He states that she has some confusion at home, but \"not this bad\".   We talk about her being out of her skilled days. He states that he is afraid she will not get the same level of care and a nursing facility. I discuss with him the difference between in hospital and ECF facilities. He states that she has frequent UTI's and he is concerned about that. I tell him that we are re-checking about her number of days. He denies any further needs at this time. Education/support to family  Continue with current plan of care  Code status clarified: DNRCCA    Principle Problem/Diagnosis:  Change in mental status    Goals of care evaluation   The patient goals of care are improve or maintain function/quality of life   Goals of care discussed with:    [] Patient independently    [] Patient and Family    [x] Family or Healthcare DPOA independently    [] Unable to discuss with patient, family/DPOA not present    Code Status  DNR-CCA     Palliative Care will continue to follow Ms. Regan's care as needed. Thank you for allowing Palliative Care to participate in the care of Ms. Makayla Guzman .      Electronically signed by   Randee Sanabria RN  Palliative Care Team  on 6/24/2022 at 12:51 PM    Palliative care office: [phone]

## 2022-06-24 NOTE — PROGRESS NOTES
 UPPER GASTROINTESTINAL ENDOSCOPY  2014    VENA CAVA FILTER PLACEMENT         Restrictions  Restrictions/Precautions: Fall Risk,Bed Alarm,General Precautions      Subjective         Pain Level: 0    Orientation       Home Living  Type of Home: House  Home Layout: One level  Home Access: Ramped entrance    Prior Level of Function  Additional Comments: Patient was lving at [facility] in skilled unit, but out of days  Prior Function  Ambulation Assistance: Needs assistance (w.walker)  Transfer Assistance: Needs assistance  Additional Comments: Patient was lving at [facility] in skilled unit, but out of days      Objective                 PROM RLE (degrees)  RLE General PROM: swollen LE, stiff joints, limited at 25%  PROM LLE (degrees)  LLE General PROM: swollen LE, stiff joints, limited at 25%     Strength RLE  Comment: Unable to assess due to not following commands  Strength LLE  Comment: Unable to assess due to not following commands             Rolling: Maximal assistance  Supine to Sit: Maximal assistance  Sit to Supine: Maximal assistance  Scooting: Maximal assistance  Sit to Stand: Unable to assess              Balance  Sitting - Static: Good  Sitting - Dynamic: Fair  Standing - Static:  (unable to assess, patient refused)    Assessment      Body Structures, Functions, Activity Limitations Requiring Skilled Therapeutic Intervention: Decreased functional mobility ,Decreased strength,Decreased ROM,Decreased cognition,Decreased balance,Increased pain  Chart Reviewed: Yes  Assessment: Patient in bed on arrival.  Patient alert, but confused. Oriented to self only. Patient's sister present and provided hx. Patient did pump ankles, but unable to follow commands for MMT. Unable to complete SLR in bed. .  Transfered supine to sit with max assist x2 person. Patient sat with lean to left, but was able to sit independently x 5 min. She did partial LAQ on L LE but not on R LE. Patient refused to try to stand or walk. Return to supine with max assist x2. Scooting and rolling in bed for positioning with Max assist.  Plan to work on therex,, transfers, use Olga Plus mitchell transfer to chair, work standing and gait if/ when able. Therapy Prognosis: Poor           Plan  Current Treatment Recommendations: Strengthening,Balance training,Transfer training,Gait training    Goals  Short Term Goals  Time Frame for Short term goals: NA STG=LTG    Long Term Goals  Time Frame for Long term goals :  3 days  Long term goal 1: Patient to tansfer supine to sit with mod assist  Long term goal 2: Patient to transfer sit to stand with mod assist  Long term goal 3: Patient to to stand with w.walker x60 sec with min assist       Time In: 9:23  Time Out: 9:50  Timed Coded Minutes:    Total Treatment Time: Nader [address], PT, PT 6/24/2022

## 2022-06-24 NOTE — PROGRESS NOTES
Patient much more calm at this time however she still has yet to sleep. Adjusted patient pillows, offered her sips of water, applied lip balm. No further needs at this time, will continue to monitor.

## 2022-06-25 ENCOUNTER — APPOINTMENT (OUTPATIENT)
Dept: GENERAL RADIOLOGY | Age: 77
DRG: 193 | End: 2022-06-25
Payer: MEDICARE

## 2022-06-25 LAB
ANION GAP SERPL CALCULATED.3IONS-SCNC: 8 MMOL/L (ref 8–16)
ANION GAP SERPL CALCULATED.3IONS-SCNC: 8 MMOL/L (ref 9–17)
BUN BLDV-MCNC: 21 MG/DL (ref 8–23)
BUN BLDV-MCNC: 22 MG/DL (ref 8–23)
BUN/CREAT BLD: 26 (ref 9–20)
BUN/CREAT BLD: 27 (ref 9–20)
CALCIUM SERPL-MCNC: 8.8 MG/DL (ref 8.6–10.4)
CALCIUM SERPL-MCNC: 8.9 MG/DL (ref 8.6–10.4)
CHLORIDE BLD-SCNC: 104 MMOL/L (ref 98–107)
CHLORIDE BLD-SCNC: 99 MMOL/L (ref 98–107)
CO2: 24 MMOL/L (ref 20–31)
CO2: 25 MMOL/L (ref 20–31)
CREAT SERPL-MCNC: 0.79 MG/DL (ref 0.5–0.9)
CREAT SERPL-MCNC: 0.84 MG/DL (ref 0.5–0.9)
GFR AFRICAN AMERICAN: >60 ML/MIN
GFR AFRICAN AMERICAN: >60 ML/MIN
GFR NON-AFRICAN AMERICAN: >60 ML/MIN
GFR NON-AFRICAN AMERICAN: >60 ML/MIN
GFR SERPL CREATININE-BSD FRML MDRD: ABNORMAL ML/MIN/{1.73_M2}
GFR SERPL CREATININE-BSD FRML MDRD: ABNORMAL ML/MIN/{1.73_M2}
GLUCOSE BLD-MCNC: 196 MG/DL (ref 65–99)
GLUCOSE BLD-MCNC: 197 MG/DL (ref 65–99)
GLUCOSE BLD-MCNC: 246 MG/DL (ref 65–99)
GLUCOSE BLD-MCNC: 247 MG/DL (ref 70–99)
GLUCOSE BLD-MCNC: 252 MG/DL (ref 65–99)
GLUCOSE BLD-MCNC: ABNORMAL MG/DL (ref 70–99)
MAGNESIUM: 1.5 MG/DL (ref 1.6–2.6)
MAGNESIUM: 1.8 MG/DL (ref 1.6–2.6)
PHOSPHORUS: 3.4 MG/DL (ref 2.6–4.5)
PHOSPHORUS: 6.2 MG/DL (ref 2.6–4.5)
POTASSIUM SERPL-SCNC: 4.2 MMOL/L (ref 3.7–5.3)
POTASSIUM SERPL-SCNC: 5.7 MMOL/L (ref 3.7–5.3)
SODIUM BLD-SCNC: 131 MMOL/L (ref 135–144)
SODIUM BLD-SCNC: 137 MMOL/L (ref 135–144)
TRIGL SERPL-MCNC: 124 MG/DL

## 2022-06-25 PROCEDURE — 83735 ASSAY OF MAGNESIUM: CPT

## 2022-06-25 PROCEDURE — 97110 THERAPEUTIC EXERCISES: CPT

## 2022-06-25 PROCEDURE — 2500000003 HC RX 250 WO HCPCS: Performed by: INTERNAL MEDICINE

## 2022-06-25 PROCEDURE — 1200000000 HC SEMI PRIVATE

## 2022-06-25 PROCEDURE — 94761 N-INVAS EAR/PLS OXIMETRY MLT: CPT

## 2022-06-25 PROCEDURE — 74018 RADEX ABDOMEN 1 VIEW: CPT

## 2022-06-25 PROCEDURE — 84100 ASSAY OF PHOSPHORUS: CPT

## 2022-06-25 PROCEDURE — A4216 STERILE WATER/SALINE, 10 ML: HCPCS | Performed by: INTERNAL MEDICINE

## 2022-06-25 PROCEDURE — 80048 BASIC METABOLIC PNL TOTAL CA: CPT

## 2022-06-25 PROCEDURE — 84478 ASSAY OF TRIGLYCERIDES: CPT

## 2022-06-25 PROCEDURE — 6370000000 HC RX 637 (ALT 250 FOR IP): Performed by: INTERNAL MEDICINE

## 2022-06-25 PROCEDURE — 82947 ASSAY GLUCOSE BLOOD QUANT: CPT

## 2022-06-25 PROCEDURE — 2580000003 HC RX 258: Performed by: INTERNAL MEDICINE

## 2022-06-25 PROCEDURE — 6360000002 HC RX W HCPCS: Performed by: INTERNAL MEDICINE

## 2022-06-25 PROCEDURE — C9113 INJ PANTOPRAZOLE SODIUM, VIA: HCPCS | Performed by: INTERNAL MEDICINE

## 2022-06-25 RX ORDER — HYDRALAZINE HYDROCHLORIDE 25 MG/1
50 TABLET, FILM COATED ORAL EVERY 8 HOURS SCHEDULED
Status: DISCONTINUED | OUTPATIENT
Start: 2022-06-25 | End: 2022-06-25

## 2022-06-25 RX ORDER — METOPROLOL TARTRATE 50 MG/1
50 TABLET, FILM COATED ORAL 2 TIMES DAILY
Status: DISCONTINUED | OUTPATIENT
Start: 2022-06-25 | End: 2022-06-26

## 2022-06-25 RX ORDER — SODIUM CHLORIDE 9 MG/ML
INJECTION, SOLUTION INTRAVENOUS CONTINUOUS
Status: DISCONTINUED | OUTPATIENT
Start: 2022-06-25 | End: 2022-06-26 | Stop reason: ALTCHOICE

## 2022-06-25 RX ORDER — TRAZODONE HYDROCHLORIDE 50 MG/1
25 TABLET ORAL NIGHTLY
Status: DISCONTINUED | OUTPATIENT
Start: 2022-06-25 | End: 2022-06-25

## 2022-06-25 RX ORDER — HYDRALAZINE HYDROCHLORIDE 20 MG/ML
10 INJECTION INTRAMUSCULAR; INTRAVENOUS EVERY 6 HOURS PRN
Status: DISCONTINUED | OUTPATIENT
Start: 2022-06-25 | End: 2022-06-27 | Stop reason: HOSPADM

## 2022-06-25 RX ORDER — METOPROLOL TARTRATE 5 MG/5ML
10 INJECTION INTRAVENOUS EVERY 6 HOURS PRN
Status: DISCONTINUED | OUTPATIENT
Start: 2022-06-25 | End: 2022-06-26

## 2022-06-25 RX ORDER — HYDRALAZINE HYDROCHLORIDE 20 MG/ML
20 INJECTION INTRAMUSCULAR; INTRAVENOUS EVERY 8 HOURS SCHEDULED
Status: DISCONTINUED | OUTPATIENT
Start: 2022-06-25 | End: 2022-06-27 | Stop reason: HOSPADM

## 2022-06-25 RX ADMIN — SODIUM CHLORIDE, PRESERVATIVE FREE 10 ML: 5 INJECTION INTRAVENOUS at 18:40

## 2022-06-25 RX ADMIN — ONDANSETRON 4 MG: 2 INJECTION INTRAMUSCULAR; INTRAVENOUS at 21:07

## 2022-06-25 RX ADMIN — ASCORBIC ACID, VITAMIN A PALMITATE, CHOLECALCIFEROL, THIAMINE HYDROCHLORIDE, RIBOFLAVIN-5 PHOSPHATE SODIUM, PYRIDOXINE HYDROCHLORIDE, NIACINAMIDE, DEXPANTHENOL, ALPHA-TOCOPHEROL ACETATE, VITAMIN K1, FOLIC ACID, BIOTIN, CYANOCOBALAMIN: 200; 3300; 200; 6; 3.6; 6; 40; 15; 10; 150; 600; 60; 5 INJECTION, SOLUTION INTRAVENOUS at 15:15

## 2022-06-25 RX ADMIN — INSULIN LISPRO 3 UNITS: 100 INJECTION, SOLUTION INTRAVENOUS; SUBCUTANEOUS at 12:29

## 2022-06-25 RX ADMIN — PIPERACILLIN SODIUM AND TAZOBACTAM SODIUM 3375 MG: 3; 375 INJECTION, POWDER, FOR SOLUTION INTRAVENOUS at 16:28

## 2022-06-25 RX ADMIN — AZITHROMYCIN MONOHYDRATE 500 MG: 500 INJECTION, POWDER, LYOPHILIZED, FOR SOLUTION INTRAVENOUS at 06:37

## 2022-06-25 RX ADMIN — METOPROLOL TARTRATE 10 MG: 5 INJECTION INTRAVENOUS at 18:40

## 2022-06-25 RX ADMIN — SODIUM CHLORIDE: 9 INJECTION, SOLUTION INTRAVENOUS at 08:02

## 2022-06-25 RX ADMIN — HYDRALAZINE HYDROCHLORIDE 20 MG: 20 INJECTION INTRAMUSCULAR; INTRAVENOUS at 05:38

## 2022-06-25 RX ADMIN — HYDRALAZINE HYDROCHLORIDE 20 MG: 20 INJECTION INTRAMUSCULAR; INTRAVENOUS at 21:51

## 2022-06-25 RX ADMIN — PIPERACILLIN SODIUM AND TAZOBACTAM SODIUM 3375 MG: 3; 375 INJECTION, POWDER, FOR SOLUTION INTRAVENOUS at 00:34

## 2022-06-25 RX ADMIN — PIPERACILLIN SODIUM AND TAZOBACTAM SODIUM 3375 MG: 3; 375 INJECTION, POWDER, FOR SOLUTION INTRAVENOUS at 09:24

## 2022-06-25 RX ADMIN — SODIUM CHLORIDE, PRESERVATIVE FREE 10 ML: 5 INJECTION INTRAVENOUS at 14:06

## 2022-06-25 RX ADMIN — INSULIN LISPRO 1 UNITS: 100 INJECTION, SOLUTION INTRAVENOUS; SUBCUTANEOUS at 00:24

## 2022-06-25 RX ADMIN — HYDRALAZINE HYDROCHLORIDE 10 MG: 20 INJECTION INTRAMUSCULAR; INTRAVENOUS at 16:15

## 2022-06-25 RX ADMIN — ENOXAPARIN SODIUM 40 MG: 100 INJECTION SUBCUTANEOUS at 11:49

## 2022-06-25 RX ADMIN — INSULIN LISPRO 1 UNITS: 100 INJECTION, SOLUTION INTRAVENOUS; SUBCUTANEOUS at 06:27

## 2022-06-25 RX ADMIN — METOPROLOL TARTRATE 10 MG: 5 INJECTION INTRAVENOUS at 04:24

## 2022-06-25 RX ADMIN — POTASSIUM CHLORIDE AND SODIUM CHLORIDE: 900; 150 INJECTION, SOLUTION INTRAVENOUS at 00:28

## 2022-06-25 RX ADMIN — INSULIN LISPRO 2 UNITS: 100 INJECTION, SOLUTION INTRAVENOUS; SUBCUTANEOUS at 18:00

## 2022-06-25 RX ADMIN — SODIUM CHLORIDE 40 MG: 9 INJECTION, SOLUTION INTRAMUSCULAR; INTRAVENOUS; SUBCUTANEOUS at 16:22

## 2022-06-25 RX ADMIN — SODIUM CHLORIDE, PRESERVATIVE FREE 10 ML: 5 INJECTION INTRAVENOUS at 20:48

## 2022-06-25 RX ADMIN — I.V. FAT EMULSION 12.6 ML/HR: 20 EMULSION INTRAVENOUS at 15:24

## 2022-06-25 ASSESSMENT — PAIN SCALES - PAIN ASSESSMENT IN ADVANCED DEMENTIA (PAINAD)
TOTALSCORE: 0
CONSOLABILITY: 0
NEGVOCALIZATION: 0
FACIALEXPRESSION: 0
CONSOLABILITY: 0
CONSOLABILITY: 0
BODYLANGUAGE: 0
BREATHING: 0
CONSOLABILITY: 0
BODYLANGUAGE: 0
BREATHING: 0
CONSOLABILITY: 0
NEGVOCALIZATION: 0
TOTALSCORE: 0
FACIALEXPRESSION: 0
NEGVOCALIZATION: 0
NEGVOCALIZATION: 0
TOTALSCORE: 0
NEGVOCALIZATION: 0
FACIALEXPRESSION: 0
BODYLANGUAGE: 0
FACIALEXPRESSION: 0
TOTALSCORE: 0
BODYLANGUAGE: 0
BODYLANGUAGE: 0
TOTALSCORE: 0
BREATHING: 0
BREATHING: 0
FACIALEXPRESSION: 0
BREATHING: 0

## 2022-06-25 ASSESSMENT — PAIN SCALES - GENERAL
PAINLEVEL_OUTOF10: 0
PAINLEVEL_OUTOF10: 0

## 2022-06-25 NOTE — PROGRESS NOTES
Patient remains somnolent, but is more responsive than she was upon morning assessment. Will open eyes when her named is called, but only for a brief moment. Left eye has become slightly edematous, red, and matted. Warm compresses have been applied intermittently.

## 2022-06-25 NOTE — PROGRESS NOTES
Unable to perform neuro assessment on patient due to not being able to arouse patient. Patient was prompted several times to wake up, but remains asleep. Patient's vitals remain stable, see Flowsheets. 45 minutes after assessment, patient wakes up but remains drowsy. Too drowsy to feed breakfast or attempt to give morning PO meds.

## 2022-06-25 NOTE — PROGRESS NOTES
Hospitalist Progress Note  6/25/2022 6:51 AM  Subjective:   Admit Date: 6/20/2022  PCP: Sue To, DO    Interval History: Ragini slept well last night. She will arouse this am but doesn't want to answer questions and goes off to sleep. Ragini denies any pain. Sister is with her this morning and feels she is comfortable. Encouraged the family to encourage her to eat. Diet: ADULT DIET; Dysphagia - Pureed  ADULT ORAL NUTRITION SUPPLEMENT; Breakfast, Lunch, Dinner; Frozen Oral Supplement  PN-Adult Premix 5/15 - Standard Electrolytes - Central Line  Medications:   Scheduled Meds:   fat emulsion  12.6 mL/hr IntraVENous Daily    piperacillin-tazobactam  3,375 mg IntraVENous Q8H    lidocaine 1 % injection  5 mL IntraDERmal Once    sodium chloride flush  5-40 mL IntraVENous 2 times per day    metoprolol  10 mg IntraVENous Q6H    enoxaparin  40 mg SubCUTAneous Daily    insulin lispro  0-6 Units SubCUTAneous 4 times per day    traZODone  50 mg Oral Nightly    azithromycin  500 mg IntraVENous Q24H    allopurinol  100 mg Oral Daily    aspirin EC  81 mg Oral Daily    atorvastatin  40 mg Oral Daily    folic acid  679 mcg Oral Daily    magnesium oxide  800 mg Oral Nightly    pantoprazole  40 mg Oral QAM AC    sennosides-docusate sodium  1 tablet Oral Daily    sodium chloride flush  5-40 mL IntraVENous 2 times per day     Continuous Infusions:   PN-Adult Premix 5/15 - Standard Electrolytes - Central Line 50 mL/hr at 06/24/22 2307    sodium chloride      0.9% NaCl with KCl 20 mEq 100 mL/hr at 06/25/22 0028    sodium chloride      dextrose         Patient's current medications documented, reviewed, and updated.       CBC:   Recent Labs     06/23/22  0631 06/24/22  0355   WBC 13.7* 11.1*   HGB 10.9* 9.7*    306     BMP:    Recent Labs     06/23/22  0631 06/24/22  0355 06/25/22  0524    143 131*   K 4.8 3.9 5.7*    108* 99   CO2 22 24 24   BUN 18 21 21   CREATININE 0.87 0.84 0.79 GLUCOSE 143* 163* 773*     Hepatic:   Recent Labs     06/23/22  0631 06/24/22  0355   AST 15 10   ALT 10 9   BILITOT 0.43 0.56   ALKPHOS 90 80     Troponin: No results for input(s): TROPONINI in the last 72 hours. BNP: No results for input(s): BNP in the last 72 hours. Lipids: No results for input(s): CHOL, HDL in the last 72 hours. Invalid input(s): LDLCALCU  INR: No results for input(s): INR in the last 72 hours. Objective:   Vitals: BP (!) 135/53   Pulse 98   Temp 98 °F (36.7 °C) (Temporal)   Resp 16   Ht 5' 6\" (1.676 m)   Wt 204 lb 3.2 oz (92.6 kg)   LMP  (LMP Unknown)   SpO2 95%   BMI 32.96 kg/m²   General appearance: alert and cooperative with exam  HEENT: Head: Normocephalic, no lesions, without obvious abnormality. Eye: Normal external eye, conjunctiva, lids cornea, JACLYN. Nose: Normal external nose, mucus membranes and septum. Neck: no adenopathy, no carotid bruit and supple, symmetrical, trachea midline  Lungs: clear to auscultation bilaterally  Heart: regular rate and rhythm, S1, S2 normal and II/VI systolic murmur  Abdomen: soft, non-tender; bowel sounds normal; no masses,  no organomegaly and obese  Extremities: Edema in the left ankle. No calf tenderness. Neurologic: Mental status: Arouses but falls back off to sleep. Assessment and Plan:   1.  Mental status change - Slowly improving.  UA negative upon admission.  CXR suggesting RUL pneumonia (placed on IV Rocephin / Zithromax).  IV Rocephin change to Zosyn on 6/23, secondary to slow improvement and elevation in WBC with improvement.   CT scan abdomen and pelvis negative for infection.  CT head 6/20 showed no acute changes and MRI on 6/22 showed no acute ischemic infarct with brain atrophy and chronic microvascular ischemic changes.  TSH and ammonia levels normal.    2.  Pneumonia / Leukocytosis - WBC increased on Rocephin and Zithromax with change to Zosyn on 6/23. WBC down to 11,000.   3.  Dehydration -  Improving on Normal saline and TPN. 4.  CKD stage III b - creatinine improved with IV hydration.    5.  Hypokalemia - Replacement ordered. 6.  H/O CVA right precentral gyrus - seen at [facility] 2021. 7.  DM II - placed on Humalog sliding scale. 8.  Anemia -stool heme occult pending. Has been stable. 9.  Hyperlipidemia - on Lipitor. 10.  HTN - elevated upon admission.  Placed on IV Lopressor and Hydralazine. 11.  GERD - on PPI. 12.  H/O Gout - on Allopurinol.    13.  Tachycardia - HR improved with increase in Lopressor. Dr. Joy Hunter consulted. ECHO results pending. Plan:  1. Labs asked to repeat labs  this am since the specimen was contaminated with IV fluid / TPN. 2.  Continue to encourage patient to eat during the day. 3.  Will decrease total IV rate to 75 ml/hr. 4.  Start on oral lopressor with IV PRN. Patient continues to require in patient admission secondary to the need for IV antibiotic, TPN, and therapy.      DVT prophylaxis:   [x] Lovenox   [] SCDs   [] SQ Heparin   [] Encourage ambulation, low risk for DVT, no chemical or mechanical    prophylaxis necessary      [] Already on Anticoagulation    Patient Active Problem List:     Type 2 diabetes mellitus with stage 3 chronic kidney disease, without long-term current use of insulin (HCC)     Essential hypertension, benign     Esophageal reflux     Irritable bowel syndrome     Seasonal allergies     Long term current use of anticoagulant therapy     Gout     Rectocele     Tubular adenoma of colon     Hiatal hernia     Sigmoid diverticulosis     Chronic back pain     Hypomagnesemia     Family history of colon cancer     History of recurrent deep vein thrombosis (DVT)     Hyperparathyroidism ([address]     Chronic renal disease, stage III ([address] [[id number]]     Change in mental status     Severe malnutrition ([address]      Michael Bonds MD, MD  [facility] Hospitalist

## 2022-06-25 NOTE — PROGRESS NOTES
Attempted to perform neuro assessment again and patient responded to squeezing my hands. Asked patient to push down and up on my hands with her feet and she wasn't able to perform that appropriately, but was able to move her feet slightly. She remains very drowsy.

## 2022-06-25 NOTE — PROGRESS NOTES
YESENIA completed. Opens eyes with repositioning and when placing on XRAY plate. \"Ouch\" but then falls back to sleep when not disturbed. Son Robert Flies in at bedside and all explained to him as well. Explained it xray shows an ileus or if she vomits again, an NG tube may need to be placed to protect her airway. Robert Jones is in agreement with this \"it is necessary then\". Mouth suctioned x2 with no additional secretions. Repositioned onto right side with HOB elevated.

## 2022-06-25 NOTE — PROGRESS NOTES
Dr. Elle Burgess called and updated with pt somnolence this am, unable to take am meds or anything PO. Pt had trazadone last pm.  Will continue to monitor and attempt to give meds/lunch if pt is more awake at that time.

## 2022-06-26 LAB
ANION GAP SERPL CALCULATED.3IONS-SCNC: 11 MMOL/L (ref 9–17)
BUN BLDV-MCNC: 25 MG/DL (ref 8–23)
BUN/CREAT BLD: 29 (ref 9–20)
CALCIUM SERPL-MCNC: 8.5 MG/DL (ref 8.6–10.4)
CHLORIDE BLD-SCNC: 101 MMOL/L (ref 98–107)
CO2: 24 MMOL/L (ref 20–31)
CREAT SERPL-MCNC: 0.87 MG/DL (ref 0.5–0.9)
GFR AFRICAN AMERICAN: >60 ML/MIN
GFR NON-AFRICAN AMERICAN: >60 ML/MIN
GFR SERPL CREATININE-BSD FRML MDRD: ABNORMAL ML/MIN/{1.73_M2}
GLUCOSE BLD-MCNC: 192 MG/DL (ref 65–99)
GLUCOSE BLD-MCNC: 192 MG/DL (ref 65–99)
GLUCOSE BLD-MCNC: 213 MG/DL (ref 65–99)
GLUCOSE BLD-MCNC: 230 MG/DL (ref 65–99)
GLUCOSE BLD-MCNC: 245 MG/DL (ref 65–99)
GLUCOSE BLD-MCNC: 287 MG/DL (ref 65–99)
GLUCOSE BLD-MCNC: 294 MG/DL (ref 70–99)
MAGNESIUM: 1.5 MG/DL (ref 1.6–2.6)
PHOSPHORUS: 3.8 MG/DL (ref 2.6–4.5)
POTASSIUM SERPL-SCNC: 4.4 MMOL/L (ref 3.7–5.3)
SODIUM BLD-SCNC: 136 MMOL/L (ref 135–144)
TRIGL SERPL-MCNC: 103 MG/DL

## 2022-06-26 PROCEDURE — 2580000003 HC RX 258: Performed by: INTERNAL MEDICINE

## 2022-06-26 PROCEDURE — 6360000002 HC RX W HCPCS: Performed by: INTERNAL MEDICINE

## 2022-06-26 PROCEDURE — A4216 STERILE WATER/SALINE, 10 ML: HCPCS | Performed by: INTERNAL MEDICINE

## 2022-06-26 PROCEDURE — 93306 TTE W/DOPPLER COMPLETE: CPT | Performed by: INTERNAL MEDICINE

## 2022-06-26 PROCEDURE — 83735 ASSAY OF MAGNESIUM: CPT

## 2022-06-26 PROCEDURE — 82947 ASSAY GLUCOSE BLOOD QUANT: CPT

## 2022-06-26 PROCEDURE — 94761 N-INVAS EAR/PLS OXIMETRY MLT: CPT

## 2022-06-26 PROCEDURE — C9113 INJ PANTOPRAZOLE SODIUM, VIA: HCPCS | Performed by: INTERNAL MEDICINE

## 2022-06-26 PROCEDURE — 80048 BASIC METABOLIC PNL TOTAL CA: CPT

## 2022-06-26 PROCEDURE — 97110 THERAPEUTIC EXERCISES: CPT

## 2022-06-26 PROCEDURE — 1200000000 HC SEMI PRIVATE

## 2022-06-26 PROCEDURE — 6370000000 HC RX 637 (ALT 250 FOR IP): Performed by: INTERNAL MEDICINE

## 2022-06-26 PROCEDURE — 84100 ASSAY OF PHOSPHORUS: CPT

## 2022-06-26 PROCEDURE — 84478 ASSAY OF TRIGLYCERIDES: CPT

## 2022-06-26 PROCEDURE — 2500000003 HC RX 250 WO HCPCS: Performed by: INTERNAL MEDICINE

## 2022-06-26 RX ORDER — METOPROLOL TARTRATE 5 MG/5ML
10 INJECTION INTRAVENOUS EVERY 6 HOURS
Status: DISCONTINUED | OUTPATIENT
Start: 2022-06-26 | End: 2022-06-27 | Stop reason: HOSPADM

## 2022-06-26 RX ORDER — INSULIN GLARGINE 100 [IU]/ML
20 INJECTION, SOLUTION SUBCUTANEOUS
Status: DISCONTINUED | OUTPATIENT
Start: 2022-06-26 | End: 2022-06-27 | Stop reason: HOSPADM

## 2022-06-26 RX ORDER — SODIUM CHLORIDE 9 MG/ML
INJECTION, SOLUTION INTRAVENOUS CONTINUOUS
Status: DISCONTINUED | OUTPATIENT
Start: 2022-06-26 | End: 2022-06-26 | Stop reason: HOSPADM

## 2022-06-26 RX ORDER — MAGNESIUM SULFATE 1 G/100ML
1000 INJECTION INTRAVENOUS ONCE
Status: COMPLETED | OUTPATIENT
Start: 2022-06-26 | End: 2022-06-26

## 2022-06-26 RX ORDER — INSULIN LISPRO 100 [IU]/ML
0-12 INJECTION, SOLUTION INTRAVENOUS; SUBCUTANEOUS EVERY 4 HOURS
Status: DISCONTINUED | OUTPATIENT
Start: 2022-06-26 | End: 2022-06-27

## 2022-06-26 RX ADMIN — INSULIN LISPRO 6 UNITS: 100 INJECTION, SOLUTION INTRAVENOUS; SUBCUTANEOUS at 06:34

## 2022-06-26 RX ADMIN — INSULIN LISPRO 4 UNITS: 100 INJECTION, SOLUTION INTRAVENOUS; SUBCUTANEOUS at 18:06

## 2022-06-26 RX ADMIN — SODIUM CHLORIDE, PRESERVATIVE FREE 10 ML: 5 INJECTION INTRAVENOUS at 16:54

## 2022-06-26 RX ADMIN — SODIUM CHLORIDE 40 MG: 9 INJECTION, SOLUTION INTRAMUSCULAR; INTRAVENOUS; SUBCUTANEOUS at 20:29

## 2022-06-26 RX ADMIN — HYDRALAZINE HYDROCHLORIDE 20 MG: 20 INJECTION INTRAMUSCULAR; INTRAVENOUS at 06:20

## 2022-06-26 RX ADMIN — INSULIN GLARGINE 20 UNITS: 100 INJECTION, SOLUTION SUBCUTANEOUS at 06:34

## 2022-06-26 RX ADMIN — ASCORBIC ACID, VITAMIN A PALMITATE, CHOLECALCIFEROL, THIAMINE HYDROCHLORIDE, RIBOFLAVIN-5 PHOSPHATE SODIUM, PYRIDOXINE HYDROCHLORIDE, NIACINAMIDE, DEXPANTHENOL, ALPHA-TOCOPHEROL ACETATE, VITAMIN K1, FOLIC ACID, BIOTIN, CYANOCOBALAMIN: 200; 3300; 200; 6; 3.6; 6; 40; 15; 10; 150; 600; 60; 5 INJECTION, SOLUTION INTRAVENOUS at 14:48

## 2022-06-26 RX ADMIN — SODIUM CHLORIDE 40 MG: 9 INJECTION, SOLUTION INTRAMUSCULAR; INTRAVENOUS; SUBCUTANEOUS at 09:15

## 2022-06-26 RX ADMIN — METOPROLOL TARTRATE 10 MG: 5 INJECTION INTRAVENOUS at 13:36

## 2022-06-26 RX ADMIN — I.V. FAT EMULSION 15.6 ML/HR: 20 EMULSION INTRAVENOUS at 14:50

## 2022-06-26 RX ADMIN — METOPROLOL TARTRATE 10 MG: 5 INJECTION INTRAVENOUS at 00:43

## 2022-06-26 RX ADMIN — INSULIN LISPRO 2 UNITS: 100 INJECTION, SOLUTION INTRAVENOUS; SUBCUTANEOUS at 00:49

## 2022-06-26 RX ADMIN — INSULIN LISPRO 4 UNITS: 100 INJECTION, SOLUTION INTRAVENOUS; SUBCUTANEOUS at 10:20

## 2022-06-26 RX ADMIN — MAGNESIUM SULFATE HEPTAHYDRATE 1000 MG: 1 INJECTION, SOLUTION INTRAVENOUS at 06:17

## 2022-06-26 RX ADMIN — AZITHROMYCIN MONOHYDRATE 500 MG: 500 INJECTION, POWDER, LYOPHILIZED, FOR SOLUTION INTRAVENOUS at 07:22

## 2022-06-26 RX ADMIN — INSULIN LISPRO 2 UNITS: 100 INJECTION, SOLUTION INTRAVENOUS; SUBCUTANEOUS at 21:48

## 2022-06-26 RX ADMIN — SODIUM CHLORIDE, PRESERVATIVE FREE 10 ML: 5 INJECTION INTRAVENOUS at 18:12

## 2022-06-26 RX ADMIN — PIPERACILLIN SODIUM,TAZOBACTAM SODIUM 3.38 G: 3; .375 INJECTION, POWDER, FOR SOLUTION INTRAVENOUS at 09:22

## 2022-06-26 RX ADMIN — INSULIN LISPRO 2 UNITS: 100 INJECTION, SOLUTION INTRAVENOUS; SUBCUTANEOUS at 14:58

## 2022-06-26 RX ADMIN — ENOXAPARIN SODIUM 40 MG: 100 INJECTION SUBCUTANEOUS at 09:30

## 2022-06-26 RX ADMIN — METOPROLOL TARTRATE 10 MG: 5 INJECTION INTRAVENOUS at 06:21

## 2022-06-26 RX ADMIN — SODIUM CHLORIDE, PRESERVATIVE FREE 10 ML: 5 INJECTION INTRAVENOUS at 20:29

## 2022-06-26 RX ADMIN — SODIUM CHLORIDE, PRESERVATIVE FREE 10 ML: 5 INJECTION INTRAVENOUS at 15:06

## 2022-06-26 RX ADMIN — SODIUM CHLORIDE, PRESERVATIVE FREE 10 ML: 5 INJECTION INTRAVENOUS at 20:32

## 2022-06-26 RX ADMIN — PIPERACILLIN SODIUM,TAZOBACTAM SODIUM 3.38 G: 3; .375 INJECTION, POWDER, FOR SOLUTION INTRAVENOUS at 16:58

## 2022-06-26 RX ADMIN — HYDRALAZINE HYDROCHLORIDE 10 MG: 20 INJECTION INTRAMUSCULAR; INTRAVENOUS at 04:17

## 2022-06-26 RX ADMIN — HYDRALAZINE HYDROCHLORIDE 20 MG: 20 INJECTION INTRAMUSCULAR; INTRAVENOUS at 20:38

## 2022-06-26 RX ADMIN — SODIUM CHLORIDE: 9 INJECTION, SOLUTION INTRAVENOUS at 16:58

## 2022-06-26 RX ADMIN — PIPERACILLIN SODIUM AND TAZOBACTAM SODIUM 3375 MG: 3; 375 INJECTION, POWDER, FOR SOLUTION INTRAVENOUS at 00:55

## 2022-06-26 RX ADMIN — HYDRALAZINE HYDROCHLORIDE 20 MG: 20 INJECTION INTRAMUSCULAR; INTRAVENOUS at 13:42

## 2022-06-26 RX ADMIN — METOPROLOL TARTRATE 10 MG: 5 INJECTION INTRAVENOUS at 18:12

## 2022-06-26 ASSESSMENT — PAIN SCALES - PAIN ASSESSMENT IN ADVANCED DEMENTIA (PAINAD)
FACIALEXPRESSION: 0
TOTALSCORE: 0
TOTALSCORE: 0
FACIALEXPRESSION: 0
BREATHING: 0
CONSOLABILITY: 0
BREATHING: 0
BODYLANGUAGE: 0
NEGVOCALIZATION: 0
CONSOLABILITY: 0
NEGVOCALIZATION: 0
BODYLANGUAGE: 0
FACIALEXPRESSION: 0
BODYLANGUAGE: 0
TOTALSCORE: 0
NEGVOCALIZATION: 0
NEGVOCALIZATION: 0
CONSOLABILITY: 0
FACIALEXPRESSION: 0
CONSOLABILITY: 0
BODYLANGUAGE: 0
TOTALSCORE: 0

## 2022-06-26 NOTE — PROGRESS NOTES
Patient was more easily aroused this AM and would open her eyes to her name being called. Patient has been talking much more than yesterday, but only when prompted. Otherwise, she lies in bed with eyes closed. When asked where she was, patient could not say. When asked if she could take deep breaths, \"I don't know\". Was able to perform a neuro assessment, but remains very weak. Could wiggle her toes and stick her tongue out. Lip moisturizer applied and rest of morning care performed. When turning her, patient complains of pain in her right leg and that \"it was burning\", but was satisfied after repositioning. Skin is intact and no redness is noted.

## 2022-06-26 NOTE — PROGRESS NOTES
Interval Nutrition Assessment    Type and Reason for Visit:  Booker Ball / PN recommendations    Current Nutrition Therapies:    Current Parenteral Nutrition Orders:  · Type and Formula: Premix Central (50 ml/hr)   · Lipids: Daily (12.6 ml/hr x 12 hours)  · Duration: Continuous  · Rate/Volume: 50 ml/hr  · Current PN Order Provides: 1154 calories, 60 g AA  · Goal PN Orders Provides: depends on tolerance and POC    Recent Labs     06/24/22  0355 06/24/22  0355 06/25/22  0524 06/25/22  0705 06/26/22  0518      < > 131* 137 136   K 3.9   < > 5.7* 4.2 4.4   *   < > 99 104 101   CO2 24   < > 24 25 24   BUN 21   < > 21 22 25*   CREATININE 0.84   < > 0.79 0.84 0.87   GLUCOSE 163*   < > POSSIBLE CONTAMINATE, REDRAW REQUESTED PER DR JADON CONNER [id number] 247* 294*   ALT 9  --   --   --   --    ALKPHOS 80  --   --   --   --    TRIG  --   --   --  124  --    GFR        < >                   < > = values in this interval not displayed. Lab Results   Component Value Date    LABALBU 2.7 06/24/2022      Lab Results   Component Value Date    PHOS 3.8 06/26/2022      Lab Results   Component Value Date    MG 1.5 06/26/2022    No results found for: PREALBUMIN     Utilized 9 units humalog on low correction ssc (switched now to medium). 10 units insulin in PN bag added yesterday. Antibiotics provided in D5, contributing to hyperglycemia and volume. GI c/o and nil PO taken. Recommend:  1. Advance PN to 62.5 ml/hr Clinimix 5/15%, with 15.6 ml IL 20% x 12 hours. 1439 calories, 75 g AA. 2. Increase insulin in PN bag to 16 units (2/3 of utilized insulin yesterday). 3. D/c additional NS r/t additional volume in antibiotic and lipid volume  4. Monitor family decision on POC overall.     Electronically signed by Angie Moses RD, LD on 6/26/2022 at 7:18 AM

## 2022-06-26 NOTE — PROGRESS NOTES
Tries feeding patient a small amount of a [employer], but patient just holds it in her mouth and will not follow the command to swallow so it is suctioned out. States that she does not want to continue eating. RN at bedside with patient and she has her eyes open, sitting up, and telling stories of Mu[employer] when she was young in [place]. \"If I ever get better I am going back down there to visit\". Very talkative, but still disoriented x4.

## 2022-06-26 NOTE — PROGRESS NOTES
Hospitalist Progress Note  6/26/2022 5:57 AM  Subjective:   Admit Date: 6/20/2022  PCP: Madhav Thompson, DO    Interval History: Ragini wakes with minimal stimulation this morning. She denies pain. She denies chest pain or SOB. Bonnie Christian is not able to tell me where she is and isn't able to tell me the name of her son. She does follow commands moving all 4 extremities. Unable to take her medication last night. Tolerating TPN although BS has expectantly jumped. Diet: ADULT DIET; Dysphagia - Pureed  ADULT ORAL NUTRITION SUPPLEMENT; Breakfast, Lunch, Dinner; Frozen Oral Supplement  PN-Adult Premix 5/15 - Standard Electrolytes - Central Line  Medications:   Scheduled Meds:   metoprolol  10 mg IntraVENous Q6H    magnesium sulfate  1,000 mg IntraVENous Once    insulin glargine  20 Units SubCUTAneous QAM AC    insulin lispro  0-12 Units SubCUTAneous Q4H    fat emulsion  12.6 mL/hr IntraVENous Daily    pantoprazole (PROTONIX) 40 mg injection  40 mg IntraVENous BID    hydrALAZINE  20 mg IntraVENous 3 times per day    piperacillin-tazobactam  3,375 mg IntraVENous Q8H    lidocaine 1 % injection  5 mL IntraDERmal Once    sodium chloride flush  5-40 mL IntraVENous 2 times per day    enoxaparin  40 mg SubCUTAneous Daily    azithromycin  500 mg IntraVENous Q24H    allopurinol  100 mg Oral Daily    aspirin EC  81 mg Oral Daily    atorvastatin  40 mg Oral Daily    folic acid  667 mcg Oral Daily    magnesium oxide  800 mg Oral Nightly    sennosides-docusate sodium  1 tablet Oral Daily    sodium chloride flush  5-40 mL IntraVENous 2 times per day     Continuous Infusions:   sodium chloride 25 mL/hr at 06/25/22 2249    PN-Adult Premix 5/15 - Standard Electrolytes - Central Line 50 mL/hr at 06/25/22 2249    sodium chloride      sodium chloride      dextrose         Patient's current medications documented, reviewed, and updated.       CBC:   Recent Labs     06/23/22  0631 06/24/22  0355   WBC 13.7* 11.1*   HGB 10.9* 9.7*    306     BMP:    Recent Labs     06/25/22  0524 06/25/22  0705 06/26/22  0518   * 137 136   K 5.7* 4.2 4.4   CL 99 104 101   CO2 24 25 24   BUN 21 22 25*   CREATININE 0.79 0.84 0.87   GLUCOSE POSSIBLE CONTAMINATE, REDRAW REQUESTED PER DR DIOP TN [id number] 247* 294*     Hepatic:   Recent Labs     06/23/22  0631 06/24/22  0355   AST 15 10   ALT 10 9   BILITOT 0.43 0.56   ALKPHOS 90 80     Troponin: No results for input(s): TROPONINI in the last 72 hours. BNP: No results for input(s): BNP in the last 72 hours. Lipids: No results for input(s): CHOL, HDL in the last 72 hours. Invalid input(s): LDLCALCU  INR: No results for input(s): INR in the last 72 hours. Objective:   Vitals: BP (!) 150/59   Pulse (!) 101   Temp 98.5 °F (36.9 °C) (Temporal)   Resp 16   Ht 5' 6\" (1.676 m)   Wt 205 lb 9.6 oz (93.3 kg)   LMP  (LMP Unknown)   SpO2 95%   BMI 33.18 kg/m²   General appearance: alert and cooperative with exam  HEENT: Head: Normocephalic, no lesions, without obvious abnormality. Eye: Normal external eye, conjunctiva, lids cornea, JACLYN. Nose: Normal external nose, mucus membranes and septum. Neck: no adenopathy, no carotid bruit and supple, symmetrical, trachea midline  Lungs: clear to auscultation bilaterally  Heart: regular rate and rhythm, S1, S2 normal and II/VI systolic murmur  Abdomen: soft, non-tender; bowel sounds normal; no masses,  no organomegaly  Extremities: No calf tenderness  Neurologic: Mental status: Alert and follows commands but confused to all questions. Assessment and Plan:   1.  Mental status change - Has not had significant improvement.  UA negative upon admission.   CXR suggesting RUL pneumonia (placed on IV Rocephin / Zithromax).  IV Rocephin change to Zosyn on 6/23, secondary to slow improvement and elevation in WBC with improvement.   CT scan abdomen and pelvis negative for infection.  CT head 6/20 showed no acute changes and MRI on 6/22 showed no [facility])     Essential hypertension, benign     Esophageal reflux     Irritable bowel syndrome     Seasonal allergies     Long term current use of anticoagulant therapy     Gout     Rectocele     Tubular adenoma of colon     Hiatal hernia     Sigmoid diverticulosis     Chronic back pain     Hypomagnesemia     Family history of colon cancer     History of recurrent deep vein thrombosis (DVT)     Hyperpar[address] [address]     Chronic renal disease, stage III ([facility] [facility] [[id number]]     Change in mental status     Severe malnutrition ([facility] [facility]        Yonathan Shaffer MD, MD  [facility]

## 2022-06-26 NOTE — PROGRESS NOTES
Patient is very lethargic. Unable to perform neuro assessment at this time. Disoriented x4. Patient will open eyes spontaneously. Unable to take po meds at this time. Verbalizes \"my stomach doesn't feel good\". IV Zofran given at 2107. No emesis. Oral care provided and lip moisturizer applied. Dr. Peyton Henao called and updated on patient condition as well as unable to take po BP meds. Order received and entered, see MAR.     2200- Patient is more alert at this time. Patient is looking around room and takes a sip of water. RN attempts to feed patient but is refusing. Still unable to give po meds. Denies any pain. Call light in reach. Bed alarm on.

## 2022-06-26 NOTE — PROGRESS NOTES
More alert this afternoon when son Sayra John was here. Converses some with him. Was able to take a few small sips of water and few small bites of peaches before refusing anymore. Has been offered several times to be fed additional food or take sips of water but refuses. Has gotten more sleepy later this afternoon.

## 2022-06-26 NOTE — PROGRESS NOTES
Phone: Darrell  Date: 2022  Fax: [phone]      Physical Therapy    Daily Note    Patient Name: Jorge Sylvester      : 1945  (28 y.o.)  MRN: [id number]     Pt shows NO CHANGE toward goals and increased independence of mobility this treatment session        Assessment     Rolling: Maximal assistance  Supine to Sit: Maximal assistance  Sit to Supine: Maximal assistance  Scooting: Maximal assistance    Sit to Stand: Unable to assess                   WB Status: unable to assess, patient refused to stand                Assessment: Patient sleeping in bed upon entering room. Pt able to follow commands to wiggle toes. Pt asked to complete SLR with pt not responding. Stretched LE when strething R LE pt stated ouch that hurts. When questioned pt where pain was pt did not respond. Covered pt up and ended session.              Time In: 1031  Time Out: 1041  Timed Coded Minutes:10  Total Treatment Time: 10    Exercises:  See Flowsheets    Plan  Cont Per Plan Of Care    Goals  Short Term Goals  Time Frame for Short term goals: NA STG=LTG                   Long Term Goals  Time Frame for Long term goals :  3 days  Long term goal 1: Patient to tansfer supine to sit with mod assist  Long term goal 2: Patient to transfer sit to stand with mod assist  Long term goal 3: Patient to to stand with w.walker x60 sec with min assist          Chandra Valadez PTA Therapy License Number: [id number]    Date: 2022

## 2022-06-26 NOTE — PROGRESS NOTES
Pt given sips of water with straw without and difficulty. Pt ask for peaches. Pt feed pureed peaches, small bites. Pt takes 1/2 of serving and reports \"that's all. \" Pt is alert and is talkative.

## 2022-06-27 VITALS
TEMPERATURE: 98.4 F | SYSTOLIC BLOOD PRESSURE: 160 MMHG | OXYGEN SATURATION: 94 % | RESPIRATION RATE: 18 BRPM | DIASTOLIC BLOOD PRESSURE: 53 MMHG | HEIGHT: 66 IN | WEIGHT: 213 LBS | BODY MASS INDEX: 34.23 KG/M2 | HEART RATE: 109 BPM

## 2022-06-27 LAB
ABSOLUTE EOS #: 0.6 K/UL (ref 0–0.4)
ABSOLUTE LYMPH #: 2.8 K/UL (ref 1–4.8)
ABSOLUTE MONO #: 1 K/UL (ref 0–1)
ANION GAP SERPL CALCULATED.3IONS-SCNC: 9 MMOL/L (ref 9–17)
BASOPHILS # BLD: 2 % (ref 0–2)
BASOPHILS ABSOLUTE: 0.2 K/UL (ref 0–0.2)
BUN BLDV-MCNC: 32 MG/DL (ref 8–23)
BUN/CREAT BLD: 30 (ref 9–20)
CALCIUM SERPL-MCNC: 8.9 MG/DL (ref 8.6–10.4)
CHLORIDE BLD-SCNC: 102 MMOL/L (ref 98–107)
CO2: 25 MMOL/L (ref 20–31)
CREAT SERPL-MCNC: 1.06 MG/DL (ref 0.5–0.9)
DIFFERENTIAL TYPE: YES
EOSINOPHILS RELATIVE PERCENT: 5 % (ref 0–5)
GFR AFRICAN AMERICAN: >60 ML/MIN
GFR NON-AFRICAN AMERICAN: 51 ML/MIN
GFR SERPL CREATININE-BSD FRML MDRD: ABNORMAL ML/MIN/{1.73_M2}
GLUCOSE BLD-MCNC: 190 MG/DL (ref 65–99)
GLUCOSE BLD-MCNC: 196 MG/DL (ref 65–99)
GLUCOSE BLD-MCNC: 202 MG/DL (ref 70–99)
GLUCOSE BLD-MCNC: 210 MG/DL (ref 65–99)
HCT VFR BLD CALC: 30 % (ref 36–46)
HEMOGLOBIN: 9.5 G/DL (ref 12–16)
LYMPHOCYTES # BLD: 24 % (ref 15–40)
MAGNESIUM: 1.6 MG/DL (ref 1.6–2.6)
MCH RBC QN AUTO: 30.4 PG (ref 26–34)
MCHC RBC AUTO-ENTMCNC: 31.7 G/DL (ref 31–37)
MCV RBC AUTO: 96 FL (ref 80–100)
MONOCYTES # BLD: 8 % (ref 4–8)
PDW BLD-RTO: 16.7 % (ref 12.1–15.2)
PHOSPHORUS: 4 MG/DL (ref 2.6–4.5)
PLATELET # BLD: 298 K/UL (ref 140–450)
POTASSIUM SERPL-SCNC: 4.4 MMOL/L (ref 3.7–5.3)
RBC # BLD: 3.12 M/UL (ref 4–5.2)
SEG NEUTROPHILS: 61 % (ref 47–75)
SEGMENTED NEUTROPHILS ABSOLUTE COUNT: 7.4 K/UL (ref 2.5–7)
SODIUM BLD-SCNC: 136 MMOL/L (ref 135–144)
TRIGL SERPL-MCNC: 103 MG/DL
WBC # BLD: 12 K/UL (ref 3.5–11)

## 2022-06-27 PROCEDURE — 2500000003 HC RX 250 WO HCPCS: Performed by: INTERNAL MEDICINE

## 2022-06-27 PROCEDURE — 94761 N-INVAS EAR/PLS OXIMETRY MLT: CPT

## 2022-06-27 PROCEDURE — C9113 INJ PANTOPRAZOLE SODIUM, VIA: HCPCS | Performed by: INTERNAL MEDICINE

## 2022-06-27 PROCEDURE — 6370000000 HC RX 637 (ALT 250 FOR IP): Performed by: INTERNAL MEDICINE

## 2022-06-27 PROCEDURE — 6360000002 HC RX W HCPCS: Performed by: INTERNAL MEDICINE

## 2022-06-27 PROCEDURE — A4216 STERILE WATER/SALINE, 10 ML: HCPCS | Performed by: INTERNAL MEDICINE

## 2022-06-27 PROCEDURE — 2580000003 HC RX 258: Performed by: INTERNAL MEDICINE

## 2022-06-27 RX ORDER — MEGESTROL ACETATE 40 MG/ML
400 SUSPENSION ORAL DAILY
Status: DISCONTINUED | OUTPATIENT
Start: 2022-06-27 | End: 2022-06-27 | Stop reason: HOSPADM

## 2022-06-27 RX ORDER — INSULIN LISPRO 100 [IU]/ML
0-12 INJECTION, SOLUTION INTRAVENOUS; SUBCUTANEOUS EVERY 6 HOURS SCHEDULED
Status: DISCONTINUED | OUTPATIENT
Start: 2022-06-27 | End: 2022-06-27 | Stop reason: HOSPADM

## 2022-06-27 RX ADMIN — METOPROLOL TARTRATE 10 MG: 5 INJECTION INTRAVENOUS at 06:31

## 2022-06-27 RX ADMIN — ONDANSETRON 4 MG: 2 INJECTION INTRAMUSCULAR; INTRAVENOUS at 06:38

## 2022-06-27 RX ADMIN — INSULIN GLARGINE 20 UNITS: 100 INJECTION, SOLUTION SUBCUTANEOUS at 06:43

## 2022-06-27 RX ADMIN — HYDRALAZINE HYDROCHLORIDE 20 MG: 20 INJECTION INTRAMUSCULAR; INTRAVENOUS at 06:31

## 2022-06-27 RX ADMIN — I.V. FAT EMULSION 15.6 ML/HR: 20 EMULSION INTRAVENOUS at 14:56

## 2022-06-27 RX ADMIN — PIPERACILLIN SODIUM,TAZOBACTAM SODIUM 3.38 G: 3; .375 INJECTION, POWDER, FOR SOLUTION INTRAVENOUS at 10:45

## 2022-06-27 RX ADMIN — METOPROLOL TARTRATE 10 MG: 5 INJECTION INTRAVENOUS at 12:18

## 2022-06-27 RX ADMIN — INSULIN LISPRO 2 UNITS: 100 INJECTION, SOLUTION INTRAVENOUS; SUBCUTANEOUS at 02:26

## 2022-06-27 RX ADMIN — PIPERACILLIN SODIUM,TAZOBACTAM SODIUM 3.38 G: 3; .375 INJECTION, POWDER, FOR SOLUTION INTRAVENOUS at 17:33

## 2022-06-27 RX ADMIN — INSULIN LISPRO 2 UNITS: 100 INJECTION, SOLUTION INTRAVENOUS; SUBCUTANEOUS at 06:44

## 2022-06-27 RX ADMIN — INSULIN LISPRO 4 UNITS: 100 INJECTION, SOLUTION INTRAVENOUS; SUBCUTANEOUS at 12:16

## 2022-06-27 RX ADMIN — AZITHROMYCIN MONOHYDRATE 500 MG: 500 INJECTION, POWDER, LYOPHILIZED, FOR SOLUTION INTRAVENOUS at 07:39

## 2022-06-27 RX ADMIN — PIPERACILLIN SODIUM,TAZOBACTAM SODIUM 3.38 G: 3; .375 INJECTION, POWDER, FOR SOLUTION INTRAVENOUS at 01:17

## 2022-06-27 RX ADMIN — ENOXAPARIN SODIUM 40 MG: 100 INJECTION SUBCUTANEOUS at 10:25

## 2022-06-27 RX ADMIN — SODIUM CHLORIDE 40 MG: 9 INJECTION, SOLUTION INTRAMUSCULAR; INTRAVENOUS; SUBCUTANEOUS at 10:25

## 2022-06-27 RX ADMIN — ASCORBIC ACID, VITAMIN A PALMITATE, CHOLECALCIFEROL, THIAMINE HYDROCHLORIDE, RIBOFLAVIN-5 PHOSPHATE SODIUM, PYRIDOXINE HYDROCHLORIDE, NIACINAMIDE, DEXPANTHENOL, ALPHA-TOCOPHEROL ACETATE, VITAMIN K1, FOLIC ACID, BIOTIN, CYANOCOBALAMIN: 200; 3300; 200; 6; 3.6; 6; 40; 15; 10; 150; 600; 60; 5 INJECTION, SOLUTION INTRAVENOUS at 14:55

## 2022-06-27 RX ADMIN — METOPROLOL TARTRATE 10 MG: 5 INJECTION INTRAVENOUS at 01:16

## 2022-06-27 RX ADMIN — HYDRALAZINE HYDROCHLORIDE 20 MG: 20 INJECTION INTRAMUSCULAR; INTRAVENOUS at 15:02

## 2022-06-27 ASSESSMENT — PAIN SCALES - PAIN ASSESSMENT IN ADVANCED DEMENTIA (PAINAD)
BODYLANGUAGE: 0
NEGVOCALIZATION: 0
CONSOLABILITY: 0
BREATHING: 0
FACIALEXPRESSION: 0
TOTALSCORE: 0

## 2022-06-27 ASSESSMENT — PAIN SCALES - GENERAL
PAINLEVEL_OUTOF10: 0

## 2022-06-27 NOTE — PROGRESS NOTES
Hospitalist Progress Note  6/27/2022 7:53 AM  Subjective:   Admit Date: 6/20/2022  PCP: DO Otis Mcconnell History:     The patient is somnolent, responds to verbal stimuli, answered to few simple questions only   Denies pain. Patient continues with poor oral intake. Tolerates TPN. Diet: ADULT DIET;  Dysphagia - Pureed  PN-Adult Premix 5/15 - Standard Electrolytes - Central Line  Medications:   Scheduled Meds:   megestrol  400 mg Oral Daily    IVPB builder  500 mg IntraVENous Q24H    insulin lispro  0-12 Units SubCUTAneous 4 times per day    metoprolol  10 mg IntraVENous Q6H    insulin glargine  20 Units SubCUTAneous QAM AC    IVPB builder  3.375 g IntraVENous Q8H    fat emulsion  15.6 mL/hr IntraVENous Daily    pantoprazole (PROTONIX) 40 mg injection  40 mg IntraVENous BID    hydrALAZINE  20 mg IntraVENous 3 times per day    lidocaine 1 % injection  5 mL IntraDERmal Once    sodium chloride flush  5-40 mL IntraVENous 2 times per day    enoxaparin  40 mg SubCUTAneous Daily    allopurinol  100 mg Oral Daily    aspirin EC  81 mg Oral Daily    atorvastatin  40 mg Oral Daily    folic acid  013 mcg Oral Daily    magnesium oxide  800 mg Oral Nightly    sennosides-docusate sodium  1 tablet Oral Daily    sodium chloride flush  5-40 mL IntraVENous 2 times per day     Continuous Infusions:   PN-Adult Premix 5/15 - Standard Electrolytes - Central Line 62.5 mL/hr at 06/27/22 0553    sodium chloride      sodium chloride Stopped (06/27/22 0520)    dextrose       PRN Medications: hydrALAZINE, sodium chloride flush, sodium chloride, baclofen, sodium chloride flush, sodium chloride, ondansetron **OR** ondansetron, polyethylene glycol, acetaminophen **OR** acetaminophen, glucose, dextrose bolus **OR** dextrose bolus, glucagon (rDNA), dextrose, haloperidol lactate    Objective:   Vitals: BP (!) 131/46   Pulse 95   Temp 98.7 °F (37.1 °C) (Temporal)   Resp 16   Ht 5' 6\" (1.676 m)   Wt 213 lb (96.6 kg)   LMP  (LMP Unknown)   SpO2 96%   BMI 34.38 kg/m²   BMI: Body mass index is 34.38 kg/m². CBC:   Recent Labs     06/27/22  0450   WBC 12.0*   HGB 9.5*        BMP:    Recent Labs     06/25/22  0705 06/26/22  0518 06/27/22  0450    136 136   K 4.2 4.4 4.4    101 102   CO2 25 24 25   BUN 22 25* 32*   CREATININE 0.84 0.87 1.06*   GLUCOSE 247* 294* 202*       Physical Exam:  General Appearance: Somnolent, responds to verbal stimuli, not in distress  Cardiovascular: normal rate, regular rhythm, normal S1 and S2, 2/6 systolic murmur  Pulmonary/Chest: clear to auscultation bilaterally- no wheezes, rales or rhonchi, normal air movement, no respiratory distress  Abdomen: soft, non-tender, non-distended, normal bowel sounds   Extremities: no cyanosis, clubbing or edema  Skin: warm and dry, no rash or erythema  Neurological: Confused    Assessment and Plan:     1. Altered mental status -no significant improvement, etiology unclear. Possibly related to infectious process. Chest x-ray with right upper lobe pneumonia, urinalysis was negative. Continue on IV Zosyn started on 6/23 secondary to elevated WBC count and no improvement with IV Rocephin and Zithromax. CT scan of abdomen pelvis was negative for sources of infection. CT head 6/20 did not reveal acute abnormalities. MRI on 6/22 showed no evidence of acute stroke, showed brain atrophy and chronic microvascular ischemic changes. Hospice was consulted to discuss patient's condition with her son Juarez Young. 2.  Pneumonia -antibiotics changed from Rocephin and Zithromax to Zosyn on 6/23, continues with elevated WBC count, will monitor. Blood cultures showed no significant growth  3. Severe malnutrition - started on TPN with pharmacy managing. Has extremely poor oral intake. We will add Megace to help with appetite. If patient does not improve, may need PEG placement for long-term nutritional support  4. CKD stage IIIb - at baseline  5. History of CVA  6. Diabetes mellitus type 2  7. Anemia -stool guaiac pending, H&H stable, monitor  8. Hypertension - BP stable, on IV Lopressor and hydralazine  9. GERD -on IV Protonix  10. Hypomagnesemia -replace  11. Generalized weakness -PT OT ordered, however the patient is not participating due to her mental status change    CODE STATUS DNR CCA    Advanced Care Plan    ( x)  I confirmed that the patient's Advanced Care Plan is present, code status documented, or surrogate decision maker is listed in the patient's medical record. ( )  The patient's advanced care plan is not present because:  (select)   ( ) I confirmed today that the patient does not wish or was not able to name a surrogate decision maker or provide an [address] ( ) Hospice care is currently being provided or has been provided this calender year. ( )  I did not confirm today the presence of an [address] or surrogate decision maker documented within the patient's medical record. (Does not satisfy MIPS performance). Documentation of Current Medications in the Medical Record   (x )  I have utilized all available immediate resources to obtain, update, or review the patient's current medications. If Yes, Stop Here  ( ) The patient is not eligivel for medications reconciliation; the patient is in an emergent medical situation where delaying treatment would jeopardize the patient's health. ( ) I did not confirm, update or review the patient's current list of medications today.   (does not satisfy MIPS performance)        Patient continues to require inpatient admission related to need for IV antibiotics, TPN, monitoring electrolytes, discharge planning      Electronically signed by Mehnaz Ogden MD on 6/27/2022 at 7:53 AM    Rounding Hospitalist

## 2022-06-27 NOTE — PROGRESS NOTES
[facility]  Occupational Therapy    Date: 2022  Patient Name: Marianne العلي        : 1945       [] Pt Refusal           [x] Pt Unavailable due to:  Spoke with RN regarding OT evaluation. Decided to cancel OT order at this time as pt is inappropriate and unable to engage in therapy at this time. If pt's status improves, will re-assess need for OT evaluation at that time.          Patti Sanchez, NEHA, OTR/L Date: 2022

## 2022-06-27 NOTE — PLAN OF CARE
Problem: Discharge Planning  Goal: Discharge to home or other facility with appropriate resources  Outcome: Progressing  Flowsheets (Taken 6/27/2022 1028)  Discharge to home or other facility with appropriate resources: Identify barriers to discharge with patient and caregiver     Problem: Skin/Tissue Integrity  Goal: Absence of new skin breakdown  Description: 1. Monitor for areas of redness and/or skin breakdown  2. Assess vascular access sites hourly  3. Every 4-6 hours minimum:  Change oxygen saturation probe site  4. Every 4-6 hours:  If on nasal continuous positive airway pressure, respiratory therapy assess nares and determine need for appliance change or resting period. 6/27/2022 1131 by Keith Taveras  Outcome: Progressing  6/26/2022 2234 by Tracy Astudillo RN  Outcome: Progressing     Problem: Safety - Adult  Goal: Free from fall injury  6/27/2022 1131 by Keith Taveras  Outcome: Progressing  Flowsheets (Taken 6/27/2022 1125)  Free From Fall Injury: Based on caregiver fall risk screen, instruct family/caregiver to ask for assistance with transferring infant if caregiver noted to have fall risk factors  6/26/2022 2234 by Tracy Astudillo RN  Outcome: Progressing  [address] (Taken 6/26/2022 0844 by Santos Marroquin RN)  Free From Fall Injury: Instruct family/caregiver on patient safety     Problem: Pain  Goal: Verbalizes/displays adequate comfort level or baseline comfort level  6/27/2022 1131 by BERYL KELLY  Outcome: Progressing  6/26/2022 2234 by Tracy Astudillo RN  Outcome: Progressing     Problem: ABCDS Injury Assessment  Goal: Absence of physical injury  Outcome: Progressing  Flowsheets (Taken 6/27/2022 1125)  Absence of Physical Injury: Implement safety measures based on patient assessment     Problem: Confusion  Goal: Confusion, delirium, dementia, or psychosis is improved or at baseline  Description: INTERVENTIONS:  1.  Assess for possible contributors to thought disturbance, including medications, impaired vision or hearing, underlying metabolic abnormalities, dehydration, psychiatric diagnoses, and notify attending LIP  2. [place] high risk fall precautions, as indicated  3. Provide frequent short contacts to provide reality reorientation, refocusing and direction  4. Decrease environmental stimuli, including noise as appropriate  5. Monitor and intervene to maintain adequate nutrition, hydration, elimination, sleep and activity  6. If unable to ensure safety without constant attention obtain sitter and review sitter guidelines with assigned personnel  7.  Initiate Psychosocial CNS and Spiritual Care consult, as indicated  Outcome: Progressing     Problem: Chronic Conditions and Co-morbidities  Goal: Patient's chronic conditions and co-morbidity symptoms are monitored and maintained or improved  Outcome: Progressing  Flowsheets (Taken 6/27/2022 1028)  Care Plan - Patient's Chronic Conditions and Co-Morbidity Symptoms are Monitored and Maintained or Improved: Monitor and assess patient's chronic conditions and comorbid symptoms for stability, deterioration, or improvement     Problem: Cardiovascular - Adult  Goal: Maintains optimal cardiac output and hemodynamic stability  Outcome: Progressing  Flowsheets (Taken 6/27/2022 1028)  Maintains optimal cardiac output and hemodynamic stability: Monitor blood pressure and heart rate     Problem: Musculoskeletal - Adult  Goal: Maintain proper alignment of affected body part  Outcome: Progressing  Flowsheets (Taken 6/27/2022 1028)  Maintain proper alignment of affected body part: Support and protect limb and body alignment per provider's orders     Problem: Metabolic/Fluid and Electrolytes - Adult  Goal: Electrolytes maintained within normal limits  Outcome: Progressing  Flowsheets (Taken 6/27/2022 1028)  Electrolytes maintained within normal limits:   Monitor labs and assess patient for signs and symptoms of electrolyte imbalances   Administer electrolyte replacement as ordered  Goal: Glucose maintained within prescribed range  Outcome: Progressing  Flowsheets (Taken 6/27/2022 1028)  Glucose maintained within prescribed range: Monitor blood glucose as ordered     Problem: Nutrition Deficit:  Goal: Optimize nutritional status  6/27/2022 1131 by BERYL KELLY  Outcome: Progressing  6/27/2022 0837 by Lurdes James RD, LD  Outcome: Progressing  Flowsheets (Taken 6/27/2022 0837)  Nutrient intake appropriate for improving, restoring, or maintaining nutritional needs:   Monitor oral intake, labs, and treatment plans   Recommend, monitor, and adjust tube feedings and TPN/PPN based on assessed needs  Note: Nutrition Problem #1: Severe malnutrition  Intervention: Food and/or Nutrient Delivery: Modify Oral Nutrition Supplement,Continue Current Diet,Continue Current Parenteral Nutrition  Ensure Clear  Megace

## 2022-06-27 NOTE — CONSULTS
TPN Follow Up Note: Pharmacy Consult    Today's Labs:  Recent Labs     06/27/22  0450        Recent Labs     06/27/22  0450        Recent Labs     06/27/22  0450   K 4.4     Recent Labs     06/27/22  0450   MG 1.6     Recent Labs     06/27/22  0450   PHOS 4.0     Recent Labs     06/27/22  0450   GLUCOSE 202*     Recent Labs     06/27/22  0450   BUN 32*         Assessment: continue central PN at 62.5 ml/hr with IL 20% at 15.6 ml/hr x 12 hours per dietary recommendations (provides 1439 calories, 75 g AA, 225 grams CHO, Lipids 26% of total calories) Electrolyte Replacement: none    TPN changes for (today) at 1800: increase insulin in PN to 25 units    PN Product Dispensed: CLINIMIX-E  5/15    Lipid Product Dispensed: Intralipid 20%    Rates of Infusion   Lipid Infusion Rate: 15.6  ML/hr   Clinimix-E Infusion Rate: 62.5 mL/hr          Electrolytes (per bag) Clinimix E   Na= 70 mEq/Bag (2000ml)   K= 60 mEq/Bag (2000ml)              Mg= 10 mEq/Bag (2000ml)              Ca= 4.4 mmol/Bag (2000ml)              Acetate= 160 mEq/Bag (2000ml)  (140mEq/Bag-Peripheral Formula)              Chloride= 78 mEq/Bag (2000ml)              Phosphate= 30mMol/Bag (2000ml)                            MVI= 10 mL/Bag 2000ml   Trace Elements= 1 mL/Bag (2000ml)    **NOTE: Electrolytes are based upon the total volume of the TPN bag (2000ml). The amount of electrolytes received is based upon the rate/volume of the total infusion.     Plan:  1) Pharmacy will continue to review daily electrolytes and adjust as needed  2) Will continue to consult dietician for caloric needs and rate changes      Nando Pool [facility],6/27/2022, 10:07 AM

## 2022-06-27 NOTE — PROGRESS NOTES
Son at bedside and updated on patient status; Son would like pt to be transferred to [facility] if able. Dr. Kiera Zuniga made aware.

## 2022-06-27 NOTE — PLAN OF CARE
Problem: Skin/Tissue Integrity  Goal: Absence of new skin breakdown  Description: 1. Monitor for areas of redness and/or skin breakdown  2. Assess vascular access sites hourly  3. Every 4-6 hours minimum:  Change oxygen saturation probe site  4. Every 4-6 hours:  If on nasal continuous positive airway pressure, respiratory therapy assess nares and determine need for appliance change or resting period.   Outcome: Progressing     Problem: Safety - Adult  Goal: Free from fall injury  Outcome: Progressing  Flowsheets (Taken 6/26/2022 0844 by Santos Marroquin RN)  Free From Fall Injury: Instruct family/caregiver on patient safety     Problem: Pain  Goal: Verbalizes/displays adequate comfort level or baseline comfort level  Outcome: Progressing     Problem: ABCDS Injury Assessment  Goal: Absence of physical injury  Recent Flowsheet Documentation  Taken 6/26/2022 0844 by Santos Marroquin RN  Absence of Physical Injury: Implement safety measures based on patient assessment

## 2022-06-27 NOTE — DISCHARGE SUMMARY
PHYSICAL THERAPY:  Patient has not been able to participate in PT sessions due to non-alert, drowsy, and unable to follow directions. No progress made; discharge at this time.   Paola Dickerson, PT 6/27/22

## 2022-06-27 NOTE — PROGRESS NOTES
Acknowledge pt transfer to [facility] for possible peg tube placement. SW notified Chastity Jimenez at [facility] of transfer so they could follow up.  Heladio Riojas MSW LSW 6/27/2022

## 2022-06-27 NOTE — PROGRESS NOTES
Comprehensive Nutrition Assessment    Type and Reason for Visit:  Reassess    Nutrition Recommendations/Plan:   1. Continue PN at current rate  2. Add 9 more units of insulin to PN bag  3. Add ensure clear      Malnutrition Assessment:  Malnutrition Status:  Severe malnutrition (06/22/22 0820)      Nutrition Assessment:    Continued malnutrition. PO poor overall with only scant amounts of pureed peaches accepted, often refusing anything else. Had difficulty with magic cup (stayed in oral cavity, then suctioned per nursing), so it was d/c'd this AM. PN continues, with mild improvement in Mg++ and glucose (insulin in bag, moderate coverage and iv antibiotics switched over to 0.9 NS). Used 18 units insulin yesterday, and would recommend to add 9 of that into PN (25 units in bag). Noted addition of megace 400 mg daily as of this AM. Unable to have patient report any fluids that she would normally drink besides water. Will try Ensure Clear regardless. Nutrition Related Findings:    2-3 + both U and ANGE edema. Wound Type: Skin Tears       Current Nutrition Intake & Therapies:    Average Meal Intake: 1-25%  Average Supplements Intake: 0%  Current Parenteral Nutrition Orders:  · Type and Formula: Premix Central (62.5 ml/hr)   · Lipids: Daily (15.6 ml/hr x 12 hours)  · Duration: Continuous  · Rate/Volume: 62.5  · Current PN Order Provides: 1439 calories, 75 g AA  · Goal PN Orders Provides: depends on tolerance and POC    Anthropometric Measures:  Height: 5' 6\" (167.6 cm)  Ideal Body Weight (IBW): 130 lbs (59 kg)    Admission Body Weight: 213 lb (96.6 kg)  Current Body Weight: 205 lb 9.6 oz (93.3 kg), 163.8 % IBW. Weight Source: Bed Scale  Current BMI (kg/m2): 33.2  Usual Body Weight: 242 lb (109.8 kg)  % Weight Change (Calculated): -15  Weight Adjustment For: No Adjustment                 BMI Categories: Obese Class 1 (BMI 30.0-34. 9)    Estimated Daily Nutrient Needs:  Energy Requirements Based On: Kcal/kg  Weight Used for Energy Requirements: Current  Energy (kcal/day): 2719-7200 (15-20)  Weight Used for Protein Requirements: Ideal  Protein (g/day): 77-95g (1.3-+1.5g/kg)  Method Used for Fluid Requirements: 1 ml/kcal  Fluid (ml/day): 2222 ml (23/kg)    Nutrition Diagnosis:   · Severe malnutrition related to inadequate protein-energy intake as evidenced by weight loss,localized or generalized fluid accumulation    Recent Labs     06/25/22  0705 06/26/22  0518 06/27/22  0450    136 136   K 4.2 4.4 4.4    101 102   CO2 25 24 25   BUN 22 25* 32*   CREATININE 0.84 0.87 1.06*   GLUCOSE 247* 294* 202*   TRIG 124 103  --    GFR                     Lab Results   Component Value Date    LABALBU 2.7 06/24/2022      Lab Results   Component Value Date    PHOS 4.0 06/27/2022      Lab Results   Component Value Date    MG 1.6 06/27/2022    No results found for: PREALBUMIN    Recent Labs     06/26/22  0042 06/26/22  0632 06/26/22  1016 06/26/22  1439 06/26/22  1805 06/26/22  2147 06/27/22  0224 06/27/22  0641   POCGLU 230* 287* 245* 192* 213* 192* 196* 190*     Nutrition Interventions:   Food and/or Nutrient Delivery: Modify Oral Nutrition Supplement,Continue Current Diet,Continue Current Parenteral Nutrition  Nutrition Education/Counseling: Education initiated  Coordination of Nutrition Care: Speech Therapy,Coordination of Care  Plan of Care discussed with: physician    Goals:  Previous Goal Met: Progressing toward Goal(s) (slowly)  Goals: Tolerate nutrition support at goal rate,PO intake 50% or greater,by next RD assessment     Nutrition Monitoring and Evaluation:   Behavioral-Environmental Outcomes: Knowledge or Skill  Food/Nutrient Intake Outcomes: Food and Nutrient Intake,Parenteral Nutrition Intake/Tolerance,Supplement Intake  Physical Signs/Symptoms Outcomes: Chewing or Swallowing,Biochemical Data,Weight,Fluid Status or Edema    Discharge Planning:     Too soon to determine     Kanchan Lennon, [address] [place] [address],   Contact: [phone]

## 2022-06-28 LAB
CULTURE: NORMAL
Lab: NORMAL
SPECIMEN DESCRIPTION: NORMAL

## 2022-06-28 NOTE — DISCHARGE SUMMARY
Hospitalist Discharge Summary    Patient:  Bebe Murdock  YOB: 1945    MRN: [id number]   Acct: [de-identified]    Primary Care Physician: Lola Steve DO    Admit date:  6/20/2022    Discharge date:  6/27/2022 Transfer to [facility]       Discharge Diagnoses: 1. Altered mental status  2. Pneumonia  3. Severe malnutrition  4. CKD stage 3 b  5. H/O CVA  6. DM type 2  7. Anemia  8. Hypomagnesemia  9. Generalized weakness         Discharge Medications:         Medication List      CONTINUE taking these medications    Lancets Misc  1 each by Does not apply route 2 times daily     * True Metrix Air Glucose Meter Dorcas     * True Metrix Meter w/Device Kit  Test once daily         * This list has 2 medication(s) that are the same as other medications prescribed for you. Read the directions carefully, and ask your doctor or other care provider to review them with you. ASK your doctor about these medications    acetaminophen 650 MG extended release tablet  Commonly known as: TYLENOL     allopurinol 100 MG tablet  Commonly known as: ZYLOPRIM  2 tabs po daily     aspirin EC 81 MG EC tablet  Take 1 tablet by mouth daily     atorvastatin 40 MG tablet  Commonly known as: LIPITOR  Take 1 tablet by mouth daily     baclofen 10 MG tablet  Commonly known as: LIORESAL  1 tablet 3 times a day as needed for spasm     blood glucose test strips  Test blood glucose daily     ciprofloxacin 500 MG tablet  Commonly known as: CIPRO     folic acid 528 MCG tablet  Commonly known as: FOLVITE     furosemide 20 MG tablet  Commonly known as: LASIX  TAKE 1 TABLET EVERY DAY     gabapentin 100 MG capsule  Commonly known as: NEURONTIN  Take 2 capsules by mouth nightly for 180 days.      glipiZIDE 5 MG tablet  Commonly known as: GLUCOTROL  Take 1 tablet by mouth in the morning and at bedtime     Magnesium 400 MG Tabs     omeprazole 20 MG delayed release capsule  Commonly known as: PRILOSEC  TAKE 1 CAPSULE EVERY rales or rhonchi, normal air movement, no respiratory distress  Abdomen: soft, non-tender, non-distended, normal bowel sounds   Extremities: no cyanosis, clubbing or edema  Skin: warm and dry, no rash or erythema  Neurological: Confused      Hospital Course: The patient is a 78 y.o. female who presents to the ER with increasing confusion and weakness. According to records from the ER, Joshua Valenzuela was diagnosed with a UTI 3 days ago and started on Cipro. Since that time she has progressively become weaker and more confused. She was seen in the ER on 6/18, complaining of right leg and pelvic pain. Xrays showed no fractures at that time and she was diagnosed with right side sciatica. This morning Ragini is unable to give any history. She yells out, \"help me\". Joshua Valenzuela does not answer questions or follow commands. Nursing reports that she refuses to take her medication. She appears to be moving all extremities. In the ER her BMP was normal other than a BUN of 34, creatinine 1.34, and glucose of 214. CBC showed a WBC of 11.5, Hgb 10, and Plt ct 304. COVID was negative. ABG showed a PH of 7.407, PaO2 97, PCO2 40 and HCO3 25.  CT of the head showed:  1. Examination limited by motion degradation. 2. Cerebral atrophy and chronic small vessel ischemic disease. 3. No gross acute intracranial hemorrhage or acute large vessel ischemia by   noncontrast CT. 4. Intracranial atherosclerosis. Urine culture from 6/13/22, grew out E.coli sensitive to Cipro and Rocephin. Joshua Valenzuela is admitted for IV fluids, IV antibiotics, and further evaluation of her confusion.       1. Altered mental status -no significant improvement, etiology unclear. Chest x-ray with right upper lobe pneumonia, urinalysis was negative. On  IV Zosyn started on 6/23 secondary to elevated WBC count and no improvement with IV Rocephin and Zithromax. CT scan of abdomen pelvis 6/21 was negative for sources of infection.     CT head 6/20 did not reveal acute abnormalities. MRI on 6/22 showed no evidence of acute stroke, showed brain atrophy and chronic microvascular ischemic changes. Hospice was consulted to discuss patient's condition with her son Ky Salas however he requested transfer to [facility] for second opinion evaluation. 2.  Pneumonia -antibiotics changed from Rocephin and Zithromax to Zosyn on 6/23, continues with elevated WBC count  Blood cultures showed no significant growth  3. Severe malnutrition - started on TPN with pharmacy managing. Has extremely poor oral intake. added Megace 6/27 to help with appetite  If patient does not improve, may need PEG placement for long-term nutritional support  4. CKD stage IIIb - at baseline  5. History of CVA  6. Diabetes mellitus type 2  7. Anemia -stool guaiac pending, H&H stable, monitor  8. Hypertension - BP stable, on IV Lopressor and hydralazine  9. GERD -on IV Protonix  10. Hypomagnesemia -replace  11.   Generalized weakness -PT OT ordered, however the patient is not participating due to her mental status change    Per pt's son request patient was transferred to [facility] for additional evaluation and possibly PEG placement       Disposition: [facility]    Condition: Stable    Time Spent: 35 minutes      Electronically signed by Ethel Patel MD on 6/27/2022 at 8:52 PM  Discharging Hospitalist

## 2022-07-06 NOTE — CONSULTS
[place] [address]                                  CONSULTATION    PATIENT NAME: Francesca Martins                     :        1945  MED REC NO:   [id number]                              ROOM:       5081  ACCOUNT NO:   [de-identified]                           ADMIT DATE: 2022  PROVIDER:     Padmini Johnson    CONSULT DATE:  2022    REASON FOR CONSULT:  Tachycardia. HISTORY OF PRESENT ILLNESS:  The patient is a 46-year-old female who I  last saw in 2018. At that time, I had seen her for chest pain and  abnormal stress test.  She developed fatigue and shortness of breath  with some chest pain. It was quite atypical.  I did a catheterization  on 2018. This showed essentially normal coronary arteries, with  normal LV function, EF of 60%. I had not seen her since her heart  catheterization. She presented to the emergency room on 2022, for confusion. She  had been seen in the ER on , for leg pain. She was treated for UTI  with Cipro. In the emergency room, she was confused. Chest x-ray  showed right upper lobe pneumonia and she was started on Zosyn. On , she continued to be confused. She was also tachycardic with  the heart rate in the 120 range. This appeared to be sinus tachycardia  and I was asked to see her in consult. When I see her, she was resting comfortably. She amazingly enough  remembered who I was from 2018. She denied any PND or orthopnea. She  denied any history of chest pain or chest discomfort. She really could  not answer why, however, she had come to the emergency room. She does  have a history of DVT. She does have a Valarie filter.     She also has a history of a closed nondisplaced comminuted fracture of  the left femur on 2021, and had open reduction internal fixation  of the distal femur by Dr. Fany Artis on 12/18/2021. During the time of her fall with her femur fracture, she did have  confusion and an incidental finding of a stroke in the right precentral  gyrus, which appeared to be embolic. This resolved. She denies any PND or orthopnea. She denies any chest pain or chest  discomfort. She does have history of paroxysmal atrial fibrillation and was not  anticoagulated following her December fall because of a fall risk. Her Lopressor had been on 5 mg q.6 hours for her sinus tachycardia. It  was just increased to 10 mg q.6 hours by Dr. Hanna Vallejo. CARDIAC RISK FACTORS:  Known CAD:  Negative. Hypertension:  Positive. Diabetes:  Positive. Hyperlipidemia:  Positive. Smoking:  Negative. Peripheral Vascular Disease:  Negative. MEDICATIONS AT THE TIME OF HER ADMISSION:  She was on allopurinol 100 mg  two tablets daily, aspirin 81 mg daily, atorvastatin 40 mg daily,  baclofen 10 mg t.i.d. p.r.n., Cipro 500 mg b.i.d., Lasix 20 mg daily,  gabapentin 100 mg b.i.d., glipizide 5 mg b.i.d., magnesium 400 mg daily,  omeprazole 20 mg daily, Ditropan XL 10 mg daily, prednisone 50 mg daily,  propranolol 20 mg daily, trazodone 50 mg nightly. PAST MEDICAL AND SURGICAL HISTORY:  1. She had a fall with a nondisplaced fracture of femur on 12/17/2021,  along with an incidental finding of a CVA in the right precentral gyrus. 2.  Diabetes type 2.  3.  Anemia. 4.  Hypertension. 5.  Chronic renal insufficiency. 6.  DVT. 7.  Back surgery. 8.  Breast biopsy in the left and right. 9.  Cardiac catheterization on 03/07/2018. 10.  Carpal tunnel release. 11.  Colonoscopy. 12.  Eye surgery. 13.  Hysterectomy. 14.  Left half of kidney removed with partial nephrectomy. 15.  Rotator cuff repair. 16.  Left and right knee replacement. FAMILY HISTORY:  Had diabetes, no heart disease in the family. SOCIAL HISTORY:  She is 78years old, . Lives with her son,  Shira Bryan. No grandchildren and he is not . Very inactive. REVIEW OF SYSTEMS:  Cardiac as above. Other systems reviewed as much as  I could but she was a poor historian. PHYSICAL EXAMINATION:  VITAL SIGNS:  Her blood pressure was 130/70, heart rate was 120 and  regular. Respiratory rate 18. O2 saturation was 94%. GENERAL:  She is a pleasant 68-year-old female. Denied pain. She was  oriented to person, place and time. Answered questions appropriately. SKIN:  No unusual skin changes. HEENT:  The pupils are equally round and intact. Mucous membranes were  dry. NECK:  No JVD. Good carotid pulses. No carotid bruits. No  lymphadenopathy or thyromegaly. CARDIOVASCULAR EXAM:  S1 and S2 were normal.  No S3 or S4. Soft  systolic blowing type murmur. No diastolic murmur. PMI was normal.  No  lift, thrust, or pericardial friction rub. LUNGS:  Clear to auscultation and percussion. ABDOMEN:  Soft and nontender. Good bowel sounds. EXTREMITIES:  Good femoral pulses. Good pedal pulses. No pedal edema. Skin was warm and dry. No calf tenderness. Nail beds pink. Good cap  refill. PULSES:  Bilateral symmetrical radial, brachial and carotid pulses. No  carotid bruits. Good femoral and pedal pulses. NEUROLOGIC EXAM:  Within normal limits. PSYCHIATRIC EXAM:  Within normal limits. LABORATORY DATA:  Sodium 143, potassium 3.9, BUN 21, creatinine 0.84,  GFR greater than 60. Her ALT was 9, AST was 12. White count 11.1,  hemoglobin 9.7 with a platelet count 270,972. EKG showed tachycardia, which appeared to be a sinus tachycardia at 119  beats per minute, it was otherwise a normal EKG. Chest x-ray was unremarkable. IMPRESSION:  1. Tachycardia, which appeared to be a sinus tachycardia. 2.  General weakness. 3.  Left facial droop, however both sides seem to move symmetrically.   4.  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  5.  History of paroxysmal atrial fibrillation, not anticoagulated  because of a fall risk. 6.  Two UTIs with encephalopathy. PLAN:  1. No change in medications. 2.  Watch her response to 10 mg of Lopressor every 6 hours. 3.  Look for other causes for her sinus tachycardia. DISCUSSION:  The patient appears to have a sinus tachycardia. She does  have UTI with possible pneumonia and she does have confusion. This  could all be contributing to her sinus tachycardia. She has a history of paroxysmal atrial fibrillation but I do not see any  evidence of atrial fibrillation at this time. When she was lying on her left side, she appeared to have a left facial  droop. However, I called Dr. Randol Boeck and she states that she will often  have, what appears to be, a slight droop on the left side but it is  normal finding for her. She does have symmetrical movement of both  sides of her face. Her echocardiogram is pending. There is no indication of acute cardiac  event and again her rhythm appears to be a sinus tachycardia. We will  review the echo to see if there is any pathology, which could be causing  her sinus tachycardia such as a pericardial effusion. We will also  watch the response to Lopressor. Thank you very much for allowing me the privilege of seeing the patient. If you have any questions on my thoughts, please do not hesitate to  contact me.         Jean Pierre Butler    D: 07/06/2022 9:49:28       T: 07/06/2022 11:13:46     [email or web address]  Job#: [id number]     Doc#: [id number]    CC:

## 2022-07-26 ENCOUNTER — HOSPITAL ENCOUNTER (OUTPATIENT)
Age: 77
Setting detail: SPECIMEN
Discharge: HOME | End: 2022-07-26
Payer: MEDICARE

## 2022-07-26 LAB
-: ABNORMAL
BACTERIA: ABNORMAL
BILIRUBIN URINE: ABNORMAL
COLOR: YELLOW
COMMENT UA: ABNORMAL
EPITHELIAL CELLS UA: ABNORMAL /HPF
GLUCOSE URINE: NEGATIVE
KETONES, URINE: ABNORMAL
LEUKOCYTE ESTERASE, URINE: ABNORMAL
NITRITE, URINE: NEGATIVE
PH UA: 5 (ref 5–8)
PROTEIN UA: ABNORMAL
RBC UA: ABNORMAL /HPF (ref 0–2)
SPECIFIC GRAVITY UA: 1.02 (ref 1–1.03)
TURBIDITY: ABNORMAL
URINE HGB: ABNORMAL
UROBILINOGEN, URINE: NORMAL
WBC UA: ABNORMAL /HPF

## 2022-07-26 PROCEDURE — 81001 URINALYSIS AUTO W/SCOPE: CPT

## 2022-07-26 PROCEDURE — 87086 URINE CULTURE/COLONY COUNT: CPT

## 2022-07-27 LAB
CULTURE: NORMAL
SPECIMEN DESCRIPTION: NORMAL

## 2022-08-01 ENCOUNTER — CARE COORDINATION (OUTPATIENT)
Dept: CASE MANAGEMENT | Age: 77
End: 2022-08-01

## 2022-08-01 RX ORDER — ATORVASTATIN CALCIUM 40 MG/1
40 TABLET, FILM COATED ORAL DAILY
Qty: 90 TABLET | Refills: 1 | Status: SHIPPED | OUTPATIENT
Start: 2022-08-01 | End: 2022-08-23 | Stop reason: SDUPTHER

## 2022-08-01 RX ORDER — CLOPIDOGREL BISULFATE 75 MG/1
75 TABLET ORAL DAILY
Qty: 90 TABLET | Refills: 1 | Status: SHIPPED | OUTPATIENT
Start: 2022-08-01 | End: 2022-08-23 | Stop reason: SDUPTHER

## 2022-08-01 RX ORDER — CLOPIDOGREL BISULFATE 75 MG/1
TABLET ORAL
COMMUNITY
Start: 2022-07-02 | End: 2022-08-01 | Stop reason: SDUPTHER

## 2022-08-01 RX ORDER — GABAPENTIN 100 MG/1
200 CAPSULE ORAL NIGHTLY
Qty: 180 CAPSULE | Refills: 1 | Status: SHIPPED | OUTPATIENT
Start: 2022-08-01 | End: 2022-08-23 | Stop reason: SDUPTHER

## 2022-08-01 RX ORDER — OMEPRAZOLE 20 MG/1
CAPSULE, DELAYED RELEASE ORAL
Qty: 90 CAPSULE | Refills: 1 | Status: SHIPPED | OUTPATIENT
Start: 2022-08-01 | End: 2022-08-23 | Stop reason: SDUPTHER

## 2022-08-01 RX ORDER — ALLOPURINOL 100 MG/1
TABLET ORAL
Qty: 180 TABLET | Refills: 1 | Status: SHIPPED | OUTPATIENT
Start: 2022-08-01 | End: 2022-08-23 | Stop reason: SDUPTHER

## 2022-08-01 RX ORDER — PROPRANOLOL HYDROCHLORIDE 20 MG/1
60 TABLET ORAL 3 TIMES DAILY
Qty: 90 TABLET | Refills: 1 | Status: SHIPPED | OUTPATIENT
Start: 2022-08-01 | End: 2022-08-23

## 2022-08-01 NOTE — TELEPHONE ENCOUNTER
Last OV: 6/13/2022 AWV  Last RX:    Next scheduled apt: 8/23/2022 HFU      Pt's son requesting a refill

## 2022-08-01 NOTE — CARE COORDINATION
[facility] Transitions Initial Follow Up Call    Call within 2 business days of discharge: Yes    Patient: Kori Crowe Patient : 1945   MRN: <[id number]>  Reason for Admission: AMS  Discharge Date: 22 RARS: Readmission Risk Score: 17.7 ( )      Last Discharge [address] [place] [address]       Date Complaint Diagnosis Description Type Department Provider    22 Altered Mental Status Altered mental status, unspecified altered mental status type . .. ED to Hosp-Admission (Discharged) (ADMITTED) [facility] 2[facility] MS David Giang MD; Aaliyah Rogers,... Acute Care Course: Patient admitted to [facility] for [address] from -. She transferred to [facility] -. She discharged to [facility] -    HFU made:    Conversation: Attempted a well check. Caller was unable to reach patient or leave a vm d/t no vm set up. Follow up plan: No further outreach. Non-face-to-face services provided:         Follow Up  Future Appointments   Date Time Provider Natanael Austin   2022  1:00 PM [address] [place] [address]   10/27/2022  2:30 PM SHARMILA Carrasquillo [facility]       Jorge Bernabe RN

## 2022-08-03 ENCOUNTER — TELEPHONE (OUTPATIENT)
Dept: FAMILY MEDICINE CLINIC | Age: 77
End: 2022-08-03

## 2022-08-03 NOTE — TELEPHONE ENCOUNTER
Last OV: 6/13/2022 awv    Next scheduled apt: 8/23/2022 Hospital follow up        Patients son called in stating patient recently started taking Meformin but now she has diarrhea. The son is concerned that she is going to get dehydrated because she's having 6-7 loose stools a day that are clear. He feels that she needs to try a different medication. I did advise that if her symptoms worsen to take her to the ER.

## 2022-08-04 NOTE — TELEPHONE ENCOUNTER
Spoke with patient's son(Arcadio). Advised of providers notes. He voiced understanding. Cheri Jack stated for now he would like to hold off having her try the extended release but will stop the Meformin for now.

## 2022-08-04 NOTE — TELEPHONE ENCOUNTER
Hold metformin until diarrhea resolves. Maintain good hydration. After diarrhea stops, we can try to resume it. If diarrhea returns, then we know it was the metformin (which is a known problem, but usually resolves after taking the medication for a couple weeks). Then we would need to come up with a different plan. Continue glipizide BID with food. Could try extended release metformin for less GI upset.

## 2022-08-15 ENCOUNTER — TELEPHONE (OUTPATIENT)
Dept: FAMILY MEDICINE CLINIC | Age: 77
End: 2022-08-15

## 2022-08-15 NOTE — TELEPHONE ENCOUNTER
Please get quantification of edema - like 1-4+, how far up the legs it goes, whether she's wearing comp stockings, elevating, and if she's having any SOB.

## 2022-08-15 NOTE — TELEPHONE ENCOUNTER
Last OV: 6/13/2022    Next scheduled apt: 8/23/2022        Out patient home health called requesting advise about edema in patients legs. Stating the patient has gained 12.8lbs in a month. Joshua Valenzuela has an appointment scheduled for the 23rd but the son is concerned that she may need to be seen before that day.

## 2022-08-15 NOTE — TELEPHONE ENCOUNTER
Ara Sayer PT from the [facility], states that she does not have pitting score,  swelling is double in the left leg more than the right. Swelled more around knee and ankle. States that patient complains of pain and is limiting her ROM with PT. No SOB. She is elevating but refuses compression stockings.

## 2022-08-23 ENCOUNTER — OFFICE VISIT (OUTPATIENT)
Dept: FAMILY MEDICINE CLINIC | Age: 77
End: 2022-08-23
Payer: MEDICARE

## 2022-08-23 VITALS
BODY MASS INDEX: 32.95 KG/M2 | DIASTOLIC BLOOD PRESSURE: 52 MMHG | SYSTOLIC BLOOD PRESSURE: 100 MMHG | OXYGEN SATURATION: 99 % | HEART RATE: 84 BPM | WEIGHT: 205 LBS | HEIGHT: 66 IN

## 2022-08-23 DIAGNOSIS — S76.911A STRAIN OF RIGHT ILIOPSOAS MUSCLE, INITIAL ENCOUNTER: ICD-10-CM

## 2022-08-23 DIAGNOSIS — E43 SEVERE MALNUTRITION (HCC): ICD-10-CM

## 2022-08-23 DIAGNOSIS — Z87.440 H/O URINARY TRACT INFECTION: ICD-10-CM

## 2022-08-23 DIAGNOSIS — E11.22 TYPE 2 DIABETES MELLITUS WITH STAGE 3B CHRONIC KIDNEY DISEASE, WITHOUT LONG-TERM CURRENT USE OF INSULIN (HCC): ICD-10-CM

## 2022-08-23 DIAGNOSIS — N18.32 TYPE 2 DIABETES MELLITUS WITH STAGE 3B CHRONIC KIDNEY DISEASE, WITHOUT LONG-TERM CURRENT USE OF INSULIN (HCC): ICD-10-CM

## 2022-08-23 DIAGNOSIS — R41.82 ALTERED MENTAL STATUS, UNSPECIFIED ALTERED MENTAL STATUS TYPE: Primary | ICD-10-CM

## 2022-08-23 PROCEDURE — G8427 DOCREV CUR MEDS BY ELIG CLIN: HCPCS | Performed by: FAMILY MEDICINE

## 2022-08-23 PROCEDURE — G8417 CALC BMI ABV UP PARAM F/U: HCPCS | Performed by: FAMILY MEDICINE

## 2022-08-23 PROCEDURE — 1036F TOBACCO NON-USER: CPT | Performed by: FAMILY MEDICINE

## 2022-08-23 PROCEDURE — 2022F DILAT RTA XM EVC RTNOPTHY: CPT | Performed by: FAMILY MEDICINE

## 2022-08-23 PROCEDURE — 1090F PRES/ABSN URINE INCON ASSESS: CPT | Performed by: FAMILY MEDICINE

## 2022-08-23 PROCEDURE — G8399 PT W/DXA RESULTS DOCUMENT: HCPCS | Performed by: FAMILY MEDICINE

## 2022-08-23 PROCEDURE — 1123F ACP DISCUSS/DSCN MKR DOCD: CPT | Performed by: FAMILY MEDICINE

## 2022-08-23 PROCEDURE — 3044F HG A1C LEVEL LT 7.0%: CPT | Performed by: FAMILY MEDICINE

## 2022-08-23 PROCEDURE — 3017F COLORECTAL CA SCREEN DOC REV: CPT | Performed by: FAMILY MEDICINE

## 2022-08-23 PROCEDURE — 99214 OFFICE O/P EST MOD 30 MIN: CPT | Performed by: FAMILY MEDICINE

## 2022-08-23 RX ORDER — LANOLIN ALCOHOL/MO/W.PET/CERES
400 CREAM (GRAM) TOPICAL DAILY
Qty: 90 TABLET | Refills: 1 | Status: SHIPPED | OUTPATIENT
Start: 2022-08-23 | End: 2022-09-26 | Stop reason: SDUPTHER

## 2022-08-23 RX ORDER — ALLOPURINOL 100 MG/1
TABLET ORAL
Qty: 180 TABLET | Refills: 1 | Status: SHIPPED | OUTPATIENT
Start: 2022-08-23 | End: 2022-09-26 | Stop reason: SDUPTHER

## 2022-08-23 RX ORDER — OMEPRAZOLE 20 MG/1
CAPSULE, DELAYED RELEASE ORAL
Qty: 90 CAPSULE | Refills: 1 | Status: SHIPPED | OUTPATIENT
Start: 2022-08-23 | End: 2022-09-26 | Stop reason: SDUPTHER

## 2022-08-23 RX ORDER — CLOPIDOGREL BISULFATE 75 MG/1
75 TABLET ORAL DAILY
Qty: 90 TABLET | Refills: 1 | Status: SHIPPED | OUTPATIENT
Start: 2022-08-23 | End: 2022-09-26 | Stop reason: SDUPTHER

## 2022-08-23 RX ORDER — PROPRANOLOL HYDROCHLORIDE 60 MG/1
TABLET ORAL
Qty: 180 TABLET | Refills: 1 | Status: SHIPPED | OUTPATIENT
Start: 2022-08-23 | End: 2022-09-19 | Stop reason: SDUPTHER

## 2022-08-23 RX ORDER — ATORVASTATIN CALCIUM 40 MG/1
40 TABLET, FILM COATED ORAL DAILY
Qty: 90 TABLET | Refills: 1 | Status: SHIPPED | OUTPATIENT
Start: 2022-08-23 | End: 2022-09-26 | Stop reason: SDUPTHER

## 2022-08-23 RX ORDER — PROPRANOLOL HYDROCHLORIDE 60 MG/1
TABLET ORAL
COMMUNITY
Start: 2022-07-02 | End: 2022-08-23 | Stop reason: SDUPTHER

## 2022-08-23 RX ORDER — GABAPENTIN 100 MG/1
100 CAPSULE ORAL 3 TIMES DAILY
Qty: 270 CAPSULE | Refills: 1 | Status: SHIPPED | OUTPATIENT
Start: 2022-08-23 | End: 2022-09-06 | Stop reason: SDUPTHER

## 2022-08-23 NOTE — PROGRESS NOTES
Name: Samantha Kang  : 1945         Chief Complaint:     Chief Complaint   Patient presents with    Diabetes    Leg Swelling       History of Present Illness:      Samantha Kang is a 78 y.o.  female who presents with Diabetes and Leg Swelling      HPI      had AMS, brought to ER here, admitted til  when she became weaker and more confused, unable to participate in PT, transferred to [facility]. Stayed there til  and then went to [facility]. Has been doing pretty well at home, ambulating with walker, taking meds as son arranges them. Appetite improved, hadn't been eating at all in the hospital. Only explanation given for her illness was UTI. Son asking about ways to avoid same in the future. C/o R anterior thigh pain that sometimes jumps over to L anterior distal thigh. Gabapentin 100 mg TID. Helped by rubbing anterior R thigh. LLE swelling and can't wear comp stockings, wraps leave big marks and bruising. Elevates when she can. Diarrhea stopped after holding metformin. More on the constipated side now. Sugar 120s-140s, few over 200.      Past Medical History:     Past Medical History:   Diagnosis Date    Allergic rhinitis     Chronic kidney disease, stage III (moderate) (HCC)     Deep vein thrombosis (DVT) ([facility] [address] 10/24/2012    Elbow fracture, left     Fall     Gastric ulcer     Gout     Hx of blood clots     Following last back surgery     Hypertension     Nausea & vomiting     Presence of IVC filter     Type II or unspecified type diabetes mellitus without mention of complication, not stated as uncontrolled         Past Surgical History:     Past Surgical History:   Procedure Laterality Date    BACK SURGERY      BACK SURGERY      BACK SURGERY      BACK SURGERY      BACK SURGERY      BACK SURGERY      Fusion     BREAST BIOPSY Left     BREAST BIOPSY Right     CARDIAC CATHETERIZATION Left 2018    Dr. Lonzell Kayser @ [facility]--There is 30% disease of the origin of the lateral anterior descending. Mild 10% -20% plaque disease in the circumflex & right coronary artery. Normal left ventricular functino, ejection fraction of 60%. CARPAL TUNNEL RELEASE Right     CATARACT REMOVAL Right     CATARACT REMOVAL Left     COLONOSCOPY  2014    COLONOSCOPY N/A 5/13/2019    COLONOSCOPY POLYPECTOMY HOT BIOPSY performed by Kati Abraham MD at [address]      \"Multiple\"     EYE SURGERY Right     Removed fluid from behind eye    HYSTERECTOMY (CERVIX STATUS UNKNOWN)      KIDNEY SURGERY      left side 1/2 of kidney removed    PARTIAL NEPHRECTOMY Left     RETINAL DETACHMENT SURGERY Left     ROTATOR CUFF REPAIR      TOTAL KNEE ARTHROPLASTY Right     TOTAL KNEE ARTHROPLASTY Left     UPPER GASTROINTESTINAL ENDOSCOPY  2014    VENA CAVA FILTER PLACEMENT          Medications:       Prior to Admission medications    Medication Sig Start Date End Date Taking? Authorizing Provider   allopurinol (ZYLOPRIM) 100 MG tablet 2 tabs po daily 8/23/22  Yes Shirlyn Primrose, DO   clopidogrel (PLAVIX) 75 MG tablet Take 1 tablet by mouth daily 8/23/22  Yes Shirdaya Mainrose, DO   atorvastatin (LIPITOR) 40 MG tablet Take 1 tablet by mouth daily 8/23/22  Yes Rosa Isela Mainrose, DO   omeprazole (PRILOSEC) 20 MG delayed release capsule TAKE 1 CAPSULE EVERY DAY 8/23/22  Yes Shirjosemanueln Primrose, DO   gabapentin (NEURONTIN) 100 MG capsule Take 1 capsule by mouth 3 times daily for 180 days. 8/23/22 2/19/23 Yes Rosa Isela Mainrose, DO   folic acid (FOLVITE) 247 MCG tablet Take 1 tablet by mouth daily 8/23/22  Yes Shirlyn Primrose, DO   propranolol (INDERAL) 60 MG tablet 60mg  Twice daily 8/23/22  Yes Shirlyn Primrose, DO   Blood Glucose Monitoring Suppl (TRUE METRIX AIR GLUCOSE METER) SARAH True Metrix Glucose Meter   use TWICE DAILY AS DIRECTED.    Yes Historical Provider, MD   aspirin EC 81 MG EC tablet Take 1 tablet by mouth daily 5/16/22  Yes Shirlyn Primrose, DO   blood glucose monitor strips Test blood glucose daily 10/29/21  Yes Fide Hoskins DO   Lancets MISC 1 each by Does not apply route 2 times daily 8/27/21  Yes YOHAN Vazquez CNP   Blood Glucose Monitoring Suppl (TRUE METRIX METER) w/Device KIT Test once daily 2/9/21  Yes Fide Hoskins DO   acetaminophen (TYLENOL) 650 MG CR tablet Take 1,300 mg by mouth every 8 hours as needed for Pain   Yes Historical Provider, MD        Allergies:       Codeine, Lisinopril, Percocet [oxycodone-acetaminophen], Adhesive tape, and Norco [hydrocodone-acetaminophen]    Social History:     Tobacco:    reports that she has never smoked. She has never used smokeless tobacco.  Alcohol:      reports no history of alcohol use. Drug Use:  reports no history of drug use. Family History:     Family History   Problem Relation Age of Onset    Diabetes Father     Cancer Father         Prostate Cancer    Cancer Paternal Aunt         Colon Cancer       Review of Systems:     Positive and Negative as described in HPI    Review of Systems   Constitutional: Negative. Respiratory: Negative. Physical Exam:     Vitals:  BP (!) 100/52 (Site: Right Upper Arm)   Pulse 84   Ht 5' 6\" (1.676 m)   Wt 205 lb (93 kg)   LMP  (LMP Unknown)   SpO2 99%   BMI 33.09 kg/m²   Physical Exam  Vitals and nursing note reviewed. Constitutional:       General: She is not in acute distress. Appearance: Normal appearance. She is well-developed. She is not ill-appearing. Cardiovascular:      Rate and Rhythm: Normal rate and regular rhythm. Heart sounds: Normal heart sounds. Pulmonary:      Effort: Pulmonary effort is normal.      Breath sounds: Normal breath sounds. Musculoskeletal:      Comments: LLE much larger than R but both lower ext have 1+ pitting edema and tenderness to palpation   Neurological:      Mental Status: She is alert and oriented to person, place, and time.    Psychiatric:         Mood and Affect: Mood normal.         Behavior: Behavior normal.       Data:     Lab Results   Component Value Date/Time     06/27/2022 04:50 AM    K 4.4 06/27/2022 04:50 AM     06/27/2022 04:50 AM    CO2 25 06/27/2022 04:50 AM    BUN 32 06/27/2022 04:50 AM    CREATININE 1.06 06/27/2022 04:50 AM    GLUCOSE 202 06/27/2022 04:50 AM    PROT 5.0 06/24/2022 03:55 AM    LABALBU 2.7 06/24/2022 03:55 AM    BILITOT 0.56 06/24/2022 03:55 AM    ALKPHOS 80 06/24/2022 03:55 AM    AST 10 06/24/2022 03:55 AM    ALT 9 06/24/2022 03:55 AM     Lab Results   Component Value Date/Time    WBC 12.0 06/27/2022 04:50 AM    RBC 3.12 06/27/2022 04:50 AM    HGB 9.5 06/27/2022 04:50 AM    HCT 30.0 06/27/2022 04:50 AM    MCV 96.0 06/27/2022 04:50 AM    MCH 30.4 06/27/2022 04:50 AM    MCHC 31.7 06/27/2022 04:50 AM    RDW 16.7 06/27/2022 04:50 AM     06/27/2022 04:50 AM    MPV NOT REPORTED 12/17/2021 11:45 AM     Lab Results   Component Value Date/Time    TSH 2.51 06/21/2022 04:36 AM     Lab Results   Component Value Date/Time    CHOL 167 06/22/2021 02:35 PM    HDL 59 06/22/2021 02:35 PM    LABA1C 6.9 05/13/2022 01:11 PM         Assessment & Plan:        Diagnosis Orders   1. Altered mental status, unspecified altered mental status type        2. Severe malnutrition ([address]        3. Strain of right iliopsoas muscle, initial encounter        4. Type 2 diabetes mellitus with stage 3b chronic kidney disease, without long-term current use of insulin ([address]        5. H/O urinary tract infection          Severe AMS requiring hospitalization, stopped eating and there was actually talk of hospice care. Prior to this, she had had a UTI with delay of antibiotic treatment as, per son, she was refusing to take med or saying she was taking it when she wasn't. Unclear whether this was the only thing that caused AMS. Also had abnl CT of iliopsoas and has had R thigh pain for past few mos. Malnutrition improved, better appetite and has gained back some weight  R thigh pain, abnl CT, stable/improving. Continue gabapentin.   DM controlled, no meds currently. Cont to monitor. Supplies given to collect UA at home if needed for symptoms in the future      Requested Prescriptions     Signed Prescriptions Disp Refills    allopurinol (ZYLOPRIM) 100 MG tablet 180 tablet 1     Si tabs po daily    clopidogrel (PLAVIX) 75 MG tablet 90 tablet 1     Sig: Take 1 tablet by mouth daily    atorvastatin (LIPITOR) 40 MG tablet 90 tablet 1     Sig: Take 1 tablet by mouth daily    omeprazole (PRILOSEC) 20 MG delayed release capsule 90 capsule 1     Sig: TAKE 1 CAPSULE EVERY DAY    gabapentin (NEURONTIN) 100 MG capsule 270 capsule 1     Sig: Take 1 capsule by mouth 3 times daily for 180 days. folic acid (FOLVITE) 039 MCG tablet 90 tablet 1     Sig: Take 1 tablet by mouth daily    propranolol (INDERAL) 60 MG tablet 180 tablet 1     Simg  Twice daily       There are no Patient Instructions on file for this visit.         Electronically signed by Bryan Lennon DO on 2022 at 10:36 PM   [address]  Dept: [phone]

## 2022-08-25 ASSESSMENT — ENCOUNTER SYMPTOMS: RESPIRATORY NEGATIVE: 1

## 2022-09-01 ENCOUNTER — APPOINTMENT (OUTPATIENT)
Dept: CT IMAGING | Age: 77
End: 2022-09-01
Payer: MEDICARE

## 2022-09-01 ENCOUNTER — HOSPITAL ENCOUNTER (EMERGENCY)
Age: 77
Discharge: HOME | End: 2022-09-01
Attending: EMERGENCY MEDICINE
Payer: MEDICARE

## 2022-09-01 ENCOUNTER — APPOINTMENT (OUTPATIENT)
Dept: GENERAL RADIOLOGY | Age: 77
End: 2022-09-01
Payer: MEDICARE

## 2022-09-01 VITALS
BODY MASS INDEX: 31.71 KG/M2 | HEART RATE: 72 BPM | HEIGHT: 67 IN | DIASTOLIC BLOOD PRESSURE: 64 MMHG | TEMPERATURE: 97.5 F | WEIGHT: 202 LBS | RESPIRATION RATE: 16 BRPM | SYSTOLIC BLOOD PRESSURE: 128 MMHG | OXYGEN SATURATION: 97 %

## 2022-09-01 DIAGNOSIS — S09.90XA INJURY OF HEAD, INITIAL ENCOUNTER: ICD-10-CM

## 2022-09-01 DIAGNOSIS — M25.552 LEFT HIP PAIN: ICD-10-CM

## 2022-09-01 DIAGNOSIS — T14.8XXA HEMATOMA: Primary | ICD-10-CM

## 2022-09-01 LAB
ABSOLUTE EOS #: 0.3 K/UL (ref 0–0.4)
ABSOLUTE LYMPH #: 2.8 K/UL (ref 1–4.8)
ABSOLUTE MONO #: 0.6 K/UL (ref 0–1)
ANION GAP SERPL CALCULATED.3IONS-SCNC: 12 MMOL/L (ref 9–17)
BASOPHILS # BLD: 1 % (ref 0–2)
BASOPHILS ABSOLUTE: 0.1 K/UL (ref 0–0.2)
BUN BLDV-MCNC: 22 MG/DL (ref 8–23)
BUN/CREAT BLD: 17 (ref 9–20)
CALCIUM SERPL-MCNC: 9.9 MG/DL (ref 8.6–10.4)
CHLORIDE BLD-SCNC: 103 MMOL/L (ref 98–107)
CO2: 26 MMOL/L (ref 20–31)
CREAT SERPL-MCNC: 1.27 MG/DL (ref 0.5–0.9)
DIFFERENTIAL TYPE: YES
EOSINOPHILS RELATIVE PERCENT: 3 % (ref 0–5)
GFR AFRICAN AMERICAN: 50 ML/MIN
GFR NON-AFRICAN AMERICAN: 41 ML/MIN
GFR SERPL CREATININE-BSD FRML MDRD: ABNORMAL ML/MIN/{1.73_M2}
GLUCOSE BLD-MCNC: 132 MG/DL (ref 70–99)
HCT VFR BLD CALC: 32.3 % (ref 36–46)
HEMOGLOBIN: 10.3 G/DL (ref 12–16)
INR BLD: 1
LYMPHOCYTES # BLD: 33 % (ref 15–40)
MCH RBC QN AUTO: 30.9 PG (ref 26–34)
MCHC RBC AUTO-ENTMCNC: 32 G/DL (ref 31–37)
MCV RBC AUTO: 96.6 FL (ref 80–100)
MONOCYTES # BLD: 7 % (ref 4–8)
PDW BLD-RTO: 16.6 % (ref 12.1–15.2)
PLATELET # BLD: 359 K/UL (ref 140–450)
POTASSIUM SERPL-SCNC: 4.2 MMOL/L (ref 3.7–5.3)
PROTHROMBIN TIME: 13.4 SEC (ref 11.5–14.2)
RBC # BLD: 3.34 M/UL (ref 4–5.2)
SEG NEUTROPHILS: 56 % (ref 47–75)
SEGMENTED NEUTROPHILS ABSOLUTE COUNT: 4.8 K/UL (ref 2.5–7)
SODIUM BLD-SCNC: 141 MMOL/L (ref 135–144)
WBC # BLD: 8.4 K/UL (ref 3.5–11)

## 2022-09-01 PROCEDURE — 80048 BASIC METABOLIC PNL TOTAL CA: CPT

## 2022-09-01 PROCEDURE — 6370000000 HC RX 637 (ALT 250 FOR IP): Performed by: EMERGENCY MEDICINE

## 2022-09-01 PROCEDURE — 85025 COMPLETE CBC W/AUTO DIFF WBC: CPT

## 2022-09-01 PROCEDURE — 73502 X-RAY EXAM HIP UNI 2-3 VIEWS: CPT

## 2022-09-01 PROCEDURE — 73700 CT LOWER EXTREMITY W/O DYE: CPT

## 2022-09-01 PROCEDURE — 99284 EMERGENCY DEPT VISIT MOD MDM: CPT

## 2022-09-01 PROCEDURE — 70450 CT HEAD/BRAIN W/O DYE: CPT

## 2022-09-01 PROCEDURE — 85610 PROTHROMBIN TIME: CPT

## 2022-09-01 PROCEDURE — 36415 COLL VENOUS BLD VENIPUNCTURE: CPT

## 2022-09-01 RX ORDER — HYDROCODONE BITARTRATE AND ACETAMINOPHEN 5; 325 MG/1; MG/1
1 TABLET ORAL ONCE
Status: COMPLETED | OUTPATIENT
Start: 2022-09-01 | End: 2022-09-01

## 2022-09-01 RX ADMIN — HYDROCODONE BITARTRATE AND ACETAMINOPHEN 1 TABLET: 5; 325 TABLET ORAL at 16:13

## 2022-09-01 ASSESSMENT — ENCOUNTER SYMPTOMS
VOMITING: 0
BACK PAIN: 0
NAUSEA: 0
COLOR CHANGE: 0

## 2022-09-01 ASSESSMENT — PAIN SCALES - GENERAL
PAINLEVEL_OUTOF10: 0
PAINLEVEL_OUTOF10: 5

## 2022-09-01 ASSESSMENT — PAIN DESCRIPTION - LOCATION: LOCATION: HIP

## 2022-09-01 ASSESSMENT — PAIN - FUNCTIONAL ASSESSMENT: PAIN_FUNCTIONAL_ASSESSMENT: PREVENTS OR INTERFERES SOME ACTIVE ACTIVITIES AND ADLS

## 2022-09-01 ASSESSMENT — PAIN DESCRIPTION - DESCRIPTORS: DESCRIPTORS: BURNING

## 2022-09-01 ASSESSMENT — PAIN DESCRIPTION - ORIENTATION: ORIENTATION: LEFT

## 2022-09-01 NOTE — ED PROVIDER NOTES
[facility] ED  eMERGENCY dEPARTMENT eNCOUnter      Pt Name: Zoraida Sloan  MRN: [id number]  [place] 1945  Date of evaluation: 9/1/2022  Provider: Rosa Harris DO    CHIEF COMPLAINT     Chief Complaint   Patient presents with    Hip Pain     Patient fell backwards from standing while son was out running errands. Denies LOC. Patient reports she lied on the ground for an hour and a half until son got home. Reports hitting head but unsure of what she hit it on. Complaint of left hip pain. HISTORY OF PRESENT ILLNESS    Shayla Esteban is a 78 y.o. female who presents to the emergency department from home after mechanical fall. She was reaching for something when she lost her balance. She landed on her left side and then hit the back of her head on the ground. Complains mostly of left hip pain. She was on the ground for 2 hours prior to her son returning. She states she was not dizzy or lightheaded she simply tripped. Complains only of left hip pain. No neck or back pain. No loss of consciousness. Triage notes and Nursing notes were reviewed by myself. Any discrepancies are addressed above.     PAST MEDICAL HISTORY     Past Medical History:   Diagnosis Date    Allergic rhinitis     Chronic kidney disease, stage III (moderate) (HCC)     Deep vein thrombosis (DVT) ([facility] [address] 10/24/2012    Elbow fracture, left     Fall     Gastric ulcer     Gout     Hx of blood clots     Following last back surgery     Hypertension     Nausea & vomiting     Presence of IVC filter     Type II or unspecified type diabetes mellitus without mention of complication, not stated as uncontrolled        SURGICAL HISTORY       Past Surgical History:   Procedure Laterality Date    BACK SURGERY      BACK SURGERY      BACK SURGERY      BACK SURGERY      BACK SURGERY      BACK SURGERY      Fusion     BREAST BIOPSY Left     BREAST BIOPSY Right     CARDIAC CATHETERIZATION Left 03/07/2018    Dr. Marc Baldwin @ [facility]--There is 30% disease of the origin of the lateral anterior descending. Mild 10% -20% plaque disease in the circumflex & right coronary artery. Normal left ventricular functino, ejection fraction of 60%. CARPAL TUNNEL RELEASE Right     CATARACT REMOVAL Right     CATARACT REMOVAL Left     COLONOSCOPY  2014    COLONOSCOPY N/A 5/13/2019    COLONOSCOPY POLYPECTOMY HOT BIOPSY performed by Susan Hawkins MD at [facility]      \"Multiple\"     EYE SURGERY Right     Removed fluid from behind eye    HYSTERECTOMY (CERVIX STATUS UNKNOWN)      KIDNEY SURGERY      left side 1/2 of kidney removed    PARTIAL NEPHRECTOMY Left     RETINAL DETACHMENT SURGERY Left     ROTATOR CUFF REPAIR      TOTAL KNEE ARTHROPLASTY Right     TOTAL KNEE ARTHROPLASTY Left     UPPER GASTROINTESTINAL ENDOSCOPY  2014    VENA CAVA FILTER PLACEMENT         CURRENT MEDICATIONS       Previous Medications    ACETAMINOPHEN (TYLENOL) 650 MG CR TABLET    Take 1,300 mg by mouth every 8 hours as needed for Pain    ALLOPURINOL (ZYLOPRIM) 100 MG TABLET    2 tabs po daily    ASPIRIN EC 81 MG EC TABLET    Take 1 tablet by mouth daily    ATORVASTATIN (LIPITOR) 40 MG TABLET    Take 1 tablet by mouth daily    BLOOD GLUCOSE MONITOR STRIPS    Test blood glucose daily    BLOOD GLUCOSE MONITORING SUPPL (TRUE METRIX AIR GLUCOSE METER) SARAH    True Metrix Glucose Meter   use TWICE DAILY AS DIRECTED.    BLOOD GLUCOSE MONITORING SUPPL (TRUE METRIX METER) W/DEVICE KIT    Test once daily    CLOPIDOGREL (PLAVIX) 75 MG TABLET    Take 1 tablet by mouth daily    FOLIC ACID (FOLVITE) 606 MCG TABLET    Take 1 tablet by mouth daily    GABAPENTIN (NEURONTIN) 100 MG CAPSULE    Take 1 capsule by mouth 3 times daily for 180 days.     LANCETS MISC    1 each by Does not apply route 2 times daily    OMEPRAZOLE (PRILOSEC) 20 MG DELAYED RELEASE CAPSULE    TAKE 1 CAPSULE EVERY DAY    PROPRANOLOL (INDERAL) 60 MG TABLET    60mg  Twice daily       ALLERGIES     Codeine, Lisinopril, Percocet [oxycodone-acetaminophen], Adhesive tape, and Norco [hydrocodone-acetaminophen]    FAMILY HISTORY       Family History   Problem Relation Age of Onset    Diabetes Father     Cancer Father         Prostate Cancer    Cancer Paternal Aunt         Colon Cancer        SOCIAL HISTORY     Social History     Socioeconomic History    Marital status:      Spouse name: None    Number of children: None    Years of education: None    Highest education level: None   Tobacco Use    Smoking status: Never    Smokeless tobacco: Never   Vaping Use    Vaping Use: Never used   Substance and Sexual Activity    Alcohol use: No    Drug use: No     Social Determinants of Health     Financial Resource Strain: Low Risk     Difficulty of Paying Living Expenses: Not hard at all   Food Insecurity: No Food Insecurity    Worried About Running Out of Food in the Last Year: Never true    Ran Out of Food in the Last Year: Never true   Physical Activity: Unknown    Days of Exercise per Week: 0 days       REVIEW OF SYSTEMS       Review of Systems   Constitutional:  Negative for chills, fatigue and fever. Gastrointestinal:  Negative for nausea and vomiting. Genitourinary:  Negative for dysuria, flank pain, frequency, hematuria and urgency. Musculoskeletal:  Positive for arthralgias. Negative for back pain, neck pain and neck stiffness. Skin:  Negative for color change, pallor, rash and wound. Neurological:  Negative for dizziness, weakness and numbness. All other systems reviewed and are negative. Except as noted above the remainder of the review of systems was reviewed and is negative. PHYSICAL EXAM    (up to 7 for level 4, 8 or more for level 5)     ED Triage Vitals   BP Temp Temp src Pulse Resp SpO2 Height Weight   -- -- -- -- -- -- -- --       Physical Exam  Constitutional:       General: She is not in acute distress. Appearance: Normal appearance. She is obese.  She is not ill-appearing, toxic-appearing or diaphoretic. HENT:      Head: Normocephalic and atraumatic. Mouth/Throat:      Mouth: Mucous membranes are moist.   Eyes:      Extraocular Movements: Extraocular movements intact. Conjunctiva/sclera: Conjunctivae normal.      Pupils: Pupils are equal, round, and reactive to light. Cardiovascular:      Rate and Rhythm: Normal rate. Pulses: Normal pulses. Pulmonary:      Effort: Pulmonary effort is normal.   Abdominal:      Tenderness: There is no abdominal tenderness. Musculoskeletal:         General: Tenderness (left hip) present. No swelling. Cervical back: Normal range of motion and neck supple. No swelling, deformity, tenderness or bony tenderness. No pain with movement. Normal range of motion. Thoracic back: Normal. No swelling, tenderness or bony tenderness. Lumbar back: Normal. No swelling, tenderness or bony tenderness. Right hip: Normal.      Left hip: Tenderness and bony tenderness present. No crepitus. Decreased range of motion. Normal strength. Right knee: Normal.      Left knee: Normal.      Right lower leg: Normal.      Left lower leg: Normal.      Comments: Pelvis stable   Left gluteal hematoma and tenderness. Skin:     General: Skin is warm and dry. Capillary Refill: Capillary refill takes less than 2 seconds. Coloration: Skin is not jaundiced or pale. Findings: No bruising, erythema, lesion or rash. Neurological:      General: No focal deficit present. Mental Status: She is alert and oriented to person, place, and time. Sensory: No sensory deficit.    Psychiatric:         Mood and Affect: Mood normal.         Behavior: Behavior normal.       DIAGNOSTIC RESULTS     EKG: (none if blank)  All EKG's areinterpreted by the Emergency Department Physician who either signs or Co-signs this chart in the absence of a cardiologist.        RADIOLOGY: (none if blank)   Interpretationper the Radiologist below, if available at the time of this note:    CT HIP LEFT WO CONTRAST   Final Result      1. A 5.2 cm hematoma in the left gluteus serg muscle, posteroinferior to the    left ischial tuberosity. Fatty atrophy throughout the left gluteal muscles. 2. Moderate left hip osteoarthrosis. 3. No fracture or malalignment. CT HEAD WO CONTRAST   Final Result      Chronic changes, without fracture. XR HIP 2-3 VW W PELVIS LEFT   Final Result      No pelvic or left hip fracture. If persistent unexplained pain, consider further evaluation in this osteopenic    individual.                      LABS:  Labs Reviewed   CBC WITH AUTO DIFFERENTIAL - Abnormal; Notable for the following components:       Result Value    RBC 3.34 (*)     Hemoglobin 10.3 (*)     Hematocrit 32.3 (*)     RDW 16.6 (*)     All other components within normal limits   BASIC METABOLIC PANEL - Abnormal; Notable for the following components:    Glucose 132 (*)     Creatinine 1.27 (*)     GFR Non- 41 (*)     GFR  50 (*)     All other components within normal limits   PROTIME-INR       All other labs were within normal range or not returned as of this dictation. EMERGENCY DEPARTMENT COURSE and Medical Decision Making:     MDM  / Patient evaluated after a fall. CT brain negative. Left hip xray negative. Still has pain but able to stand. Found a gluteal hematoma. CT showed 5cm hematoma. Which is the exact area of her pain. She is able to stand with her walker. Analgesia provided. Stable for outpatient follow up.  Given signs and symptoms of when to return to the ER>   Strict return precautions and follow up instructions were discussed with the patient with which the patient agrees    ED Medications administered this visit:    Medications   HYDROcodone-acetaminophen (NORCO) 5-325 MG per tablet 1 tablet (1 tablet Oral Given 9/1/22 1613)       CONSULTS: (None if blank)  None    Procedures: (None if blank)       CLINICAL IMPRESSION      1. Hematoma    2. Left hip pain    3. Injury of head, initial encounter          DISPOSITION/PLAN    DISPOSITION Decision To Discharge 09/01/2022 06:49:26 PM      PATIENT REFERRED TO:  No follow-up provider specified.     DISCHARGE MEDICATIONS:  New Prescriptions    No medications on file              (Please note that portions of this note were completed with a voicerecognition program.  Efforts were made to edit the dictations but occasionally words are mis-transcribed.)      Latoya Jonas DO (electronically signed)  Attending Emergency Department Provider        Latoya Jonas DO  09/01/22 [address] [place] [address]  09/01/22 [id number]

## 2022-09-06 RX ORDER — GABAPENTIN 100 MG/1
100 CAPSULE ORAL 3 TIMES DAILY
Qty: 90 CAPSULE | Refills: 0 | Status: SHIPPED | OUTPATIENT
Start: 2022-09-06 | End: 2022-10-12 | Stop reason: SDUPTHER

## 2022-09-06 NOTE — TELEPHONE ENCOUNTER
Son called in and stated she still has not received her Gabapentin. Asking for a 30 day supply. Son also wanted you to know she fell last Thursday and went to [facility] Maintenance   Topic Date Due    Shingles vaccine (1 of 2) Never done    Diabetic foot exam  10/22/2020    Colorectal Cancer Screen  05/13/2021    Diabetic microalbuminuria test  06/22/2022    Lipids  06/22/2022    Flu vaccine (1) 09/01/2022    Breast cancer screen  08/24/2022    Diabetic retinal exam  08/30/2022    A1C test (Diabetic or Prediabetic)  05/13/2023    Depression Screen  06/09/2023    Annual Wellness Visit (AWV)  06/14/2023    DTaP/Tdap/Td vaccine (2 - Td or Tdap) 12/10/2025    DEXA (modify frequency per FRAX score)  Completed    Pneumococcal 65+ years Vaccine  Completed    COVID-19 Vaccine  Completed    Hepatitis C screen  Addressed    Hepatitis A vaccine  Aged Out    Hib vaccine  Aged Out    Meningococcal (ACWY) vaccine  Aged Out             (applicable per patient's age: Cancer Screenings, Depression Screening, Fall Risk Screening, Immunizations)    Hemoglobin A1C (%)   Date Value   05/13/2022 6.9   09/28/2021 6.8   06/22/2021 9.5 (H)     Microalb/Crt.  Ratio (mcg/mg creat)   Date Value   06/22/2021 CANNOT BE CALCULATED     LDL Cholesterol (mg/dL)   Date Value   06/22/2021 87     AST (U/L)   Date Value   06/24/2022 10     ALT (U/L)   Date Value   06/24/2022 9     BUN (mg/dL)   Date Value   09/01/2022 22      (goal A1C is < 7)   (goal LDL is <100) need 30-50% reduction from baseline     BP Readings from Last 3 Encounters:   09/01/22 128/64   08/23/22 (!) 100/52   06/27/22 (!) 160/53    (goal /80)      All Future Testing planned in [facility]:  Lab Frequency Next Occurrence   XR ABDOMEN (KUB) (SINGLE AP VIEW) Once 09/09/2022       Next Visit Date:  Future Appointments   Date Time Provider Natanael Austin   9/23/2022  9:00 AM DO Blayne Suarez [facility] [email or web address]   11/3/2022  3:30 PM SHARMILA Scott [facility]            Patient Active Problem List:     Type 2 diabetes mellitus with stage 3 chronic kidney disease, without long-term current use of insulin (HCC)     Essential hypertension, benign     Esophageal reflux     Irritable bowel syndrome     Seasonal allergies     Long term current use of anticoagulant therapy     Gout     Rectocele     Tubular adenoma of colon     Hiatal hernia     Sigmoid diverticulosis     Chronic back pain     Hypomagnesemia     Family history of colon cancer     History of recurrent deep vein thrombosis (DVT)     Hyperparathyroidism ([address]     Chronic renal disease, stage III ([address] [946401]     Change in mental status     Severe malnutrition ([address]

## 2022-09-19 RX ORDER — PROPRANOLOL HYDROCHLORIDE 60 MG/1
TABLET ORAL
Qty: 180 TABLET | Refills: 1 | Status: SHIPPED | OUTPATIENT
Start: 2022-09-19 | End: 2022-09-26 | Stop reason: SDUPTHER

## 2022-09-26 ENCOUNTER — HOSPITAL ENCOUNTER (EMERGENCY)
Age: 77
Discharge: HOME | End: 2022-09-26
Attending: EMERGENCY MEDICINE
Payer: MEDICARE

## 2022-09-26 ENCOUNTER — OFFICE VISIT (OUTPATIENT)
Dept: FAMILY MEDICINE CLINIC | Age: 77
End: 2022-09-26
Payer: MEDICARE

## 2022-09-26 VITALS
WEIGHT: 191 LBS | BODY MASS INDEX: 29.98 KG/M2 | TEMPERATURE: 97.6 F | DIASTOLIC BLOOD PRESSURE: 68 MMHG | SYSTOLIC BLOOD PRESSURE: 147 MMHG | OXYGEN SATURATION: 96 % | HEIGHT: 67 IN | RESPIRATION RATE: 12 BRPM | HEART RATE: 80 BPM

## 2022-09-26 VITALS
HEART RATE: 74 BPM | OXYGEN SATURATION: 97 % | BODY MASS INDEX: 29.98 KG/M2 | WEIGHT: 191 LBS | SYSTOLIC BLOOD PRESSURE: 64 MMHG | HEIGHT: 67 IN | DIASTOLIC BLOOD PRESSURE: 43 MMHG

## 2022-09-26 DIAGNOSIS — E11.22 TYPE 2 DIABETES MELLITUS WITH STAGE 3B CHRONIC KIDNEY DISEASE, WITHOUT LONG-TERM CURRENT USE OF INSULIN (HCC): Primary | ICD-10-CM

## 2022-09-26 DIAGNOSIS — I95.1 ORTHOSTATIC HYPOTENSION: Primary | ICD-10-CM

## 2022-09-26 DIAGNOSIS — N18.32 TYPE 2 DIABETES MELLITUS WITH STAGE 3B CHRONIC KIDNEY DISEASE, WITHOUT LONG-TERM CURRENT USE OF INSULIN (HCC): Primary | ICD-10-CM

## 2022-09-26 DIAGNOSIS — E83.42 HYPOMAGNESEMIA: ICD-10-CM

## 2022-09-26 LAB
ABSOLUTE EOS #: 0.2 K/UL (ref 0–0.4)
ABSOLUTE LYMPH #: 2.9 K/UL (ref 1–4.8)
ABSOLUTE MONO #: 0.6 K/UL (ref 0–1)
ALBUMIN SERPL-MCNC: 3.5 G/DL (ref 3.5–5.2)
ALP BLD-CCNC: 82 U/L (ref 35–104)
ALT SERPL-CCNC: 7 U/L (ref 5–33)
ANION GAP SERPL CALCULATED.3IONS-SCNC: 13 MMOL/L (ref 9–17)
AST SERPL-CCNC: 10 U/L
BASOPHILS # BLD: 1 % (ref 0–2)
BASOPHILS ABSOLUTE: 0.1 K/UL (ref 0–0.2)
BILIRUB SERPL-MCNC: 0.5 MG/DL (ref 0.3–1.2)
BUN BLDV-MCNC: 25 MG/DL (ref 8–23)
BUN/CREAT BLD: 18 (ref 9–20)
CALCIUM SERPL-MCNC: 10.1 MG/DL (ref 8.6–10.4)
CHLORIDE BLD-SCNC: 98 MMOL/L (ref 98–107)
CO2: 24 MMOL/L (ref 20–31)
CREAT SERPL-MCNC: 1.36 MG/DL (ref 0.5–0.9)
DIFFERENTIAL TYPE: YES
EOSINOPHILS RELATIVE PERCENT: 2 % (ref 0–5)
GFR AFRICAN AMERICAN: 46 ML/MIN
GFR NON-AFRICAN AMERICAN: 38 ML/MIN
GFR SERPL CREATININE-BSD FRML MDRD: ABNORMAL ML/MIN/{1.73_M2}
GLUCOSE BLD-MCNC: 138 MG/DL (ref 70–99)
HBA1C MFR BLD: 6 %
HCT VFR BLD CALC: 30.9 % (ref 36–46)
HEMOGLOBIN: 9.9 G/DL (ref 12–16)
LYMPHOCYTES # BLD: 31 % (ref 15–40)
MAGNESIUM: 1 MG/DL (ref 1.6–2.6)
MCH RBC QN AUTO: 30.9 PG (ref 26–34)
MCHC RBC AUTO-ENTMCNC: 32 G/DL (ref 31–37)
MCV RBC AUTO: 96.4 FL (ref 80–100)
MONOCYTES # BLD: 6 % (ref 4–8)
PDW BLD-RTO: 16.2 % (ref 12.1–15.2)
PLATELET # BLD: 299 K/UL (ref 140–450)
POTASSIUM SERPL-SCNC: 3.7 MMOL/L (ref 3.7–5.3)
RBC # BLD: 3.2 M/UL (ref 4–5.2)
SEG NEUTROPHILS: 60 % (ref 47–75)
SEGMENTED NEUTROPHILS ABSOLUTE COUNT: 5.6 K/UL (ref 2.5–7)
SODIUM BLD-SCNC: 135 MMOL/L (ref 135–144)
TOTAL PROTEIN: 6.1 G/DL (ref 6.4–8.3)
WBC # BLD: 9.3 K/UL (ref 3.5–11)

## 2022-09-26 PROCEDURE — 93005 ELECTROCARDIOGRAM TRACING: CPT | Performed by: EMERGENCY MEDICINE

## 2022-09-26 PROCEDURE — 80053 COMPREHEN METABOLIC PANEL: CPT

## 2022-09-26 PROCEDURE — 2580000003 HC RX 258: Performed by: EMERGENCY MEDICINE

## 2022-09-26 PROCEDURE — 99284 EMERGENCY DEPT VISIT MOD MDM: CPT

## 2022-09-26 PROCEDURE — 83735 ASSAY OF MAGNESIUM: CPT

## 2022-09-26 PROCEDURE — 36415 COLL VENOUS BLD VENIPUNCTURE: CPT

## 2022-09-26 PROCEDURE — 99999 PR OFFICE/OUTPT VISIT,PROCEDURE ONLY: CPT | Performed by: FAMILY MEDICINE

## 2022-09-26 PROCEDURE — 83036 HEMOGLOBIN GLYCOSYLATED A1C: CPT | Performed by: FAMILY MEDICINE

## 2022-09-26 PROCEDURE — 96365 THER/PROPH/DIAG IV INF INIT: CPT

## 2022-09-26 PROCEDURE — 85025 COMPLETE CBC W/AUTO DIFF WBC: CPT

## 2022-09-26 PROCEDURE — 6360000002 HC RX W HCPCS: Performed by: EMERGENCY MEDICINE

## 2022-09-26 RX ORDER — MAGNESIUM SULFATE 1 G/100ML
1000 INJECTION INTRAVENOUS ONCE
Status: COMPLETED | OUTPATIENT
Start: 2022-09-26 | End: 2022-09-26

## 2022-09-26 RX ORDER — OMEPRAZOLE 20 MG/1
CAPSULE, DELAYED RELEASE ORAL
Qty: 90 CAPSULE | Refills: 1 | Status: SHIPPED | OUTPATIENT
Start: 2022-09-26

## 2022-09-26 RX ORDER — PROPRANOLOL HYDROCHLORIDE 60 MG/1
TABLET ORAL
Qty: 180 TABLET | Refills: 1 | Status: SHIPPED | OUTPATIENT
Start: 2022-09-26 | End: 2022-09-26 | Stop reason: SDUPTHER

## 2022-09-26 RX ORDER — LANOLIN ALCOHOL/MO/W.PET/CERES
400 CREAM (GRAM) TOPICAL DAILY
Qty: 90 TABLET | Refills: 1 | Status: SHIPPED | OUTPATIENT
Start: 2022-09-26

## 2022-09-26 RX ORDER — 0.9 % SODIUM CHLORIDE 0.9 %
1000 INTRAVENOUS SOLUTION INTRAVENOUS ONCE
Status: COMPLETED | OUTPATIENT
Start: 2022-09-26 | End: 2022-09-26

## 2022-09-26 RX ORDER — ATORVASTATIN CALCIUM 40 MG/1
40 TABLET, FILM COATED ORAL DAILY
Qty: 90 TABLET | Refills: 1 | Status: SHIPPED | OUTPATIENT
Start: 2022-09-26

## 2022-09-26 RX ORDER — PROPRANOLOL HYDROCHLORIDE 60 MG/1
TABLET ORAL
Qty: 180 TABLET | Refills: 1
Start: 2022-09-26 | End: 2022-09-30

## 2022-09-26 RX ORDER — CLOPIDOGREL BISULFATE 75 MG/1
75 TABLET ORAL DAILY
Qty: 90 TABLET | Refills: 1 | Status: SHIPPED | OUTPATIENT
Start: 2022-09-26

## 2022-09-26 RX ORDER — ALLOPURINOL 100 MG/1
TABLET ORAL
Qty: 180 TABLET | Refills: 1 | Status: SHIPPED | OUTPATIENT
Start: 2022-09-26

## 2022-09-26 RX ADMIN — SODIUM CHLORIDE 1000 ML: 9 INJECTION, SOLUTION INTRAVENOUS at 12:49

## 2022-09-26 RX ADMIN — MAGNESIUM SULFATE HEPTAHYDRATE 1000 MG: 1 INJECTION, SOLUTION INTRAVENOUS at 13:05

## 2022-09-26 ASSESSMENT — PAIN DESCRIPTION - ONSET: ONSET: ON-GOING

## 2022-09-26 ASSESSMENT — PAIN DESCRIPTION - DESCRIPTORS: DESCRIPTORS: ACHING

## 2022-09-26 ASSESSMENT — PAIN - FUNCTIONAL ASSESSMENT: PAIN_FUNCTIONAL_ASSESSMENT: 0-10

## 2022-09-26 ASSESSMENT — PAIN DESCRIPTION - PAIN TYPE: TYPE: CHRONIC PAIN

## 2022-09-26 ASSESSMENT — PAIN DESCRIPTION - FREQUENCY: FREQUENCY: INTERMITTENT

## 2022-09-26 ASSESSMENT — PAIN DESCRIPTION - LOCATION: LOCATION: BACK

## 2022-09-26 ASSESSMENT — PAIN DESCRIPTION - ORIENTATION: ORIENTATION: MID

## 2022-09-26 NOTE — PROGRESS NOTES
Briefly evaluated patient, as she was very uncomfortable, feeling like she may vomit, crying due to severe posterior neck pain, dizzy. Skin was pale to slightly yellow without any jaundice visible in the eyes. Staff got blood pressure of 60s over 40s. Radial pulse was strong and regular. Transported patient to ER for further management due to level of discomfort and low blood pressure.

## 2022-09-26 NOTE — ED NOTES
Pt states she was in to see Dr. Odalis Qureshi for a routine visit, states her blood pressure was low but not sure what it was in the office. C/O back pain which is chronic and states she became nauseated when she got to the hospital for her appointment. Pt requires 2 assistance to get into bed due to fear of falling. Lives with her son and normally ambulates with a walker at home. States she had a fall earlier this month.       Elva Marquez RN  09/26/22 [id number]

## 2022-09-26 NOTE — ED PROVIDER NOTES
[facility]  [address] [address]  Phone: [phone] COMPLAINT    Chief Complaint   Patient presents with    Hypotension     Pt sent to ED from Primary Care for hypotension    Nausea     Started when she got to the hospital        HPI    Jorge Sylvester is a 78 y.o. female who presents noted complaint. Patient evidently has been doing okay. Has developed some dizziness. She went to the doctor's office for checkup on things and was found to be hypotensive. Family states she had been sitting for prolonged period of time and then got up to weigh her. Patient self denies other symptoms such as headache neck pain chest pain abdominal pain or other issues. Does states she gets constipated since being discharged from the hospital.  Had to take medications to move her bowels and has had loose stools of late. PAST MEDICAL HISTORY    Past Medical History:   Diagnosis Date    Allergic rhinitis     Chronic kidney disease, stage III (moderate) (HCC)     Deep vein thrombosis (DVT) ([facility] [address] 10/24/2012    Elbow fracture, left     Fall     Gastric ulcer     Gout     Hx of blood clots     Following last back surgery     Hypertension     Nausea & vomiting     Presence of IVC filter     Type II or unspecified type diabetes mellitus without mention of complication, not stated as uncontrolled        SURGICAL HISTORY    Past Surgical History:   Procedure Laterality Date    BACK SURGERY      BACK SURGERY      BACK SURGERY      BACK SURGERY      BACK SURGERY      BACK SURGERY      Fusion     BREAST BIOPSY Left     BREAST BIOPSY Right     CARDIAC CATHETERIZATION Left 03/07/2018    Dr. Randy Zelaya @ [facility]--There is 30% disease of the origin of the lateral anterior descending. Mild 10% -20% plaque disease in the circumflex & right coronary artery. Normal left ventricular functino, ejection fraction of 60%.       CARPAL TUNNEL RELEASE Right     CATARACT REMOVAL Right     CATARACT REMOVAL Left     COLONOSCOPY  2014    COLONOSCOPY N/A 5/13/2019    COLONOSCOPY POLYPECTOMY HOT BIOPSY performed by Mj Rankin MD at [facility]. [address]      \"Multiple\"     EYE SURGERY Right     Removed fluid from behind eye    HYSTERECTOMY (CERVIX STATUS UNKNOWN)      KIDNEY SURGERY      left side 1/2 of kidney removed    PARTIAL NEPHRECTOMY Left     RETINAL DETACHMENT SURGERY Left     ROTATOR CUFF REPAIR      TOTAL KNEE ARTHROPLASTY Right     TOTAL KNEE ARTHROPLASTY Left     UPPER GASTROINTESTINAL ENDOSCOPY  2014    VENA CAVA FILTER PLACEMENT         CURRENT MEDICATIONS    Current Outpatient Rx   Medication Sig Dispense Refill    propranolol (INDERAL) 60 MG tablet 60mg once nightly 848 tablet 1    folic acid (FOLVITE) 071 MCG tablet Take 1 tablet by mouth daily 90 tablet 1    omeprazole (PRILOSEC) 20 MG delayed release capsule TAKE 1 CAPSULE EVERY DAY 90 capsule 1    atorvastatin (LIPITOR) 40 MG tablet Take 1 tablet by mouth daily 90 tablet 1    allopurinol (ZYLOPRIM) 100 MG tablet 2 tabs po daily 180 tablet 1    clopidogrel (PLAVIX) 75 MG tablet Take 1 tablet by mouth daily 90 tablet 1    gabapentin (NEURONTIN) 100 MG capsule Take 1 capsule by mouth 3 times daily for 30 days. 90 capsule 0    Blood Glucose Monitoring Suppl (TRUE METRIX AIR GLUCOSE METER) SARAH True Metrix Glucose Meter   use TWICE DAILY AS DIRECTED.       aspirin EC 81 MG EC tablet Take 1 tablet by mouth daily 90 tablet 3    blood glucose monitor strips Test blood glucose daily 100 strip 1    Lancets MISC 1 each by Does not apply route 2 times daily 300 each 1    Blood Glucose Monitoring Suppl (TRUE METRIX METER) w/Device KIT Test once daily 1 kit 0    acetaminophen (TYLENOL) 650 MG CR tablet Take 1,300 mg by mouth every 8 hours as needed for Pain         ALLERGIES    Allergies   Allergen Reactions    Codeine Itching    Lisinopril Other (See Comments)     cough    Percocet [Oxycodone-Acetaminophen] Itching and Nausea Only Adhesive Tape Itching, Rash and Other (See Comments)     Tape \"takes the skin off\"    Norco [Hydrocodone-Acetaminophen] Nausea And Vomiting       FAMILY HISTORY    Family History   Problem Relation Age of Onset    Diabetes Father     Cancer Father         Prostate Cancer    Cancer Paternal Aunt         Colon Cancer       SOCIAL HISTORY    Social History     Socioeconomic History    Marital status:    Tobacco Use    Smoking status: Never    Smokeless tobacco: Never   Vaping Use    Vaping Use: Never used   Substance and Sexual Activity    Alcohol use: No    Drug use: No     Social Determinants of Health     Financial Resource Strain: Low Risk     Difficulty of Paying Living Expenses: Not hard at all   Food Insecurity: No Food Insecurity    Worried About Running Out of Food in the Last Year: Never true    Ran Out of Food in the Last Year: Never true   Physical Activity: Unknown    Days of Exercise per Week: 0 days       REVIEW OF SYSTEMS    Positive for dizziness with standing. Low blood pressure. Negative for headache neck pain chest pain. Some bowel changes. No urinary symptoms. All systems negative except as marked. PHYSICAL EXAM    VITAL SIGNS: BP (!) 133/49   Pulse 79   Temp 97.6 °F (36.4 °C) (Oral)   Resp 19   Ht 5' 7\" (1.702 m)   Wt 191 lb (86.6 kg)   LMP  (LMP Unknown)   SpO2 100%   BMI 29.91 kg/m²    Constitutional:  Alert not toxic or ill,   HENT:  normocephalic, Atraumatic,  Bilateral external ears normal, Oropharynx moist, No oral exudates, Nose normal.  Cervical Spine: Normal range of motion,  No stridor. No tenderness, Supple,  Eyes:  No discharge or  Swelling,Conjunctiva normal, PERRL, EOMI,  Respiratory: No respiratory distress, Normal breath sounds,  No wheezing, No chest tenderness. Cardiovascular:  Normal heart rate, Normal rhythm, No murmurs, No rubs, No gallops. GI:  No reproducible pain, Bowel sounds normal, Soft, No masses, No pulsatile masses.  No tenderness  Musculoskeletal:  Intact distal pulses, 1+ chronic edema, No tenderness, No cyanosis, No clubbing. Good range of motion in all major joints. No tenderness to palpation or major deformities noted. Back:No tenderness. Integument:  Warm, Dry, No erythema, No rash (on exposed areas)   Lymphatic:  No lymphadenopathy noted. Neurologic:  Alert & oriented x 3, Normal motor function, Normal sensory function, No focal deficits noted. Psychiatric:  Affect normal, Judgment normal, Mood normal.     EKG    Sinus rate and rhythm without ischemia or infarction                       RADIOLOGY    No orders to display       PROCEDURES    none      CONSULTS:  None      CRITICAL CARE:  None    SCREENINGS:  BP (!) 133/49   Pulse 79   Temp 97.6 °F (36.4 °C) (Oral)   Resp 19   Ht 5' 7\" (1.702 m)   Wt 191 lb (86.6 kg)   LMP  (LMP Unknown)   SpO2 100%   BMI 29.91 kg/m²     Screening For Hypertension and Follow-up (#317)   previously diagnosed with hypertension and not applicable for screen      Screening For Tobacco Use and Cessation Intervention (#226):   reports that she has never smoked. She has never used smokeless tobacco.  Non-smoker not applicable for screen        ED COURSE & MEDICAL DECISION MAKING    Pertinent Labs & Imaging studies reviewed. (See chart for details)  Reports low blood pressure at that doctor's office. Clinically is otherwise been doing okay although has had some fatigue malaise recent hospitalization etc.  Has a history of high blood pressure. Blood pressure here at rest is normal checking orthostatics and labs. Evidently she has been taking some oral magnesium as well. Checking a magnesium level. REASSESSMENT  12:55 PM  Patient rechecked and updated on lab/xray status, progress and results. Patient was reassessed and condition was unchanged after no treatment. .. Needs nothing else at this time. Labs show low magnesium which has a history of. Creatinine slightly elevated. Counseled patient and family in regards to orthostatic vital signs being positive. Discourage sitting to standing rapidly. We will back off on her Inderal.    She is on Inderal twice daily. 2:07 PM EDT  Fluids have instilled. We will have follow-up. FINAL IMPRESSION    1. Orthostatic hypotension    2.  Hypomagnesemia         PATIENT REFERRED TO:  Lola Steve DO  [address] [phone]    Call   For evaluation, Follow up from ER condition    DISCHARGE MEDICATIONS:  Current Discharge Medication List        START taking these medications    Details   propranolol (INDERAL) 60 MG tablet 60mg once nightly  Qty: 180 tablet, Refills: 1      folic acid (FOLVITE) 741 MCG tablet Take 1 tablet by mouth daily  Qty: 90 tablet, Refills: 1      omeprazole (PRILOSEC) 20 MG delayed release capsule TAKE 1 CAPSULE EVERY DAY  Qty: 90 capsule, Refills: 1      atorvastatin (LIPITOR) 40 MG tablet Take 1 tablet by mouth daily  Qty: 90 tablet, Refills: 1      allopurinol (ZYLOPRIM) 100 MG tablet 2 tabs po daily  Qty: 180 tablet, Refills: 1      clopidogrel (PLAVIX) 75 MG tablet Take 1 tablet by mouth daily  Qty: 90 tablet, Refills: [id number] [facility] Milagros Patel MD  09/26/22 [id number]

## 2022-09-26 NOTE — DISCHARGE INSTRUCTIONS
Take your Inderal 60 mg only at night. Do not take tonight's dose. Start taking Inderal 60 mg only at night. Call your primary care doctor for close follow-up and repeat blood pressure checks and monitoring.

## 2022-09-26 NOTE — PROGRESS NOTES
Name: Heather Hickman  : 1945         Chief Complaint:     Chief Complaint   Patient presents with    Diabetes    Fall     Bruised her butt. Dizziness       History of Present Illness:      Heather Hickman is a 78 y.o.  female who presents with Diabetes, Fall (Bruised her butt. ), and Dizziness      HPI    ***    Medical History:     Patient Active Problem List   Diagnosis    Type 2 diabetes mellitus with stage 3 chronic kidney disease, without long-term current use of insulin ([facility]    Essential hypertension, benign    Esophageal reflux    Irritable bowel syndrome    Seasonal allergies    Long term current use of anticoagulant therapy    Gout    Rectocele    Tubular adenoma of colon    Hiatal hernia    Sigmoid diverticulosis    Chronic back pain    Hypomagnesemia    Family history of colon cancer    History of recurrent deep vein thrombosis (DVT)    Hyperparathyroidism ([facility]    Chronic renal disease, stage III ([facility] [[id number]]    Change in mental status    Severe malnutrition ([facility]       Medications:       Prior to Admission medications    Medication Sig Start Date End Date Taking? Authorizing Provider   propranolol (INDERAL) 60 MG tablet 60mg  Twice daily 22  Yes Norma [place], DO   gabapentin (NEURONTIN) 100 MG capsule Take 1 capsule by mouth 3 times daily for 30 days.  9/6/22 10/6/22 Yes Norma [place], DO   allopurinol (ZYLOPRIM) 100 MG tablet 2 tabs po daily 22  Yes Norma [place], DO   clopidogrel (PLAVIX) 75 MG tablet Take 1 tablet by mouth daily 22  Yes Norma [place], DO   atorvastatin (LIPITOR) 40 MG tablet Take 1 tablet by mouth daily 22  Yes Norma [place], DO   omeprazole (PRILOSEC) 20 MG delayed release capsule TAKE 1 CAPSULE EVERY DAY 22  Yes Norma [place], DO   folic acid (FOLVITE) 602 MCG tablet Take 1 tablet by mouth daily 22  Yes Norma [place], DO   Blood Glucose Monitoring Suppl (TRUE METRIX AIR GLUCOSE METER) SARAH True Metrix Glucose Meter use TWICE DAILY AS DIRECTED.    Yes Historical Provider, MD   aspirin EC 81 MG EC tablet Take 1 tablet by mouth daily 5/16/22  Yes Allyson Sahu DO   blood glucose monitor strips Test blood glucose daily 10/29/21  Yes Allyson Sahu DO   Lancets MISC 1 each by Does not apply route 2 times daily 8/27/21  Yes YOHAN Cervantes - CNP   Blood Glucose Monitoring Suppl (TRUE METRIX METER) w/Device KIT Test once daily 2/9/21  Yes Allyson Sahu DO   acetaminophen (TYLENOL) 650 MG CR tablet Take 1,300 mg by mouth every 8 hours as needed for Pain   Yes Historical Provider, MD        Allergies:       Codeine, Lisinopril, Percocet [oxycodone-acetaminophen], Adhesive tape, and Norco [hydrocodone-acetaminophen]    Physical Exam:     Vitals:  Pulse 74   Ht 5' 7\" (1.702 m)   Wt 191 lb (86.6 kg)   LMP  (LMP Unknown)   SpO2 97%   BMI 29.91 kg/m²   Physical Exam    Data:     Lab Results   Component Value Date/Time     09/01/2022 04:14 PM    K 4.2 09/01/2022 04:14 PM     09/01/2022 04:14 PM    CO2 26 09/01/2022 04:14 PM    BUN 22 09/01/2022 04:14 PM    CREATININE 1.27 09/01/2022 04:14 PM    GLUCOSE 132 09/01/2022 04:14 PM    PROT 5.0 06/24/2022 03:55 AM    LABALBU 2.7 06/24/2022 03:55 AM    BILITOT 0.56 06/24/2022 03:55 AM    ALKPHOS 80 06/24/2022 03:55 AM    AST 10 06/24/2022 03:55 AM    ALT 9 06/24/2022 03:55 AM     Lab Results   Component Value Date/Time    WBC 8.4 09/01/2022 04:14 PM    RBC 3.34 09/01/2022 04:14 PM    HGB 10.3 09/01/2022 04:14 PM    HCT 32.3 09/01/2022 04:14 PM    MCV 96.6 09/01/2022 04:14 PM    MCH 30.9 09/01/2022 04:14 PM    MCHC 32.0 09/01/2022 04:14 PM    RDW 16.6 09/01/2022 04:14 PM     09/01/2022 04:14 PM    MPV NOT REPORTED 12/17/2021 11:45 AM     Lab Results   Component Value Date/Time    TSH 2.51 06/21/2022 04:36 AM     Lab Results   Component Value Date/Time    CHOL 167 06/22/2021 02:35 PM    HDL 59 06/22/2021 02:35 PM    LABA1C 6.0 09/26/2022 11:19 AM Assessment & Plan:        Diagnosis Orders   1.  Type 2 diabetes mellitus with stage 3b chronic kidney disease, without long-term current use of insulin ([facility])  POCT glycosylated hemoglobin (Hb A1C)        ***    Requested Prescriptions     Pending Prescriptions Disp Refills    folic acid (FOLVITE) 527 MCG tablet 90 tablet 1     Sig: Take 1 tablet by mouth daily    omeprazole (PRILOSEC) 20 MG delayed release capsule 90 capsule 1     Sig: TAKE 1 CAPSULE EVERY DAY    atorvastatin (LIPITOR) 40 MG tablet 90 tablet 1     Sig: Take 1 tablet by mouth daily    allopurinol (ZYLOPRIM) 100 MG tablet 180 tablet 1     Si tabs po daily    clopidogrel (PLAVIX) 75 MG tablet 90 tablet 1     Sig: Take 1 tablet by mouth daily    propranolol (INDERAL) 60 MG tablet 180 tablet 1     Simg  Twice daily         There are no Patient Instructions on file for this visit.      signed by Justine Chiang DO on 2022 at 11:21 AM  [address]  Dept: [phone]

## 2022-09-27 LAB
EKG ATRIAL RATE: 76 BPM
EKG P AXIS: 89 DEGREES
EKG P-R INTERVAL: 174 MS
EKG Q-T INTERVAL: 412 MS
EKG QRS DURATION: 98 MS
EKG QTC CALCULATION (BAZETT): 463 MS
EKG R AXIS: -6 DEGREES
EKG T AXIS: 22 DEGREES
EKG VENTRICULAR RATE: 76 BPM

## 2022-09-27 PROCEDURE — 93010 ELECTROCARDIOGRAM REPORT: CPT | Performed by: INTERNAL MEDICINE

## 2022-09-30 ENCOUNTER — OFFICE VISIT (OUTPATIENT)
Dept: FAMILY MEDICINE CLINIC | Age: 77
End: 2022-09-30
Payer: MEDICARE

## 2022-09-30 VITALS — SYSTOLIC BLOOD PRESSURE: 90 MMHG | OXYGEN SATURATION: 97 % | DIASTOLIC BLOOD PRESSURE: 52 MMHG | HEART RATE: 74 BPM

## 2022-09-30 DIAGNOSIS — R79.89 ELEVATED SERUM CREATININE: ICD-10-CM

## 2022-09-30 DIAGNOSIS — M79.10 MYALGIA: Primary | ICD-10-CM

## 2022-09-30 DIAGNOSIS — E11.9 TYPE 2 DIABETES MELLITUS WITHOUT COMPLICATION, WITHOUT LONG-TERM CURRENT USE OF INSULIN (HCC): ICD-10-CM

## 2022-09-30 DIAGNOSIS — N64.4 MASTALGIA: ICD-10-CM

## 2022-09-30 DIAGNOSIS — F32.A ANXIETY AND DEPRESSION: ICD-10-CM

## 2022-09-30 DIAGNOSIS — E83.42 HYPOMAGNESEMIA: ICD-10-CM

## 2022-09-30 DIAGNOSIS — F41.9 ANXIETY AND DEPRESSION: ICD-10-CM

## 2022-09-30 DIAGNOSIS — R29.6 FREQUENT FALLS: ICD-10-CM

## 2022-09-30 DIAGNOSIS — R29.898 WEAKNESS OF BOTH HIPS: ICD-10-CM

## 2022-09-30 PROBLEM — R41.82 CHANGE IN MENTAL STATUS: Status: RESOLVED | Noted: 2022-06-20 | Resolved: 2022-09-30

## 2022-09-30 PROBLEM — E43 SEVERE MALNUTRITION (HCC): Status: RESOLVED | Noted: 2022-06-22 | Resolved: 2022-09-30

## 2022-09-30 PROCEDURE — 99214 OFFICE O/P EST MOD 30 MIN: CPT | Performed by: FAMILY MEDICINE

## 2022-09-30 PROCEDURE — 1123F ACP DISCUSS/DSCN MKR DOCD: CPT | Performed by: FAMILY MEDICINE

## 2022-09-30 PROCEDURE — 3017F COLORECTAL CA SCREEN DOC REV: CPT | Performed by: FAMILY MEDICINE

## 2022-09-30 PROCEDURE — 1090F PRES/ABSN URINE INCON ASSESS: CPT | Performed by: FAMILY MEDICINE

## 2022-09-30 PROCEDURE — G8399 PT W/DXA RESULTS DOCUMENT: HCPCS | Performed by: FAMILY MEDICINE

## 2022-09-30 PROCEDURE — 3044F HG A1C LEVEL LT 7.0%: CPT | Performed by: FAMILY MEDICINE

## 2022-09-30 PROCEDURE — G8417 CALC BMI ABV UP PARAM F/U: HCPCS | Performed by: FAMILY MEDICINE

## 2022-09-30 PROCEDURE — 1036F TOBACCO NON-USER: CPT | Performed by: FAMILY MEDICINE

## 2022-09-30 PROCEDURE — G8427 DOCREV CUR MEDS BY ELIG CLIN: HCPCS | Performed by: FAMILY MEDICINE

## 2022-09-30 PROCEDURE — 2022F DILAT RTA XM EVC RTNOPTHY: CPT | Performed by: FAMILY MEDICINE

## 2022-09-30 RX ORDER — SERTRALINE HYDROCHLORIDE 25 MG/1
25 TABLET, FILM COATED ORAL DAILY
Qty: 30 TABLET | Refills: 3 | Status: SHIPPED | OUTPATIENT
Start: 2022-09-30

## 2022-09-30 NOTE — PROGRESS NOTES
Name: Douglas Ellis  : 1945         Chief Complaint:     Chief Complaint   Patient presents with    Jhonatan Brewer again before coming to visit today    Hypotension       History of Present Illness:      Douglas Ellis is a 78 y.o.  female who presents with Fall Astrid Blizzard again before coming to visit today) and Hypotension      HPI    Pt presents with son for f/u recent low BP. ER visit subsequent to attempted office visit earlier this wk, was given fluids, had labs, magnesium replacement. Continues to feel unwell, unsteady, frequent falls. Murl Rung this morning, thinks she had trouble lifting LLE to clear threshold between rooms and then fell backwards. Son was close by and was able to break her fall so she had no injury. R thigh pain continues, present for over 3 mos (was present during hospitalization in ) and doesn't seem like anything has helped. Poor strength in both hips. Home therapy ongoing. Home health care will end next week. Transient decrease of vision, sometimes assoc with redness but not always. R eye loses vision temporarily during visit then returns to normal.    Pt c/o difficulty with feeling anxious, \"nerves\" bothering her, interested in trying a med for same. C/o R breast pain going on for a while. No known injury or lump. Medical History:     Patient Active Problem List   Diagnosis    Essential hypertension, benign    Esophageal reflux    Irritable bowel syndrome    Seasonal allergies    Gout    Rectocele    Tubular adenoma of colon    Hiatal hernia    Sigmoid diverticulosis    Chronic back pain    Hypomagnesemia    Family history of colon cancer    History of recurrent deep vein thrombosis (DVT)    Hyperparathyroidism ([address]    Chronic renal disease, stage III ([address] [082904]    Type 2 diabetes mellitus without complication, without long-term current use of insulin (HCC)       Medications:       Prior to Admission medications    Medication Sig Start Date End Date Taking? Authorizing Provider   sertraline (ZOLOFT) 25 MG tablet Take 1 tablet by mouth daily 9/30/22  Yes Vivian Odonnell, DO   folic acid (FOLVITE) 303 MCG tablet Take 1 tablet by mouth daily 9/26/22  Yes Vivian Odonnell, DO   omeprazole (PRILOSEC) 20 MG delayed release capsule TAKE 1 CAPSULE EVERY DAY 9/26/22  Yes Vivian Odonnell, DO   atorvastatin (LIPITOR) 40 MG tablet Take 1 tablet by mouth daily 9/26/22  Yes Vivian Odonnell, DO   allopurinol (ZYLOPRIM) 100 MG tablet 2 tabs po daily 9/26/22  Yes Vivian Odonnell, DO   clopidogrel (PLAVIX) 75 MG tablet Take 1 tablet by mouth daily 9/26/22  Yes Vivian Odonnell, DO   gabapentin (NEURONTIN) 100 MG capsule Take 1 capsule by mouth 3 times daily for 30 days. 9/6/22 10/6/22 Yes Vivian Odonnell, DO   Blood Glucose Monitoring Suppl (TRUE METRIX AIR GLUCOSE METER) SARAH True Metrix Glucose Meter   use TWICE DAILY AS DIRECTED. Yes Historical Provider, MD   aspirin EC 81 MG EC tablet Take 1 tablet by mouth daily 5/16/22  Yes Vivian Odonnell,    blood glucose monitor strips Test blood glucose daily 10/29/21  Yes Vivian Odonnell, DO   Lancets MISC 1 each by Does not apply route 2 times daily 8/27/21  Yes YOHAN Coffey CNP   Blood Glucose Monitoring Suppl (TRUE METRIX METER) w/Device KIT Test once daily 2/9/21  Yes Vivian Odonnell, DO   acetaminophen (TYLENOL) 650 MG CR tablet Take 1,300 mg by mouth every 8 hours as needed for Pain   Yes Historical Provider, MD        Allergies:       Codeine, Lisinopril, Percocet [oxycodone-acetaminophen], Adhesive tape, and Norco [hydrocodone-acetaminophen]    Physical Exam:     Vitals:  BP (!) 90/52 (Site: Right Upper Arm)   Pulse 74   LMP  (LMP Unknown)   SpO2 97%   Physical Exam  Vitals and nursing note reviewed. Constitutional:       General: She is not in acute distress. Appearance: Normal appearance. She is well-developed. She is not ill-appearing. Eyes:      Extraocular Movements: Extraocular movements intact. Pupils: Pupils are equal, round, and reactive to light. Comments: Chronic L upper lid ptosis   Cardiovascular:      Rate and Rhythm: Normal rate and regular rhythm. Heart sounds: Normal heart sounds. Pulmonary:      Effort: Pulmonary effort is normal.      Breath sounds: Normal breath sounds. Musculoskeletal:      Comments: 3/5 strength yarely hip flexion, tested in seated position in wheelchair   Neurological:      Mental Status: She is alert and oriented to person, place, and time. Psychiatric:         Mood and Affect: Mood normal.         Behavior: Behavior normal.       Data:     Lab Results   Component Value Date/Time     09/26/2022 12:00 PM    K 3.7 09/26/2022 12:00 PM    CL 98 09/26/2022 12:00 PM    CO2 24 09/26/2022 12:00 PM    BUN 25 09/26/2022 12:00 PM    CREATININE 1.36 09/26/2022 12:00 PM    GLUCOSE 138 09/26/2022 12:00 PM    PROT 6.1 09/26/2022 12:00 PM    LABALBU 3.5 09/26/2022 12:00 PM    BILITOT 0.5 09/26/2022 12:00 PM    ALKPHOS 82 09/26/2022 12:00 PM    AST 10 09/26/2022 12:00 PM    ALT 7 09/26/2022 12:00 PM     Lab Results   Component Value Date/Time    WBC 9.3 09/26/2022 12:00 PM    RBC 3.20 09/26/2022 12:00 PM    HGB 9.9 09/26/2022 12:00 PM    HCT 30.9 09/26/2022 12:00 PM    MCV 96.4 09/26/2022 12:00 PM    MCH 30.9 09/26/2022 12:00 PM    MCHC 32.0 09/26/2022 12:00 PM    RDW 16.2 09/26/2022 12:00 PM     09/26/2022 12:00 PM    MPV NOT REPORTED 12/17/2021 11:45 AM     Lab Results   Component Value Date/Time    TSH 2.51 06/21/2022 04:36 AM     Lab Results   Component Value Date/Time    CHOL 167 06/22/2021 02:35 PM    HDL 59 06/22/2021 02:35 PM    LABA1C 6.0 09/26/2022 11:19 AM         Assessment & Plan:        Diagnosis Orders   1. Myalgia  CK      2. Weakness of both hips        3. Frequent falls        4. Type 2 diabetes mellitus without complication, without long-term current use of insulin ([facility]        5. Hypomagnesemia  Magnesium      6.  Elevated serum creatinine Basic Metabolic Panel      7. Mastalgia  DESMOND DIGITAL DIAGNOSTIC W OR WO CAD RIGHT      8. Anxiety and depression          R thigh pain, CT abd/pelvis in June showing myositis of R iliopsoas. Pt was very ill at that point so no further workup was done at that time. Checking CK and other labs and will consider CT femur. Jez hip weakness, very deconditioned after illnesses the past few mos. Continue home PT and working on exercises on her own. Frequent falls multifactorial, weakness, low BP. Stop propranolol. Cont PT. DM controlled  Severe hypomag in ER - rechecking. Had been on oral supplement and stopped for some reason. Will likely need to restart. Worsening of CKD - work on increasing fluid and food intake. Update labs. Diagnostic mamm ordered  Start low-dose zoloft.         Requested Prescriptions     Signed Prescriptions Disp Refills    sertraline (ZOLOFT) 25 MG tablet 30 tablet 3     Sig: Take 1 tablet by mouth daily         There are no Patient Instructions on file for this visit.      signed by Fab Rosales DO on 10/2/2022 at 10:49 PM  [address] [address]  Dept: [phone]

## 2022-10-05 ENCOUNTER — TELEPHONE (OUTPATIENT)
Dept: FAMILY MEDICINE CLINIC | Age: 77
End: 2022-10-05

## 2022-10-05 ENCOUNTER — HOSPITAL ENCOUNTER (OUTPATIENT)
Age: 77
Discharge: HOME | End: 2022-10-05
Payer: MEDICARE

## 2022-10-05 ENCOUNTER — NURSE ONLY (OUTPATIENT)
Dept: FAMILY MEDICINE CLINIC | Age: 77
End: 2022-10-05

## 2022-10-05 ENCOUNTER — HOSPITAL ENCOUNTER (EMERGENCY)
Age: 77
Discharge: HOME | End: 2022-10-05
Attending: EMERGENCY MEDICINE
Payer: MEDICARE

## 2022-10-05 VITALS
OXYGEN SATURATION: 96 % | TEMPERATURE: 97.8 F | HEART RATE: 120 BPM | DIASTOLIC BLOOD PRESSURE: 40 MMHG | SYSTOLIC BLOOD PRESSURE: 60 MMHG

## 2022-10-05 VITALS
OXYGEN SATURATION: 99 % | BODY MASS INDEX: 29.57 KG/M2 | DIASTOLIC BLOOD PRESSURE: 60 MMHG | HEIGHT: 67 IN | RESPIRATION RATE: 18 BRPM | HEART RATE: 87 BPM | SYSTOLIC BLOOD PRESSURE: 114 MMHG | TEMPERATURE: 97.8 F | WEIGHT: 188.4 LBS

## 2022-10-05 DIAGNOSIS — R53.1 WEAKNESS: Primary | ICD-10-CM

## 2022-10-05 DIAGNOSIS — N39.0 URINARY TRACT INFECTION WITHOUT HEMATURIA, SITE UNSPECIFIED: ICD-10-CM

## 2022-10-05 DIAGNOSIS — R79.89 ELEVATED SERUM CREATININE: ICD-10-CM

## 2022-10-05 DIAGNOSIS — R41.0 CONFUSION: ICD-10-CM

## 2022-10-05 DIAGNOSIS — E83.42 HYPOMAGNESEMIA: Primary | ICD-10-CM

## 2022-10-05 DIAGNOSIS — E83.42 HYPOMAGNESEMIA: ICD-10-CM

## 2022-10-05 DIAGNOSIS — F03.90 DEMENTIA, UNSPECIFIED DEMENTIA SEVERITY, UNSPECIFIED DEMENTIA TYPE, UNSPECIFIED WHETHER BEHAVIORAL, PSYCHOTIC, OR MOOD DISTURBANCE OR ANXIETY (HCC): ICD-10-CM

## 2022-10-05 DIAGNOSIS — M79.10 MYALGIA: ICD-10-CM

## 2022-10-05 LAB
-: ABNORMAL
ABSOLUTE EOS #: 0.1 K/UL (ref 0–0.4)
ABSOLUTE LYMPH #: 3.4 K/UL (ref 1–4.8)
ABSOLUTE MONO #: 0.5 K/UL (ref 0–1)
ALBUMIN SERPL-MCNC: 4 G/DL (ref 3.5–5.2)
ALP BLD-CCNC: 86 U/L (ref 35–104)
ALT SERPL-CCNC: 7 U/L (ref 5–33)
ANION GAP SERPL CALCULATED.3IONS-SCNC: 14 MMOL/L (ref 9–17)
ANION GAP SERPL CALCULATED.3IONS-SCNC: 15 MMOL/L (ref 9–17)
AST SERPL-CCNC: 12 U/L
BACTERIA: ABNORMAL
BASOPHILS # BLD: 1 % (ref 0–2)
BASOPHILS ABSOLUTE: 0.1 K/UL (ref 0–0.2)
BILIRUB SERPL-MCNC: 0.6 MG/DL (ref 0.3–1.2)
BILIRUBIN URINE: NEGATIVE
BUN BLDV-MCNC: 18 MG/DL (ref 8–23)
BUN BLDV-MCNC: 19 MG/DL (ref 8–23)
BUN/CREAT BLD: 14 (ref 9–20)
BUN/CREAT BLD: 14 (ref 9–20)
CALCIUM SERPL-MCNC: 9.6 MG/DL (ref 8.6–10.4)
CALCIUM SERPL-MCNC: 9.9 MG/DL (ref 8.6–10.4)
CHLORIDE BLD-SCNC: 98 MMOL/L (ref 98–107)
CHLORIDE BLD-SCNC: 99 MMOL/L (ref 98–107)
CO2: 23 MMOL/L (ref 20–31)
CO2: 25 MMOL/L (ref 20–31)
COLOR: YELLOW
COMMENT UA: ABNORMAL
CREAT SERPL-MCNC: 1.32 MG/DL (ref 0.5–0.9)
CREAT SERPL-MCNC: 1.39 MG/DL (ref 0.5–0.9)
DIFFERENTIAL TYPE: YES
EOSINOPHILS RELATIVE PERCENT: 1 % (ref 0–5)
EPITHELIAL CELLS UA: ABNORMAL /HPF
GFR SERPL CREATININE-BSD FRML MDRD: 40 ML/MIN/1.73M2
GFR SERPL CREATININE-BSD FRML MDRD: 42 ML/MIN/1.73M2
GLUCOSE BLD-MCNC: 158 MG/DL (ref 70–99)
GLUCOSE BLD-MCNC: 162 MG/DL (ref 70–99)
GLUCOSE URINE: NEGATIVE
HCT VFR BLD CALC: 32.7 % (ref 36–46)
HEMOGLOBIN: 10.7 G/DL (ref 12–16)
KETONES, URINE: NEGATIVE
LACTIC ACID: 1.4 MMOL/L (ref 0.5–2.2)
LACTIC ACID: 2.3 MMOL/L (ref 0.5–2.2)
LEUKOCYTE ESTERASE, URINE: ABNORMAL
LYMPHOCYTES # BLD: 30 % (ref 15–40)
MAGNESIUM: 0.8 MG/DL (ref 1.6–2.6)
MCH RBC QN AUTO: 31.9 PG (ref 26–34)
MCHC RBC AUTO-ENTMCNC: 32.8 G/DL (ref 31–37)
MCV RBC AUTO: 97.1 FL (ref 80–100)
MONOCYTES # BLD: 5 % (ref 4–8)
NITRITE, URINE: NEGATIVE
PDW BLD-RTO: 16.5 % (ref 12.1–15.2)
PH UA: 6.5 (ref 5–8)
PLATELET # BLD: 368 K/UL (ref 140–450)
POTASSIUM SERPL-SCNC: 3.9 MMOL/L (ref 3.7–5.3)
POTASSIUM SERPL-SCNC: 4.2 MMOL/L (ref 3.7–5.3)
PROTEIN UA: ABNORMAL
RBC # BLD: 3.36 M/UL (ref 4–5.2)
RBC UA: ABNORMAL /HPF (ref 0–2)
SEG NEUTROPHILS: 63 % (ref 47–75)
SEGMENTED NEUTROPHILS ABSOLUTE COUNT: 7.1 K/UL (ref 2.5–7)
SODIUM BLD-SCNC: 136 MMOL/L (ref 135–144)
SODIUM BLD-SCNC: 138 MMOL/L (ref 135–144)
SPECIFIC GRAVITY UA: 1.01 (ref 1–1.03)
TOTAL CK: 69 U/L (ref 26–192)
TOTAL PROTEIN: 6.6 G/DL (ref 6.4–8.3)
TURBIDITY: CLEAR
URINE HGB: ABNORMAL
UROBILINOGEN, URINE: NORMAL
WBC # BLD: 11.1 K/UL (ref 3.5–11)
WBC UA: ABNORMAL /HPF

## 2022-10-05 PROCEDURE — 99999 PR OFFICE/OUTPT VISIT,PROCEDURE ONLY: CPT | Performed by: FAMILY MEDICINE

## 2022-10-05 PROCEDURE — 36415 COLL VENOUS BLD VENIPUNCTURE: CPT

## 2022-10-05 PROCEDURE — 85025 COMPLETE CBC W/AUTO DIFF WBC: CPT

## 2022-10-05 PROCEDURE — 6370000000 HC RX 637 (ALT 250 FOR IP): Performed by: EMERGENCY MEDICINE

## 2022-10-05 PROCEDURE — 80048 BASIC METABOLIC PNL TOTAL CA: CPT

## 2022-10-05 PROCEDURE — 80053 COMPREHEN METABOLIC PANEL: CPT

## 2022-10-05 PROCEDURE — 82550 ASSAY OF CK (CPK): CPT

## 2022-10-05 PROCEDURE — 2580000003 HC RX 258: Performed by: EMERGENCY MEDICINE

## 2022-10-05 PROCEDURE — 81001 URINALYSIS AUTO W/SCOPE: CPT

## 2022-10-05 PROCEDURE — 96367 TX/PROPH/DG ADDL SEQ IV INF: CPT

## 2022-10-05 PROCEDURE — 96366 THER/PROPH/DIAG IV INF ADDON: CPT

## 2022-10-05 PROCEDURE — 96365 THER/PROPH/DIAG IV INF INIT: CPT

## 2022-10-05 PROCEDURE — 6360000002 HC RX W HCPCS: Performed by: EMERGENCY MEDICINE

## 2022-10-05 PROCEDURE — 93005 ELECTROCARDIOGRAM TRACING: CPT | Performed by: EMERGENCY MEDICINE

## 2022-10-05 PROCEDURE — 99284 EMERGENCY DEPT VISIT MOD MDM: CPT

## 2022-10-05 PROCEDURE — 83735 ASSAY OF MAGNESIUM: CPT

## 2022-10-05 PROCEDURE — 83605 ASSAY OF LACTIC ACID: CPT

## 2022-10-05 RX ORDER — MAGNESIUM SULFATE IN WATER 40 MG/ML
2000 INJECTION, SOLUTION INTRAVENOUS ONCE
Status: COMPLETED | OUTPATIENT
Start: 2022-10-05 | End: 2022-10-05

## 2022-10-05 RX ORDER — CEPHALEXIN 500 MG/1
500 CAPSULE ORAL 3 TIMES DAILY
Qty: 15 CAPSULE | Refills: 0 | Status: SHIPPED | OUTPATIENT
Start: 2022-10-05 | End: 2022-10-12 | Stop reason: ALTCHOICE

## 2022-10-05 RX ORDER — 0.9 % SODIUM CHLORIDE 0.9 %
500 INTRAVENOUS SOLUTION INTRAVENOUS ONCE
Status: COMPLETED | OUTPATIENT
Start: 2022-10-05 | End: 2022-10-05

## 2022-10-05 RX ORDER — LANOLIN ALCOHOL/MO/W.PET/CERES
400 CREAM (GRAM) TOPICAL ONCE
Status: COMPLETED | OUTPATIENT
Start: 2022-10-05 | End: 2022-10-05

## 2022-10-05 RX ORDER — MAGNESIUM OXIDE 240 MG
400 POWDER IN PACKET (EA) ORAL DAILY
Qty: 30 EACH | Refills: 1 | Status: SHIPPED | OUTPATIENT
Start: 2022-10-05 | End: 2022-10-12

## 2022-10-05 RX ADMIN — SODIUM CHLORIDE 500 ML: 9 INJECTION, SOLUTION INTRAVENOUS at 15:41

## 2022-10-05 RX ADMIN — Medication 400 MG: at 14:17

## 2022-10-05 RX ADMIN — CEFTRIAXONE SODIUM 1000 MG: 1 INJECTION, POWDER, FOR SOLUTION INTRAMUSCULAR; INTRAVENOUS at 17:19

## 2022-10-05 RX ADMIN — MAGNESIUM SULFATE HEPTAHYDRATE 2000 MG: 40 INJECTION, SOLUTION INTRAVENOUS at 14:20

## 2022-10-05 ASSESSMENT — PAIN - FUNCTIONAL ASSESSMENT: PAIN_FUNCTIONAL_ASSESSMENT: NONE - DENIES PAIN

## 2022-10-05 NOTE — TELEPHONE ENCOUNTER
Advised pt's son for provider's notes, voiced understanding  He stated he didn't know if he could get her here, advised then she needs to go to the ER

## 2022-10-05 NOTE — TELEPHONE ENCOUNTER
Pt's son called stating pt is having extreme fatigue and has been discharged from PT due to the weakness. Pt's son very concerned and would like pt seen. Provider's schedule is full, should be evaluated at the ER at this time? Pt seen on 09/30/22 09/26/22 08/23/22 on going weakness, pt does have open lab orders.  Please advise

## 2022-10-05 NOTE — ED PROVIDER NOTES
[facility] COMPLAINT    Chief Complaint   Patient presents with    Other     Sent by PCP d/t blood pressure drop and change in patient mentation. Extreme weakness. Patient states \"something just ain't right\"       HPI    Indy Khanna is a 78 y.o. female who presentsto ED from home. By car. With complaint of confusion, disorientation  Onset PTA. Patient also had an episode of hypotension  Patient has Hx of dementia. PAST MEDICAL HISTORY    Past Medical History:   Diagnosis Date    Allergic rhinitis     Chronic kidney disease, stage III (moderate) (HCC)     Deep vein thrombosis (DVT) ([facility] 10/24/2012    Elbow fracture, left     Fall     Gastric ulcer     Gout     Hx of blood clots     Following last back surgery     Hypertension     Nausea & vomiting     Presence of IVC filter     Type II or unspecified type diabetes mellitus without mention of complication, not stated as uncontrolled        SURGICAL HISTORY    Past Surgical History:   Procedure Laterality Date    BACK SURGERY      BACK SURGERY      BACK SURGERY      BACK SURGERY      BACK SURGERY      BACK SURGERY      Fusion     BREAST BIOPSY Left     BREAST BIOPSY Right     CARDIAC CATHETERIZATION Left 03/07/2018    Dr. Giuliano Melvin @ [facility]--There is 30% disease of the origin of the lateral anterior descending. Mild 10% -20% plaque disease in the circumflex & right coronary artery. Normal left ventricular functino, ejection fraction of 60%.       CARPAL TUNNEL RELEASE Right     CATARACT REMOVAL Right     CATARACT REMOVAL Left     COLONOSCOPY  2014    COLONOSCOPY N/A 5/13/2019    COLONOSCOPY POLYPECTOMY HOT BIOPSY performed by Isac Mansfield MD at [facility]      \"Multiple\"     EYE SURGERY Right     Removed fluid from behind eye    HYSTERECTOMY (CERVIX STATUS UNKNOWN)      KIDNEY SURGERY      left side 1/2 of kidney removed    PARTIAL NEPHRECTOMY Left     RETINAL DETACHMENT SURGERY Left     ROTATOR CUFF REPAIR      TOTAL KNEE ARTHROPLASTY Right     TOTAL KNEE ARTHROPLASTY Left     UPPER GASTROINTESTINAL ENDOSCOPY  2014    VENA CAVA FILTER PLACEMENT         CURRENT MEDICATIONS    Current Outpatient Rx   Medication Sig Dispense Refill    Magnesium Oxide (MAGNESIUM OXIDE 400) 240 MG PACK Take 400 mg by mouth daily 30 each 1    cephALEXin (KEFLEX) 500 MG capsule Take 1 capsule by mouth 3 times daily 15 capsule 0    sertraline (ZOLOFT) 25 MG tablet Take 1 tablet by mouth daily 30 tablet 3    folic acid (FOLVITE) 781 MCG tablet Take 1 tablet by mouth daily 90 tablet 1    omeprazole (PRILOSEC) 20 MG delayed release capsule TAKE 1 CAPSULE EVERY DAY 90 capsule 1    atorvastatin (LIPITOR) 40 MG tablet Take 1 tablet by mouth daily 90 tablet 1    allopurinol (ZYLOPRIM) 100 MG tablet 2 tabs po daily 180 tablet 1    clopidogrel (PLAVIX) 75 MG tablet Take 1 tablet by mouth daily 90 tablet 1    gabapentin (NEURONTIN) 100 MG capsule Take 1 capsule by mouth 3 times daily for 30 days. 90 capsule 0    Blood Glucose Monitoring Suppl (TRUE METRIX AIR GLUCOSE METER) SARAH True Metrix Glucose Meter   use TWICE DAILY AS DIRECTED.       aspirin EC 81 MG EC tablet Take 1 tablet by mouth daily 90 tablet 3    blood glucose monitor strips Test blood glucose daily 100 strip 1    Lancets MISC 1 each by Does not apply route 2 times daily 300 each 1    Blood Glucose Monitoring Suppl (TRUE METRIX METER) w/Device KIT Test once daily 1 kit 0    acetaminophen (TYLENOL) 650 MG CR tablet Take 1,300 mg by mouth every 8 hours as needed for Pain         ALLERGIES    Allergies   Allergen Reactions    Codeine Itching    Adhesive Tape Itching, Rash and Other (See Comments)     Tape \"takes the skin off\"    Lisinopril Other (See Comments)     cough    Norco [Hydrocodone-Acetaminophen] Nausea And Vomiting    Percocet [Oxycodone-Acetaminophen] Itching and Nausea Only       FAMILY HISTORY    Family History   Problem Relation Age of Onset    Diabetes Father     Cancer Father         Prostate Cancer    Cancer Paternal Aunt         Colon Cancer       SOCIAL HISTORY    Social History     Socioeconomic History    Marital status:    Tobacco Use    Smoking status: Never    Smokeless tobacco: Never   Vaping Use    Vaping Use: Never used   Substance and Sexual Activity    Alcohol use: No    Drug use: No     Social Determinants of Health     Financial Resource Strain: Low Risk     Difficulty of Paying Living Expenses: Not hard at all   Food Insecurity: No Food Insecurity    Worried About Running Out of Food in the Last Year: Never true    Ran Out of Food in the Last Year: Never true   Physical Activity: Unknown    Days of Exercise per Week: 0 days           Review of Systems:  Constitutional: Positive for generalized weakness and confusion   eyes:  Denies photophobia or discharge   HENT:  Denies sore throat or ear pain   Respiratory:  Denies cough or shortness of breath   Cardiovascular:  Denies chest pain, palpitations or swelling   GI:  Denies abdominal pain, nausea, vomiting, or diarrhea   Musculoskeletal:  Denies back pain   Skin:  Denies rash   Neurologic:  Denies headache, focal weakness or sensory changes   Endocrine:  Denies polyuria or polydypsia   Lymphatic:  Denies swollen glands   Psychiatric:  Denies depression, suicidal ideation or homicidal ideation   All systems negative except as marked. PHYSICAL EXAM    VITAL SIGNS: /60   Pulse 87   Temp 97.8 °F (36.6 °C)   Resp 18   Ht 5' 7\" (1.702 m)   Wt 188 lb 6.4 oz (85.5 kg)   LMP  (LMP Unknown)   SpO2 99%   BMI 29.51 kg/m²    Constitutional: Elderly female with confusion and dementia HENT:  Normocephalic, Atraumatic, Bilateral external ears normal, Oropharynx dry No oral exudates, Nose normal. Neck- Normal range of motion, No tenderness, Supple, No stridor. Eyes:  PERRL, EOMI, Conjunctiva normal, No discharge.    Respiratory:  Normal breath sounds, No respiratory distress, No wheezing, No chest tenderness. Cardiovascular:  Normal heart rate, Normal rhythm, No murmurs, No rubs, No gallops. GI:  Bowel sounds normal, Soft, No tenderness, No masses, No pulsatile masses. : External genitalia appear normal, No masses or lesions. No discharge. No CVA tenderness. Musculoskeletal:  Intact distal pulses, No edema, No tenderness, No cyanosis, No clubbing. Good range of motion in all major joints. No tenderness to palpation or major deformities noted. Back- No tenderness. Integument:  Warm, Dry, No erythema, No rash. Lymphatic:  No lymphadenopathy noted. Neurologic:  Alert & oriented x 3, Normal motor function, Normal sensory function, No focal deficits noted. Psychiatric: Patient is confused. Patient follows commands she is confused to place and time    EKG    Sinus rhythm rate of 87    RADIOLOGY    No orders to display       PROCEDURES        Labs  Labs Reviewed   CBC WITH AUTO DIFFERENTIAL - Abnormal; Notable for the following components:       Result Value    WBC 11.1 (*)     RBC 3.36 (*)     Hemoglobin 10.7 (*)     Hematocrit 32.7 (*)     RDW 16.5 (*)     Segs Absolute 7.10 (*)     All other components within normal limits   COMPREHENSIVE METABOLIC PANEL - Abnormal; Notable for the following components:    Glucose 162 (*)     Creatinine 1.39 (*)     Est, Glom Filt Rate 40 (*)     All other components within normal limits   URINALYSIS - Abnormal; Notable for the following components:    Urine Hgb TRACE (*)     Protein, UA 2+ (*)     Leukocyte Esterase, Urine 2+ (*)     All other components within normal limits   LACTIC ACID - Abnormal; Notable for the following components:    Lactic Acid 2.3 (*)     All other components within normal limits   MICROSCOPIC URINALYSIS - Abnormal; Notable for the following components:    Bacteria, UA 1+ (*)     All other components within normal limits   LACTIC ACID             Summation      Patient Course: Patient has hypomagnesemia.   The patient is given mag oxide 400 mg x 1 patient is given 2 g of mag sulfate IV. Patient is given IV fluids. Patient will be sent home. Warning signs were discussed. Turn to ED if worse. ED Medications administered this visit:    Medications   cefTRIAXone (ROCEPHIN) 1,000 mg in dextrose 5 % 50 mL IVPB mini-bag (1,000 mg IntraVENous New Bag 10/5/22 1719)   magnesium oxide (MAG-OX) tablet 400 mg (400 mg Oral Given 10/5/22 1417)   magnesium sulfate 2000 mg in 50 mL IVPB premix (0 mg IntraVENous Stopped 10/5/22 1622)   0.9 % sodium chloride bolus (0 mLs IntraVENous Stopped 10/5/22 1645)       New Prescriptions from this visit:    New Prescriptions    CEPHALEXIN (KEFLEX) 500 MG CAPSULE    Take 1 capsule by mouth 3 times daily    MAGNESIUM OXIDE (MAGNESIUM OXIDE 400) 240 MG PACK    Take 400 mg by mouth daily       Follow-up:  Liv Ramirez [place]  [id number] [facility]  [phone]            Final Impression:   1. Hypomagnesemia    2. Urinary tract infection without hematuria, site unspecified    3. Confusion    4.  Dementia, unspecified dementia severity, unspecified dementia type, unspecified whether behavioral, psychotic, or mood disturbance or anxiety ([facility]               (Please note that portions of this note were completed with a voice recognition program.  Efforts were made to edit the dictations but occasionally words are mis-transcribed.)          Tc Rosado MD  10/05/22 2858

## 2022-10-05 NOTE — TELEPHONE ENCOUNTER
Come get labs and nurse visit for vitals if possible, or if unable to do that would need to go to ER. I'm concerned also.

## 2022-10-06 LAB
EKG ATRIAL RATE: 87 BPM
EKG P AXIS: 63 DEGREES
EKG P-R INTERVAL: 154 MS
EKG Q-T INTERVAL: 378 MS
EKG QRS DURATION: 92 MS
EKG QTC CALCULATION (BAZETT): 454 MS
EKG R AXIS: 2 DEGREES
EKG T AXIS: 58 DEGREES
EKG VENTRICULAR RATE: 87 BPM

## 2022-10-06 PROCEDURE — 93010 ELECTROCARDIOGRAM REPORT: CPT | Performed by: INTERNAL MEDICINE

## 2022-10-12 ENCOUNTER — OFFICE VISIT (OUTPATIENT)
Dept: FAMILY MEDICINE CLINIC | Age: 77
End: 2022-10-12
Payer: MEDICARE

## 2022-10-12 ENCOUNTER — HOSPITAL ENCOUNTER (OUTPATIENT)
Age: 77
Discharge: HOME | End: 2022-10-12
Payer: MEDICARE

## 2022-10-12 ENCOUNTER — TELEPHONE (OUTPATIENT)
Dept: FAMILY MEDICINE CLINIC | Age: 77
End: 2022-10-12

## 2022-10-12 VITALS
DIASTOLIC BLOOD PRESSURE: 46 MMHG | BODY MASS INDEX: 29.35 KG/M2 | WEIGHT: 187 LBS | HEIGHT: 67 IN | HEART RATE: 110 BPM | SYSTOLIC BLOOD PRESSURE: 82 MMHG | OXYGEN SATURATION: 99 %

## 2022-10-12 DIAGNOSIS — Z87.440 H/O URINARY TRACT INFECTION: ICD-10-CM

## 2022-10-12 DIAGNOSIS — M79.651 RIGHT THIGH PAIN: ICD-10-CM

## 2022-10-12 DIAGNOSIS — F03.90 DEMENTIA, UNSPECIFIED DEMENTIA SEVERITY, UNSPECIFIED DEMENTIA TYPE, UNSPECIFIED WHETHER BEHAVIORAL, PSYCHOTIC, OR MOOD DISTURBANCE OR ANXIETY (HCC): ICD-10-CM

## 2022-10-12 DIAGNOSIS — E83.42 HYPOMAGNESEMIA: ICD-10-CM

## 2022-10-12 DIAGNOSIS — M60.851 MYOSITIS OF RIGHT THIGH, UNSPECIFIED MYOSITIS TYPE: ICD-10-CM

## 2022-10-12 DIAGNOSIS — E83.42 HYPOMAGNESEMIA: Primary | ICD-10-CM

## 2022-10-12 DIAGNOSIS — E11.9 TYPE 2 DIABETES MELLITUS WITHOUT COMPLICATION, WITHOUT LONG-TERM CURRENT USE OF INSULIN (HCC): ICD-10-CM

## 2022-10-12 DIAGNOSIS — Z23 NEED FOR INFLUENZA VACCINATION: ICD-10-CM

## 2022-10-12 LAB
-: ABNORMAL
ANION GAP SERPL CALCULATED.3IONS-SCNC: 12 MMOL/L (ref 9–17)
BACTERIA: ABNORMAL
BILIRUBIN URINE: NEGATIVE
BUN BLDV-MCNC: 18 MG/DL (ref 8–23)
BUN/CREAT BLD: 17 (ref 9–20)
CALCIUM SERPL-MCNC: 9.9 MG/DL (ref 8.6–10.4)
CHLORIDE BLD-SCNC: 99 MMOL/L (ref 98–107)
CO2: 27 MMOL/L (ref 20–31)
COLOR: YELLOW
COMMENT UA: ABNORMAL
CREAT SERPL-MCNC: 1.07 MG/DL (ref 0.5–0.9)
EPITHELIAL CELLS UA: ABNORMAL /HPF
GFR SERPL CREATININE-BSD FRML MDRD: 55 ML/MIN/1.73M2
GLUCOSE BLD-MCNC: 173 MG/DL (ref 70–99)
GLUCOSE URINE: NEGATIVE
KETONES, URINE: NEGATIVE
LEUKOCYTE ESTERASE, URINE: ABNORMAL
MAGNESIUM: 1.1 MG/DL (ref 1.6–2.6)
NITRITE, URINE: NEGATIVE
PH UA: 5 (ref 5–8)
POTASSIUM SERPL-SCNC: 3.7 MMOL/L (ref 3.7–5.3)
PROTEIN UA: ABNORMAL
RBC UA: ABNORMAL /HPF (ref 0–2)
SODIUM BLD-SCNC: 138 MMOL/L (ref 135–144)
SPECIFIC GRAVITY UA: 1.02 (ref 1–1.03)
TURBIDITY: ABNORMAL
URINE HGB: NEGATIVE
UROBILINOGEN, URINE: NORMAL
WBC UA: ABNORMAL /HPF

## 2022-10-12 PROCEDURE — 1123F ACP DISCUSS/DSCN MKR DOCD: CPT | Performed by: FAMILY MEDICINE

## 2022-10-12 PROCEDURE — 87086 URINE CULTURE/COLONY COUNT: CPT

## 2022-10-12 PROCEDURE — G8399 PT W/DXA RESULTS DOCUMENT: HCPCS | Performed by: FAMILY MEDICINE

## 2022-10-12 PROCEDURE — 1090F PRES/ABSN URINE INCON ASSESS: CPT | Performed by: FAMILY MEDICINE

## 2022-10-12 PROCEDURE — 3044F HG A1C LEVEL LT 7.0%: CPT | Performed by: FAMILY MEDICINE

## 2022-10-12 PROCEDURE — 83735 ASSAY OF MAGNESIUM: CPT

## 2022-10-12 PROCEDURE — 80048 BASIC METABOLIC PNL TOTAL CA: CPT

## 2022-10-12 PROCEDURE — 36415 COLL VENOUS BLD VENIPUNCTURE: CPT

## 2022-10-12 PROCEDURE — G8417 CALC BMI ABV UP PARAM F/U: HCPCS | Performed by: FAMILY MEDICINE

## 2022-10-12 PROCEDURE — 3017F COLORECTAL CA SCREEN DOC REV: CPT | Performed by: FAMILY MEDICINE

## 2022-10-12 PROCEDURE — 81001 URINALYSIS AUTO W/SCOPE: CPT

## 2022-10-12 PROCEDURE — G8427 DOCREV CUR MEDS BY ELIG CLIN: HCPCS | Performed by: FAMILY MEDICINE

## 2022-10-12 PROCEDURE — G0008 ADMIN INFLUENZA VIRUS VAC: HCPCS | Performed by: FAMILY MEDICINE

## 2022-10-12 PROCEDURE — 1036F TOBACCO NON-USER: CPT | Performed by: FAMILY MEDICINE

## 2022-10-12 PROCEDURE — 90694 VACC AIIV4 NO PRSRV 0.5ML IM: CPT | Performed by: FAMILY MEDICINE

## 2022-10-12 PROCEDURE — 99214 OFFICE O/P EST MOD 30 MIN: CPT | Performed by: FAMILY MEDICINE

## 2022-10-12 PROCEDURE — G8484 FLU IMMUNIZE NO ADMIN: HCPCS | Performed by: FAMILY MEDICINE

## 2022-10-12 PROCEDURE — 2022F DILAT RTA XM EVC RTNOPTHY: CPT | Performed by: FAMILY MEDICINE

## 2022-10-12 RX ORDER — GABAPENTIN 100 MG/1
100 CAPSULE ORAL 3 TIMES DAILY
Qty: 270 CAPSULE | Refills: 1 | Status: SHIPPED | OUTPATIENT
Start: 2022-10-12 | End: 2023-04-10

## 2022-10-12 RX ORDER — MAGNESIUM OXIDE 240 MG
POWDER IN PACKET (EA) ORAL
Qty: 90 EACH | Refills: 3 | Status: CANCELLED | OUTPATIENT
Start: 2022-10-12

## 2022-10-12 RX ORDER — GABAPENTIN 100 MG/1
100 CAPSULE ORAL 3 TIMES DAILY
Qty: 42 CAPSULE | Refills: 0 | Status: SHIPPED | OUTPATIENT
Start: 2022-10-12 | End: 2022-10-12 | Stop reason: SDUPTHER

## 2022-10-12 RX ORDER — LANOLIN ALCOHOL/MO/W.PET/CERES
CREAM (GRAM) TOPICAL
Qty: 90 TABLET | Refills: 5 | Status: SHIPPED | OUTPATIENT
Start: 2022-10-12

## 2022-10-12 NOTE — PATIENT INSTRUCTIONS
SURVEY:    You may be receiving a survey from [employer] regarding your visit today. You may get this in the mail, through your MyChart or in your email. Please complete the survey to enable us to provide the highest quality of care to you and your family. If you cannot score us as very good ( 5 Stars) on any question, please feel free to call the office to discuss how we could have made your experience exceptional.     Thank you.     Clinical Care Team:  Dr. Carmen Cash, DO Karrie Honeycutt, [address] [place] [address] Team:  [id number]

## 2022-10-12 NOTE — TELEPHONE ENCOUNTER
----- Message from Maurice Alex DO sent at 10/12/2022 10:27 AM EDT -----  Mag still very low. Inc to 3 tabs a day - recommend 2 pm and 1 am. Repeat Mg level Monday. Other lab looks great, kidneys in great shape, other electrolytes ok.

## 2022-10-12 NOTE — PROGRESS NOTES
Name: Kaylie Hadley  : 1945         Chief Complaint:     Chief Complaint   Patient presents with    Hypotension     Low magnesium    Constipation     Patient states that she hasn't had a bm in 1 week. She had 1 little turd, can feel pressure in her back end but can't go. History of Present Illness:      Kaylie Hadley is a 78 y.o.  female who presents with Hypotension (Low magnesium) and Constipation (Patient states that she hasn't had a bm in 1 week. She had 1 little turd, can feel pressure in her back end but can't go. )      HPI    Pt presents with son for f/u from ER visit 10/5 for dizziness and confusion. Found to have severe hypomag, received IV and oral replacement. Feeling a lot better now, thinking more clearly. Was also dx UTI and put on keflex. PT on pause d/t not progressing when she was sick. Pt agreeable to restarting. Home BP readings around 119-130/70-80 though one reading yesterday 180/119. Appetite varies. Not great today. Thinks one of AM meds might be causing upset stomach but unsure which one. Ongoing pain R anterior thigh very bothersome, present for past few mos. Ambulating with walker. Medical History:     Patient Active Problem List   Diagnosis    Essential hypertension, benign    Esophageal reflux    Irritable bowel syndrome    Seasonal allergies    Gout    Rectocele    Tubular adenoma of colon    Hiatal hernia    Sigmoid diverticulosis    Chronic back pain    Hypomagnesemia    Family history of colon cancer    History of recurrent deep vein thrombosis (DVT)    Hyperparathyroidism ([address]    Chronic renal disease, stage III ([address] [244513]    Type 2 diabetes mellitus without complication, without long-term current use of insulin ([facility])       Medications:       Prior to Admission medications    Medication Sig Start Date End Date Taking? Authorizing Provider   gabapentin (NEURONTIN) 100 MG capsule Take 1 capsule by mouth 3 times daily for 180 days.  10/12/22 4/10/23 Yes Jaspreet Stroud,    sertraline (ZOLOFT) 25 MG tablet Take 1 tablet by mouth daily 9/30/22  Yes Jaspreet Stroud DO   folic acid (FOLVITE) 648 MCG tablet Take 1 tablet by mouth daily 9/26/22  Yes Jaspreet Stroud, DO   omeprazole (PRILOSEC) 20 MG delayed release capsule TAKE 1 CAPSULE EVERY DAY 9/26/22  Yes Jaspreet Stroud DO   atorvastatin (LIPITOR) 40 MG tablet Take 1 tablet by mouth daily 9/26/22  Yes Jaspreet Stroud, DO   allopurinol (ZYLOPRIM) 100 MG tablet 2 tabs po daily 9/26/22  Yes Jaspreet Stroud DO   clopidogrel (PLAVIX) 75 MG tablet Take 1 tablet by mouth daily 9/26/22  Yes Jaspreet Stroud DO   Blood Glucose Monitoring Suppl (TRUE METRIX AIR GLUCOSE METER) SARAH True Metrix Glucose Meter   use TWICE DAILY AS DIRECTED. Yes Historical Provider, MD   aspirin EC 81 MG EC tablet Take 1 tablet by mouth daily 5/16/22  Yes Jaspreet Stroud DO   blood glucose monitor strips Test blood glucose daily 10/29/21  Yes Jaspreet Stroud DO   Lancets MISC 1 each by Does not apply route 2 times daily 8/27/21  Yes YOHAN Marie CNP   Blood Glucose Monitoring Suppl (TRUE METRIX METER) w/Device KIT Test once daily 2/9/21  Yes Jaspreet Stroud DO   acetaminophen (TYLENOL) 650 MG CR tablet Take 1,300 mg by mouth every 8 hours as needed for Pain   Yes Historical Provider, MD   magnesium oxide (MAGOX 400) 400 (240 Mg) MG tablet 1 po every morning and 2 po every night 10/12/22   Jaspreet Stroud DO        Allergies:       Codeine, Adhesive tape, Lisinopril, Norco [hydrocodone-acetaminophen], and Percocet [oxycodone-acetaminophen]    Physical Exam:     Vitals:  BP (!) 82/46   Pulse (!) 110   Ht 5' 7\" (1.702 m)   Wt 187 lb (84.8 kg)   LMP  (LMP Unknown)   SpO2 99%   BMI 29.29 kg/m²   Physical Exam  Vitals and nursing note reviewed. Constitutional:       General: She is not in acute distress. Appearance: Normal appearance. She is well-developed. She is not ill-appearing. Cardiovascular:      Rate and Rhythm: Normal rate and regular rhythm. Heart sounds: Normal heart sounds. Pulmonary:      Effort: Pulmonary effort is normal.      Breath sounds: Normal breath sounds. Neurological:      Mental Status: She is alert and oriented to person, place, and time. Psychiatric:         Mood and Affect: Mood normal.         Behavior: Behavior normal.       Data:     Lab Results   Component Value Date/Time     10/12/2022 09:26 AM    K 3.7 10/12/2022 09:26 AM    CL 99 10/12/2022 09:26 AM    CO2 27 10/12/2022 09:26 AM    BUN 18 10/12/2022 09:26 AM    CREATININE 1.07 10/12/2022 09:26 AM    GLUCOSE 173 10/12/2022 09:26 AM    PROT 6.6 10/05/2022 02:15 PM    LABALBU 4.0 10/05/2022 02:15 PM    BILITOT 0.6 10/05/2022 02:15 PM    ALKPHOS 86 10/05/2022 02:15 PM    AST 12 10/05/2022 02:15 PM    ALT 7 10/05/2022 02:15 PM     Lab Results   Component Value Date/Time    WBC 11.1 10/05/2022 02:15 PM    RBC 3.36 10/05/2022 02:15 PM    HGB 10.7 10/05/2022 02:15 PM    HCT 32.7 10/05/2022 02:15 PM    MCV 97.1 10/05/2022 02:15 PM    MCH 31.9 10/05/2022 02:15 PM    MCHC 32.8 10/05/2022 02:15 PM    RDW 16.5 10/05/2022 02:15 PM     10/05/2022 02:15 PM    MPV NOT REPORTED 12/17/2021 11:45 AM     Lab Results   Component Value Date/Time    TSH 2.51 06/21/2022 04:36 AM     Lab Results   Component Value Date/Time    CHOL 167 06/22/2021 02:35 PM    HDL 59 06/22/2021 02:35 PM    LABA1C 6.0 09/26/2022 11:19 AM         Assessment & Plan:        Diagnosis Orders   1. Hypomagnesemia  Magnesium    Basic Metabolic Panel      2. Myositis of right thigh, unspecified myositis type  CT FEMUR RIGHT WO CONTRAST      3. Right thigh pain  CT FEMUR RIGHT WO CONTRAST      4. Dementia, unspecified dementia severity, unspecified dementia type, unspecified whether behavioral, psychotic, or mood disturbance or anxiety ([facility]        5.  Type 2 diabetes mellitus without complication, without long-term current use of insulin [facility])  Basic Metabolic Panel      6. H/O urinary tract infection  Urinalysis with Microscopic      7. Need for influenza vaccination  Influenza, FLUAD, (age 72 y+), IM, Preservative Free, 0.5 mL        Severe hypomag tx in ER - rechecking and will adjust oral dosing as appropriate, almost certainly inc dose  R anterior thigh pain, CT abd/pelvis in June showing iliopsoas myositis. Normal CK but ongoing pain so CT ordered. Pain may also be lumbar radicular pain. Cont gabapentin. Dementia over past several mos, alzheimers, vascular, or encephalopathic with multiple UTI's, other acute episodes requiring hospitalizations. DM which has been controlled  Rechecking culture, last one contaminated      Requested Prescriptions     Signed Prescriptions Disp Refills    gabapentin (NEURONTIN) 100 MG capsule 270 capsule 1     Sig: Take 1 capsule by mouth 3 times daily for 180 days. Patient Instructions   SURVEY:    You may be receiving a survey from [employer] regarding your visit today. You may get this in the mail, through your MyChart or in your email. Please complete the survey to enable us to provide the highest quality of care to you and your family. If you cannot score us as very good ( 5 Stars) on any question, please feel free to call the office to discuss how we could have made your experience exceptional.     Thank you.     Clinical Care Team:  DO Janet Dunaway, [address] [place] [address] Team:  Annalise Aguilar                                signed by Eloise Castillo DO on 10/16/2022 at 9:54 PM  [facility] [address]  Dept: [phone]

## 2022-10-13 ENCOUNTER — TELEPHONE (OUTPATIENT)
Dept: FAMILY MEDICINE CLINIC | Age: 77
End: 2022-10-13

## 2022-10-13 DIAGNOSIS — R53.1 WEAKNESS: Primary | ICD-10-CM

## 2022-10-13 NOTE — TELEPHONE ENCOUNTER
----- Message from Karson Nelson, DO sent at 10/13/2022  7:41 AM EDT -----  Specimen contaminated so even though it is abnormal it is hard to say whether she does have ongoing infection. Please culture if possible.

## 2022-10-14 LAB
CULTURE: NO GROWTH
SPECIMEN DESCRIPTION: NORMAL

## 2022-10-17 ENCOUNTER — HOSPITAL ENCOUNTER (OUTPATIENT)
Age: 77
Discharge: HOME | End: 2022-10-17
Payer: MEDICARE

## 2022-10-17 DIAGNOSIS — E83.42 HYPOMAGNESEMIA: ICD-10-CM

## 2022-10-17 LAB
ALBUMIN SERPL-MCNC: 3.6 G/DL (ref 3.5–5.2)
ANION GAP SERPL CALCULATED.3IONS-SCNC: 11 MMOL/L (ref 9–17)
BUN BLDV-MCNC: 19 MG/DL (ref 8–23)
BUN/CREAT BLD: 17 (ref 9–20)
CALCIUM SERPL-MCNC: 10.6 MG/DL (ref 8.6–10.4)
CHLORIDE BLD-SCNC: 100 MMOL/L (ref 98–107)
CO2: 28 MMOL/L (ref 20–31)
CREAT SERPL-MCNC: 1.12 MG/DL (ref 0.5–0.9)
GFR SERPL CREATININE-BSD FRML MDRD: 52 ML/MIN/1.73M2
GLUCOSE BLD-MCNC: 141 MG/DL (ref 70–99)
MAGNESIUM: 1.5 MG/DL (ref 1.6–2.6)
MAGNESIUM: 1.5 MG/DL (ref 1.6–2.6)
PHOSPHORUS: 3.6 MG/DL (ref 2.6–4.5)
POTASSIUM SERPL-SCNC: 4.1 MMOL/L (ref 3.7–5.3)
PTH INTACT: 18.8 PG/ML (ref 14–72)
SODIUM BLD-SCNC: 139 MMOL/L (ref 135–144)
VITAMIN D 25-HYDROXY: 35.6 NG/ML

## 2022-10-17 PROCEDURE — 80069 RENAL FUNCTION PANEL: CPT

## 2022-10-17 PROCEDURE — 82306 VITAMIN D 25 HYDROXY: CPT

## 2022-10-17 PROCEDURE — 83735 ASSAY OF MAGNESIUM: CPT

## 2022-10-17 PROCEDURE — 83970 ASSAY OF PARATHORMONE: CPT

## 2022-10-17 PROCEDURE — 36415 COLL VENOUS BLD VENIPUNCTURE: CPT

## 2022-10-18 ENCOUNTER — TELEPHONE (OUTPATIENT)
Dept: FAMILY MEDICINE CLINIC | Age: 77
End: 2022-10-18

## 2022-10-18 DIAGNOSIS — E83.42 HYPOMAGNESEMIA: Primary | ICD-10-CM

## 2022-10-18 NOTE — TELEPHONE ENCOUNTER
----- Message from Carlee Ace, DO sent at 10/18/2022 12:56 PM EDT -----  Magnesium still just a little low so please increase dosing to 2 tabs twice a day.

## 2022-10-20 ENCOUNTER — HOSPITAL ENCOUNTER (OUTPATIENT)
Dept: MAMMOGRAPHY | Age: 77
Discharge: HOME | End: 2022-10-22
Payer: MEDICARE

## 2022-10-20 ENCOUNTER — HOSPITAL ENCOUNTER (OUTPATIENT)
Dept: ULTRASOUND IMAGING | Age: 77
Discharge: HOME | End: 2022-10-22
Payer: MEDICARE

## 2022-10-20 ENCOUNTER — HOSPITAL ENCOUNTER (OUTPATIENT)
Dept: CT IMAGING | Age: 77
Discharge: HOME | End: 2022-10-22
Payer: MEDICARE

## 2022-10-20 DIAGNOSIS — M79.651 RIGHT THIGH PAIN: ICD-10-CM

## 2022-10-20 DIAGNOSIS — N64.4 MASTALGIA: ICD-10-CM

## 2022-10-20 DIAGNOSIS — N64.4 BREAST PAIN, RIGHT: ICD-10-CM

## 2022-10-20 DIAGNOSIS — R92.8 ABNORMAL MAMMOGRAM: ICD-10-CM

## 2022-10-20 DIAGNOSIS — M60.851 MYOSITIS OF RIGHT THIGH, UNSPECIFIED MYOSITIS TYPE: ICD-10-CM

## 2022-10-20 DIAGNOSIS — N63.20 MASS OF LEFT BREAST, UNSPECIFIED QUADRANT: Primary | ICD-10-CM

## 2022-10-20 PROCEDURE — 73700 CT LOWER EXTREMITY W/O DYE: CPT

## 2022-10-20 PROCEDURE — G0279 TOMOSYNTHESIS, MAMMO: HCPCS

## 2022-10-24 ENCOUNTER — HOSPITAL ENCOUNTER (OUTPATIENT)
Age: 77
Discharge: HOME | End: 2022-10-24
Payer: MEDICARE

## 2022-10-24 DIAGNOSIS — E83.42 HYPOMAGNESEMIA: ICD-10-CM

## 2022-10-24 LAB
FERRITIN: 53 NG/ML (ref 13–150)
FREE KAPPA/LAMBDA RATIO: 0.71 (ref 0.26–1.65)
HCT VFR BLD CALC: 34.4 % (ref 36–46)
HEMOGLOBIN: 11 G/DL (ref 12–16)
IRON SATURATION: 18 % (ref 20–55)
IRON: 60 UG/DL (ref 37–145)
KAPPA FREE LIGHT CHAINS QNT: 2.37 MG/DL (ref 0.37–1.94)
LAMBDA FREE LIGHT CHAINS QNT: 3.35 MG/DL (ref 0.57–2.63)
MAGNESIUM: 1.5 MG/DL (ref 1.6–2.6)
MCH RBC QN AUTO: 30.5 PG (ref 26–34)
MCHC RBC AUTO-ENTMCNC: 32 G/DL (ref 31–37)
MCV RBC AUTO: 95.4 FL (ref 80–100)
PDW BLD-RTO: 15.3 % (ref 12.1–15.2)
PLATELET # BLD: 336 K/UL (ref 140–450)
RBC # BLD: 3.61 M/UL (ref 4–5.2)
TOTAL IRON BINDING CAPACITY: 330 UG/DL (ref 250–450)
TRANSFERRIN: 295 MG/DL (ref 200–360)
UNSATURATED IRON BINDING CAPACITY: 270 UG/DL (ref 112–347)
WBC # BLD: 9.4 K/UL (ref 3.5–11)

## 2022-10-24 PROCEDURE — 85027 COMPLETE CBC AUTOMATED: CPT

## 2022-10-24 PROCEDURE — 36415 COLL VENOUS BLD VENIPUNCTURE: CPT

## 2022-10-24 PROCEDURE — 84466 ASSAY OF TRANSFERRIN: CPT

## 2022-10-24 PROCEDURE — 82728 ASSAY OF FERRITIN: CPT

## 2022-10-24 PROCEDURE — 83521 IG LIGHT CHAINS FREE EACH: CPT

## 2022-10-24 PROCEDURE — 83550 IRON BINDING TEST: CPT

## 2022-10-24 PROCEDURE — 83540 ASSAY OF IRON: CPT

## 2022-10-24 PROCEDURE — 86334 IMMUNOFIX E-PHORESIS SERUM: CPT

## 2022-10-24 PROCEDURE — 83735 ASSAY OF MAGNESIUM: CPT

## 2022-10-24 NOTE — PROGRESS NOTES
Fingerstick INR drawn per clinic protocol. Patient states no visible blood in urine and no black tarry stool. Denies any missed doses of warfarin. No change in other maintenance medications or in diet. Edilberto Anderson says she went to see her eye doctor, Dr. Garrett Tanner, in [place], yesterday and says he has her scheduled for laser eye surgery on 11- to \"clean\" the \"black spots\" from her right eye. She will have to skip her warfarin for 3 days prior to this procedure. Edilberto Anderson will continue current weekly warfarin regimen until she takes her last dose on 11- prior to procedure. She will resume current warfarin regimen as soon as it is safe to do so post-procedurally and we will check her INR again 2 weeks later on 12-1-2020. We have provided her with a dosing calendar to help ease her transition off and then back on her warfarin therapy.  Patient acknowledges working in consult agreement with pharmacist as referred by his/her physician.           CLINICAL PHARMACY CONSULT: MED RECONCILIATION/REVIEW ADDENDUM     For Pharmacy Admin Tracking Only     PHSO: Yes  Total # of Interventions Recommended: 1  - Updated order #1  - Maintenance Safety Lab Monitoring #: 1    Total Interventions Accepted: 1  Time Spent (min): 30    Clinton Alcantara PharmD       
Fellow
Fellow

## 2022-10-25 LAB
PATHOLOGIST: NORMAL
SERUM IFX INTERP: NORMAL

## 2022-10-27 ENCOUNTER — HOSPITAL ENCOUNTER (OUTPATIENT)
Dept: ULTRASOUND IMAGING | Age: 77
Discharge: HOME | End: 2022-10-29
Payer: MEDICARE

## 2022-10-27 PROCEDURE — 76641 ULTRASOUND BREAST COMPLETE: CPT

## 2022-10-28 ENCOUNTER — TELEPHONE (OUTPATIENT)
Dept: FAMILY MEDICINE CLINIC | Age: 77
End: 2022-10-28

## 2022-10-28 RX ORDER — ASPIRIN 81 MG/1
81 TABLET ORAL DAILY
Qty: 90 TABLET | Refills: 3 | Status: SHIPPED | OUTPATIENT
Start: 2022-10-28

## 2022-10-28 NOTE — TELEPHONE ENCOUNTER
Message left to return call for results.   Also need to clarify if someone else restarted her on propranolol, if so who and at what dose

## 2022-10-28 NOTE — TELEPHONE ENCOUNTER
----- Message from Mitzi Ng DO sent at 10/27/2022  4:22 PM EDT -----  Benign, some calcified areas of fat necrosis

## 2022-10-31 ENCOUNTER — OFFICE VISIT (OUTPATIENT)
Dept: FAMILY MEDICINE CLINIC | Age: 77
End: 2022-10-31
Payer: MEDICARE

## 2022-10-31 ENCOUNTER — HOSPITAL ENCOUNTER (OUTPATIENT)
Age: 77
Setting detail: SPECIMEN
Discharge: HOME | End: 2022-10-31
Payer: MEDICARE

## 2022-10-31 VITALS
BODY MASS INDEX: 28.56 KG/M2 | SYSTOLIC BLOOD PRESSURE: 102 MMHG | HEART RATE: 111 BPM | DIASTOLIC BLOOD PRESSURE: 46 MMHG | HEIGHT: 67 IN | WEIGHT: 182 LBS

## 2022-10-31 DIAGNOSIS — R29.6 FREQUENT FALLS: ICD-10-CM

## 2022-10-31 DIAGNOSIS — M79.651 RIGHT THIGH PAIN: Primary | ICD-10-CM

## 2022-10-31 DIAGNOSIS — E83.42 HYPOMAGNESEMIA: ICD-10-CM

## 2022-10-31 DIAGNOSIS — D48.5 NEOPLASM OF UNCERTAIN BEHAVIOR OF SCALP: ICD-10-CM

## 2022-10-31 DIAGNOSIS — N18.31 STAGE 3A CHRONIC KIDNEY DISEASE (HCC): ICD-10-CM

## 2022-10-31 DIAGNOSIS — R63.4 WEIGHT LOSS: ICD-10-CM

## 2022-10-31 PROCEDURE — 11102 TANGNTL BX SKIN SINGLE LES: CPT | Performed by: FAMILY MEDICINE

## 2022-10-31 PROCEDURE — 1123F ACP DISCUSS/DSCN MKR DOCD: CPT | Performed by: FAMILY MEDICINE

## 2022-10-31 PROCEDURE — 3078F DIAST BP <80 MM HG: CPT | Performed by: FAMILY MEDICINE

## 2022-10-31 PROCEDURE — 1090F PRES/ABSN URINE INCON ASSESS: CPT | Performed by: FAMILY MEDICINE

## 2022-10-31 PROCEDURE — G8417 CALC BMI ABV UP PARAM F/U: HCPCS | Performed by: FAMILY MEDICINE

## 2022-10-31 PROCEDURE — 3074F SYST BP LT 130 MM HG: CPT | Performed by: FAMILY MEDICINE

## 2022-10-31 PROCEDURE — G8484 FLU IMMUNIZE NO ADMIN: HCPCS | Performed by: FAMILY MEDICINE

## 2022-10-31 PROCEDURE — 88305 TISSUE EXAM BY PATHOLOGIST: CPT

## 2022-10-31 PROCEDURE — G8399 PT W/DXA RESULTS DOCUMENT: HCPCS | Performed by: FAMILY MEDICINE

## 2022-10-31 PROCEDURE — 3017F COLORECTAL CA SCREEN DOC REV: CPT | Performed by: FAMILY MEDICINE

## 2022-10-31 PROCEDURE — G8427 DOCREV CUR MEDS BY ELIG CLIN: HCPCS | Performed by: FAMILY MEDICINE

## 2022-10-31 PROCEDURE — 1036F TOBACCO NON-USER: CPT | Performed by: FAMILY MEDICINE

## 2022-10-31 PROCEDURE — 99214 OFFICE O/P EST MOD 30 MIN: CPT | Performed by: FAMILY MEDICINE

## 2022-10-31 NOTE — PROGRESS NOTES
Name: Ford Rice  : 1945         Chief Complaint:     Chief Complaint   Patient presents with    Results       History of Present Illness:      Ford Rice is a 78 y.o.  female who presents with Results      HPI    Bothered by ongoing R thigh pain that hits her with certain position changes. Recent CT. Taking gabapentin but not having adequate pain relief. CKD improved and wants to stop seeing Dr Bashir Faustin, doesn't feel it's needed. On Mag 2 BID now and has had a couple big episodes of diarrhea but otherwise ok. Getting in and out of car more easily. Still trouble getting RLE up into bed, needs help from son. Can't stand and shower and only has boys so she sits and does a sponge bath. No therapy right now. Pt says she can do the same exercises they were doing with her, questionable whether she does them (son says she doesn't, pt says she does them when he's not at home which he says is not very often at all). Persistent skin lesion crown of head, has changed appearance again, larger and \"wiggly\" per son who washes her hair. Ongoing weight loss, poor appetite but tries to eat. Some meals eats more than others. No supplemental shakes. Doesn't know if she tolerates dairy, doesn't like drinking milk. Medical History:     Patient Active Problem List   Diagnosis    Essential hypertension, benign    Esophageal reflux    Irritable bowel syndrome    Seasonal allergies    Gout    Rectocele    Tubular adenoma of colon    Hiatal hernia    Sigmoid diverticulosis    Chronic back pain    Hypomagnesemia    Family history of colon cancer    History of recurrent deep vein thrombosis (DVT)    Hyperparathyroidism ([address]    Chronic renal disease, stage III ([address] [177664]    Type 2 diabetes mellitus without complication, without long-term current use of insulin ([facility])       Medications:       Prior to Admission medications    Medication Sig Start Date End Date Taking?  Authorizing Provider   aspirin EC 81 ill-appearing. Cardiovascular:      Rate and Rhythm: Normal rate and regular rhythm. Pulmonary:      Effort: Pulmonary effort is normal.      Breath sounds: Normal breath sounds. Musculoskeletal:      Comments: Pt was able to rise from wheelchair with help of son and physician but unable to pivot onto exam table. Pt became panicked despite having full support from front (me) and back (very large-framed son) the entire time, was fearful of falling, sat back down in wheelchair. RLE exam deferred because of inability to transfer to exam table. Skin:     Comments: Scalp just to L of vertex: approx 1.5 cm diameter fungating lesion, slight detachment anterior aspect with tiny amt thin white fluid extruding with mild pressure on lesion. Neurological:      Mental Status: She is alert. Psychiatric:         Mood and Affect: Mood normal.         Behavior: Behavior normal.      Comments: Appropriate today, no tangentiality, hallucinations, apparent forgetfulness. Shave biopsy: consent obtained. Pt remained in seated position in wheelchair. Cleaned with alcohol, then injected 2 mL 1% lido with epi. Prepped with betadine and alcohol. After injection, much of crusted portion sloughed off. This was pulled off using forceps and placed into specimen collection container. Pink bulging soft tissue lesion then visible beneath. Dermablade used to remove lesion. Hyfrecator for hemostasis. Bacitracin applied.       Data:     Lab Results   Component Value Date/Time     10/17/2022 11:16 AM    K 4.1 10/17/2022 11:16 AM     10/17/2022 11:16 AM    CO2 28 10/17/2022 11:16 AM    BUN 19 10/17/2022 11:16 AM    CREATININE 1.12 10/17/2022 11:16 AM    GLUCOSE 141 10/17/2022 11:16 AM    PROT 6.6 10/05/2022 02:15 PM    LABALBU 3.6 10/17/2022 11:16 AM    BILITOT 0.6 10/05/2022 02:15 PM    ALKPHOS 86 10/05/2022 02:15 PM    AST 12 10/05/2022 02:15 PM    ALT 7 10/05/2022 02:15 PM     Lab Results   Component Value Date/Time    WBC 9.4 10/24/2022 12:03 PM    RBC 3.61 10/24/2022 12:03 PM    HGB 11.0 10/24/2022 12:03 PM    HCT 34.4 10/24/2022 12:03 PM    MCV 95.4 10/24/2022 12:03 PM    MCH 30.5 10/24/2022 12:03 PM    MCHC 32.0 10/24/2022 12:03 PM    RDW 15.3 10/24/2022 12:03 PM     10/24/2022 12:03 PM    MPV NOT REPORTED 12/17/2021 11:45 AM     Lab Results   Component Value Date/Time    TSH 2.51 06/21/2022 04:36 AM     Lab Results   Component Value Date/Time    CHOL 167 06/22/2021 02:35 PM    HDL 59 06/22/2021 02:35 PM    LABA1C 6.0 09/26/2022 11:19 AM         Assessment & Plan:        Diagnosis Orders   1. Right thigh pain  [address], [place], , Pain Management, [facility]      2. Neoplasm of uncertain behavior of scalp  VT SHAV SKIN LES 11-20MM REMAINDR BODY      3. Hypomagnesemia        4. Weight loss        5. Stage 3a chronic kidney disease ([address]        6. Frequent falls          Ongoing R thigh pain, had had previous signs of iliopsoas edema on abd/pelvis CT but on CT femur 10/21 no sign of same (didn't look at same area). Still suspect r/t lumbar radiculopathy. Referred back to pain mgmt. NUB enlarging, shave bx performed  Hypomag improved, cont mag 800 BID. Strength and mentation improved. Weight loss mainly since fall 12/17/21 resulting in distal L femur fx, extended hospitalization and nursing home stay. Pt stopped cooking and has had poor appetite. Has had much imaging with no sign of malignancy identified. Advised to inc oral intake, may try adding protein supplemental shake. CKD improved. Ok to stop seeing nephro and I will follow as long as things remain stable. Frequent falls - strongly advised home exercises. She asked when/if she could stop using walker and I advised she should become aware of this as she is able to lean less on walker.  After we attempted to get her out of wheelcir onto exam table and I saw how fearful she was of falls, I advised she do certainly keep using walker for now.        signed by Fab Rosales DO on 10/31/2022 at 6:10 PM  [address]  Dept: [phone]

## 2022-11-02 LAB — DERMATOLOGY PATHOLOGY REPORT: NORMAL

## 2022-11-03 ENCOUNTER — OFFICE VISIT (OUTPATIENT)
Dept: UROLOGY | Age: 77
End: 2022-11-03
Payer: MEDICARE

## 2022-11-03 ENCOUNTER — TELEPHONE (OUTPATIENT)
Dept: FAMILY MEDICINE CLINIC | Age: 77
End: 2022-11-03

## 2022-11-03 VITALS
DIASTOLIC BLOOD PRESSURE: 62 MMHG | BODY MASS INDEX: 31.16 KG/M2 | HEIGHT: 65 IN | WEIGHT: 187 LBS | SYSTOLIC BLOOD PRESSURE: 104 MMHG

## 2022-11-03 DIAGNOSIS — C44.41 BASAL CELL CARCINOMA (BCC) OF SCALP: Primary | ICD-10-CM

## 2022-11-03 DIAGNOSIS — N18.30 STAGE 3 CHRONIC KIDNEY DISEASE, UNSPECIFIED WHETHER STAGE 3A OR 3B CKD (HCC): ICD-10-CM

## 2022-11-03 DIAGNOSIS — N39.46 MIXED INCONTINENCE: ICD-10-CM

## 2022-11-03 DIAGNOSIS — N39.0 FREQUENT UTI: Primary | ICD-10-CM

## 2022-11-03 DIAGNOSIS — N20.0 KIDNEY STONES: ICD-10-CM

## 2022-11-03 DIAGNOSIS — Z90.5 H/O PARTIAL NEPHRECTOMY: ICD-10-CM

## 2022-11-03 DIAGNOSIS — N95.2 VAGINAL ATROPHY: ICD-10-CM

## 2022-11-03 PROCEDURE — 3078F DIAST BP <80 MM HG: CPT | Performed by: PHYSICIAN ASSISTANT

## 2022-11-03 PROCEDURE — 0509F URINE INCON PLAN DOCD: CPT | Performed by: PHYSICIAN ASSISTANT

## 2022-11-03 PROCEDURE — 1090F PRES/ABSN URINE INCON ASSESS: CPT | Performed by: PHYSICIAN ASSISTANT

## 2022-11-03 PROCEDURE — G8427 DOCREV CUR MEDS BY ELIG CLIN: HCPCS | Performed by: PHYSICIAN ASSISTANT

## 2022-11-03 PROCEDURE — 3074F SYST BP LT 130 MM HG: CPT | Performed by: PHYSICIAN ASSISTANT

## 2022-11-03 PROCEDURE — G8484 FLU IMMUNIZE NO ADMIN: HCPCS | Performed by: PHYSICIAN ASSISTANT

## 2022-11-03 PROCEDURE — 51798 US URINE CAPACITY MEASURE: CPT | Performed by: PHYSICIAN ASSISTANT

## 2022-11-03 PROCEDURE — G8417 CALC BMI ABV UP PARAM F/U: HCPCS | Performed by: PHYSICIAN ASSISTANT

## 2022-11-03 PROCEDURE — 3017F COLORECTAL CA SCREEN DOC REV: CPT | Performed by: PHYSICIAN ASSISTANT

## 2022-11-03 PROCEDURE — 1123F ACP DISCUSS/DSCN MKR DOCD: CPT | Performed by: PHYSICIAN ASSISTANT

## 2022-11-03 PROCEDURE — G8399 PT W/DXA RESULTS DOCUMENT: HCPCS | Performed by: PHYSICIAN ASSISTANT

## 2022-11-03 PROCEDURE — 99214 OFFICE O/P EST MOD 30 MIN: CPT | Performed by: PHYSICIAN ASSISTANT

## 2022-11-03 PROCEDURE — 1036F TOBACCO NON-USER: CPT | Performed by: PHYSICIAN ASSISTANT

## 2022-11-03 RX ORDER — ESTRADIOL 0.1 MG/G
CREAM VAGINAL
Qty: 42.5 G | Refills: 3 | Status: ON HOLD | OUTPATIENT
Start: 2022-11-03 | End: 2022-11-17 | Stop reason: HOSPADM

## 2022-11-03 ASSESSMENT — ENCOUNTER SYMPTOMS
VOMITING: 0
BACK PAIN: 0
CONSTIPATION: 0
COLOR CHANGE: 0
COUGH: 0
APNEA: 0
EYE REDNESS: 0
SHORTNESS OF BREATH: 0
ABDOMINAL PAIN: 0
WHEEZING: 0
NAUSEA: 0

## 2022-11-03 NOTE — PROGRESS NOTES
HPI:    Patient is a 78 y.o. female in no acute distress. She is alert and oriented to person, place, and time. 7/2021 Patient presents today as a new patient referral from Dr. Johnny Rosenbaum for mixed incontinence and recurrent urinary tract infection. Patient has been seen by urology before. Has not seen urology in many years. She did have a culture proven urinary tract infection 1 month ago. Patient states that she was treated multiple times over the past year for urinary tract infections, again I have only seen one culture sent in the past year. Patient did have a left partial nephrectomy 50 years ago for atrophy/hematuria. Distant history of kidney stones she is also apparently has had \"multiple\" cystoscopies in the past.  She has had many back surgeries as well. He does have chronic kidney disease. She used to follow with nephrology, but no longer. Non smoker. Retired [employer] . Patient did have labs approximately a month ago which did show a BUN of 34, creatinine of 1.74, GFR of 29. She does have history of hysterectomy. Patient did have a bladder sling in 1988. Patient does have incontinence throughout the day and night. She does wear a thick/heavy pad which she changes 3-4 times a day. She says that it is saturated almost every time she changes it. She states that some of the time her incontinence is due to not getting there in time. Most of the time her incontinence is when she stands up. She has never been on any anticholinergics. She does have a daily cough which is soft and easy to pass. She only drinks water. She urinates every 30 minutes to 1-1/2 hours. Patient does take Lasix. Patient is an uncontrolled diabetic, her  last hemoglobin A1c was 9.5. She denies any current fever, chills, gross hematuria, flank pain, dysuria. Patient voided - 20 mL, PVR - 0 mL.                  Started on oxybutynin XL 10 mg     9/2021 - US was independently reviewed showing evidence of prior left partial nephrectomy. Bilateral ureteral jets noted. No hydronephrosis. There is a benign-appearing right renal cortical cyst.  Patient did have a KUB prior to visit which independently reviewed showing no distinct  calcifications. No further follow-up needed for history of left partial nephrectomy    8/2022 Ditropan stopped secondary to dizziness/hypotension     Urine cultures  10/2022 - no growth  7/2022 - no growth   6/2022 - e coli  12/2021 - e coli  10/2021 -klebsiella aerogenes  8/2021 -Enterococcus faecalis  8/2021 -E. Coli  7/2021 - no growth    Today:  Patient is here today for follow-up overactive bladder, urinary incontinence, recurrent urinary tract infection. He does have chronic kidney disease. She does have a vague history of kidney stones. She did have a CT scan earlier in the year which was independently reviewed showing no distinct  calcifications. Patient did have a urinary tract infection back in June. Early in June (6/9/2022) we were notified of her having worsening urinary symptoms. We did order a urine culture and started her on empiric ciprofloxacin. Her urine culture did grow E. coli which was sensitive to Cipro. Patient's symptoms worsened. Per patient's son she did start the medication but later found out that she never completed it. She ended up in the emergency room on 6/18/2022 with complaints of leg pain. Again she was diagnosed with urinary tract infection and started on Cipro. There again was a delay in starting the medication. Patient ended up back in the emergency room on 6/20/2022 with altered mental status. Patient did have an extended hospital course secondary to UTI. Patient also spent amount of time in skilled nursing facility. Patient has now at home. They do relate that she was taken off of her Ditropan secondary to dizziness and hypotension. Currently she is wearing 6 pads a day and getting up 2-3 times at night.   At this time she does not wish to be started on any additional oral medications for this. Past Medical History:   Diagnosis Date    Allergic rhinitis     Chronic kidney disease, stage III (moderate) (HCC)     Deep vein thrombosis (DVT) ([facility] [address] 10/24/2012    Elbow fracture, left     Fall     Gastric ulcer     Gout     Hx of blood clots     Following last back surgery     Hypertension     Nausea & vomiting     Presence of IVC filter     Type II or unspecified type diabetes mellitus without mention of complication, not stated as uncontrolled      Past Surgical History:   Procedure Laterality Date    BACK SURGERY      BACK SURGERY      BACK SURGERY      BACK SURGERY      BACK SURGERY      BACK SURGERY      Fusion     BREAST BIOPSY Left     BREAST BIOPSY Right     CARDIAC CATHETERIZATION Left 03/07/2018    Dr. Dany Corbin @ [facility] [address]--There is 30% disease of the origin of the lateral anterior descending. Mild 10% -20% plaque disease in the circumflex & right coronary artery. Normal left ventricular functino, ejection fraction of 60%. CARPAL TUNNEL RELEASE Right     CATARACT REMOVAL Right     CATARACT REMOVAL Left     COLONOSCOPY  2014    COLONOSCOPY N/A 05/13/2019    COLONOSCOPY POLYPECTOMY HOT BIOPSY performed by Emily Concepcion MD at [facility]      \"Multiple\"     EYE SURGERY Right     Removed fluid from behind eye    HYSTERECTOMY ([facility])      KIDNEY SURGERY      left side 1/2 of kidney removed    PARTIAL NEPHRECTOMY Left     RETINAL DETACHMENT SURGERY Left     ROTATOR CUFF REPAIR      TOTAL KNEE ARTHROPLASTY Right     TOTAL KNEE ARTHROPLASTY Left     UPPER GASTROINTESTINAL ENDOSCOPY  2014    VENA CAVA FILTER PLACEMENT       Outpatient Encounter Medications as of 11/3/2022   Medication Sig Dispense Refill    estradiol (ESTRACE VAGINAL) 0.1 MG/GM vaginal cream Place a pea-sized amount vaginally daily x 2 weeks, then 3 times per week thereafter. Use finger to apply, wash hands after application.  DO NOT use plastic applicator. Call office if you stop this medication. 42.5 g 3    aspirin EC 81 MG EC tablet Take 1 tablet by mouth daily 90 tablet 3    gabapentin (NEURONTIN) 100 MG capsule Take 1 capsule by mouth 3 times daily for 180 days. 270 capsule 1    magnesium oxide (MAGOX 400) 400 (240 Mg) MG tablet 1 po every morning and 2 po every night (Patient taking differently: 2 po every morning and 2 po every night) 90 tablet 5    sertraline (ZOLOFT) 25 MG tablet Take 1 tablet by mouth daily 30 tablet 3    folic acid (FOLVITE) 233 MCG tablet Take 1 tablet by mouth daily 90 tablet 1    omeprazole (PRILOSEC) 20 MG delayed release capsule TAKE 1 CAPSULE EVERY DAY 90 capsule 1    atorvastatin (LIPITOR) 40 MG tablet Take 1 tablet by mouth daily 90 tablet 1    allopurinol (ZYLOPRIM) 100 MG tablet 2 tabs po daily 180 tablet 1    clopidogrel (PLAVIX) 75 MG tablet Take 1 tablet by mouth daily 90 tablet 1    Blood Glucose Monitoring Suppl (TRUE METRIX AIR GLUCOSE METER) SARAH True Metrix Glucose Meter   use TWICE DAILY AS DIRECTED. blood glucose monitor strips Test blood glucose daily 100 strip 1    Lancets MISC 1 each by Does not apply route 2 times daily 300 each 1    Blood Glucose Monitoring Suppl (TRUE METRIX METER) w/Device KIT Test once daily 1 kit 0    acetaminophen (TYLENOL) 650 MG CR tablet Take 1,300 mg by mouth every 8 hours as needed for Pain       No facility-administered encounter medications on file as of 11/3/2022. Current Outpatient Medications on File Prior to Visit   Medication Sig Dispense Refill    aspirin EC 81 MG EC tablet Take 1 tablet by mouth daily 90 tablet 3    gabapentin (NEURONTIN) 100 MG capsule Take 1 capsule by mouth 3 times daily for 180 days.  270 capsule 1    magnesium oxide (MAGOX 400) 400 (240 Mg) MG tablet 1 po every morning and 2 po every night (Patient taking differently: 2 po every morning and 2 po every night) 90 tablet 5    sertraline (ZOLOFT) 25 MG tablet Take 1 tablet by mouth daily 30 tablet 3    folic acid (FOLVITE) 445 MCG tablet Take 1 tablet by mouth daily 90 tablet 1    omeprazole (PRILOSEC) 20 MG delayed release capsule TAKE 1 CAPSULE EVERY DAY 90 capsule 1    atorvastatin (LIPITOR) 40 MG tablet Take 1 tablet by mouth daily 90 tablet 1    allopurinol (ZYLOPRIM) 100 MG tablet 2 tabs po daily 180 tablet 1    clopidogrel (PLAVIX) 75 MG tablet Take 1 tablet by mouth daily 90 tablet 1    Blood Glucose Monitoring Suppl (TRUE METRIX AIR GLUCOSE METER) SARAH True Metrix Glucose Meter   use TWICE DAILY AS DIRECTED. blood glucose monitor strips Test blood glucose daily 100 strip 1    Lancets MISC 1 each by Does not apply route 2 times daily 300 each 1    Blood Glucose Monitoring Suppl (TRUE METRIX METER) w/Device KIT Test once daily 1 kit 0    acetaminophen (TYLENOL) 650 MG CR tablet Take 1,300 mg by mouth every 8 hours as needed for Pain       No current facility-administered medications on file prior to visit. Codeine, Adhesive tape, Lisinopril, Norco [hydrocodone-acetaminophen], and Percocet [oxycodone-acetaminophen]  Family History   Problem Relation Age of Onset    Diabetes Father     Cancer Father         Prostate Cancer    Cancer Paternal Aunt         Colon Cancer     Social History     Tobacco Use   Smoking Status Never   Smokeless Tobacco Never       Social History     Substance and Sexual Activity   Alcohol Use No       Review of Systems   Constitutional:  Negative for appetite change, chills and fever. Eyes:  Negative for redness and visual disturbance. Respiratory:  Negative for apnea, cough, shortness of breath and wheezing. Cardiovascular:  Negative for chest pain and leg swelling. Gastrointestinal:  Negative for abdominal pain, constipation, nausea and vomiting. Genitourinary:  Negative for difficulty urinating, dyspareunia, dysuria, enuresis, flank pain, frequency, hematuria, pelvic pain, urgency, vaginal bleeding and vaginal discharge.         Positive for incontinence and nocturia   Musculoskeletal:  Negative for back pain, joint swelling and myalgias. Skin:  Negative for color change, rash and wound. Neurological:  Negative for dizziness, tremors and numbness. Hematological:  Negative for adenopathy. Does not bruise/bleed easily. Psychiatric/Behavioral:  Negative for sleep disturbance. /62   Ht 5' 5\" (1.651 m)   Wt 187 lb (84.8 kg)   LMP  (LMP Unknown)   BMI 31.12 kg/m²       PHYSICAL EXAM:  Constitutional: Patient resting comfortably, in no acute distress. Neuro: Alert and oriented to person place and time. Psych: Mood and affect normal.  HEENT: normocephalic, atraumatic  Lungs: Respiratory effort normal, unlabored  Abdomen: Soft, non-tender, non-distended   : No CVA tenderness. Pelvic: deferred     Lab Results   Component Value Date    BUN 19 10/17/2022     Lab Results   Component Value Date    CREATININE 1.12 (H) 10/17/2022       ASSESSMENT:   Diagnosis Orders   1. Frequent UTI  estradiol (ESTRACE VAGINAL) 0.1 MG/GM vaginal cream      2. Mixed incontinence  IA MEASUREMENT,POST-VOID RESIDUAL VOLUME BY US,NON-IMAGING      3. H/O partial nephrectomy        4. Stage 3 chronic kidney disease, unspecified whether stage 3a or 3b CKD ([facility] [place] [address]        5. Kidney stones  XR ABDOMEN (KUB) (SINGLE AP VIEW)      6. Vaginal atrophy  estradiol (ESTRACE VAGINAL) 0.1 MG/GM vaginal cream              PLAN:  Offered myrbetriq, declined    Patient instructed to drink at least 80 ounces of \"good fluids\" daily (water, juice, Gatoraide, milk) and to avoid/minimize intake of \"bad fluids\" (soda pop, coffee, tea, alcohol, energy drinks). Also advised to avoid excessive consumption of sodium and animal protein to decrease risk of recurrent kidney stones. Recommended she start over-the-counter oral cranberry    We discussed with her that vaginal atrophy can be contributing to her urinary tract infections and we do recommend starting vaginal estrogen.   We discussed with her that it can take upwards of 6 months to be fully effective. We also instructed her on how to apply.     Follow Up in 3 months    KUMARIAH 1 year

## 2022-11-03 NOTE — TELEPHONE ENCOUNTER
----- Message from Brad Manuel DO sent at 11/3/2022  1:54 PM EDT -----  Basal cell cancer. This is a cancer that stays localized but can be locally invasive. Do recommend seeing dermatology about cutting a little deeper as we did not get the whole thing.

## 2022-11-03 NOTE — TELEPHONE ENCOUNTER
Pt. Informed of results and is willing to go to derm. Requesting somewhere close if possible.     Thank you

## 2022-11-03 NOTE — PATIENT INSTRUCTIONS
Vaginal Estrogen   Place a pea-sized amount vaginally daily x 2 weeks, then 3 times per week thereafter. Use finger to apply, wash hands after application. DO NOT use plastic applicator. Call office if you stop this medication. Start over the counter cranberry       SURVEY:    You may be receiving a survey from [employer] regarding your visit today. Please complete the survey to enable us to provide the highest quality of care to you and your family. If you cannot score us a very good on any question, please call the office to discuss how we could have made your experience a very good one. Thank you.

## 2022-11-04 ENCOUNTER — TELEPHONE (OUTPATIENT)
Dept: UROLOGY | Age: 77
End: 2022-11-04

## 2022-11-07 NOTE — TELEPHONE ENCOUNTER
Call Good Rx card info in:    [place] [address]: [id number]  PCN: [id number] [facility]  Group: [facility]  Member ID: [id number]

## 2022-11-07 NOTE — TELEPHONE ENCOUNTER
Contacted the pharmacy, spoke to Josep Pruitt. Informed her of the message regarding  good RX coupon. She advised to wait on the good RX info until the prior authorization is complete. Informed her the providers would be informed. She voiced understanding.

## 2022-11-08 NOTE — TELEPHONE ENCOUNTER
Contacted the pharmacy and advised patient does not wish to use the insurance for the medication. Gave the Good RX information and pharmacist was able to get the prescription filled at $ 25.59 for 3 month supply. Contacted the patient and advised she could  the prescription today. She voiced understanding.

## 2022-11-10 ENCOUNTER — OFFICE VISIT (OUTPATIENT)
Dept: PAIN MANAGEMENT | Age: 77
End: 2022-11-10
Payer: MEDICARE

## 2022-11-10 VITALS
RESPIRATION RATE: 18 BRPM | OXYGEN SATURATION: 98 % | HEART RATE: 93 BPM | BODY MASS INDEX: 31.16 KG/M2 | WEIGHT: 187 LBS | HEIGHT: 65 IN

## 2022-11-10 DIAGNOSIS — M54.16 LUMBAR RADICULOPATHY: Primary | ICD-10-CM

## 2022-11-10 PROCEDURE — G8417 CALC BMI ABV UP PARAM F/U: HCPCS | Performed by: STUDENT IN AN ORGANIZED HEALTH CARE EDUCATION/TRAINING PROGRAM

## 2022-11-10 PROCEDURE — 1036F TOBACCO NON-USER: CPT | Performed by: STUDENT IN AN ORGANIZED HEALTH CARE EDUCATION/TRAINING PROGRAM

## 2022-11-10 PROCEDURE — 99204 OFFICE O/P NEW MOD 45 MIN: CPT | Performed by: STUDENT IN AN ORGANIZED HEALTH CARE EDUCATION/TRAINING PROGRAM

## 2022-11-10 PROCEDURE — G8399 PT W/DXA RESULTS DOCUMENT: HCPCS | Performed by: STUDENT IN AN ORGANIZED HEALTH CARE EDUCATION/TRAINING PROGRAM

## 2022-11-10 PROCEDURE — 1123F ACP DISCUSS/DSCN MKR DOCD: CPT | Performed by: STUDENT IN AN ORGANIZED HEALTH CARE EDUCATION/TRAINING PROGRAM

## 2022-11-10 PROCEDURE — 1090F PRES/ABSN URINE INCON ASSESS: CPT | Performed by: STUDENT IN AN ORGANIZED HEALTH CARE EDUCATION/TRAINING PROGRAM

## 2022-11-10 PROCEDURE — G8427 DOCREV CUR MEDS BY ELIG CLIN: HCPCS | Performed by: STUDENT IN AN ORGANIZED HEALTH CARE EDUCATION/TRAINING PROGRAM

## 2022-11-10 PROCEDURE — G8484 FLU IMMUNIZE NO ADMIN: HCPCS | Performed by: STUDENT IN AN ORGANIZED HEALTH CARE EDUCATION/TRAINING PROGRAM

## 2022-11-10 PROCEDURE — 3017F COLORECTAL CA SCREEN DOC REV: CPT | Performed by: STUDENT IN AN ORGANIZED HEALTH CARE EDUCATION/TRAINING PROGRAM

## 2022-11-10 NOTE — H&P (VIEW-ONLY)
Chronic Pain Clinic Note     Encounter Date: 11/10/2022     SUBJECTIVE:  Chief Complaint   Patient presents with    Back Pain     Left leg pain       History of Present Illness:   Lukas Gomez is a 78 y.o. female who presents with LLE pain    Medication Refill: N/A    Current Complaints of Pain:   Location: lateral LLE   Radiation: Into legs  Severity: mild/ moderate  Pain Numerical Score -    Average: 5     Highest: 6  Lowest: 4  Character/Quality: Complains of pain that is aching, burning   Timing: Keeps awake at night, constant   Associated symptoms: none  Numbness: burning on anterior thigh   Weakness: Yes  Exacerbating factors: Activity  Alleviating factors: Rest  Length of time pain has been present: Started on   Inciting event/injury: after surgery of left femur. Shattered femur. Bowel/Bladder incontinence: No  Falls: No  Physical Therapy: yes    History of Interventions:   Surgery: 6 back surgeries  Injections: epidurals in past - Dr Piper Jose    Imaging:    CT lumbar 6/18/22    FINDINGS: There are degenerative changes again seen of the visualized    sacroiliac joints. There are postsurgical changes again seen from prior bone    graft harvest involving the posterosuperior aspects of the iliac bones    bilaterally. There is a rotatory dextroconvex scoliosis of the lumbar spine    again seen centered at the L2-L3 level. There is a stable appearance of remote    mild anterior compression deformities of the T11 and T12 vertebral bodies and    there is also a stable appearance of a remote moderate compression deformity of    L1 which has undergone prior vertebroplasty. No acute compression fracture of    the lumbar spine is seen. There are postsurgical changes again seen from prior    posterior decompression surgery at the T11-T12 level through the L5-S1 level    and bilateral rods and pedicle screws are again seen extending from the T11    level through the L3 level. The hardware appears grossly intact.  No lucency is    seen adjacent to the hardware. Multilevel degenerative changes of the lumbar    spine are again seen. At the edge of the field-of-view there appear to be severe degenerative changes    at the T9-T10 level. It is difficult to assess the spinal canal and foramina at the T11-T12 level    through the L2-L3 level secondary to beam hardening artifact from the adjacent    hardware. No significant spinal canal stenosis is seen at the L3-L4 through L5-S1 level. There again appears to be mild right foraminal narrowing at the L5-S1 level    secondary to a small right foraminal disc-osteophyte complex. Past Medical History:   Diagnosis Date    Allergic rhinitis     Chronic kidney disease, stage III (moderate) (HCC)     Deep vein thrombosis (DVT) ([facility] [address] 10/24/2012    Elbow fracture, left     Fall     Gastric ulcer     Gout     Hx of blood clots     Following last back surgery     Hypertension     Nausea & vomiting     Presence of IVC filter     Type II or unspecified type diabetes mellitus without mention of complication, not stated as uncontrolled        Past Surgical History:   Procedure Laterality Date    BACK SURGERY      BACK SURGERY      BACK SURGERY      BACK SURGERY      BACK SURGERY      BACK SURGERY      Fusion     BREAST BIOPSY Left     BREAST BIOPSY Right     CARDIAC CATHETERIZATION Left 03/07/2018    Dr. Aster Buckley @ [facility]--There is 30% disease of the origin of the lateral anterior descending. Mild 10% -20% plaque disease in the circumflex & right coronary artery. Normal left ventricular functino, ejection fraction of 60%.       CARPAL TUNNEL RELEASE Right     CATARACT REMOVAL Right     CATARACT REMOVAL Left     COLONOSCOPY  2014    COLONOSCOPY N/A 05/13/2019    COLONOSCOPY POLYPECTOMY HOT BIOPSY performed by Jose Enrique Wright MD at [facility]      \"Multiple\"     EYE SURGERY Right     Removed fluid from behind eye    HYSTERECTOMY (CERVIX STATUS UNKNOWN)      KIDNEY SURGERY      left side 1/2 of kidney removed    PARTIAL NEPHRECTOMY Left     RETINAL DETACHMENT SURGERY Left     ROTATOR CUFF REPAIR      TOTAL KNEE ARTHROPLASTY Right     TOTAL KNEE ARTHROPLASTY Left     UPPER GASTROINTESTINAL ENDOSCOPY  2014    VENA CAVA FILTER PLACEMENT         Family History   Problem Relation Age of Onset    Diabetes Father     Cancer Father         Prostate Cancer    Cancer Paternal Aunt         Colon Cancer       Social History     Socioeconomic History    Marital status:       Spouse name: Not on file    Number of children: Not on file    Years of education: Not on file    Highest education level: Not on file   Occupational History    Not on file   Tobacco Use    Smoking status: Never    Smokeless tobacco: Never   Vaping Use    Vaping Use: Never used   Substance and Sexual Activity    Alcohol use: No    Drug use: No    Sexual activity: Not on file   Other Topics Concern    Not on file   Social History Narrative    Not on file     Social Determinants of Health     Financial Resource Strain: Low Risk     Difficulty of Paying Living Expenses: Not hard at all   Food Insecurity: No Food Insecurity    Worried About Running Out of Food in the Last Year: Never true    Ran Out of Food in the Last Year: Never true   Transportation Needs: Not on file   Physical Activity: Unknown    Days of Exercise per Week: 0 days    Minutes of Exercise per Session: Not on file   Stress: Not on file   Social Connections: Not on file   Intimate Partner Violence: Not on file   Housing Stability: Not on file       Medications & Allergies:   Current Outpatient Medications   Medication Instructions    acetaminophen (TYLENOL) 1,300 mg, Oral, EVERY 8 HOURS PRN    allopurinol (ZYLOPRIM) 100 MG tablet 2 tabs po daily    aspirin EC 81 mg, Oral, DAILY    atorvastatin (LIPITOR) 40 mg, Oral, DAILY    blood glucose monitor strips Test blood glucose daily    Blood Glucose Monitoring Suppl (TRUE METRIX AIR GLUCOSE METER) SARAH True Metrix Glucose Meter   use TWICE DAILY AS DIRECTED. Blood Glucose Monitoring Suppl (TRUE METRIX METER) w/Device KIT Test once daily    clopidogrel (PLAVIX) 75 mg, Oral, DAILY    estradiol (ESTRACE VAGINAL) 0.1 MG/GM vaginal cream Place a pea-sized amount vaginally daily x 2 weeks, then 3 times per week thereafter. Use finger to apply, wash hands after application. DO NOT use plastic applicator. Call office if you stop this medication.     folic acid (FOLVITE) 877 mcg, Oral, DAILY    gabapentin (NEURONTIN) 100 mg, Oral, 3 TIMES DAILY    Lancets MISC 1 each, Does not apply, 2 TIMES DAILY    magnesium oxide (MAGOX 400) 400 (240 Mg) MG tablet 1 po every morning and 2 po every night    omeprazole (PRILOSEC) 20 MG delayed release capsule TAKE 1 CAPSULE EVERY DAY    sertraline (ZOLOFT) 25 mg, Oral, DAILY       Allergies   Allergen Reactions    Codeine Itching    Adhesive Tape Itching, Rash and Other (See Comments)     Tape \"takes the skin off\"    Lisinopril Other (See Comments)     cough    Norco [Hydrocodone-Acetaminophen] Nausea And Vomiting    Percocet [Oxycodone-Acetaminophen] Itching and Nausea Only       Review of Systems:   Constitutional: negative for weight changes or fevers  Cardiovascular: negative for chest pain, palpitations, irregular heart beat  Respiratory: negative for dyspnea, cough, wheezing  Gastrointestinal: negative for constipation, diarrhea, nausea  Genitourinary: negative for bowel/bladder incontinence   Musculoskeletal: positive for low back pain  Neurological: negative for radicular leg pain, leg weakness or numbness/tingling  Behavioral/Psych: negative for anxiety/depression   Hematological: negative for abnormal bleeding, anticoagulation use or antiplatelet use  All other systems reviewed and are negative    OBJECTIVE:    Vitals:    11/10/22 1116   Pulse: 93   Resp: 18   SpO2: 98%       PHYSICAL EXAM    GENERAL: No acute distress, pleasant, well-appearing  HEENT: Normocephalic, atraumatic, Pupils equal and round  CARDIOVASCULAR: Well perfused, No peripheral cyanosis  PULMONARY: Good chest wall excursion, breathing unlabored  PSYCH: Appropriate affect and insight, non-pressured speech  SKIN: No rashes or lesions  MUSCULOSKELETAL:  Inspection: The back and extremities are symmetric and aligned. Muscle bulk is normal in appearance. Surgical scar present in lumbar spine. Palpation: There is tenderness to palpation along the lumbar paraspinal musculature bilaterally  Lumbar range of motion is full  NEUROMUSCULAR:  Patient ambulates in wheelchair  Gait is not assessed  Sensation to light touch is intact in lower extremities  Strength is full in lower extremities  No ankle clonus    Special Tests:  Lumbar facet loading is positive bilaterally  Seated straight leg raise is positive bilaterally      DIAGNOSIS:    ICD-10-CM    1. Lumbar radiculopathy  M54.16 Lumbar Epidural Steroid Injection/Caudal           ASSESSMENT:    Pawel Olivares is a 78 y. o.female who presents with chronic low back pain and leg pain. To review, patient has history of lumbar decompression and fusion from T11-L3 with old, stable compression fractures at T11, T12, L1. She also has a history of L1 vertebroplasty. The patient's history and physical examination are consistent with lumbar radiculopathy as the patient has pain starting in the low back radiating down into the right and left leg. Patient has positive neural tension signs on examination with a positive seated straight leg raise. Additionally the lumbar CT on 6/18/2022 reveals multilevel degenerative changes with neuroforaminal stenosis at L5-S1 on right. Therefore, I will plan for a caudal epidural steroid injection. Neurologically, it appears the patient has full strength and normal sensation. There is no evidence of myelopathy on examination. There are no red flags in the patient's history.  The patient has failed conservative measures including outpatient physical therapy, greater than 3 medications for pain relief, a self-directed therapy program, as well as activity modification all within the last 6 weeks over 3 months. The patient's pain has been causing worsening quality of life and function. PLAN:  Medications: For nonopioid therapy, the following medications were prescribed:    -Continue medication prescribed by primary care physician    Opioid therapy:  -Not indicated    Interventions:  -Caudal epidural steroid injection  -Consider SIJ injections in future    Imaging:  -Reviewed lumbar CT with patient in room    Behavioral Therapies:  -Continue daily stretching and home exercise program    Referrals:  -None    Follow-up Plan:  -After procedure    Patient was offered intervention where appropriate. Multi-modal Pain Therapy: The patient was explicitly considered for multimodal and interdisciplinary therapy. Non-opioid and non-pharmacological opportunities to enhance analgesia and quality of life have been and will continue to be pursued.     Julian Martinez DO  Interventional Pain Management/PM&R   [facility]    Orders Placed This Encounter    Lumbar Epidural Steroid Injection/Caudal     Standing Status:   Future     Standing Expiration Date:   11/10/2023     Scheduling Instructions:      Caudal MAYRA      Moderate Sedation

## 2022-11-10 NOTE — PROGRESS NOTES
Chronic Pain Clinic Note     Encounter Date: 11/10/2022     SUBJECTIVE:  Chief Complaint   Patient presents with    Back Pain     Left leg pain       History of Present Illness:   Sally Sparks is a 78 y.o. female who presents with LLE pain    Medication Refill: N/A    Current Complaints of Pain:   Location: lateral LLE   Radiation: Into legs  Severity: mild/ moderate  Pain Numerical Score -    Average: 5     Highest: 6  Lowest: 4  Character/Quality: Complains of pain that is aching, burning   Timing: Keeps awake at night, constant   Associated symptoms: none  Numbness: burning on anterior thigh   Weakness: Yes  Exacerbating factors: Activity  Alleviating factors: Rest  Length of time pain has been present: Started on   Inciting event/injury: after surgery of left femur. Shattered femur. Bowel/Bladder incontinence: No  Falls: No  Physical Therapy: yes    History of Interventions:   Surgery: 6 back surgeries  Injections: epidurals in past - Dr Marisela Lopez    Imaging:    CT lumbar 6/18/22    FINDINGS: There are degenerative changes again seen of the visualized    sacroiliac joints. There are postsurgical changes again seen from prior bone    graft harvest involving the posterosuperior aspects of the iliac bones    bilaterally. There is a rotatory dextroconvex scoliosis of the lumbar spine    again seen centered at the L2-L3 level. There is a stable appearance of remote    mild anterior compression deformities of the T11 and T12 vertebral bodies and    there is also a stable appearance of a remote moderate compression deformity of    L1 which has undergone prior vertebroplasty. No acute compression fracture of    the lumbar spine is seen. There are postsurgical changes again seen from prior    posterior decompression surgery at the T11-T12 level through the L5-S1 level    and bilateral rods and pedicle screws are again seen extending from the T11    level through the L3 level. The hardware appears grossly intact.  No lucency is    seen adjacent to the hardware. Multilevel degenerative changes of the lumbar    spine are again seen. At the edge of the field-of-view there appear to be severe degenerative changes    at the T9-T10 level. It is difficult to assess the spinal canal and foramina at the T11-T12 level    through the L2-L3 level secondary to beam hardening artifact from the adjacent    hardware. No significant spinal canal stenosis is seen at the L3-L4 through L5-S1 level. There again appears to be mild right foraminal narrowing at the L5-S1 level    secondary to a small right foraminal disc-osteophyte complex. Past Medical History:   Diagnosis Date    Allergic rhinitis     Chronic kidney disease, stage III (moderate) (HCC)     Deep vein thrombosis (DVT) ([facility] [address] 10/24/2012    Elbow fracture, left     Fall     Gastric ulcer     Gout     Hx of blood clots     Following last back surgery     Hypertension     Nausea & vomiting     Presence of IVC filter     Type II or unspecified type diabetes mellitus without mention of complication, not stated as uncontrolled        Past Surgical History:   Procedure Laterality Date    BACK SURGERY      BACK SURGERY      BACK SURGERY      BACK SURGERY      BACK SURGERY      BACK SURGERY      Fusion     BREAST BIOPSY Left     BREAST BIOPSY Right     CARDIAC CATHETERIZATION Left 03/07/2018    Dr. Linden Bocanegra @ [facility]--There is 30% disease of the origin of the lateral anterior descending. Mild 10% -20% plaque disease in the circumflex & right coronary artery. Normal left ventricular functino, ejection fraction of 60%.       CARPAL TUNNEL RELEASE Right     CATARACT REMOVAL Right     CATARACT REMOVAL Left     COLONOSCOPY  2014    COLONOSCOPY N/A 05/13/2019    COLONOSCOPY POLYPECTOMY HOT BIOPSY performed by Andres Deleon MD at [address]      \"Multiple\"     EYE SURGERY Right     Removed fluid from behind eye    HYSTERECTOMY (CERVIX STATUS UNKNOWN)      KIDNEY SURGERY      left side 1/2 of kidney removed    PARTIAL NEPHRECTOMY Left     RETINAL DETACHMENT SURGERY Left     ROTATOR CUFF REPAIR      TOTAL KNEE ARTHROPLASTY Right     TOTAL KNEE ARTHROPLASTY Left     UPPER GASTROINTESTINAL ENDOSCOPY  2014    VENA CAVA FILTER PLACEMENT         Family History   Problem Relation Age of Onset    Diabetes Father     Cancer Father         Prostate Cancer    Cancer Paternal Aunt         Colon Cancer       Social History     Socioeconomic History    Marital status:       Spouse name: Not on file    Number of children: Not on file    Years of education: Not on file    Highest education level: Not on file   Occupational History    Not on file   Tobacco Use    Smoking status: Never    Smokeless tobacco: Never   Vaping Use    Vaping Use: Never used   Substance and Sexual Activity    Alcohol use: No    Drug use: No    Sexual activity: Not on file   Other Topics Concern    Not on file   Social History Narrative    Not on file     Social Determinants of Health     Financial Resource Strain: Low Risk     Difficulty of Paying Living Expenses: Not hard at all   Food Insecurity: No Food Insecurity    Worried About Running Out of Food in the Last Year: Never true    Ran Out of Food in the Last Year: Never true   Transportation Needs: Not on file   Physical Activity: Unknown    Days of Exercise per Week: 0 days    Minutes of Exercise per Session: Not on file   Stress: Not on file   Social Connections: Not on file   Intimate Partner Violence: Not on file   Housing Stability: Not on file       Medications & Allergies:   Current Outpatient Medications   Medication Instructions    acetaminophen (TYLENOL) 1,300 mg, Oral, EVERY 8 HOURS PRN    allopurinol (ZYLOPRIM) 100 MG tablet 2 tabs po daily    aspirin EC 81 mg, Oral, DAILY    atorvastatin (LIPITOR) 40 mg, Oral, DAILY    blood glucose monitor strips Test blood glucose daily    Blood Glucose Monitoring Suppl (TRUE METRIX AIR GLUCOSE METER) SARAH True Metrix Glucose Meter   use TWICE DAILY AS DIRECTED. Blood Glucose Monitoring Suppl (TRUE METRIX METER) w/Device KIT Test once daily    clopidogrel (PLAVIX) 75 mg, Oral, DAILY    estradiol (ESTRACE VAGINAL) 0.1 MG/GM vaginal cream Place a pea-sized amount vaginally daily x 2 weeks, then 3 times per week thereafter. Use finger to apply, wash hands after application. DO NOT use plastic applicator. Call office if you stop this medication.     folic acid (FOLVITE) 828 mcg, Oral, DAILY    gabapentin (NEURONTIN) 100 mg, Oral, 3 TIMES DAILY    Lancets MISC 1 each, Does not apply, 2 TIMES DAILY    magnesium oxide (MAGOX 400) 400 (240 Mg) MG tablet 1 po every morning and 2 po every night    omeprazole (PRILOSEC) 20 MG delayed release capsule TAKE 1 CAPSULE EVERY DAY    sertraline (ZOLOFT) 25 mg, Oral, DAILY       Allergies   Allergen Reactions    Codeine Itching    Adhesive Tape Itching, Rash and Other (See Comments)     Tape \"takes the skin off\"    Lisinopril Other (See Comments)     cough    Norco [Hydrocodone-Acetaminophen] Nausea And Vomiting    Percocet [Oxycodone-Acetaminophen] Itching and Nausea Only       Review of Systems:   Constitutional: negative for weight changes or fevers  Cardiovascular: negative for chest pain, palpitations, irregular heart beat  Respiratory: negative for dyspnea, cough, wheezing  Gastrointestinal: negative for constipation, diarrhea, nausea  Genitourinary: negative for bowel/bladder incontinence   Musculoskeletal: positive for low back pain  Neurological: negative for radicular leg pain, leg weakness or numbness/tingling  Behavioral/Psych: negative for anxiety/depression   Hematological: negative for abnormal bleeding, anticoagulation use or antiplatelet use  All other systems reviewed and are negative    OBJECTIVE:    Vitals:    11/10/22 1116   Pulse: 93   Resp: 18   SpO2: 98%       PHYSICAL EXAM    GENERAL: No acute distress, pleasant, well-appearing  HEENT: Normocephalic, atraumatic, Pupils equal and round  CARDIOVASCULAR: Well perfused, No peripheral cyanosis  PULMONARY: Good chest wall excursion, breathing unlabored  PSYCH: Appropriate affect and insight, non-pressured speech  SKIN: No rashes or lesions  MUSCULOSKELETAL:  Inspection: The back and extremities are symmetric and aligned. Muscle bulk is normal in appearance. Surgical scar present in lumbar spine. Palpation: There is tenderness to palpation along the lumbar paraspinal musculature bilaterally  Lumbar range of motion is full  NEUROMUSCULAR:  Patient ambulates in wheelchair  Gait is not assessed  Sensation to light touch is intact in lower extremities  Strength is full in lower extremities  No ankle clonus    Special Tests:  Lumbar facet loading is positive bilaterally  Seated straight leg raise is positive bilaterally      DIAGNOSIS:    ICD-10-CM    1. Lumbar radiculopathy  M54.16 Lumbar Epidural Steroid Injection/Caudal           ASSESSMENT:    Nilson Booth is a 78 y. o.female who presents with chronic low back pain and leg pain. To review, patient has history of lumbar decompression and fusion from T11-L3 with old, stable compression fractures at T11, T12, L1. She also has a history of L1 vertebroplasty. The patient's history and physical examination are consistent with lumbar radiculopathy as the patient has pain starting in the low back radiating down into the right and left leg. Patient has positive neural tension signs on examination with a positive seated straight leg raise. Additionally the lumbar CT on 6/18/2022 reveals multilevel degenerative changes with neuroforaminal stenosis at L5-S1 on right. Therefore, I will plan for a caudal epidural steroid injection. Neurologically, it appears the patient has full strength and normal sensation. There is no evidence of myelopathy on examination. There are no red flags in the patient's history.  The patient has failed conservative measures including outpatient physical therapy, greater than 3 medications for pain relief, a self-directed therapy program, as well as activity modification all within the last 6 weeks over 3 months. The patient's pain has been causing worsening quality of life and function. PLAN:  Medications: For nonopioid therapy, the following medications were prescribed:    -Continue medication prescribed by primary care physician    Opioid therapy:  -Not indicated    Interventions:  -Caudal epidural steroid injection  -Consider SIJ injections in future    Imaging:  -Reviewed lumbar CT with patient in room    Behavioral Therapies:  -Continue daily stretching and home exercise program    Referrals:  -None    Follow-up Plan:  -After procedure    Patient was offered intervention where appropriate. Multi-modal Pain Therapy: The patient was explicitly considered for multimodal and interdisciplinary therapy. Non-opioid and non-pharmacological opportunities to enhance analgesia and quality of life have been and will continue to be pursued.     Fox Maurice DO  Interventional Pain Management/PM&R   [facility] [address]    Orders Placed This Encounter    Lumbar Epidural Steroid Injection/Caudal     Standing Status:   Future     Standing Expiration Date:   11/10/2023     Scheduling Instructions:      Caudal MAYRA      Moderate Sedation

## 2022-11-16 ENCOUNTER — HOSPITAL ENCOUNTER (OUTPATIENT)
Age: 77
Setting detail: OBSERVATION
Discharge: HOME | End: 2022-11-17
Attending: FAMILY MEDICINE | Admitting: INTERNAL MEDICINE
Payer: MEDICARE

## 2022-11-16 DIAGNOSIS — R63.8 SYMPTOMS OF DEHYDRATION: ICD-10-CM

## 2022-11-16 DIAGNOSIS — R42 ORTHOSTATIC DIZZINESS: Primary | ICD-10-CM

## 2022-11-16 PROBLEM — I95.1 ORTHOSTATIC HYPOTENSION: Status: ACTIVE | Noted: 2022-11-16

## 2022-11-16 LAB
-: NORMAL
ABSOLUTE EOS #: 0.2 K/UL (ref 0–0.4)
ABSOLUTE LYMPH #: 4.8 K/UL (ref 1–4.8)
ABSOLUTE MONO #: 0.5 K/UL (ref 0–1)
ANION GAP SERPL CALCULATED.3IONS-SCNC: 15 MMOL/L (ref 9–17)
BASOPHILS # BLD: 1 % (ref 0–2)
BASOPHILS ABSOLUTE: 0.1 K/UL (ref 0–0.2)
BILIRUBIN URINE: NEGATIVE
BUN BLDV-MCNC: 20 MG/DL (ref 8–23)
BUN/CREAT BLD: 16 (ref 9–20)
CALCIUM SERPL-MCNC: 10.7 MG/DL (ref 8.6–10.4)
CHLORIDE BLD-SCNC: 100 MMOL/L (ref 98–107)
CO2: 26 MMOL/L (ref 20–31)
COLOR: YELLOW
COMMENT UA: ABNORMAL
CREAT SERPL-MCNC: 1.26 MG/DL (ref 0.5–0.9)
DIFFERENTIAL TYPE: YES
EOSINOPHILS RELATIVE PERCENT: 2 % (ref 0–5)
EPITHELIAL CELLS UA: NORMAL /HPF
GFR SERPL CREATININE-BSD FRML MDRD: 45 ML/MIN/1.73M2
GLUCOSE BLD-MCNC: 163 MG/DL (ref 70–99)
GLUCOSE URINE: NEGATIVE
HCT VFR BLD CALC: 35.9 % (ref 36–46)
HEMOGLOBIN: 11.5 G/DL (ref 12–16)
KETONES, URINE: NEGATIVE
LEUKOCYTE ESTERASE, URINE: ABNORMAL
LYMPHOCYTES # BLD: 53 % (ref 15–40)
MCH RBC QN AUTO: 30.5 PG (ref 26–34)
MCHC RBC AUTO-ENTMCNC: 32 G/DL (ref 31–37)
MCV RBC AUTO: 95.2 FL (ref 80–100)
MONOCYTES # BLD: 6 % (ref 4–8)
NITRITE, URINE: NEGATIVE
PDW BLD-RTO: 15.5 % (ref 12.1–15.2)
PH UA: 7 (ref 5–8)
PLATELET # BLD: 285 K/UL (ref 140–450)
POTASSIUM SERPL-SCNC: 3.7 MMOL/L (ref 3.7–5.3)
PROTEIN UA: ABNORMAL
RBC # BLD: 3.77 M/UL (ref 4–5.2)
RBC UA: NORMAL /HPF (ref 0–2)
SARS-COV-2, RAPID: NOT DETECTED
SEG NEUTROPHILS: 38 % (ref 47–75)
SEGMENTED NEUTROPHILS ABSOLUTE COUNT: 3.5 K/UL (ref 2.5–7)
SODIUM BLD-SCNC: 141 MMOL/L (ref 135–144)
SPECIFIC GRAVITY UA: 1.01 (ref 1–1.03)
SPECIMEN DESCRIPTION: NORMAL
TROPONIN, HIGH SENSITIVITY: 60 NG/L (ref 0–14)
TROPONIN, HIGH SENSITIVITY: 65 NG/L (ref 0–14)
TURBIDITY: CLEAR
URINE HGB: NEGATIVE
UROBILINOGEN, URINE: NORMAL
WBC # BLD: 9.1 K/UL (ref 3.5–11)
WBC UA: NORMAL /HPF

## 2022-11-16 PROCEDURE — 94761 N-INVAS EAR/PLS OXIMETRY MLT: CPT

## 2022-11-16 PROCEDURE — 93005 ELECTROCARDIOGRAM TRACING: CPT | Performed by: FAMILY MEDICINE

## 2022-11-16 PROCEDURE — 99285 EMERGENCY DEPT VISIT HI MDM: CPT

## 2022-11-16 PROCEDURE — 96360 HYDRATION IV INFUSION INIT: CPT

## 2022-11-16 PROCEDURE — 36415 COLL VENOUS BLD VENIPUNCTURE: CPT

## 2022-11-16 PROCEDURE — 81001 URINALYSIS AUTO W/SCOPE: CPT

## 2022-11-16 PROCEDURE — 2580000003 HC RX 258: Performed by: INTERNAL MEDICINE

## 2022-11-16 PROCEDURE — G0378 HOSPITAL OBSERVATION PER HR: HCPCS

## 2022-11-16 PROCEDURE — 87635 SARS-COV-2 COVID-19 AMP PRB: CPT

## 2022-11-16 PROCEDURE — 80048 BASIC METABOLIC PNL TOTAL CA: CPT

## 2022-11-16 PROCEDURE — 2580000003 HC RX 258: Performed by: FAMILY MEDICINE

## 2022-11-16 PROCEDURE — 96372 THER/PROPH/DIAG INJ SC/IM: CPT

## 2022-11-16 PROCEDURE — 96361 HYDRATE IV INFUSION ADD-ON: CPT

## 2022-11-16 PROCEDURE — 6370000000 HC RX 637 (ALT 250 FOR IP): Performed by: INTERNAL MEDICINE

## 2022-11-16 PROCEDURE — 84484 ASSAY OF TROPONIN QUANT: CPT

## 2022-11-16 PROCEDURE — 85025 COMPLETE CBC W/AUTO DIFF WBC: CPT

## 2022-11-16 PROCEDURE — 6360000002 HC RX W HCPCS: Performed by: INTERNAL MEDICINE

## 2022-11-16 PROCEDURE — C9803 HOPD COVID-19 SPEC COLLECT: HCPCS

## 2022-11-16 RX ORDER — ALLOPURINOL 100 MG/1
200 TABLET ORAL DAILY
Status: DISCONTINUED | OUTPATIENT
Start: 2022-11-16 | End: 2022-11-17 | Stop reason: HOSPADM

## 2022-11-16 RX ORDER — LANOLIN ALCOHOL/MO/W.PET/CERES
400 CREAM (GRAM) TOPICAL 2 TIMES DAILY
Status: DISCONTINUED | OUTPATIENT
Start: 2022-11-16 | End: 2022-11-17 | Stop reason: HOSPADM

## 2022-11-16 RX ORDER — 0.9 % SODIUM CHLORIDE 0.9 %
500 INTRAVENOUS SOLUTION INTRAVENOUS ONCE
Status: COMPLETED | OUTPATIENT
Start: 2022-11-16 | End: 2022-11-16

## 2022-11-16 RX ORDER — POLYETHYLENE GLYCOL 3350 17 G/17G
17 POWDER, FOR SOLUTION ORAL DAILY PRN
Status: DISCONTINUED | OUTPATIENT
Start: 2022-11-16 | End: 2022-11-17 | Stop reason: HOSPADM

## 2022-11-16 RX ORDER — SODIUM CHLORIDE 9 MG/ML
INJECTION, SOLUTION INTRAVENOUS CONTINUOUS
Status: DISCONTINUED | OUTPATIENT
Start: 2022-11-16 | End: 2022-11-17

## 2022-11-16 RX ORDER — SODIUM CHLORIDE 9 MG/ML
INJECTION, SOLUTION INTRAVENOUS PRN
Status: DISCONTINUED | OUTPATIENT
Start: 2022-11-16 | End: 2022-11-17 | Stop reason: HOSPADM

## 2022-11-16 RX ORDER — SODIUM CHLORIDE 9 MG/ML
INJECTION, SOLUTION INTRAVENOUS CONTINUOUS
Status: DISCONTINUED | OUTPATIENT
Start: 2022-11-16 | End: 2022-11-16

## 2022-11-16 RX ORDER — SODIUM CHLORIDE 9 MG/ML
INJECTION, SOLUTION INTRAVENOUS ONCE
Status: DISCONTINUED | OUTPATIENT
Start: 2022-11-16 | End: 2022-11-16

## 2022-11-16 RX ORDER — SODIUM CHLORIDE 0.9 % (FLUSH) 0.9 %
5-40 SYRINGE (ML) INJECTION EVERY 12 HOURS SCHEDULED
Status: DISCONTINUED | OUTPATIENT
Start: 2022-11-16 | End: 2022-11-17 | Stop reason: HOSPADM

## 2022-11-16 RX ORDER — ATORVASTATIN CALCIUM 40 MG/1
40 TABLET, FILM COATED ORAL DAILY
Status: DISCONTINUED | OUTPATIENT
Start: 2022-11-16 | End: 2022-11-17 | Stop reason: HOSPADM

## 2022-11-16 RX ORDER — SODIUM CHLORIDE 0.9 % (FLUSH) 0.9 %
5-40 SYRINGE (ML) INJECTION PRN
Status: DISCONTINUED | OUTPATIENT
Start: 2022-11-16 | End: 2022-11-17 | Stop reason: HOSPADM

## 2022-11-16 RX ORDER — LANOLIN ALCOHOL/MO/W.PET/CERES
400 CREAM (GRAM) TOPICAL DAILY
Status: DISCONTINUED | OUTPATIENT
Start: 2022-11-16 | End: 2022-11-17 | Stop reason: HOSPADM

## 2022-11-16 RX ORDER — ASPIRIN 81 MG/1
81 TABLET ORAL DAILY
Status: DISCONTINUED | OUTPATIENT
Start: 2022-11-16 | End: 2022-11-17 | Stop reason: HOSPADM

## 2022-11-16 RX ORDER — ONDANSETRON 2 MG/ML
4 INJECTION INTRAMUSCULAR; INTRAVENOUS EVERY 6 HOURS PRN
Status: DISCONTINUED | OUTPATIENT
Start: 2022-11-16 | End: 2022-11-17 | Stop reason: HOSPADM

## 2022-11-16 RX ORDER — ACETAMINOPHEN 325 MG/1
650 TABLET ORAL EVERY 6 HOURS PRN
Status: DISCONTINUED | OUTPATIENT
Start: 2022-11-16 | End: 2022-11-17 | Stop reason: HOSPADM

## 2022-11-16 RX ORDER — CLOPIDOGREL BISULFATE 75 MG/1
75 TABLET ORAL DAILY
Status: DISCONTINUED | OUTPATIENT
Start: 2022-11-16 | End: 2022-11-17 | Stop reason: HOSPADM

## 2022-11-16 RX ORDER — ACETAMINOPHEN 650 MG/1
650 SUPPOSITORY RECTAL EVERY 6 HOURS PRN
Status: DISCONTINUED | OUTPATIENT
Start: 2022-11-16 | End: 2022-11-17 | Stop reason: HOSPADM

## 2022-11-16 RX ORDER — ENOXAPARIN SODIUM 100 MG/ML
40 INJECTION SUBCUTANEOUS DAILY
Status: DISCONTINUED | OUTPATIENT
Start: 2022-11-16 | End: 2022-11-17 | Stop reason: HOSPADM

## 2022-11-16 RX ORDER — PANTOPRAZOLE SODIUM 40 MG/1
40 TABLET, DELAYED RELEASE ORAL
Status: DISCONTINUED | OUTPATIENT
Start: 2022-11-17 | End: 2022-11-17 | Stop reason: HOSPADM

## 2022-11-16 RX ORDER — ONDANSETRON 4 MG/1
4 TABLET, ORALLY DISINTEGRATING ORAL EVERY 8 HOURS PRN
Status: DISCONTINUED | OUTPATIENT
Start: 2022-11-16 | End: 2022-11-17 | Stop reason: HOSPADM

## 2022-11-16 RX ORDER — GABAPENTIN 100 MG/1
100 CAPSULE ORAL 3 TIMES DAILY
Status: DISCONTINUED | OUTPATIENT
Start: 2022-11-16 | End: 2022-11-17 | Stop reason: HOSPADM

## 2022-11-16 RX ADMIN — ATORVASTATIN CALCIUM 40 MG: 40 TABLET, FILM COATED ORAL at 18:50

## 2022-11-16 RX ADMIN — SODIUM CHLORIDE 500 ML: 9 INJECTION, SOLUTION INTRAVENOUS at 13:05

## 2022-11-16 RX ADMIN — SODIUM CHLORIDE: 9 INJECTION, SOLUTION INTRAVENOUS at 13:47

## 2022-11-16 RX ADMIN — GABAPENTIN 100 MG: 100 CAPSULE ORAL at 20:14

## 2022-11-16 RX ADMIN — ENOXAPARIN SODIUM 40 MG: 100 INJECTION SUBCUTANEOUS at 18:50

## 2022-11-16 RX ADMIN — ASPIRIN 81 MG: 81 TABLET, COATED ORAL at 18:50

## 2022-11-16 RX ADMIN — Medication 400 MG: at 20:14

## 2022-11-16 RX ADMIN — FOLIC ACID TAB 400 MCG 400 MCG: 400 TAB at 18:50

## 2022-11-16 RX ADMIN — SERTRALINE HYDROCHLORIDE 25 MG: 50 TABLET ORAL at 18:50

## 2022-11-16 RX ADMIN — ALLOPURINOL 200 MG: 100 TABLET ORAL at 18:50

## 2022-11-16 RX ADMIN — SODIUM CHLORIDE 500 ML: 9 INJECTION, SOLUTION INTRAVENOUS at 15:20

## 2022-11-16 RX ADMIN — CLOPIDOGREL BISULFATE 75 MG: 75 TABLET ORAL at 18:50

## 2022-11-16 RX ADMIN — SODIUM CHLORIDE: 9 INJECTION, SOLUTION INTRAVENOUS at 18:56

## 2022-11-16 RX ADMIN — SODIUM CHLORIDE: 9 INJECTION, SOLUTION INTRAVENOUS at 23:16

## 2022-11-16 ASSESSMENT — PAIN - FUNCTIONAL ASSESSMENT
PAIN_FUNCTIONAL_ASSESSMENT: ACTIVITIES ARE NOT PREVENTED
PAIN_FUNCTIONAL_ASSESSMENT: 0-10

## 2022-11-16 ASSESSMENT — PAIN DESCRIPTION - DESCRIPTORS: DESCRIPTORS: SHARP

## 2022-11-16 ASSESSMENT — PAIN DESCRIPTION - LOCATION: LOCATION: NECK

## 2022-11-16 ASSESSMENT — PAIN DESCRIPTION - PAIN TYPE: TYPE: ACUTE PAIN

## 2022-11-16 ASSESSMENT — PAIN DESCRIPTION - FREQUENCY: FREQUENCY: CONTINUOUS

## 2022-11-16 ASSESSMENT — LIFESTYLE VARIABLES
HOW OFTEN DO YOU HAVE A DRINK CONTAINING ALCOHOL: NEVER
HOW MANY STANDARD DRINKS CONTAINING ALCOHOL DO YOU HAVE ON A TYPICAL DAY: PATIENT DOES NOT DRINK

## 2022-11-16 ASSESSMENT — PAIN DESCRIPTION - ORIENTATION: ORIENTATION: POSTERIOR

## 2022-11-16 ASSESSMENT — PAIN SCALES - GENERAL: PAINLEVEL_OUTOF10: 3

## 2022-11-16 NOTE — ED PROVIDER NOTES
[facility]  eMERGENCY dEPARTMENT eNCOUnter          [address] [facility]       Chief Complaint   Patient presents with    Dizziness     \"I tried to walk this morning and I was too dizzy and almost fell. I was also puking this morning\"       Nurses Notes reviewed and I agree except as noted in the HPI. HISTORY OF PRESENT Romayne Balloon is a 78 y.o. female who presents to the emergency room via EMS from home, patient reporting dizziness, states often first thing in the morning and feels like she is got a pass out, notes that only when she is walking with her walker, patient stating \"something wrong with my neck\". Patient denies any spinning sensation denies any chest pain or palpitations, denies any trauma falls or striking her head, denies any dysuria, denies any new or changes in medication. Patient has not talked to her primary care provider regarding her symptoms. Noting triage note, patient dictating posterior neck pain rating 3 out of 10, sharp. PCP: Alyson Stone    REVIEW OF SYSTEMS     Review of Systems   All other systems reviewed and are negative. PAST MEDICAL HISTORY    has a past medical history of Allergic rhinitis, Chronic kidney disease, stage III (moderate) ([facility]), Deep vein thrombosis (DVT) ([facility]), Elbow fracture, left, Gastric ulcer, Gout, Hx of blood clots, Hypertension, Nausea & vomiting, Presence of IVC filter, and Type II or unspecified type diabetes mellitus without mention of complication, not stated as uncontrolled. SURGICAL HISTORY      has a past surgical history that includes Rotator cuff repair; Cystoscopy; Hysterectomy; partial nephrectomy (Left); Colonoscopy (2014); Vena Cava Filter Placement; Total knee arthroplasty (Right); Total knee arthroplasty (Left); Cataract removal (Right); Cataract removal (Left); back surgery; back surgery; back surgery; back surgery; back surgery; back surgery; Breast biopsy (Left); Breast biopsy (Right);  Carpal tunnel release (Right); Retinal detachment surgery (Left); Eye surgery (Right); Kidney surgery; Cardiac catheterization (Left, 03/07/2018); Upper gastrointestinal endoscopy (2014); and Colonoscopy (N/A, 05/13/2019). CURRENT MEDICATIONS       Current Discharge Medication List        CONTINUE these medications which have NOT CHANGED    Details   estradiol (ESTRACE VAGINAL) 0.1 MG/GM vaginal cream Place a pea-sized amount vaginally daily x 2 weeks, then 3 times per week thereafter. Use finger to apply, wash hands after application. DO NOT use plastic applicator. Call office if you stop this medication. Qty: 42.5 g, Refills: 3    Associated Diagnoses: Frequent UTI; Vaginal atrophy      aspirin EC 81 MG EC tablet Take 1 tablet by mouth daily  Qty: 90 tablet, Refills: 3      gabapentin (NEURONTIN) 100 MG capsule Take 1 capsule by mouth 3 times daily for 180 days. Qty: 270 capsule, Refills: 1      magnesium oxide (MAGOX 400) 400 (240 Mg) MG tablet 1 po every morning and 2 po every night  Qty: 90 tablet, Refills: 5      sertraline (ZOLOFT) 25 MG tablet Take 1 tablet by mouth daily  Qty: 30 tablet, Refills: 3      folic acid (FOLVITE) 978 MCG tablet Take 1 tablet by mouth daily  Qty: 90 tablet, Refills: 1      omeprazole (PRILOSEC) 20 MG delayed release capsule TAKE 1 CAPSULE EVERY DAY  Qty: 90 capsule, Refills: 1      atorvastatin (LIPITOR) 40 MG tablet Take 1 tablet by mouth daily  Qty: 90 tablet, Refills: 1      allopurinol (ZYLOPRIM) 100 MG tablet 2 tabs po daily  Qty: 180 tablet, Refills: 1      clopidogrel (PLAVIX) 75 MG tablet Take 1 tablet by mouth daily  Qty: 90 tablet, Refills: 1      Blood Glucose Monitoring Suppl (TRUE METRIX AIR GLUCOSE METER) SARAH True Metrix Glucose Meter   use TWICE DAILY AS DIRECTED. blood glucose monitor strips Test blood glucose daily  Qty: 100 strip, Refills: 1    Comments: Brand per patient preference. May round up to next available package size.   Associated Diagnoses: Uncontrolled type 2 diabetes mellitus with hyperglycemia (HCC)      Lancets MISC 1 each by Does not apply route 2 times daily  Qty: 300 each, Refills: 1    Associated Diagnoses: Uncontrolled type 2 diabetes mellitus with hyperglycemia (HCC)      Blood Glucose Monitoring Suppl (TRUE METRIX METER) w/Device KIT Test once daily  Qty: 1 kit, Refills: 0      acetaminophen (TYLENOL) 650 MG CR tablet Take 1,300 mg by mouth every 8 hours as needed for Pain             ALLERGIES     is allergic to codeine, adhesive tape, lisinopril, norco [hydrocodone-acetaminophen], and percocet [oxycodone-acetaminophen]. FAMILY HISTORY     She indicated that her mother is . She indicated that her father is . She indicated that her sister is alive. She indicated that her brother is alive. She indicated that her maternal grandmother is . She indicated that her maternal grandfather is . She indicated that her paternal grandmother is . She indicated that her paternal grandfather is . She indicated that her paternal aunt is . family history includes Cancer in her father and paternal aunt; Diabetes in her father. SOCIAL HISTORY      reports that she has never smoked. She has never used smokeless tobacco. She reports that she does not drink alcohol and does not use drugs. PHYSICAL EXAM     INITIAL VITALS:  height is 5' 5\" (1.651 m) and weight is 184 lb 8 oz (83.7 kg). Her oral temperature is 97.9 °F (36.6 °C). Her blood pressure is 146/71 (abnormal) and her pulse is 80. Her respiration is 16 and oxygen saturation is 98%. Physical Exam   Constitutional: Patient is oriented to person, place, and time. Patient appears well-developed and well-nourished. Patient is active and cooperative. HENT:   Head: Normocephalic and atraumatic. Head is without contusion.   Noted AK/SK patient's midline occiput of head measuring 1 x 2 cm along the sagittal line  Right Ear: Hearing and external ear normal. No drainage. Left Ear: Hearing and external ear normal. No drainage. Nose: Nose normal. No nasal deformity. No epistaxis. Mouth/Throat: Mucous membranes are not dry. Eyes: EOMI. Conjunctivae, sclera, and lids are normal. Right eye exhibits no discharge. Left eye exhibits no discharge. No appreciable lateral nystagmus bilaterally on exam  Neck: Full passive range of motion without pain and phonation normal.  Negative JVD, negative tracheal deviation  Cardiovascular:  Normal rate, regular rhythm and intact distal pulses. No marked lower extremity pitting edema. Pulses: Right radial pulse  2+   Pulmonary/Chest: Effort normal. No tachypnea and no bradypnea. No wheezes, rhonchi, or rales. Abdominal: Soft. Patient without distension or tenderness  Musculoskeletal:   Negative acute trauma or deformity,  apparent full range of motion and normal strength all extremities appropriate to age. Neurological: Patient is alert and oriented to person, place, and time. patient displays no tremor. Patient displays no seizure activity. Skin: Skin is warm and dry. Patient is not diaphoretic. Psychiatric: Patient has a normal mood and pleasant affect. Patient speech is normal and behavior is normal. Cognition and memory are normal.     DIFFERENTIAL DIAGNOSIS:   Dizziness NOS, hypotension, orthostatic hypotension, ICH CVA TIA, UTI    DIAGNOSTIC RESULTS     EKG: All EKG's are interpreted by the Emergency Department Physician who either signs or Co-signs this chart in the absence of a cardiologist.  EKG    The patient had an EKG which is interpreted by me in the absence of a Cardiologist.   [] Without comparison to previous.    [x] With comparison to a previous EKG Dated 10/5/2022    EKG @ 1228 hrs -sinus rhythm, rate 82, normal axis normal intervals, noted background tremor limiting evaluation, as compared to prior EKG no significant changes morphology      RADIOLOGY: non-plain film images(s) such as CT, Ultrasound and MRI are read by the radiologist.  No orders to display       LABS:   Labs Reviewed   URINALYSIS - Abnormal; Notable for the following components:       Result Value    Protein, UA 1+ (*)     Leukocyte Esterase, Urine 1+ (*)     All other components within normal limits   CBC WITH AUTO DIFFERENTIAL - Abnormal; Notable for the following components:    RBC 3.77 (*)     Hemoglobin 11.5 (*)     Hematocrit 35.9 (*)     RDW 15.5 (*)     Seg Neutrophils 38 (*)     Lymphocytes 53 (*)     All other components within normal limits   BASIC METABOLIC PANEL - Abnormal; Notable for the following components:    Glucose 163 (*)     Creatinine 1.26 (*)     Est, Glom Filt Rate 45 (*)     Calcium 10.7 (*)     All other components within normal limits   TROPONIN - Abnormal; Notable for the following components:    Troponin, High Sensitivity 65 (*)     All other components within normal limits   TROPONIN - Abnormal; Notable for the following components:    Troponin, High Sensitivity 60 (*)     All other components within normal limits   COVID-19, RAPID   MICROSCOPIC URINALYSIS   CBC WITH AUTO DIFFERENTIAL   BASIC METABOLIC PANEL W/ REFLEX TO MG FOR LOW K       EMERGENCY DEPARTMENT COURSE:   Vitals:    Vitals:    11/16/22 1924 11/16/22 2016 11/16/22 2209 11/17/22 0000   BP: (!) 149/79 (!) 146/71     Pulse: 82 80     Resp: 18 16     Temp: 97.7 °F (36.5 °C) 97.9 °F (36.6 °C)     TempSrc: Oral Oral     SpO2:  98% 98%    Weight:    184 lb 8 oz (83.7 kg)   Height:         Patient history and physical exam taken at bedside, discussed patient symptoms and exam findings, discussed initial work-up to include blood and urine studies, IV access, EKG, orthostatic vital signs, and will reevaluate, acknowledged    Orthostatic vital signs reviewed, noted marked in blood pressure from laying to sitting to standing, will give patient 500 cc normal saline bolus and reevaluate.     Initial lab work-up reviewed, awaiting urine sample    Patient's son had arrived to the emergency room and sitting at bedside, patient given permission to talk in front of family, we discussed initial work-up including orthostatic vital signs and reason for IV fluids, EKG and blood work, would still obtain urine sample as patient had recurrent UTIs in the past, acknowledged we discussed patient indicating some neck discomfort and feels more like could be arthritis in the posterior aspect, however with patient said that she is having some dizziness with turning her head, consideration for carotid artery stenosis or other arterial stenosis of the neck, we discussed CTA neck to look at the blood vessels with and respect patient's known CKD, versus doing outpatient carotid ultrasound, upon further discussion we will order outpatient ultrasound. After 500 cc normal saline, orthostatics were reevaluated, patient still positive for orthostasis with greater than 20 mmHg change in blood pressure, will contact hospitalist to discuss case    Case was discussed with Dr. Yanely Stokes, hospitalist, regarding patient's presentation current work-up, we also reviewed reviewed patient's echocardiogram from 6/2022 showing normal EF, advised to give an additional 5 cc of fluid and reevaluate.     Urinalysis reviewed, follow-up troponin shows a decrease    Discussed with patient and patient's son plan for second bolus and reevaluation, acknowledged    After second 500 cc bolus, patient again reevaluated, although her pressure did not change much from laying to sitting, there is still 35 mmHg change when she set up and patient was symptomatic, plan to again discuss case with hospitalist    Case was again discussed with Dr. Yanely Stokes, hospitalist, regarding patient's continued orthostatic dizziness, again asked that we give an additional 500 cc for total of 1500 and reevaluate, as I be concerned with the patient would meet admission criteria and would only be receiving fluids and reevaluation, also has that if continues to have dizziness contact cardiology to discuss case as well. Third 500 cc normal saline bolus ordered    ~1556 hrs - After third bolus of 500 cc normal saline, orthostatics again were checked, patient with continued orthostatic dizziness upon standing, with a drop in blood pressure greater than 20 mmHg, will contact Dr. Danny Wakefield from cardiology to discuss case as per previous conversation with hospitalist Dr. Rodrick Fiore    Case was discussed with Dr. Danny Wakefield, cardiology, regarding patient's presentation current work-up, agreeable to see patient on consult    Message sent to Dr. Rodrick Fiore, regarding third fluids and orthotics vital signs, conversation with Danny Wakefield, excepted for admission    FINAL IMPRESSION      1. Orthostatic dizziness    2. Symptoms of dehydration          DISPOSITION/PLAN   Admit    PATIENT REFERRED TO:  No follow-up provider specified.     DISCHARGE MEDICATIONS:  Current Discharge Medication List              Summation      Patient Course: Admit    ED Medications administered this visit:    Medications   allopurinol (ZYLOPRIM) tablet 200 mg (200 mg Oral Given 11/16/22 1850)   aspirin EC tablet 81 mg (81 mg Oral Given 11/16/22 1850)   atorvastatin (LIPITOR) tablet 40 mg (40 mg Oral Given 11/16/22 1850)   clopidogrel (PLAVIX) tablet 75 mg (75 mg Oral Given 01/58/79 1691)   folic acid (FOLVITE) tablet 400 mcg (400 mcg Oral Given 11/16/22 1850)   gabapentin (NEURONTIN) capsule 100 mg (100 mg Oral Given 11/16/22 2014)   magnesium oxide (MAG-OX) tablet 400 mg (400 mg Oral Given 11/16/22 2014)   pantoprazole (PROTONIX) tablet 40 mg (has no administration in time range)   sertraline (ZOLOFT) tablet 25 mg (25 mg Oral Given 11/16/22 1850)   sodium chloride flush 0.9 % injection 5-40 mL (5 mLs IntraVENous Not Given 11/16/22 2005)   sodium chloride flush 0.9 % injection 5-40 mL (has no administration in time range)   0.9 % sodium chloride infusion (has no administration in time range) enoxaparin (LOVENOX) injection 40 mg (40 mg SubCUTAneous Given 11/16/22 1850)   ondansetron (ZOFRAN-ODT) disintegrating tablet 4 mg (has no administration in time range)     Or   ondansetron (ZOFRAN) injection 4 mg (has no administration in time range)   polyethylene glycol (GLYCOLAX) packet 17 g (has no administration in time range)   acetaminophen (TYLENOL) tablet 650 mg (has no administration in time range)     Or   acetaminophen (TYLENOL) suppository 650 mg (has no administration in time range)   0.9 % sodium chloride infusion ( IntraVENous New Bag 11/16/22 2316)   0.9 % sodium chloride bolus (0 mLs IntraVENous Stopped 11/16/22 1339)   0.9 % sodium chloride bolus (0 mLs IntraVENous Stopped 11/16/22 1554)       New Prescriptions from this visit:    Current Discharge Medication List          Follow-up:  No follow-up provider specified. Final Impression:   1. Orthostatic dizziness    2.  Symptoms of dehydration               (Please note that portions of this note were completed with a voice recognition program.  Efforts were made to edit the dictations but occasionally words are mis-transcribed.)    MD Evelina Ramos MD  11/17/22 9079

## 2022-11-17 ENCOUNTER — TELEPHONE (OUTPATIENT)
Dept: FAMILY MEDICINE CLINIC | Age: 77
End: 2022-11-17

## 2022-11-17 VITALS
RESPIRATION RATE: 16 BRPM | HEART RATE: 80 BPM | WEIGHT: 184.5 LBS | HEIGHT: 65 IN | OXYGEN SATURATION: 98 % | BODY MASS INDEX: 30.74 KG/M2 | DIASTOLIC BLOOD PRESSURE: 68 MMHG | SYSTOLIC BLOOD PRESSURE: 138 MMHG | TEMPERATURE: 98 F

## 2022-11-17 LAB
ABSOLUTE EOS #: 0.2 K/UL (ref 0–0.4)
ABSOLUTE LYMPH #: 2.9 K/UL (ref 1–4.8)
ABSOLUTE MONO #: 0.5 K/UL (ref 0–1)
ANION GAP SERPL CALCULATED.3IONS-SCNC: 10 MMOL/L (ref 8–16)
BASOPHILS # BLD: 1 % (ref 0–2)
BASOPHILS ABSOLUTE: 0 K/UL (ref 0–0.2)
BUN BLDV-MCNC: 17 MG/DL (ref 8–23)
BUN/CREAT BLD: 16 (ref 9–20)
CALCIUM SERPL-MCNC: 9.7 MG/DL (ref 8.6–10.4)
CHLORIDE BLD-SCNC: 107 MMOL/L (ref 98–107)
CO2: 25 MMOL/L (ref 20–31)
CORTISOL COLLECTION INFO: NORMAL
CORTISOL: 9.4 UG/DL (ref 2.7–18.4)
CREAT SERPL-MCNC: 1.07 MG/DL (ref 0.5–0.9)
DIFFERENTIAL TYPE: YES
EKG Q-T INTERVAL: 402 MS
EKG QRS DURATION: 98 MS
EKG QTC CALCULATION (BAZETT): 469 MS
EKG R AXIS: 13 DEGREES
EKG T AXIS: 43 DEGREES
EKG VENTRICULAR RATE: 82 BPM
EOSINOPHILS RELATIVE PERCENT: 3 % (ref 0–5)
GFR SERPL CREATININE-BSD FRML MDRD: 54 ML/MIN/1.73M2
GLUCOSE BLD-MCNC: 104 MG/DL (ref 70–99)
HCT VFR BLD CALC: 29.6 % (ref 36–46)
HEMOGLOBIN: 9.4 G/DL (ref 12–16)
LYMPHOCYTES # BLD: 42 % (ref 15–40)
MAGNESIUM: 1.3 MG/DL (ref 1.6–2.6)
MCH RBC QN AUTO: 30.1 PG (ref 26–34)
MCHC RBC AUTO-ENTMCNC: 31.6 G/DL (ref 31–37)
MCV RBC AUTO: 95.3 FL (ref 80–100)
MONOCYTES # BLD: 7 % (ref 4–8)
PDW BLD-RTO: 15.5 % (ref 12.1–15.2)
PLATELET # BLD: 226 K/UL (ref 140–450)
POTASSIUM SERPL-SCNC: 3.5 MMOL/L (ref 3.7–5.3)
RBC # BLD: 3.11 M/UL (ref 4–5.2)
SEG NEUTROPHILS: 47 % (ref 47–75)
SEGMENTED NEUTROPHILS ABSOLUTE COUNT: 3.2 K/UL (ref 2.5–7)
SODIUM BLD-SCNC: 142 MMOL/L (ref 135–144)
WBC # BLD: 6.9 K/UL (ref 3.5–11)

## 2022-11-17 PROCEDURE — G0378 HOSPITAL OBSERVATION PER HR: HCPCS

## 2022-11-17 PROCEDURE — 93010 ELECTROCARDIOGRAM REPORT: CPT | Performed by: INTERNAL MEDICINE

## 2022-11-17 PROCEDURE — 36415 COLL VENOUS BLD VENIPUNCTURE: CPT

## 2022-11-17 PROCEDURE — 82024 ASSAY OF ACTH: CPT

## 2022-11-17 PROCEDURE — 97161 PT EVAL LOW COMPLEX 20 MIN: CPT

## 2022-11-17 PROCEDURE — 97166 OT EVAL MOD COMPLEX 45 MIN: CPT

## 2022-11-17 PROCEDURE — 83735 ASSAY OF MAGNESIUM: CPT

## 2022-11-17 PROCEDURE — 93660 TILT TABLE EVALUATION: CPT

## 2022-11-17 PROCEDURE — 6370000000 HC RX 637 (ALT 250 FOR IP): Performed by: INTERNAL MEDICINE

## 2022-11-17 PROCEDURE — 82533 TOTAL CORTISOL: CPT

## 2022-11-17 PROCEDURE — 6360000002 HC RX W HCPCS: Performed by: INTERNAL MEDICINE

## 2022-11-17 PROCEDURE — 80048 BASIC METABOLIC PNL TOTAL CA: CPT

## 2022-11-17 PROCEDURE — 85025 COMPLETE CBC W/AUTO DIFF WBC: CPT

## 2022-11-17 PROCEDURE — 94761 N-INVAS EAR/PLS OXIMETRY MLT: CPT

## 2022-11-17 PROCEDURE — 99222 1ST HOSP IP/OBS MODERATE 55: CPT | Performed by: INTERNAL MEDICINE

## 2022-11-17 RX ORDER — MAGNESIUM SULFATE 1 G/100ML
2000 INJECTION INTRAVENOUS ONCE
Status: COMPLETED | OUTPATIENT
Start: 2022-11-17 | End: 2022-11-17

## 2022-11-17 RX ORDER — POTASSIUM CHLORIDE 750 MG/1
40 TABLET, FILM COATED, EXTENDED RELEASE ORAL ONCE
Status: COMPLETED | OUTPATIENT
Start: 2022-11-17 | End: 2022-11-17

## 2022-11-17 RX ADMIN — Medication 400 MG: at 10:10

## 2022-11-17 RX ADMIN — CLOPIDOGREL BISULFATE 75 MG: 75 TABLET ORAL at 10:10

## 2022-11-17 RX ADMIN — GABAPENTIN 100 MG: 100 CAPSULE ORAL at 10:10

## 2022-11-17 RX ADMIN — PANTOPRAZOLE SODIUM 40 MG: 40 TABLET, DELAYED RELEASE ORAL at 05:53

## 2022-11-17 RX ADMIN — MAGNESIUM SULFATE HEPTAHYDRATE 2000 MG: 1 INJECTION, SOLUTION INTRAVENOUS at 05:52

## 2022-11-17 RX ADMIN — ASPIRIN 81 MG: 81 TABLET, COATED ORAL at 10:10

## 2022-11-17 RX ADMIN — ALLOPURINOL 200 MG: 100 TABLET ORAL at 10:10

## 2022-11-17 RX ADMIN — POTASSIUM CHLORIDE 40 MEQ: 750 TABLET, FILM COATED, EXTENDED RELEASE ORAL at 05:53

## 2022-11-17 RX ADMIN — FOLIC ACID TAB 400 MCG 400 MCG: 400 TAB at 10:10

## 2022-11-17 RX ADMIN — ENOXAPARIN SODIUM 40 MG: 100 INJECTION SUBCUTANEOUS at 10:10

## 2022-11-17 RX ADMIN — ATORVASTATIN CALCIUM 40 MG: 40 TABLET, FILM COATED ORAL at 10:10

## 2022-11-17 RX ADMIN — SERTRALINE HYDROCHLORIDE 25 MG: 50 TABLET ORAL at 10:10

## 2022-11-17 NOTE — PROCEDURES
TILT TABLE PROCEDURE    NAME: Yeyo Harrison MRN: [id number] : 1945   DIAGNOSIS: dizzy  ATTENDING PHYSICIAN:  Dr. Palomino Conquest: Dr. Amy Dyer     IV INSERTED:          LOCATION: [facility]gett: 20    TIME OUT PERFORMED: yes  PRE-PROCEDURE TEACHING COMPLETED:yes  CONSENT SIGNED:yes  POST PROCEDURE TEACHING: yes  STAFF PRESENT: Tyler Ibarra RN, BETTYE Alba CST  BASELINE VITALS:  Time BLOOD PRESSURE HEART RATE O2SAT% Comments   1316 162/74 83 100    1317 154/69 84 98    1318 160/72 88 98    1319 154/68 82 98    120 153/71 81 99      PROCEDURE START TIME:1323     TIME BLOOD PRESSURE HEART RATE O2 SAT % COMMENTS   1323 156/78 85 98    1324 140/69 105 98    1325 135/76 113 96    1326 140/71 114 96    1327 133/67 112 96    1328 131/72 115 96    1329 130/70 116 96    1330 118/70 116 95    1331 129/71 115 96    1332 123/71 118 96    1333 118/64 117 96    1334 113/69 119 96    1335 112/70 120 96    1336** 97/82   122 96    1337 109/67 125 96    1338 97/64 123 96    1339 101/66 126 96    1340 93/69 125 96    1341 95/64 133 96    1342 87/64 130 96    1343 82/60 129 96 Pt  states \"I am almost getting sick\"   1344 75/59 132 96    1345 66/49 131 96 Pt to supine   1346 120/72 88 96 Fluid bolus 250 ml administered, cool clothe to forehead. 1347 149/82 102 96    1348 149/76 100 96 Dr Amy Dyer at bedside   [id number] 144/73 92 99 Pt reports she  feels better. 1354 141/75 85 100    1355 150/71 87 100    1356 149/72 85 100        Procedure Comments: 1400 154/68 81 pulse ox 100%. Pt return to 267 via wheelchair.

## 2022-11-17 NOTE — PROGRESS NOTES
Met with Patient and spoke with her son via telephone conversation this a.m. to discuss discharge planning. Patient is a 78year old , white female, admitted with a diagnosis of Orthostatic Hypertension. Patient is alert and responding appropriately to questions being asked of her, pleasant confusion noted. Both Patient and her son, Melba Mayorga, wish for Patient to be discharged home when she is deemed medically stable. Patient resides with her son in [facility]. She uses a walker, wheelchair, wheelchair ramp, toilet riser and a shower chair as she needs for assistance at home. Patient relies on her son for the cooking, cleaning and transportation needs that she has. She reports no outside services at this point. PCP is Dr. Nuno Garcia. Patient has medical insurance and son reports that no financial assistance is needed with the cost of her prescription medications. Discharge plan is home when stable. Patient has a '[facility]' order in place. She does not currently have medical directives on file however son states that he is her medical decision maker as needed. No anticipated discharge needs or concerns are identified by Patient or her son. SHANI to monitor and assist with discharge planning as appropriate.     Mili. [address]  11/17/2022

## 2022-11-17 NOTE — PROGRESS NOTES
[facility]  Occupational Therapy  Evaluation  Date: 2022  Patient Name: Douglas Ellis        MRN: [id number]    : 1945  (78 y.o.)  Gender: female   Referring Practitioner: Dr. Jadon Brewster  Diagnosis: Orthostatic hypotension  Additional Pertinent Hx: Admitted to [facility] on 22 due to orthostatic hypotension. Referred to OT services to assess ability to care for self. Past Medical History:   Diagnosis Date    Allergic rhinitis     Chronic kidney disease, stage III (moderate) (HCC)     Deep vein thrombosis (DVT) ([facility] [address] 10/24/2012    Elbow fracture, left     Fall     Gastric ulcer     Gout     Hx of blood clots     Following last back surgery     Hypertension     Nausea & vomiting     Presence of IVC filter     Type II or unspecified type diabetes mellitus without mention of complication, not stated as uncontrolled      Past Surgical History:   Procedure Laterality Date    BACK SURGERY      BACK SURGERY      BACK SURGERY      BACK SURGERY      BACK SURGERY      BACK SURGERY      Fusion     BREAST BIOPSY Left     BREAST BIOPSY Right     CARDIAC CATHETERIZATION Left 2018    Dr. Uriel Srivastava @ [facility]--There is 30% disease of the origin of the lateral anterior descending. Mild 10% -20% plaque disease in the circumflex & right coronary artery. Normal left ventricular functino, ejection fraction of 60%.       CARPAL TUNNEL RELEASE Right     CATARACT REMOVAL Right     CATARACT REMOVAL Left     COLONOSCOPY      COLONOSCOPY N/A 2019    COLONOSCOPY POLYPECTOMY HOT BIOPSY performed by Lysle Meigs, MD at [address]      \"Multiple\"     EYE SURGERY Right     Removed fluid from behind eye    HYSTERECTOMY (CERVIX STATUS UNKNOWN)      KIDNEY SURGERY      left side 1/2 of kidney removed    PARTIAL NEPHRECTOMY Left     RETINAL DETACHMENT SURGERY Left     ROTATOR CUFF REPAIR      TOTAL KNEE ARTHROPLASTY Right     TOTAL KNEE ARTHROPLASTY Left     UPPER GASTROINTESTINAL ENDOSCOPY  2014    VENA CAVA FILTER PLACEMENT       Subjective  Subjective: Was sitting in recliner upon OT arrival.    Social/Functional History  Lives With: Son  Type of Home: House  Home Layout: One level  Home Access: Ramped entrance  [address]: Standard  Bathroom Equipment: [employer]: [address], Ar[address]on, rolling, [address] chair    Prior Function  Receives Help From: Family  ADL Assistance: Independent  Homemaking Assistance: Needs assistance  Ambulation Assistance: Independent  Transfer Assistance: Needs assistance    Objective  ADLs:  Feeding: Setup  Grooming: Contact guard assistance (to wash bilateral hands while standing in front of bathroom sink)  UE Bathing: Minimal assistance (due to limited bilateral shoulder ROM)  LE Bathing: Minimal assistance  UE Dressing: Minimal assistance  LE Dressing: Minimal assistance  Toileting: Stand by assistance, Minimal assistance (SBA for seated brandyn care; MIN A for clothing management in standing)     Transfers:  Sit to stand: Minimal assistance  Stand to sit: Contact guard assistance (required verbal cues for proper walker placement)  Toilet: Moderate assistance x1    Assessment: Patient was sitting in recliner upon OT arrival. Completed ADLs and functional transfers as documented above (based on clinical reasoning and observation). Had difficulty donning/doffing elastic-waisted briefs over bilateral feet and would benefit from adaptive equipment training to facilitate increased independence. Demonstrated F- dynamic standing balance during grooming and clothing management tasks. Limited bilateral shoulder ROM noted due to torn rotator cuffs. Denied any dizziness/lightheadedness this date and did not experience any LOB, but required increased time to complete ADLs. Would benefit from OT services during hospital stay to increase independence and maximize safety during ADLs/functional transfers.  Patient remains in recliner with call light/personal items within reach and chair alarm activated upon OT departure. Goals  Short Term Goals  Time Frame for Short Term Goals: 3 days (11/19/22)  Short Term Goal 1: Patient will complete a commode transfer while using DME PRN with CGA. Short Term Goal 2: Patient will complete lower body dressing and/or bathing tasks while using adaptive equipment/techniques PRN with SBA. Short Term Goal 3: Patient will improve dynamic standing balance to F to increase independence with grooming tasks. Additional Goals?: No  Long Term Goals  Time Frame for Long Term Goals : STG=LTG    Plan  Times Per Week: 3x  Times Per Day:  Once a day (1-2x/day as tolerated)    Time In: 1125  Time Out: 1155  Timed Coded Minutes: 0  Total Treatment Time: 5602 GREG Cervantes/L  11/17/2022

## 2022-11-17 NOTE — PROGRESS NOTES
Orthostatics done and patient has no complaints of being lightheaded. Lying /71, pulse 79. Sitting /74, pulse 83.  Standing /59, pulse 99

## 2022-11-17 NOTE — PROGRESS NOTES
Quality flow rounds held on 11/17/22     Chantel Solis is admitted for  Orthostatic hypotension. Length of stay 0. Education:    Needed Education: hypotension, diet, follow up, meds      Do you have any questions regarding your plan of care while at the hospital? denies    Planned Disposition:               [x]  Home when able                [] Swing Bed                [] ECF/SNF               [] Other/TBD    Barriers to Discharge:    Can you afford your medications? yes   Do you have transportation to follow up appointments? Son provides   Do you need any new equipment at home? denies   Current equipment includes [employer]ann Public, rolling elevated toilet seats, ramp, shower chair. Do you have a living will or durable power of  for healthcare? yes               If yes do we have a copy on file? Denies, son informed needs copy. Do you or your family have any questions or concerns we haven't already discussed? Denies    Lives with son and he provides care. Plans to return home at discharge.  PCP is Dr. Nuno Garcia

## 2022-11-17 NOTE — PLAN OF CARE
[facility]    Inpatient Occupational Therapy  Plan of Care  OT Orders Received and Evaluation Complete  Date: 2022  Patient Name: Indy Khanna        MRN: [id number]    : 1945  (78 y.o.)  Referring Practitioner: Dr. Kylie Lance     Referral Date: 22  Diagnosis: Orthostatic hypotension  Treatment Diagnosis: Orthostatic hypotension    Identified Problem Areas  Performance deficits / Impairments: Decreased ADL status, Decreased ROM, Decreased strength, Decreased safe awareness, Decreased balance     Justification for Skilled Services:  [x]  Complete Daily Tasks Safely  [x] Improve Balance   [x]  Return to Prior Level of Function  [x]  Family/Caregiver Education    [x] Improve UE strength  [x] Patient Education: [x]Adaptive Equipment   [x]Home Exercise Program and Progression    Treatment Plan  Times Per Week: 3x  Times Per Day: Once a day (1-2x/day as tolerated)  Current Treatment Recommendations: Strengthening, ROM, Balance training, Safety education & training, Patient/Caregiver education & training, Equipment evaluation, education, & procurement, Self-Care / ADL, Home management training  [] Modalities:  [x] Therapeutic Exercise   [x] Therapeutic Activity  [] Splinting:     [] Home Safety Evaluation         [x] ADL Retraining                       [] Muscle Re-education [] Cognitive Retraining            [] Sensory Integration  [x] Patient Education [x] Home Exercise Program [] Fine Motor Coordination       GOALS  Short Term Goals  Time Frame for Short Term Goals: 3 days (22)  Short Term Goal 1: Patient will complete a commode transfer while using DME PRN with CGA. Short Term Goal 2: Patient will complete lower body dressing and/or bathing tasks while using adaptive equipment/techniques PRN with SBA. Short Term Goal 3: Patient will improve dynamic standing balance to F to increase independence with grooming tasks.   Additional Goals?: No  Long Term Goals  Time Frame for Long Term Goals : STG=LTG    Upon Swingbed Transfer this Plan of Care will be followed until revision is completed.     Willie Gomez, OTR/L                    Date: 11/17/2022

## 2022-11-17 NOTE — PROGRESS NOTES
Cardiology    Orthostatic hypotension  SOB    She was fairly hypotensive yesterday in ER. States mornings are worse. Will check Cortisol level and do tilt table. No indication to do CST. Hopefully home after above tests.     Thanks, Liseth Beck MD

## 2022-11-17 NOTE — PROGRESS NOTES
Pt has taken a few bites of breakfast tray; tray removed and pt moved to NPO status per cardiopulmonary department for tilt table test.

## 2022-11-17 NOTE — THERAPY EVALUATION
[facility]  Physical Therapy  Evaluation  Date: 2022  Patient Name: Ford Rice        MRN: [id number]    : 1945  (78 y.o.)  Gender: female   Referring Practitioner: Dr. Grant Johnson  Diagnosis: Dizziness  Additional Pertinent Hx: The patient is a 78 y.o. female who presents to the ER with light headedness. According to [facility], she has had issues with feeling light headed for some time. She states this is usually worse in the mornings. Due to continued dizziness, she requested her son bring her to ED. Patient admitted for observation and to monitor hydration and cardiac status. Referred to PT to assess functional mobility   Past Medical History:   Diagnosis Date    Allergic rhinitis     Chronic kidney disease, stage III (moderate) (HCC)     Deep vein thrombosis (DVT) ([facility] [address] 10/24/2012    Elbow fracture, left     Fall     Gastric ulcer     Gout     Hx of blood clots     Following last back surgery     Hypertension     Nausea & vomiting     Presence of IVC filter     Type II or unspecified type diabetes mellitus without mention of complication, not stated as uncontrolled      Past Surgical History:   Procedure Laterality Date    BACK SURGERY      BACK SURGERY      BACK SURGERY      BACK SURGERY      BACK SURGERY      BACK SURGERY      Fusion     BREAST BIOPSY Left     BREAST BIOPSY Right     CARDIAC CATHETERIZATION Left 2018    Dr. Yogesh Husain @ [facility]--There is 30% disease of the origin of the lateral anterior descending. Mild 10% -20% plaque disease in the circumflex & right coronary artery. Normal left ventricular functino, ejection fraction of 60%.       CARPAL TUNNEL RELEASE Right     CATARACT REMOVAL Right     CATARACT REMOVAL Left     COLONOSCOPY      COLONOSCOPY N/A 2019    COLONOSCOPY POLYPECTOMY HOT BIOPSY performed by Viki Tanner MD at [address]      \"Multiple\"     EYE SURGERY Right     Removed fluid from behind eye    HYSTERECTOMY (CERVIX STATUS UNKNOWN) KIDNEY SURGERY      left side 1/2 of kidney removed    PARTIAL NEPHRECTOMY Left     RETINAL DETACHMENT SURGERY Left     ROTATOR CUFF REPAIR      TOTAL KNEE ARTHROPLASTY Right     TOTAL KNEE ARTHROPLASTY Left     UPPER GASTROINTESTINAL ENDOSCOPY  2014    VENA CAVA FILTER PLACEMENT         Restrictions         Subjective         Pain Level: 3    Orientation  Overall Orientation Status: Within Functional Limits    Home Living  Type of Home: House  Home Layout: One level  Home Access: Ramped entrance  Home Equipment: Ashwini Matias, rolling, Lift chair  Social/Functional History  Lives With: Son  Type of Home: House  Home Layout: One level  Home Access: Ramped entrance  [employer]: Standard  Bathroom Equipment: [employer]: [address], [address], [place], [address] Home chair  Receives Help From: Family  ADL Assistance: Independent  Homemaking Assistance: Needs assistance  Ambulation Assistance: Independent  Transfer Assistance: Needs assistance  Additional Comments: Patient states she bathes at sink and does not use shower. She requires assist for LEs when transferring into bed or from lower surface heights        Objective                       Strength RLE  Strength RLE: Exception  R Hip Flexion: 3+/5  R Knee Extension: 4/5  R Ankle Dorsiflexion: 3+/5  Strength LLE  Strength LLE: Exception  L Hip Flexion: 3+/5  L Knee Extension: 3+/5  L Ankle Dorsiflexion: 3+/5                Sit to Stand: Contact guard assistance, Minimal Assistance (dependent on surface height;  higher surfaces she is mod Independent)  Stand to Sit: Supervision      Ambulated with wh walker 20 ft  2 in room with supervision. No LOB or dizziness noted. Demonstrates short shuffling gait pattern however patient states this is normal/baseline.         Balance  Sitting - Static: Good  Sitting - Dynamic: Good  Standing - Static: Fair, +    Assessment  Activity Tolerance: Patient tolerated evaluation without incident Chart Reviewed: Yes  Assessment: Patient admitted for observation and assessment following an episode of dizziness at home. Patient reports several month onset of dizziness primarily in morning which affects her safety however no reported falls. She lives with son who completes all meal prep, cleaning, laundry however patient states she is independent with self-care tasks. She ambulates with wh walker and has a lift chair, elevated commode, and reacher. She also has manual wheelchair which she only uses in community if needed. There is a ramp to enter the home. Upon evaluation, she is able to complete transfers sit to stand needing CG or min assist only due to lower surface height. She ambulated in room with wh walker and supervision without LOB or dizziness noted. Her gait is short and shuffling however patient states this is normal.   At present, feel patient is at baseline status in regards to strength and mobility;  she noted no dizziness with mobility. No skilled PT recommended at present. Based on additional medical assessment for dizziness, she may benefit from out patient PT to assess for possible cervicogenic causes. Therapy Prognosis: Good  Discharge Recommendations: Home with assist PRN     Type of Devices: Left in chair, Gait belt, Chair alarm in place, Call light within reach     Plan:  PT evaluation completed. No skilled PT at present due to present functional mobility at baseline status.           Goals  Short Term Goals  Time Frame for Short Term Goals: 1visits  Short Term Goal 1: Safety with basic transfers and ambulation using wh walker to return home at prior level and with son's assist- MET               Time In: 1125  Time Out: 1155  Timed Coded Minutes: 0  Total Treatment Time: 1920 [facility], PT,  11/17/2022

## 2022-11-17 NOTE — H&P
Hospital Medicine  History and Physical    Patient:  Alla Acosta  MRN: [id number]    CHIEF COMPLAINT:  Light headed    History Obtained From:  patient, electronic medical record  PCP: Yoly Hernandez DO    HISTORY OF PRESENT ILLNESS:   The patient is a 78 y.o. female who presents to the ER with light headedness. According to [facility], she has had issues with feeling light headed for some time. She states this is usually worse in the mornings. Ragini states her appetite is not very good resulting in her having weight loss recently. Yesterday morning she states she was very light headed. She had some nausea with one episode of vomiting. Ragini denies any chest pain or SOB. No recent fever or chills. She denies taking any medication for the light headedness. [facility] denies a feeling of the room spinning. Her son lives with her and she told him she needed to be seen in the ER. In the ER her BMP was normal other than a creatinine of 1.26 and glucose of 163. CBC showed a WBC at 9.1, Hgb 11.5, and Plt ct of 285. Troponin was 64 with a repeat at 60. From previous labs  her troponin always runs in the 60's. UA was negative for UTI.  ECG showed sinus rhythm with no acute changes. She was give 1500 ml of fluid in the ER and continued to have orthostatic changes in BP with complains of feeling light headed. The ER Physician requested an observation admission for continued hydration and monitoring of Orthostatic BP.       Past Medical History:        Diagnosis Date    Allergic rhinitis     Chronic kidney disease, stage III (moderate) (HCC)     Deep vein thrombosis (DVT) ([facility] [address] 10/24/2012    Elbow fracture, left     Fall     Gastric ulcer     Gout     Hx of blood clots     Following last back surgery     Hypertension     Nausea & vomiting     Presence of IVC filter     Type II or unspecified type diabetes mellitus without mention of complication, not stated as uncontrolled        Past Surgical History:        Procedure Laterality Date    BACK SURGERY      BACK SURGERY      BACK SURGERY      BACK SURGERY      BACK SURGERY      BACK SURGERY      Fusion     BREAST BIOPSY Left     BREAST BIOPSY Right     CARDIAC CATHETERIZATION Left 03/07/2018    Dr. Jayleen Ramirez @ [facility]--There is 30% disease of the origin of the lateral anterior descending. Mild 10% -20% plaque disease in the circumflex & right coronary artery. Normal left ventricular functino, ejection fraction of 60%. CARPAL TUNNEL RELEASE Right     CATARACT REMOVAL Right     CATARACT REMOVAL Left     COLONOSCOPY  2014    COLONOSCOPY N/A 05/13/2019    COLONOSCOPY POLYPECTOMY HOT BIOPSY performed by Zion Munoz MD at [address] [place] [address]      \"Multiple\"     EYE SURGERY Right     Removed fluid from behind eye    HYSTERECTOMY (CERVIX STATUS UNKNOWN)      KIDNEY SURGERY      left side 1/2 of kidney removed    PARTIAL NEPHRECTOMY Left     RETINAL DETACHMENT SURGERY Left     ROTATOR CUFF REPAIR      TOTAL KNEE ARTHROPLASTY Right     TOTAL KNEE ARTHROPLASTY Left     UPPER GASTROINTESTINAL ENDOSCOPY  2014    VENA CAVA FILTER PLACEMENT         Medications Prior to Admission:  I obtained, documented, reviewed, and updated the patient's current medications. Prior to Admission medications    Medication Sig Start Date End Date Taking? Authorizing Provider   estradiol (ESTRACE VAGINAL) 0.1 MG/GM vaginal cream Place a pea-sized amount vaginally daily x 2 weeks, then 3 times per week thereafter. Use finger to apply, wash hands after application. DO NOT use plastic applicator. Call office if you stop this medication. Patient not taking: Reported on 11/16/2022 11/3/22   Marry Preston PA-C   aspirin EC 81 MG EC tablet Take 1 tablet by mouth daily 10/28/22   Fab Rosales DO   gabapentin (NEURONTIN) 100 MG capsule Take 1 capsule by mouth 3 times daily for 180 days.  10/12/22 4/10/23  Fab Rosales DO   magnesium oxide (MAGOX 400) 400 (240 Mg) MG tablet 1 po every morning and 2 po every night  Patient taking differently: 2 po every morning and 2 po every night 10/12/22   Ericance Hockey, DO   sertraline (ZOLOFT) 25 MG tablet Take 1 tablet by mouth daily 9/30/22   [facility], DO   folic acid (FOLVITE) 338 MCG tablet Take 1 tablet by mouth daily 9/26/22   [facility], DO   omeprazole (PRILOSEC) 20 MG delayed release capsule TAKE 1 CAPSULE EVERY DAY 9/26/22 Clance Hockey, DO   atorvastatin (LIPITOR) 40 MG tablet Take 1 tablet by mouth daily 9/26/22   Clance Hockey, DO   allopurinol (ZYLOPRIM) 100 MG tablet 2 tabs po daily 9/26/22   Clance Hockey, DO   clopidogrel (PLAVIX) 75 MG tablet Take 1 tablet by mouth daily 9/26/22   [facility], DO   Blood Glucose Monitoring Suppl (TRUE METRIX AIR GLUCOSE METER) SARAH True Metrix Glucose Meter   use TWICE DAILY AS DIRECTED. Historical Provider, MD   blood glucose monitor strips Test blood glucose daily 10/29/21   Anithae Hockey,    Lancets MISC 1 each by Does not apply route 2 times daily 8/27/21   Jim Bhatti, APRN - CNP   Blood Glucose Monitoring Suppl (TRUE METRIX METER) w/Device KIT Test once daily 2/9/21 Clance Hockey, DO   acetaminophen (TYLENOL) 650 MG CR tablet Take 1,300 mg by mouth every 8 hours as needed for Pain    Historical Provider, MD       Allergies:  Codeine, Adhesive tape, Lisinopril, Norco [hydrocodone-acetaminophen], and Percocet [oxycodone-acetaminophen]    Social History:   TOBACCO:   reports that she has never smoked. She has never used smokeless tobacco.  ETOH:   reports no history of alcohol use.   OCCUPATION:      Family History:       Problem Relation Age of Onset    Diabetes Father     Cancer Father         Prostate Cancer    Cancer Paternal Aunt         Colon Cancer       REVIEW OF SYSTEMS:  Constitutional:  negative for  fevers, chills, and malaise  HEENT:  negative for  ear drainage, earaches, nasal congestion, and sore throat  Neck:  No lumps or masses  Cardiac:  negative for  chest pain, dyspnea, palpitations  Respiratory:  negative for  dry cough, cough with sputum, and dyspnea  Gastrointestinal:  positive for episode of nausea / vomiting yesterday  negative for abdominal pain  :  negative for frequency, nocturia, and hesitancy  Musculoskeletal:  positive for  muscle weakness  Neuro:  negative      Physical Exam:    Vitals: BP (!) 141/72   Pulse 83   Temp 98.1 °F (36.7 °C) (Oral)   Resp 16   Ht 5' 5\" (1.651 m)   Wt 184 lb 8 oz (83.7 kg)   LMP  (LMP Unknown)   SpO2 97%   BMI 30.70 kg/m²   General appearance: alert, appears stated age and cooperative  Skin: Skin color, texture, turgor normal. No rashes or lesions  HEENT: Head: Normocephalic, no lesions, without obvious abnormality. Eye: Normal external eye, conjunctiva, lids cornea, JACLYN. Ears: Normal TM's bilaterally. Normal auditory canals and external ears. Non-tender. Nose: Normal external nose, mucus membranes and septum. Pharynx: Dental Hygiene adequate. Normal buccal mucosa. Normal pharynx. Neck: no adenopathy, no carotid bruit, and supple, symmetrical, trachea midline  Lungs: clear to auscultation bilaterally  Heart: regular rate and rhythm, S1, S2 normal, and II/VI systolic murmur  Abdomen: soft, non-tender; bowel sounds normal; no masses,  no organomegaly  Extremities: extremities normal, atraumatic, no cyanosis or edema. ACE wraps on. Neurologic: Mental status: Alert and knows me and who the President is. Unable to tell me the year, month, or what holiday is coming up. CBC:   Recent Labs     11/16/22  1230 11/17/22  0416   WBC 9.1 6.9   HGB 11.5* 9.4*    226     BMP:    Recent Labs     11/16/22  1230 11/17/22  0416    135   K 3.7 3.5*    111*   CO2 26 26   BUN 20 17   CREATININE 1.26* 1.07*   GLUCOSE 163* 104*     Hepatic: No results for input(s): AST, ALT, ALB, BILITOT, ALKPHOS in the last 72 hours. Troponin: No results for input(s): TROPONINI in the last 72 hours.   BNP: No results for input(s): BNP in the last 72 hours. Lipids: No results for input(s): CHOL, HDL in the last 72 hours. Invalid input(s): LDLCALCU  ABGs: No results found for: PHART, PO2ART, KYG7APS  INR: No results for input(s): INR in the last 72 hours. -----------------------------------------------------------------      Assessment and Plan   Orthostatic Hypotension - has not significantly improved with IV hydration although Ragini is not feeling as light headed. ACE wraps placed on the lower legs. Dr. Uriel Srivastava consulted to evaluate for possible Tilt table. Hypokalemia - replacement ordered. Hypomagnesemia - replacement ordered. Weight loss - has been following with PCP with work up evaluated with labs / imaging with no signs of malignancy. Confusion - likely a degree of dementia. Hyperlipidemia - on Lipitor. H/O Gout - stable on Allopurinol. GERD - stable on PPI. H/O depression - on Zoloft. DM II - diet controlled. CKD stage III A - Creatinine at baseline. CAD - on aspirin / Plavix. Acute on chronic anemia - likely dilutional.    Plan:  Observation admission on cardiac monitoring. IV hydration with ACE wraps on the lower legs. Orthostatic BP's. Consult to Dr. Uriel Srivastava in Cardiology. Continue home medication. Replace potassium / magnesium. Lovenox for DVT prophylaxis. DNR CC - A. Estimated length of stay 1 day. The beneficiary may reasonably be expected to be discharged or transferred to a hospital within 96 hours after admission.       Patient Active Problem List   Diagnosis Code    Essential hypertension, benign I10    Esophageal reflux K21.9    Irritable bowel syndrome K58.9    Seasonal allergies J30.2    Gout M10.9    Rectocele N81.6    Tubular adenoma of colon D12.6    Hiatal hernia K44.9    Sigmoid diverticulosis K57.30    Chronic back pain M54.9, G89.29    Hypomagnesemia E83.42    Family history of colon cancer Z80.0    History of recurrent deep vein thrombosis (DVT) Z86.718    Hyperparathyroidism ([facility] [address] E21.3    Chronic renal disease, stage III [facility]) [471633] N18.30    Type 2 diabetes mellitus without complication, without long-term current use of insulin (HCC) E11.9    Orthostatic hypotension I95.1       DVT prophylaxis:   [x] Lovenox   [] SCDs   [] SQ Heparin   [] Encourage ambulation, low risk for DVT, no chemical or mechanical    prophylaxis necessary      [] Already on Anticoagulation      Documentation of the Current Medications in the Medical Record    (x)  I have utilized all available immediate resources to obtain, update, or review the patient's current medications. (Satisfies MIPS Performance)  If Yes, Stop Here  ( )  The patient is not eligible for medication reconciliation; the patient is in an emergent medical situation where delaying treatment would jeopardize the patient's health. (MIPS Performance exception / exclusion)  ( )  I did not confirm, update or review the patient's current list of medications today. (Does not satisfy MIPS Performance)        Advanced Care Plan    (x)  I confirmed that the patient's Advanced Care Plan is present, code status documented, or surrogate decision maker is listed in the patient's medical record. ( )  The patient's advanced care plan is not present because:  (select)   ( ) I confirmed today that the patient does not wish or was not able to name a surrogate decision maker or provide an [address] ( ) Hospice care is currently being provided or has been provided this calender year. ( )  I did not confirm today the presence of an [address] or surrogate decision maker documented within the patient's medical record. (Does not satisfy MIPS performance).             Cecy Obrien MD, MD  Admitting Hospitalist

## 2022-11-17 NOTE — PLAN OF CARE
[facility]  Inpatient Physical Therapy  Plan of Care  Date: 2022  Patient Name: Pawel Olivares        :   (78 y.o.)  Referring Practitioner: Dr. Marivel Thomas  Admission Date: 2022  Referral Date : 22  Diagnosis: Dizziness  Treatment Diagnosis: Dizziness  PT Orders Received and Evaluation Complete  Identified Problem Areas:  Therapy Prognosis: Good    Justification for Skilled Services:  [] Reduce Falls   [] Improve Ambulation  []  Complete Daily Tasks Safely   [] Improve Balance   [] Improve LE strength  []  Return to Prior Level of Function  [] Improve Functional Mobility   []  Family/Caregiver Education  [] Patient Education: []Assistive Devices []Home Exercise Program and Progression    Assessment: Patient admitted for observation and assessment following an episode of dizziness at home. Patient reports several month onset of dizziness primarily in morning which affects her safety however no reported falls. She lives with son who completes all meal prep, cleaning, laundry however patient states she is independent with self-care tasks. She ambulates with wh walker and has a lift chair, elevated commode, and reacher. She also has manual wheelchair which she only uses in community if needed. There is a ramp to enter the home. Upon evaluation, she is able to complete transfers sit to stand needing CG or min assist only due to lower surface height. She ambulated in room with wh walker and supervision without LOB or dizziness noted. Her gait is short and shuffling however patient states this is normal.   At present, feel patient is at baseline status in regards to strength and mobility;  she noted no dizziness with mobility. No skilled PT recommended at present. Based on additional medical assessment for dizziness, she may benefit from out patient PT to assess for possible cervicogenic causes.     Plan:;  1 time evaluation      [] Modalities:  [] Therapeutic Exercise   [] Gait Training  [] Therapeutic Activity    [] Home Safety Evaluation         [x] PT evaluation                        [] Neuromuscular Re-education [] Back Education             [] Patient Education [] Home Exercise Program  Discharge Recommendations: Home with assist PRN    Rehab Potential:  [x]  Good [] Fair   []  Poor    Goals  Short Term Goals  Time Frame for Short Term Goals: 1 visit  Short Term Goal 1: Safety with basic transfers and ambulation using wh walker to return home at prior level and with son's assist- MET         Upon Swingbed Transfer this Plan of Care will be followed until revision is complete.     CARMELO CABALLERO, PT,    Date: 11/17/2022

## 2022-11-17 NOTE — DISCHARGE SUMMARY
and magnesium did drop and was replaced. Hgb dropped to 9.4 which was felt to be dilutional.   was consulted for DC planning. With IV hydration her light headedness resolved but she continued to have some orthostatic drop in BP. Dr. Kelly Dewitt was consulted and performed a tilt table study showing severe autonomic dysfunction (significant drop in BP with elevation in heart rate). During her admission she did have some confusion which was seen with previous hospitalizations and seemed to better during the day. This may be suggestive of some underlying dementia. Dr. Kelly Dewitt felt Ragini could be discharged home with the recommendations to keep well hydrated, add salt to her food, and to wear thigh high or knee high compression stockings daily. Discharge Exam:    Vitals: BP (!) 141/72   Pulse 83   Temp 98.1 °F (36.7 °C) (Oral)   Resp 16   Ht 5' 5\" (1.651 m)   Wt 184 lb 8 oz (83.7 kg)   LMP  (LMP Unknown)   SpO2 97%   BMI 30.70 kg/m²   General appearance: alert, appears stated age and cooperative  Skin: Skin color, texture, turgor normal. No rashes or lesions  HEENT: Head: Normocephalic, no lesions, without obvious abnormality. Eye: Normal external eye, conjunctiva, lids cornea, JACLYN. Ears: Normal TM's bilaterally. Normal auditory canals and external ears. Non-tender. Nose: Normal external nose, mucus membranes and septum. Pharynx: Dental Hygiene adequate. Normal buccal mucosa. Normal pharynx. Neck: no adenopathy, no carotid bruit, and supple, symmetrical, trachea midline  Lungs: clear to auscultation bilaterally  Heart: regular rate and rhythm, S1, S2 normal, and II/VI systolic murmur  Abdomen: soft, non-tender; bowel sounds normal; no masses,  no organomegaly  Extremities: extremities normal, atraumatic, no cyanosis or edema. ACE wraps on. Neurologic: Mental status: Alert and knows me and who the President is.   Unable to tell me the year, month, or what holiday is coming up.    Discharge Medications:         Medication List        CHANGE how you take these medications      magnesium oxide 400 (240 Mg) MG tablet  Commonly known as: MagOx 400  1 po every morning and 2 po every night  What changed: additional instructions            CONTINUE taking these medications      acetaminophen 650 MG extended release tablet  Commonly known as: TYLENOL     allopurinol 100 MG tablet  Commonly known as: ZYLOPRIM  2 tabs po daily     aspirin EC 81 MG EC tablet  Take 1 tablet by mouth daily     atorvastatin 40 MG tablet  Commonly known as: LIPITOR  Take 1 tablet by mouth daily     blood glucose test strips  Test blood glucose daily     clopidogrel 75 MG tablet  Commonly known as: PLAVIX  Take 1 tablet by mouth daily     folic acid 549 MCG tablet  Commonly known as: FOLVITE  Take 1 tablet by mouth daily     gabapentin 100 MG capsule  Commonly known as: NEURONTIN  Take 1 capsule by mouth 3 times daily for 180 days. Lancets Misc  1 each by Does not apply route 2 times daily     omeprazole 20 MG delayed release capsule  Commonly known as: PRILOSEC  TAKE 1 CAPSULE EVERY DAY     sertraline 25 MG tablet  Commonly known as: Zoloft  Take 1 tablet by mouth daily     * True Metrix Air Glucose Meter Dorcas     * True Metrix Meter w/Device Kit  Test once daily           * This list has 2 medication(s) that are the same as other medications prescribed for you. Read the directions carefully, and ask your doctor or other care provider to review them with you. STOP taking these medications      estradiol 0.1 MG/GM vaginal cream  Commonly known as: ESTRACE VAGINAL              Consults:  Dr. Amy Burgess (Cardiology), PT / OT,     Significant Diagnostic Studies:  Labs, UA, ECG. Treatments:   IV Normal saline, ACE wraps lower legs. Disposition:   Home. Discharge Instructions:  Resume previous home medication. Keep well hydrated daily. Add Salt to food.   Wear thigh high or knee high compression stockings daily (which ever she can tolerate). Can take off at bedtime. Recommend to purchase a blood pressure monitor and to monitor BP daily and as needed when she is feeling light headed. Document blood pressures to take to doctor appointments. Activity:  As tolerated. Diet:  General   Follow up with Dr. Santino Morris in 2 months. Follow up with Maurice Alex DO in 1 week. Discharge time =  25 minutes.      Signed:  Jayne Damian MD  11/17/2022, 5:03 PM

## 2022-11-17 NOTE — PROGRESS NOTES
Pt and son given discharge orders; both verbalize instructions. Pt discharged with all personal belongings to private vehicle with son.

## 2022-11-17 NOTE — DISCHARGE INSTRUCTIONS
Discharge Instructions    Admission Date:  11/16/2022  Discharge Date:  11/17/22    Disposition:  Home    Activity:  As tolerated    Diet:  General    Discharge Instructions:  Resume previous home medication. Keep well hydrated daily. Add Salt to food. Wear thigh high or knee high compression stockings daily (which ever she can tolerate). Can take off at bedtime. Recommend to purchase a blood pressure monitor and to monitor BP daily and as needed when she is feeling light headed. Document blood pressures to take to doctor appointments. Activity:  As tolerated. Diet:  General   Follow up with Dr. Nkechi Lovelace in 2 months. Follow up with Eloise Castillo DO in 1 week. Discharge time =  25 minutes.

## 2022-11-18 LAB — ADRENOCORTICOTROPIC HORMONE: 64 PG/ML (ref 6–58)

## 2022-11-18 NOTE — TELEPHONE ENCOUNTER
Care Transitions Initial Follow Up Call    Outreach made within 2 business days of discharge: Yes    Patient: Trae Juarez Patient : 1945   MRN: [id number]  Reason for Admission: dehydration  Discharge Date: 22       Spoke with: Mikayla Callahan     Discharge department/facility: [facility] Interactive Patient Contact:  Was patient able to fill all prescriptions: Yes  Was patient instructed to bring all medications to the follow-up visit: Yes  Is patient taking all medications as directed in the discharge summary?  Yes  Does patient understand their discharge instructions: Yes  Does patient have questions or concerns that need addressed prior to 7-14 day follow up office visit: no    Scheduled appointment with PCP within 7-14 days    Follow Up  Future Appointments   Date Time Provider Natanael Austin   2022  1:40 PM Zelphia Prudent, DO [facility]   2022 10:00 AM Zelphia Prudent, DO [facility]   2023 11:00 AM Ion Velasquez, DO [facility] PAIN [facility]   2023  3:30 PM SHARMILA Rivera urol [facility]   2023  3:30 PM SHARMILA Caballero urol [address], [address] [facility] [place]

## 2022-11-19 NOTE — CONSULTS
[place] [address]                                  CONSULTATION    PATIENT NAME: Christin Bonner                     :        1945  MED REC NO:   [id number]                              ROOM:       0105  ACCOUNT NO:   [de-identified]                           ADMIT DATE: 2022  PROVIDER:     Candice Lama. Uriel Srivastava MD    CONSULT DATE:  2022    REASON FOR CONSULT:  1. Hypotension. 2.  Shortness of breath. HISTORY OF PRESENT ILLNESS:  The patient is a pleasant 57-year-old  female, who I saw on 2022 for tachycardia. She has a history of a cardiac catheterization on 2018, that  showed normal coronary arteries with normal LV function, EF of 60%. I  had not seen her from then until 2022. On 2022, she was admitted and was confused, although when I saw  her in the hospital, she knew who I was. She has a history of  paroxysmal atrial fibrillation and was not anticoagulated because of a  fall that she had in 2021. She was having sinus tachycardia and was  placed on Lopressor. At that time, she did have UTI with possible  pneumonia, which I felt was contributing to her tachycardia. She had no  acute coronary syndrome at that time. She has been having weakness and falls at home. She states mornings are  worse. She will get up and begin developing lightheadedness and  dizziness and has to sit down, at which time it will usually subside. She has had no syncopal episodes but she does feel times close to  syncope. She denies vertigo. She lives with her son and she asked him to take her to the emergency  room. She had marked orthostatic changes in the emergency room. Her blood  pressure at rest would be in the 120s; however, when she would stand, it  would decrease in the 80s and 90s and she was admitted for orthostatic  hypotension.     When I see her, she answers questions appropriately. Denies pain. She  has had no unusual shortness of breath and no chest pain or chest  discomfort. She denies any PND or orthopnea. She has had no pedal  edema. She has been hydrated. Her blood pressures have been elevated in the  135 to 149/79 range. Her heart rate has been in the 80s. We have ordered a tilt table, which was done. It was markedly abnormal  showing autonomic dysfunction. Her initial blood pressure was 120 to  130. With upright tilt, she did well until approximately 15 minutes  into the upright tilt table, when her pressure began dropping. It then  went down to the 70s and 80s and she was placed supine. Her heart rate  remained in the 80s but it was markedly positive for autonomic  dysfunction. She has never had a myocardial infarction again. She had a  catheterization on 03/07/2018, which was normal.    CARDIAC RISK FACTORS:  Known CAD:  Negative. Hypertension:  Positive. Diabetes:  Positive. Hyperlipidemia:  Positive. Smoking:  Negative. Peripheral Vascular Disease:  Negative. MEDICATIONS PRIOR TO ADMISSION:  Aspirin 81 mg daily, Neurontin 100 mg  t.i.d., magnesium 400 mg daily, Zoloft 25 mg daily, Prilosec 20 mg  daily, Lipitor 40 mg daily, Plavix 75 mg daily. PAST MEDICAL AND SURGICAL HISTORY:  1. 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 2.  Type 2 diabetes. 3.  Anemia. 4.  Hypertension. 5.  Chronic renal insufficiency. 6.  Back surgery. 7.  Breast biopsy in both the left and right. 8.  Catheterization on 03/07/2018, which was normal.  9.  Hysterectomy. 10.  Partial nephrectomy. 11.  Left and right knee replacement. FAMILY HISTORY:  Had diabetes, no heart disease in the family. SOCIAL HISTORY:  She is 78years old, . Lives with her son,  Renita Martines. No grandchildren and Renita Martines is not .     REVIEW OF SYSTEMS: along with orthostatic hypotension,  especially when she is dehydrated. 2.  Normal LV function. 3.  Normal cardiac catheterization on 03/07/2018. 4.  History of paroxysmal atrial fibrillation, not anticoagulated  because of a fall risk but currently is on Plavix and aspirin. PLAN:  1. Well hydrated. 2.  Support hose. DISCUSSION:  The patient has orthostatic hypotension especially when  dehydrated but also has autonomic dysfunction. The tilt table was  markedly abnormal with hypotension developing at 15 minutes into the  tilt table. This is going to be a very difficult issue to treat. She is  hypertensive especially when she is sitting or lying with blood pressure  in the 140s. However, her blood pressure decreased to the 80s with the  upright tilt. Treatment will be support hose and adequate hydration. I would not  place her on other therapies such as Lexapro at this time. She had normal cardiac catheterization in 2018 and she has no chest pain  or chest discomfort. She should of course always get up slowly and get  her bearings. She should not  one spot for an extensive period  of time but should move to not allow venous stasis. Thank you very much for allowing me the privilege of seeing the patient. If you have any questions on my thoughts, please do not hesitate to  contact me.         Catalina Olivo MD    D: 11/19/2022 6:55:03       T: 11/19/2022 8:37:02     [email or web address]  Job#: [id number]     Doc#: [id number]    CC:

## 2022-11-20 NOTE — PROCEDURES
[address]                                TILT TABLE TEST    PATIENT NAME: Ting David                     :        1945  MED REC NO:   [id number]                              ROOM:       999  ACCOUNT NO:   [de-identified]                           ADMIT DATE: 2022  PROVIDER:     Chiquita Jesus. Taryn Ahmadi MD    DATE OF STUDY:  2022    NAME OF TEST:  Tilt table. INDICATION:  Near syncope. We did a tilt table on the patient. We kept her supine for 5 minutes. Her blood pressure was 150-160/60-70  mmHg with a heart rate of 80 to 85 beats per minute. We then upright tilted her to 60 degrees for 20 minutes. Her blood  pressure remained in the 140 to 150 range. Her heart rate then  increased appropriately to 115 to 118 beats per minute. At 15 minutes into the test, she began dropping her blood pressure. Her  blood pressure then decreased down to 97/82 with her heart rate still at  122 beats per minute. Her blood pressure then increased to the 90s. She then decreased to 66/49 and she was placed supine. Her blood  pressure we saw into the 140/80 range. This was an abnormal tilt table  with autonomic dysfunction with gradual decline in her blood pressures  up until 15 minutes from the decrease to 70s and she was very  symptomatic. Obviously, she needs fluids to stay well hydrated. I also would use  support hose to try to maintain her blood pressure. I would not place  her on any medications at this time. Thank you very much.         Basilio Jarrett MD    D: 2022 8:46:50       T: 2022 9:53:08     [email or web address]  Job#: [id number]     Doc#: [id number]    CC:  Aleida Valenzuela DO

## 2022-11-22 ENCOUNTER — TELEPHONE (OUTPATIENT)
Dept: FAMILY MEDICINE CLINIC | Age: 77
End: 2022-11-22

## 2022-11-22 DIAGNOSIS — R19.7 ACUTE DIARRHEA: Primary | ICD-10-CM

## 2022-11-22 RX ORDER — MAGNESIUM L-LACTATE 84 MG
168 TABLET, EXTENDED RELEASE ORAL EVERY 12 HOURS
Qty: 120 TABLET | Refills: 3 | Status: SHIPPED | OUTPATIENT
Start: 2022-11-22

## 2022-11-22 NOTE — TELEPHONE ENCOUNTER
C diff test ordered, son to come  testing supplies. Stop mag ox and start mag L lactate tomorrow morning.

## 2022-11-22 NOTE — TELEPHONE ENCOUNTER
Uncontrollable diarrhea yesterday and today. \"It's a lot\" ,   \"it's messy\", no fever, no pain, no nausea. Getting weak drinking some. Please advise.

## 2022-11-23 ENCOUNTER — OFFICE VISIT (OUTPATIENT)
Dept: FAMILY MEDICINE CLINIC | Age: 77
End: 2022-11-23

## 2022-11-23 VITALS
HEART RATE: 78 BPM | SYSTOLIC BLOOD PRESSURE: 102 MMHG | BODY MASS INDEX: 29.49 KG/M2 | WEIGHT: 177 LBS | HEIGHT: 65 IN | DIASTOLIC BLOOD PRESSURE: 56 MMHG

## 2022-11-23 DIAGNOSIS — Z09 HOSPITAL DISCHARGE FOLLOW-UP: ICD-10-CM

## 2022-11-23 DIAGNOSIS — M54.2 POSTERIOR NECK PAIN: ICD-10-CM

## 2022-11-23 DIAGNOSIS — R55 SYNCOPE, UNSPECIFIED SYNCOPE TYPE: ICD-10-CM

## 2022-11-23 DIAGNOSIS — R42 INTERMITTENT LIGHTHEADEDNESS: Primary | ICD-10-CM

## 2022-11-23 NOTE — PROGRESS NOTES
Name: Nilson Booth  : 1945         Chief Complaint:     Chief Complaint   Patient presents with    Follow-Up from Hospital    Dizziness    Diarrhea       History of Present Illness:      Nilson Booth is a 78 y.o.  female who presents with Follow-Up from Hospital, Dizziness, and Diarrhea      HPI     Patient was admitted to [facility] from  to  for dehydration, orthostasis. Initial post-discharge communication occurred between medical assistant and caregiver- son Rod Wyatt  on - see documentation in chart: telephone encounter. Diagnostic test results reviewed: inpatient labs    Patient risk of morbidity and mortality: moderate    Medical Decision Making: moderate complexity      Diarrhea yesterday which was extremely profuse but did resolve. Dark brown, not black. With lightheaded spells gets severe pain in posterior neck, helped by laying with head back. Still had 1 or 2 dizzy spells this wk, ok when she hurried up and sat down. Ambulating with walker. Past Medical History:     Past Medical History:   Diagnosis Date    Allergic rhinitis     Chronic kidney disease, stage III (moderate) ([facility])     Deep vein thrombosis (DVT) ([facility]) 10/24/2012    Elbow fracture, left     Fall     Gastric ulcer     Gout     Hx of blood clots     Following last back surgery     Hypertension     Nausea & vomiting     Presence of IVC filter     Type II or unspecified type diabetes mellitus without mention of complication, not stated as uncontrolled         Past Surgical History:     Past Surgical History:   Procedure Laterality Date    BACK SURGERY      BACK SURGERY      BACK SURGERY      BACK SURGERY      BACK SURGERY      BACK SURGERY      Fusion     BREAST BIOPSY Left     BREAST BIOPSY Right     CARDIAC CATHETERIZATION Left 2018    Dr. Priyank Brothers @ [facility]--There is 30% disease of the origin of the lateral anterior descending.   Mild 10% -20% plaque disease in the circumflex & right coronary artery. Normal left ventricular functino, ejection fraction of 60%.  CARPAL TUNNEL RELEASE Right     CATARACT REMOVAL Right     CATARACT REMOVAL Left     COLONOSCOPY  2014    COLONOSCOPY N/A 05/13/2019    COLONOSCOPY POLYPECTOMY HOT BIOPSY performed by Ines Tenorio MD at [address] [place] [address]      \"Multiple\"     EYE SURGERY Right     Removed fluid from behind eye    HYSTERECTOMY (CERVIX STATUS UNKNOWN)      KIDNEY SURGERY      left side 1/2 of kidney removed    PARTIAL NEPHRECTOMY Left     RETINAL DETACHMENT SURGERY Left     ROTATOR CUFF REPAIR      TOTAL KNEE ARTHROPLASTY Right     TOTAL KNEE ARTHROPLASTY Left     UPPER GASTROINTESTINAL ENDOSCOPY  2014    VENA CAVA FILTER PLACEMENT          Medications:       Prior to Admission medications    Medication Sig Start Date End Date Taking? Authorizing Provider   magnesium lactate (MAG-TAB CR) 84 MG (7MEQ) TBCR extended release tablet Take 2 tablets by mouth in the morning and 2 tablets in the evening. 11/22/22  Yes Baby Fix, DO   aspirin EC 81 MG EC tablet Take 1 tablet by mouth daily 10/28/22  Yes Baby Fix, DO   gabapentin (NEURONTIN) 100 MG capsule Take 1 capsule by mouth 3 times daily for 180 days.  10/12/22 4/10/23 Yes Baby Fix, DO   sertraline (ZOLOFT) 25 MG tablet Take 1 tablet by mouth daily 9/30/22  Yes Baby Fix, DO   folic acid (FOLVITE) 184 MCG tablet Take 1 tablet by mouth daily 9/26/22  Yes Baby Fix, DO   omeprazole (PRILOSEC) 20 MG delayed release capsule TAKE 1 CAPSULE EVERY DAY 9/26/22  Yes Baby Fix, DO   atorvastatin (LIPITOR) 40 MG tablet Take 1 tablet by mouth daily 9/26/22  Yes Baby Fix, DO   allopurinol (ZYLOPRIM) 100 MG tablet 2 tabs po daily 9/26/22  Yes Baby Fix, DO   clopidogrel (PLAVIX) 75 MG tablet Take 1 tablet by mouth daily 9/26/22  Yes Baby Fix, DO   Blood Glucose Monitoring Suppl (TRUE METRIX AIR GLUCOSE METER) SARAH True Metrix Glucose Meter   use TWICE DAILY AS DIRECTED. Yes Historical Provider, MD   blood glucose monitor strips Test blood glucose daily 10/29/21  Yes Baby Fix, DO   Lancets MISC 1 each by Does not apply route 2 times daily 8/27/21  Yes Kailey Valentine, APRN - CNP   Blood Glucose Monitoring Suppl (TRUE METRIX METER) w/Device KIT Test once daily 2/9/21  Yes Baby Fix, DO   acetaminophen (TYLENOL) 650 MG CR tablet Take 1,300 mg by mouth every 8 hours as needed for Pain   Yes Historical Provider, MD        Allergies:       Codeine, Adhesive tape, Lisinopril, Norco [hydrocodone-acetaminophen], and Percocet [oxycodone-acetaminophen]    Social History:     Tobacco:    reports that she has never smoked. She has never used smokeless tobacco.  Alcohol:      reports no history of alcohol use. Drug Use:  reports no history of drug use. Family History:     Family History   Problem Relation Age of Onset    Diabetes Father     Cancer Father         Prostate Cancer    Cancer Paternal Aunt         Colon Cancer       Review of Systems:     Positive and Negative as described in HPI    Review of Systems   Constitutional: Negative. Respiratory: Negative. Genitourinary: Negative. Skin: Negative. Physical Exam:     Vitals:  BP (!) 102/56   Pulse 78   Ht 5' 5\" (1.651 m)   Wt 177 lb (80.3 kg)   LMP  (LMP Unknown)   BMI 29.45 kg/m²   Physical Exam  Vitals and nursing note reviewed. Constitutional:       General: She is not in acute distress. Appearance: Normal appearance. She is well-developed. She is not ill-appearing. Cardiovascular:      Rate and Rhythm: Normal rate and regular rhythm. Heart sounds: Normal heart sounds. Pulmonary:      Effort: Pulmonary effort is normal.      Breath sounds: Normal breath sounds. Abdominal:      General: Bowel sounds are normal.      Palpations: Abdomen is soft. Tenderness: There is no abdominal tenderness.    Neurological: Mental Status: She is alert and oriented to person, place, and time. Psychiatric:         Mood and Affect: Mood normal.         Behavior: Behavior normal.       Data:     Lab Results   Component Value Date/Time     11/17/2022 04:16 AM    K 3.5 11/17/2022 04:16 AM     11/17/2022 04:16 AM    CO2 25 11/17/2022 04:16 AM    BUN 17 11/17/2022 04:16 AM    CREATININE 1.07 11/17/2022 04:16 AM    GLUCOSE 104 11/17/2022 04:16 AM    PROT 6.6 10/05/2022 02:15 PM    LABALBU 3.6 10/17/2022 11:16 AM    BILITOT 0.6 10/05/2022 02:15 PM    ALKPHOS 86 10/05/2022 02:15 PM    AST 12 10/05/2022 02:15 PM    ALT 7 10/05/2022 02:15 PM     Lab Results   Component Value Date/Time    WBC 6.9 11/17/2022 04:16 AM    RBC 3.11 11/17/2022 04:16 AM    HGB 9.4 11/17/2022 04:16 AM    HCT 29.6 11/17/2022 04:16 AM    MCV 95.3 11/17/2022 04:16 AM    MCH 30.1 11/17/2022 04:16 AM    MCHC 31.6 11/17/2022 04:16 AM    RDW 15.5 11/17/2022 04:16 AM     11/17/2022 04:16 AM    MPV NOT REPORTED 12/17/2021 11:45 AM     Lab Results   Component Value Date/Time    TSH 2.51 06/21/2022 04:36 AM     Lab Results   Component Value Date/Time    CHOL 167 06/22/2021 02:35 PM    HDL 59 06/22/2021 02:35 PM    LABA1C 6.0 09/26/2022 11:19 AM         Assessment & Plan:        Diagnosis Orders   1. Intermittent lightheadedness  VL DUP CAROTID BILATERAL      2. Hospital discharge follow-up        3. Posterior neck pain  VL DUP CAROTID BILATERAL      4. Syncope, unspecified syncope type  VL DUP CAROTID BILATERAL        Reviewed hospital records. Advised increasing water intake, may also inc sodium intake. Advised comp stockings or wrapping yarely legs. Cont current rx which contains no anti-hypertensive meds. Intermittent R posterior neck pain which is sometimes assoc with the lightheadedness. US ordered to assess carotids and vertebral arteries.          Requested Prescriptions      No prescriptions requested or ordered in this encounter       Patient Instructions SURVEY:    You may be receiving a survey from [employer] regarding your visit today. You may get this in the mail, through your MyChart or in your email. Please complete the survey to enable us to provide the highest quality of care to you and your family. If you cannot score us as very good ( 5 Stars) on any question, please feel free to call the office to discuss how we could have made your experience exceptional.     Thank you.     Clinical Care Team:  Dr. Debra Renteria DO                                           [facility], [address] [place] [address] Team:  Rene Sadler        Electronically signed by Debra Renteria DO on 11/27/2022 at 10:29 PM   [address]  [address] [place] [id number]  Dept: [phone]

## 2022-11-23 NOTE — PATIENT INSTRUCTIONS
SURVEY:    You may be receiving a survey from [employer] regarding your visit today. You may get this in the mail, through your MyChart or in your email. Please complete the survey to enable us to provide the highest quality of care to you and your family. If you cannot score us as very good ( 5 Stars) on any question, please feel free to call the office to discuss how we could have made your experience exceptional.     Thank you.     Clinical Care Team:  DO Margaret Singer, [address] [place] [address] Team:  [address] [place] [address]

## 2022-11-27 ASSESSMENT — ENCOUNTER SYMPTOMS: RESPIRATORY NEGATIVE: 1

## 2022-11-28 NOTE — PROGRESS NOTES
[facility]   Preadmission Testing    Name: Karel Ham  : [id number]  Patient Phone: [phone] (home) [phone] (work)    Procedure: CAUDAL EPIDURAL STEROID INJECTION  Date of Procedure: 2022  Surgeon: Deanna Weldon DO    Ht:    Wt:   Wt method: Allergies: Allergies   Allergen Reactions    Codeine Itching    Adhesive Tape Itching, Rash and Other (See Comments)     Tape \"takes the skin off\"    Lisinopril Other (See Comments)     cough    Norco [Hydrocodone-Acetaminophen] Nausea And Vomiting    Percocet [Oxycodone-Acetaminophen] Itching and Nausea Only                There were no vitals filed for this visit. No LMP recorded (lmp unknown). Patient has had a hysterectomy. Do you take blood thinners? [x] Yes    [] No         Instructed to stop blood thinners prior to procedure? [] Yes    [] No      [] N/A    []Eliquis - 3 days  []Xarelto - 3 days  []Pradaxa - 5 days  []Coumadin - 5 days   [x]Plavix - 7 days holding starting 2022 (pt was unaware to hold)  []ASA 325mg - 7 days  []Brillinta - 5 days  []Pletal - 2 days   Have you had a respiratory infection or sore throat in last 4 weeks before surgery?     [] Yes    [x] No     Patient instructed on: [x] NPO Status - if receiving sedation  [x] Meds to Take  [x] Ride Home - sedation/ epidurals  [x]No Jewelry/Contact Lenses/Nail Polish     DOS Patient Needs [] HCG   [x] Blood Sugar  [] PT/INR

## 2022-12-08 ENCOUNTER — HOSPITAL ENCOUNTER (OUTPATIENT)
Age: 77
Setting detail: OUTPATIENT SURGERY
Discharge: HOME | End: 2022-12-08
Attending: STUDENT IN AN ORGANIZED HEALTH CARE EDUCATION/TRAINING PROGRAM | Admitting: STUDENT IN AN ORGANIZED HEALTH CARE EDUCATION/TRAINING PROGRAM
Payer: MEDICARE

## 2022-12-08 ENCOUNTER — APPOINTMENT (OUTPATIENT)
Dept: GENERAL RADIOLOGY | Age: 77
End: 2022-12-08
Attending: STUDENT IN AN ORGANIZED HEALTH CARE EDUCATION/TRAINING PROGRAM
Payer: MEDICARE

## 2022-12-08 VITALS
DIASTOLIC BLOOD PRESSURE: 75 MMHG | BODY MASS INDEX: 28.45 KG/M2 | TEMPERATURE: 97.3 F | SYSTOLIC BLOOD PRESSURE: 152 MMHG | OXYGEN SATURATION: 97 % | HEIGHT: 66 IN | WEIGHT: 177 LBS | RESPIRATION RATE: 14 BRPM | HEART RATE: 64 BPM

## 2022-12-08 LAB — GLUCOSE BLD-MCNC: 99 MG/DL (ref 65–99)

## 2022-12-08 PROCEDURE — 6360000002 HC RX W HCPCS: Performed by: STUDENT IN AN ORGANIZED HEALTH CARE EDUCATION/TRAINING PROGRAM

## 2022-12-08 PROCEDURE — 7100000011 HC PHASE II RECOVERY - ADDTL 15 MIN: Performed by: STUDENT IN AN ORGANIZED HEALTH CARE EDUCATION/TRAINING PROGRAM

## 2022-12-08 PROCEDURE — 62323 NJX INTERLAMINAR LMBR/SAC: CPT | Performed by: STUDENT IN AN ORGANIZED HEALTH CARE EDUCATION/TRAINING PROGRAM

## 2022-12-08 PROCEDURE — 7100000010 HC PHASE II RECOVERY - FIRST 15 MIN: Performed by: STUDENT IN AN ORGANIZED HEALTH CARE EDUCATION/TRAINING PROGRAM

## 2022-12-08 PROCEDURE — 3600000050 HC PAIN LEVEL 1 BASE: Performed by: STUDENT IN AN ORGANIZED HEALTH CARE EDUCATION/TRAINING PROGRAM

## 2022-12-08 PROCEDURE — 2709999900 HC NON-CHARGEABLE SUPPLY: Performed by: STUDENT IN AN ORGANIZED HEALTH CARE EDUCATION/TRAINING PROGRAM

## 2022-12-08 PROCEDURE — 82947 ASSAY GLUCOSE BLOOD QUANT: CPT

## 2022-12-08 PROCEDURE — 6360000004 HC RX CONTRAST MEDICATION: Performed by: STUDENT IN AN ORGANIZED HEALTH CARE EDUCATION/TRAINING PROGRAM

## 2022-12-08 PROCEDURE — 2500000003 HC RX 250 WO HCPCS: Performed by: STUDENT IN AN ORGANIZED HEALTH CARE EDUCATION/TRAINING PROGRAM

## 2022-12-08 PROCEDURE — 76000 FLUOROSCOPY <1 HR PHYS/QHP: CPT

## 2022-12-08 RX ORDER — LIDOCAINE HYDROCHLORIDE 10 MG/ML
INJECTION, SOLUTION EPIDURAL; INFILTRATION; INTRACAUDAL; PERINEURAL PRN
Status: DISCONTINUED | OUTPATIENT
Start: 2022-12-08 | End: 2022-12-08 | Stop reason: ALTCHOICE

## 2022-12-08 RX ORDER — TRIAMCINOLONE ACETONIDE 40 MG/ML
INJECTION, SUSPENSION INTRA-ARTICULAR; INTRAMUSCULAR PRN
Status: DISCONTINUED | OUTPATIENT
Start: 2022-12-08 | End: 2022-12-08 | Stop reason: ALTCHOICE

## 2022-12-08 ASSESSMENT — PAIN - FUNCTIONAL ASSESSMENT: PAIN_FUNCTIONAL_ASSESSMENT: 0-10

## 2022-12-08 NOTE — DISCHARGE INSTRUCTIONS
You have received an injection of local anesthetic and corticosteroids. The local anesthetic effect typically last 4-6 hours. It may take 3-7 days for the corticosteroids to reach optimal effect. Please pay close attention to the following information/instructions: You may not drive for 12 hours after your injection. It is common to experience mild soreness at the injection site(s) for 24-48 hours. Ice is the best remedy. You may apply ice for 20 minutes at a time several times a day as needed. Avoid heat to the injection area for 72 hours. No hot packs, saunas, or steam rooms during this time. A regular shower is OK. You may immediately restart your regular medication regimen, including pain medications, anti-inflammatory, and blood thinners. Please remove the sterile dressing/band-aid tonight or tomorrow morning. Do not leave it on after you have taken a shower or gotten it wet. Corticosteroid side effects can occur after this injection, but they usually resolve after several days. These side effects can include flushing, hot flashes, mild palpitations, insomnia, water retention, feeling anxious/restless, or headaches. While it is extremely rare to get an infection after a spinal procedure, please call the office if you develop any signs of infection. These signs include fevers/chills, severely increased pain, redness at the injections site, or any drainage from the injection site. Remember that the corticosteroid benefit (long-term pain relief) from an injection can take as long as 10 days to occur. You may have a period of slightly increased pain after your injection before the cortisone takes effect. You may resume all of your normal daily activities 24 hours after your injection. It is OK to restart your exercise or physical therapy program as soon as you feel comfortable doing so. Please complete the pain diary given to you after your injection.  The information you provide on this diary is used to guide your treatment plan. You should schedule a follow-up visit in 2-3 weeks to review your response to this injection. Please bring your pain diary with you to this appointment. Education Materials Received: yes  Belongings Returned: yes    Resume all current outpatient medication(s).

## 2022-12-08 NOTE — INTERVAL H&P NOTE
Update History & Physical    The patient's History and Physical of November 10, 2022 was reviewed with the patient and I examined the patient. There was no change. The surgical site was confirmed by the patient and me. Plan: The risks, benefits, expected outcome, and alternative to the recommended procedure have been discussed with the patient. Patient understands and wants to proceed with the procedure.      Electronically signed by Julian Martinez DO on 12/8/2022 at 3:18 PM

## 2022-12-08 NOTE — OP NOTE
PROCEDURE PERFORMED: Caudal epidural steroid injection    PREOPERATIVE DIAGNOSIS: Lumbosacral radiculopathy    INDICATIONS: Radicular leg pain    The patient's history and physical exam were reviewed. The risk, benefits, and alternatives of the procedure were discussed and all questions were answered to the patient's satisfaction. The patient agreed to proceed and written informed consent was obtained. POSTOPERATIVE DIAGNOSIS: Same    PHYSICIAN:  Dr. Rashida Park DO    ANESTHESIA:  LOCAL    ASSISTANT:  NONE    PATHOLOGY:  NONE    ESTIMATED BLOOD LOSS:  N/A    IMPLANTS:  NONE    PROCEDURE DESCRIPTION: Caudal epidural injection using fluoroscopy    The patient was placed on the operative bed in prone position. The area was prepped with  Chlorhexidine. The area was then draped in a sterile fashion. A lateral fluoroscopic view was obtained to observe for the entry point of the sacral hiatus. The sacral hiatus was identified and marked. The skin and subcutaneous tissues were anesthetized with 1% lidocaine. A 22-gauge 3-1/2 inch Quincke spinal needle was then advanced through the sacral hiatus. It was then advanced in the lateral fluoroscopic view until the appropriate level was reached. After negative aspiration was confirmed, Omnipaque was injected. Epidural spread was observed. The epidural spread was also confirmed in the AP view. Then after negative aspiration, the injectate was easily injected. The injectate solution consisted of 80 mg triamcinolone and 5 mL preservative-free normal saline. The needle was removed and the patient's back was dressed appropriately. There was no neurological or hemodynamic sequelae after the procedure. The patient was transferred to the postoperative care unit in stable condition. Written discharge instructions were given to the patient. COMPLICATIONS:  There were no apparent complications. The patient tolerated the procedure well.

## 2022-12-12 ENCOUNTER — OFFICE VISIT (OUTPATIENT)
Dept: FAMILY MEDICINE CLINIC | Age: 77
End: 2022-12-12
Payer: MEDICARE

## 2022-12-12 VITALS
WEIGHT: 172 LBS | DIASTOLIC BLOOD PRESSURE: 68 MMHG | BODY MASS INDEX: 28.66 KG/M2 | OXYGEN SATURATION: 98 % | SYSTOLIC BLOOD PRESSURE: 120 MMHG | HEIGHT: 65 IN | HEART RATE: 95 BPM

## 2022-12-12 DIAGNOSIS — E83.42 HYPOMAGNESEMIA: ICD-10-CM

## 2022-12-12 DIAGNOSIS — R19.8 IRREGULAR BOWEL HABITS: ICD-10-CM

## 2022-12-12 DIAGNOSIS — R63.4 UNINTENDED WEIGHT LOSS: ICD-10-CM

## 2022-12-12 DIAGNOSIS — N18.31 STAGE 3A CHRONIC KIDNEY DISEASE (HCC): ICD-10-CM

## 2022-12-12 DIAGNOSIS — R07.2 PRECORDIAL PAIN: Primary | ICD-10-CM

## 2022-12-12 PROCEDURE — 99214 OFFICE O/P EST MOD 30 MIN: CPT | Performed by: FAMILY MEDICINE

## 2022-12-12 PROCEDURE — 3074F SYST BP LT 130 MM HG: CPT | Performed by: FAMILY MEDICINE

## 2022-12-12 PROCEDURE — G8427 DOCREV CUR MEDS BY ELIG CLIN: HCPCS | Performed by: FAMILY MEDICINE

## 2022-12-12 PROCEDURE — 1123F ACP DISCUSS/DSCN MKR DOCD: CPT | Performed by: FAMILY MEDICINE

## 2022-12-12 PROCEDURE — 1036F TOBACCO NON-USER: CPT | Performed by: FAMILY MEDICINE

## 2022-12-12 PROCEDURE — 3078F DIAST BP <80 MM HG: CPT | Performed by: FAMILY MEDICINE

## 2022-12-12 PROCEDURE — G8417 CALC BMI ABV UP PARAM F/U: HCPCS | Performed by: FAMILY MEDICINE

## 2022-12-12 PROCEDURE — 1090F PRES/ABSN URINE INCON ASSESS: CPT | Performed by: FAMILY MEDICINE

## 2022-12-12 PROCEDURE — G8399 PT W/DXA RESULTS DOCUMENT: HCPCS | Performed by: FAMILY MEDICINE

## 2022-12-12 PROCEDURE — 3017F COLORECTAL CA SCREEN DOC REV: CPT | Performed by: FAMILY MEDICINE

## 2022-12-12 PROCEDURE — G8484 FLU IMMUNIZE NO ADMIN: HCPCS | Performed by: FAMILY MEDICINE

## 2022-12-12 NOTE — PROGRESS NOTES
Name: Yasmany Fritz  : 1945         Chief Complaint:     Chief Complaint   Patient presents with    Constipation       History of Present Illness:      Yasmany Fritz is a 78 y.o.  female who presents with Constipation      HPI    C/o irreg BM's, for about the past wk has only been passing water, then today very small stool. Mag currently 1 in the morning, 2 in the evening. No dizziness for past 1-2 weeks. No recent neck pain either. Epidural injection went well and has had some relief of pain but still some pain in R anterior knee. Ongoing unintentional weight loss though pt says she has good appetite and eats well, eats what son makes. C/o frequent pain in lower sternum. Medical History:     Patient Active Problem List   Diagnosis    Essential hypertension, benign    Esophageal reflux    Irritable bowel syndrome    Seasonal allergies    Gout    Rectocele    Tubular adenoma of colon    Hiatal hernia    Sigmoid diverticulosis    Chronic back pain    Hypomagnesemia    Family history of colon cancer    History of recurrent deep vein thrombosis (DVT)    Hyperparathyroidism ([facility] [address]    Chronic renal disease, stage III ([facility] [address] [030241]    Type 2 diabetes mellitus without complication, without long-term current use of insulin ([facility])    Orthostatic hypotension       Medications:       Prior to Admission medications    Medication Sig Start Date End Date Taking? Authorizing Provider   magnesium lactate (MAG-TAB CR) 84 MG (7MEQ) TBCR extended release tablet Take 2 tablets by mouth in the morning and 2 tablets in the evening. Patient taking differently: 1 in the morning, 2 at night 22  Yes Lester Sorto DO   aspirin EC 81 MG EC tablet Take 1 tablet by mouth daily 10/28/22  Yes Lester Sorto DO   gabapentin (NEURONTIN) 100 MG capsule Take 1 capsule by mouth 3 times daily for 180 days.  10/12/22 4/10/23 Yes Lester Sorto DO   sertraline (ZOLOFT) 25 MG tablet Take 1 tablet by mouth daily 9/30/22  Yes Monica Dean DO   folic acid (FOLVITE) 122 MCG tablet Take 1 tablet by mouth daily 9/26/22  Yes Monica Dean DO   omeprazole (PRILOSEC) 20 MG delayed release capsule TAKE 1 CAPSULE EVERY DAY 9/26/22  Yes Monica Dean DO   atorvastatin (LIPITOR) 40 MG tablet Take 1 tablet by mouth daily 9/26/22  Yes Monica Dean DO   allopurinol (ZYLOPRIM) 100 MG tablet 2 tabs po daily 9/26/22  Yes Monica Dean DO   clopidogrel (PLAVIX) 75 MG tablet Take 1 tablet by mouth daily 9/26/22  Yes Monica Dean DO   blood glucose monitor strips Test blood glucose daily 10/29/21  Yes Monica Dean DO   Lancets MISC 1 each by Does not apply route 2 times daily 8/27/21  Yes YOHAN Horowitz - CNP   Blood Glucose Monitoring Suppl (TRUE METRIX METER) w/Device KIT Test once daily 2/9/21  Yes Monica Dean DO   acetaminophen (TYLENOL) 650 MG CR tablet Take 1,300 mg by mouth every 8 hours as needed for Pain   Yes Historical Provider, MD   Blood Glucose Monitoring Suppl (TRUE METRIX AIR GLUCOSE METER) SARAH True Metrix Glucose Meter   use TWICE DAILY AS DIRECTED. Historical Provider, MD        Allergies:       Codeine, Adhesive tape, Lisinopril, Norco [hydrocodone-acetaminophen], and Percocet [oxycodone-acetaminophen]    Physical Exam:     Vitals:  /68   Pulse 95   Ht 5' 5\" (1.651 m)   Wt 172 lb (78 kg)   LMP  (LMP Unknown)   SpO2 98%   BMI 28.62 kg/m²   Physical Exam  Vitals and nursing note reviewed. Constitutional:       General: She is not in acute distress. Appearance: Normal appearance. She is well-developed. She is not ill-appearing. Cardiovascular:      Rate and Rhythm: Normal rate and regular rhythm. Heart sounds: Normal heart sounds. Pulmonary:      Effort: Pulmonary effort is normal.      Breath sounds: Normal breath sounds. Neurological:      Mental Status: She is alert and oriented to person, place, and time.    Psychiatric:         Mood and Affect: Mood normal.         Behavior: Behavior normal.       Data:     Lab Results   Component Value Date/Time     11/17/2022 04:16 AM    K 3.5 11/17/2022 04:16 AM     11/17/2022 04:16 AM    CO2 25 11/17/2022 04:16 AM    BUN 17 11/17/2022 04:16 AM    CREATININE 1.07 11/17/2022 04:16 AM    GLUCOSE 104 11/17/2022 04:16 AM    PROT 6.6 10/05/2022 02:15 PM    LABALBU 3.6 10/17/2022 11:16 AM    BILITOT 0.6 10/05/2022 02:15 PM    ALKPHOS 86 10/05/2022 02:15 PM    AST 12 10/05/2022 02:15 PM    ALT 7 10/05/2022 02:15 PM     Lab Results   Component Value Date/Time    WBC 6.9 11/17/2022 04:16 AM    RBC 3.11 11/17/2022 04:16 AM    HGB 9.4 11/17/2022 04:16 AM    HCT 29.6 11/17/2022 04:16 AM    MCV 95.3 11/17/2022 04:16 AM    MCH 30.1 11/17/2022 04:16 AM    MCHC 31.6 11/17/2022 04:16 AM    RDW 15.5 11/17/2022 04:16 AM     11/17/2022 04:16 AM    MPV NOT REPORTED 12/17/2021 11:45 AM     Lab Results   Component Value Date/Time    TSH 2.51 06/21/2022 04:36 AM     Lab Results   Component Value Date/Time    CHOL 167 06/22/2021 02:35 PM    HDL 59 06/22/2021 02:35 PM    LABA1C 6.0 09/26/2022 11:19 AM         Assessment & Plan:        Diagnosis Orders   1. Precordial pain  CT CHEST W CONTRAST      2. Unintended weight loss  CT CHEST W CONTRAST      3. Hypomagnesemia  Magnesium      4. Stage 3a chronic kidney disease (HCC)  Basic Metabolic Panel      5. Irregular bowel habits          Chronic chest pain, very significant unintentional weight loss, has had CT abd/pelvis and brain imaging. CT chest ordered. 3. Rechecking hypomag  4. CKD which has been improving - rechecking  5. Irreg stools, constipation with overflow for past few days. Advised enema or suppository.       signed by Benjamin Garner DO on 12/14/2022 at 10:03 PM  [address] [address]  Dept: [phone]

## 2022-12-27 RX ORDER — OXYBUTYNIN CHLORIDE 10 MG/1
TABLET, EXTENDED RELEASE ORAL
Qty: 90 TABLET | Refills: 3 | Status: SHIPPED | OUTPATIENT
Start: 2022-12-27

## 2022-12-27 RX ORDER — FUROSEMIDE 20 MG/1
TABLET ORAL
Qty: 90 TABLET | Refills: 3 | Status: SHIPPED | OUTPATIENT
Start: 2022-12-27

## 2022-12-27 RX ORDER — GLIPIZIDE 5 MG/1
5 TABLET ORAL 2 TIMES DAILY
Qty: 180 TABLET | Refills: 3 | Status: SHIPPED | OUTPATIENT
Start: 2022-12-27

## 2022-12-27 RX ORDER — TRAZODONE HYDROCHLORIDE 50 MG/1
TABLET ORAL
Qty: 90 TABLET | Refills: 3 | Status: SHIPPED | OUTPATIENT
Start: 2022-12-27

## 2022-12-27 NOTE — TELEPHONE ENCOUNTER
Last visit:  12/12/2022  Next Visit Date:    Future Appointments   Date Time Provider Natanael Austin   1/5/2023 11:00 AM Rafa Ramirez DO [facility] PAIN TOL[facility]   1/23/2023 10:40 AM Rohini Adams DO [facility] MED WPP   2/16/2023  3:30 PM SHARMILA Raines urol [facility]   11/30/2023  3:30 PM SHARMILA Raines urol [facility]         Medication List:  Prior to Admission medications    Medication Sig Start Date End Date Taking? Authorizing Provider   magnesium lactate (MAG-TAB CR) 84 MG (7MEQ) TBCR extended release tablet Take 2 tablets by mouth in the morning and 2 tablets in the evening. Patient taking differently: 1 in the morning, 2 at night 11/22/22   Rohini Adams, DO   aspirin EC 81 MG EC tablet Take 1 tablet by mouth daily 10/28/22   Rohini Adams, DO   gabapentin (NEURONTIN) 100 MG capsule Take 1 capsule by mouth 3 times daily for 180 days. 10/12/22 4/10/23  Rohini Adams, DO   sertraline (ZOLOFT) 25 MG tablet Take 1 tablet by mouth daily 9/30/22   Rohini Adams, DO   folic acid (FOLVITE) 274 MCG tablet Take 1 tablet by mouth daily 9/26/22   Rohini Adams, DO   omeprazole (PRILOSEC) 20 MG delayed release capsule TAKE 1 CAPSULE EVERY DAY 9/26/22   Rohini Adams, DO   atorvastatin (LIPITOR) 40 MG tablet Take 1 tablet by mouth daily 9/26/22   Rohini Adams, DO   allopurinol (ZYLOPRIM) 100 MG tablet 2 tabs po daily 9/26/22   Rohini Adams, DO   clopidogrel (PLAVIX) 75 MG tablet Take 1 tablet by mouth daily 9/26/22   Rohini Adams, DO   Blood Glucose Monitoring Suppl (TRUE METRIX AIR GLUCOSE METER) SARAH True Metrix Glucose Meter   use TWICE DAILY AS DIRECTED.     Historical Provider, MD   blood glucose monitor strips Test blood glucose daily 10/29/21   Rohini Adams,    Lancets MISC 1 each by Does not apply route 2 times daily 8/27/21   YOHAN Terry - CNP   Blood Glucose Monitoring Suppl (TRUE METRIX METER) w/Device KIT Test once daily 2/9/21 Brent Orn, DO   acetaminophen (TYLENOL) 650 MG CR tablet Take 1,300 mg by mouth every 8 hours as needed for Pain    Historical Provider, MD

## 2023-01-05 ENCOUNTER — OFFICE VISIT (OUTPATIENT)
Dept: PAIN MANAGEMENT | Age: 78
End: 2023-01-05
Payer: MEDICARE

## 2023-01-05 VITALS
WEIGHT: 172 LBS | RESPIRATION RATE: 19 BRPM | OXYGEN SATURATION: 98 % | BODY MASS INDEX: 28.66 KG/M2 | HEART RATE: 80 BPM | HEIGHT: 65 IN

## 2023-01-05 DIAGNOSIS — M54.16 LUMBAR RADICULOPATHY: Primary | ICD-10-CM

## 2023-01-05 DIAGNOSIS — M51.36 LUMBAR DEGENERATIVE DISC DISEASE: ICD-10-CM

## 2023-01-05 DIAGNOSIS — M96.1 LUMBAR POST-LAMINECTOMY SYNDROME: ICD-10-CM

## 2023-01-05 DIAGNOSIS — M47.816 LUMBAR SPONDYLOSIS: ICD-10-CM

## 2023-01-05 PROCEDURE — 1123F ACP DISCUSS/DSCN MKR DOCD: CPT | Performed by: STUDENT IN AN ORGANIZED HEALTH CARE EDUCATION/TRAINING PROGRAM

## 2023-01-05 PROCEDURE — G8417 CALC BMI ABV UP PARAM F/U: HCPCS | Performed by: STUDENT IN AN ORGANIZED HEALTH CARE EDUCATION/TRAINING PROGRAM

## 2023-01-05 PROCEDURE — G8484 FLU IMMUNIZE NO ADMIN: HCPCS | Performed by: STUDENT IN AN ORGANIZED HEALTH CARE EDUCATION/TRAINING PROGRAM

## 2023-01-05 PROCEDURE — G8399 PT W/DXA RESULTS DOCUMENT: HCPCS | Performed by: STUDENT IN AN ORGANIZED HEALTH CARE EDUCATION/TRAINING PROGRAM

## 2023-01-05 PROCEDURE — 1036F TOBACCO NON-USER: CPT | Performed by: STUDENT IN AN ORGANIZED HEALTH CARE EDUCATION/TRAINING PROGRAM

## 2023-01-05 PROCEDURE — G8427 DOCREV CUR MEDS BY ELIG CLIN: HCPCS | Performed by: STUDENT IN AN ORGANIZED HEALTH CARE EDUCATION/TRAINING PROGRAM

## 2023-01-05 PROCEDURE — 3017F COLORECTAL CA SCREEN DOC REV: CPT | Performed by: STUDENT IN AN ORGANIZED HEALTH CARE EDUCATION/TRAINING PROGRAM

## 2023-01-05 PROCEDURE — 1090F PRES/ABSN URINE INCON ASSESS: CPT | Performed by: STUDENT IN AN ORGANIZED HEALTH CARE EDUCATION/TRAINING PROGRAM

## 2023-01-05 PROCEDURE — 99213 OFFICE O/P EST LOW 20 MIN: CPT | Performed by: STUDENT IN AN ORGANIZED HEALTH CARE EDUCATION/TRAINING PROGRAM

## 2023-01-05 NOTE — PROGRESS NOTES
Chronic Pain Clinic Note     Encounter Date: 1/5/2023     SUBJECTIVE:  Chief Complaint   Patient presents with    1 Month Follow-Up         History of Present Illness:   Jo Gaston is a 78 y.o. female who presents to follow up after the injection. Patient states that she \"never felkt so good in all her life\". She had 100% relief in her back. Patient is not in her wheelchair today, presents with walker. She states that her left leg is still hurting and swelling but her back feels great. Medication Refill: N/A    Current Complaints of Pain:   Location: lateral LLE   Radiation: Into legs  Severity: mild/ moderate  Pain Numerical Score -    Average: 5 (leg) (back-0)  Highest: 6- leg (back 0)  Lowest: 4- (leg) Back 0)  Character/Quality: Complains of pain that is aching, burning   Timing: Keeps awake at night, constant   Associated symptoms: none  Numbess: burning on anterior thigh   Weakness: Yes  Exacerbating factors: Activity  Alleviating factors: Rest  Length of time pain has been present: Started on   Inciting event/injury: after surgery of left femur. Shattered femur. Bowel/Bladder incontinence: No  Falls: No  Physical Therapy: yes    History of Interventions:   Surgery: 6 back surgeries  Injections: epidurals in past - Dr Blanca Valerio    Imaging:    CT lumbar 6/18/22    FINDINGS: There are degenerative changes again seen of the visualized    sacroiliac joints. There are postsurgical changes again seen from prior bone    graft harvest involving the posterosuperior aspects of the iliac bones    bilaterally. There is a rotatory dextroconvex scoliosis of the lumbar spine    again seen centered at the L2-L3 level. There is a stable appearance of remote    mild anterior compression deformities of the T11 and T12 vertebral bodies and    there is also a stable appearance of a remote moderate compression deformity of    L1 which has undergone prior vertebroplasty.  No acute compression fracture of    the lumbar spine is seen. There are postsurgical changes again seen from prior    posterior decompression surgery at the T11-T12 level through the L5-S1 level    and bilateral rods and pedicle screws are again seen extending from the T11    level through the L3 level. The hardware appears grossly intact. No lucency is    seen adjacent to the hardware. Multilevel degenerative changes of the lumbar    spine are again seen. At the edge of the field-of-view there appear to be severe degenerative changes    at the T9-T10 level. It is difficult to assess the spinal canal and foramina at the T11-T12 level    through the L2-L3 level secondary to beam hardening artifact from the adjacent    hardware. No significant spinal canal stenosis is seen at the L3-L4 through L5-S1 level. There again appears to be mild right foraminal narrowing at the L5-S1 level    secondary to a small right foraminal disc-osteophyte complex. Past Medical History:   Diagnosis Date    Allergic rhinitis     Chronic kidney disease, stage III (moderate) (HCC)     Deep vein thrombosis (DVT) ([facility] [address] 10/24/2012    Elbow fracture, left     Fall     Gastric ulcer     Gout     Hx of blood clots     Following last back surgery     Hypertension     Nausea & vomiting     Presence of IVC filter     Type II or unspecified type diabetes mellitus without mention of complication, not stated as uncontrolled        Past Surgical History:   Procedure Laterality Date    BACK SURGERY      BACK SURGERY      BACK SURGERY      BACK SURGERY      BACK SURGERY      BACK SURGERY      Fusion     BREAST BIOPSY Left     BREAST BIOPSY Right     CARDIAC CATHETERIZATION Left 03/07/2018    Dr. Dutta  @ [address] [facility] [address] [facility]--There is 30% disease of the origin of the lateral anterior descending. Mild 10% -20% plaque disease in the circumflex & right coronary artery. Normal left ventricular functino, ejection fraction of 60%.       CARPAL TUNNEL RELEASE Right     CATARACT REMOVAL Right CATARACT REMOVAL Left     COLONOSCOPY  2014    COLONOSCOPY N/A 05/13/2019    COLONOSCOPY POLYPECTOMY HOT BIOPSY performed by Droon Parsons MD at [facility] OR    CYSTOSCOPY      \"Multiple\"     EYE SURGERY Right     Removed fluid from behind eye    HYSTERECTOMY (CERVIX STATUS UNKNOWN)      KIDNEY SURGERY      left side 1/2 of kidney removed    PAIN MANAGEMENT PROCEDURE N/A 12/8/2022    CAUDAL EPIDURAL STEROID INJECTION performed by Ion Fuentes DO at [facility] OR    PARTIAL NEPHRECTOMY Left     RETINAL DETACHMENT SURGERY Left     ROTATOR CUFF REPAIR      TOTAL KNEE ARTHROPLASTY Right     TOTAL KNEE ARTHROPLASTY Left     UPPER GASTROINTESTINAL ENDOSCOPY  2014    VENA CAVA FILTER PLACEMENT         Family History   Problem Relation Age of Onset    Diabetes Father     Cancer Father         Prostate Cancer    Cancer Paternal Aunt         Colon Cancer       Social History     Socioeconomic History    Marital status:      Spouse name: Not on file    Number of children: Not on file    Years of education: Not on file    Highest education level: Not on file   Occupational History    Not on file   Tobacco Use    Smoking status: Never    Smokeless tobacco: Never   Vaping Use    Vaping Use: Never used   Substance and Sexual Activity    Alcohol use: No    Drug use: No    Sexual activity: Not on file   Other Topics Concern    Not on file   Social History Narrative    Not on file     Social Determinants of Health     Financial Resource Strain: Low Risk     Difficulty of Paying Living Expenses: Not hard at all   Food Insecurity: No Food Insecurity    Worried About Running Out of Food in the Last Year: Never true    Ran Out of Food in the Last Year: Never true   Transportation Needs: Not on file   Physical Activity: Unknown    Days of Exercise per Week: 0 days    Minutes of Exercise per Session: Not on file   Stress: Not on file   Social Connections: Not on file   Intimate Partner Violence: Not on file   Housing Stability: Not on  file       Medications & Allergies:   Current Outpatient Medications   Medication Instructions    acetaminophen (TYLENOL) 1,300 mg, Oral, EVERY 8 HOURS PRN    allopurinol (ZYLOPRIM) 100 MG tablet 2 tabs po daily    aspirin EC 81 mg, Oral, DAILY    atorvastatin (LIPITOR) 40 mg, Oral, DAILY    blood glucose monitor strips Test blood glucose daily    Blood Glucose Monitoring Suppl (TRUE METRIX AIR GLUCOSE METER) SARAH True Metrix Glucose Meter   use TWICE DAILY AS DIRECTED.     Blood Glucose Monitoring Suppl (TRUE METRIX METER) w/Device KIT Test once daily    clopidogrel (PLAVIX) 75 mg, Oral, DAILY    folic acid (FOLVITE) 730 mcg, Oral, DAILY    furosemide (LASIX) 20 MG tablet TAKE 1 TABLET BY MOUTH  DAILY    gabapentin (NEURONTIN) 100 mg, Oral, 3 TIMES DAILY    glipiZIDE (GLUCOTROL) 5 mg, Oral, 2 times daily    Lancets MISC 1 each, Does not apply, 2 TIMES DAILY    magnesium lactate (MAG-TAB CR) 168 mg, Oral, EVERY 12 HOURS    omeprazole (PRILOSEC) 20 MG delayed release capsule TAKE 1 CAPSULE EVERY DAY    oxybutynin (DITROPAN-XL) 10 MG extended release tablet TAKE 1 TABLET BY MOUTH IN  THE EVENING    sertraline (ZOLOFT) 25 mg, Oral, DAILY    traZODone (DESYREL) 50 MG tablet TAKE 1 TABLET BY MOUTH AT  NIGHT       Allergies   Allergen Reactions    Codeine Itching    Adhesive Tape Itching, Rash and Other (See Comments)     Tape \"takes the skin off\"    Lisinopril Other (See Comments)     cough    Norco [Hydrocodone-Acetaminophen] Nausea And Vomiting    Percocet [Oxycodone-Acetaminophen] Itching and Nausea Only       Review of Systems:   Constitutional: negative for weight changes or fevers  Cardiovascular: negative for chest pain, palpitations, irregular heart beat  Respiratory: negative for dyspnea, cough, wheezing  Gastrointestinal: negative for constipation, diarrhea, nausea  Genitourinary: negative for bowel/bladder incontinence   Musculoskeletal: positive for low back pain  Neurological: negative for radicular leg pain, leg weakness or numbness/tingling  Behavioral/Psych: negative for anxiety/depression   Hematological: negative for abnormal bleeding, anticoagulation use or antiplatelet use  All other systems reviewed and are negative    OBJECTIVE:    Vitals:    01/05/23 1112   Pulse: 80   Resp: 19   SpO2: 98%         PHYSICAL EXAM    GENERAL: No acute distress, pleasant, well-appearing  HEENT: Normocephalic, atraumatic, Pupils equal and round  CARDIOVASCULAR: Well perfused, No peripheral cyanosis. Edema in bilateral lower extremities. PULMONARY: Good chest wall excursion, breathing unlabored  PSYCH: Appropriate affect and insight, non-pressured speech  SKIN: No rashes or lesions  MUSCULOSKELETAL:  Inspection: The back and extremities are symmetric and aligned. Muscle bulk is normal in appearance. Surgical scar present in lumbar spine. Palpation: There is tenderness to palpation along the lumbar paraspinal musculature bilaterally  Lumbar range of motion is full  NEUROMUSCULAR:  Patient ambulates in wheelchair  Gait is not assessed  Sensation to light touch is intact in lower extremities  Strength is full in lower extremities  No ankle clonus    Special Tests:  Lumbar facet loading is positive bilaterally  Seated straight leg raise is positive bilaterally      DIAGNOSIS:    ICD-10-CM    1. Lumbar radiculopathy  M54.16       2. Lumbar degenerative disc disease  M51.36       3. Lumbar post-laminectomy syndrome  M96.1       4. Lumbar spondylosis  M47.816           ASSESSMENT:    Rashaun José is a 78 y. o.female who presents with chronic low back pain and leg pain. To review, patient has history of lumbar decompression and fusion from T11-L3 with old, stable compression fractures at T11, T12, L1. She also has a history of L1 vertebroplasty. The patient's history and physical examination are consistent with lumbar radiculopathy as the patient has pain starting in the low back radiating down into the right and left leg.   Patient has positive neural tension signs on examination with a positive seated straight leg raise. Additionally the lumbar CT on 6/18/2022 reveals multilevel degenerative changes with neuroforaminal stenosis at L5-S1 on right. She underwent a caudal epidural steroid injection on 12/8/2022 with 100% improvement in her low back pain and slight improvement in her left distal leg nerve pain. I will reach out to her primary care physician about increasing gabapentin as well as working on her edema. Neurologically, it appears the patient has full strength and normal sensation. There is no evidence of myelopathy on examination. There are no red flags in the patient's history. The patient has failed conservative measures including outpatient physical therapy, greater than 3 medications for pain relief, a self-directed therapy program, as well as activity modification all within the last 6 weeks over 3 months. The patient's pain has been causing worsening quality of life and function. PLAN:  Medications: For nonopioid therapy, the following medications were prescribed:    -Consider increasing gabapentin for nerve pain, I will reach out to primary care physician about this    Opioid therapy:  -Not indicated    Interventions:  -Status post caudal epidural steroid injection on 12/8/2022 with 100% improvement in her low back pain and slight improvement in her left distal leg nerve pain  -Consider SIJ injections in future    Imaging:  -No new imaging    Behavioral Therapies:  -Continue daily stretching and home exercise program    Referrals:  -None    Follow-up Plan:  -2 months    Patient was offered intervention where appropriate. Multi-modal Pain Therapy: The patient was explicitly considered for multimodal and interdisciplinary therapy. Non-opioid and non-pharmacological opportunities to enhance analgesia and quality of life have been and will continue to be pursued.     John Ceja, DO  Interventional Pain Management/PM&R   [facility]    No orders of the defined types were placed in this encounter.

## 2023-01-17 RX ORDER — SERTRALINE HYDROCHLORIDE 25 MG/1
TABLET, FILM COATED ORAL
Qty: 30 TABLET | Refills: 3 | Status: SHIPPED | OUTPATIENT
Start: 2023-01-17

## 2023-01-30 ENCOUNTER — HOSPITAL ENCOUNTER (OUTPATIENT)
Age: 78
Discharge: HOME | End: 2023-01-30
Payer: MEDICARE

## 2023-01-30 ENCOUNTER — OFFICE VISIT (OUTPATIENT)
Dept: FAMILY MEDICINE CLINIC | Age: 78
End: 2023-01-30
Payer: MEDICARE

## 2023-01-30 VITALS
SYSTOLIC BLOOD PRESSURE: 120 MMHG | DIASTOLIC BLOOD PRESSURE: 64 MMHG | BODY MASS INDEX: 29.66 KG/M2 | HEART RATE: 80 BPM | HEIGHT: 65 IN | WEIGHT: 178 LBS

## 2023-01-30 DIAGNOSIS — I50.32 CHRONIC DIASTOLIC CONGESTIVE HEART FAILURE (HCC): ICD-10-CM

## 2023-01-30 DIAGNOSIS — E83.42 HYPOMAGNESEMIA: ICD-10-CM

## 2023-01-30 DIAGNOSIS — N18.31 STAGE 3A CHRONIC KIDNEY DISEASE (HCC): ICD-10-CM

## 2023-01-30 DIAGNOSIS — E11.9 TYPE 2 DIABETES MELLITUS WITHOUT COMPLICATION, WITHOUT LONG-TERM CURRENT USE OF INSULIN (HCC): Primary | ICD-10-CM

## 2023-01-30 DIAGNOSIS — F41.9 ANXIETY: ICD-10-CM

## 2023-01-30 DIAGNOSIS — E89.2 H/O PARATHYROIDECTOMY (HCC): ICD-10-CM

## 2023-01-30 DIAGNOSIS — E11.9 TYPE 2 DIABETES MELLITUS WITHOUT COMPLICATION, WITHOUT LONG-TERM CURRENT USE OF INSULIN (HCC): ICD-10-CM

## 2023-01-30 LAB
ANION GAP SERPL CALCULATED.3IONS-SCNC: 12 MMOL/L (ref 9–17)
BUN BLDV-MCNC: 35 MG/DL (ref 8–23)
BUN/CREAT BLD: 23 (ref 9–20)
CALCIUM SERPL-MCNC: 9.7 MG/DL (ref 8.6–10.4)
CHLORIDE BLD-SCNC: 103 MMOL/L (ref 98–107)
CO2: 27 MMOL/L (ref 20–31)
CREAT SERPL-MCNC: 1.49 MG/DL (ref 0.5–0.9)
GFR SERPL CREATININE-BSD FRML MDRD: 36 ML/MIN/1.73M2
GLUCOSE BLD-MCNC: 111 MG/DL (ref 70–99)
MAGNESIUM: 1.8 MG/DL (ref 1.6–2.6)
POTASSIUM SERPL-SCNC: 4.4 MMOL/L (ref 3.7–5.3)
SODIUM BLD-SCNC: 142 MMOL/L (ref 135–144)

## 2023-01-30 PROCEDURE — G8484 FLU IMMUNIZE NO ADMIN: HCPCS | Performed by: FAMILY MEDICINE

## 2023-01-30 PROCEDURE — 3017F COLORECTAL CA SCREEN DOC REV: CPT | Performed by: FAMILY MEDICINE

## 2023-01-30 PROCEDURE — 80048 BASIC METABOLIC PNL TOTAL CA: CPT

## 2023-01-30 PROCEDURE — 99214 OFFICE O/P EST MOD 30 MIN: CPT | Performed by: FAMILY MEDICINE

## 2023-01-30 PROCEDURE — G8427 DOCREV CUR MEDS BY ELIG CLIN: HCPCS | Performed by: FAMILY MEDICINE

## 2023-01-30 PROCEDURE — 83036 HEMOGLOBIN GLYCOSYLATED A1C: CPT

## 2023-01-30 PROCEDURE — 1123F ACP DISCUSS/DSCN MKR DOCD: CPT | Performed by: FAMILY MEDICINE

## 2023-01-30 PROCEDURE — 3074F SYST BP LT 130 MM HG: CPT | Performed by: FAMILY MEDICINE

## 2023-01-30 PROCEDURE — 3078F DIAST BP <80 MM HG: CPT | Performed by: FAMILY MEDICINE

## 2023-01-30 PROCEDURE — 1090F PRES/ABSN URINE INCON ASSESS: CPT | Performed by: FAMILY MEDICINE

## 2023-01-30 PROCEDURE — G8399 PT W/DXA RESULTS DOCUMENT: HCPCS | Performed by: FAMILY MEDICINE

## 2023-01-30 PROCEDURE — G8417 CALC BMI ABV UP PARAM F/U: HCPCS | Performed by: FAMILY MEDICINE

## 2023-01-30 PROCEDURE — 1036F TOBACCO NON-USER: CPT | Performed by: FAMILY MEDICINE

## 2023-01-30 PROCEDURE — 3044F HG A1C LEVEL LT 7.0%: CPT | Performed by: FAMILY MEDICINE

## 2023-01-30 PROCEDURE — 36415 COLL VENOUS BLD VENIPUNCTURE: CPT

## 2023-01-30 PROCEDURE — 2022F DILAT RTA XM EVC RTNOPTHY: CPT | Performed by: FAMILY MEDICINE

## 2023-01-30 PROCEDURE — 83735 ASSAY OF MAGNESIUM: CPT

## 2023-01-30 RX ORDER — SERTRALINE HYDROCHLORIDE 25 MG/1
TABLET, FILM COATED ORAL
Qty: 90 TABLET | Refills: 1 | Status: SHIPPED | OUTPATIENT
Start: 2023-01-30

## 2023-01-30 RX ORDER — GABAPENTIN 100 MG/1
200 CAPSULE ORAL 2 TIMES DAILY
Qty: 120 CAPSULE | Refills: 0
Start: 2023-01-30 | End: 2023-03-01

## 2023-01-30 RX ORDER — MAGNESIUM L-LACTATE 84 MG
TABLET, EXTENDED RELEASE ORAL
Qty: 270 TABLET | Refills: 1 | Status: SHIPPED | OUTPATIENT
Start: 2023-01-30

## 2023-01-30 ASSESSMENT — PATIENT HEALTH QUESTIONNAIRE - PHQ9
7. TROUBLE CONCENTRATING ON THINGS, SUCH AS READING THE NEWSPAPER OR WATCHING TELEVISION: 0
SUM OF ALL RESPONSES TO PHQ QUESTIONS 1-9: 0
3. TROUBLE FALLING OR STAYING ASLEEP: 0
SUM OF ALL RESPONSES TO PHQ QUESTIONS 1-9: 0
SUM OF ALL RESPONSES TO PHQ9 QUESTIONS 1 & 2: 0
1. LITTLE INTEREST OR PLEASURE IN DOING THINGS: 0
4. FEELING TIRED OR HAVING LITTLE ENERGY: 0
2. FEELING DOWN, DEPRESSED OR HOPELESS: 0
6. FEELING BAD ABOUT YOURSELF - OR THAT YOU ARE A FAILURE OR HAVE LET YOURSELF OR YOUR FAMILY DOWN: 0
SUM OF ALL RESPONSES TO PHQ QUESTIONS 1-9: 0
9. THOUGHTS THAT YOU WOULD BE BETTER OFF DEAD, OR OF HURTING YOURSELF: 0
8. MOVING OR SPEAKING SO SLOWLY THAT OTHER PEOPLE COULD HAVE NOTICED. OR THE OPPOSITE, BEING SO FIGETY OR RESTLESS THAT YOU HAVE BEEN MOVING AROUND A LOT MORE THAN USUAL: 0
5. POOR APPETITE OR OVEREATING: 0
10. IF YOU CHECKED OFF ANY PROBLEMS, HOW DIFFICULT HAVE THESE PROBLEMS MADE IT FOR YOU TO DO YOUR WORK, TAKE CARE OF THINGS AT HOME, OR GET ALONG WITH OTHER PEOPLE: 0
SUM OF ALL RESPONSES TO PHQ QUESTIONS 1-9: 0

## 2023-01-30 NOTE — PROGRESS NOTES
Name: Ford Rice  : 1945         Chief Complaint:     Chief Complaint   Patient presents with    Constipation       History of Present Illness:      Ford Rice is a 78 y.o.  female who presents with Constipation      HPI    Lower chest pain at times, usually at rest, hasn't had for past couple days. Can occur after eating sometimes. Some of pain has improved to the point that she can wear bras again. Not having many lightheaded spells. Does have some trouble with neck, bothered overnight but ok this morning. Walking much better, using rollator. Still having significant pain yarely lower ext. Has been helped some by gabapentin and doesn't seem to have adverse effects. Had excision of skin cancer on scalp, stapled. Medical History:     Patient Active Problem List   Diagnosis    Essential hypertension, benign    Esophageal reflux    Irritable bowel syndrome    Seasonal allergies    Gout    Rectocele    Tubular adenoma of colon    Hiatal hernia    Sigmoid diverticulosis    Chronic back pain    Hypomagnesemia    Family history of colon cancer    History of recurrent deep vein thrombosis (DVT)    Hyperparathyroidism ([facility] [address]    Chronic renal disease, stage III ([facility] [address] [656123]    Type 2 diabetes mellitus without complication, without long-term current use of insulin (HCC)    Orthostatic hypotension    Chronic diastolic congestive heart failure (HCC)       Medications:       Prior to Admission medications    Medication Sig Start Date End Date Taking? Authorizing Provider   sertraline (ZOLOFT) 25 MG tablet Take 1 (ONE) tablet by mouth daily 23  Yes Kika Manzanares,    magnesium lactate (MAG-TAB CR) 84 MG (7MEQ) TBCR extended release tablet 1 in the morning, 2 at night 23  Yes Kika Manzanares DO   gabapentin (NEURONTIN) 100 MG capsule Take 2 capsules by mouth 2 times daily for 30 days.  1/30/23 3/1/23 Yes Kika Manzanares DO   furosemide (LASIX) 20 MG tablet TAKE 1 TABLET BY MOUTH  DAILY 12/27/22  Yes Carmen Cash, DO   glipiZIDE (GLUCOTROL) 5 MG tablet TAKE 1 TABLET BY MOUTH IN  THE MORNING AND AT BEDTIME 12/27/22  Yes Carmen Cash, DO   oxybutynin (DITROPAN-XL) 10 MG extended release tablet TAKE 1 TABLET BY MOUTH IN  THE EVENING 12/27/22  Yes Carmen Cash, DO   traZODone (DESYREL) 50 MG tablet TAKE 1 TABLET BY MOUTH AT  NIGHT 12/27/22  Yes Carmen Cash, DO   aspirin EC 81 MG EC tablet Take 1 tablet by mouth daily 10/28/22  Yes Carmen Cash, DO   folic acid (FOLVITE) 534 MCG tablet Take 1 tablet by mouth daily 9/26/22  Yes Carmen Cash, DO   omeprazole (PRILOSEC) 20 MG delayed release capsule TAKE 1 CAPSULE EVERY DAY 9/26/22  Yes Carmen Cash, DO   atorvastatin (LIPITOR) 40 MG tablet Take 1 tablet by mouth daily 9/26/22  Yes Carmen Cash, DO   allopurinol (ZYLOPRIM) 100 MG tablet 2 tabs po daily 9/26/22  Yes Carmen Cash, DO   clopidogrel (PLAVIX) 75 MG tablet Take 1 tablet by mouth daily 9/26/22  Yes Carmen Cash, DO   Blood Glucose Monitoring Suppl (TRUE METRIX AIR GLUCOSE METER) SARAH True Metrix Glucose Meter   use TWICE DAILY AS DIRECTED. Yes Historical Provider, MD   blood glucose monitor strips Test blood glucose daily 10/29/21  Yes Carmen Cash, DO   Lancets MISC 1 each by Does not apply route 2 times daily 8/27/21  Yes YOHAN Alberto - CNP   Blood Glucose Monitoring Suppl (TRUE METRIX METER) w/Device KIT Test once daily 2/9/21  Yes Carmen Cash, DO   acetaminophen (TYLENOL) 650 MG CR tablet Take 1,300 mg by mouth every 8 hours as needed for Pain   Yes Historical Provider, MD        Allergies:       Codeine, Adhesive tape, Lisinopril, Norco [hydrocodone-acetaminophen], and Percocet [oxycodone-acetaminophen]    Physical Exam:     Vitals:  /64   Pulse 80   Ht 5' 5\" (1.651 m)   Wt 178 lb (80.7 kg)   LMP  (LMP Unknown)   BMI 29.62 kg/m²   Physical Exam  Vitals and nursing note reviewed.    Constitutional:       General: She is not in acute distress. Appearance: Normal appearance. She is well-developed. She is not ill-appearing. Cardiovascular:      Rate and Rhythm: Normal rate and regular rhythm. Heart sounds: Normal heart sounds. Pulmonary:      Effort: Pulmonary effort is normal.      Breath sounds: Normal breath sounds. Musculoskeletal:      Comments: Using rollator. Rises unassisted and readily from chair. Skin:     Comments: Stapled lesion superior scalp near vertex, no sign of infection   Neurological:      Mental Status: She is alert and oriented to person, place, and time. Psychiatric:         Mood and Affect: Mood normal.         Behavior: Behavior normal.       Data:     Lab Results   Component Value Date/Time     01/30/2023 02:58 PM    K 4.4 01/30/2023 02:58 PM     01/30/2023 02:58 PM    CO2 27 01/30/2023 02:58 PM    BUN 35 01/30/2023 02:58 PM    CREATININE 1.49 01/30/2023 02:58 PM    GLUCOSE 111 01/30/2023 02:58 PM    PROT 6.6 10/05/2022 02:15 PM    LABALBU 3.6 10/17/2022 11:16 AM    BILITOT 0.6 10/05/2022 02:15 PM    ALKPHOS 86 10/05/2022 02:15 PM    AST 12 10/05/2022 02:15 PM    ALT 7 10/05/2022 02:15 PM     Lab Results   Component Value Date/Time    WBC 6.9 11/17/2022 04:16 AM    RBC 3.11 11/17/2022 04:16 AM    HGB 9.4 11/17/2022 04:16 AM    HCT 29.6 11/17/2022 04:16 AM    MCV 95.3 11/17/2022 04:16 AM    MCH 30.1 11/17/2022 04:16 AM    MCHC 31.6 11/17/2022 04:16 AM    RDW 15.5 11/17/2022 04:16 AM     11/17/2022 04:16 AM    MPV NOT REPORTED 12/17/2021 11:45 AM     Lab Results   Component Value Date/Time    TSH 2.51 06/21/2022 04:36 AM     Lab Results   Component Value Date/Time    CHOL 167 06/22/2021 02:35 PM    HDL 59 06/22/2021 02:35 PM    LABA1C 5.8 01/30/2023 02:58 PM         Assessment & Plan:        Diagnosis Orders   1.  Type 2 diabetes mellitus without complication, without long-term current use of insulin ([facility])  Hemoglobin A1C      2. Stage 3a chronic kidney disease ([facility] [address]  Basic Metabolic Panel      3. Hypomagnesemia  Magnesium      4. Chronic diastolic congestive heart failure ([address]        5. H/O parathyroidectomy ([facility] [address]        6. Anxiety          DM which has been well controlled, cont same rx  CKD worse on last labs - update  Hypomag which has been severe at times and difficult to control, seemed to correlate/cause pt's dizziness at times. Pt appeared GREATLY improved today, able to rise from chair unassisted and ambulate with walker. This is opposed to previous wheelchair use and requiring 2 assist to stand. Even then she had difficulty standing and marked anxiety about falling. Suspect zoloft has made significant contribution to her improvement. Continue same rx. H/o parathyroidectomy, has had normal Ca levels - recheck  HF stable, appears euvolemic        Requested Prescriptions     Signed Prescriptions Disp Refills    sertraline (ZOLOFT) 25 MG tablet 90 tablet 1     Sig: Take 1 (ONE) tablet by mouth daily    magnesium lactate (MAG-TAB CR) 84 MG (7MEQ) TBCR extended release tablet 270 tablet 1     Si in the morning, 2 at night    gabapentin (NEURONTIN) 100 MG capsule 120 capsule 0     Sig: Take 2 capsules by mouth 2 times daily for 30 days.        signed by Fab Rosales DO on 2023 at 12:29 PM  [address]  [address] [address]  Dept: [phone]

## 2023-01-31 LAB
ESTIMATED AVERAGE GLUCOSE: 120 MG/DL
HBA1C MFR BLD: 5.8 % (ref 4–6)

## 2023-02-02 RX ORDER — CLOPIDOGREL BISULFATE 75 MG/1
75 TABLET ORAL DAILY
Qty: 90 TABLET | Refills: 3 | Status: SHIPPED | OUTPATIENT
Start: 2023-02-02

## 2023-02-02 NOTE — TELEPHONE ENCOUNTER
Last visit:  1/30/2023  Next Visit Date:    Future Appointments   Date Time Provider Department Center   2/16/2023  3:30 PM SHARMILA Andrade [facility]   3/9/2023 10:45 AM DO ELIZABETH Pichardo PAIN [facility]   5/1/2023  1:40 PM DO ELIZABETH Pierce MED [facility]   11/30/2023  3:30 PM SHARMILA Andrade [facility]         Medication List:  Prior to Admission medications    Medication Sig Start Date End Date Taking? Authorizing Provider   sertraline (ZOLOFT) 25 MG tablet Take 1 (ONE) tablet by mouth daily 1/30/23   Edwige Baker DO   magnesium lactate (MAG-TAB CR) 84 MG (7MEQ) TBCR extended release tablet 1 in the morning, 2 at night 1/30/23   Edwige Baker DO   gabapentin (NEURONTIN) 100 MG capsule Take 2 capsules by mouth 2 times daily for 30 days. 1/30/23 3/1/23  Edwige Baker DO   furosemide (LASIX) 20 MG tablet TAKE 1 TABLET BY MOUTH  DAILY 12/27/22   Edwige Baker DO   glipiZIDE (GLUCOTROL) 5 MG tablet TAKE 1 TABLET BY MOUTH IN  THE MORNING AND AT BEDTIME 12/27/22   Edwige Baker DO   oxybutynin (DITROPAN-XL) 10 MG extended release tablet TAKE 1 TABLET BY MOUTH IN  THE EVENING 12/27/22   Edwige Baker DO   traZODone (DESYREL) 50 MG tablet TAKE 1 TABLET BY MOUTH AT  NIGHT 12/27/22   Edwige Baker DO   aspirin EC 81 MG EC tablet Take 1 tablet by mouth daily 10/28/22   Edwige Baker DO   folic acid (FOLVITE) 400 MCG tablet Take 1 tablet by mouth daily 9/26/22   Edwige Baker DO   omeprazole (PRILOSEC) 20 MG delayed release capsule TAKE 1 CAPSULE EVERY DAY 9/26/22   Edwige Baker DO   atorvastatin (LIPITOR) 40 MG tablet Take 1 tablet by mouth daily 9/26/22   Edwige Baker DO   allopurinol (ZYLOPRIM) 100 MG tablet 2 tabs po daily 9/26/22   Edwige Baker DO   clopidogrel (PLAVIX) 75 MG tablet Take 1 tablet by mouth daily 9/26/22   Edwige Baker DO   Blood Glucose Monitoring Suppl (TRUE METRIX AIR GLUCOSE METER) SARAH True Metrix  Glucose Meter   use TWICE DAILY AS DIRECTED.     Historical Provider, MD   blood glucose monitor strips Test blood glucose daily 10/29/21   Mumtaz Mccoy DO   Lancets MISC 1 each by Does not apply route 2 times daily 8/27/21   YOHAN Alicia - CNP   Blood Glucose Monitoring Suppl (TRUE METRIX METER) w/Device KIT Test once daily 2/9/21   Mumtaz Mccoy DO   acetaminophen (TYLENOL) 650 MG CR tablet Take 1,300 mg by mouth every 8 hours as needed for Pain    Historical Provider, MD

## 2023-02-06 RX ORDER — ALLOPURINOL 100 MG/1
TABLET ORAL
Qty: 180 TABLET | Refills: 1 | Status: SHIPPED | OUTPATIENT
Start: 2023-02-06

## 2023-02-06 RX ORDER — ATORVASTATIN CALCIUM 40 MG/1
40 TABLET, FILM COATED ORAL DAILY
Qty: 90 TABLET | Refills: 1 | Status: SHIPPED | OUTPATIENT
Start: 2023-02-06

## 2023-02-16 ENCOUNTER — OFFICE VISIT (OUTPATIENT)
Dept: UROLOGY | Age: 78
End: 2023-02-16

## 2023-02-16 VITALS
SYSTOLIC BLOOD PRESSURE: 148 MMHG | DIASTOLIC BLOOD PRESSURE: 72 MMHG | HEIGHT: 65 IN | BODY MASS INDEX: 29.66 KG/M2 | WEIGHT: 178 LBS

## 2023-02-16 DIAGNOSIS — N39.0 FREQUENT UTI: ICD-10-CM

## 2023-02-16 DIAGNOSIS — N95.2 VAGINAL ATROPHY: ICD-10-CM

## 2023-02-16 DIAGNOSIS — Z90.5 H/O PARTIAL NEPHRECTOMY: ICD-10-CM

## 2023-02-16 DIAGNOSIS — N39.46 MIXED INCONTINENCE: Primary | ICD-10-CM

## 2023-02-16 RX ORDER — TROSPIUM CHLORIDE 20 MG/1
20 TABLET, FILM COATED ORAL 2 TIMES DAILY
Qty: 60 TABLET | Refills: 3 | Status: SHIPPED | OUTPATIENT
Start: 2023-02-16

## 2023-02-16 ASSESSMENT — ENCOUNTER SYMPTOMS
SHORTNESS OF BREATH: 0
COUGH: 0
WHEEZING: 0
CONSTIPATION: 0
EYE REDNESS: 0
NAUSEA: 0
ABDOMINAL PAIN: 0
APNEA: 0
BACK PAIN: 0
COLOR CHANGE: 0
VOMITING: 0

## 2023-02-16 NOTE — PROGRESS NOTES
HPI:    Patient is a 78 y.o. female in no acute distress. She is alert and oriented to person, place, and time. 7/2021 Patient presents today as a new patient referral from Dr. Filiberto Wright for mixed incontinence and recurrent urinary tract infection. Patient has been seen by urology before. Has not seen urology in many years. She did have a culture proven urinary tract infection 1 month ago. Patient states that she was treated multiple times over the past year for urinary tract infections, again I have only seen one culture sent in the past year. Patient did have a left partial nephrectomy 50 years ago for atrophy/hematuria. Distant history of kidney stones she is also apparently has had \"multiple\" cystoscopies in the past.  She has had many back surgeries as well. He does have chronic kidney disease. She used to follow with nephrology, but no longer. Non smoker. Retired MTD . Patient did have labs approximately a month ago which did show a BUN of 34, creatinine of 1.74, GFR of 29. She does have history of hysterectomy. Patient did have a bladder sling in 1988. Patient does have incontinence throughout the day and night. She does wear a thick/heavy pad which she changes 3-4 times a day. She says that it is saturated almost every time she changes it. She states that some of the time her incontinence is due to not getting there in time. Most of the time her incontinence is when she stands up. She has never been on any anticholinergics. She does have a daily cough which is soft and easy to pass. She only drinks water. She urinates every 30 minutes to 1-1/2 hours. Patient does take Lasix. Patient is an uncontrolled diabetic, her  last hemoglobin A1c was 9.5. She denies any current fever, chills, gross hematuria, flank pain, dysuria. Patient voided - 20 mL, PVR - 0 mL.                  Started on oxybutynin XL 10 mg     9/2021 - US was independently reviewed showing evidence of prior left partial nephrectomy. Bilateral ureteral jets noted. No hydronephrosis. There is a benign-appearing right renal cortical cyst.  Patient did have a KUB prior to visit which independently reviewed showing no distinct  calcifications. No further follow-up needed for history of left partial nephrectomy    8/2022 - Ditropan stopped secondary to dizziness/hypotension by outside facility    12/2022 - Ditropan resumed by PCP     2/2023 -Ditropan stopped due to ineffectiveness, trospium started     Urine cultures  10/2022 - no growth  7/2022 - no growth   6/2022 - e coli  12/2021 - e coli  10/2021 -klebsiella aerogenes  8/2021 -Enterococcus faecalis  8/2021 -E. Coli  7/2021 - no growth    Today:  Patient is here today for follow-up overactive bladder, urinary incontinence, recurrent urinary tract infection. He does have chronic kidney disease. Patient goes through 4-5 saturated pads a day. Patient states that when she stands up she has gushing of urine. Patient has nocturia x3. She denies any hematuria or dysuria. Patient continues to have urgency and frequency. She states that she was advised by her PCP not to start vaginal estrogen cream for her vaginal atrophy and for any recurrent urinary tract infections. Patient states that she also does not want to use this. She does drink coffee in the morning and Sprite in the afternoon. She does drink some water but is hesitant to drink too much secondary to her urinary issues. She has not had a urinary tract infection since 10/2022. She is having daily bowel movements. She would like more relief.     Past Medical History:   Diagnosis Date    Allergic rhinitis     Chronic kidney disease, stage III (moderate) (HCC)     Deep vein thrombosis (DVT) ([facility] [address] 10/24/2012    Elbow fracture, left     Fall     Gastric ulcer     Gout     Hx of blood clots     Following last back surgery     Hypertension     Nausea & vomiting     Presence of IVC filter     Type II or unspecified type diabetes mellitus without mention of complication, not stated as uncontrolled      Past Surgical History:   Procedure Laterality Date    BACK SURGERY      BACK SURGERY      BACK SURGERY      BACK SURGERY      BACK SURGERY      BACK SURGERY      Fusion     BREAST BIOPSY Left     BREAST BIOPSY Right     CARDIAC CATHETERIZATION Left 03/07/2018    Dr. Alex Bruce @ [facility]--There is 30% disease of the origin of the lateral anterior descending. Mild 10% -20% plaque disease in the circumflex & right coronary artery. Normal left ventricular functino, ejection fraction of 60%. CARPAL TUNNEL RELEASE Right     CATARACT REMOVAL Right     CATARACT REMOVAL Left     COLONOSCOPY  2014    COLONOSCOPY N/A 05/13/2019    COLONOSCOPY POLYPECTOMY HOT BIOPSY performed by Gómez Salgado MD at [address]      \"Multiple\"     EYE SURGERY Right     Removed fluid from behind eye    HYSTERECTOMY (CERVIX STATUS UNKNOWN)      KIDNEY SURGERY      left side 1/2 of kidney removed    PAIN MANAGEMENT PROCEDURE N/A 12/8/2022    CAUDAL EPIDURAL STEROID INJECTION performed by Sravanthi Diaz DO at [address] Left     RETINAL DETACHMENT SURGERY Left     ROTATOR CUFF REPAIR      TOTAL KNEE ARTHROPLASTY Right     TOTAL KNEE ARTHROPLASTY Left     UPPER GASTROINTESTINAL ENDOSCOPY  2014    [facility]       Outpatient Encounter Medications as of 2/16/2023   Medication Sig Dispense Refill    trospium (SANCTURA) 20 MG tablet Take 1 tablet by mouth 2 times daily 60 tablet 3    omeprazole (PRILOSEC) 20 MG delayed release capsule TAKE 1 CAPSULE BY MOUTH  DAILY 90 capsule 3    gabapentin (NEURONTIN) 100 MG capsule Take 2 capsules by mouth 2 times daily for 30 days.  120 capsule 0    allopurinol (ZYLOPRIM) 100 MG tablet TAKE 2 TABLETS BY MOUTH  DAILY 180 tablet 1    atorvastatin (LIPITOR) 40 MG tablet TAKE 1 TABLET BY MOUTH  DAILY 90 tablet 1    clopidogrel (PLAVIX) 75 MG tablet TAKE 1 TABLET BY MOUTH  DAILY 90 tablet 3    sertraline (ZOLOFT) 25 MG tablet Take 1 (ONE) tablet by mouth daily 90 tablet 1    magnesium lactate (MAG-TAB CR) 84 MG (7MEQ) TBCR extended release tablet 1 in the morning, 2 at night 270 tablet 1    furosemide (LASIX) 20 MG tablet TAKE 1 TABLET BY MOUTH  DAILY 90 tablet 3    glipiZIDE (GLUCOTROL) 5 MG tablet TAKE 1 TABLET BY MOUTH IN  THE MORNING AND AT BEDTIME 180 tablet 3    traZODone (DESYREL) 50 MG tablet TAKE 1 TABLET BY MOUTH AT  NIGHT 90 tablet 3    aspirin EC 81 MG EC tablet Take 1 tablet by mouth daily 90 tablet 3    folic acid (FOLVITE) 482 MCG tablet Take 1 tablet by mouth daily 90 tablet 1    Blood Glucose Monitoring Suppl (TRUE METRIX AIR GLUCOSE METER) SARAH True Metrix Glucose Meter   use TWICE DAILY AS DIRECTED. blood glucose monitor strips Test blood glucose daily 100 strip 1    Lancets MISC 1 each by Does not apply route 2 times daily 300 each 1    Blood Glucose Monitoring Suppl (TRUE METRIX METER) w/Device KIT Test once daily 1 kit 0    acetaminophen (TYLENOL) 650 MG CR tablet Take 1,300 mg by mouth every 8 hours as needed for Pain      [DISCONTINUED] oxybutynin (DITROPAN-XL) 10 MG extended release tablet TAKE 1 TABLET BY MOUTH IN  THE EVENING 90 tablet 3     No facility-administered encounter medications on file as of 2/16/2023. Current Outpatient Medications on File Prior to Visit   Medication Sig Dispense Refill    omeprazole (PRILOSEC) 20 MG delayed release capsule TAKE 1 CAPSULE BY MOUTH  DAILY 90 capsule 3    gabapentin (NEURONTIN) 100 MG capsule Take 2 capsules by mouth 2 times daily for 30 days.  120 capsule 0    allopurinol (ZYLOPRIM) 100 MG tablet TAKE 2 TABLETS BY MOUTH  DAILY 180 tablet 1    atorvastatin (LIPITOR) 40 MG tablet TAKE 1 TABLET BY MOUTH  DAILY 90 tablet 1    clopidogrel (PLAVIX) 75 MG tablet TAKE 1 TABLET BY MOUTH  DAILY 90 tablet 3    sertraline (ZOLOFT) 25 MG tablet Take 1 (ONE) tablet by mouth daily 90 tablet 1    magnesium lactate (MAG-TAB CR) 84 MG (7MEQ) TBCR extended release tablet 1 in the morning, 2 at night 270 tablet 1    furosemide (LASIX) 20 MG tablet TAKE 1 TABLET BY MOUTH  DAILY 90 tablet 3    glipiZIDE (GLUCOTROL) 5 MG tablet TAKE 1 TABLET BY MOUTH IN  THE MORNING AND AT BEDTIME 180 tablet 3    traZODone (DESYREL) 50 MG tablet TAKE 1 TABLET BY MOUTH AT  NIGHT 90 tablet 3    aspirin EC 81 MG EC tablet Take 1 tablet by mouth daily 90 tablet 3    folic acid (FOLVITE) 884 MCG tablet Take 1 tablet by mouth daily 90 tablet 1    Blood Glucose Monitoring Suppl (TRUE METRIX AIR GLUCOSE METER) SARAH True Metrix Glucose Meter   use TWICE DAILY AS DIRECTED. blood glucose monitor strips Test blood glucose daily 100 strip 1    Lancets MISC 1 each by Does not apply route 2 times daily 300 each 1    Blood Glucose Monitoring Suppl (TRUE METRIX METER) w/Device KIT Test once daily 1 kit 0    acetaminophen (TYLENOL) 650 MG CR tablet Take 1,300 mg by mouth every 8 hours as needed for Pain       No current facility-administered medications on file prior to visit. Codeine, Adhesive tape, Lisinopril, Norco [hydrocodone-acetaminophen], and Percocet [oxycodone-acetaminophen]  Family History   Problem Relation Age of Onset    Diabetes Father     Cancer Father         Prostate Cancer    Cancer Paternal Aunt         Colon Cancer     Social History     Tobacco Use   Smoking Status Never   Smokeless Tobacco Never       Social History     Substance and Sexual Activity   Alcohol Use No       Review of Systems   Constitutional:  Negative for appetite change, chills and fever. Eyes:  Negative for redness and visual disturbance. Respiratory:  Negative for apnea, cough, shortness of breath and wheezing. Cardiovascular:  Negative for chest pain and leg swelling. Gastrointestinal:  Negative for abdominal pain, constipation, nausea and vomiting. Genitourinary:  Positive for frequency and urgency.  Negative for difficulty urinating, dyspareunia, dysuria, enuresis, flank pain, hematuria, pelvic pain, vaginal bleeding and vaginal discharge. Positive for urinary incontinence   Musculoskeletal:  Negative for back pain, joint swelling and myalgias. Skin:  Negative for color change, rash and wound. Neurological:  Negative for dizziness, tremors and numbness. Hematological:  Negative for adenopathy. Does not bruise/bleed easily. Psychiatric/Behavioral:  Negative for sleep disturbance. BP (!) 148/72 (Site: Right Upper Arm, Position: Sitting, Cuff Size: Large Adult)   Ht 5' 5\" (1.651 m)   Wt 178 lb (80.7 kg)   LMP  (LMP Unknown)   BMI 29.62 kg/m²       PHYSICAL EXAM:  Constitutional: Patient resting comfortably, in no acute distress. Neuro: Alert and oriented to person place and time. Psych: Mood and affect normal.  HEENT: normocephalic, atraumatic  Lungs: Respiratory effort normal, unlabored  Abdomen: Soft, non-tender, non-distended   : No CVA tenderness. Pelvic: deferred     Lab Results   Component Value Date    BUN 35 (H) 01/30/2023     Lab Results   Component Value Date    CREATININE 1.49 (H) 01/30/2023       ASSESSMENT:   Diagnosis Orders   1. Mixed incontinence        2. Frequent UTI        3. H/O partial nephrectomy        4. Vaginal atrophy                PLAN:  Stop Ditropan secondary to ineffectiveness    Start trospium twice a day-discussed possible side effects of cognitive issues, dry eye, dry mouth, constipation, urinary retention. Patient instructed to drink at least 80 ounces of \"good fluids\" daily (water, juice, Gatoraide, milk) and to avoid/minimize intake of \"bad fluids\" (soda pop, coffee, tea, alcohol, energy drinks). Also advised to avoid excessive consumption of sodium and animal protein to decrease risk of recurrent kidney stones.     Recommended she start over-the-counter oral cranberry and d-mannose for UTI prophylaxis    Patient does not want to use vaginal estrogen    Follow Up in 2 months    YESENIA 10/2023

## 2023-03-09 ENCOUNTER — OFFICE VISIT (OUTPATIENT)
Dept: PAIN MANAGEMENT | Age: 78
End: 2023-03-09

## 2023-03-09 VITALS
OXYGEN SATURATION: 98 % | WEIGHT: 178 LBS | RESPIRATION RATE: 19 BRPM | BODY MASS INDEX: 29.62 KG/M2 | HEART RATE: 88 BPM

## 2023-03-09 DIAGNOSIS — G56.81 SUPRASCAPULAR NERVE ENTRAPMENT, RIGHT: ICD-10-CM

## 2023-03-09 DIAGNOSIS — M47.816 LUMBAR SPONDYLOSIS: ICD-10-CM

## 2023-03-09 DIAGNOSIS — M54.16 LUMBAR RADICULOPATHY: ICD-10-CM

## 2023-03-09 DIAGNOSIS — G56.82 SUPRASCAPULAR NERVE ENTRAPMENT, LEFT: Primary | ICD-10-CM

## 2023-03-09 DIAGNOSIS — M51.36 LUMBAR DEGENERATIVE DISC DISEASE: ICD-10-CM

## 2023-03-09 DIAGNOSIS — M96.1 LUMBAR POST-LAMINECTOMY SYNDROME: ICD-10-CM

## 2023-03-09 NOTE — PROGRESS NOTES
Chronic Pain Clinic Note     Encounter Date: 3/9/2023     SUBJECTIVE:  Chief Complaint   Patient presents with    Back Pain     History of Present Illness:   Jorge Sylvester is a 78 y.o. female who presents to follow up on lumbar back pain. She states that her pain is tolerable. Medication Refill: N/A    Current Complaints of Pain:   Location: lateral LLE - mild   Radiation: intermittent LLE  Severity: mild  Pain Numerical Score -    Average: 1-2 (leg) (back-0)  Highest: 4 - leg (back 0)  Lowest: 1 - (leg) Back 0)  Character/Quality: Complains of pain that is aching  Timing: Keeps awake at night, constant   Associated symptoms: none  Numbess: burning on anterior thigh   Weakness: Yes  Exacerbating factors: Activity  Alleviating factors: Rest  Length of time pain has been present: Started on   Inciting event/injury: after surgery of left femur. Shattered femur. Bowel/Bladder incontinence: No  Falls: No  Physical Therapy: yes    History of Interventions:   Surgery: 6 back surgeries  Injections: epidurals     Imaging:    CT lumbar 6/18/22    FINDINGS: There are degenerative changes again seen of the visualized    sacroiliac joints. There are postsurgical changes again seen from prior bone    graft harvest involving the posterosuperior aspects of the iliac bones    bilaterally. There is a rotatory dextroconvex scoliosis of the lumbar spine    again seen centered at the L2-L3 level. There is a stable appearance of remote    mild anterior compression deformities of the T11 and T12 vertebral bodies and    there is also a stable appearance of a remote moderate compression deformity of    L1 which has undergone prior vertebroplasty. No acute compression fracture of    the lumbar spine is seen.  There are postsurgical changes again seen from prior    posterior decompression surgery at the T11-T12 level through the L5-S1 level    and bilateral rods and pedicle screws are again seen extending from the T11    level through the L3 level. The hardware appears grossly intact. No lucency is    seen adjacent to the hardware. Multilevel degenerative changes of the lumbar    spine are again seen. At the edge of the field-of-view there appear to be severe degenerative changes    at the T9-T10 level. It is difficult to assess the spinal canal and foramina at the T11-T12 level    through the L2-L3 level secondary to beam hardening artifact from the adjacent    hardware. No significant spinal canal stenosis is seen at the L3-L4 through L5-S1 level. There again appears to be mild right foraminal narrowing at the L5-S1 level    secondary to a small right foraminal disc-osteophyte complex. Past Medical History:   Diagnosis Date    Allergic rhinitis     Chronic kidney disease, stage III (moderate) (HCC)     Deep vein thrombosis (DVT) ([facility] [address] 10/24/2012    Elbow fracture, left     Fall     Gastric ulcer     Gout     Hx of blood clots     Following last back surgery     Hypertension     Nausea & vomiting     Presence of IVC filter     Type II or unspecified type diabetes mellitus without mention of complication, not stated as uncontrolled        Past Surgical History:   Procedure Laterality Date    BACK SURGERY      BACK SURGERY      BACK SURGERY      BACK SURGERY      BACK SURGERY      BACK SURGERY      Fusion     BREAST BIOPSY Left     BREAST BIOPSY Right     CARDIAC CATHETERIZATION Left 03/07/2018    Dr. Luis Alberto Freedman @ [facility]--There is 30% disease of the origin of the lateral anterior descending. Mild 10% -20% plaque disease in the circumflex & right coronary artery. Normal left ventricular functino, ejection fraction of 60%.       CARPAL TUNNEL RELEASE Right     CATARACT REMOVAL Right     CATARACT REMOVAL Left     COLONOSCOPY  2014    COLONOSCOPY N/A 05/13/2019    COLONOSCOPY POLYPECTOMY HOT BIOPSY performed by Roverto Patel MD at [address]      \"Multiple\"     EYE SURGERY Right     Removed fluid from behind eye    HYSTERECTOMY (CERVIX STATUS UNKNOWN)      KIDNEY SURGERY      left side 1/2 of kidney removed    PAIN MANAGEMENT PROCEDURE N/A 12/8/2022    CAUDAL EPIDURAL STEROID INJECTION performed by Jojo Corado DO at [address] Left     RETINAL DETACHMENT SURGERY Left     ROTATOR CUFF REPAIR      TOTAL KNEE ARTHROPLASTY Right     TOTAL KNEE ARTHROPLASTY Left     UPPER GASTROINTESTINAL ENDOSCOPY  2014    VENA CAVA FILTER PLACEMENT         Family History   Problem Relation Age of Onset    Diabetes Father     Cancer Father         Prostate Cancer    Cancer Paternal Aunt         Colon Cancer       Social History     Socioeconomic History    Marital status:       Spouse name: Not on file    Number of children: Not on file    Years of education: Not on file    Highest education level: Not on file   Occupational History    Not on file   Tobacco Use    Smoking status: Never    Smokeless tobacco: Never   Vaping Use    Vaping Use: Never used   Substance and Sexual Activity    Alcohol use: No    Drug use: No    Sexual activity: Not on file   Other Topics Concern    Not on file   Social History Narrative    Not on file     Social Determinants of Health     Financial Resource Strain: Low Risk     Difficulty of Paying Living Expenses: Not hard at all   Food Insecurity: No Food Insecurity    Worried About Running Out of Food in the Last Year: Never true    Ran Out of Food in the Last Year: Never true   Transportation Needs: Not on file   Physical Activity: Unknown    Days of Exercise per Week: 0 days    Minutes of Exercise per Session: Not on file   Stress: Not on file   Social Connections: Not on file   Intimate Partner Violence: Not on file   Housing Stability: Not on file       Medications & Allergies:   Current Outpatient Medications   Medication Instructions    acetaminophen (TYLENOL) 1,300 mg, Oral, EVERY 8 HOURS PRN    allopurinol (ZYLOPRIM) 100 MG tablet TAKE 2 TABLETS BY MOUTH  DAILY    aspirin EC 81 mg, Oral, DAILY    atorvastatin (LIPITOR) 40 mg, Oral, DAILY    blood glucose monitor strips Test blood glucose daily    Blood Glucose Monitoring Suppl (TRUE METRIX AIR GLUCOSE METER) SARAH True Metrix Glucose Meter   use TWICE DAILY AS DIRECTED.     Blood Glucose Monitoring Suppl (TRUE METRIX METER) w/Device KIT Test once daily    clopidogrel (PLAVIX) 75 mg, Oral, DAILY    folic acid (FOLVITE) 834 mcg, Oral, DAILY    furosemide (LASIX) 20 MG tablet TAKE 1 TABLET BY MOUTH  DAILY    gabapentin (NEURONTIN) 200 mg, Oral, 2 TIMES DAILY    glipiZIDE (GLUCOTROL) 5 mg, Oral, 2 times daily    Lancets MISC 1 each, Does not apply, 2 TIMES DAILY    magnesium lactate (MAG-TAB CR) 84 MG (7MEQ) TBCR extended release tablet 1 in the morning, 2 at night    omeprazole (PRILOSEC) 20 MG delayed release capsule TAKE 1 CAPSULE BY MOUTH  DAILY    sertraline (ZOLOFT) 25 MG tablet Take 1 (ONE) tablet by mouth daily    traZODone (DESYREL) 50 MG tablet TAKE 1 TABLET BY MOUTH AT  NIGHT    trospium (SANCTURA) 20 mg, Oral, 2 TIMES DAILY       Allergies   Allergen Reactions    Codeine Itching    Adhesive Tape Itching, Rash and Other (See Comments)     Tape \"takes the skin off\"    Lisinopril Other (See Comments)     cough    Norco [Hydrocodone-Acetaminophen] Nausea And Vomiting    Percocet [Oxycodone-Acetaminophen] Itching and Nausea Only       Review of Systems:   Constitutional: negative for weight changes or fevers  Cardiovascular: negative for chest pain, palpitations, irregular heart beat  Respiratory: negative for dyspnea, cough, wheezing  Gastrointestinal: negative for constipation, diarrhea, nausea  Genitourinary: negative for bowel/bladder incontinence   Musculoskeletal: positive for low back pain, shoulder pain  Neurological: negative for radicular leg pain, leg weakness or numbness/tingling  Behavioral/Psych: negative for anxiety/depression   Hematological: negative for abnormal bleeding, anticoagulation use or antiplatelet use  All other systems reviewed and are negative    OBJECTIVE:    Vitals:    03/09/23 1108   Pulse: 88   Resp: 19   SpO2: 98%     PHYSICAL EXAM    GENERAL: No acute distress, pleasant, well-appearing  HEENT: Normocephalic, atraumatic, Pupils equal and round  CARDIOVASCULAR: Well perfused, No peripheral cyanosis. Edema in bilateral lower extremities. PULMONARY: Good chest wall excursion, breathing unlabored  PSYCH: Appropriate affect and insight, non-pressured speech  SKIN: No rashes or lesions  MUSCULOSKELETAL:  Inspection: The back and extremities are symmetric and aligned. Muscle bulk is normal in appearance. Surgical scar present in lumbar spine. Palpation: There is tenderness to palpation along the lumbar paraspinal musculature bilaterally  Lumbar range of motion is full    Bilateral shoulder exam:  Painful range of motion in external rotation and abduction  Tenderness to palpation along the anterior lateral shoulder girdle  Positive Neer's and Prajapati sign  Positive empty can sign     NEUROMUSCULAR:  Patient ambulates in wheelchair  Gait is not assessed  Sensation to light touch is intact in lower extremities  Strength is full in lower extremities  No ankle clonus    Special Tests:  Lumbar facet loading is positive bilaterally  Seated straight leg raise is positive bilaterally      DIAGNOSIS:    ICD-10-CM    1. Suprascapular nerve entrapment, left  G56.82 DC PRQ IMPLTJ NEUROSTIMULATOR ELTRD PERIPHERAL NRV      2. Suprascapular nerve entrapment, right  G56.81 DC PRQ IMPLTJ NEUROSTIMULATOR ELTRD PERIPHERAL NRV      3. Lumbar radiculopathy  M54.16       4. Lumbar degenerative disc disease  M51.36       5. Lumbar post-laminectomy syndrome  M96.1       6. Lumbar spondylosis  M47.816         ASSESSMENT:    Heather Hickman is a 78 y. o.female who presents with chronic low back pain and leg pain.  To review, patient has history of lumbar decompression and fusion from T11-L3 with old, stable compression fractures at T11, T12, L1. She also has a history of L1 vertebroplasty. The patient's history and physical examination are consistent with lumbar radiculopathy as the patient has pain starting in the low back radiating down into the right and left leg. Patient has positive neural tension signs on examination with a positive seated straight leg raise. Additionally the lumbar CT on 6/18/2022 reveals multilevel degenerative changes with neuroforaminal stenosis at L5-S1 on right. She underwent a caudal epidural steroid injection on 12/8/2022 with 100% improvement in her low back pain and slight improvement in her left distal leg nerve pain. Since last visit, she reports worsening bilateral shoulder pain. She has a history of bilateral shoulder surgery. Her history and physical examination are concerning for left and right suprascapular nerve entrapment syndrome. Therefore, I will plan for left then right suprascapular nerve peripheral nerve stimulator implant using ultrasound guidance for improvement in her pain and function. Information provided to the patient about the device company. Neurologically, it appears the patient has full strength and normal sensation. There is no evidence of myelopathy on examination. There are no red flags in the patient's history. The patient has failed conservative measures including outpatient physical therapy, greater than 3 medications for pain relief, a self-directed therapy program, as well as activity modification all within the last 6 weeks over 3 months. The patient's pain has been causing worsening quality of life and function. PLAN:  Medications:     For nonopioid therapy, the following medications were prescribed:    -Consider increasing gabapentin for nerve pain, I will reach out to primary care physician about this    Opioid therapy:  -Not indicated    Interventions:  -Plan for left then right suprascapular nerve peripheral nerve stimulator implant using ultrasound guidance  -Status post caudal epidural steroid injection on 12/8/2022 with 100% improvement in her low back pain and slight improvement in her left distal leg nerve pain  -Consider SIJ injections in future    Imaging:  -No new imaging    Behavioral Therapies:  -Continue daily stretching and home exercise program    Referrals:  -None    Follow-up Plan:  -60 days after implant    Patient was offered intervention where appropriate. Multi-modal Pain Therapy: The patient was explicitly considered for multimodal and interdisciplinary therapy. Non-opioid and non-pharmacological opportunities to enhance analgesia and quality of life have been and will continue to be pursued.     Andra Willson DO  Interventional Pain Management/PM&R   [facility]    Orders Placed This Encounter    TN PRQ IMPLTJ NEUROSTIMULATOR ELTRD PERIPHERAL NRV     Left suprascapular nerve peripheral nerve stimulator implant using ultrasound guidance  Right side 2 weeks

## 2023-04-03 RX ORDER — MAGNESIUM L-LACTATE 84 MG
TABLET, EXTENDED RELEASE ORAL
Qty: 120 TABLET | Refills: 3 | Status: SHIPPED | OUTPATIENT
Start: 2023-04-03

## 2023-04-20 ENCOUNTER — OFFICE VISIT (OUTPATIENT)
Dept: UROLOGY | Age: 78
End: 2023-04-20
Payer: MEDICARE

## 2023-04-20 VITALS
SYSTOLIC BLOOD PRESSURE: 112 MMHG | WEIGHT: 190 LBS | DIASTOLIC BLOOD PRESSURE: 62 MMHG | BODY MASS INDEX: 30.53 KG/M2 | HEIGHT: 66 IN

## 2023-04-20 DIAGNOSIS — N18.30 STAGE 3 CHRONIC KIDNEY DISEASE, UNSPECIFIED WHETHER STAGE 3A OR 3B CKD (HCC): ICD-10-CM

## 2023-04-20 DIAGNOSIS — N20.0 KIDNEY STONES: ICD-10-CM

## 2023-04-20 DIAGNOSIS — Z90.5 H/O PARTIAL NEPHRECTOMY: ICD-10-CM

## 2023-04-20 DIAGNOSIS — N95.2 VAGINAL ATROPHY: ICD-10-CM

## 2023-04-20 DIAGNOSIS — N39.0 FREQUENT UTI: ICD-10-CM

## 2023-04-20 DIAGNOSIS — N39.46 MIXED INCONTINENCE: Primary | ICD-10-CM

## 2023-04-20 PROCEDURE — 1123F ACP DISCUSS/DSCN MKR DOCD: CPT | Performed by: PHYSICIAN ASSISTANT

## 2023-04-20 PROCEDURE — 3017F COLORECTAL CA SCREEN DOC REV: CPT | Performed by: PHYSICIAN ASSISTANT

## 2023-04-20 PROCEDURE — 51798 US URINE CAPACITY MEASURE: CPT | Performed by: PHYSICIAN ASSISTANT

## 2023-04-20 PROCEDURE — 1036F TOBACCO NON-USER: CPT | Performed by: PHYSICIAN ASSISTANT

## 2023-04-20 PROCEDURE — G8417 CALC BMI ABV UP PARAM F/U: HCPCS | Performed by: PHYSICIAN ASSISTANT

## 2023-04-20 PROCEDURE — G8399 PT W/DXA RESULTS DOCUMENT: HCPCS | Performed by: PHYSICIAN ASSISTANT

## 2023-04-20 PROCEDURE — 99213 OFFICE O/P EST LOW 20 MIN: CPT | Performed by: PHYSICIAN ASSISTANT

## 2023-04-20 PROCEDURE — 3074F SYST BP LT 130 MM HG: CPT | Performed by: PHYSICIAN ASSISTANT

## 2023-04-20 PROCEDURE — 0509F URINE INCON PLAN DOCD: CPT | Performed by: PHYSICIAN ASSISTANT

## 2023-04-20 PROCEDURE — 1090F PRES/ABSN URINE INCON ASSESS: CPT | Performed by: PHYSICIAN ASSISTANT

## 2023-04-20 PROCEDURE — 3078F DIAST BP <80 MM HG: CPT | Performed by: PHYSICIAN ASSISTANT

## 2023-04-20 PROCEDURE — G8427 DOCREV CUR MEDS BY ELIG CLIN: HCPCS | Performed by: PHYSICIAN ASSISTANT

## 2023-04-20 RX ORDER — TROSPIUM CHLORIDE 20 MG/1
20 TABLET, FILM COATED ORAL 2 TIMES DAILY
Qty: 180 TABLET | Refills: 3 | Status: SHIPPED | OUTPATIENT
Start: 2023-04-20

## 2023-04-20 ASSESSMENT — ENCOUNTER SYMPTOMS
SHORTNESS OF BREATH: 0
VOMITING: 0
EYE REDNESS: 0
COUGH: 0
BACK PAIN: 0
NAUSEA: 0
COLOR CHANGE: 0
CONSTIPATION: 0
WHEEZING: 0
ABDOMINAL PAIN: 0
APNEA: 0

## 2023-04-20 NOTE — PATIENT INSTRUCTIONS
SURVEY:    You may be receiving a survey from [email or web address] regarding your visit today. Please complete the survey to enable us to provide the highest quality of care to you and your family. If you cannot score us a very good on any question, please call the office to discuss how we could have made your experience a very good one. Thank you.  used

## 2023-04-20 NOTE — PROGRESS NOTES
HPI:    Patient is a 78 y.o. female in no acute distress. She is alert and oriented to person, place, and time. 7/2021 Patient presents today as a new patient referral from Dr. Edith Friend for mixed incontinence and recurrent urinary tract infection. Patient has been seen by urology before. Has not seen urology in many years. She did have a culture proven urinary tract infection 1 month ago. Patient states that she was treated multiple times over the past year for urinary tract infections, again I have only seen one culture sent in the past year. Patient did have a left partial nephrectomy 50 years ago for atrophy/hematuria. Distant history of kidney stones she is also apparently has had \"multiple\" cystoscopies in the past.  She has had many back surgeries as well. He does have chronic kidney disease. She used to follow with nephrology, but no longer. Non smoker. Retired MTD . Patient did have labs approximately a month ago which did show a BUN of 34, creatinine of 1.74, GFR of 29. She does have history of hysterectomy. Patient did have a bladder sling in 1988. Patient does have incontinence throughout the day and night. She does wear a thick/heavy pad which she changes 3-4 times a day. She says that it is saturated almost every time she changes it. She states that some of the time her incontinence is due to not getting there in time. Most of the time her incontinence is when she stands up. She has never been on any anticholinergics. She does have a daily cough which is soft and easy to pass. She only drinks water. She urinates every 30 minutes to 1-1/2 hours. Patient does take Lasix. Patient is an uncontrolled diabetic, her  last hemoglobin A1c was 9.5. She denies any current fever, chills, gross hematuria, flank pain, dysuria. Patient voided - 20 mL, PVR - 0 mL.                  Started on oxybutynin XL 10 mg     9/2021 - US was independently reviewed showing evidence of prior

## 2023-04-21 RX ORDER — GABAPENTIN 100 MG/1
CAPSULE ORAL
Qty: 360 CAPSULE | Refills: 1 | Status: SHIPPED | OUTPATIENT
Start: 2023-04-21 | End: 2023-10-21

## 2023-04-24 NOTE — PROGRESS NOTES
[facility] [place]   Preadmission Testing    Name: Michael Lou  : [id number]  Patient Phone: [phone] (home) [phone] (work)    Procedure: LEFT SUPRASCAPULAR NERVE PERIPHERAL NERVE STIMULATOR IMPLANT USING ULTRASOUND GUIDANCE - Left  Local  Date of Procedure: 2023  Surgeon: Rashida Park DO    Ht:  5' 8\" (172.7 cm)  Wt: 190 lb (86.2 kg)  Wt method: Stated    Allergies: Allergies   Allergen Reactions    Codeine Itching    Adhesive Tape Itching, Rash and Other (See Comments)     Tape \"takes the skin off\"    Lisinopril Other (See Comments)     cough    Norco [Hydrocodone-Acetaminophen] Nausea And Vomiting    Percocet [Oxycodone-Acetaminophen] Itching and Nausea Only                There were no vitals filed for this visit. No LMP recorded (lmp unknown). Patient has had a hysterectomy. Do you take blood thinners? [x] Yes    [] No         Instructed to stop blood thinners prior to procedure? [] Yes    [x] No      [] N/A    []Eliquis - 3 days  []Xarelto - 3 days  []Pradaxa - 5 days  []Coumadin - 5 days   []Plavix - 7 days  []ASA 325mg - 7 days  []Brillinta - 5 days  []Pletal - 2 days   Have you had a respiratory infection or sore throat in last 4 weeks before surgery?     [] Yes    [x] No     Patient instructed on: [x] NPO Status - if receiving sedation  [x] Meds to Take  [x] Ride Home - sedation/ epidurals  [x]No Jewelry/Contact Lenses/Nail Polish     DOS Patient Needs [] HCG   [x] Blood Sugar  [] PT/INR

## 2023-04-27 ENCOUNTER — HOSPITAL ENCOUNTER (OUTPATIENT)
Age: 78
Setting detail: OUTPATIENT SURGERY
Discharge: HOME | End: 2023-04-27
Attending: STUDENT IN AN ORGANIZED HEALTH CARE EDUCATION/TRAINING PROGRAM | Admitting: STUDENT IN AN ORGANIZED HEALTH CARE EDUCATION/TRAINING PROGRAM
Payer: MEDICARE

## 2023-04-27 ENCOUNTER — APPOINTMENT (OUTPATIENT)
Dept: GENERAL RADIOLOGY | Age: 78
End: 2023-04-27
Attending: STUDENT IN AN ORGANIZED HEALTH CARE EDUCATION/TRAINING PROGRAM
Payer: MEDICARE

## 2023-04-27 VITALS
RESPIRATION RATE: 16 BRPM | DIASTOLIC BLOOD PRESSURE: 82 MMHG | BODY MASS INDEX: 28.48 KG/M2 | OXYGEN SATURATION: 100 % | HEIGHT: 68 IN | TEMPERATURE: 97 F | SYSTOLIC BLOOD PRESSURE: 188 MMHG | HEART RATE: 70 BPM | WEIGHT: 187.9 LBS

## 2023-04-27 LAB — GLUCOSE BLD-MCNC: 127 MG/DL (ref 65–99)

## 2023-04-27 PROCEDURE — C1767 GENERATOR, NEURO NON-RECHARG: HCPCS | Performed by: STUDENT IN AN ORGANIZED HEALTH CARE EDUCATION/TRAINING PROGRAM

## 2023-04-27 PROCEDURE — 76942 ECHO GUIDE FOR BIOPSY: CPT | Performed by: STUDENT IN AN ORGANIZED HEALTH CARE EDUCATION/TRAINING PROGRAM

## 2023-04-27 PROCEDURE — 3600000058 HC PAIN LEVEL 5 BASE: Performed by: STUDENT IN AN ORGANIZED HEALTH CARE EDUCATION/TRAINING PROGRAM

## 2023-04-27 PROCEDURE — 64555 IMPLANT NEUROELECTRODES: CPT | Performed by: STUDENT IN AN ORGANIZED HEALTH CARE EDUCATION/TRAINING PROGRAM

## 2023-04-27 PROCEDURE — 2500000003 HC RX 250 WO HCPCS: Performed by: STUDENT IN AN ORGANIZED HEALTH CARE EDUCATION/TRAINING PROGRAM

## 2023-04-27 PROCEDURE — 7100000011 HC PHASE II RECOVERY - ADDTL 15 MIN: Performed by: STUDENT IN AN ORGANIZED HEALTH CARE EDUCATION/TRAINING PROGRAM

## 2023-04-27 PROCEDURE — 7100000010 HC PHASE II RECOVERY - FIRST 15 MIN: Performed by: STUDENT IN AN ORGANIZED HEALTH CARE EDUCATION/TRAINING PROGRAM

## 2023-04-27 PROCEDURE — 2709999900 HC NON-CHARGEABLE SUPPLY: Performed by: STUDENT IN AN ORGANIZED HEALTH CARE EDUCATION/TRAINING PROGRAM

## 2023-04-27 PROCEDURE — 82947 ASSAY GLUCOSE BLOOD QUANT: CPT

## 2023-04-27 PROCEDURE — 3600000059 HC PAIN LEVEL 5 ADDL 15 MIN: Performed by: STUDENT IN AN ORGANIZED HEALTH CARE EDUCATION/TRAINING PROGRAM

## 2023-04-27 RX ORDER — LIDOCAINE HYDROCHLORIDE 10 MG/ML
INJECTION, SOLUTION EPIDURAL; INFILTRATION; INTRACAUDAL; PERINEURAL PRN
Status: DISCONTINUED | OUTPATIENT
Start: 2023-04-27 | End: 2023-04-27 | Stop reason: ALTCHOICE

## 2023-04-27 ASSESSMENT — PAIN - FUNCTIONAL ASSESSMENT: PAIN_FUNCTIONAL_ASSESSMENT: 0-10

## 2023-04-27 ASSESSMENT — PAIN DESCRIPTION - DESCRIPTORS: DESCRIPTORS: ACHING

## 2023-04-27 NOTE — H&P
Update History & Physical    The patient's History and Physical of March 9, 2023 was reviewed with the patient and I examined the patient. There was no change. The surgical site was confirmed by the patient and me. Plan: The risks, benefits, expected outcome, and alternative to the recommended procedure have been discussed with the patient. Patient understands and wants to proceed with the procedure. Electronically signed by April Keen DO on 4/27/2023 at 11:50 AM    Chronic Pain Clinic Note     Encounter Date: 4/27/2023     SUBJECTIVE:  No chief complaint on file. History of Present Illness:   Imelda Garcia is a 78 y.o. female who presents to follow up on lumbar back pain. She states that her pain is tolerable. Medication Refill: N/A    Current Complaints of Pain:   Location: lateral LLE - mild   Radiation: intermittent LLE  Severity: mild  Pain Numerical Score -    Average: 1-2 (leg) (back-0)  Highest: 4 - leg (back 0)  Lowest: 1 - (leg) Back 0)  Character/Quality: Complains of pain that is aching  Timing: Keeps awake at night, constant   Associated symptoms: none  Numbess: burning on anterior thigh   Weakness: Yes  Exacerbating factors: Activity  Alleviating factors: Rest  Length of time pain has been present: Started on   Inciting event/injury: after surgery of left femur. Shattered femur. Bowel/Bladder incontinence: No  Falls: No  Physical Therapy: yes    History of Interventions:   Surgery: 6 back surgeries  Injections: epidurals     Imaging:    CT lumbar 6/18/22    FINDINGS: There are degenerative changes again seen of the visualized    sacroiliac joints. There are postsurgical changes again seen from prior bone    graft harvest involving the posterosuperior aspects of the iliac bones    bilaterally. There is a rotatory dextroconvex scoliosis of the lumbar spine    again seen centered at the L2-L3 level.  There is a stable appearance of remote    mild anterior compression deformities of

## 2023-04-27 NOTE — DISCHARGE INSTRUCTIONS
You have received an injection of local anesthetic and corticosteroids. The local anesthetic effect typically last 4-6 hours. It may take 3-7 days for the corticosteroids to reach optimal effect. Please pay close attention to the following information/instructions: You may not drive for 12 hours after your injection. It is common to experience mild soreness at the injection site(s) for 24-48 hours. Ice is the best remedy. You may apply ice for 20 minutes at a time several times a day as needed. Avoid heat to the injection area for 72 hours. No hot packs, saunas, or steam rooms during this time. A regular shower is OK. You may immediately restart your regular medication regimen, including pain medications, anti-inflammatory, and blood thinners. Please remove the sterile dressing/band-aid tonight or tomorrow morning. Do not leave it on after you have taken a shower or gotten it wet. Corticosteroid side effects can occur after this injection, but they usually resolve after several days. These side effects can include flushing, hot flashes, mild palpitations, insomnia, water retention, feeling anxious/restless, or headaches. While it is extremely rare to get an infection after a spinal procedure, please call the office if you develop any signs of infection. These signs include fevers/chills, severely increased pain, redness at the injections site, or any drainage from the injection site. Remember that the corticosteroid benefit (long-term pain relief) from an injection can take as long as 10 days to occur. You may have a period of slightly increased pain after your injection before the cortisone takes effect. You may resume all of your normal daily activities 24 hours after your injection. It is OK to restart your exercise or physical therapy program as soon as you feel comfortable doing so. Please complete the pain diary given to you after your injection.  The information you provide on this

## 2023-04-27 NOTE — OP NOTE
PROCEDURE PERFORMED:  Left Suprascapular nerve peripheral nerve stimulator implantation using ultrasound guidance    PREOPERATIVE DIAGNOSIS: Left suprascapular nerve entrapment    INDICATIONS: Chronic left shoulder pain    The patient's history and physical exam were reviewed. The risk, benefits, and alternatives of the procedure were discussed and all questions were answered to the patient's satisfaction. The patient agreed to proceed and written informed consent was obtained. POSTOPERATIVE DIAGNOSIS: Same    PHYSICIAN:  Dr. Karina De Guzman DO    ANESTHESIA:  LOCAL    ASSISTANT:  NONE    PATHOLOGY:  NONE    ESTIMATED BLOOD LOSS:  N/A    IMPLANTS: Peripheral nerve stimulator    PROCEDURE DESCRIPTION: Left Suprascapular nerve peripheral nerve stimulator implantation using ultrasound guidance    The patient was placed on the operative bed in seated position. The area was prepped with Chlorhexidine. The area was then draped in a sterile fashion. An ultrasound transducer was placed over the scapula and was used to identify the scapular notch. Then, the skin and subcutaneous tissues in the area were anesthetized with 1% lidocaine. A percutaneous sleeve and stimulating probe lead introduction system were assembled, inserted and advanced along the intended course of the suprascapular nerve, taking care to maintain the proper depth of insertion as the introducer was advanced under ultrasound guidance. The introducer needle was delivered to a location in proximity to the nerve. Multiple stimulation parameters were used to deliver stimulation to the suprascapular nerve in concert with stimulating at multiple positions around the nerve. Nerve target acquisition was confirmed noting generation of paresthesia in the shoulder corresponding to the nerve being stimulated.   Various electrical parameter combinations were tested, and the lead location was adjusted until the patient indicated paresthesia overlapping the

## 2023-05-08 NOTE — PROGRESS NOTES
[place]   Preadmission Testing    Name: Roxane Kahn  : [id number]  Patient Phone: [phone] (home) [phone] (work)    Procedure: \RIGHT SUPRASCAPULAR NERVE PERIPHERAL NERVE  STIMULATOR IMPLANT USING ULTRASOUND GUIDANCE - Right  Local  Date of Procedure: 2023  Surgeon: Susanne Vasquez DO    Ht:  5' 8\" (172.7 cm)  Wt: 180 lb (81.6 kg)  Wt method: Stated    Allergies: Allergies   Allergen Reactions    Codeine Itching    Adhesive Tape Itching, Rash and Other (See Comments)     Tape \"takes the skin off\"    Lisinopril Other (See Comments)     cough    Norco [Hydrocodone-Acetaminophen] Nausea And Vomiting    Percocet [Oxycodone-Acetaminophen] Itching and Nausea Only                There were no vitals filed for this visit. No LMP recorded (lmp unknown). Patient has had a hysterectomy. Do you take blood thinners? [] Yes    [x] No         Instructed to stop blood thinners prior to procedure? [] Yes    [] No      [x] N/A    []Eliquis - 3 days  []Xarelto - 3 days  []Pradaxa - 5 days  []Coumadin - 5 days   []Plavix - 7 days  []ASA 325mg - 7 days  []Brillinta - 5 days  []Pletal - 2 days   Have you had a respiratory infection or sore throat in last 4 weeks before surgery?     [] Yes    [x] No     Patient instructed on: [x] NPO Status - if receiving sedation  [x] Meds to Take  [x] Ride Home - sedation/ epidurals  [x]No Jewelry/Contact Lenses/Nail Polish     DOS Patient Needs [] HCG   [] Blood Sugar  [] PT/INR

## 2023-05-09 ENCOUNTER — OFFICE VISIT (OUTPATIENT)
Dept: FAMILY MEDICINE CLINIC | Age: 78
End: 2023-05-09
Payer: MEDICARE

## 2023-05-09 ENCOUNTER — HOSPITAL ENCOUNTER (OUTPATIENT)
Age: 78
Discharge: HOME | End: 2023-05-09
Payer: MEDICARE

## 2023-05-09 VITALS
HEART RATE: 78 BPM | SYSTOLIC BLOOD PRESSURE: 138 MMHG | BODY MASS INDEX: 28.04 KG/M2 | HEIGHT: 68 IN | WEIGHT: 185 LBS | DIASTOLIC BLOOD PRESSURE: 74 MMHG | OXYGEN SATURATION: 97 %

## 2023-05-09 DIAGNOSIS — R68.89 COLD INTOLERANCE: ICD-10-CM

## 2023-05-09 DIAGNOSIS — E83.42 HYPOMAGNESEMIA: ICD-10-CM

## 2023-05-09 DIAGNOSIS — E11.9 TYPE 2 DIABETES MELLITUS WITHOUT COMPLICATION, WITHOUT LONG-TERM CURRENT USE OF INSULIN (HCC): ICD-10-CM

## 2023-05-09 DIAGNOSIS — N18.31 STAGE 3A CHRONIC KIDNEY DISEASE (HCC): ICD-10-CM

## 2023-05-09 DIAGNOSIS — D64.9 NORMOCYTIC ANEMIA: ICD-10-CM

## 2023-05-09 DIAGNOSIS — E83.42 HYPOMAGNESEMIA: Primary | ICD-10-CM

## 2023-05-09 LAB
ABSOLUTE EOS #: 0.1 K/UL (ref 0–0.4)
ABSOLUTE LYMPH #: 2.9 K/UL (ref 1–4.8)
ABSOLUTE MONO #: 0.4 K/UL (ref 0–1)
ANION GAP SERPL CALCULATED.3IONS-SCNC: 11 MMOL/L (ref 9–17)
BASOPHILS # BLD: 1 % (ref 0–2)
BASOPHILS ABSOLUTE: 0.1 K/UL (ref 0–0.2)
BUN SERPL-MCNC: 30 MG/DL (ref 8–23)
BUN/CREAT BLD: 24 (ref 9–20)
CALCIUM SERPL-MCNC: 10.1 MG/DL (ref 8.6–10.4)
CHLORIDE SERPL-SCNC: 102 MMOL/L (ref 98–107)
CO2 SERPL-SCNC: 24 MMOL/L (ref 20–31)
CREAT SERPL-MCNC: 1.24 MG/DL (ref 0.5–0.9)
DIFFERENTIAL TYPE: YES
EOSINOPHILS RELATIVE PERCENT: 2 % (ref 0–5)
FOLATE SERPL-MCNC: >20 NG/ML
GFR SERPL CREATININE-BSD FRML MDRD: 45 ML/MIN/1.73M2
GLUCOSE SERPL-MCNC: 95 MG/DL (ref 70–99)
HCT VFR BLD AUTO: 32.9 % (ref 36–46)
HGB BLD-MCNC: 10.6 G/DL (ref 12–16)
IRON SATURATION: 24 % (ref 20–55)
IRON SERPL-MCNC: 75 UG/DL (ref 37–145)
LYMPHOCYTES # BLD: 37 % (ref 15–40)
MAGNESIUM SERPL-MCNC: 1.9 MG/DL (ref 1.6–2.6)
MCH RBC QN AUTO: 31.1 PG (ref 26–34)
MCHC RBC AUTO-ENTMCNC: 32.3 G/DL (ref 31–37)
MCV RBC AUTO: 96.4 FL (ref 80–100)
MONOCYTES # BLD: 6 % (ref 4–8)
PDW BLD-RTO: 15.8 % (ref 12.1–15.2)
PLATELET # BLD AUTO: 257 K/UL (ref 140–450)
POTASSIUM SERPL-SCNC: 4.3 MMOL/L (ref 3.7–5.3)
RBC # BLD: 3.42 M/UL (ref 4–5.2)
SEG NEUTROPHILS: 54 % (ref 47–75)
SEGMENTED NEUTROPHILS ABSOLUTE COUNT: 4.2 K/UL (ref 2.5–7)
SODIUM SERPL-SCNC: 137 MMOL/L (ref 135–144)
TIBC SERPL-MCNC: 308 UG/DL (ref 250–450)
TSH SERPL-ACNC: 2.48 UIU/ML (ref 0.3–5)
UNSATURATED IRON BINDING CAPACITY: 233 UG/DL (ref 112–347)
VIT B12 SERPL-MCNC: 429 PG/ML (ref 232–1245)
WBC # BLD AUTO: 7.7 K/UL (ref 3.5–11)

## 2023-05-09 PROCEDURE — G8417 CALC BMI ABV UP PARAM F/U: HCPCS | Performed by: FAMILY MEDICINE

## 2023-05-09 PROCEDURE — 83036 HEMOGLOBIN GLYCOSYLATED A1C: CPT

## 2023-05-09 PROCEDURE — 83550 IRON BINDING TEST: CPT

## 2023-05-09 PROCEDURE — G8427 DOCREV CUR MEDS BY ELIG CLIN: HCPCS | Performed by: FAMILY MEDICINE

## 2023-05-09 PROCEDURE — 82746 ASSAY OF FOLIC ACID SERUM: CPT

## 2023-05-09 PROCEDURE — 83735 ASSAY OF MAGNESIUM: CPT

## 2023-05-09 PROCEDURE — 82607 VITAMIN B-12: CPT

## 2023-05-09 PROCEDURE — 3077F SYST BP >= 140 MM HG: CPT | Performed by: FAMILY MEDICINE

## 2023-05-09 PROCEDURE — 84443 ASSAY THYROID STIM HORMONE: CPT

## 2023-05-09 PROCEDURE — 1090F PRES/ABSN URINE INCON ASSESS: CPT | Performed by: FAMILY MEDICINE

## 2023-05-09 PROCEDURE — 3044F HG A1C LEVEL LT 7.0%: CPT | Performed by: FAMILY MEDICINE

## 2023-05-09 PROCEDURE — 3078F DIAST BP <80 MM HG: CPT | Performed by: FAMILY MEDICINE

## 2023-05-09 PROCEDURE — 85025 COMPLETE CBC W/AUTO DIFF WBC: CPT

## 2023-05-09 PROCEDURE — 1036F TOBACCO NON-USER: CPT | Performed by: FAMILY MEDICINE

## 2023-05-09 PROCEDURE — 2022F DILAT RTA XM EVC RTNOPTHY: CPT | Performed by: FAMILY MEDICINE

## 2023-05-09 PROCEDURE — G8399 PT W/DXA RESULTS DOCUMENT: HCPCS | Performed by: FAMILY MEDICINE

## 2023-05-09 PROCEDURE — 3017F COLORECTAL CA SCREEN DOC REV: CPT | Performed by: FAMILY MEDICINE

## 2023-05-09 PROCEDURE — 83540 ASSAY OF IRON: CPT

## 2023-05-09 PROCEDURE — 36415 COLL VENOUS BLD VENIPUNCTURE: CPT

## 2023-05-09 PROCEDURE — 1123F ACP DISCUSS/DSCN MKR DOCD: CPT | Performed by: FAMILY MEDICINE

## 2023-05-09 PROCEDURE — 80048 BASIC METABOLIC PNL TOTAL CA: CPT

## 2023-05-09 PROCEDURE — 99214 OFFICE O/P EST MOD 30 MIN: CPT | Performed by: FAMILY MEDICINE

## 2023-05-09 RX ORDER — MAGNESIUM L-LACTATE 84 MG
TABLET, EXTENDED RELEASE ORAL
Qty: 360 TABLET | Refills: 1 | Status: SHIPPED | OUTPATIENT
Start: 2023-05-09 | End: 2023-05-11 | Stop reason: SDUPTHER

## 2023-05-09 RX ORDER — LANOLIN ALCOHOL/MO/W.PET/CERES
400 CREAM (GRAM) TOPICAL DAILY
Qty: 90 TABLET | Refills: 1 | Status: SHIPPED | OUTPATIENT
Start: 2023-05-09

## 2023-05-09 SDOH — ECONOMIC STABILITY: INCOME INSECURITY: HOW HARD IS IT FOR YOU TO PAY FOR THE VERY BASICS LIKE FOOD, HOUSING, MEDICAL CARE, AND HEATING?: NOT HARD AT ALL

## 2023-05-09 SDOH — ECONOMIC STABILITY: FOOD INSECURITY: WITHIN THE PAST 12 MONTHS, THE FOOD YOU BOUGHT JUST DIDN'T LAST AND YOU DIDN'T HAVE MONEY TO GET MORE.: NEVER TRUE

## 2023-05-09 SDOH — ECONOMIC STABILITY: FOOD INSECURITY: WITHIN THE PAST 12 MONTHS, YOU WORRIED THAT YOUR FOOD WOULD RUN OUT BEFORE YOU GOT MONEY TO BUY MORE.: NEVER TRUE

## 2023-05-09 SDOH — ECONOMIC STABILITY: HOUSING INSECURITY
IN THE LAST 12 MONTHS, WAS THERE A TIME WHEN YOU DID NOT HAVE A STEADY PLACE TO SLEEP OR SLEPT IN A SHELTER (INCLUDING NOW)?: NO

## 2023-05-09 NOTE — PATIENT INSTRUCTIONS
[employer] SURVEY:    You may be receiving a survey from [employer] regarding your visit today. You may get this in the mail, through your MyChart or in your email. Please complete the survey to enable us to provide the highest quality of care to you and your family. If you cannot score us as very good ( 5 Stars) on any question, please feel free to call the office to discuss how we could have made your experience exceptional.     Thank you.     Clinical Care Team:   Bethel Syed [address], [address] [facility]                                     Triage: Andre Briones, [address] Team:    [address] [facility]                                      Jason Hurleys

## 2023-05-09 NOTE — PROGRESS NOTES
BY MOUTH  DAILY 2/6/23  Yes Chantal Desir DO   atorvastatin (LIPITOR) 40 MG tablet TAKE 1 TABLET BY MOUTH  DAILY 2/6/23  Yes Chantal Desir DO   clopidogrel (PLAVIX) 75 MG tablet TAKE 1 TABLET BY MOUTH  DAILY 2/2/23  Yes Chantal Desir DO   sertraline (ZOLOFT) 25 MG tablet Take 1 (ONE) tablet by mouth daily 1/30/23  Yes Chantal Desir DO   furosemide (LASIX) 20 MG tablet TAKE 1 TABLET BY MOUTH  DAILY 12/27/22  Yes Chantal Desir DO   glipiZIDE (GLUCOTROL) 5 MG tablet TAKE 1 TABLET BY MOUTH IN  THE MORNING AND AT BEDTIME 12/27/22  Yes Chantal Desir DO   traZODone (DESYREL) 50 MG tablet TAKE 1 TABLET BY MOUTH AT  NIGHT 12/27/22  Yes Chantal Desir DO   aspirin EC 81 MG EC tablet Take 1 tablet by mouth daily 10/28/22  Yes Chantal Desir DO   Blood Glucose Monitoring Suppl (TRUE METRIX AIR GLUCOSE METER) SARAH True Metrix Glucose Meter   use TWICE DAILY AS DIRECTED.    Yes Historical Provider, MD   blood glucose monitor strips Test blood glucose daily 10/29/21  Yes Chantal Desir DO   Lancets MISC 1 each by Does not apply route 2 times daily 8/27/21  Yes YOHAN Fry CNP   Blood Glucose Monitoring Suppl (TRUE METRIX METER) w/Device KIT Test once daily 2/9/21  Yes Chantal Desir DO   acetaminophen (TYLENOL) 650 MG CR tablet Take 2 tablets by mouth every 8 hours as needed for Pain   Yes Historical Provider, MD   magnesium lactate (MAGTAB) 84 MG (7MEQ) TBCR extended release tablet TAKE 2 TABLETS BY MOUTH IN THE MORNING and TAKE 2 TABLETS IN THE EVENING 5/11/23   Chantal Desir DO   vitamin B-12 (CYANOCOBALAMIN) 500 MCG tablet Take 1 tablet by mouth daily    Historical Provider, MD   ciprofloxacin (CIPRO) 250 MG tablet Take 1 tablet by mouth 2 times daily for 7 days For UTI 5/10/23 5/17/23  Chantal Desir DO        Allergies:       Codeine, Adhesive tape, Lisinopril, Norco [hydrocodone-acetaminophen], and Percocet [oxycodone-acetaminophen]    Physical Exam:     Vitals:  /74

## 2023-05-10 ENCOUNTER — TELEPHONE (OUTPATIENT)
Dept: FAMILY MEDICINE CLINIC | Age: 78
End: 2023-05-10

## 2023-05-10 LAB
EST. AVERAGE GLUCOSE BLD GHB EST-MCNC: 123 MG/DL
HBA1C MFR BLD: 5.9 % (ref 4–6)

## 2023-05-10 RX ORDER — CIPROFLOXACIN 250 MG/1
250 TABLET, FILM COATED ORAL 2 TIMES DAILY
Qty: 14 TABLET | Refills: 0 | Status: SHIPPED | OUTPATIENT
Start: 2023-05-10 | End: 2023-05-17

## 2023-05-10 NOTE — TELEPHONE ENCOUNTER
Having lower abdominal pain, dysuria. A little more incontinent. Felt it yesterday but forgot, worse today. Get an antibiotic? Last visit:  5/9/2023  Next Visit Date:    Future Appointments   Date Time Provider Natanael Austin   6/8/2023 10:00 AM Ion Colindres,  [facility] PAIN [facility]   8/10/2023 10:00 AM Gomez David DO [facility] MED [facility]   11/30/2023  9:30 AM SHARMILA Mas urol [facility]         Medication List:  Prior to Admission medications    Medication Sig Start Date End Date Taking?  Authorizing Provider   folic acid (FOLVITE) 813 MCG tablet Take 1 tablet by mouth daily 5/9/23   Gomez Hora, DO   magnesium lactate (MAGTAB) 84 MG (7MEQ) TBCR extended release tablet TAKE 2 TABLETS BY MOUTH IN THE MORNING and TAKE 2 TABLETS IN THE EVENING 5/9/23   Gomze Hora, DO   gabapentin (NEURONTIN) 100 MG capsule 2 po bid 4/21/23 10/21/23  Gomez Hora, DO   trospium (SANCTURA) 20 MG tablet Take 1 tablet by mouth 2 times daily 4/20/23   Lamar Preston PA-C   omeprazole (PRILOSEC) 20 MG delayed release capsule TAKE 1 CAPSULE BY MOUTH  DAILY 2/8/23   Gomez Hora, DO   allopurinol (ZYLOPRIM) 100 MG tablet TAKE 2 TABLETS BY MOUTH  DAILY 2/6/23   Gomez Hora, DO   atorvastatin (LIPITOR) 40 MG tablet TAKE 1 TABLET BY MOUTH  DAILY 2/6/23   Gomez Hora, DO   clopidogrel (PLAVIX) 75 MG tablet TAKE 1 TABLET BY MOUTH  DAILY 2/2/23   Gomez Hora, DO   sertraline (ZOLOFT) 25 MG tablet Take 1 (ONE) tablet by mouth daily 1/30/23   Gomez Hora, DO   furosemide (LASIX) 20 MG tablet TAKE 1 TABLET BY MOUTH  DAILY 12/27/22   Gomez Pearla, DO   glipiZIDE (GLUCOTROL) 5 MG tablet TAKE 1 TABLET BY MOUTH IN  THE MORNING AND AT BEDTIME 12/27/22   Gomez Hora, DO   traZODone (DESYREL) 50 MG tablet TAKE 1 TABLET BY MOUTH AT  NIGHT 12/27/22   Gomez David DO   aspirin EC 81 MG EC tablet Take 1 tablet by mouth daily 10/28/22   Gomez David DO   Blood Glucose

## 2023-05-10 NOTE — TELEPHONE ENCOUNTER
Rx sent.  Can cause hypoglycemia with her glipizide so recommend only taking glipizide once a day (AM with food) while taking antibiotic

## 2023-05-11 ENCOUNTER — HOSPITAL ENCOUNTER (OUTPATIENT)
Age: 78
Setting detail: OUTPATIENT SURGERY
Discharge: HOME | End: 2023-05-11
Attending: STUDENT IN AN ORGANIZED HEALTH CARE EDUCATION/TRAINING PROGRAM | Admitting: STUDENT IN AN ORGANIZED HEALTH CARE EDUCATION/TRAINING PROGRAM
Payer: MEDICARE

## 2023-05-11 VITALS
OXYGEN SATURATION: 98 % | HEIGHT: 68 IN | WEIGHT: 180 LBS | DIASTOLIC BLOOD PRESSURE: 102 MMHG | SYSTOLIC BLOOD PRESSURE: 168 MMHG | TEMPERATURE: 98 F | HEART RATE: 75 BPM | BODY MASS INDEX: 27.28 KG/M2 | RESPIRATION RATE: 16 BRPM

## 2023-05-11 LAB — GLUCOSE BLD-MCNC: 97 MG/DL (ref 65–99)

## 2023-05-11 PROCEDURE — 7100000011 HC PHASE II RECOVERY - ADDTL 15 MIN: Performed by: STUDENT IN AN ORGANIZED HEALTH CARE EDUCATION/TRAINING PROGRAM

## 2023-05-11 PROCEDURE — 7100000010 HC PHASE II RECOVERY - FIRST 15 MIN: Performed by: STUDENT IN AN ORGANIZED HEALTH CARE EDUCATION/TRAINING PROGRAM

## 2023-05-11 PROCEDURE — 3600000059 HC PAIN LEVEL 5 ADDL 15 MIN: Performed by: STUDENT IN AN ORGANIZED HEALTH CARE EDUCATION/TRAINING PROGRAM

## 2023-05-11 PROCEDURE — C1778 LEAD, NEUROSTIMULATOR: HCPCS | Performed by: STUDENT IN AN ORGANIZED HEALTH CARE EDUCATION/TRAINING PROGRAM

## 2023-05-11 PROCEDURE — 2709999900 HC NON-CHARGEABLE SUPPLY: Performed by: STUDENT IN AN ORGANIZED HEALTH CARE EDUCATION/TRAINING PROGRAM

## 2023-05-11 PROCEDURE — 76942 ECHO GUIDE FOR BIOPSY: CPT | Performed by: STUDENT IN AN ORGANIZED HEALTH CARE EDUCATION/TRAINING PROGRAM

## 2023-05-11 PROCEDURE — 64555 IMPLANT NEUROELECTRODES: CPT | Performed by: STUDENT IN AN ORGANIZED HEALTH CARE EDUCATION/TRAINING PROGRAM

## 2023-05-11 PROCEDURE — 82947 ASSAY GLUCOSE BLOOD QUANT: CPT

## 2023-05-11 PROCEDURE — 2500000003 HC RX 250 WO HCPCS: Performed by: STUDENT IN AN ORGANIZED HEALTH CARE EDUCATION/TRAINING PROGRAM

## 2023-05-11 PROCEDURE — 3600000058 HC PAIN LEVEL 5 BASE: Performed by: STUDENT IN AN ORGANIZED HEALTH CARE EDUCATION/TRAINING PROGRAM

## 2023-05-11 DEVICE — STIMULATOR NERVE PERIPH ENDURA ELECTR PERC FOR PAIN REL: Type: IMPLANTABLE DEVICE | Site: SHOULDER | Status: FUNCTIONAL

## 2023-05-11 RX ORDER — MAGNESIUM L-LACTATE 84 MG
TABLET, EXTENDED RELEASE ORAL
Qty: 360 TABLET | Refills: 1 | Status: SHIPPED | OUTPATIENT
Start: 2023-05-11

## 2023-05-11 RX ORDER — LIDOCAINE HYDROCHLORIDE 10 MG/ML
INJECTION, SOLUTION EPIDURAL; INFILTRATION; INTRACAUDAL; PERINEURAL PRN
Status: DISCONTINUED | OUTPATIENT
Start: 2023-05-11 | End: 2023-05-11 | Stop reason: ALTCHOICE

## 2023-05-11 RX ORDER — CHOLECALCIFEROL (VITAMIN D3) 125 MCG
500 CAPSULE ORAL DAILY
COMMUNITY

## 2023-05-11 ASSESSMENT — PAIN - FUNCTIONAL ASSESSMENT: PAIN_FUNCTIONAL_ASSESSMENT: NONE - DENIES PAIN

## 2023-05-11 NOTE — DISCHARGE INSTRUCTIONS
injection. The information you provide on this diary is used to guide your treatment plan. You should schedule a follow-up visit in 2-3 weeks to review your response to this injection. Please bring your pain diary with you to this appointment. Education Materials Received: yes  Belongings Returned: yes    Resume all current outpatient medication(s). The discharge instructions have been reviewed with the patient and/or Guardian. Patient and/or Guardian signed and retained a printed copy.

## 2023-05-11 NOTE — TELEPHONE ENCOUNTER
Send magnesium to [facility], [facility] will not fill      Last visit:  5/9/2023  Next Visit Date:    Future Appointments   Date Time Provider Natanael Austin   6/8/2023 10:00 AM Ion Wilhelm,  [facility] PAIN [facility]   8/10/2023 10:00 AM Bonnie Liu DO [facility] MED [facility]   11/30/2023  9:30 AM SHARMILA Davis urol [facility]         Medication List:  Prior to Admission medications    Medication Sig Start Date End Date Taking?  Authorizing Provider   ciprofloxacin (CIPRO) 250 MG tablet Take 1 tablet by mouth 2 times daily for 7 days For UTI 5/10/23 5/17/23  Bonnie Liu, DO   folic acid (FOLVITE) 407 MCG tablet Take 1 tablet by mouth daily 5/9/23   Bonnie Liu, DO   magnesium lactate (MAGTAB) 84 MG (7MEQ) TBCR extended release tablet TAKE 2 TABLETS BY MOUTH IN THE MORNING and TAKE 2 TABLETS IN THE EVENING 5/9/23   Bonnie Liu, DO   gabapentin (NEURONTIN) 100 MG capsule 2 po bid 4/21/23 10/21/23  Bonnie Liu, DO   trospium (SANCTURA) 20 MG tablet Take 1 tablet by mouth 2 times daily 4/20/23   Kathleen Preston PA-C   omeprazole (PRILOSEC) 20 MG delayed release capsule TAKE 1 CAPSULE BY MOUTH  DAILY 2/8/23   Bonnie Liu, DO   allopurinol (ZYLOPRIM) 100 MG tablet TAKE 2 TABLETS BY MOUTH  DAILY 2/6/23   Bonnie Liu, DO   atorvastatin (LIPITOR) 40 MG tablet TAKE 1 TABLET BY MOUTH  DAILY 2/6/23   Bonnie Liu, DO   clopidogrel (PLAVIX) 75 MG tablet TAKE 1 TABLET BY MOUTH  DAILY 2/2/23   Bonnie Liu, DO   sertraline (ZOLOFT) 25 MG tablet Take 1 (ONE) tablet by mouth daily 1/30/23   Bonnie Liu, DO   furosemide (LASIX) 20 MG tablet TAKE 1 TABLET BY MOUTH  DAILY 12/27/22   Bonnie Liu, DO   glipiZIDE (GLUCOTROL) 5 MG tablet TAKE 1 TABLET BY MOUTH IN  THE MORNING AND AT BEDTIME 12/27/22   Bonnie Liu, DO   traZODone (DESYREL) 50 MG tablet TAKE 1 TABLET BY MOUTH AT  NIGHT 12/27/22   Bonnie Liu DO   aspirin EC 81 MG EC tablet Take 1 tablet by mouth daily 10/28/22

## 2023-05-11 NOTE — OP NOTE
PROCEDURE PERFORMED:  Right Suprascapular nerve peripheral nerve stimulator implantation using ultrasound guidance    PREOPERATIVE DIAGNOSIS: Right suprascapular nerve entrapment    INDICATIONS: Chronic right shoulder pain    The patient's history and physical exam were reviewed. The risk, benefits, and alternatives of the procedure were discussed and all questions were answered to the patient's satisfaction. The patient agreed to proceed and written informed consent was obtained. POSTOPERATIVE DIAGNOSIS: Same    PHYSICIAN:  Dr. Brennan Harrison DO    ANESTHESIA:  LOCAL    ASSISTANT:  NONE    PATHOLOGY:  NONE    ESTIMATED BLOOD LOSS:  N/A    IMPLANTS: Peripheral nerve stimulator    PROCEDURE DESCRIPTION: Right Suprascapular nerve peripheral nerve stimulator implantation using ultrasound guidance    The patient was placed on the operative bed in seated position. The area was prepped with Chlorhexidine. The area was then draped in a sterile fashion. An ultrasound transducer was placed over the scapula and was used to identify the scapular notch. Then, the skin and subcutaneous tissues in the area were anesthetized with 1% lidocaine. A percutaneous sleeve and stimulating probe lead introduction system were assembled, inserted and advanced along the intended course of the suprascapular nerve, taking care to maintain the proper depth of insertion as the introducer was advanced under ultrasound guidance. The introducer needle was delivered to a location in proximity to the nerve. Multiple stimulation parameters were used to deliver stimulation to the suprascapular nerve in concert with stimulating at multiple positions around the nerve. Nerve target acquisition was confirmed noting generation of paresthesia in the shoulder corresponding to the nerve being stimulated.   Various electrical parameter combinations were tested, and the lead location was adjusted until the patient indicated paresthesia overlapping

## 2023-05-11 NOTE — H&P
Update History & Physical    The patient's History and Physical of March 9, 2023 was reviewed with the patient and I examined the patient. There was no change. The surgical site was confirmed by the patient and me. Plan: The risks, benefits, expected outcome, and alternative to the recommended procedure have been discussed with the patient. Patient understands and wants to proceed with the procedure. Electronically signed by Nikia Julio DO on 5/11/2023 at 11:58 AM    Chronic Pain Clinic Note     Encounter Date: 5/11/2023     SUBJECTIVE:  No chief complaint on file. History of Present Illness:   Mary Doherty is a 78 y.o. female who presents to follow up on lumbar back pain. She states that her pain is tolerable. Medication Refill: N/A    Current Complaints of Pain:   Location: lateral LLE - mild   Radiation: intermittent LLE  Severity: mild  Pain Numerical Score -    Average: 1-2 (leg) (back-0)  Highest: 4 - leg (back 0)  Lowest: 1 - (leg) Back 0)  Character/Quality: Complains of pain that is aching  Timing: Keeps awake at night, constant   Associated symptoms: none  Numbess: burning on anterior thigh   Weakness: Yes  Exacerbating factors: Activity  Alleviating factors: Rest  Length of time pain has been present: Started on   Inciting event/injury: after surgery of left femur. Shattered femur. Bowel/Bladder incontinence: No  Falls: No  Physical Therapy: yes    History of Interventions:   Surgery: 6 back surgeries  Injections: epidurals     Imaging:    CT lumbar 6/18/22    FINDINGS: There are degenerative changes again seen of the visualized    sacroiliac joints. There are postsurgical changes again seen from prior bone    graft harvest involving the posterosuperior aspects of the iliac bones    bilaterally. There is a rotatory dextroconvex scoliosis of the lumbar spine    again seen centered at the L2-L3 level.  There is a stable appearance of remote    mild anterior compression deformities of

## 2023-05-16 ENCOUNTER — TELEPHONE (OUTPATIENT)
Dept: FAMILY MEDICINE CLINIC | Age: 78
End: 2023-05-16

## 2023-05-16 NOTE — TELEPHONE ENCOUNTER
Ragini called [facility] and told them that she does not want to follow up with them anymore and that Dr. Dada Jarrett was going to follow her for her kidney functions. They just wanted to make sure Dr. Dada Jarrett was aware. Health Maintenance   Topic Date Due    Shingles vaccine (1 of 2) Never done    Diabetic foot exam  10/22/2020    Colorectal Cancer Screen  05/13/2021    Lipids  06/22/2022    Diabetic retinal exam  08/30/2022    Annual Wellness Visit (AWV)  06/14/2023    Depression Monitoring  01/30/2024    A1C test (Diabetic or Prediabetic)  05/09/2024    DTaP/Tdap/Td vaccine (2 - Td or Tdap) 12/10/2025    DEXA (modify frequency per FRAX score)  Completed    Flu vaccine  Completed    Pneumococcal 65+ years Vaccine  Completed    COVID-19 Vaccine  Completed    Hepatitis C screen  Addressed    Hepatitis A vaccine  Aged Out    Hib vaccine  Aged Out    Meningococcal (ACWY) vaccine  Aged Out    Diabetic Alb to Cr ratio (uACR) test  Discontinued    Depression Screen  Discontinued    Breast cancer screen  Discontinued             (applicable per patient's age: Cancer Screenings, Depression Screening, Fall Risk Screening, Immunizations)    Hemoglobin A1C (%)   Date Value   05/09/2023 5.9   01/30/2023 5.8   09/26/2022 6.0     Microalb/Crt.  Ratio (mcg/mg creat)   Date Value   06/22/2021 CANNOT BE CALCULATED     LDL Cholesterol (mg/dL)   Date Value   06/22/2021 87     AST (U/L)   Date Value   10/05/2022 12     ALT (U/L)   Date Value   10/05/2022 7     BUN (mg/dL)   Date Value   05/09/2023 30 (H)      (goal A1C is < 7)   (goal LDL is <100) need 30-50% reduction from baseline     BP Readings from Last 3 Encounters:   05/11/23 (!) 168/102   05/09/23 138/74   04/27/23 (!) 188/82    (goal /80)      All Future Testing planned in [facility]:  Lab Frequency Next Occurrence   Culture, Urine Once 10/13/2022   XR ABDOMEN (KUB) (SINGLE AP VIEW) Once 11/03/2022   Lumbar Epidural Steroid Injection/Caudal Once 11/10/2022   Clostridium

## 2023-05-30 RX ORDER — SERTRALINE HYDROCHLORIDE 25 MG/1
TABLET, FILM COATED ORAL
Qty: 14 TABLET | Refills: 0 | Status: SHIPPED | OUTPATIENT
Start: 2023-05-30

## 2023-05-30 NOTE — TELEPHONE ENCOUNTER
Pt's son requesting a short supply to [facility] [address] [place] [address] until mail order can arrive.

## 2023-07-06 ENCOUNTER — OFFICE VISIT (OUTPATIENT)
Dept: PAIN MANAGEMENT | Age: 78
End: 2023-07-06
Payer: MEDICARE

## 2023-07-06 VITALS
BODY MASS INDEX: 28.79 KG/M2 | OXYGEN SATURATION: 97 % | HEIGHT: 68 IN | WEIGHT: 190 LBS | HEART RATE: 83 BPM | RESPIRATION RATE: 19 BRPM

## 2023-07-06 DIAGNOSIS — G56.82 SUPRASCAPULAR NERVE ENTRAPMENT, LEFT: ICD-10-CM

## 2023-07-06 DIAGNOSIS — M47.816 LUMBAR SPONDYLOSIS: ICD-10-CM

## 2023-07-06 DIAGNOSIS — G56.81 SUPRASCAPULAR NERVE ENTRAPMENT, RIGHT: ICD-10-CM

## 2023-07-06 DIAGNOSIS — M54.16 LUMBAR RADICULOPATHY: Primary | ICD-10-CM

## 2023-07-06 DIAGNOSIS — M96.1 LUMBAR POST-LAMINECTOMY SYNDROME: ICD-10-CM

## 2023-07-06 DIAGNOSIS — M51.36 LUMBAR DEGENERATIVE DISC DISEASE: ICD-10-CM

## 2023-07-06 PROCEDURE — 1090F PRES/ABSN URINE INCON ASSESS: CPT | Performed by: STUDENT IN AN ORGANIZED HEALTH CARE EDUCATION/TRAINING PROGRAM

## 2023-07-06 PROCEDURE — 1036F TOBACCO NON-USER: CPT | Performed by: STUDENT IN AN ORGANIZED HEALTH CARE EDUCATION/TRAINING PROGRAM

## 2023-07-06 PROCEDURE — G8399 PT W/DXA RESULTS DOCUMENT: HCPCS | Performed by: STUDENT IN AN ORGANIZED HEALTH CARE EDUCATION/TRAINING PROGRAM

## 2023-07-06 PROCEDURE — G8427 DOCREV CUR MEDS BY ELIG CLIN: HCPCS | Performed by: STUDENT IN AN ORGANIZED HEALTH CARE EDUCATION/TRAINING PROGRAM

## 2023-07-06 PROCEDURE — 1123F ACP DISCUSS/DSCN MKR DOCD: CPT | Performed by: STUDENT IN AN ORGANIZED HEALTH CARE EDUCATION/TRAINING PROGRAM

## 2023-07-06 PROCEDURE — 3017F COLORECTAL CA SCREEN DOC REV: CPT | Performed by: STUDENT IN AN ORGANIZED HEALTH CARE EDUCATION/TRAINING PROGRAM

## 2023-07-06 PROCEDURE — G8417 CALC BMI ABV UP PARAM F/U: HCPCS | Performed by: STUDENT IN AN ORGANIZED HEALTH CARE EDUCATION/TRAINING PROGRAM

## 2023-07-06 PROCEDURE — 99214 OFFICE O/P EST MOD 30 MIN: CPT | Performed by: STUDENT IN AN ORGANIZED HEALTH CARE EDUCATION/TRAINING PROGRAM

## 2023-07-06 NOTE — PATIENT INSTRUCTIONS
SURVEY:    You may be receiving a survey from [employer] regarding your visit today. Please complete the survey to enable us to provide the highest quality of care to you and your family. If you cannot score us a very good on any question, please call the office to discuss how we could have made your experience a very good one. Thank you.   Sagar ARBOLEDA/MD Ragini Rodriguez MD Talmage Salvo, MD [facility],   Patricia Sierra, APRN-CNM  Dominick Fairbanks, APRN-CNP  [address], LOIS Harrison, [address] [facility], [address] [place] [address], [address] [place] [address]

## 2023-07-06 NOTE — PROGRESS NOTES
Chronic Pain Clinic Note     Encounter Date: 7/6/2023     SUBJECTIVE:  No chief complaint on file. History of Present Illness:   Isa Maki is a 78 y.o. female who presents to follow up after procedure- nerve stimulator implant. Patient also here for lead pull. She states that she is able to lift her arms higher than before and she has had improvement in pain. Today will be pulling the left side lead. Medication Refill: N/A    Current Complaints of Pain:   Location: lateral LLE - mild   Radiation: intermittent LLE  Severity: mild  Pain Numerical Score - 3   Average: 6    Highest: 7-8  Lowest: 3  Character/Quality: Complains of pain that is aching  Timing: Keeps awake at night, constant   Associated symptoms: none  Numbess: burning on anterior thigh   Weakness: Yes  Exacerbating factors: Activity  Alleviating factors: Rest  Length of time pain has been present: Started on   Inciting event/injury: after surgery of left femur. Shattered femur. Bowel/Bladder incontinence: No  Falls: No  Physical Therapy: yes    History of Interventions:   Surgery: 6 back surgeries  Injections: epidurals     Imaging:    CT lumbar 6/18/22    FINDINGS: There are degenerative changes again seen of the visualized    sacroiliac joints. There are postsurgical changes again seen from prior bone    graft harvest involving the posterosuperior aspects of the iliac bones    bilaterally. There is a rotatory dextroconvex scoliosis of the lumbar spine    again seen centered at the L2-L3 level. There is a stable appearance of remote    mild anterior compression deformities of the T11 and T12 vertebral bodies and    there is also a stable appearance of a remote moderate compression deformity of    L1 which has undergone prior vertebroplasty. No acute compression fracture of    the lumbar spine is seen.  There are postsurgical changes again seen from prior    posterior decompression surgery at the T11-T12 level through the L5-S1 level    and

## 2023-07-24 RX ORDER — ALLOPURINOL 100 MG/1
TABLET ORAL
Qty: 180 TABLET | Refills: 1 | Status: SHIPPED | OUTPATIENT
Start: 2023-07-24

## 2023-07-24 RX ORDER — ATORVASTATIN CALCIUM 40 MG/1
40 TABLET, FILM COATED ORAL DAILY
Qty: 90 TABLET | Refills: 1 | Status: SHIPPED | OUTPATIENT
Start: 2023-07-24

## 2023-07-24 NOTE — TELEPHONE ENCOUNTER
Last OV: 5/9/2023  chronic   Last RX:    Next scheduled apt: 8/10/2023    3 months DM             Surescript requesting a refill

## 2023-07-27 ENCOUNTER — OFFICE VISIT (OUTPATIENT)
Dept: PAIN MANAGEMENT | Age: 78
End: 2023-07-27
Payer: MEDICARE

## 2023-07-27 VITALS
WEIGHT: 190 LBS | OXYGEN SATURATION: 95 % | HEART RATE: 75 BPM | BODY MASS INDEX: 28.89 KG/M2 | RESPIRATION RATE: 19 BRPM

## 2023-07-27 DIAGNOSIS — M47.816 LUMBAR SPONDYLOSIS: ICD-10-CM

## 2023-07-27 DIAGNOSIS — M51.36 LUMBAR DEGENERATIVE DISC DISEASE: ICD-10-CM

## 2023-07-27 DIAGNOSIS — G56.82 SUPRASCAPULAR NERVE ENTRAPMENT, LEFT: Primary | ICD-10-CM

## 2023-07-27 DIAGNOSIS — G56.81 SUPRASCAPULAR NERVE ENTRAPMENT, RIGHT: ICD-10-CM

## 2023-07-27 DIAGNOSIS — M54.16 LUMBAR RADICULOPATHY: ICD-10-CM

## 2023-07-27 DIAGNOSIS — M96.1 LUMBAR POST-LAMINECTOMY SYNDROME: ICD-10-CM

## 2023-07-27 PROCEDURE — G8427 DOCREV CUR MEDS BY ELIG CLIN: HCPCS | Performed by: STUDENT IN AN ORGANIZED HEALTH CARE EDUCATION/TRAINING PROGRAM

## 2023-07-27 PROCEDURE — G8399 PT W/DXA RESULTS DOCUMENT: HCPCS | Performed by: STUDENT IN AN ORGANIZED HEALTH CARE EDUCATION/TRAINING PROGRAM

## 2023-07-27 PROCEDURE — G8417 CALC BMI ABV UP PARAM F/U: HCPCS | Performed by: STUDENT IN AN ORGANIZED HEALTH CARE EDUCATION/TRAINING PROGRAM

## 2023-07-27 PROCEDURE — 1090F PRES/ABSN URINE INCON ASSESS: CPT | Performed by: STUDENT IN AN ORGANIZED HEALTH CARE EDUCATION/TRAINING PROGRAM

## 2023-07-27 PROCEDURE — 3017F COLORECTAL CA SCREEN DOC REV: CPT | Performed by: STUDENT IN AN ORGANIZED HEALTH CARE EDUCATION/TRAINING PROGRAM

## 2023-07-27 PROCEDURE — 1036F TOBACCO NON-USER: CPT | Performed by: STUDENT IN AN ORGANIZED HEALTH CARE EDUCATION/TRAINING PROGRAM

## 2023-07-27 PROCEDURE — 1123F ACP DISCUSS/DSCN MKR DOCD: CPT | Performed by: STUDENT IN AN ORGANIZED HEALTH CARE EDUCATION/TRAINING PROGRAM

## 2023-07-27 PROCEDURE — 99213 OFFICE O/P EST LOW 20 MIN: CPT | Performed by: STUDENT IN AN ORGANIZED HEALTH CARE EDUCATION/TRAINING PROGRAM

## 2023-07-27 NOTE — PATIENT INSTRUCTIONS
SURVEY:    You may be receiving a survey from [employer] regarding your visit today. Please complete the survey to enable us to provide the highest quality of care to you and your family. If you cannot score us a very good on any question, please call the office to discuss how we could have made your experience a very good one. Thank you.   Alexys ARBOLEDA/MD Bryan Tinajero, MD Taylor Orellana, DO  Anna Paredes, APRN-CNM  Ruperto Alberts, APRN-CNP  [address], PM  [facility], [address] [facility], [address] [place] [address], [address] [place] [address]

## 2023-07-27 NOTE — H&P (VIEW-ONLY)
decompression surgery at the T11-T12 level through the L5-S1 level    and bilateral rods and pedicle screws are again seen extending from the T11    level through the L3 level. The hardware appears grossly intact. No lucency is    seen adjacent to the hardware. Multilevel degenerative changes of the lumbar    spine are again seen. At the edge of the field-of-view there appear to be severe degenerative changes    at the T9-T10 level. It is difficult to assess the spinal canal and foramina at the T11-T12 level    through the L2-L3 level secondary to beam hardening artifact from the adjacent    hardware. No significant spinal canal stenosis is seen at the L3-L4 through L5-S1 level. There again appears to be mild right foraminal narrowing at the L5-S1 level    secondary to a small right foraminal disc-osteophyte complex. Past Medical History:   Diagnosis Date    Allergic rhinitis     Chronic kidney disease, stage III (moderate) (HCC)     Deep vein thrombosis (DVT) ([address]) 10/24/2012    Elbow fracture, left     Fall     Gastric ulcer     Gout     Hx of blood clots     Following last back surgery     Hypertension     Nausea & vomiting     Presence of IVC filter     Type II or unspecified type diabetes mellitus without mention of complication, not stated as uncontrolled        Past Surgical History:   Procedure Laterality Date    BACK SURGERY      BACK SURGERY      BACK SURGERY      BACK SURGERY      BACK SURGERY      BACK SURGERY      Fusion     BREAST BIOPSY Left     BREAST BIOPSY Right     CARDIAC CATHETERIZATION Left 03/07/2018    Dr. Diya Coronel @ [facility]--There is 30% disease of the origin of the lateral anterior descending. Mild 10% -20% plaque disease in the circumflex & right coronary artery. Normal left ventricular functino, ejection fraction of 60%.       CARPAL TUNNEL RELEASE Right     CATARACT REMOVAL Right     CATARACT REMOVAL Left     COLONOSCOPY  2014    COLONOSCOPY N/A 05/13/2019 Suprascapular nerve entrapment, left  G56.82       2. Suprascapular nerve entrapment, right  G56.81       3. Lumbar radiculopathy  M54.16       4. Lumbar degenerative disc disease  M51.36       5. Lumbar post-laminectomy syndrome  M96.1       6. Lumbar spondylosis  M47.816             ASSESSMENT:    Son Fernandez is a 78 y. o.female who presents with chronic low back pain and leg pain. To review, patient has history of lumbar decompression and fusion from T11-L3 with old, stable compression fractures at T11, T12, L1. She also has a history of L1 vertebroplasty. Since last visit, the patient reports worsening low back and leg pain. The patient's history and physical examination are consistent with lumbar radiculopathy as the patient has pain starting in the low back radiating down into the right and left leg. Patient has positive neural tension signs on examination with a positive seated straight leg raise. Additionally the lumbar CT on 6/18/2022 reveals multilevel degenerative changes with neuroforaminal stenosis at L5-S1 on right. She underwent a caudal epidural steroid injection on 12/8/2022 with 100% improvement for almost 6 months. Therefore, I will plan for repeat caudal epidural steroid injection. The patient underwent a left and right suprascapular nerve peripheral nerve stimulator implant using ultrasound guidance on 4/27/2023 and 5/11/2023 with 80% improvement in pain, function and range of motion. The right peripheral nerve stimulator lead was removed at today's visit. The lead tip was intact. She is scheduled for her caudal epidural steroid injection next week. Neurologically, it appears the patient has full strength and normal sensation. There is no evidence of myelopathy on examination. There are no red flags in the patient's history.  The patient has failed conservative measures including outpatient physical therapy, greater than 3 medications for pain relief, a self-directed therapy

## 2023-07-27 NOTE — PROGRESS NOTES
Chronic Pain Clinic Note     Encounter Date: 7/27/2023     SUBJECTIVE:  Chief Complaint   Patient presents with    Back Pain     History of Present Illness:   Alaina Landeros is a 78 y.o. female who presents to follow up after procedure- nerve stimulator implant. Patient also here for lead pull. She states that she has had improvement in pain. She states that she was able to reach higher and do things that she was unable to do before. Today we will be pulling the right side lead. Medication Refill: N/A    Current Complaints of Pain:   Location: lateral LLE - mild   Radiation: intermittent LLE  Severity: mild  Pain Numerical Score - 2   Average: 2    Highest: 3  Lowest: 1-2  Character/Quality: Complains of pain that is aching  Timing: Keeps awake at night, constant   Associated symptoms: none  Numbess: burning on anterior thigh   Weakness: Yes  Exacerbating factors: Activity  Alleviating factors: Rest  Length of time pain has been present: Started on   Inciting event/injury: after surgery of left femur. Shattered femur. Bowel/Bladder incontinence: No  Falls: No  Physical Therapy: yes    History of Interventions:   Surgery: 6 back surgeries  Injections: epidurals     Imaging:    CT lumbar 6/18/22    FINDINGS: There are degenerative changes again seen of the visualized    sacroiliac joints. There are postsurgical changes again seen from prior bone    graft harvest involving the posterosuperior aspects of the iliac bones    bilaterally. There is a rotatory dextroconvex scoliosis of the lumbar spine    again seen centered at the L2-L3 level. There is a stable appearance of remote    mild anterior compression deformities of the T11 and T12 vertebral bodies and    there is also a stable appearance of a remote moderate compression deformity of    L1 which has undergone prior vertebroplasty. No acute compression fracture of    the lumbar spine is seen.  There are postsurgical changes again seen from prior    posterior

## 2023-07-31 RX ORDER — SERTRALINE HYDROCHLORIDE 25 MG/1
TABLET, FILM COATED ORAL
Qty: 90 TABLET | Refills: 3 | Status: SHIPPED | OUTPATIENT
Start: 2023-07-31

## 2023-07-31 NOTE — PROGRESS NOTES
[facility] MARCELLO   Preadmission Testing    Name: Marisol Traore  :   Patient Phone: [phone] (home) [phone] (work)    Procedure: Caudal Epideral Steroid injection  Date of Procedure: 2023  Surgeon: Bradley Tarango DO    Ht:  5' 7\" (170.2 cm)  Wt: 190 lb (86.2 kg)  Wt method: Stated    Allergies: Allergies   Allergen Reactions    Codeine Itching    Hydrocodone-Acetaminophen     Oxycodone-Acetaminophen     Adhesive Tape Itching, Rash and Other (See Comments)     Tape \"takes the skin off\"    Lisinopril Other (See Comments)     cough    Norco [Hydrocodone-Acetaminophen] Nausea And Vomiting    Percocet [Oxycodone-Acetaminophen] Itching and Nausea Only                There were no vitals filed for this visit. No LMP recorded (lmp unknown). Patient has had a hysterectomy. Do you take blood thinners? [x] Yes    [] No         Instructed to stop blood thinners prior to procedure? [x] Yes    [] No      [] N/A   Do you have sleep apnea? [] Yes    [x] No     Do you have acid reflux ? [x] Yes    [] No     Do you have  hiatal hernia? [x] Yes    [] No    Do you ever experience motion sickness? [x] Yes    [] No     Have you had a respiratory infection or sore throat in last 4 weeks before surgery? [] Yes    [x] No     Do you have poorly controlled asthma or COPD? Difficulty with intubation in past? [] Yes    [x] No      [] Yes    [x] No       Do you have a history of angina in the last month or symptomatic arrhythmia? [] Yes    [x] No     Do you have significant central nervous system disease? [] Yes    [x] No     Have you had an EKG, labs, or chest xray in last 12 months? If yes provide copies to anesthesia   [] Yes    [x] No       [] Lab    [] EKG    [] CXR     Have you had a stress test?     [] Yes    [x] No    When/where:    Was it normal?    [] Yes    [] No     Do you or your family have a history of Malignant Hyperthermia? [] Yes    [x] No           Do you smoke?

## 2023-08-03 ENCOUNTER — APPOINTMENT (OUTPATIENT)
Dept: GENERAL RADIOLOGY | Age: 78
End: 2023-08-03
Attending: STUDENT IN AN ORGANIZED HEALTH CARE EDUCATION/TRAINING PROGRAM
Payer: MEDICARE

## 2023-08-03 ENCOUNTER — HOSPITAL ENCOUNTER (OUTPATIENT)
Age: 78
Setting detail: OUTPATIENT SURGERY
Discharge: HOME | End: 2023-08-03
Attending: STUDENT IN AN ORGANIZED HEALTH CARE EDUCATION/TRAINING PROGRAM | Admitting: STUDENT IN AN ORGANIZED HEALTH CARE EDUCATION/TRAINING PROGRAM
Payer: MEDICARE

## 2023-08-03 VITALS
DIASTOLIC BLOOD PRESSURE: 61 MMHG | OXYGEN SATURATION: 95 % | HEIGHT: 66 IN | HEART RATE: 88 BPM | RESPIRATION RATE: 20 BRPM | SYSTOLIC BLOOD PRESSURE: 144 MMHG | TEMPERATURE: 98.4 F | WEIGHT: 190.3 LBS | BODY MASS INDEX: 30.58 KG/M2

## 2023-08-03 LAB
GLUCOSE BLD-MCNC: 60 MG/DL (ref 65–99)
GLUCOSE BLD-MCNC: 72 MG/DL (ref 65–99)

## 2023-08-03 PROCEDURE — 76000 FLUOROSCOPY <1 HR PHYS/QHP: CPT

## 2023-08-03 PROCEDURE — 2500000003 HC RX 250 WO HCPCS: Performed by: STUDENT IN AN ORGANIZED HEALTH CARE EDUCATION/TRAINING PROGRAM

## 2023-08-03 PROCEDURE — 3600000058 HC PAIN LEVEL 5 BASE: Performed by: STUDENT IN AN ORGANIZED HEALTH CARE EDUCATION/TRAINING PROGRAM

## 2023-08-03 PROCEDURE — A4216 STERILE WATER/SALINE, 10 ML: HCPCS | Performed by: STUDENT IN AN ORGANIZED HEALTH CARE EDUCATION/TRAINING PROGRAM

## 2023-08-03 PROCEDURE — 6360000002 HC RX W HCPCS: Performed by: STUDENT IN AN ORGANIZED HEALTH CARE EDUCATION/TRAINING PROGRAM

## 2023-08-03 PROCEDURE — 7100000010 HC PHASE II RECOVERY - FIRST 15 MIN: Performed by: STUDENT IN AN ORGANIZED HEALTH CARE EDUCATION/TRAINING PROGRAM

## 2023-08-03 PROCEDURE — 2580000003 HC RX 258: Performed by: STUDENT IN AN ORGANIZED HEALTH CARE EDUCATION/TRAINING PROGRAM

## 2023-08-03 PROCEDURE — 7100000011 HC PHASE II RECOVERY - ADDTL 15 MIN: Performed by: STUDENT IN AN ORGANIZED HEALTH CARE EDUCATION/TRAINING PROGRAM

## 2023-08-03 PROCEDURE — 2709999900 HC NON-CHARGEABLE SUPPLY: Performed by: STUDENT IN AN ORGANIZED HEALTH CARE EDUCATION/TRAINING PROGRAM

## 2023-08-03 PROCEDURE — 82947 ASSAY GLUCOSE BLOOD QUANT: CPT

## 2023-08-03 PROCEDURE — 62323 NJX INTERLAMINAR LMBR/SAC: CPT | Performed by: STUDENT IN AN ORGANIZED HEALTH CARE EDUCATION/TRAINING PROGRAM

## 2023-08-03 PROCEDURE — 6360000004 HC RX CONTRAST MEDICATION: Performed by: STUDENT IN AN ORGANIZED HEALTH CARE EDUCATION/TRAINING PROGRAM

## 2023-08-03 RX ORDER — SODIUM CHLORIDE 9 MG/ML
INJECTION INTRAVENOUS PRN
Status: DISCONTINUED | OUTPATIENT
Start: 2023-08-03 | End: 2023-08-03 | Stop reason: ALTCHOICE

## 2023-08-03 RX ORDER — LIDOCAINE HYDROCHLORIDE 10 MG/ML
INJECTION, SOLUTION EPIDURAL; INFILTRATION; INTRACAUDAL; PERINEURAL PRN
Status: DISCONTINUED | OUTPATIENT
Start: 2023-08-03 | End: 2023-08-03 | Stop reason: ALTCHOICE

## 2023-08-03 RX ORDER — TRIAMCINOLONE ACETONIDE 40 MG/ML
INJECTION, SUSPENSION INTRA-ARTICULAR; INTRAMUSCULAR PRN
Status: DISCONTINUED | OUTPATIENT
Start: 2023-08-03 | End: 2023-08-03 | Stop reason: ALTCHOICE

## 2023-08-03 ASSESSMENT — PAIN - FUNCTIONAL ASSESSMENT: PAIN_FUNCTIONAL_ASSESSMENT: NONE - DENIES PAIN

## 2023-08-03 NOTE — OP NOTE
PROCEDURE PERFORMED: Caudal epidural steroid injection    PREOPERATIVE DIAGNOSIS: Lumbosacral radiculopathy    INDICATIONS: Radicular leg pain    The patient's history and physical exam were reviewed. The risk, benefits, and alternatives of the procedure were discussed and all questions were answered to the patient's satisfaction. The patient agreed to proceed and written informed consent was obtained. POSTOPERATIVE DIAGNOSIS: Same    PHYSICIAN:  Dr. Emily Lainez DO    ANESTHESIA:  LOCAL    ASSISTANT:  NONE    PATHOLOGY:  NONE    ESTIMATED BLOOD LOSS:  N/A    IMPLANTS:  NONE    PROCEDURE DESCRIPTION: Caudal epidural injection using fluoroscopy    The patient was placed on the operative bed in prone position. The area was prepped with  Chlorhexidine. The area was then draped in a sterile fashion. A lateral fluoroscopic view was obtained to observe for the entry point of the sacral hiatus. The sacral hiatus was identified and marked. The skin and subcutaneous tissues were anesthetized with 1% lidocaine. A 22-gauge 3-1/2 inch Quincke spinal needle was then advanced through the sacral hiatus. It was then advanced in the lateral fluoroscopic view until the appropriate level was reached. After negative aspiration was confirmed, Omnipaque was injected. Epidural spread was observed. The epidural spread was also confirmed in the AP view. Then after negative aspiration, the injectate was easily injected. The injectate solution consisted of 80 mg triamcinolone and 5 mL preservative-free normal saline. The needle was removed and the patient's back was dressed appropriately. There was no neurological or hemodynamic sequelae after the procedure. The patient was transferred to the postoperative care unit in stable condition. Written discharge instructions were given to the patient. COMPLICATIONS:  There were no apparent complications. The patient tolerated the procedure well.

## 2023-08-03 NOTE — PROGRESS NOTES

## 2023-08-03 NOTE — INTERVAL H&P NOTE
Update History & Physical    The patient's History and Physical of July 27, 2023 was reviewed with the patient and I examined the patient. There was no change. The surgical site was confirmed by the patient and me. Plan: The risks, benefits, expected outcome, and alternative to the recommended procedure have been discussed with the patient. Patient understands and wants to proceed with the procedure.      Electronically signed by Dejan Felix DO on 8/3/2023 at 12:22 PM

## 2023-08-10 ENCOUNTER — HOSPITAL ENCOUNTER (OUTPATIENT)
Age: 78
Discharge: HOME | End: 2023-08-10
Payer: MEDICARE

## 2023-08-10 ENCOUNTER — OFFICE VISIT (OUTPATIENT)
Dept: FAMILY MEDICINE CLINIC | Age: 78
End: 2023-08-10

## 2023-08-10 VITALS
SYSTOLIC BLOOD PRESSURE: 128 MMHG | BODY MASS INDEX: 28.49 KG/M2 | DIASTOLIC BLOOD PRESSURE: 60 MMHG | OXYGEN SATURATION: 97 % | HEIGHT: 68 IN | WEIGHT: 188 LBS | HEART RATE: 90 BPM

## 2023-08-10 DIAGNOSIS — M19.041 ARTHRITIS OF BOTH HANDS: ICD-10-CM

## 2023-08-10 DIAGNOSIS — F41.9 ANXIETY: ICD-10-CM

## 2023-08-10 DIAGNOSIS — E83.42 HYPOMAGNESEMIA: ICD-10-CM

## 2023-08-10 DIAGNOSIS — D12.6 TUBULAR ADENOMA OF COLON: ICD-10-CM

## 2023-08-10 DIAGNOSIS — K59.09 CHRONIC CONSTIPATION: ICD-10-CM

## 2023-08-10 DIAGNOSIS — R71.0 DECREASED HEMOGLOBIN: ICD-10-CM

## 2023-08-10 DIAGNOSIS — M19.042 ARTHRITIS OF BOTH HANDS: ICD-10-CM

## 2023-08-10 DIAGNOSIS — E11.9 TYPE 2 DIABETES MELLITUS WITHOUT COMPLICATION, WITHOUT LONG-TERM CURRENT USE OF INSULIN (HCC): ICD-10-CM

## 2023-08-10 DIAGNOSIS — Z00.00 MEDICARE ANNUAL WELLNESS VISIT, SUBSEQUENT: ICD-10-CM

## 2023-08-10 DIAGNOSIS — E11.9 TYPE 2 DIABETES MELLITUS WITHOUT COMPLICATION, WITHOUT LONG-TERM CURRENT USE OF INSULIN (HCC): Primary | ICD-10-CM

## 2023-08-10 LAB
ANION GAP SERPL CALCULATED.3IONS-SCNC: 13 MMOL/L (ref 9–17)
BUN SERPL-MCNC: 49 MG/DL (ref 8–23)
BUN/CREAT SERPL: 31 (ref 9–20)
CALCIUM SERPL-MCNC: 9.4 MG/DL (ref 8.6–10.4)
CHLORIDE SERPL-SCNC: 102 MMOL/L (ref 98–107)
CO2 SERPL-SCNC: 26 MMOL/L (ref 20–31)
CREAT SERPL-MCNC: 1.6 MG/DL (ref 0.5–0.9)
GFR SERPL CREATININE-BSD FRML MDRD: 33 ML/MIN/1.73M2
GLUCOSE SERPL-MCNC: 72 MG/DL (ref 70–99)
HBA1C MFR BLD: 5.7 %
HCT VFR BLD AUTO: 32.6 % (ref 36–46)
HGB BLD-MCNC: 10.7 G/DL (ref 12–16)
MAGNESIUM SERPL-MCNC: 2.3 MG/DL (ref 1.6–2.6)
POTASSIUM SERPL-SCNC: 4.2 MMOL/L (ref 3.7–5.3)
SODIUM SERPL-SCNC: 141 MMOL/L (ref 135–144)

## 2023-08-10 PROCEDURE — 85018 HEMOGLOBIN: CPT

## 2023-08-10 PROCEDURE — 85014 HEMATOCRIT: CPT

## 2023-08-10 PROCEDURE — 83735 ASSAY OF MAGNESIUM: CPT

## 2023-08-10 PROCEDURE — 80048 BASIC METABOLIC PNL TOTAL CA: CPT

## 2023-08-10 PROCEDURE — 36415 COLL VENOUS BLD VENIPUNCTURE: CPT

## 2023-08-10 RX ORDER — AMOXICILLIN 250 MG
1 CAPSULE ORAL DAILY
Qty: 30 TABLET | Refills: 2 | Status: SHIPPED | OUTPATIENT
Start: 2023-08-10

## 2023-08-10 RX ORDER — GLIPIZIDE 5 MG/1
TABLET ORAL
Qty: 90 TABLET | Refills: 3
Start: 2023-08-10

## 2023-08-10 SDOH — HEALTH STABILITY: PHYSICAL HEALTH: ON AVERAGE, HOW MANY DAYS PER WEEK DO YOU ENGAGE IN MODERATE TO STRENUOUS EXERCISE (LIKE A BRISK WALK)?: 0 DAYS

## 2023-08-10 ASSESSMENT — LIFESTYLE VARIABLES
HOW MANY STANDARD DRINKS CONTAINING ALCOHOL DO YOU HAVE ON A TYPICAL DAY: 0
HOW OFTEN DO YOU HAVE A DRINK CONTAINING ALCOHOL: 1
HOW OFTEN DO YOU HAVE A DRINK CONTAINING ALCOHOL: NEVER
HOW MANY STANDARD DRINKS CONTAINING ALCOHOL DO YOU HAVE ON A TYPICAL DAY: PATIENT DOES NOT DRINK
HOW OFTEN DO YOU HAVE SIX OR MORE DRINKS ON ONE OCCASION: 1

## 2023-08-10 ASSESSMENT — PATIENT HEALTH QUESTIONNAIRE - PHQ9
1. LITTLE INTEREST OR PLEASURE IN DOING THINGS: 0
SUM OF ALL RESPONSES TO PHQ QUESTIONS 1-9: 0
6. FEELING BAD ABOUT YOURSELF - OR THAT YOU ARE A FAILURE OR HAVE LET YOURSELF OR YOUR FAMILY DOWN: 0
SUM OF ALL RESPONSES TO PHQ QUESTIONS 1-9: 0
SUM OF ALL RESPONSES TO PHQ9 QUESTIONS 1 & 2: 0
9. THOUGHTS THAT YOU WOULD BE BETTER OFF DEAD, OR OF HURTING YOURSELF: 0
SUM OF ALL RESPONSES TO PHQ QUESTIONS 1-9: 0
4. FEELING TIRED OR HAVING LITTLE ENERGY: 0
2. FEELING DOWN, DEPRESSED OR HOPELESS: 0
8. MOVING OR SPEAKING SO SLOWLY THAT OTHER PEOPLE COULD HAVE NOTICED. OR THE OPPOSITE, BEING SO FIGETY OR RESTLESS THAT YOU HAVE BEEN MOVING AROUND A LOT MORE THAN USUAL: 0
3. TROUBLE FALLING OR STAYING ASLEEP: 0
2. FEELING DOWN, DEPRESSED OR HOPELESS: 0
SUM OF ALL RESPONSES TO PHQ QUESTIONS 1-9: 0
SUM OF ALL RESPONSES TO PHQ QUESTIONS 1-9: 0
5. POOR APPETITE OR OVEREATING: 0
SUM OF ALL RESPONSES TO PHQ QUESTIONS 1-9: 0
SUM OF ALL RESPONSES TO PHQ9 QUESTIONS 1 & 2: 0
10. IF YOU CHECKED OFF ANY PROBLEMS, HOW DIFFICULT HAVE THESE PROBLEMS MADE IT FOR YOU TO DO YOUR WORK, TAKE CARE OF THINGS AT HOME, OR GET ALONG WITH OTHER PEOPLE: 0
1. LITTLE INTEREST OR PLEASURE IN DOING THINGS: 0
7. TROUBLE CONCENTRATING ON THINGS, SUCH AS READING THE NEWSPAPER OR WATCHING TELEVISION: 0

## 2023-08-10 NOTE — PATIENT INSTRUCTIONS
for the signs of gum disease. These signs include gums that bleed after brushing or after eating hard foods, such as apples. See a dentist regularly. Many experts recommend checkups every 6 months. Keep the dentist up to date on any new medications the person is taking. Encourage a balanced diet that includes whole grains, vegetables, and fruits, and that is low in saturated fat and sodium. Encourage the person you're caring for not to use tobacco products. They can affect dental and general health. Many older adults have a fixed income and feel that they can't afford dental care. But most [facility] and [facility] have programs in which dentists help older adults by lowering fees. Contact your area's public health offices or  for information about dental care in your area. Using a toothbrush  Older adults with arthritis sometimes have trouble brushing their teeth because they can't easily hold the toothbrush. Their hands and fingers may be stiff, painful, or weak. If this is the case, you can: Offer an electric toothbrush. Enlarge the handle of a non-electric toothbrush by wrapping a sponge, an elastic bandage, or adhesive tape around it. Push the toothbrush handle through a ball made of rubber or soft foam.  Make the handle longer and thicker by taping Popsicle sticks or tongue depressors to it. You may also be able to buy special toothbrushes, toothpaste dispensers, and floss holders. Your doctor may recommend a soft-bristle toothbrush if the person you care for bleeds easily. Bleeding can happen because of a health problem or from certain medicines. A toothpaste for sensitive teeth may help if the person you care for has sensitive teeth. How do you brush and floss someone's teeth? If the person you are caring for has a hard time cleaning their teeth on their own, you may need to brush and floss their teeth for them.  It may be easiest to have the person sit and face away from you, and to sit

## 2023-08-10 NOTE — PROGRESS NOTES
Name: Shree Mccollum  : 1945         Chief Complaint:     Chief Complaint   Patient presents with    Medicare AWV    Diabetes    Hypertension    Hypothyroidism       History of Present Illness:      Shree Mccollum is a 78 y.o.  female who presents with Medicare AWV, Diabetes, Hypertension, and Hypothyroidism      HPI    F/u hypomag. Trouble swallowing Mg pills. No diarrhea - actually constipated. Goes maybe every 2-3 days. MoM once in a while. Doesn't like miralax. Hadn't been doing well with water intake but now better. Pain yarely hands r/t arthritis, better with heat, soaks hands in water. Occasional mild lightheadedness. Sugar 60 when she came in for pain mgmt procedure recently. Feeling anxious sometimes and would like to inc zoloft dose. Overall she is doing well, functioning much better than she had been a couple yrs ago. Plans to start driving again soon. Will do this gradually, initially with son in the car with her and just short drives. Medical History:     Patient Active Problem List   Diagnosis    Essential hypertension, benign    Esophageal reflux    Irritable bowel syndrome    Seasonal allergies    Gout    Rectocele    Tubular adenoma of colon    Hiatal hernia    Sigmoid diverticulosis    Chronic back pain    Hypomagnesemia    Family history of colon cancer    History of recurrent deep vein thrombosis (DVT)    Hyperparathyroidism ([address])    Chronic renal disease, stage III ([address]) [480518]    Type 2 diabetes mellitus without complication, without long-term current use of insulin (HCC)    Orthostatic hypotension    Chronic diastolic congestive heart failure (HCC)       Medications:       Prior to Admission medications    Medication Sig Start Date End Date Taking?  Authorizing Provider   sertraline (ZOLOFT) 50 MG tablet TAKE 1 TABLET BY MOUTH DAILY 8/10/23  Yes Cherelle Casas DO   senna-docusate (PERICOLACE) 8.6-50 MG per tablet Take 1 tablet by mouth daily 8/10/23  Yes [facility]

## 2023-08-11 ENCOUNTER — TELEPHONE (OUTPATIENT)
Dept: FAMILY MEDICINE CLINIC | Age: 78
End: 2023-08-11

## 2023-08-11 DIAGNOSIS — E83.42 HYPOMAGNESEMIA: Primary | ICD-10-CM

## 2023-08-11 NOTE — TELEPHONE ENCOUNTER
----- Message from Amarilys Donahue DO sent at 8/11/2023  1:01 PM EDT -----  Magnesium quite a bit higher than it had been, still not too high but we can cut back her dosing so that she takes 2 pills once a day and 1 pill at the other dose. Otherwise, kidney function is back down a lot and it definitely looks like she is not drinking enough so she really needs to  her fluid intake and have another BMP in a couple weeks nonfasting. Anemia stable.

## 2023-08-25 NOTE — TELEPHONE ENCOUNTER
Patient must have put all of ivette scripts on hold so that they had to actually call the pharmacy before anything was shipped so she has been without her Zoloft for a week now - Kellen Mcintosh says that she is getting pretty antsy - I call [facility] to get that information - I released them for her - patient is asking if we could send a weeks worth to [facility] for her and they will pay cash for them

## 2023-08-31 ENCOUNTER — HOSPITAL ENCOUNTER (OUTPATIENT)
Age: 78
Discharge: HOME | End: 2023-08-31
Payer: MEDICARE

## 2023-08-31 DIAGNOSIS — E83.42 HYPOMAGNESEMIA: ICD-10-CM

## 2023-08-31 LAB
ANION GAP SERPL CALCULATED.3IONS-SCNC: 8 MMOL/L (ref 9–17)
BUN SERPL-MCNC: 29 MG/DL (ref 8–23)
BUN/CREAT SERPL: 21 (ref 9–20)
CALCIUM SERPL-MCNC: 9.5 MG/DL (ref 8.6–10.4)
CHLORIDE SERPL-SCNC: 103 MMOL/L (ref 98–107)
CO2 SERPL-SCNC: 28 MMOL/L (ref 20–31)
CREAT SERPL-MCNC: 1.4 MG/DL (ref 0.5–0.9)
GFR SERPL CREATININE-BSD FRML MDRD: 39 ML/MIN/1.73M2
GLUCOSE SERPL-MCNC: 89 MG/DL (ref 70–99)
POTASSIUM SERPL-SCNC: 4.1 MMOL/L (ref 3.7–5.3)
SODIUM SERPL-SCNC: 139 MMOL/L (ref 135–144)

## 2023-08-31 PROCEDURE — 80048 BASIC METABOLIC PNL TOTAL CA: CPT

## 2023-08-31 PROCEDURE — 36415 COLL VENOUS BLD VENIPUNCTURE: CPT

## 2023-09-05 RX ORDER — TROSPIUM CHLORIDE 20 MG/1
20 TABLET, FILM COATED ORAL 2 TIMES DAILY
Qty: 180 TABLET | Refills: 1 | Status: SHIPPED | OUTPATIENT
Start: 2023-09-05

## 2023-09-05 NOTE — TELEPHONE ENCOUNTER
Last OV: 8/10/2023 awv    Next scheduled apt: 11/13/2023 3m follow up      Patient requesting all medications be sent to [facility] instead of [facility].     Patient requesting medication refill of Trospium  Medication pending

## 2023-09-06 RX ORDER — GABAPENTIN 100 MG/1
CAPSULE ORAL
Qty: 360 CAPSULE | Refills: 1 | Status: SHIPPED | OUTPATIENT
Start: 2023-10-05 | End: 2023-09-25 | Stop reason: SDUPTHER

## 2023-09-06 NOTE — TELEPHONE ENCOUNTER
Last OV: 8/10/2023   AWV  Last RX:    Next scheduled apt: 11/13/2023   3 MONTHS  DM              Surescript requesting a refill

## 2023-09-10 ENCOUNTER — APPOINTMENT (OUTPATIENT)
Dept: GENERAL RADIOLOGY | Age: 78
End: 2023-09-10
Payer: MEDICARE

## 2023-09-10 ENCOUNTER — HOSPITAL ENCOUNTER (EMERGENCY)
Age: 78
Discharge: HOME | End: 2023-09-10
Attending: EMERGENCY MEDICINE
Payer: MEDICARE

## 2023-09-10 VITALS
TEMPERATURE: 98.2 F | HEART RATE: 84 BPM | DIASTOLIC BLOOD PRESSURE: 67 MMHG | BODY MASS INDEX: 31.34 KG/M2 | WEIGHT: 195 LBS | SYSTOLIC BLOOD PRESSURE: 143 MMHG | RESPIRATION RATE: 20 BRPM | HEIGHT: 66 IN | OXYGEN SATURATION: 98 %

## 2023-09-10 DIAGNOSIS — R10.13 ABDOMINAL PAIN, EPIGASTRIC: Primary | ICD-10-CM

## 2023-09-10 LAB
ANION GAP SERPL CALCULATED.3IONS-SCNC: 9 MMOL/L (ref 9–17)
BASOPHILS # BLD: ABNORMAL K/UL (ref 0–0.2)
BASOPHILS NFR BLD: ABNORMAL % (ref 0–2)
BUN SERPL-MCNC: 32 MG/DL (ref 8–23)
BUN/CREAT SERPL: 25 (ref 9–20)
CALCIUM SERPL-MCNC: 8.9 MG/DL (ref 8.6–10.4)
CHLORIDE SERPL-SCNC: 105 MMOL/L (ref 98–107)
CO2 SERPL-SCNC: 26 MMOL/L (ref 20–31)
CREAT SERPL-MCNC: 1.3 MG/DL (ref 0.5–0.9)
EKG ATRIAL RATE: 71 BPM
EKG ATRIAL RATE: 78 BPM
EKG P AXIS: 57 DEGREES
EKG P AXIS: 69 DEGREES
EKG P-R INTERVAL: 154 MS
EKG P-R INTERVAL: 164 MS
EKG Q-T INTERVAL: 386 MS
EKG Q-T INTERVAL: 398 MS
EKG QRS DURATION: 96 MS
EKG QRS DURATION: 96 MS
EKG QTC CALCULATION (BAZETT): 432 MS
EKG QTC CALCULATION (BAZETT): 440 MS
EKG R AXIS: 23 DEGREES
EKG R AXIS: 45 DEGREES
EKG T AXIS: 43 DEGREES
EKG T AXIS: 57 DEGREES
EKG VENTRICULAR RATE: 71 BPM
EKG VENTRICULAR RATE: 78 BPM
EOSINOPHIL # BLD: 0.14 K/UL (ref 0–0.4)
EOSINOPHILS RELATIVE PERCENT: 2 % (ref 0–5)
ERYTHROCYTE [DISTWIDTH] IN BLOOD BY AUTOMATED COUNT: 16.5 % (ref 12.1–15.2)
GFR SERPL CREATININE-BSD FRML MDRD: 43 ML/MIN/1.73M2
GLUCOSE SERPL-MCNC: 111 MG/DL (ref 70–99)
HCT VFR BLD AUTO: 31 % (ref 36–46)
HGB BLD-MCNC: 10.1 G/DL (ref 12–16)
IMM GRANULOCYTES # BLD AUTO: ABNORMAL K/UL (ref 0–0.3)
IMM GRANULOCYTES NFR BLD: ABNORMAL %
LYMPHOCYTES NFR BLD: 2.1 K/UL (ref 1–4.8)
LYMPHOCYTES RELATIVE PERCENT: 30 % (ref 15–40)
MCH RBC QN AUTO: 31.7 PG (ref 26–34)
MCHC RBC AUTO-ENTMCNC: 32.5 G/DL (ref 31–37)
MCV RBC AUTO: 97.6 FL (ref 80–100)
MONOCYTES NFR BLD: 0.14 K/UL (ref 0–1)
MONOCYTES NFR BLD: 2 % (ref 4–8)
MORPHOLOGY: ABNORMAL
NEUTROPHILS NFR BLD: 66 % (ref 47–75)
NEUTS SEG NFR BLD: 4.62 K/UL (ref 2.5–7)
PLATELET # BLD AUTO: 259 K/UL (ref 140–450)
POTASSIUM SERPL-SCNC: 4.1 MMOL/L (ref 3.7–5.3)
RBC # BLD AUTO: 3.18 M/UL (ref 4–5.2)
SODIUM SERPL-SCNC: 140 MMOL/L (ref 135–144)
TROPONIN I SERPL HS-MCNC: 39 NG/L (ref 0–14)
TROPONIN I SERPL HS-MCNC: 40 NG/L (ref 0–14)
WBC OTHER # BLD: 7 K/UL (ref 3.5–11)

## 2023-09-10 PROCEDURE — 36415 COLL VENOUS BLD VENIPUNCTURE: CPT

## 2023-09-10 PROCEDURE — 93010 ELECTROCARDIOGRAM REPORT: CPT | Performed by: INTERNAL MEDICINE

## 2023-09-10 PROCEDURE — 84484 ASSAY OF TROPONIN QUANT: CPT

## 2023-09-10 PROCEDURE — 99285 EMERGENCY DEPT VISIT HI MDM: CPT

## 2023-09-10 PROCEDURE — 71045 X-RAY EXAM CHEST 1 VIEW: CPT

## 2023-09-10 PROCEDURE — 85025 COMPLETE CBC W/AUTO DIFF WBC: CPT

## 2023-09-10 PROCEDURE — 80048 BASIC METABOLIC PNL TOTAL CA: CPT

## 2023-09-10 PROCEDURE — 93005 ELECTROCARDIOGRAM TRACING: CPT | Performed by: EMERGENCY MEDICINE

## 2023-09-10 ASSESSMENT — LIFESTYLE VARIABLES
HOW MANY STANDARD DRINKS CONTAINING ALCOHOL DO YOU HAVE ON A TYPICAL DAY: PATIENT DOES NOT DRINK
HOW OFTEN DO YOU HAVE A DRINK CONTAINING ALCOHOL: NEVER

## 2023-09-10 ASSESSMENT — PAIN - FUNCTIONAL ASSESSMENT: PAIN_FUNCTIONAL_ASSESSMENT: NONE - DENIES PAIN

## 2023-09-10 NOTE — ED PROVIDER NOTES
PERRL, EOMI, Conjunctiva normal, No discharge. Respiratory:  Normal breath sounds, No respiratory distress, No wheezing, No chest tenderness. Cardiovascular:  Normal heart rate, Normal rhythm, No murmurs, No rubs, No gallops. GI:  Bowel sounds normal, Soft, No tenderness, No masses, No pulsatile masses. : External genitalia appear normal, No masses or lesions. No discharge. No CVA tenderness. Musculoskeletal:  Intact distal pulses, No edema, No tenderness, No cyanosis, No clubbing. Good range of motion in all major joints. No tenderness to palpation or major deformities noted. Back- No tenderness. Integument:  Warm, Dry, No erythema, No rash. Lymphatic:  No lymphadenopathy noted. Neurologic:  Alert & oriented x 3, Normal motor function, Normal sensory function, No focal deficits noted. Psychiatric:  Affect normal, Judgment normal, Mood normal.     EKG    Sinus rhythm rate of 78    RADIOLOGY    XR CHEST PORTABLE   Final Result      No evidence of acute cardiopulmonary disease.                  PROCEDURES    Procedures    Labs  Labs Reviewed   CBC WITH AUTO DIFFERENTIAL - Abnormal; Notable for the following components:       Result Value    RBC 3.18 (*)     Hemoglobin 10.1 (*)     Hematocrit 31.0 (*)     RDW 16.5 (*)     Monocytes % 2 (*)     All other components within normal limits   BASIC METABOLIC PANEL - Abnormal; Notable for the following components:    Glucose 111 (*)     BUN 32 (*)     Creatinine 1.3 (*)     Est, Glom Filt Rate 43 (*)     Bun/Cre Ratio 25 (*)     All other components within normal limits   TROPONIN - Abnormal; Notable for the following components:    Troponin, High Sensitivity 40 (*)     All other components within normal limits   TROPONIN - Abnormal; Notable for the following components:    Troponin, High Sensitivity 39 (*)     All other components within normal limits       MIPS  Not applicable      Total Critical Care time was:    EMERGENCY DEPARTMENT COURSE and

## 2023-09-18 ENCOUNTER — OFFICE VISIT (OUTPATIENT)
Dept: GASTROENTEROLOGY | Age: 78
End: 2023-09-18
Payer: MEDICARE

## 2023-09-18 VITALS
HEIGHT: 66 IN | DIASTOLIC BLOOD PRESSURE: 71 MMHG | BODY MASS INDEX: 31.18 KG/M2 | WEIGHT: 194 LBS | RESPIRATION RATE: 18 BRPM | HEART RATE: 93 BPM | SYSTOLIC BLOOD PRESSURE: 139 MMHG | OXYGEN SATURATION: 97 %

## 2023-09-18 DIAGNOSIS — Z80.0 FAMILY HISTORY OF COLON CANCER: ICD-10-CM

## 2023-09-18 DIAGNOSIS — Z01.818 PRE-OP TESTING: ICD-10-CM

## 2023-09-18 DIAGNOSIS — Z12.11 COLON CANCER SCREENING: Primary | ICD-10-CM

## 2023-09-18 PROCEDURE — G8399 PT W/DXA RESULTS DOCUMENT: HCPCS | Performed by: NURSE PRACTITIONER

## 2023-09-18 PROCEDURE — 3017F COLORECTAL CA SCREEN DOC REV: CPT | Performed by: NURSE PRACTITIONER

## 2023-09-18 PROCEDURE — 3078F DIAST BP <80 MM HG: CPT | Performed by: NURSE PRACTITIONER

## 2023-09-18 PROCEDURE — 99202 OFFICE O/P NEW SF 15 MIN: CPT | Performed by: NURSE PRACTITIONER

## 2023-09-18 PROCEDURE — 1090F PRES/ABSN URINE INCON ASSESS: CPT | Performed by: NURSE PRACTITIONER

## 2023-09-18 PROCEDURE — 1123F ACP DISCUSS/DSCN MKR DOCD: CPT | Performed by: NURSE PRACTITIONER

## 2023-09-18 PROCEDURE — 3074F SYST BP LT 130 MM HG: CPT | Performed by: NURSE PRACTITIONER

## 2023-09-18 PROCEDURE — G8417 CALC BMI ABV UP PARAM F/U: HCPCS | Performed by: NURSE PRACTITIONER

## 2023-09-18 PROCEDURE — G8427 DOCREV CUR MEDS BY ELIG CLIN: HCPCS | Performed by: NURSE PRACTITIONER

## 2023-09-18 PROCEDURE — 1036F TOBACCO NON-USER: CPT | Performed by: NURSE PRACTITIONER

## 2023-09-18 RX ORDER — POLYETHYLENE GLYCOL 3350, SODIUM CHLORIDE, SODIUM BICARBONATE, POTASSIUM CHLORIDE 420; 11.2; 5.72; 1.48 G/4L; G/4L; G/4L; G/4L
4000 POWDER, FOR SOLUTION ORAL ONCE
Qty: 4000 ML | Refills: 0 | Status: SHIPPED | OUTPATIENT
Start: 2023-09-18 | End: 2023-09-18

## 2023-09-18 ASSESSMENT — ENCOUNTER SYMPTOMS
ALLERGIC/IMMUNOLOGIC NEGATIVE: 1
RESPIRATORY NEGATIVE: 1
TROUBLE SWALLOWING: 0
CONSTIPATION: 1
ABDOMINAL PAIN: 0
ANAL BLEEDING: 0
BLOOD IN STOOL: 0

## 2023-09-18 NOTE — PROGRESS NOTES
Determinants of Health     Tobacco Use: Low Risk  (9/18/2023)    Patient History     Smoking Tobacco Use: Never     Smokeless Tobacco Use: Never     Passive Exposure: Not on file   Alcohol Use: Not At Risk (9/10/2023)    AUDIT-C     Frequency of Alcohol Consumption: Never     Average Number of Drinks: Patient does not drink     Frequency of Binge Drinking: Never   Financial Resource Strain: Low Risk  (5/9/2023)    Overall Financial Resource Strain (CARDIA)     Difficulty of Paying Living Expenses: Not hard at all   Food Insecurity: No Food Insecurity (5/9/2023)    Hunger Vital Sign     Worried About Running Out of Food in the Last Year: Never true     Ran Out of Food in the Last Year: Never true   Transportation Needs: Unknown (5/9/2023)    PRAPARE - Transportation     Lack of Transportation (Medical): Not on file     Lack of Transportation (Non-Medical): No   Physical Activity: Unknown (8/10/2023)    Exercise Vital Sign     Days of Exercise per Week: 0 days     Minutes of Exercise per Session: Not on file   Stress: Not on file   Social Connections: Not on file   Intimate Partner Violence: Not on file   Depression: Not at risk (8/10/2023)    PHQ-2     PHQ-2 Score: 0   Housing Stability: Unknown (5/9/2023)    Housing Stability Vital Sign     Unable to Pay for Housing in the Last Year: Not on file     Number of Places Lived in the Last Year: Not on file     Unstable Housing in the Last Year: No       Review of Systems   Constitutional: Negative. Negative for fever and unexpected weight change. HENT: Negative. Negative for trouble swallowing. Respiratory: Negative. Cardiovascular: Negative. Gastrointestinal:  Positive for constipation. Negative for abdominal pain, anal bleeding and blood in stool. Endocrine: Negative. Musculoskeletal: Negative. Skin: Negative. Allergic/Immunologic: Negative. Neurological: Negative. Hematological: Negative. Psychiatric/Behavioral: Negative.

## 2023-09-25 RX ORDER — TRAZODONE HYDROCHLORIDE 50 MG/1
50 TABLET ORAL NIGHTLY
Qty: 90 TABLET | Refills: 3 | Status: SHIPPED | OUTPATIENT
Start: 2023-09-25

## 2023-09-25 RX ORDER — ATORVASTATIN CALCIUM 40 MG/1
40 TABLET, FILM COATED ORAL DAILY
Qty: 90 TABLET | Refills: 1 | Status: SHIPPED | OUTPATIENT
Start: 2023-09-25

## 2023-09-25 RX ORDER — MAGNESIUM L-LACTATE 84 MG
TABLET, EXTENDED RELEASE ORAL
Qty: 360 TABLET | Refills: 1 | Status: SHIPPED | OUTPATIENT
Start: 2023-09-25

## 2023-09-25 RX ORDER — ALLOPURINOL 100 MG/1
200 TABLET ORAL DAILY
Qty: 180 TABLET | Refills: 1 | Status: SHIPPED | OUTPATIENT
Start: 2023-09-25

## 2023-09-25 RX ORDER — FUROSEMIDE 20 MG/1
TABLET ORAL
Qty: 90 TABLET | Refills: 3 | Status: SHIPPED | OUTPATIENT
Start: 2023-09-25

## 2023-09-25 RX ORDER — LANOLIN ALCOHOL/MO/W.PET/CERES
400 CREAM (GRAM) TOPICAL DAILY
Qty: 90 TABLET | Refills: 1 | Status: SHIPPED | OUTPATIENT
Start: 2023-09-25

## 2023-09-25 RX ORDER — GABAPENTIN 100 MG/1
CAPSULE ORAL
Qty: 360 CAPSULE | Refills: 1 | Status: SHIPPED | OUTPATIENT
Start: 2023-10-05 | End: 2024-04-05

## 2023-09-25 RX ORDER — CLOPIDOGREL BISULFATE 75 MG/1
75 TABLET ORAL DAILY
Qty: 90 TABLET | Refills: 3 | Status: SHIPPED | OUTPATIENT
Start: 2023-09-25

## 2023-09-25 RX ORDER — GLIPIZIDE 5 MG/1
TABLET ORAL
Qty: 90 TABLET | Refills: 3 | Status: SHIPPED | OUTPATIENT
Start: 2023-09-25

## 2023-09-25 NOTE — TELEPHONE ENCOUNTER
Bryn Meraz called in stating his mom needed her prescriptions to go to [facility] instead of [facility]. I had asked which ones she needed and he did not have the list in front of them. He said 'all the ones Dr. Kristy Hay prescribes her. \"      [id number]      Health Maintenance   Topic Date Due    Shingles vaccine (1 of 2) Never done    Hepatitis B vaccine (1 of 3 - Risk 3-dose series) Never done    Colorectal Cancer Screen  05/13/2021    Lipids  06/22/2022    COVID-19 Vaccine (7 - Mixed Product series) 08/26/2022    Diabetic retinal exam  08/30/2022    Flu vaccine (1) 08/01/2023    Diabetic foot exam  08/10/2024    A1C test (Diabetic or Prediabetic)  08/10/2024    Depression Monitoring  08/10/2024    Annual Wellness Visit (AWV)  08/10/2024    DTaP/Tdap/Td vaccine (2 - Td or Tdap) 12/10/2025    DEXA (modify frequency per FRAX score)  Completed    Pneumococcal 65+ years Vaccine  Completed    Hepatitis C screen  Addressed    Hepatitis A vaccine  Aged Out    Hib vaccine  Aged Out    Meningococcal (ACWY) vaccine  Aged Out    Diabetic Alb to Cr ratio (uACR) test  Discontinued    Depression Screen  Discontinued    Breast cancer screen  Discontinued             (applicable per patient's age: Cancer Screenings, Depression Screening, Fall Risk Screening, Immunizations)    Hemoglobin A1C (%)   Date Value   08/10/2023 5.7   05/09/2023 5.9   01/30/2023 5.8     LDL Cholesterol (mg/dL)   Date Value   06/22/2021 87     AST (U/L)   Date Value   10/05/2022 12     ALT (U/L)   Date Value   10/05/2022 7     BUN (mg/dL)   Date Value   09/10/2023 32 (H)      (goal A1C is < 7)   (goal LDL is <100) need 30-50% reduction from baseline     BP Readings from Last 3 Encounters:   09/18/23 139/71   09/10/23 (!) 143/67   08/10/23 128/60    (goal /80)      All Future Testing planned in [facility]:  Lab Frequency Next Occurrence   Culture, Urine Once 10/13/2022   XR ABDOMEN (KUB) (SINGLE AP VIEW) Once 11/03/2022   Lumbar Epidural Steroid Injection/Caudal Once

## 2023-10-03 RX ORDER — ASPIRIN 81 MG/1
81 TABLET ORAL DAILY
Qty: 90 TABLET | Refills: 3 | Status: SHIPPED | OUTPATIENT
Start: 2023-10-03

## 2023-10-03 NOTE — TELEPHONE ENCOUNTER
Last visit:  8/10/2023  Next Visit Date:    Future Appointments   Date Time Provider [address]   11/13/2023 10:20 AM Kevin Serrano DO [place] [facility]   11/30/2023  9:30 AM SHARMILA Nam [facility]         Medication List:  Prior to Admission medications    Medication Sig Start Date End Date Taking?  Authorizing Provider   gabapentin (NEURONTIN) 100 MG capsule TAKE 2 CAPSULES BY MOUTH TWICE  DAILY 10/5/23 4/5/24  Kevin Serrano,    sertraline (ZOLOFT) 50 MG tablet TAKE 1 TABLET BY MOUTH DAILY 9/25/23   Kevin Serrano, DO   glipiZIDE (GLUCOTROL) 5 MG tablet 1 po daily with breakfast 9/25/23   Kevin Serrano, DO   atorvastatin (LIPITOR) 40 MG tablet Take 1 tablet by mouth daily 9/25/23   Kevin Serrano, DO   allopurinol (ZYLOPRIM) 100 MG tablet Take 2 tablets by mouth daily 9/25/23   Kevin Serrano, DO   folic acid (FOLVITE) 670 MCG tablet Take 1 tablet by mouth daily 9/25/23   Kevin Serrano, DO   clopidogrel (PLAVIX) 75 MG tablet Take 1 tablet by mouth daily 9/25/23   Kevin Serrano, DO   furosemide (LASIX) 20 MG tablet TAKE 1 TABLET BY MOUTH  DAILY 9/25/23   Kevin Serrano, DO   traZODone (DESYREL) 50 MG tablet Take 1 tablet by mouth nightly 9/25/23   Kevin Serrano, DO   magnesium lactate (MAGTAB) 84 MG (7MEQ) TBCR extended release tablet TAKE 2 TABLETS BY MOUTH IN THE MORNING and TAKE 1 TABLET IN THE EVENING 9/25/23   Kevin Serrano,    trospium (SANCTURA) 20 MG tablet Take 1 tablet by mouth 2 times daily 9/5/23   Kevin Serrano, DO   senna-docusate (PERICOLACE) 8.6-50 MG per tablet Take 1 tablet by mouth daily 8/10/23   Kevin Serrano,    omeprazole (PRILOSEC) 20 MG delayed release capsule TAKE 1 CAPSULE BY MOUTH  DAILY 2/8/23   Kevin Serrano, DO   aspirin EC 81 MG EC tablet Take 1 tablet by mouth daily 10/28/22   Kevin Serrano,    Blood Glucose Monitoring Suppl (TRUE METRIX AIR GLUCOSE METER) SARAH True Metrix Glucose Meter   use TWICE

## 2023-10-12 ENCOUNTER — TELEPHONE (OUTPATIENT)
Dept: FAMILY MEDICINE CLINIC | Age: 78
End: 2023-10-12

## 2023-10-12 RX ORDER — CEPHALEXIN 500 MG/1
500 CAPSULE ORAL 2 TIMES DAILY
Qty: 14 CAPSULE | Refills: 0 | Status: SHIPPED | OUTPATIENT
Start: 2023-10-12 | End: 2023-10-19

## 2023-10-12 NOTE — TELEPHONE ENCOUNTER
Luis Carlos Barlow believes she has a bladder infection. She said its burning, pain, and urine looks different. Symptoms started last night. She wanted to know if Dr. Dominique Doty would call something in for her. She is aware she is gone the rest of the day today. I asked if she has a urologist and she said yes but he doesn't deal with this. She would rather Dr. Dominique Doty address this since she knows [place].       DM-elissa      Health Maintenance   Topic Date Due    Shingles vaccine (1 of 2) Never done    Hepatitis B vaccine (1 of 3 - Risk 3-dose series) Never done    Colorectal Cancer Screen  05/13/2021    Lipids  06/22/2022    COVID-19 Vaccine (7 - Mixed Product series) 08/26/2022    Diabetic retinal exam  08/30/2022    Flu vaccine (1) 08/01/2023    Diabetic foot exam  08/10/2024    A1C test (Diabetic or Prediabetic)  08/10/2024    Depression Monitoring  08/10/2024    Annual Wellness Visit (AWV)  08/10/2024    DTaP/Tdap/Td vaccine (2 - Td or Tdap) 12/10/2025    DEXA (modify frequency per FRAX score)  Completed    Pneumococcal 65+ years Vaccine  Completed    Hepatitis C screen  Addressed    Hepatitis A vaccine  Aged Out    Hib vaccine  Aged Out    Meningococcal (ACWY) vaccine  Aged Out    Diabetic Alb to Cr ratio (uACR) test  Discontinued    Depression Screen  Discontinued    Breast cancer screen  Discontinued             (applicable per patient's age: Cancer Screenings, Depression Screening, Fall Risk Screening, Immunizations)    Hemoglobin A1C (%)   Date Value   08/10/2023 5.7   05/09/2023 5.9   01/30/2023 5.8     LDL Cholesterol (mg/dL)   Date Value   06/22/2021 87     AST (U/L)   Date Value   10/05/2022 12     ALT (U/L)   Date Value   10/05/2022 7     BUN (mg/dL)   Date Value   09/10/2023 32 (H)      (goal A1C is < 7)   (goal LDL is <100) need 30-50% reduction from baseline     BP Readings from Last 3 Encounters:   09/18/23 139/71   09/10/23 (!) 143/67   08/10/23 128/60    (goal /80)      All Future Testing planned in

## 2023-11-03 RX ORDER — DOCUSATE SODIUM AND SENNOSIDES 8.6; 5 MG/1; MG/1
1 TABLET, FILM COATED ORAL DAILY
Qty: 30 TABLET | Refills: 2 | Status: SHIPPED | OUTPATIENT
Start: 2023-11-03

## 2023-11-13 ENCOUNTER — HOSPITAL ENCOUNTER (OUTPATIENT)
Age: 78
Discharge: HOME | End: 2023-11-13
Payer: MEDICARE

## 2023-11-13 ENCOUNTER — OFFICE VISIT (OUTPATIENT)
Dept: FAMILY MEDICINE CLINIC | Age: 78
End: 2023-11-13

## 2023-11-13 VITALS
BODY MASS INDEX: 31.66 KG/M2 | SYSTOLIC BLOOD PRESSURE: 110 MMHG | HEIGHT: 66 IN | DIASTOLIC BLOOD PRESSURE: 60 MMHG | WEIGHT: 197 LBS | HEART RATE: 90 BPM | OXYGEN SATURATION: 98 %

## 2023-11-13 DIAGNOSIS — E11.9 TYPE 2 DIABETES MELLITUS WITHOUT COMPLICATION, WITHOUT LONG-TERM CURRENT USE OF INSULIN (HCC): Primary | ICD-10-CM

## 2023-11-13 DIAGNOSIS — M54.9 UPPER BACK PAIN: ICD-10-CM

## 2023-11-13 DIAGNOSIS — E83.42 HYPOMAGNESEMIA: ICD-10-CM

## 2023-11-13 DIAGNOSIS — F41.9 ANXIETY: ICD-10-CM

## 2023-11-13 DIAGNOSIS — Z23 NEED FOR INFLUENZA VACCINATION: ICD-10-CM

## 2023-11-13 DIAGNOSIS — E11.9 TYPE 2 DIABETES MELLITUS WITHOUT COMPLICATION, WITHOUT LONG-TERM CURRENT USE OF INSULIN (HCC): ICD-10-CM

## 2023-11-13 LAB
ANION GAP SERPL CALCULATED.3IONS-SCNC: 13 MMOL/L (ref 9–17)
BUN SERPL-MCNC: 32 MG/DL (ref 8–23)
BUN/CREAT SERPL: 20 (ref 9–20)
CALCIUM SERPL-MCNC: 9.5 MG/DL (ref 8.6–10.4)
CHLORIDE SERPL-SCNC: 104 MMOL/L (ref 98–107)
CHOLEST SERPL-MCNC: 117 MG/DL
CHOLESTEROL/HDL RATIO: 2
CO2 SERPL-SCNC: 26 MMOL/L (ref 20–31)
CREAT SERPL-MCNC: 1.6 MG/DL (ref 0.5–0.9)
EST. AVERAGE GLUCOSE BLD GHB EST-MCNC: 111 MG/DL
GFR SERPL CREATININE-BSD FRML MDRD: 33 ML/MIN/1.73M2
GLUCOSE SERPL-MCNC: 74 MG/DL (ref 70–99)
HBA1C MFR BLD: 5.5 % (ref 4–6)
HDLC SERPL-MCNC: 58 MG/DL
LDLC SERPL CALC-MCNC: 41 MG/DL (ref 0–130)
MAGNESIUM SERPL-MCNC: 1.9 MG/DL (ref 1.6–2.6)
POTASSIUM SERPL-SCNC: 4.1 MMOL/L (ref 3.7–5.3)
SODIUM SERPL-SCNC: 143 MMOL/L (ref 135–144)
TRIGL SERPL-MCNC: 88 MG/DL

## 2023-11-13 PROCEDURE — 83735 ASSAY OF MAGNESIUM: CPT

## 2023-11-13 PROCEDURE — 83036 HEMOGLOBIN GLYCOSYLATED A1C: CPT

## 2023-11-13 PROCEDURE — 80048 BASIC METABOLIC PNL TOTAL CA: CPT

## 2023-11-13 PROCEDURE — 36415 COLL VENOUS BLD VENIPUNCTURE: CPT

## 2023-11-13 PROCEDURE — 80061 LIPID PANEL: CPT

## 2023-11-13 NOTE — PATIENT INSTRUCTIONS
[employer] SURVEY:    You may be receiving a survey from [employer] regarding your visit today. You may get this in the mail, through your MyChart or in your email. Please complete the survey to enable us to provide the highest quality of care to you and your family. If you cannot score us as very good ( 5 Stars) on any question, please feel free to call the office to discuss how we could have made your experience exceptional.     Thank you.     Clinical Care Team:   Dr. Elias Castaneda, [address] [facility], [address]                                     Triage: Sayra Luna, [address] [facility] Team:    [address] [facility]

## 2023-11-13 NOTE — PROGRESS NOTES
Influenza, FLUAD, (age 72 y+), IM, Preservative Free, 0.5 mL        DM which has been controlled - updating labs and likely cont same rx  Hypomag - had been severe and symptomatic - doing very well now. Rechecking. Anxiety improved, cont zoloft 50 mg daily  Chronic upper back pain, neck pain, pain mgmt dx supraclav nerve impingement and pt had benefit from stimulator device. Advised to make f/u again with Dr. Peyton Woodruff. Continue gabapentin. Requested Prescriptions      No prescriptions requested or ordered in this encounter         Patient Instructions   [employer] SURVEY:    You may be receiving a survey from [employer] regarding your visit today. You may get this in the mail, through your MyChart or in your email. Please complete the survey to enable us to provide the highest quality of care to you and your family. If you cannot score us as very good ( 5 Stars) on any question, please feel free to call the office to discuss how we could have made your experience exceptional.     Thank you.     Clinical Care Team:   Dr. Erasto Harrison, [address] [facility], [address]                                     Triage: [place], [address] [facility] Team:    Colin Randle        signed by Erasto Harrison DO on 11/14/2023 at 10:48 PM  [id number]  [address]  Dept: [phone]

## 2023-11-17 DIAGNOSIS — N20.0 KIDNEY STONES: Primary | ICD-10-CM

## 2023-11-27 ENCOUNTER — HOSPITAL ENCOUNTER (OUTPATIENT)
Age: 78
Discharge: HOME | End: 2023-11-29
Payer: MEDICARE

## 2023-11-27 ENCOUNTER — HOSPITAL ENCOUNTER (OUTPATIENT)
Dept: GENERAL RADIOLOGY | Age: 78
Discharge: HOME | End: 2023-11-29
Payer: MEDICARE

## 2023-11-27 DIAGNOSIS — N20.0 KIDNEY STONES: ICD-10-CM

## 2023-11-27 PROCEDURE — 74018 RADEX ABDOMEN 1 VIEW: CPT

## 2023-11-29 ENCOUNTER — TELEPHONE (OUTPATIENT)
Dept: GASTROENTEROLOGY | Age: 78
End: 2023-11-29

## 2023-11-29 NOTE — TELEPHONE ENCOUNTER
Spoke with son Preston Cabrera, he is agreeable to moving 1/4/24 colonoscopy to 12/8/23. Please schedule for 12/8/23. Reminded them of Ekg. Plavix clearance letter sent to Dr Maddi Peña.

## 2023-12-01 ENCOUNTER — TELEPHONE (OUTPATIENT)
Dept: GASTROENTEROLOGY | Age: 78
End: 2023-12-01

## 2023-12-01 ENCOUNTER — ANESTHESIA EVENT (OUTPATIENT)
Dept: ENDOSCOPY | Age: 78
End: 2023-12-01
Payer: MEDICARE

## 2023-12-01 ENCOUNTER — TELEPHONE (OUTPATIENT)
Dept: FAMILY MEDICINE CLINIC | Age: 78
End: 2023-12-01

## 2023-12-01 RX ORDER — TROSPIUM CHLORIDE 20 MG/1
20 TABLET, FILM COATED ORAL 2 TIMES DAILY
Qty: 180 TABLET | Refills: 1 | Status: SHIPPED | OUTPATIENT
Start: 2023-12-01

## 2023-12-01 RX ORDER — BENZONATATE 100 MG/1
100 CAPSULE ORAL 3 TIMES DAILY PRN
Qty: 20 CAPSULE | Refills: 0 | Status: SHIPPED | OUTPATIENT
Start: 2023-12-01

## 2023-12-01 NOTE — TELEPHONE ENCOUNTER
Son called in stating that [address] started with cough and headache today. He stated that she has not tested for Covid yet but he is Covid positive so she has been exposed. He is worried about her cough getting worse and was asking if there is anything you can recommend or prescribe. Please advise.

## 2023-12-01 NOTE — TELEPHONE ENCOUNTER
Karthik Costa, [facility], calling- patient has Covid. Please cancel Colonoscopy for 12/8/23.     Please reschedule for 4/9/24

## 2023-12-01 NOTE — TELEPHONE ENCOUNTER
Home test is positive. Asking for medication and also something to take for her cough.  Notified that the rx would be at [facility]

## 2023-12-08 ENCOUNTER — ANESTHESIA (OUTPATIENT)
Dept: ENDOSCOPY | Age: 78
End: 2023-12-08
Payer: MEDICARE

## 2023-12-26 ENCOUNTER — HOSPITAL ENCOUNTER (OUTPATIENT)
Age: 78
Discharge: HOME | End: 2023-12-26
Payer: MEDICARE

## 2023-12-26 DIAGNOSIS — R30.0 DYSURIA: ICD-10-CM

## 2023-12-26 LAB
-: ABNORMAL
BACTERIA URNS QL MICRO: ABNORMAL
BILIRUB UR QL STRIP: NEGATIVE
CLARITY UR: CLEAR
COLOR UR: YELLOW
COMMENT: ABNORMAL
EPI CELLS #/AREA URNS HPF: ABNORMAL /HPF
GLUCOSE UR STRIP-MCNC: NEGATIVE MG/DL
HGB UR QL STRIP.AUTO: NEGATIVE
KETONES UR STRIP-MCNC: NEGATIVE MG/DL
LEUKOCYTE ESTERASE UR QL STRIP: ABNORMAL
NITRITE UR QL STRIP: NEGATIVE
PH UR STRIP: 6 [PH] (ref 5–8)
PROT UR STRIP-MCNC: ABNORMAL MG/DL
RBC #/AREA URNS HPF: ABNORMAL /HPF (ref 0–2)
RENAL EPITHELIAL, UA: ABNORMAL /HPF
SP GR UR STRIP: 1.01 (ref 1–1.03)
UROBILINOGEN UR STRIP-ACNC: NORMAL EU/DL (ref 0–1)
WBC #/AREA URNS HPF: ABNORMAL /HPF

## 2023-12-26 PROCEDURE — 81001 URINALYSIS AUTO W/SCOPE: CPT

## 2024-01-04 ENCOUNTER — OFFICE VISIT (OUTPATIENT)
Dept: UROLOGY | Age: 79
End: 2024-01-04

## 2024-01-04 ENCOUNTER — HOSPITAL ENCOUNTER (OUTPATIENT)
Age: 79
Setting detail: SPECIMEN
Discharge: HOME | End: 2024-01-04
Payer: MEDICARE

## 2024-01-04 VITALS — DIASTOLIC BLOOD PRESSURE: 80 MMHG | BODY MASS INDEX: 33.28 KG/M2 | HEIGHT: 65 IN | SYSTOLIC BLOOD PRESSURE: 118 MMHG

## 2024-01-04 DIAGNOSIS — N18.30 STAGE 3 CHRONIC KIDNEY DISEASE, UNSPECIFIED WHETHER STAGE 3A OR 3B CKD (HCC): ICD-10-CM

## 2024-01-04 DIAGNOSIS — N39.46 MIXED INCONTINENCE: ICD-10-CM

## 2024-01-04 DIAGNOSIS — R35.1 NOCTURIA: ICD-10-CM

## 2024-01-04 DIAGNOSIS — Z90.5 H/O PARTIAL NEPHRECTOMY: ICD-10-CM

## 2024-01-04 DIAGNOSIS — N39.0 FREQUENT UTI: ICD-10-CM

## 2024-01-04 DIAGNOSIS — N39.46 MIXED INCONTINENCE: Primary | ICD-10-CM

## 2024-01-04 PROCEDURE — 87086 URINE CULTURE/COLONY COUNT: CPT

## 2024-01-04 ASSESSMENT — ENCOUNTER SYMPTOMS
CONSTIPATION: 1
COUGH: 0
SHORTNESS OF BREATH: 0
ABDOMINAL PAIN: 0
COLOR CHANGE: 0
NAUSEA: 0
EYE REDNESS: 0
BACK PAIN: 0
WHEEZING: 0
APNEA: 0
VOMITING: 0

## 2024-01-04 NOTE — PROGRESS NOTES
HPI:    Patient is a 78 y.o. female in no acute distress.  She is alert and oriented to person, place, and time.      7/2021 Patient presents today as a new patient referral from Dr. Baker for mixed incontinence and recurrent urinary tract infection. Patient has been seen by urology before. Has not seen urology in many years.  She did have a culture proven urinary tract infection 1 month ago.  Patient states that she was treated multiple times over the past year for urinary tract infections, again I have only seen one culture sent in the past year.    Patient did have a left partial nephrectomy 50 years ago for atrophy/hematuria.  Distant history of kidney stones she is also apparently has had \"multiple\" cystoscopies in the past.  She has had many back surgeries as well.  He does have chronic kidney disease. She used to follow with nephrology, but no longer. Non smoker. Retired M[employer] . Patient did have labs approximately a month ago which did show a BUN of 34, creatinine of 1.74, GFR of 29.  She does have history of hysterectomy.  Patient did have a bladder sling in 1988.  Patient does have incontinence throughout the day and night.  She does wear a thick/heavy pad which she changes 3-4 times a day.  She says that it is saturated almost every time she changes it.  She states that some of the time her incontinence is due to not getting there in time.  Most of the time her incontinence is when she stands up.  She has never been on any anticholinergics.  She does have a daily cough which is soft and easy to pass.  She only drinks water.  She urinates every 30 minutes to 1-1/2 hours.  Patient does take Lasix. Patient is an uncontrolled diabetic, her  last hemoglobin A1c was 9.5.  She denies any current fever, chills, gross hematuria, flank pain, dysuria. Patient voided - 20 mL, PVR - 0 mL.                 Started on oxybutynin XL 10 mg     9/2021 - US was independently reviewed showing evidence of prior

## 2024-01-05 LAB
MICROORGANISM SPEC CULT: NORMAL
SPECIMEN DESCRIPTION: NORMAL

## 2024-01-08 ENCOUNTER — TELEPHONE (OUTPATIENT)
Dept: UROLOGY | Age: 79
End: 2024-01-08

## 2024-01-08 NOTE — TELEPHONE ENCOUNTER
----- Message from YOHAN Gomes - CNP sent at 1/8/2024  9:34 AM EST -----  Call pt - urine cx reviewed and negative for UTI

## 2024-01-10 RX ORDER — OMEPRAZOLE 20 MG/1
CAPSULE, DELAYED RELEASE ORAL
Qty: 90 CAPSULE | Refills: 3 | Status: SHIPPED | OUTPATIENT
Start: 2024-01-10

## 2024-01-10 RX ORDER — CLOPIDOGREL BISULFATE 75 MG/1
75 TABLET ORAL DAILY
Qty: 90 TABLET | Refills: 3 | Status: SHIPPED | OUTPATIENT
Start: 2024-01-10

## 2024-01-10 RX ORDER — ALLOPURINOL 100 MG/1
200 TABLET ORAL DAILY
Qty: 180 TABLET | Refills: 3 | Status: SHIPPED | OUTPATIENT
Start: 2024-01-10

## 2024-01-10 RX ORDER — ATORVASTATIN CALCIUM 40 MG/1
40 TABLET, FILM COATED ORAL DAILY
Qty: 90 TABLET | Refills: 3 | Status: SHIPPED | OUTPATIENT
Start: 2024-01-10

## 2024-01-10 NOTE — TELEPHONE ENCOUNTER
Last visit:  11/13/2023  Next Visit Date:    Future Appointments   Date Time Provider Department Center   2/14/2024  8:20 AM Edwige Baker DO [facility]   3/7/2024 11:30 AM Bartolo Preston PA-C [facility]         Medication List:  Prior to Admission medications    Medication Sig Start Date End Date Taking? Authorizing Provider   mirabegron (MYRBETRIQ) 50 MG TB24 Take 50 mg by mouth daily 1/4/24   Bartolo Preston PA-C   benzonatate (TESSALON) 100 MG capsule Take 1 capsule by mouth 3 times daily as needed for Cough 12/1/23   Edwige Baker DO   STOOL SOFTENER/LAXATIVE 50-8.6 MG per tablet TAKE 1 TABLET BY MOUTH DAILY 11/3/23   Edwige Baker DO   aspirin 81 MG EC tablet Take 1 tablet by mouth daily 10/3/23   Edwige Baker DO   gabapentin (NEURONTIN) 100 MG capsule TAKE 2 CAPSULES BY MOUTH TWICE  DAILY 10/5/23 4/5/24  Edwige Baker DO   sertraline (ZOLOFT) 50 MG tablet TAKE 1 TABLET BY MOUTH DAILY 9/25/23   Edwige Baker DO   glipiZIDE (GLUCOTROL) 5 MG tablet 1 po daily with breakfast 9/25/23   Edwige Baker DO   atorvastatin (LIPITOR) 40 MG tablet Take 1 tablet by mouth daily 9/25/23   Edwige Baker DO   allopurinol (ZYLOPRIM) 100 MG tablet Take 2 tablets by mouth daily 9/25/23   Edwige Baker DO   folic acid (FOLVITE) 400 MCG tablet Take 1 tablet by mouth daily 9/25/23   Edwige Baker DO   clopidogrel (PLAVIX) 75 MG tablet Take 1 tablet by mouth daily 9/25/23   Edwige Baker DO   furosemide (LASIX) 20 MG tablet TAKE 1 TABLET BY MOUTH  DAILY 9/25/23   Edwige Baker DO   traZODone (DESYREL) 50 MG tablet Take 1 tablet by mouth nightly 9/25/23   Edwige Baker DO   magnesium lactate (MAGTAB) 84 MG (7MEQ) TBCR extended release tablet TAKE 2 TABLETS BY MOUTH IN THE MORNING and TAKE 1 TABLET IN THE EVENING 9/25/23   Edwige Baker DO   omeprazole (PRILOSEC) 20 MG delayed release capsule TAKE 1 CAPSULE BY MOUTH  DAILY 2/8/23

## 2024-01-15 RX ORDER — LANOLIN ALCOHOL/MO/W.PET/CERES
400 CREAM (GRAM) TOPICAL DAILY
Qty: 90 TABLET | Refills: 1 | Status: SHIPPED | OUTPATIENT
Start: 2024-01-15

## 2024-01-15 RX ORDER — AMOXICILLIN 250 MG
1 CAPSULE ORAL DAILY
Qty: 30 TABLET | Refills: 2 | Status: SHIPPED | OUTPATIENT
Start: 2024-01-15

## 2024-01-15 NOTE — TELEPHONE ENCOUNTER
Last OV: 11/13/2023  chronic   Last RX:    Next scheduled apt: 2/14/2024   3 months DM           Surescript requesting a refill

## 2024-02-12 RX ORDER — GABAPENTIN 100 MG/1
CAPSULE ORAL
Qty: 360 CAPSULE | Refills: 1 | Status: SHIPPED | OUTPATIENT
Start: 2024-02-12 | End: 2024-08-12

## 2024-02-14 ENCOUNTER — OFFICE VISIT (OUTPATIENT)
Dept: FAMILY MEDICINE CLINIC | Age: 79
End: 2024-02-14

## 2024-02-14 ENCOUNTER — HOSPITAL ENCOUNTER (OUTPATIENT)
Age: 79
Discharge: HOME | End: 2024-02-14
Payer: MEDICARE

## 2024-02-14 VITALS
WEIGHT: 199 LBS | HEART RATE: 80 BPM | BODY MASS INDEX: 33.15 KG/M2 | HEIGHT: 65 IN | SYSTOLIC BLOOD PRESSURE: 120 MMHG | OXYGEN SATURATION: 98 % | DIASTOLIC BLOOD PRESSURE: 64 MMHG

## 2024-02-14 DIAGNOSIS — E11.9 TYPE 2 DIABETES MELLITUS WITHOUT COMPLICATION, WITHOUT LONG-TERM CURRENT USE OF INSULIN (HCC): Primary | ICD-10-CM

## 2024-02-14 DIAGNOSIS — E83.42 HYPOMAGNESEMIA: ICD-10-CM

## 2024-02-14 DIAGNOSIS — I50.32 CHRONIC DIASTOLIC CONGESTIVE HEART FAILURE (HCC): ICD-10-CM

## 2024-02-14 DIAGNOSIS — F41.9 ANXIETY: ICD-10-CM

## 2024-02-14 DIAGNOSIS — E89.2 H/O PARATHYROIDECTOMY (HCC): ICD-10-CM

## 2024-02-14 DIAGNOSIS — M54.12 CHRONIC CERVICAL RADICULOPATHY: ICD-10-CM

## 2024-02-14 DIAGNOSIS — E11.9 TYPE 2 DIABETES MELLITUS WITHOUT COMPLICATION, WITHOUT LONG-TERM CURRENT USE OF INSULIN (HCC): ICD-10-CM

## 2024-02-14 LAB
ANION GAP SERPL CALCULATED.3IONS-SCNC: 11 MMOL/L (ref 9–17)
BUN SERPL-MCNC: 32 MG/DL (ref 8–23)
BUN/CREAT SERPL: 21 (ref 9–20)
CALCIUM SERPL-MCNC: 9.2 MG/DL (ref 8.6–10.4)
CHLORIDE SERPL-SCNC: 100 MMOL/L (ref 98–107)
CO2 SERPL-SCNC: 28 MMOL/L (ref 20–31)
CREAT SERPL-MCNC: 1.5 MG/DL (ref 0.5–0.9)
GFR SERPL CREATININE-BSD FRML MDRD: 36 ML/MIN/1.73M2
GLUCOSE SERPL-MCNC: 79 MG/DL (ref 70–99)
HBA1C MFR BLD: 6.1 %
MAGNESIUM SERPL-MCNC: 2.2 MG/DL (ref 1.6–2.6)
POTASSIUM SERPL-SCNC: 4.5 MMOL/L (ref 3.7–5.3)
SODIUM SERPL-SCNC: 139 MMOL/L (ref 135–144)

## 2024-02-14 PROCEDURE — 80048 BASIC METABOLIC PNL TOTAL CA: CPT

## 2024-02-14 PROCEDURE — 83735 ASSAY OF MAGNESIUM: CPT

## 2024-02-14 PROCEDURE — 36415 COLL VENOUS BLD VENIPUNCTURE: CPT

## 2024-02-14 RX ORDER — SERTRALINE HYDROCHLORIDE 25 MG/1
25 TABLET, FILM COATED ORAL DAILY
Qty: 90 TABLET | Refills: 3 | Status: SHIPPED | OUTPATIENT
Start: 2024-02-14

## 2024-02-14 NOTE — PATIENT INSTRUCTIONS

## 2024-02-14 NOTE — PROGRESS NOTES
Name: Ragini Regan  : 1945         Chief Complaint:     Chief Complaint   Patient presents with    Diabetes    Hypertension    Gastroesophageal Reflux    Neck Pain     Neck pain left side into shoulder       History of Present Illness:      Ragini Regan is a 78 y.o.  female who presents with Diabetes, Hypertension, Gastroesophageal Reflux, and Neck Pain (Neck pain left side into shoulder)      HPI    C/o pain L trap area, stiff neck muscles. One night woke up with pain in L arm and put rubbing alcohol on which helped. Still having lower ext pain, doesn't feel last procedure helped.     Ended up not really driving, feels like she can but doesn't like to.     Incontinence much better on myrbetriq.     Hypomag, mfr changed so pill is twice as big and she's having difficulty swallowing it. Not scored.    C/o still feeling like she's having trouble with nerves (anxiety), helped by zoloft but would like to try higher dose. Has lost a couple friends recently.     Medical History:     Patient Active Problem List   Diagnosis    Essential hypertension, benign    Esophageal reflux    Irritable bowel syndrome    Seasonal allergies    Gout    Rectocele    Tubular adenoma of colon    Hiatal hernia    Sigmoid diverticulosis    Chronic back pain    Hypomagnesemia    Family history of colon cancer    History of recurrent deep vein thrombosis (DVT)    Hyperparathyroidism (HCC)    Chronic renal disease, stage III ([facility]) [354231]    Type 2 diabetes mellitus without complication, without long-term current use of insulin (HCC)    Orthostatic hypotension    Chronic diastolic congestive heart failure (HCC)       Medications:       Prior to Admission medications    Medication Sig Start Date End Date Taking? Authorizing Provider   sertraline (ZOLOFT) 25 MG tablet Take 1 tablet by mouth daily In addition to 50 mg tab 24  Yes Edwige Baker DO   gabapentin (NEURONTIN) 100 MG capsule TAKE 2 CAPSULES BY MOUTH TWICE  DAILY

## 2024-02-29 ENCOUNTER — HOSPITAL ENCOUNTER (EMERGENCY)
Age: 79
Discharge: HOME | End: 2024-02-29
Attending: EMERGENCY MEDICINE
Payer: MEDICARE

## 2024-02-29 VITALS
TEMPERATURE: 98.6 F | BODY MASS INDEX: 33.15 KG/M2 | HEIGHT: 65 IN | OXYGEN SATURATION: 99 % | WEIGHT: 199 LBS | DIASTOLIC BLOOD PRESSURE: 83 MMHG | RESPIRATION RATE: 18 BRPM | HEART RATE: 88 BPM | SYSTOLIC BLOOD PRESSURE: 154 MMHG

## 2024-02-29 DIAGNOSIS — K21.9 GASTROESOPHAGEAL REFLUX DISEASE WITHOUT ESOPHAGITIS: ICD-10-CM

## 2024-02-29 DIAGNOSIS — R12 HEART BURN: Primary | ICD-10-CM

## 2024-02-29 PROCEDURE — 93005 ELECTROCARDIOGRAM TRACING: CPT | Performed by: EMERGENCY MEDICINE

## 2024-02-29 PROCEDURE — 99283 EMERGENCY DEPT VISIT LOW MDM: CPT

## 2024-02-29 PROCEDURE — 6370000000 HC RX 637 (ALT 250 FOR IP): Performed by: EMERGENCY MEDICINE

## 2024-02-29 RX ORDER — PANTOPRAZOLE SODIUM 20 MG/1
20 TABLET, DELAYED RELEASE ORAL DAILY
Qty: 30 TABLET | Refills: 0 | Status: SHIPPED | OUTPATIENT
Start: 2024-02-29

## 2024-02-29 RX ORDER — SUCRALFATE 1 G/1
1 TABLET ORAL 3 TIMES DAILY
Qty: 30 TABLET | Refills: 0 | Status: SHIPPED | OUTPATIENT
Start: 2024-02-29

## 2024-02-29 RX ADMIN — Medication: at 11:20

## 2024-02-29 ASSESSMENT — PAIN - FUNCTIONAL ASSESSMENT
PAIN_FUNCTIONAL_ASSESSMENT: 0-10
PAIN_FUNCTIONAL_ASSESSMENT: ACTIVITIES ARE NOT PREVENTED

## 2024-02-29 ASSESSMENT — PAIN DESCRIPTION - LOCATION: LOCATION: THROAT

## 2024-02-29 ASSESSMENT — PAIN DESCRIPTION - DESCRIPTORS: DESCRIPTORS: BURNING

## 2024-02-29 ASSESSMENT — PAIN DESCRIPTION - PAIN TYPE: TYPE: ACUTE PAIN

## 2024-02-29 ASSESSMENT — PAIN SCALES - GENERAL: PAINLEVEL_OUTOF10: 4

## 2024-02-29 NOTE — ED PROVIDER NOTES
Prescriptions    PANTOPRAZOLE (PROTONIX) 20 MG TABLET    Take 1 tablet by mouth daily    SUCRALFATE (CARAFATE) 1 GM TABLET    Take 1 tablet by mouth 3 times daily       Follow-up:  Xochitl Hawkins MD  [address] [place] [address]  [phone]    In 1 week          Final Impression:   1. Heart burn    2. Gastroesophageal reflux disease without esophagitis                 (Please note that portions of this note were completed with a voice recognition program.  Efforts were made to edit the dictations but occasionally words are mis-transcribed.)      (Please note that portions of this note were completed with a voice recognition program.  Efforts were made to edit the dictations but occasionally words are mis-transcribed.)       Rex Valentino MD  02/29/24 0131

## 2024-03-01 ENCOUNTER — CLINICAL DOCUMENTATION ONLY (OUTPATIENT)
Facility: CLINIC | Age: 79
End: 2024-03-01

## 2024-03-01 ENCOUNTER — TELEPHONE (OUTPATIENT)
Dept: FAMILY MEDICINE CLINIC | Age: 79
End: 2024-03-01

## 2024-03-01 DIAGNOSIS — D12.6 TUBULAR ADENOMA OF COLON: ICD-10-CM

## 2024-03-01 DIAGNOSIS — K21.9 GASTROESOPHAGEAL REFLUX DISEASE WITHOUT ESOPHAGITIS: Primary | ICD-10-CM

## 2024-03-01 LAB
EKG ATRIAL RATE: 76 BPM
EKG P AXIS: 110 DEGREES
EKG P-R INTERVAL: 164 MS
EKG Q-T INTERVAL: 394 MS
EKG QRS DURATION: 106 MS
EKG QTC CALCULATION (BAZETT): 443 MS
EKG R AXIS: 40 DEGREES
EKG T AXIS: 33 DEGREES
EKG VENTRICULAR RATE: 76 BPM

## 2024-03-01 PROCEDURE — 93010 ELECTROCARDIOGRAM REPORT: CPT | Performed by: INTERNAL MEDICINE

## 2024-03-01 NOTE — TELEPHONE ENCOUNTER
Patient was wanting to be seen by gi for GERD also, spoke with Emanuel, requesting new referral for consult for GERD and colonoscopy

## 2024-03-01 NOTE — TELEPHONE ENCOUNTER
----- Message from Meg Longo sent at 3/1/2024 12:02 PM EST -----  Subject: Referral Request    Reason for referral request? States Dr Hawkins office called to let her know   that Dr Baker information before getting scheduled  Provider patient wants to be referred to(if known):     Provider Phone Number(if known):    Additional Information for Provider?   ---------------------------------------------------------------------------  --------------  CALL BACK INFO    [phone]; OK to leave message on voicemail  ---------------------------------------------------------------------------  --------------

## 2024-03-06 ENCOUNTER — TELEPHONE (OUTPATIENT)
Dept: GASTROENTEROLOGY | Age: 79
End: 2024-03-06

## 2024-03-06 NOTE — TELEPHONE ENCOUNTER
LVM with son, Arcadio. Patient is already established with GI. Has colonoscopy scheduled in April. Can be seen for follow up for the Gerd/ ED follow up if needed.

## 2024-03-07 NOTE — TELEPHONE ENCOUNTER
LEFT MESSAGE COVID-19 phone screening     Call placed to screen patient prior to upcoming Medication Management visit for Anticoagulation. Does patient have fever and/or lower respiratory symptoms (SOB, difficulty breathing, cough)? [] Yes    [] No    If yes, complete Travel Screening and ask the following:   [] [Fever OR s/sxs of lower respiratory illness]  AND  [close contact with lab-confirmed COVID-19 patient within 14 days of symptom onset   [] [Fever AND s/sxs of lower respiratory illness requiring hospitalization] AND [history of travel to [place], [place], [place], [facility], [place] within 14 days of sx onset]  [] [Fever with severe acute lower respiratory illness (I.e. PNA, ARDS) requiring hospitalization and without alternative explanatory diagnosis (I.e. Influenza)] AND [no source of exposure identified]    [] None of the following; patient confirmed for regularly scheduled INR check and instructed to call the clinic immediately if any symptoms develop prior to upcoming appointment. Pt informed of the below, verbalizes understanding and is agreeable.   Pharmacy verified, rx sent.

## 2024-03-22 ENCOUNTER — OFFICE VISIT (OUTPATIENT)
Dept: FAMILY MEDICINE CLINIC | Age: 79
End: 2024-03-22

## 2024-03-22 ENCOUNTER — HOSPITAL ENCOUNTER (OUTPATIENT)
Age: 79
Setting detail: SPECIMEN
Discharge: HOME | End: 2024-03-22
Payer: MEDICARE

## 2024-03-22 VITALS
BODY MASS INDEX: 32.28 KG/M2 | WEIGHT: 194 LBS | DIASTOLIC BLOOD PRESSURE: 58 MMHG | SYSTOLIC BLOOD PRESSURE: 126 MMHG | OXYGEN SATURATION: 97 % | HEART RATE: 93 BPM

## 2024-03-22 DIAGNOSIS — E11.22 TYPE 2 DIABETES MELLITUS WITH STAGE 3B CHRONIC KIDNEY DISEASE, WITHOUT LONG-TERM CURRENT USE OF INSULIN (HCC): ICD-10-CM

## 2024-03-22 DIAGNOSIS — K21.9 GASTROESOPHAGEAL REFLUX DISEASE WITHOUT ESOPHAGITIS: ICD-10-CM

## 2024-03-22 DIAGNOSIS — R39.11 URINARY HESITANCY: ICD-10-CM

## 2024-03-22 DIAGNOSIS — R41.0 CONFUSION: Primary | ICD-10-CM

## 2024-03-22 DIAGNOSIS — R82.90 ABNORMAL URINALYSIS: ICD-10-CM

## 2024-03-22 DIAGNOSIS — N18.32 TYPE 2 DIABETES MELLITUS WITH STAGE 3B CHRONIC KIDNEY DISEASE, WITHOUT LONG-TERM CURRENT USE OF INSULIN (HCC): ICD-10-CM

## 2024-03-22 DIAGNOSIS — M54.2 NECK PAIN: ICD-10-CM

## 2024-03-22 LAB
BILIRUBIN, POC: NEGATIVE
BLOOD URINE, POC: NEGATIVE
CLARITY, POC: CLEAR
COLOR, POC: YELLOW
GLUCOSE URINE, POC: NEGATIVE
KETONES, POC: NEGATIVE
LEUKOCYTE EST, POC: NORMAL
NITRITE, POC: NEGATIVE
PH, POC: 5.5
PROTEIN, POC: NEGATIVE
SPECIFIC GRAVITY, POC: 1.01
UROBILINOGEN, POC: NORMAL

## 2024-03-22 PROCEDURE — 87086 URINE CULTURE/COLONY COUNT: CPT

## 2024-03-22 RX ORDER — PANTOPRAZOLE SODIUM 20 MG/1
20 TABLET, DELAYED RELEASE ORAL DAILY
Qty: 90 TABLET | Refills: 1 | Status: SHIPPED | OUTPATIENT
Start: 2024-03-22

## 2024-03-22 NOTE — PROGRESS NOTES
trailing off. Alert throughout visit. Doesn't know what day it is, thinks Saturday (its Fri). Mixes up timing of recent events or only able to give very vague details.         Data:     Lab Results   Component Value Date/Time     02/14/2024 09:41 AM    K 4.5 02/14/2024 09:41 AM     02/14/2024 09:41 AM    CO2 28 02/14/2024 09:41 AM    BUN 32 02/14/2024 09:41 AM    CREATININE 1.5 02/14/2024 09:41 AM    GLUCOSE 79 02/14/2024 09:41 AM    PROT 6.6 10/05/2022 02:15 PM    LABALBU 3.6 10/17/2022 11:16 AM    BILITOT 0.6 10/05/2022 02:15 PM    ALKPHOS 86 10/05/2022 02:15 PM    AST 12 10/05/2022 02:15 PM    ALT 7 10/05/2022 02:15 PM     Lab Results   Component Value Date/Time    WBC 7.0 09/10/2023 09:41 AM    RBC 3.18 09/10/2023 09:41 AM    HGB 10.1 09/10/2023 09:41 AM    HCT 31.0 09/10/2023 09:41 AM    MCV 97.6 09/10/2023 09:41 AM    MCH 31.7 09/10/2023 09:41 AM    MCHC 32.5 09/10/2023 09:41 AM    RDW 16.5 09/10/2023 09:41 AM     09/10/2023 09:41 AM    MPV NOT REPORTED 12/17/2021 11:45 AM     Lab Results   Component Value Date/Time    TSH 2.48 05/09/2023 12:08 PM     Lab Results   Component Value Date/Time    CHOL 117 11/13/2023 11:17 AM    HDL 58 11/13/2023 11:17 AM    LABA1C 6.1 02/14/2024 09:19 AM     Results for POC orders placed in visit on 03/22/24   POCT Urinalysis no Micro   Result Value Ref Range    Color, UA yellow     Clarity, UA clear     Glucose, UA POC negative     Bilirubin, UA negative     Ketones, UA negative     Spec Grav, UA 1.010     Blood, UA POC negative     pH, UA 5.5     Protein, UA POC negative     Urobilinogen, UA 0.2 E.U/dL     Leukocytes, UA small     Nitrite, UA negative          Assessment & Plan:        Diagnosis Orders   1. Confusion        2. Urinary hesitancy  POCT Urinalysis no Micro    Culture, Urine      3. Abnormal urinalysis  Culture, Urine      4. Type 2 diabetes mellitus with stage 3b chronic kidney disease, without long-term current use of insulin (HCC)        5.

## 2024-03-23 LAB
MICROORGANISM SPEC CULT: NORMAL
SPECIMEN DESCRIPTION: NORMAL

## 2024-03-25 ENCOUNTER — TELEPHONE (OUTPATIENT)
Dept: FAMILY MEDICINE CLINIC | Age: 79
End: 2024-03-25

## 2024-03-25 DIAGNOSIS — R41.0 CONFUSION: Primary | ICD-10-CM

## 2024-03-25 DIAGNOSIS — R26.81 UNSTABLE GAIT: ICD-10-CM

## 2024-03-25 DIAGNOSIS — R51.9 PERSISTENT HEADACHES: ICD-10-CM

## 2024-03-25 NOTE — TELEPHONE ENCOUNTER
----- Message from Edwige Baker DO sent at 3/25/2024  8:04 AM EDT -----  No UTI. Recommend CT head to see if something may be going on with pressure in her brain to cause the pain and unsteadiness she's having.

## 2024-03-25 NOTE — TELEPHONE ENCOUNTER
----- Message from Leela Quevedo MA sent at 3/25/2024  9:20 AM EDT -----  Pt aware of results. Would like to get CT scan done.

## 2024-03-26 ENCOUNTER — HOSPITAL ENCOUNTER (OUTPATIENT)
Dept: CT IMAGING | Age: 79
Discharge: HOME | End: 2024-03-28
Attending: FAMILY MEDICINE
Payer: MEDICARE

## 2024-03-26 DIAGNOSIS — R41.0 CONFUSION: ICD-10-CM

## 2024-03-26 DIAGNOSIS — R51.9 PERSISTENT HEADACHES: ICD-10-CM

## 2024-03-26 DIAGNOSIS — R26.81 UNSTABLE GAIT: ICD-10-CM

## 2024-03-26 PROCEDURE — 70450 CT HEAD/BRAIN W/O DYE: CPT

## 2024-03-28 RX ORDER — MAGNESIUM L-LACTATE 84 MG
TABLET, EXTENDED RELEASE ORAL
Qty: 360 TABLET | Refills: 1 | Status: SHIPPED | OUTPATIENT
Start: 2024-03-28

## 2024-03-28 NOTE — TELEPHONE ENCOUNTER
Last visit:  3/22/2024  Next Visit Date:    Future Appointments   Date Time Provider Department Center   4/4/2024  1:15 PM [facility]   5/15/2024  9:00 AM Edwige Baker DO [facility]   8/12/2024  9:00 AM Edwige Baker DO [facility]         Medication List:  Prior to Admission medications    Medication Sig Start Date End Date Taking? Authorizing Provider   pantoprazole (PROTONIX) 20 MG tablet Take 1 tablet by mouth daily 3/22/24   Edwige Baker DO   sucralfate (CARAFATE) 1 GM tablet Take 1 tablet by mouth 3 times daily 2/29/24   Rex Valentino MD   sertraline (ZOLOFT) 25 MG tablet Take 1 tablet by mouth daily In addition to 50 mg tab 2/14/24   Edwige Baker DO   gabapentin (NEURONTIN) 100 MG capsule TAKE 2 CAPSULES BY MOUTH TWICE  DAILY 2/12/24 8/12/24  Edwige Baker DO   folic acid (FOLVITE) 400 MCG tablet Take 1 tablet by mouth daily 1/15/24   Edwige Baker DO   senna-docusate (STOOL SOFTENER/LAXATIVE) 8.6-50 MG per tablet Take 1 tablet by mouth daily 1/15/24   Edwige Baker DO   atorvastatin (LIPITOR) 40 MG tablet TAKE 1 TABLET BY MOUTH DAILY 1/10/24   Edwige Baker DO   allopurinol (ZYLOPRIM) 100 MG tablet TAKE 2 TABLETS BY MOUTH DAILY 1/10/24   Edwige Baker DO   clopidogrel (PLAVIX) 75 MG tablet TAKE 1 TABLET BY MOUTH DAILY 1/10/24   Edwige Baker DO   mirabegron (MYRBETRIQ) 50 MG TB24 Take 50 mg by mouth daily 1/4/24   Bartolo Preston PA-C   aspirin 81 MG EC tablet Take 1 tablet by mouth daily 10/3/23   Edwige Baker DO   sertraline (ZOLOFT) 50 MG tablet TAKE 1 TABLET BY MOUTH DAILY 9/25/23   Edwige Baker DO   glipiZIDE (GLUCOTROL) 5 MG tablet 1 po daily with breakfast 9/25/23   Edwige Baker DO   furosemide (LASIX) 20 MG tablet TAKE 1 TABLET BY MOUTH  DAILY 9/25/23   Edwige Baker DO   traZODone (DESYREL) 50 MG tablet Take 1 tablet by mouth nightly 9/25/23   Edwige Baker DO

## 2024-04-02 NOTE — PROGRESS NOTES
[facility]   Preadmission Testing    Name: Ragini Regan  : 1945  Patient Phone: [phone] (home) [phone] (work)    Procedure: Colonoscopy  Date of Procedure: 2024  Surgeon: Xochitl Hawkins MD    Ht:  165.1 cm (5' 5\")  Wt: 89.4 kg (197 lb)  Wt method: Stated    Allergies:   Allergies   Allergen Reactions    Codeine Itching    Hydrocodone-Acetaminophen Hives    Oxycodone-Acetaminophen Hives    Adhesive Tape Itching, Rash and Other (See Comments)     Tape \"takes the skin off\"    Lisinopril Other (See Comments)     cough    Norco [Hydrocodone-Acetaminophen] Nausea And Vomiting    Percocet [Oxycodone-Acetaminophen] Itching and Nausea Only                There were no vitals filed for this visit.    No LMP recorded (lmp unknown). Patient has had a hysterectomy.    Do you take blood thinners?   [x] Yes    [] No         Instructed to stop blood thinners prior to procedure?    [x] Yes    [] No      [] N/A   Do you have sleep apnea?   [] Yes    [x] No     Do you have acid reflux ?   [x] Yes    [] No     Do you have  hiatal hernia?   [x] Yes    [] No    Do you ever experience motion sickness?   [] Yes    [x] No     Have you had a respiratory infection or sore throat in last 4 weeks before surgery?    [] Yes    [x] No     Do you have poorly controlled asthma or COPD?  Difficulty with intubation in past? [] Yes    [x] No      [] Yes    [x] No       Do you have a history of angina in the last month or symptomatic arrhythmia?   [] Yes    [x] No     Do you have significant central nervous system disease?   [] Yes    [] No     Have you had an EKG, labs, or chest xray in last 12 months?  If yes provide copies to anesthesia   [x] Yes    [] No       [] Lab    [x] EKG    [] CXR     Have you had a stress test?     [x] Yes    [] No    When/where:Years ago, at [place]      Was it normal?    [x] Yes    [] No     Do you or your family have a history of Malignant Hyperthermia?   [] Yes    [x] No           Do

## 2024-04-05 ENCOUNTER — TELEPHONE (OUTPATIENT)
Dept: GASTROENTEROLOGY | Age: 79
End: 2024-04-05

## 2024-04-05 NOTE — TELEPHONE ENCOUNTER
Spoke to son Arcadio, reviewed light meals Sunday and clear liquids on Monday. Arcadio denies any other questions about prepping.

## 2024-04-09 ENCOUNTER — HOSPITAL ENCOUNTER (EMERGENCY)
Age: 79
Discharge: HOME | End: 2024-04-09
Attending: FAMILY MEDICINE
Payer: MEDICARE

## 2024-04-09 ENCOUNTER — HOSPITAL ENCOUNTER (OUTPATIENT)
Age: 79
Setting detail: OUTPATIENT SURGERY
Discharge: HOME | End: 2024-04-09
Attending: INTERNAL MEDICINE | Admitting: INTERNAL MEDICINE
Payer: MEDICARE

## 2024-04-09 VITALS
SYSTOLIC BLOOD PRESSURE: 113 MMHG | WEIGHT: 197 LBS | RESPIRATION RATE: 16 BRPM | OXYGEN SATURATION: 96 % | DIASTOLIC BLOOD PRESSURE: 74 MMHG | HEART RATE: 102 BPM | BODY MASS INDEX: 32.78 KG/M2 | TEMPERATURE: 97.8 F

## 2024-04-09 VITALS
WEIGHT: 197 LBS | TEMPERATURE: 96.7 F | RESPIRATION RATE: 13 BRPM | HEART RATE: 88 BPM | SYSTOLIC BLOOD PRESSURE: 142 MMHG | DIASTOLIC BLOOD PRESSURE: 66 MMHG | BODY MASS INDEX: 32.82 KG/M2 | OXYGEN SATURATION: 94 % | HEIGHT: 65 IN

## 2024-04-09 DIAGNOSIS — Z98.890 HISTORY OF COLONOSCOPY WITH POLYPECTOMY: ICD-10-CM

## 2024-04-09 DIAGNOSIS — K62.5 BLOOD PER RECTUM: Primary | ICD-10-CM

## 2024-04-09 DIAGNOSIS — Z12.11 SPECIAL SCREENING FOR MALIGNANT NEOPLASMS, COLON: ICD-10-CM

## 2024-04-09 DIAGNOSIS — Z86.010 HISTORY OF COLONOSCOPY WITH POLYPECTOMY: ICD-10-CM

## 2024-04-09 LAB
ALBUMIN SERPL-MCNC: 3.5 G/DL (ref 3.5–5.2)
ALP SERPL-CCNC: 60 U/L (ref 35–104)
ALT SERPL-CCNC: 10 U/L (ref 5–33)
ANION GAP SERPL CALCULATED.3IONS-SCNC: 12 MMOL/L (ref 9–17)
AST SERPL-CCNC: 17 U/L
BASOPHILS # BLD: 0.05 K/UL (ref 0–0.2)
BASOPHILS NFR BLD: 1 % (ref 0–2)
BILIRUB SERPL-MCNC: 0.6 MG/DL (ref 0.3–1.2)
BUN SERPL-MCNC: 29 MG/DL (ref 8–23)
BUN/CREAT SERPL: 19 (ref 9–20)
CALCIUM SERPL-MCNC: 9.2 MG/DL (ref 8.6–10.4)
CHLORIDE SERPL-SCNC: 99 MMOL/L (ref 98–107)
CO2 SERPL-SCNC: 24 MMOL/L (ref 20–31)
CREAT SERPL-MCNC: 1.5 MG/DL (ref 0.5–0.9)
EOSINOPHIL # BLD: 0.13 K/UL (ref 0–0.4)
EOSINOPHILS RELATIVE PERCENT: 2 % (ref 0–5)
ERYTHROCYTE [DISTWIDTH] IN BLOOD BY AUTOMATED COUNT: 14.7 % (ref 12.1–15.2)
GFR SERPL CREATININE-BSD FRML MDRD: 36 ML/MIN/1.73M2
GLUCOSE SERPL-MCNC: 108 MG/DL (ref 70–99)
HCT VFR BLD AUTO: 29.9 % (ref 36–46)
HGB BLD-MCNC: 10 G/DL (ref 12–16)
IMM GRANULOCYTES # BLD AUTO: 0.01 K/UL (ref 0–0.3)
IMM GRANULOCYTES NFR BLD: 0 % (ref 0–5)
LACTATE BLDV-SCNC: 1.5 MMOL/L (ref 0.5–2.2)
LIPASE SERPL-CCNC: 13 U/L (ref 13–60)
LYMPHOCYTES NFR BLD: 1.8 K/UL (ref 1–4.8)
LYMPHOCYTES RELATIVE PERCENT: 22 % (ref 15–40)
MCH RBC QN AUTO: 32.7 PG (ref 26–34)
MCHC RBC AUTO-ENTMCNC: 33.4 G/DL (ref 31–37)
MCV RBC AUTO: 97.7 FL (ref 80–100)
MONOCYTES NFR BLD: 0.59 K/UL (ref 0–1)
MONOCYTES NFR BLD: 7 % (ref 4–8)
NEUTROPHILS NFR BLD: 68 % (ref 47–75)
NEUTS SEG NFR BLD: 5.77 K/UL (ref 2.5–7)
PLATELET # BLD AUTO: 239 K/UL (ref 140–450)
PMV BLD AUTO: 10.5 FL (ref 6–12)
POTASSIUM SERPL-SCNC: 3.9 MMOL/L (ref 3.7–5.3)
PROT SERPL-MCNC: 6.2 G/DL (ref 6.4–8.3)
RBC # BLD AUTO: 3.06 M/UL (ref 4–5.2)
SODIUM SERPL-SCNC: 135 MMOL/L (ref 135–144)
WBC OTHER # BLD: 8.4 K/UL (ref 3.5–11)

## 2024-04-09 PROCEDURE — 3700000000 HC ANESTHESIA ATTENDED CARE: Performed by: INTERNAL MEDICINE

## 2024-04-09 PROCEDURE — 7100000010 HC PHASE II RECOVERY - FIRST 15 MIN: Performed by: INTERNAL MEDICINE

## 2024-04-09 PROCEDURE — 80053 COMPREHEN METABOLIC PANEL: CPT

## 2024-04-09 PROCEDURE — 7100000011 HC PHASE II RECOVERY - ADDTL 15 MIN: Performed by: INTERNAL MEDICINE

## 2024-04-09 PROCEDURE — 3609010600 HC COLONOSCOPY POLYPECTOMY SNARE/COLD BIOPSY: Performed by: INTERNAL MEDICINE

## 2024-04-09 PROCEDURE — 88305 TISSUE EXAM BY PATHOLOGIST: CPT

## 2024-04-09 PROCEDURE — 2580000003 HC RX 258: Performed by: INTERNAL MEDICINE

## 2024-04-09 PROCEDURE — 2580000003 HC RX 258: Performed by: FAMILY MEDICINE

## 2024-04-09 PROCEDURE — 85025 COMPLETE CBC W/AUTO DIFF WBC: CPT

## 2024-04-09 PROCEDURE — 2709999900 HC NON-CHARGEABLE SUPPLY: Performed by: INTERNAL MEDICINE

## 2024-04-09 PROCEDURE — 2500000003 HC RX 250 WO HCPCS: Performed by: NURSE ANESTHETIST, CERTIFIED REGISTERED

## 2024-04-09 PROCEDURE — 6360000002 HC RX W HCPCS: Performed by: NURSE ANESTHETIST, CERTIFIED REGISTERED

## 2024-04-09 PROCEDURE — 83690 ASSAY OF LIPASE: CPT

## 2024-04-09 PROCEDURE — 3700000001 HC ADD 15 MINUTES (ANESTHESIA): Performed by: INTERNAL MEDICINE

## 2024-04-09 PROCEDURE — 83605 ASSAY OF LACTIC ACID: CPT

## 2024-04-09 RX ORDER — 0.9 % SODIUM CHLORIDE 0.9 %
500 INTRAVENOUS SOLUTION INTRAVENOUS ONCE
Status: COMPLETED | OUTPATIENT
Start: 2024-04-09 | End: 2024-04-09

## 2024-04-09 RX ORDER — MIDAZOLAM HYDROCHLORIDE 2 MG/2ML
INJECTION, SOLUTION INTRAMUSCULAR; INTRAVENOUS PRN
Status: DISCONTINUED | OUTPATIENT
Start: 2024-04-09 | End: 2024-04-09 | Stop reason: SDUPTHER

## 2024-04-09 RX ORDER — SODIUM CHLORIDE, SODIUM LACTATE, POTASSIUM CHLORIDE, CALCIUM CHLORIDE 600; 310; 30; 20 MG/100ML; MG/100ML; MG/100ML; MG/100ML
INJECTION, SOLUTION INTRAVENOUS CONTINUOUS
Status: DISCONTINUED | OUTPATIENT
Start: 2024-04-09 | End: 2024-04-09 | Stop reason: HOSPADM

## 2024-04-09 RX ORDER — FENTANYL CITRATE 50 UG/ML
INJECTION, SOLUTION INTRAMUSCULAR; INTRAVENOUS PRN
Status: DISCONTINUED | OUTPATIENT
Start: 2024-04-09 | End: 2024-04-09 | Stop reason: SDUPTHER

## 2024-04-09 RX ORDER — LIDOCAINE HYDROCHLORIDE 10 MG/ML
INJECTION, SOLUTION EPIDURAL; INFILTRATION; INTRACAUDAL; PERINEURAL PRN
Status: DISCONTINUED | OUTPATIENT
Start: 2024-04-09 | End: 2024-04-09 | Stop reason: SDUPTHER

## 2024-04-09 RX ORDER — PROPOFOL 10 MG/ML
INJECTION, EMULSION INTRAVENOUS PRN
Status: DISCONTINUED | OUTPATIENT
Start: 2024-04-09 | End: 2024-04-09 | Stop reason: SDUPTHER

## 2024-04-09 RX ORDER — PROPOFOL 10 MG/ML
INJECTION, EMULSION INTRAVENOUS CONTINUOUS PRN
Status: DISCONTINUED | OUTPATIENT
Start: 2024-04-09 | End: 2024-04-09 | Stop reason: SDUPTHER

## 2024-04-09 RX ADMIN — PROPOFOL 50 MCG/KG/MIN: 10 INJECTION, EMULSION INTRAVENOUS at 10:17

## 2024-04-09 RX ADMIN — PROPOFOL 80 MG: 10 INJECTION, EMULSION INTRAVENOUS at 10:17

## 2024-04-09 RX ADMIN — MIDAZOLAM HYDROCHLORIDE 2 MG: 1 INJECTION, SOLUTION INTRAMUSCULAR; INTRAVENOUS at 10:06

## 2024-04-09 RX ADMIN — SODIUM CHLORIDE, POTASSIUM CHLORIDE, SODIUM LACTATE AND CALCIUM CHLORIDE: 600; 310; 30; 20 INJECTION, SOLUTION INTRAVENOUS at 08:35

## 2024-04-09 RX ADMIN — PROPOFOL 30 MG: 10 INJECTION, EMULSION INTRAVENOUS at 10:22

## 2024-04-09 RX ADMIN — FENTANYL CITRATE 50 MCG: 50 INJECTION, SOLUTION INTRAMUSCULAR; INTRAVENOUS at 10:06

## 2024-04-09 RX ADMIN — SODIUM CHLORIDE 500 ML: 9 INJECTION, SOLUTION INTRAVENOUS at 15:17

## 2024-04-09 RX ADMIN — LIDOCAINE HYDROCHLORIDE 50 MG: 10 INJECTION, SOLUTION EPIDURAL; INFILTRATION; INTRACAUDAL; PERINEURAL at 10:17

## 2024-04-09 RX ADMIN — FENTANYL CITRATE 50 MCG: 50 INJECTION, SOLUTION INTRAMUSCULAR; INTRAVENOUS at 10:17

## 2024-04-09 RX ADMIN — PROPOFOL 30 MG: 10 INJECTION, EMULSION INTRAVENOUS at 10:19

## 2024-04-09 ASSESSMENT — ENCOUNTER SYMPTOMS: BLOOD IN STOOL: 1

## 2024-04-09 ASSESSMENT — PAIN - FUNCTIONAL ASSESSMENT
PAIN_FUNCTIONAL_ASSESSMENT: NONE - DENIES PAIN
PAIN_FUNCTIONAL_ASSESSMENT: NONE - DENIES PAIN

## 2024-04-09 ASSESSMENT — LIFESTYLE VARIABLES: HOW OFTEN DO YOU HAVE A DRINK CONTAINING ALCOHOL: NEVER

## 2024-04-09 NOTE — OP NOTE
[facility]    COLONOSCOPY    PROCEDURE DATE: 04/09/24    REFERRING PHYSICIAN: No ref. provider found     PRIMARY CARE PROVIDER: Edwige Baker DO    ATTENDING PHYSICIAN: AGATHA RJAAN MD     HISTORY: Ms. Ragini Regan is a 78 y.o. female who presents to the  endoscopy unit for colonoscopy. The patient's clinical history is remarkable for diabetes mellitus type 2, hypertension. She is currently medically stable and appropriate for the planned procedure.       PREOPERATIVE DIAGNOSIS: previous adenomatous polyp, screening for colon cancer.     PROCEDURES:   Transanal Colonoscopy with polypectomy (snare cautery).     POSTPROCEDURE DIAGNOSIS:  Colon polyps    MEDICATIONS:   MAC per anesthesia     EBL 2 mL      INSTRUMENT: Olympus CF-H190 AL Pediatric flexible Colonoscope.     PREPARATION: The nature and character of the procedure as well as risks, benefits, and alternatives were discussed with the patient and informed consent was obtained. Complications were said to include, but were not limited to: medication allergy, medication reaction, cardiovascular and respiratory problems, bleeding, perforation, infection, and/or missed diagnosis. Following arrival in the endoscopy room, the patient was placed in the left lateral decubitus position and final time-out accomplished in the presence of the nursing staff. Baseline vital signs were obtained and reviewed, and IV sedation was subsequently initiated.       FINDINGS: Rectal examination demonstrated no significant visible external abnormality and digital palpation was unremarkable. Following adequate conscious sedation the colonoscope was introduced and advanced under direct visualization to the cecum, which was identified by the ileocecal valve and appendiceal orifice. The bowel preparation was felt to be fair-improved with irrigation and suction. Cecal intubation time was 6 minutes.     Once maximally inserted through a tortuous colon, the endoscope was

## 2024-04-09 NOTE — DISCHARGE INSTRUCTIONS
Discharge Instructions for Colonoscopy    Do not drive or operate machinery for 24 hours.  Do not make important personal or business decisions for 24 hours.   Do not drink alcoholic beverages for 24 hours.  Do not smoke tobacco products for 24 hours.  It is normal to have a feeling of fullness or mild cramping in your abdomen afterwards due to air that is put into your bowel during the procedure. Mild activities such as walking will help you pass the air.  You may resume your regular diet.   Results from a biopsy may take up to 2 weeks to return from pathology.  Make a follow-up appointment with PCP to be seen in 2 weeks.     SEEK MEDICAL ATTENTION IF:    Chest Pain or trouble breathing.    Persistent and significant bleeding from your rectum, such as soaking through clothing and/or feeling dizzy and sweating.    A fever above 101F or if you have chills.    If symptoms are severe call 911 or go to the nearest Emergency Room.

## 2024-04-09 NOTE — ANESTHESIA PRE PROCEDURE
HGB 10.1 09/10/2023 09:41 AM    HCT 31.0 09/10/2023 09:41 AM    MCV 97.6 09/10/2023 09:41 AM    RDW 16.5 09/10/2023 09:41 AM     09/10/2023 09:41 AM       CMP:   Lab Results   Component Value Date/Time     02/14/2024 09:41 AM    K 4.5 02/14/2024 09:41 AM     02/14/2024 09:41 AM    CO2 28 02/14/2024 09:41 AM    BUN 32 02/14/2024 09:41 AM    CREATININE 1.5 02/14/2024 09:41 AM    GFRAA 46 09/26/2022 12:00 PM    AGRATIO NOT REPORTED 12/17/2021 11:45 AM    LABGLOM 36 02/14/2024 09:41 AM    GLUCOSE 79 02/14/2024 09:41 AM    PROT 6.6 10/05/2022 02:15 PM    CALCIUM 9.2 02/14/2024 09:41 AM    BILITOT 0.6 10/05/2022 02:15 PM    ALKPHOS 86 10/05/2022 02:15 PM    AST 12 10/05/2022 02:15 PM    ALT 7 10/05/2022 02:15 PM       POC Tests: No results for input(s): \"POCGLU\", \"POCNA\", \"POCK\", \"POCCL\", \"POCBUN\", \"POCHEMO\", \"POCHCT\" in the last 72 hours.    Coags:   Lab Results   Component Value Date/Time    PROTIME 13.4 09/01/2022 04:14 PM    INR 1.0 09/01/2022 04:14 PM    APTT 25.2 10/09/2020 09:34 AM       HCG (If Applicable): No results found for: \"PREGTESTUR\", \"PREGSERUM\", \"HCG\", \"HCGQUANT\"     ABGs: No results found for: \"PHART\", \"PO2ART\", \"FHG5IYE\", \"HFV1DNG\", \"BEART\", \"G7MEVJHK\"     Type & Screen (If Applicable):  No results found for: \"LABABO\", \"LABRH\"    Drug/Infectious Status (If Applicable):  No results found for: \"HIV\", \"HEPCAB\"    COVID-19 Screening (If Applicable):   Lab Results   Component Value Date/Time    COVID19 Not Detected 11/16/2022 02:06 PM           Anesthesia Evaluation  Patient summary reviewed and Nursing notes reviewed  Airway: Mallampati: II  TM distance: >3 FB   Neck ROM: full  Mouth opening: > = 3 FB   Dental:    (+) poor dentition      Pulmonary:Negative Pulmonary ROS and normal exam  breath sounds clear to auscultation                             Cardiovascular:  Exercise tolerance: no interval change  (+) hypertension:, CHF: diastolic and no interval change      ECG reviewed  Rhythm:

## 2024-04-09 NOTE — H&P
History and Physical    Patient's Name/Date of Birth: Ragini Regan / 1945 (78 y.o.)    MRN: [id number]     Date: April 9, 2024       CHIEF COMPLAINT:  screening for colon cancer      Ragini Regan is a 77 y.o. old female who has a past medical history of diabetes mellitus type 2, chronic kidney disease, DVT (greater than 10 years ago), hypertension presenting for interval colon cancer screening colonoscopy.  Presents with her adult son, Arcadio who assists with HPI.  Ragini reports that she is feeling well, doing well with no concerns today.     Family history of colon cancer: Yes  Blood in stool: No  Unintentional weight loss: No  Abdominal pain: Yes: Occasionally after PO intact.  Prior colonoscopy: Yes  Constipation history: Yes  Number of bowel movements a day: 2x a week  Change in stool caliber: Yes: constipation  History of GERD or Acid Reflux: Yes: Feels it is well controlled.    Use of PPI's. Yes     Colonoscopy May 13, 2019:  PREOPERATIVE DIAGNOSES:  1.  Change in bowel habits.  2.  Abdominal bloating.  3.  History of colon polyps.  4.  Family history of colon cancer (paternal aunt). POSTOPERATIVE DIAGNOSES:  1.  Large rectosigmoid pedunculated polyp (approximately 15-20 cm from the anal verge).  2.  Sigmoid diverticulosis.  OPERATION:  1.  Colonoscopy, anus to cecum.  2.  Large pedunculated polypectomy with tattoo (approximately 15-20 cm from the anal verge).  Diagnosis --   RECTOSIGMOID COLON POLYP BIOPSY:  LARGE TUBULAR ADENOMA.      Past Medical History:   Diagnosis Date    Allergic rhinitis     Chronic kidney disease, stage III (moderate) (HCC)     Deep vein thrombosis (DVT) (HCC) 10/24/2012    Elbow fracture, left     Fall     Gastric ulcer     Gout     Hx of blood clots     Following last back surgery     Hypertension     Nausea & vomiting     Presence of IVC filter     Type II or unspecified type diabetes mellitus without mention of complication, not stated as uncontrolled      Past Surgical History:

## 2024-04-09 NOTE — PROGRESS NOTES
Discharge Criteria  Outpatients must meet criteria 1 through 7.    Up to restroom, void sufficient amount.     Yes  Gait steady when up.  1.  Minimum 30 minutes after last dose of sedative medication, minimum 120 minutes after last dose of reversal agent.    Yes    2.  Systolic BP stable within 20 mmHg for 30 minutes & systolic BP between 90 & 180 or within 10 mmHg of baseline.    Yes    3.  Pulse between 60 and 100 or within 10 bpm of baseline.    Yes    4.  Spontaneous respiratory rate >/= 10 per minute.    Yes    5.  SaO2 >/= 95 or  >/= baseline.    Yes    6.  Able to cough and swallow or return to baseline function.    Yes    7.  Alert and oriented or return to baseline mental status.    Yes    8.  Demonstrates controlled, coordinated movements, ambulates with steady gait, or return to baseline activity function.    Yes    9.  Minimal or no pain or nausea, or at a level tolerable and acceptable to patient.    Yes    10. Takes and retains oral fluids as allowed.    Yes    11. Procedural / perioperative site stable.  Minimal or no bleeding.    Yes        12. If GI endoscopy procedure, minimal or no abdominal distention or passing flatus.    Yes    13. Written discharge instructions and emergency telephone number provided.    Yes    14. Accompanied by a responsible adult.    Yes    Adult patient discharged from facility without responsible person meets above criteria plus the following:   a) remains awake without stimulus for 30 minutes     b) oriented appropriate for age      c) all vital signs stable   d) no significant risk of losing protective reflexes      e) able to maintain pre-procedure mobility without assistance   f) no nausea or dizziness      g) transportation arrangements that do not require patient to operate motor   Vehicle.  Yes

## 2024-04-09 NOTE — ED PROVIDER NOTES
[facility]  EMERGENCY DEPARTMENT ENCOUNTER      Pt Name: Ragini Regan  MRN: [id number]  Birthdate 1945  Date of evaluation: 4/9/2024  Provider: Jose Cope MD    CHIEF COMPLAINT       Chief Complaint   Patient presents with    Rectal Bleeding     Colonoscopy done this am and states she has rectal bleeding         HISTORY OF PRESENT ILLNESS      Ragini Regan is a 78 y.o. female who presents to the emergency department via private vehicle with son, patient a colonoscopy performed this morning, and states afterward she did have a small on her rectal bleeding, describing both prone and some bright red blood product.  Patient states after procedure development of small amount of 7-Up to drink.  Denies any fever denies any vomiting or diarrhea.    PCP: Samuel  GI: Ross        REVIEW OF SYSTEMS       Review of Systems   Gastrointestinal:  Positive for blood in stool.   All other systems reviewed and are negative.        PAST MEDICAL HISTORY     Past Medical History:   Diagnosis Date    Allergic rhinitis     Chronic kidney disease, stage III (moderate) (HCC)     Deep vein thrombosis (DVT) (HCC) 10/24/2012    Elbow fracture, left     Fall     Gastric ulcer     Gout     Hx of blood clots     Following last back surgery     Hypertension     Nausea & vomiting     Presence of IVC filter     Type II or unspecified type diabetes mellitus without mention of complication, not stated as uncontrolled          SURGICAL HISTORY       Past Surgical History:   Procedure Laterality Date    BACK SURGERY      BACK SURGERY      BACK SURGERY      BACK SURGERY      BACK SURGERY      BACK SURGERY      Fusion     BREAST BIOPSY Left     BREAST BIOPSY Right     CARDIAC CATHETERIZATION Left 03/07/2018    Dr. Larios @ [facility]--There is 30% disease of the origin of the lateral anterior descending.  Mild 10% -20% plaque disease in the circumflex & right coronary artery.  Normal left ventricular functino, ejection

## 2024-04-09 NOTE — ANESTHESIA POSTPROCEDURE EVALUATION
Department of Anesthesiology  Postprocedure Note    Patient: Ragini Regan  MRN: [id number]  YOB: 1945  Date of evaluation: 4/9/2024    Procedure Summary       Date: 04/09/24 Room / Location: [place] [address] / [facility] ENDOSCOPY    Anesthesia Start: 1006 Anesthesia Stop: 1041    Procedure: COLONOSCOPY Diagnosis:       Special screening for malignant neoplasms, colon      (Special screening for malignant neoplasms, colon [Z12.11])    Surgeons: Xochitl Hawkins MD Responsible Provider:     Anesthesia Type: MAC ASA Status: 3            Anesthesia Type: No value filed.    Yue Phase I: Yue Score: 10    Yue Phase II:      Anesthesia Post Evaluation    Patient location during evaluation: PACU  Patient participation: complete - patient participated  Level of consciousness: awake and lethargic  Pain score: 0  Airway patency: patent  Nausea & Vomiting: no nausea and no vomiting  Cardiovascular status: hemodynamically stable  Respiratory status: acceptable, room air and spontaneous ventilation  Hydration status: euvolemic  Multimodal analgesia pain management approach  Pain management: adequate and satisfactory to patient    No notable events documented.

## 2024-04-10 ENCOUNTER — TELEPHONE (OUTPATIENT)
Dept: GASTROENTEROLOGY | Age: 79
End: 2024-04-10

## 2024-04-10 NOTE — TELEPHONE ENCOUNTER
Patient calling office to report that she is having continued rectal bleeding since her colonoscopy yesterday. Patient reports she did present to ED last night for further eval d/t bleeding and feeling like she was going to pass out. She was evaluated and discharged home. Patient reports bleeding stopped while at ED yesterday and today she is again having several episodes of rectal bleeding when toileting. Patient was advised that it is common to have some bleeding after procedure and if bleeding persists or she feels weak, or fever she should present to ED for Re-evaluation. Patient voiced knowledge and understanding.

## 2024-04-11 ENCOUNTER — COMMUNITY CARE MANAGEMENT (OUTPATIENT)
Facility: CLINIC | Age: 79
End: 2024-04-11

## 2024-04-11 LAB — SURGICAL PATHOLOGY REPORT: NORMAL

## 2024-04-15 ENCOUNTER — TELEPHONE (OUTPATIENT)
Dept: GASTROENTEROLOGY | Age: 79
End: 2024-04-15

## 2024-04-15 NOTE — TELEPHONE ENCOUNTER
Patient's son contacted the office and was advised of the recent results. Patient voiced understanding.

## 2024-04-15 NOTE — TELEPHONE ENCOUNTER
----- Message from Xochitl Hawkins MD sent at 4/14/2024 10:34 PM EDT -----  Please notify patient: Tubular adenomas are on a pre-cancerous spectrum.   Repeat screening colonoscopy is not recommended considering  co-morbid conditions and clinical status. Diagnostic colonoscopy for rectal bleeding, abdominal pain, diverticulitis can still be performed as needed.

## 2024-04-18 ENCOUNTER — OFFICE VISIT (OUTPATIENT)
Dept: UROLOGY | Age: 79
End: 2024-04-18
Payer: MEDICARE

## 2024-04-18 VITALS
BODY MASS INDEX: 32.99 KG/M2 | DIASTOLIC BLOOD PRESSURE: 68 MMHG | WEIGHT: 198 LBS | SYSTOLIC BLOOD PRESSURE: 140 MMHG | HEIGHT: 65 IN

## 2024-04-18 DIAGNOSIS — Z90.5 H/O PARTIAL NEPHRECTOMY: ICD-10-CM

## 2024-04-18 DIAGNOSIS — N18.30 STAGE 3 CHRONIC KIDNEY DISEASE, UNSPECIFIED WHETHER STAGE 3A OR 3B CKD (HCC): ICD-10-CM

## 2024-04-18 DIAGNOSIS — N39.46 MIXED INCONTINENCE: Primary | ICD-10-CM

## 2024-04-18 DIAGNOSIS — N95.2 VAGINAL ATROPHY: ICD-10-CM

## 2024-04-18 DIAGNOSIS — N39.0 FREQUENT UTI: ICD-10-CM

## 2024-04-18 PROCEDURE — 1036F TOBACCO NON-USER: CPT | Performed by: PHYSICIAN ASSISTANT

## 2024-04-18 PROCEDURE — 3078F DIAST BP <80 MM HG: CPT | Performed by: PHYSICIAN ASSISTANT

## 2024-04-18 PROCEDURE — 3077F SYST BP >= 140 MM HG: CPT | Performed by: PHYSICIAN ASSISTANT

## 2024-04-18 PROCEDURE — 1090F PRES/ABSN URINE INCON ASSESS: CPT | Performed by: PHYSICIAN ASSISTANT

## 2024-04-18 PROCEDURE — G8417 CALC BMI ABV UP PARAM F/U: HCPCS | Performed by: PHYSICIAN ASSISTANT

## 2024-04-18 PROCEDURE — 99213 OFFICE O/P EST LOW 20 MIN: CPT | Performed by: PHYSICIAN ASSISTANT

## 2024-04-18 PROCEDURE — 51798 US URINE CAPACITY MEASURE: CPT | Performed by: PHYSICIAN ASSISTANT

## 2024-04-18 PROCEDURE — G8427 DOCREV CUR MEDS BY ELIG CLIN: HCPCS | Performed by: PHYSICIAN ASSISTANT

## 2024-04-18 PROCEDURE — 0509F URINE INCON PLAN DOCD: CPT | Performed by: PHYSICIAN ASSISTANT

## 2024-04-18 PROCEDURE — 1123F ACP DISCUSS/DSCN MKR DOCD: CPT | Performed by: PHYSICIAN ASSISTANT

## 2024-04-18 PROCEDURE — G8399 PT W/DXA RESULTS DOCUMENT: HCPCS | Performed by: PHYSICIAN ASSISTANT

## 2024-04-18 ASSESSMENT — ENCOUNTER SYMPTOMS
APNEA: 0
ABDOMINAL PAIN: 0
NAUSEA: 0
SHORTNESS OF BREATH: 0
EYE REDNESS: 0
VOMITING: 0
COUGH: 0
CONSTIPATION: 1
WHEEZING: 0
BACK PAIN: 0
COLOR CHANGE: 0

## 2024-05-06 RX ORDER — DOCUSATE SODIUM AND SENNOSIDES 8.6; 5 MG/1; MG/1
1 TABLET, FILM COATED ORAL DAILY
Qty: 30 TABLET | Refills: 2 | Status: SHIPPED | OUTPATIENT
Start: 2024-05-06

## 2024-05-06 RX ORDER — MIRABEGRON 50 MG/1
50 TABLET, FILM COATED, EXTENDED RELEASE ORAL DAILY
Qty: 30 TABLET | Refills: 3 | Status: SHIPPED | OUTPATIENT
Start: 2024-05-06

## 2024-05-06 NOTE — TELEPHONE ENCOUNTER
Last OV: 3/22/2024  Last RX:    Next scheduled apt: 5/15/2024   3 months DM           Surescript requesting a refill

## 2024-05-15 ENCOUNTER — OFFICE VISIT (OUTPATIENT)
Dept: FAMILY MEDICINE CLINIC | Age: 79
End: 2024-05-15

## 2024-05-15 VITALS
HEIGHT: 65 IN | OXYGEN SATURATION: 96 % | DIASTOLIC BLOOD PRESSURE: 64 MMHG | SYSTOLIC BLOOD PRESSURE: 128 MMHG | HEART RATE: 71 BPM | BODY MASS INDEX: 33.27 KG/M2 | WEIGHT: 199.7 LBS

## 2024-05-15 DIAGNOSIS — K21.9 GASTROESOPHAGEAL REFLUX DISEASE WITHOUT ESOPHAGITIS: ICD-10-CM

## 2024-05-15 DIAGNOSIS — D12.6 TUBULAR ADENOMA OF COLON: ICD-10-CM

## 2024-05-15 DIAGNOSIS — E11.9 TYPE 2 DIABETES MELLITUS WITHOUT COMPLICATION, WITHOUT LONG-TERM CURRENT USE OF INSULIN (HCC): Primary | ICD-10-CM

## 2024-05-15 DIAGNOSIS — K59.09 INTERMITTENT CONSTIPATION: ICD-10-CM

## 2024-05-15 LAB — HBA1C MFR BLD: 5.6 %

## 2024-05-15 RX ORDER — OMEPRAZOLE 20 MG/1
CAPSULE, DELAYED RELEASE ORAL
COMMUNITY
Start: 2024-04-11

## 2024-05-15 RX ORDER — MAGNESIUM L-LACTATE 84 MG
TABLET, EXTENDED RELEASE ORAL
Qty: 360 TABLET | Refills: 1
Start: 2024-05-15

## 2024-05-15 SDOH — ECONOMIC STABILITY: INCOME INSECURITY: HOW HARD IS IT FOR YOU TO PAY FOR THE VERY BASICS LIKE FOOD, HOUSING, MEDICAL CARE, AND HEATING?: NOT VERY HARD

## 2024-05-15 SDOH — ECONOMIC STABILITY: FOOD INSECURITY: WITHIN THE PAST 12 MONTHS, THE FOOD YOU BOUGHT JUST DIDN'T LAST AND YOU DIDN'T HAVE MONEY TO GET MORE.: NEVER TRUE

## 2024-05-15 SDOH — ECONOMIC STABILITY: FOOD INSECURITY: WITHIN THE PAST 12 MONTHS, YOU WORRIED THAT YOUR FOOD WOULD RUN OUT BEFORE YOU GOT MONEY TO BUY MORE.: NEVER TRUE

## 2024-05-15 NOTE — PROGRESS NOTES
Name: Ragini Regan  : 1945         Chief Complaint:     Chief Complaint   Patient presents with    Diabetes     3 month        History of Present Illness:      Ragini Regan is a 78 y.o.  female who presents with Diabetes (3 month )      HPI    Pt presents with son for f/u DM. Doing well, taking meds as directed, eating similar to previous.     Recent colonoscopy, postprocedural rectal bleeding which cleared up the next day. BM usually every day. Yesterday seemed like it wouldn't come out right so she took MoM and has passed a lot of stool since then.     Feeling better than she has for a while, using walker or sometimes even just a cane. No recent confusion. Headaches and neck/trapezius pain much improved.     Medical History:     Patient Active Problem List   Diagnosis    Essential hypertension, benign    Esophageal reflux    Irritable bowel syndrome    Seasonal allergies    Gout    Rectocele    Tubular adenoma of colon    Hiatal hernia    Sigmoid diverticulosis    Chronic back pain    Hypomagnesemia    Family history of colon cancer    History of recurrent deep vein thrombosis (DVT)    Hyperparathyroidism (HCC)    Chronic renal disease, stage III (HCC) [255625]    Type 2 diabetes mellitus without complication, without long-term current use of insulin (HCC)    Chronic diastolic congestive heart failure (HCC)    Type 2 diabetes mellitus with stage 3b chronic kidney disease, without long-term current use of insulin (HCC)       Medications:       Prior to Admission medications    Medication Sig Start Date End Date Taking? Authorizing Provider   omeprazole (PRILOSEC) 20 MG delayed release capsule  24  Yes Provider, MD Chaparrita   magnesium lactate (MAGTAB) 84 MG (7MEQ) TBCR extended release tablet TAKE 2 TABLETS BY MOUTH IN THE MORNING and TAKE 1 TABLETS IN THE EVENING 5/15/24  Yes Edwige Baker DO   MYRBETRIQ 50 MG TB24 TAKE 1 TABLET BY MOUTH DAILY 24  Yes Bartolo Preston PA-C   STOOL

## 2024-05-18 PROBLEM — I95.1 ORTHOSTATIC HYPOTENSION: Status: RESOLVED | Noted: 2022-11-16 | Resolved: 2024-05-18

## 2024-05-20 RX ORDER — TROSPIUM CHLORIDE 20 MG/1
20 TABLET, FILM COATED ORAL 2 TIMES DAILY
Qty: 180 TABLET | Refills: 1 | Status: SHIPPED | OUTPATIENT
Start: 2024-05-20

## 2024-05-20 NOTE — TELEPHONE ENCOUNTER
Last OV 5/15/24      Next OV 8/21/24    Requesting refills on trospium and sertraline thru [facility].  Rx's pending

## 2024-05-28 ENCOUNTER — TELEPHONE (OUTPATIENT)
Dept: FAMILY MEDICINE CLINIC | Age: 79
End: 2024-05-28

## 2024-05-28 RX ORDER — POLYMYXIN B SULFATE AND TRIMETHOPRIM 1; 10000 MG/ML; [USP'U]/ML
1 SOLUTION OPHTHALMIC 4 TIMES DAILY
Qty: 2 ML | Refills: 0 | Status: SHIPPED | OUTPATIENT
Start: 2024-05-28 | End: 2024-06-04

## 2024-05-28 NOTE — TELEPHONE ENCOUNTER
Ragini started with pink eye Yesterday. Left eye is red, matted, swelled, itching and burning. Right eye is now getting red also. Thinks she got it at a [employer] function this past weekend. BHAVIN Castro            Last visit:  5/15/2024  Next Visit Date:    Future Appointments   Date Time Provider Department Center   8/15/2024 10:30 AM [facility]   8/21/2024  9:00 AM Edwige Baker DO [facility]         Medication List:  Prior to Admission medications    Medication Sig Start Date End Date Taking? Authorizing Provider   trospium (SANCTURA) 20 MG tablet TAKE 1 TABLET BY MOUTH TWICE DAILY 5/20/24   Edwige Baker DO   sertraline (ZOLOFT) 50 MG tablet TAKE 1 TABLET BY MOUTH DAILY 5/20/24   Edwige Baker DO   omeprazole (PRILOSEC) 20 MG delayed release capsule  4/11/24   Provider, MD Chaparrita   magnesium lactate (MAGTAB) 84 MG (7MEQ) TBCR extended release tablet TAKE 2 TABLETS BY MOUTH IN THE MORNING and TAKE 1 TABLETS IN THE EVENING 5/15/24   Edwige Baker DO   MYRBETRIQ 50 MG TB24 TAKE 1 TABLET BY MOUTH DAILY 5/6/24   Bartolo Preston PA-C   STOOL SOFTENER/LAXATIVE 50-8.6 MG per tablet TAKE 1 TABLET BY MOUTH DAILY 5/6/24   Edwige Baker DO   sertraline (ZOLOFT) 25 MG tablet Take 1 tablet by mouth daily In addition to 50 mg tab 2/14/24   Edwige Baker DO   gabapentin (NEURONTIN) 100 MG capsule TAKE 2 CAPSULES BY MOUTH TWICE  DAILY 2/12/24 8/12/24  Edwige Baker DO   folic acid (FOLVITE) 400 MCG tablet Take 1 tablet by mouth daily 1/15/24   Edwige Baker DO   atorvastatin (LIPITOR) 40 MG tablet TAKE 1 TABLET BY MOUTH DAILY 1/10/24   Edwige Baker DO   allopurinol (ZYLOPRIM) 100 MG tablet TAKE 2 TABLETS BY MOUTH DAILY 1/10/24   Edwige Baker DO   clopidogrel (PLAVIX) 75 MG tablet TAKE 1 TABLET BY MOUTH DAILY 1/10/24   Edwige Baker DO   aspirin 81 MG EC tablet Take 1 tablet by mouth daily 10/3/23   Edwige Baker DO   glipiZIDE

## 2024-07-08 RX ORDER — GABAPENTIN 100 MG/1
CAPSULE ORAL
Qty: 360 CAPSULE | Refills: 1 | Status: SHIPPED | OUTPATIENT
Start: 2024-07-08 | End: 2025-01-07

## 2024-07-08 NOTE — TELEPHONE ENCOUNTER
Rx refill request via [facility] (Dr. Baker)  Gabapentin 100mg 2 caps BID  Last OV 5/15/24  Pt has appt 8/21/24 for AWV

## 2024-07-22 RX ORDER — DOCUSATE SODIUM AND SENNOSIDES 8.6; 5 MG/1; MG/1
1 TABLET, FILM COATED ORAL DAILY
Qty: 30 TABLET | Refills: 2 | Status: SHIPPED | OUTPATIENT
Start: 2024-07-22

## 2024-08-13 RX ORDER — MIRABEGRON 50 MG/1
50 TABLET, FILM COATED, EXTENDED RELEASE ORAL DAILY
Qty: 30 TABLET | Refills: 3 | Status: SHIPPED | OUTPATIENT
Start: 2024-08-13 | End: 2024-08-15 | Stop reason: SDUPTHER

## 2024-08-13 NOTE — TELEPHONE ENCOUNTER
Patient tolerates Myrbetriq well.  She was seen within the past 6 months.  She has an appointment this week.  We will refill

## 2024-08-15 ENCOUNTER — OFFICE VISIT (OUTPATIENT)
Dept: UROLOGY | Age: 79
End: 2024-08-15

## 2024-08-15 VITALS — DIASTOLIC BLOOD PRESSURE: 72 MMHG | BODY MASS INDEX: 33.12 KG/M2 | WEIGHT: 199 LBS | SYSTOLIC BLOOD PRESSURE: 130 MMHG

## 2024-08-15 DIAGNOSIS — N18.30 STAGE 3 CHRONIC KIDNEY DISEASE, UNSPECIFIED WHETHER STAGE 3A OR 3B CKD (HCC): ICD-10-CM

## 2024-08-15 DIAGNOSIS — N39.46 MIXED INCONTINENCE: Primary | ICD-10-CM

## 2024-08-15 DIAGNOSIS — Z90.5 H/O PARTIAL NEPHRECTOMY: ICD-10-CM

## 2024-08-15 RX ORDER — MIRABEGRON 50 MG/1
50 TABLET, EXTENDED RELEASE ORAL DAILY
Qty: 30 TABLET | Refills: 5 | Status: SHIPPED | OUTPATIENT
Start: 2024-08-15

## 2024-08-15 RX ORDER — TROSPIUM CHLORIDE 20 MG/1
20 TABLET, FILM COATED ORAL 2 TIMES DAILY
Qty: 180 TABLET | Refills: 1 | Status: SHIPPED | OUTPATIENT
Start: 2024-08-15

## 2024-08-15 ASSESSMENT — ENCOUNTER SYMPTOMS
COLOR CHANGE: 0
NAUSEA: 0
SHORTNESS OF BREATH: 0
ABDOMINAL PAIN: 0
COUGH: 0
VOMITING: 0
APNEA: 0
BACK PAIN: 0
EYE REDNESS: 0
WHEEZING: 0

## 2024-08-15 NOTE — PROGRESS NOTES
HPI:    Patient is a 78 y.o. female in no acute distress.  She is alert and oriented to person, place, and time.     7/2021 Patient presents today as a new patient referral from Dr. Baker for mixed incontinence and recurrent urinary tract infection. Patient has been seen by urology before. Has not seen urology in many years.  She did have a culture proven urinary tract infection 1 month ago.  Patient states that she was treated multiple times over the past year for urinary tract infections, again I have only seen one culture sent in the past year.    Patient did have a left partial nephrectomy 50 years ago for atrophy/hematuria.  Distant history of kidney stones she is also apparently has had \"multiple\" cystoscopies in the past.  She has had many back surgeries as well.  He does have chronic kidney disease. She used to follow with nephrology, but no longer. Non smoker. Retired MTD . Patient did have labs approximately a month ago which did show a BUN of 34, creatinine of 1.74, GFR of 29.  She does have history of hysterectomy.  Patient did have a bladder sling in 1988.  Patient does have incontinence throughout the day and night.  She does wear a thick/heavy pad which she changes 3-4 times a day.  She says that it is saturated almost every time she changes it.  She states that some of the time her incontinence is due to not getting there in time.  Most of the time her incontinence is when she stands up.  She has never been on any anticholinergics.  She does have a daily cough which is soft and easy to pass.  She only drinks water.  She urinates every 30 minutes to 1-1/2 hours.  Patient does take Lasix. Patient is an uncontrolled diabetic, her  last hemoglobin A1c was 9.5.  She denies any current fever, chills, gross hematuria, flank pain, dysuria. Patient voided - 20 mL, PVR - 0 mL.                 Started on oxybutynin XL 10 mg    told by her PCP in the past not to use vaginal estrogen     9/2021 -

## 2024-08-15 NOTE — PATIENT INSTRUCTIONS
Survey:    You may be receiving a survey from [employer] regarding your visit today.    Please complete the survey to enable us to provide the highest quality of care to you and your family.    If you cannot score us as very good on any question, please call the office to discuss how we could have major experience exceptional.    Thank you    Your Urology Care Team.

## 2024-08-21 ENCOUNTER — OFFICE VISIT (OUTPATIENT)
Dept: FAMILY MEDICINE CLINIC | Age: 79
End: 2024-08-21

## 2024-08-21 VITALS
OXYGEN SATURATION: 96 % | DIASTOLIC BLOOD PRESSURE: 74 MMHG | HEIGHT: 65 IN | WEIGHT: 200 LBS | SYSTOLIC BLOOD PRESSURE: 130 MMHG | BODY MASS INDEX: 33.32 KG/M2 | HEART RATE: 90 BPM

## 2024-08-21 DIAGNOSIS — Z00.00 MEDICARE ANNUAL WELLNESS VISIT, SUBSEQUENT: Primary | ICD-10-CM

## 2024-08-21 DIAGNOSIS — N90.5 VULVAR ATROPHY: ICD-10-CM

## 2024-08-21 DIAGNOSIS — E11.9 TYPE 2 DIABETES MELLITUS WITHOUT COMPLICATION, WITHOUT LONG-TERM CURRENT USE OF INSULIN (HCC): ICD-10-CM

## 2024-08-21 DIAGNOSIS — N39.46 MIXED INCONTINENCE: ICD-10-CM

## 2024-08-21 LAB — HBA1C MFR BLD: 6 %

## 2024-08-21 RX ORDER — PANTOPRAZOLE SODIUM 20 MG/1
20 TABLET, DELAYED RELEASE ORAL DAILY
COMMUNITY
Start: 2024-06-19

## 2024-08-21 SDOH — HEALTH STABILITY: PHYSICAL HEALTH: ON AVERAGE, HOW MANY DAYS PER WEEK DO YOU ENGAGE IN MODERATE TO STRENUOUS EXERCISE (LIKE A BRISK WALK)?: 0 DAYS

## 2024-08-21 ASSESSMENT — LIFESTYLE VARIABLES
HOW MANY STANDARD DRINKS CONTAINING ALCOHOL DO YOU HAVE ON A TYPICAL DAY: PATIENT DOES NOT DRINK
HOW OFTEN DO YOU HAVE SIX OR MORE DRINKS ON ONE OCCASION: 1
HOW OFTEN DO YOU HAVE A DRINK CONTAINING ALCOHOL: NEVER
HOW MANY STANDARD DRINKS CONTAINING ALCOHOL DO YOU HAVE ON A TYPICAL DAY: 0
HOW OFTEN DO YOU HAVE A DRINK CONTAINING ALCOHOL: 1

## 2024-08-21 ASSESSMENT — PATIENT HEALTH QUESTIONNAIRE - PHQ9
6. FEELING BAD ABOUT YOURSELF - OR THAT YOU ARE A FAILURE OR HAVE LET YOURSELF OR YOUR FAMILY DOWN: NOT AT ALL
9. THOUGHTS THAT YOU WOULD BE BETTER OFF DEAD, OR OF HURTING YOURSELF: NOT AT ALL
SUM OF ALL RESPONSES TO PHQ QUESTIONS 1-9: 0
SUM OF ALL RESPONSES TO PHQ9 QUESTIONS 1 & 2: 0
SUM OF ALL RESPONSES TO PHQ QUESTIONS 1-9: 0
3. TROUBLE FALLING OR STAYING ASLEEP: NOT AT ALL
SUM OF ALL RESPONSES TO PHQ QUESTIONS 1-9: 0
8. MOVING OR SPEAKING SO SLOWLY THAT OTHER PEOPLE COULD HAVE NOTICED. OR THE OPPOSITE, BEING SO FIGETY OR RESTLESS THAT YOU HAVE BEEN MOVING AROUND A LOT MORE THAN USUAL: NOT AT ALL
SUM OF ALL RESPONSES TO PHQ QUESTIONS 1-9: 0
2. FEELING DOWN, DEPRESSED OR HOPELESS: NOT AT ALL
5. POOR APPETITE OR OVEREATING: NOT AT ALL
7. TROUBLE CONCENTRATING ON THINGS, SUCH AS READING THE NEWSPAPER OR WATCHING TELEVISION: NOT AT ALL
10. IF YOU CHECKED OFF ANY PROBLEMS, HOW DIFFICULT HAVE THESE PROBLEMS MADE IT FOR YOU TO DO YOUR WORK, TAKE CARE OF THINGS AT HOME, OR GET ALONG WITH OTHER PEOPLE: NOT DIFFICULT AT ALL
1. LITTLE INTEREST OR PLEASURE IN DOING THINGS: NOT AT ALL
4. FEELING TIRED OR HAVING LITTLE ENERGY: NOT AT ALL

## 2024-08-21 NOTE — PROGRESS NOTES
Name: Ragini Regan  : 1945         Chief Complaint:     Chief Complaint   Patient presents with    Medicare AWV    Diabetes    Hypertension       History of Present Illness:      Ragini Regan is a 78 y.o.  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     Prefers not to use any vaginal topical product. Had never started estrogen, was in hospital and also just opted not to.     Feeling well, has been adrian vegetables, made a cake.     DM - hasn't been checking sugars. Continues glipizide.    Medical History:     Patient Active Problem List   Diagnosis    Essential hypertension, benign    Esophageal reflux    Irritable bowel syndrome    Seasonal allergies    Gout    Rectocele    Tubular adenoma of colon    Hiatal hernia    Sigmoid diverticulosis    Chronic back pain    Hypomagnesemia    Family history of colon cancer    History of recurrent deep vein thrombosis (DVT)    Hyperparathyroidism (HCC)    Chronic renal disease, stage III (HCC) [003151]    Type 2 diabetes mellitus without complication, without long-term current use of insulin (HCC)    Chronic diastolic congestive heart failure (HCC)    Type 2 diabetes mellitus with stage 3b chronic kidney disease, without long-term current use of insulin (HCC)    Mixed incontinence       Medications:       Prior to Admission medications    Medication Sig Start Date End Date Taking? Authorizing Provider   pantoprazole (PROTONIX) 20 MG tablet Take 1 tablet by mouth daily 24  Yes Provider, MD Chaparrita   trospium (SANCTURA) 20 MG tablet Take 1 tablet by mouth 2 times daily 8/15/24  Yes Bartolo Preston PA-C   mirabegron (MYRBETRIQ) 50 MG TB24 Take 50 mg by mouth daily 8/15/24  Yes Bartolo Preston PA-C   STOOL SOFTENER/LAXATIVE 50-8.6 MG per tablet TAKE 1 TABLET BY MOUTH DAILY 24  Yes Alessandro Ruiz DO   gabapentin (NEURONTIN) 100 MG capsule

## 2024-08-21 NOTE — PATIENT INSTRUCTIONS
[employer] SURVEY:    You may be receiving a survey from [employer] regarding your visit today.    You may get this in the mail, through your MyChart or in your email.     Please complete the survey to enable us to provide the highest quality of care to you and your family.    If you cannot score us as very good ( 5 Stars) on any question, please feel free to call the office to discuss how we could have made your experience exceptional.     Thank you.    Clinical Care Team:   DO Vel Bryant CMA                                     Triage: Afua Leslie CMA              Clerical Team:    Afua Ibrahim     [place] [phone]           Billing questions: [phone]           Medical Records Request: [phone]            Preventing Falls: Care Instructions  Injuries and health problems such as trouble walking or poor eyesight can increase your risk of falling. So can some medicines. But there are things you can do to help prevent falls. You can exercise to get stronger. You can also arrange your home to make it safer.    Talk to your doctor about the medicines you take. Ask if any of them increase the risk of falls and whether they can be changed or stopped.   Try to exercise regularly. It can help improve your strength and balance. This can help lower your risk of falling.         Practice fall safety and prevention.   Wear low-heeled shoes that fit well and give your feet good support. Talk to your doctor if you have foot problems that make this hard.  Carry a cellphone or wear a medical alert device that you can use to call for help.  Use stepladders instead of chairs to reach high objects. Don't climb if you're at risk for falls. Ask for help, if needed.  Wear the correct eyeglasses, if you need them.        Make your home safer.   Remove

## 2024-08-25 PROBLEM — N39.46 MIXED INCONTINENCE: Status: ACTIVE | Noted: 2024-08-25

## 2024-09-04 NOTE — TELEPHONE ENCOUNTER
Last OV: 8/21/2024  Last RX:    Next scheduled apt: 11/22/2024         Surescript requesting a refill

## 2024-09-05 RX ORDER — GLIPIZIDE 5 MG/1
TABLET ORAL
Qty: 90 TABLET | Refills: 3 | Status: SHIPPED | OUTPATIENT
Start: 2024-09-05

## 2024-09-05 RX ORDER — TRAZODONE HYDROCHLORIDE 50 MG/1
50 TABLET, FILM COATED ORAL NIGHTLY
Qty: 90 TABLET | Refills: 3 | Status: SHIPPED | OUTPATIENT
Start: 2024-09-05

## 2024-09-05 RX ORDER — LANOLIN ALCOHOL/MO/W.PET/CERES
400 CREAM (GRAM) TOPICAL DAILY
Qty: 90 TABLET | Refills: 3 | Status: SHIPPED | OUTPATIENT
Start: 2024-09-05

## 2024-09-05 RX ORDER — FUROSEMIDE 20 MG
TABLET ORAL
Qty: 90 TABLET | Refills: 3 | Status: SHIPPED | OUTPATIENT
Start: 2024-09-05

## 2024-09-24 RX ORDER — DOCUSATE SODIUM 50 MG AND SENNOSIDES 8.6 MG 8.6; 5 MG/1; MG/1
1 TABLET, FILM COATED ORAL DAILY
Qty: 30 TABLET | Refills: 2 | Status: SHIPPED | OUTPATIENT
Start: 2024-09-24

## 2024-09-25 RX ORDER — PANTOPRAZOLE SODIUM 20 MG/1
20 TABLET, DELAYED RELEASE ORAL DAILY
Qty: 90 TABLET | Refills: 1 | Status: SHIPPED | OUTPATIENT
Start: 2024-09-25

## 2024-09-29 NOTE — TELEPHONE ENCOUNTER
Last OV: 5/15/2024  Last RX:    Next scheduled apt: 8/21/2024   3 months chronic           Surescript requesting a refill   Medication pending for approval       no

## 2024-10-10 RX ORDER — ASPIRIN 81 MG/1
81 TABLET, COATED ORAL DAILY
Qty: 90 TABLET | Refills: 3 | Status: SHIPPED | OUTPATIENT
Start: 2024-10-10

## 2024-10-10 NOTE — TELEPHONE ENCOUNTER
Rx refill request via [employer]  Aspirin 81mg qd  Last OV 8-21-24 for AWV  Next appt- 11-22-24

## 2024-10-18 RX ORDER — SERTRALINE HYDROCHLORIDE 25 MG/1
TABLET, FILM COATED ORAL
Qty: 90 TABLET | Refills: 3 | Status: SHIPPED | OUTPATIENT
Start: 2024-10-18

## 2024-10-18 NOTE — TELEPHONE ENCOUNTER
Please call and ask pt/son whether she's taking this. I didn't tell her to stop and don't remember them saying she had stopped, but I took it off her med list at last visit so please check. Thanks.   
Yes she still takes this,  50 mg in the morning and 25 mg in the evening  
  blood glucose monitor strips Test blood glucose daily 10/29/21   Edwige Baker DO   acetaminophen (TYLENOL) 650 MG CR tablet Take 2 tablets by mouth every 8 hours as needed for Pain    Provider, MD Chaparrita

## 2024-11-06 RX ORDER — MAGNESIUM L-LACTATE 84 MG
TABLET, EXTENDED RELEASE ORAL
Qty: 270 TABLET | Refills: 1 | Status: SHIPPED | OUTPATIENT
Start: 2024-11-06

## 2024-11-06 NOTE — TELEPHONE ENCOUNTER
DO   allopurinol (ZYLOPRIM) 100 MG tablet TAKE 2 TABLETS BY MOUTH DAILY 1/10/24   Edwige Baker DO   clopidogrel (PLAVIX) 75 MG tablet TAKE 1 TABLET BY MOUTH DAILY 1/10/24   Edwige Baker,    blood glucose monitor strips Test blood glucose daily 10/29/21   Edwige Baker DO   acetaminophen (TYLENOL) 650 MG CR tablet Take 2 tablets by mouth every 8 hours as needed for Pain    Provider, MD Chaparrita

## 2024-11-21 RX ORDER — ALLOPURINOL 100 MG/1
200 TABLET ORAL DAILY
Qty: 180 TABLET | Refills: 3 | Status: SHIPPED | OUTPATIENT
Start: 2024-11-21

## 2024-11-21 RX ORDER — CLOPIDOGREL BISULFATE 75 MG/1
75 TABLET ORAL DAILY
Qty: 90 TABLET | Refills: 3 | Status: SHIPPED | OUTPATIENT
Start: 2024-11-21

## 2024-11-21 RX ORDER — ATORVASTATIN CALCIUM 40 MG/1
40 TABLET, FILM COATED ORAL DAILY
Qty: 90 TABLET | Refills: 3 | Status: SHIPPED | OUTPATIENT
Start: 2024-11-21

## 2024-11-21 NOTE — TELEPHONE ENCOUNTER
Last visit:  8/21/2024  Next Visit Date:    Future Appointments   Date Time Provider Department Center   11/22/2024  9:00 AM Edwige Baker DO [facility]   2/20/2025 10:30 AM Bartolo Preston PA-C [facility]         Medication List:  Prior to Admission medications    Medication Sig Start Date End Date Taking? Authorizing Provider   magnesium lactate (MAGTAB) 84 MG (7MEQ) TBCR extended release tablet TAKE 2 TABLETS BY MOUTH IN THE MORNING and 1 tablet IN THE EVENING 11/6/24   Edwige Baker DO   sertraline (ZOLOFT) 25 MG tablet Take 1 tablet in the evening. 10/18/24   Edwige aBker DO   sertraline (ZOLOFT) 50 MG tablet TAKE 1 TABLET BY MOUTH in the mornings 10/18/24   Edwige Baker DO   ASPIRIN LOW DOSE 81 MG EC tablet TAKE 1 TABLET BY MOUTH DAILY 10/10/24   Edwige Baker DO   pantoprazole (PROTONIX) 20 MG tablet Take 1 tablet by mouth daily 9/25/24   Edwige Baker DO   STIMULANT LAXATIVE 8.6-50 MG per tablet TAKE 1 TABLET BY MOUTH DAILY 9/24/24   Edwige Baker DO   glipiZIDE (GLUCOTROL) 5 MG tablet TAKE 1 TABLET BY MOUTH DAILY WITH BREAKFAST 9/5/24   Edwige Baker DO   folic acid (FOLVITE) 400 MCG tablet Take 1 tablet by mouth daily 9/5/24   Edwige Baker DO   traZODone (DESYREL) 50 MG tablet TAKE 1 TABLET BY MOUTH NIGHTLY 9/5/24   Edwige Baker DO   furosemide (LASIX) 20 MG tablet TAKE 1 TABLET BY MOUTH DAILY 9/5/24   Edwige Baker DO   trospium (SANCTURA) 20 MG tablet Take 1 tablet by mouth 2 times daily 8/15/24   Bartolo Preston PA-C   mirabegron (MYRBETRIQ) 50 MG TB24 Take 50 mg by mouth daily 8/15/24   Bartolo Preston PA-C   gabapentin (NEURONTIN) 100 MG capsule TAKE 2 CAPSULES BY MOUTH TWICE  DAILY 7/8/24 1/7/25  Flori Geiger MD   omeprazole (PRILOSEC) 20 MG delayed release capsule  4/11/24   Provider, MD Chaparrita   atorvastatin (LIPITOR) 40 MG tablet TAKE 1 TABLET BY MOUTH DAILY 1/10/24   Edwige Baker, DO

## 2024-11-22 ENCOUNTER — OFFICE VISIT (OUTPATIENT)
Dept: FAMILY MEDICINE CLINIC | Age: 79
End: 2024-11-22

## 2024-11-22 ENCOUNTER — HOSPITAL ENCOUNTER (OUTPATIENT)
Dept: GENERAL RADIOLOGY | Age: 79
End: 2024-11-22
Payer: MEDICARE

## 2024-11-22 ENCOUNTER — TELEPHONE (OUTPATIENT)
Dept: FAMILY MEDICINE CLINIC | Age: 79
End: 2024-11-22

## 2024-11-22 ENCOUNTER — HOSPITAL ENCOUNTER (OUTPATIENT)
Age: 79
End: 2024-11-22
Payer: MEDICARE

## 2024-11-22 ENCOUNTER — HOSPITAL ENCOUNTER (OUTPATIENT)
Age: 79
Discharge: HOME | End: 2024-11-22
Payer: MEDICARE

## 2024-11-22 VITALS
SYSTOLIC BLOOD PRESSURE: 102 MMHG | HEIGHT: 65 IN | HEART RATE: 92 BPM | BODY MASS INDEX: 32.32 KG/M2 | WEIGHT: 194 LBS | OXYGEN SATURATION: 97 % | DIASTOLIC BLOOD PRESSURE: 60 MMHG

## 2024-11-22 DIAGNOSIS — E83.42 HYPOMAGNESEMIA: ICD-10-CM

## 2024-11-22 DIAGNOSIS — M54.2 CHRONIC NECK PAIN: ICD-10-CM

## 2024-11-22 DIAGNOSIS — G89.29 CHRONIC RIGHT SHOULDER PAIN: ICD-10-CM

## 2024-11-22 DIAGNOSIS — R42 INTERMITTENT LIGHTHEADEDNESS: ICD-10-CM

## 2024-11-22 DIAGNOSIS — Z23 NEED FOR INFLUENZA VACCINATION: ICD-10-CM

## 2024-11-22 DIAGNOSIS — G89.29 CHRONIC NECK PAIN: ICD-10-CM

## 2024-11-22 DIAGNOSIS — M25.511 CHRONIC RIGHT SHOULDER PAIN: ICD-10-CM

## 2024-11-22 DIAGNOSIS — E11.9 TYPE 2 DIABETES MELLITUS WITHOUT COMPLICATION, WITHOUT LONG-TERM CURRENT USE OF INSULIN (HCC): ICD-10-CM

## 2024-11-22 DIAGNOSIS — E11.9 TYPE 2 DIABETES MELLITUS WITHOUT COMPLICATION, WITHOUT LONG-TERM CURRENT USE OF INSULIN (HCC): Primary | ICD-10-CM

## 2024-11-22 DIAGNOSIS — M25.512 CHRONIC LEFT SHOULDER PAIN: ICD-10-CM

## 2024-11-22 DIAGNOSIS — G89.29 CHRONIC LEFT SHOULDER PAIN: ICD-10-CM

## 2024-11-22 LAB
ALBUMIN SERPL-MCNC: 3.8 G/DL (ref 3.5–5.2)
ALP SERPL-CCNC: 56 U/L (ref 35–104)
ALT SERPL-CCNC: 10 U/L (ref 5–33)
ANION GAP SERPL CALCULATED.3IONS-SCNC: 12 MMOL/L (ref 9–17)
AST SERPL-CCNC: 14 U/L
BASOPHILS # BLD: 0.04 K/UL (ref 0–0.2)
BASOPHILS NFR BLD: 1 % (ref 0–2)
BILIRUB SERPL-MCNC: 0.3 MG/DL (ref 0.3–1.2)
BUN SERPL-MCNC: 30 MG/DL (ref 8–23)
BUN/CREAT SERPL: 20 (ref 9–20)
CALCIUM SERPL-MCNC: 9.3 MG/DL (ref 8.6–10.4)
CHLORIDE SERPL-SCNC: 105 MMOL/L (ref 98–107)
CHOLEST SERPL-MCNC: 133 MG/DL (ref 0–199)
CHOLESTEROL/HDL RATIO: 1.8
CO2 SERPL-SCNC: 25 MMOL/L (ref 20–31)
CREAT SERPL-MCNC: 1.5 MG/DL (ref 0.5–0.9)
EOSINOPHIL # BLD: 0.18 K/UL (ref 0–0.4)
EOSINOPHILS RELATIVE PERCENT: 2 % (ref 0–5)
ERYTHROCYTE [DISTWIDTH] IN BLOOD BY AUTOMATED COUNT: 15.6 % (ref 12.1–15.2)
EST. AVERAGE GLUCOSE BLD GHB EST-MCNC: 111 MG/DL
GFR, ESTIMATED: 36 ML/MIN/1.73M2
GLUCOSE SERPL-MCNC: 79 MG/DL (ref 70–99)
HBA1C MFR BLD: 5.5 % (ref 4–6)
HCT VFR BLD AUTO: 32.2 % (ref 36–46)
HDLC SERPL-MCNC: 75 MG/DL
HGB BLD-MCNC: 10.3 G/DL (ref 12–16)
IMM GRANULOCYTES # BLD AUTO: 0.01 K/UL (ref 0–0.3)
IMM GRANULOCYTES NFR BLD: 0 % (ref 0–5)
LDLC SERPL CALC-MCNC: 45 MG/DL (ref 0–100)
LYMPHOCYTES NFR BLD: 2.51 K/UL (ref 1–4.8)
LYMPHOCYTES RELATIVE PERCENT: 33 % (ref 15–40)
MAGNESIUM SERPL-MCNC: 2.2 MG/DL (ref 1.6–2.6)
MCH RBC QN AUTO: 30.6 PG (ref 26–34)
MCHC RBC AUTO-ENTMCNC: 32 G/DL (ref 31–37)
MCV RBC AUTO: 95.5 FL (ref 80–100)
MONOCYTES NFR BLD: 0.48 K/UL (ref 0–1)
MONOCYTES NFR BLD: 6 % (ref 4–8)
NEUTROPHILS NFR BLD: 58 % (ref 47–75)
NEUTS SEG NFR BLD: 4.31 K/UL (ref 2.5–7)
PLATELET # BLD AUTO: 240 K/UL (ref 140–450)
PMV BLD AUTO: 10.5 FL (ref 6–12)
POTASSIUM SERPL-SCNC: 4.1 MMOL/L (ref 3.7–5.3)
PROT SERPL-MCNC: 6.4 G/DL (ref 6.4–8.3)
RBC # BLD AUTO: 3.37 M/UL (ref 4–5.2)
SODIUM SERPL-SCNC: 142 MMOL/L (ref 135–144)
TRIGL SERPL-MCNC: 63 MG/DL
TSH SERPL DL<=0.05 MIU/L-ACNC: 2.16 UIU/ML (ref 0.3–5)
VLDLC SERPL CALC-MCNC: 13 MG/DL (ref 1–30)
WBC OTHER # BLD: 7.5 K/UL (ref 3.5–11)

## 2024-11-22 PROCEDURE — 73030 X-RAY EXAM OF SHOULDER: CPT

## 2024-11-22 PROCEDURE — 80061 LIPID PANEL: CPT

## 2024-11-22 PROCEDURE — 80053 COMPREHEN METABOLIC PANEL: CPT

## 2024-11-22 PROCEDURE — 83036 HEMOGLOBIN GLYCOSYLATED A1C: CPT

## 2024-11-22 PROCEDURE — 85025 COMPLETE CBC W/AUTO DIFF WBC: CPT

## 2024-11-22 PROCEDURE — 72050 X-RAY EXAM NECK SPINE 4/5VWS: CPT

## 2024-11-22 PROCEDURE — 84443 ASSAY THYROID STIM HORMONE: CPT

## 2024-11-22 PROCEDURE — 83735 ASSAY OF MAGNESIUM: CPT

## 2024-11-22 PROCEDURE — 36415 COLL VENOUS BLD VENIPUNCTURE: CPT

## 2024-11-22 RX ORDER — TRIAMCINOLONE ACETONIDE 40 MG/ML
40 INJECTION, SUSPENSION INTRA-ARTICULAR; INTRAMUSCULAR ONCE
Status: COMPLETED | OUTPATIENT
Start: 2024-11-22 | End: 2024-11-22

## 2024-11-22 RX ADMIN — TRIAMCINOLONE ACETONIDE 40 MG: 40 INJECTION, SUSPENSION INTRA-ARTICULAR; INTRAMUSCULAR at 09:45

## 2024-11-22 NOTE — PROGRESS NOTES
Name: Ragini Regan  : 1945         Chief Complaint:     Chief Complaint   Patient presents with    Diabetes    Hypertension    Neck Pain     Needs referral       History of Present Illness:      Ragini Regan is a 79 y.o.  female who presents with Diabetes, Hypertension, and Neck Pain (Needs referral)      HPI    Chronic pain yarely shoulders, worse on R, decreased ROM. Has had rotator cuff surgery on both. Some pain up through trapezius bilaterally. Has been told no further surgeries unless it were an emergency.    AM dizziness, with washing dishes after breakfast feels lightheaded and has to go sit down. Doesn't feel sugar is low at the time. Has already eaten breakfast at those times incl some honey (son has replaced sugar with that, feels it's healthier).     DM on med as directed, reasonable diet, feeling well. Readings ok.    Medical History:     Patient Active Problem List   Diagnosis    Essential hypertension, benign    Esophageal reflux    Irritable bowel syndrome    Seasonal allergies    Gout    Rectocele    Tubular adenoma of colon    Hiatal hernia    Sigmoid diverticulosis    Chronic back pain    Hypomagnesemia    Family history of colon cancer    History of recurrent deep vein thrombosis (DVT)    Hyperparathyroidism (HCC)    Chronic renal disease, stage III (HCC) [157531]    Type 2 diabetes mellitus without complication, without long-term current use of insulin (HCC)    Chronic diastolic congestive heart failure (HCC)    Type 2 diabetes mellitus with stage 3b chronic kidney disease, without long-term current use of insulin (HCC)    Mixed incontinence       Medications:       Prior to Admission medications    Medication Sig Start Date End Date Taking? Authorizing Provider   atorvastatin (LIPITOR) 40 MG tablet TAKE 1 TABLET BY MOUTH DAILY 24  Yes Edwige Baker DO   omeprazole (PRILOSEC) 20 MG delayed release capsule TAKE 1 CAPSULE BY MOUTH DAILY 24  Yes Edwige Baker DO

## 2024-11-22 NOTE — TELEPHONE ENCOUNTER
Stu Mcfarland! I saw documented in your notes on Miss Eid that she told you I had told her not to use topical estrogen. That's not true - we determined that she had been hospitalized shortly after you prescribed it in Nov 2022, and during the stay it was discontinued from her med list without any reason given. She must have interpreted this as somebody telling her not to take it and assumed it was me. From my standpoint it's perfectly fine for her to use topical estrogen.  Thank you!  Edwige Baker

## 2024-12-11 RX ORDER — GABAPENTIN 100 MG/1
CAPSULE ORAL
Qty: 360 CAPSULE | Refills: 1 | Status: SHIPPED | OUTPATIENT
Start: 2024-12-11 | End: 2025-06-11

## 2024-12-11 NOTE — TELEPHONE ENCOUNTER
(MYRBETRIQ) 50 MG TB24 Take 50 mg by mouth daily 8/15/24   Bartolo Preston PA-C   gabapentin (NEURONTIN) 100 MG capsule TAKE 2 CAPSULES BY MOUTH TWICE  DAILY 7/8/24 1/7/25  Flori Geiger MD   blood glucose monitor strips Test blood glucose daily 10/29/21   Edwige Baker DO   acetaminophen (TYLENOL) 650 MG CR tablet Take 2 tablets by mouth every 8 hours as needed for Pain    Provider, MD Chaparrita

## 2024-12-27 ENCOUNTER — TELEPHONE (OUTPATIENT)
Dept: FAMILY MEDICINE CLINIC | Age: 79
End: 2024-12-27

## 2024-12-27 NOTE — TELEPHONE ENCOUNTER
Last OV: 11/22/2024    Next scheduled apt: 1/9/2025      Sore throat, cough, congestion started 1 day ago. No other symptoms at this time.    Patient would like advised on what otc medication to manage symptoms.

## 2025-01-22 RX ORDER — DOCUSATE SODIUM 50 MG AND SENNOSIDES 8.6 MG 8.6; 5 MG/1; MG/1
1 TABLET, FILM COATED ORAL DAILY
Qty: 30 TABLET | Refills: 2 | Status: SHIPPED | OUTPATIENT
Start: 2025-01-22

## 2025-01-22 NOTE — TELEPHONE ENCOUNTER
8/15/24   Bartolo Preston PA-C   mirabegron (MYRBETRIQ) 50 MG TB24 Take 50 mg by mouth daily 8/15/24   Bartolo Preston PA-C   blood glucose monitor strips Test blood glucose daily 10/29/21   Edwige Baker DO   acetaminophen (TYLENOL) 650 MG CR tablet Take 2 tablets by mouth every 8 hours as needed for Pain    Provider, MD Chaparrita

## 2025-02-28 ENCOUNTER — OFFICE VISIT (OUTPATIENT)
Dept: FAMILY MEDICINE CLINIC | Age: 80
End: 2025-02-28

## 2025-02-28 VITALS
WEIGHT: 193 LBS | SYSTOLIC BLOOD PRESSURE: 118 MMHG | DIASTOLIC BLOOD PRESSURE: 60 MMHG | BODY MASS INDEX: 32.15 KG/M2 | HEIGHT: 65 IN | OXYGEN SATURATION: 93 % | HEART RATE: 88 BPM

## 2025-02-28 DIAGNOSIS — K21.9 GASTROESOPHAGEAL REFLUX DISEASE WITHOUT ESOPHAGITIS: Primary | ICD-10-CM

## 2025-02-28 DIAGNOSIS — I50.32 CHRONIC DIASTOLIC CONGESTIVE HEART FAILURE (HCC): ICD-10-CM

## 2025-02-28 DIAGNOSIS — E83.42 HYPOMAGNESEMIA: ICD-10-CM

## 2025-02-28 DIAGNOSIS — L89.152 PRESSURE INJURY OF SACRAL REGION, STAGE 2 (HCC): ICD-10-CM

## 2025-02-28 DIAGNOSIS — E11.9 TYPE 2 DIABETES MELLITUS WITHOUT COMPLICATION, WITHOUT LONG-TERM CURRENT USE OF INSULIN (HCC): ICD-10-CM

## 2025-02-28 RX ORDER — ATORVASTATIN CALCIUM 40 MG/1
40 TABLET, FILM COATED ORAL DAILY
Qty: 90 TABLET | Refills: 3 | Status: SHIPPED | OUTPATIENT
Start: 2025-02-28

## 2025-02-28 RX ORDER — GABAPENTIN 100 MG/1
CAPSULE ORAL
Qty: 360 CAPSULE | Refills: 1 | Status: SHIPPED | OUTPATIENT
Start: 2025-02-28 | End: 2025-08-29

## 2025-02-28 RX ORDER — SILVER SULFADIAZINE 10 MG/G
CREAM TOPICAL
Qty: 50 G | Refills: 2 | Status: SHIPPED | OUTPATIENT
Start: 2025-02-28

## 2025-02-28 RX ORDER — ALLOPURINOL 100 MG/1
200 TABLET ORAL DAILY
Qty: 180 TABLET | Refills: 3 | Status: SHIPPED | OUTPATIENT
Start: 2025-02-28

## 2025-02-28 RX ORDER — PANTOPRAZOLE SODIUM 40 MG/1
40 TABLET, DELAYED RELEASE ORAL
Qty: 90 TABLET | Refills: 1 | Status: SHIPPED | OUTPATIENT
Start: 2025-02-28

## 2025-02-28 RX ORDER — CLOPIDOGREL BISULFATE 75 MG/1
75 TABLET ORAL DAILY
Qty: 90 TABLET | Refills: 3 | Status: SHIPPED | OUTPATIENT
Start: 2025-02-28

## 2025-02-28 RX ORDER — OMEPRAZOLE 20 MG/1
20 CAPSULE, DELAYED RELEASE ORAL DAILY
Qty: 90 CAPSULE | Refills: 3 | Status: CANCELLED | OUTPATIENT
Start: 2025-02-28

## 2025-02-28 SDOH — ECONOMIC STABILITY: FOOD INSECURITY: WITHIN THE PAST 12 MONTHS, YOU WORRIED THAT YOUR FOOD WOULD RUN OUT BEFORE YOU GOT MONEY TO BUY MORE.: NEVER TRUE

## 2025-02-28 SDOH — ECONOMIC STABILITY: FOOD INSECURITY: WITHIN THE PAST 12 MONTHS, THE FOOD YOU BOUGHT JUST DIDN'T LAST AND YOU DIDN'T HAVE MONEY TO GET MORE.: NEVER TRUE

## 2025-02-28 ASSESSMENT — PATIENT HEALTH QUESTIONNAIRE - PHQ9
SUM OF ALL RESPONSES TO PHQ QUESTIONS 1-9: 0
SUM OF ALL RESPONSES TO PHQ QUESTIONS 1-9: 0
1. LITTLE INTEREST OR PLEASURE IN DOING THINGS: NOT AT ALL
SUM OF ALL RESPONSES TO PHQ QUESTIONS 1-9: 0
2. FEELING DOWN, DEPRESSED OR HOPELESS: NOT AT ALL
SUM OF ALL RESPONSES TO PHQ QUESTIONS 1-9: 0

## 2025-02-28 NOTE — PROGRESS NOTES
exceptional.     Thank you.    Clinical Care Team:   DO Vel Bryant CMA                                     Triage: Afua Leslie CMA              Clerical Team:    Afua Ibrahim     [place] [phone]           Billing questions: [phone]           Medical Records Request: [phone]          signed by Edwige Baker DO on 3/4/2025 at 12:58 PM  [facility]  [address] [address]  Dept: [phone]

## 2025-02-28 NOTE — PATIENT INSTRUCTIONS
[employer] SURVEY:    You may be receiving a survey from [employer] regarding your visit today.    You may get this in the mail, through your MyChart or in your email.     Please complete the survey to enable us to provide the highest quality of care to you and your family.    If you cannot score us as very good ( 5 Stars) on any question, please feel free to call the office to discuss how we could have made your experience exceptional.     Thank you.    Clinical Care Team:   DO Vel Bryant CMA                                     Triage: Afua Leslie CMA              Clerical Team:    Afua Ibrahim     [place] [phone]           Billing questions: [phone]           Medical Records Request: [phone]

## 2025-03-19 NOTE — TELEPHONE ENCOUNTER
Refill request from pharmacy,  Patient was last seen on 08/15/2024 and has a upcoming appointment scheduled 04/10/2025.

## 2025-03-20 RX ORDER — TROSPIUM CHLORIDE 20 MG/1
20 TABLET, FILM COATED ORAL 2 TIMES DAILY
Qty: 180 TABLET | Refills: 0 | Status: SHIPPED | OUTPATIENT
Start: 2025-03-20

## 2025-03-20 RX ORDER — MIRABEGRON 50 MG/1
50 TABLET, FILM COATED, EXTENDED RELEASE ORAL DAILY
Qty: 30 TABLET | Refills: 0 | Status: SHIPPED | OUTPATIENT
Start: 2025-03-20

## 2025-03-28 ENCOUNTER — OFFICE VISIT (OUTPATIENT)
Dept: FAMILY MEDICINE CLINIC | Age: 80
End: 2025-03-28
Payer: MEDICARE

## 2025-03-28 ENCOUNTER — HOSPITAL ENCOUNTER (OUTPATIENT)
Age: 80
Discharge: HOME | End: 2025-03-28
Payer: MEDICARE

## 2025-03-28 VITALS
HEART RATE: 97 BPM | DIASTOLIC BLOOD PRESSURE: 60 MMHG | SYSTOLIC BLOOD PRESSURE: 106 MMHG | BODY MASS INDEX: 31.32 KG/M2 | HEIGHT: 65 IN | OXYGEN SATURATION: 98 % | WEIGHT: 188 LBS

## 2025-03-28 DIAGNOSIS — M54.2 CHRONIC NECK PAIN: ICD-10-CM

## 2025-03-28 DIAGNOSIS — E11.22 TYPE 2 DIABETES MELLITUS WITH STAGE 3B CHRONIC KIDNEY DISEASE, WITHOUT LONG-TERM CURRENT USE OF INSULIN (HCC): ICD-10-CM

## 2025-03-28 DIAGNOSIS — R13.19 ESOPHAGEAL DYSPHAGIA: Primary | ICD-10-CM

## 2025-03-28 DIAGNOSIS — G89.29 CHRONIC NECK PAIN: ICD-10-CM

## 2025-03-28 DIAGNOSIS — K59.09 CHRONIC CONSTIPATION: ICD-10-CM

## 2025-03-28 DIAGNOSIS — N18.32 TYPE 2 DIABETES MELLITUS WITH STAGE 3B CHRONIC KIDNEY DISEASE, WITHOUT LONG-TERM CURRENT USE OF INSULIN (HCC): ICD-10-CM

## 2025-03-28 DIAGNOSIS — R41.89 COGNITIVE IMPAIRMENT: ICD-10-CM

## 2025-03-28 DIAGNOSIS — E11.9 TYPE 2 DIABETES MELLITUS WITHOUT COMPLICATION, WITHOUT LONG-TERM CURRENT USE OF INSULIN: ICD-10-CM

## 2025-03-28 DIAGNOSIS — E83.42 HYPOMAGNESEMIA: ICD-10-CM

## 2025-03-28 LAB
ALBUMIN SERPL-MCNC: 3.9 G/DL (ref 3.5–5.2)
ALBUMIN/GLOB SERPL: 1.7 {RATIO} (ref 1–2.5)
ALP SERPL-CCNC: 44 U/L (ref 35–104)
ALT SERPL-CCNC: 10 U/L (ref 5–33)
ANION GAP SERPL CALCULATED.3IONS-SCNC: 12 MMOL/L (ref 9–17)
AST SERPL-CCNC: 16 U/L
BASOPHILS # BLD: 0.04 K/UL (ref 0–0.2)
BASOPHILS NFR BLD: 1 % (ref 0–2)
BILIRUB SERPL-MCNC: 0.3 MG/DL (ref 0.3–1.2)
BUN SERPL-MCNC: 33 MG/DL (ref 8–23)
CALCIUM SERPL-MCNC: 9.4 MG/DL (ref 8.6–10.4)
CHLORIDE SERPL-SCNC: 103 MMOL/L (ref 98–107)
CO2 SERPL-SCNC: 27 MMOL/L (ref 20–31)
CREAT SERPL-MCNC: 1.8 MG/DL (ref 0.5–0.9)
EOSINOPHIL # BLD: 0.21 K/UL (ref 0–0.4)
EOSINOPHILS RELATIVE PERCENT: 3 % (ref 0–5)
ERYTHROCYTE [DISTWIDTH] IN BLOOD BY AUTOMATED COUNT: 15.3 % (ref 12.1–15.2)
EST. AVERAGE GLUCOSE BLD GHB EST-MCNC: 108 MG/DL
GFR, ESTIMATED: 29 ML/MIN/1.73M2
GLUCOSE SERPL-MCNC: 79 MG/DL (ref 70–99)
HBA1C MFR BLD: 5.4 % (ref 4–6)
HCT VFR BLD AUTO: 33.1 % (ref 36–46)
HGB BLD-MCNC: 10.8 G/DL (ref 12–16)
IMM GRANULOCYTES # BLD AUTO: 0.01 K/UL (ref 0–0.3)
IMM GRANULOCYTES NFR BLD: 0 % (ref 0–5)
LYMPHOCYTES NFR BLD: 3.03 K/UL (ref 1–4.8)
LYMPHOCYTES RELATIVE PERCENT: 44 % (ref 15–40)
MAGNESIUM SERPL-MCNC: 2.1 MG/DL (ref 1.6–2.6)
MCH RBC QN AUTO: 32.5 PG (ref 26–34)
MCHC RBC AUTO-ENTMCNC: 32.6 G/DL (ref 31–37)
MCV RBC AUTO: 99.7 FL (ref 80–100)
MONOCYTES NFR BLD: 0.46 K/UL (ref 0–1)
MONOCYTES NFR BLD: 7 % (ref 4–8)
NEUTROPHILS NFR BLD: 45 % (ref 47–75)
NEUTS SEG NFR BLD: 3.18 K/UL (ref 2.5–7)
PLATELET # BLD AUTO: 245 K/UL (ref 140–450)
PMV BLD AUTO: 10.4 FL (ref 6–12)
POTASSIUM SERPL-SCNC: 4.3 MMOL/L (ref 3.7–5.3)
PROT SERPL-MCNC: 6.2 G/DL (ref 6.4–8.3)
RBC # BLD AUTO: 3.32 M/UL (ref 4–5.2)
SODIUM SERPL-SCNC: 142 MMOL/L (ref 135–144)
WBC OTHER # BLD: 6.9 K/UL (ref 3.5–11)

## 2025-03-28 PROCEDURE — 1090F PRES/ABSN URINE INCON ASSESS: CPT | Performed by: FAMILY MEDICINE

## 2025-03-28 PROCEDURE — 80053 COMPREHEN METABOLIC PANEL: CPT

## 2025-03-28 PROCEDURE — 83036 HEMOGLOBIN GLYCOSYLATED A1C: CPT

## 2025-03-28 PROCEDURE — G8427 DOCREV CUR MEDS BY ELIG CLIN: HCPCS | Performed by: FAMILY MEDICINE

## 2025-03-28 PROCEDURE — 99214 OFFICE O/P EST MOD 30 MIN: CPT | Performed by: FAMILY MEDICINE

## 2025-03-28 PROCEDURE — 1036F TOBACCO NON-USER: CPT | Performed by: FAMILY MEDICINE

## 2025-03-28 PROCEDURE — G8399 PT W/DXA RESULTS DOCUMENT: HCPCS | Performed by: FAMILY MEDICINE

## 2025-03-28 PROCEDURE — 3044F HG A1C LEVEL LT 7.0%: CPT | Performed by: FAMILY MEDICINE

## 2025-03-28 PROCEDURE — 3074F SYST BP LT 130 MM HG: CPT | Performed by: FAMILY MEDICINE

## 2025-03-28 PROCEDURE — 3078F DIAST BP <80 MM HG: CPT | Performed by: FAMILY MEDICINE

## 2025-03-28 PROCEDURE — 85025 COMPLETE CBC W/AUTO DIFF WBC: CPT

## 2025-03-28 PROCEDURE — 1123F ACP DISCUSS/DSCN MKR DOCD: CPT | Performed by: FAMILY MEDICINE

## 2025-03-28 PROCEDURE — 36415 COLL VENOUS BLD VENIPUNCTURE: CPT

## 2025-03-28 PROCEDURE — 83735 ASSAY OF MAGNESIUM: CPT

## 2025-03-28 PROCEDURE — G8417 CALC BMI ABV UP PARAM F/U: HCPCS | Performed by: FAMILY MEDICINE

## 2025-03-28 NOTE — PROGRESS NOTES
Name: Ragini Regan  : 1945         Chief Complaint:     Chief Complaint   Patient presents with    Gastroesophageal Reflux    Constipation    Neck Pain       History of Present Illness:      Ragini Regan is a 79 y.o.  female who presents with Gastroesophageal Reflux, Constipation, and Neck Pain      HPI    C/o ongoing upper abd discomfort, feels like she has to take bra off to be able to digest food. Restarted protonix without much help, still having heartburn and vomiting at times, jasiel with lying down, happened after pizza the other day.     Chronic neck pain and wonders about asymmetry of prox clavicles, how that may affect swallowing.    BM's remain slow also, hasn't had one for past 3 days. Occasional MoM, only once in past month. No miralax currently.    Medical History:     Patient Active Problem List   Diagnosis    Essential hypertension, benign    Esophageal reflux    Irritable bowel syndrome    Seasonal allergies    Gout    Rectocele    Tubular adenoma of colon    Hiatal hernia    Sigmoid diverticulosis    Chronic back pain    Hypomagnesemia    Family history of colon cancer    History of recurrent deep vein thrombosis (DVT)    Hyperparathyroidism    Chronic renal disease, stage III ([facility]) [934513]    Type 2 diabetes mellitus without complication, without long-term current use of insulin    Chronic diastolic congestive heart failure (HCC)    Type 2 diabetes mellitus with stage 3b chronic kidney disease, without long-term current use of insulin (HCC)    Mixed incontinence       Medications:       Prior to Admission medications    Medication Sig Start Date End Date Taking? Authorizing Provider   trospium (SANCTURA) 20 MG tablet TAKE 1 TABLET BY MOUTH TWICE DAILY 3/20/25  Yes Bartolo Preston PA-C   mirabegron (MYRBETRIQ) 50 MG TB24 TAKE 1 TABLET BY MOUTH DAILY 3/20/25  Yes Bartolo Preston PA-C   gabapentin (NEURONTIN) 100 MG capsule TAKE 2 CAPSULES BY MOUTH TWICE  DAILY 25 Yes

## 2025-03-29 ENCOUNTER — HOSPITAL ENCOUNTER (EMERGENCY)
Age: 80
Discharge: HOME | End: 2025-03-29
Attending: FAMILY MEDICINE
Payer: MEDICARE

## 2025-03-29 ENCOUNTER — APPOINTMENT (OUTPATIENT)
Dept: CT IMAGING | Age: 80
End: 2025-03-29
Payer: MEDICARE

## 2025-03-29 VITALS
TEMPERATURE: 98.1 F | SYSTOLIC BLOOD PRESSURE: 151 MMHG | BODY MASS INDEX: 33.31 KG/M2 | OXYGEN SATURATION: 93 % | HEIGHT: 63 IN | WEIGHT: 188 LBS | RESPIRATION RATE: 16 BRPM | DIASTOLIC BLOOD PRESSURE: 73 MMHG | HEART RATE: 88 BPM

## 2025-03-29 DIAGNOSIS — K59.00 CONSTIPATION, UNSPECIFIED CONSTIPATION TYPE: Primary | ICD-10-CM

## 2025-03-29 PROCEDURE — 99284 EMERGENCY DEPT VISIT MOD MDM: CPT

## 2025-03-29 PROCEDURE — 74176 CT ABD & PELVIS W/O CONTRAST: CPT

## 2025-03-29 RX ORDER — POLYETHYLENE GLYCOL 3350 17 G/17G
17 POWDER, FOR SOLUTION ORAL DAILY PRN
Qty: 10 PACKET | Refills: 0 | Status: SHIPPED | OUTPATIENT
Start: 2025-03-29 | End: 2025-04-03 | Stop reason: SDUPTHER

## 2025-03-29 ASSESSMENT — PAIN - FUNCTIONAL ASSESSMENT: PAIN_FUNCTIONAL_ASSESSMENT: 0-10

## 2025-03-29 ASSESSMENT — PAIN DESCRIPTION - LOCATION: LOCATION: OTHER (COMMENT)

## 2025-03-29 ASSESSMENT — PAIN SCALES - GENERAL: PAINLEVEL_OUTOF10: 10

## 2025-03-29 ASSESSMENT — PAIN DESCRIPTION - DESCRIPTORS: DESCRIPTORS: SHARP;STABBING

## 2025-03-30 NOTE — ED PROVIDER NOTES
eMERGENCY dEPARTMENT eNCOUnter      CHIEF COMPLAINT    Chief Complaint   Patient presents with    Constipation     Pt here from home with c/o constipation. Pt state last BM 3 days ago. Pt notes some red blood in toilet with BM attempts. Pt notes some pain in rectum. Pt states took PO meds today and yesterday and has been attempting self disimpaction with no relief. Pt denies N/V/D and abd pain.        HPI    Ragini Regan is a 79 y.o. female who presents to ED from her home with complaint of constipation.  She has been constipated for 3 days.  Patient has tried Dulcolax without improvement.  Patient had a colonoscopy done in April 2024 which revealed tubular adenomas.          REVIEW OF SYSTEMS    All systems reviewed and positives are in the HPI    PAST MEDICAL HISTORY    Past Medical History:   Diagnosis Date    Allergic rhinitis     Chronic kidney disease, stage III (moderate) (HCC)     Deep vein thrombosis (DVT) (HCC) 10/24/2012    Elbow fracture, left     Fall     Gastric ulcer     Gout     Hx of blood clots     Following last back surgery     Hypertension     Nausea & vomiting     Presence of IVC filter     Type II or unspecified type diabetes mellitus without mention of complication, not stated as uncontrolled        SURGICAL HISTORY    Past Surgical History:   Procedure Laterality Date    BACK SURGERY      BACK SURGERY      BACK SURGERY      BACK SURGERY      BACK SURGERY      BACK SURGERY      Fusion     BREAST BIOPSY Left     BREAST BIOPSY Right     CARDIAC CATHETERIZATION Left 03/07/2018    Dr. Larios @ [facility]--There is 30% disease of the origin of the lateral anterior descending.  Mild 10% -20% plaque disease in the circumflex & right coronary artery.  Normal left ventricular functino, ejection fraction of 60%.      CARPAL TUNNEL RELEASE Right     CATARACT REMOVAL Right     CATARACT REMOVAL Left     COLONOSCOPY  2014    COLONOSCOPY N/A 05/13/2019    COLONOSCOPY POLYPECTOMY HOT BIOPSY performed by

## 2025-03-31 ENCOUNTER — RESULTS FOLLOW-UP (OUTPATIENT)
Dept: FAMILY MEDICINE CLINIC | Age: 80
End: 2025-03-31

## 2025-03-31 ENCOUNTER — TELEPHONE (OUTPATIENT)
Dept: FAMILY MEDICINE CLINIC | Age: 80
End: 2025-03-31

## 2025-04-03 ENCOUNTER — OFFICE VISIT (OUTPATIENT)
Dept: FAMILY MEDICINE CLINIC | Age: 80
End: 2025-04-03

## 2025-04-03 VITALS
HEART RATE: 87 BPM | WEIGHT: 191 LBS | DIASTOLIC BLOOD PRESSURE: 68 MMHG | SYSTOLIC BLOOD PRESSURE: 120 MMHG | OXYGEN SATURATION: 99 % | HEIGHT: 63 IN | BODY MASS INDEX: 33.84 KG/M2

## 2025-04-03 DIAGNOSIS — L82.1 SEBORRHEIC KERATOSIS: ICD-10-CM

## 2025-04-03 DIAGNOSIS — R13.19 ESOPHAGEAL DYSPHAGIA: Primary | ICD-10-CM

## 2025-04-03 DIAGNOSIS — K59.09 CHRONIC CONSTIPATION: ICD-10-CM

## 2025-04-03 RX ORDER — POLYETHYLENE GLYCOL 3350 17 G/17G
17 POWDER, FOR SOLUTION ORAL DAILY
Qty: 90 PACKET | Refills: 1 | Status: SHIPPED | OUTPATIENT
Start: 2025-04-03

## 2025-04-03 NOTE — PROGRESS NOTES
Name: Ragini Regan  : 1945         Chief Complaint:     Chief Complaint   Patient presents with    Follow-up    Gastroesophageal Reflux     Burning in throat and chest, feels like food wont go down       History of Present Illness:      Ragini Regan is a 79 y.o.  female who presents with Follow-up and Gastroesophageal Reflux (Burning in throat and chest, feels like food wont go down)      HPI    Ongoing constipation, ended up in ER 3/29 and was given soap suds enema which didn't help immediately. Started miralax at home and then at some point later did start passing stool. Ended up passing a lot of watery stool. Yesterday had frequent small BM's, some water and some formed stool. Did have urine incontinence overnight last night, was off her usual sleeping schedule and just didn't make it to bathroom in time.     C/o ongoing feeling of catching in lower chest after eating, jasiel while wearing bra. Has had pain there twice already this morning. Has had something small to eat, can't remember what. At times burps and that'll help.     C/o skin lesion L inferior breast.    Medical History:     Patient Active Problem List   Diagnosis    Essential hypertension, benign    Esophageal reflux    Irritable bowel syndrome    Seasonal allergies    Gout    Rectocele    Tubular adenoma of colon    Hiatal hernia    Sigmoid diverticulosis    Chronic back pain    Hypomagnesemia    Family history of colon cancer    History of recurrent deep vein thrombosis (DVT)    Hyperparathyroidism    Chronic renal disease, stage III (HCC) [023115]    Type 2 diabetes mellitus without complication, without long-term current use of insulin    Chronic diastolic congestive heart failure (HCC)    Type 2 diabetes mellitus with stage 3b chronic kidney disease, without long-term current use of insulin (HCC)    Mixed incontinence       Medications:       Prior to Admission medications    Medication Sig Start Date End Date Taking? Authorizing

## 2025-04-03 NOTE — PATIENT INSTRUCTIONS
Please take the miralax EVERY DAY. New rx sent to [facility]. Use milk of magnesia as needed, up to twice a week.  Swallowing study on April 15th.

## 2025-04-10 ENCOUNTER — OFFICE VISIT (OUTPATIENT)
Dept: UROLOGY | Age: 80
End: 2025-04-10
Payer: MEDICARE

## 2025-04-10 VITALS — WEIGHT: 185 LBS | HEIGHT: 63 IN | BODY MASS INDEX: 32.78 KG/M2

## 2025-04-10 DIAGNOSIS — N39.0 FREQUENT UTI: ICD-10-CM

## 2025-04-10 DIAGNOSIS — N39.46 MIXED INCONTINENCE: Primary | ICD-10-CM

## 2025-04-10 DIAGNOSIS — N95.8 GENITOURINARY SYNDROME OF MENOPAUSE: ICD-10-CM

## 2025-04-10 PROCEDURE — 1090F PRES/ABSN URINE INCON ASSESS: CPT | Performed by: PHYSICIAN ASSISTANT

## 2025-04-10 PROCEDURE — 1159F MED LIST DOCD IN RCRD: CPT | Performed by: PHYSICIAN ASSISTANT

## 2025-04-10 PROCEDURE — 1123F ACP DISCUSS/DSCN MKR DOCD: CPT | Performed by: PHYSICIAN ASSISTANT

## 2025-04-10 PROCEDURE — 1036F TOBACCO NON-USER: CPT | Performed by: PHYSICIAN ASSISTANT

## 2025-04-10 PROCEDURE — 51798 US URINE CAPACITY MEASURE: CPT | Performed by: PHYSICIAN ASSISTANT

## 2025-04-10 PROCEDURE — G8427 DOCREV CUR MEDS BY ELIG CLIN: HCPCS | Performed by: PHYSICIAN ASSISTANT

## 2025-04-10 PROCEDURE — G8417 CALC BMI ABV UP PARAM F/U: HCPCS | Performed by: PHYSICIAN ASSISTANT

## 2025-04-10 PROCEDURE — 0509F URINE INCON PLAN DOCD: CPT | Performed by: PHYSICIAN ASSISTANT

## 2025-04-10 PROCEDURE — G8399 PT W/DXA RESULTS DOCUMENT: HCPCS | Performed by: PHYSICIAN ASSISTANT

## 2025-04-10 PROCEDURE — 99214 OFFICE O/P EST MOD 30 MIN: CPT | Performed by: PHYSICIAN ASSISTANT

## 2025-04-10 NOTE — PROGRESS NOTES
[place] performed in office today:   PVR - 203 mL   
furosemide (LASIX) 20 MG tablet TAKE 1 TABLET BY MOUTH DAILY 90 tablet 3    blood glucose monitor strips Test blood glucose daily 100 strip 1    acetaminophen (TYLENOL) 650 MG CR tablet Take 2 tablets by mouth every 8 hours as needed for Pain       No current facility-administered medications on file prior to visit.     Codeine, Hydrocodone-acetaminophen, Oxycodone-acetaminophen, Adhesive tape, Lisinopril, Norco [hydrocodone-acetaminophen], and Percocet [oxycodone-acetaminophen]  Family History   Problem Relation Age of Onset    Diabetes Father     Cancer Father         Prostate Cancer    Cancer Paternal Aunt         Colon Cancer     Social History     Tobacco Use   Smoking Status Never    Passive exposure: Never   Smokeless Tobacco Never       Social History     Substance and Sexual Activity   Alcohol Use No         LMP  (LMP Unknown)       PHYSICAL EXAM:  Constitutional: Patient resting comfortably, in no acute distress.   Neuro: Alert and oriented to person place and time.  Psych: Mood and affect normal.  Lungs: Respiratory effort normal, unlabored      Lab Results   Component Value Date    BUN 33 (H) 03/28/2025     Lab Results   Component Value Date    CREATININE 1.8 (H) 03/28/2025       ASSESSMENT:   Diagnosis Orders   1. Mixed incontinence        2. Frequent UTI        3. Genitourinary syndrome of menopause          PLAN:  Continue trospium twice a day    Continue Myrbetriq    Increase water intake    Follow-up in 6 months with [facility]

## 2025-04-15 ENCOUNTER — HOSPITAL ENCOUNTER (OUTPATIENT)
Dept: GENERAL RADIOLOGY | Age: 80
Discharge: HOME | End: 2025-04-17
Attending: FAMILY MEDICINE
Payer: MEDICARE

## 2025-04-15 DIAGNOSIS — R13.19 ESOPHAGEAL DYSPHAGIA: ICD-10-CM

## 2025-04-15 PROCEDURE — 2500000003 HC RX 250 WO HCPCS: Performed by: FAMILY MEDICINE

## 2025-04-15 PROCEDURE — 74220 X-RAY XM ESOPHAGUS 1CNTRST: CPT

## 2025-04-15 RX ADMIN — BARIUM SULFATE 176 G: 960 POWDER, FOR SUSPENSION ORAL at 09:41

## 2025-04-16 ENCOUNTER — TELEPHONE (OUTPATIENT)
Dept: FAMILY MEDICINE CLINIC | Age: 80
End: 2025-04-16

## 2025-04-16 ENCOUNTER — OFFICE VISIT (OUTPATIENT)
Dept: GASTROENTEROLOGY | Age: 80
End: 2025-04-16
Payer: MEDICARE

## 2025-04-16 VITALS
BODY MASS INDEX: 33.66 KG/M2 | HEIGHT: 63 IN | DIASTOLIC BLOOD PRESSURE: 68 MMHG | OXYGEN SATURATION: 98 % | SYSTOLIC BLOOD PRESSURE: 142 MMHG | WEIGHT: 190 LBS | HEART RATE: 79 BPM

## 2025-04-16 DIAGNOSIS — R13.10 DYSPHAGIA, UNSPECIFIED TYPE: Primary | ICD-10-CM

## 2025-04-16 DIAGNOSIS — K92.1 MELENA: ICD-10-CM

## 2025-04-16 DIAGNOSIS — K59.09 CHRONIC CONSTIPATION: ICD-10-CM

## 2025-04-16 DIAGNOSIS — R13.19 ESOPHAGEAL DYSPHAGIA: ICD-10-CM

## 2025-04-16 PROCEDURE — 1159F MED LIST DOCD IN RCRD: CPT | Performed by: NURSE PRACTITIONER

## 2025-04-16 PROCEDURE — G8399 PT W/DXA RESULTS DOCUMENT: HCPCS | Performed by: NURSE PRACTITIONER

## 2025-04-16 PROCEDURE — 1090F PRES/ABSN URINE INCON ASSESS: CPT | Performed by: NURSE PRACTITIONER

## 2025-04-16 PROCEDURE — 99213 OFFICE O/P EST LOW 20 MIN: CPT | Performed by: NURSE PRACTITIONER

## 2025-04-16 PROCEDURE — 3077F SYST BP >= 140 MM HG: CPT | Performed by: NURSE PRACTITIONER

## 2025-04-16 PROCEDURE — 1123F ACP DISCUSS/DSCN MKR DOCD: CPT | Performed by: NURSE PRACTITIONER

## 2025-04-16 PROCEDURE — G8417 CALC BMI ABV UP PARAM F/U: HCPCS | Performed by: NURSE PRACTITIONER

## 2025-04-16 PROCEDURE — 1036F TOBACCO NON-USER: CPT | Performed by: NURSE PRACTITIONER

## 2025-04-16 PROCEDURE — G8427 DOCREV CUR MEDS BY ELIG CLIN: HCPCS | Performed by: NURSE PRACTITIONER

## 2025-04-16 PROCEDURE — 3078F DIAST BP <80 MM HG: CPT | Performed by: NURSE PRACTITIONER

## 2025-04-16 RX ORDER — SUCRALFATE ORAL 1 G/10ML
1 SUSPENSION ORAL 3 TIMES DAILY
Qty: 900 ML | Refills: 0 | Status: SHIPPED | OUTPATIENT
Start: 2025-04-16

## 2025-04-16 ASSESSMENT — ENCOUNTER SYMPTOMS
ANAL BLEEDING: 0
BLOOD IN STOOL: 1
RESPIRATORY NEGATIVE: 1
ALLERGIC/IMMUNOLOGIC NEGATIVE: 1
TROUBLE SWALLOWING: 1
CONSTIPATION: 1
ABDOMINAL PAIN: 1

## 2025-04-16 NOTE — TELEPHONE ENCOUNTER
Please scheduled EGD for Matthew 5/7/25.  Could be a double.     Lesvia will consult with Dr. Toussaint.

## 2025-04-16 NOTE — PROGRESS NOTES
spent on face-to-face time in discussion with the patient regarding diagnostic options/results, treatment options, counseling, and follow-up plan.      Lesvia Mirza, APRN - CNP    *This report was transcribed using voice recognition software. Every effort was made to ensure accuracy; however, inadvertent computerized transcription errors may be present.

## 2025-04-16 NOTE — TELEPHONE ENCOUNTER
Patient is scheduled to have a  EGD procedure scheduled for 5/7/2025.    - EGD - pending cardiac clearance - given the comorbid condition     Last EKG 3/1/2024    Patient takes 75 MG of PLAVIX daily     PLEASE INDICATE WHETHER PATIENT IS CLEARED FOR SURGERY.

## 2025-04-17 ENCOUNTER — RESULTS FOLLOW-UP (OUTPATIENT)
Dept: FAMILY MEDICINE CLINIC | Age: 80
End: 2025-04-17

## 2025-04-21 RX ORDER — AMOXICILLIN 250 MG
1 CAPSULE ORAL DAILY
Qty: 30 TABLET | Refills: 2 | Status: SHIPPED | OUTPATIENT
Start: 2025-04-21

## 2025-04-21 NOTE — TELEPHONE ENCOUNTER
Last visit:  4/3/2025  Next Visit Date:    Future Appointments   Date Time Provider Department Center   4/22/2025  2:30 PM Pedro Pablo Rsahid DO [facility]   6/30/2025  9:00 AM Edwige Baker DO [facility]   10/23/2025  3:00 PM Bartolo Preston PA-C [facility]         Medication List:  Prior to Admission medications    Medication Sig Start Date End Date Taking? Authorizing Provider   sucralfate (CARAFATE) 1 GM/10ML suspension Take 10 mLs by mouth 3 times daily 4/16/25   Lesvia Mirza APRN - CNP   polyethylene glycol (MIRALAX) 17 g packet Take 1 packet by mouth daily 4/3/25   Edwige Baker DO   magnesium hydroxide (MILK OF MAGNESIA) 400 MG/5ML suspension Take 30 mLs by mouth daily as needed for Constipation Up to twice a week 4/3/25   Edwige Baker DO   trospium (SANCTURA) 20 MG tablet TAKE 1 TABLET BY MOUTH TWICE DAILY 3/20/25   Bartolo Preston PA-C   mirabegron (MYRBETRIQ) 50 MG TB24 TAKE 1 TABLET BY MOUTH DAILY 3/20/25   Bartolo Preston PA-C   gabapentin (NEURONTIN) 100 MG capsule TAKE 2 CAPSULES BY MOUTH TWICE  DAILY 2/28/25 8/29/25  Edwige Baker DO   clopidogrel (PLAVIX) 75 MG tablet Take 1 tablet by mouth daily 2/28/25   Edwige Baker DO   atorvastatin (LIPITOR) 40 MG tablet Take 1 tablet by mouth daily 2/28/25   Edwige Baker DO   allopurinol (ZYLOPRIM) 100 MG tablet Take 2 tablets by mouth daily 2/28/25   Edwige Baker DO   pantoprazole (PROTONIX) 40 MG tablet Take 1 tablet by mouth every morning (before breakfast) 2/28/25   Edwige Baker DO   silver sulfADIAZINE (SILVADENE) 1 % cream Apply topically daily. 2/28/25   Edwige Baker DO   STIMULANT LAXATIVE 8.6-50 MG per tablet TAKE 1 TABLET BY MOUTH DAILY. 1/22/25   Edwige Baker DO   magnesium lactate (MAGTAB) 84 MG (7MEQ) TBCR extended release tablet TAKE 2 TABLETS BY MOUTH IN THE MORNING and 1 tablet IN THE EVENING 11/6/24   Edwige Baker DO   sertraline

## 2025-04-22 ENCOUNTER — OFFICE VISIT (OUTPATIENT)
Dept: CARDIOLOGY CLINIC | Age: 80
End: 2025-04-22
Payer: MEDICARE

## 2025-04-22 VITALS
BODY MASS INDEX: 33.67 KG/M2 | OXYGEN SATURATION: 97 % | HEART RATE: 85 BPM | SYSTOLIC BLOOD PRESSURE: 143 MMHG | DIASTOLIC BLOOD PRESSURE: 78 MMHG | WEIGHT: 190 LBS

## 2025-04-22 DIAGNOSIS — Z01.810 PREOPERATIVE CARDIOVASCULAR EXAMINATION: ICD-10-CM

## 2025-04-22 DIAGNOSIS — Z01.818 PREOPERATIVE CLEARANCE: ICD-10-CM

## 2025-04-22 DIAGNOSIS — I10 ESSENTIAL HYPERTENSION, BENIGN: Primary | ICD-10-CM

## 2025-04-22 DIAGNOSIS — R07.9 CHEST PAIN, UNSPECIFIED TYPE: Primary | ICD-10-CM

## 2025-04-22 DIAGNOSIS — I10 ESSENTIAL HYPERTENSION, BENIGN: ICD-10-CM

## 2025-04-22 DIAGNOSIS — R06.02 SHORTNESS OF BREATH: ICD-10-CM

## 2025-04-22 PROCEDURE — G8417 CALC BMI ABV UP PARAM F/U: HCPCS | Performed by: INTERNAL MEDICINE

## 2025-04-22 PROCEDURE — 3078F DIAST BP <80 MM HG: CPT | Performed by: INTERNAL MEDICINE

## 2025-04-22 PROCEDURE — 1090F PRES/ABSN URINE INCON ASSESS: CPT | Performed by: INTERNAL MEDICINE

## 2025-04-22 PROCEDURE — 99204 OFFICE O/P NEW MOD 45 MIN: CPT | Performed by: INTERNAL MEDICINE

## 2025-04-22 PROCEDURE — G8399 PT W/DXA RESULTS DOCUMENT: HCPCS | Performed by: INTERNAL MEDICINE

## 2025-04-22 PROCEDURE — G8428 CUR MEDS NOT DOCUMENT: HCPCS | Performed by: INTERNAL MEDICINE

## 2025-04-22 PROCEDURE — 1036F TOBACCO NON-USER: CPT | Performed by: INTERNAL MEDICINE

## 2025-04-22 PROCEDURE — 3077F SYST BP >= 140 MM HG: CPT | Performed by: INTERNAL MEDICINE

## 2025-04-22 PROCEDURE — 1123F ACP DISCUSS/DSCN MKR DOCD: CPT | Performed by: INTERNAL MEDICINE

## 2025-04-22 NOTE — PATIENT INSTRUCTIONS
Will set up for echo and lexiscan   Cardiac clearance will be pending   Results     Office follow up will be pending results

## 2025-04-22 NOTE — PROGRESS NOTES
[facility] [place], Matthew Rashid DO, FACC, FCCP, FACOI       Patient: Ragini Regan   : 1945   Date of Visit: 25     REASON FOR VISIT / CONSULTATION:   Chief Complaint   Patient presents with    Cardiac Clearance     Cardiac clearance   New pt           History of Present Illness:        Dear Edwige Baker DO     I had the pleasure of seeing Ragini Regan  today. She is a 79 y.o. Female with a history of gastric ulcer with melena pending EGD, hypertension, type 2 diabetes mellitus, CKD stage III, DVT s/p IVC filter, and gout who presents for preoperative cardiovascular examination prior to EGD scheduled for 2025 with Dr. Toussaint.    She denies any current or recent chest pain, paroxysmal nocturnal dyspnea, orthopnea, syncope, near syncope, palpitations, lightheadedness, or peripheral edema.      PAST MEDICAL HISTORY:        Past Medical History:   Diagnosis Date    Allergic rhinitis     Chronic kidney disease, stage III (moderate) ([facility])     Deep vein thrombosis (DVT) ([facility]) 10/24/2012    Elbow fracture, left     Fall     Gastric ulcer     Gout     Hx of blood clots     Following last back surgery     Hypertension     Nausea & vomiting     Presence of IVC filter     Type II or unspecified type diabetes mellitus without mention of complication, not stated as uncontrolled          CURRENT ALLERGIES:   Allergies   Allergen Reactions    Codeine Itching    Hydrocodone-Acetaminophen Hives    Oxycodone-Acetaminophen Hives    Adhesive Tape Itching, Rash and Other (See Comments)     Tape \"takes the skin off\"    Lisinopril Other (See Comments)     cough    Norco [Hydrocodone-Acetaminophen] Nausea And Vomiting    Percocet [Oxycodone-Acetaminophen] Itching and Nausea Only     REVIEW OF SYSTEMS: Review of Systems - History obtained from chart review and the patient  General ROS: negative  Respiratory ROS: no cough, shortness of breath, or

## 2025-04-22 NOTE — PROGRESS NOTES
Ov Dr. Rashid for cardiac clearance   Will be having an EDG Dr. Toussaint  May 7th   Had been having heart burn  Pt states \"I'm Better now\"   Not sure why I need it per pt   \"Heart burn\" is better now     Will set up for echo and lexiscan   Cardiac clearance will be pending   Results     Office follow up will be pending results

## 2025-04-30 ENCOUNTER — HOSPITAL ENCOUNTER (OUTPATIENT)
Age: 80
Discharge: HOME | End: 2025-05-02
Attending: INTERNAL MEDICINE
Payer: MEDICARE

## 2025-04-30 ENCOUNTER — HOSPITAL ENCOUNTER (OUTPATIENT)
Dept: NUCLEAR MEDICINE | Age: 80
Discharge: HOME | End: 2025-05-02
Attending: INTERNAL MEDICINE
Payer: MEDICARE

## 2025-04-30 VITALS — HEART RATE: 79 BPM | SYSTOLIC BLOOD PRESSURE: 164 MMHG | DIASTOLIC BLOOD PRESSURE: 72 MMHG

## 2025-04-30 DIAGNOSIS — R07.9 CHEST PAIN, UNSPECIFIED TYPE: ICD-10-CM

## 2025-04-30 DIAGNOSIS — R06.02 SHORTNESS OF BREATH: ICD-10-CM

## 2025-04-30 LAB
ECHO AO ASC DIAM: 3.4 CM
ECHO AO ROOT DIAM: 3.3 CM
ECHO AV AREA PEAK VELOCITY: 2.9 CM2
ECHO AV AREA VTI: 2.7 CM2
ECHO AV MEAN GRADIENT: 3 MMHG
ECHO AV MEAN VELOCITY: 0.8 M/S
ECHO AV PEAK GRADIENT: 5 MMHG
ECHO AV PEAK VELOCITY: 1.2 M/S
ECHO AV VELOCITY RATIO: 0.92
ECHO AV VTI: 29.6 CM
ECHO EST RA PRESSURE: 3 MMHG
ECHO LA DIAMETER: 4.1 CM
ECHO LA TO AORTIC ROOT RATIO: 1.24
ECHO LA VOL A-L A2C: 125 ML (ref 22–52)
ECHO LA VOL A-L A4C: 84 ML (ref 22–52)
ECHO LA VOL BP: 100 ML (ref 22–52)
ECHO LA VOL MOD A2C: 117 ML (ref 22–52)
ECHO LA VOL MOD A4C: 76 ML (ref 22–52)
ECHO LA VOLUME AREA LENGTH: 108 ML
ECHO LV E' LATERAL VELOCITY: 10.49 CM/S
ECHO LV E' SEPTAL VELOCITY: 8.98 CM/S
ECHO LV EDV A2C: 131 ML
ECHO LV EDV A4C: 135 ML
ECHO LV EDV BP: 140 ML (ref 56–104)
ECHO LV EF PHYSICIAN: 60 %
ECHO LV EJECTION FRACTION A2C: 65 %
ECHO LV EJECTION FRACTION A4C: 57 %
ECHO LV EJECTION FRACTION BIPLANE: 58 % (ref 55–100)
ECHO LV ESV A2C: 46 ML
ECHO LV ESV A4C: 58 ML
ECHO LV ESV BP: 59 ML (ref 19–49)
ECHO LV FRACTIONAL SHORTENING: 33 % (ref 28–44)
ECHO LV INTERNAL DIMENSION DIASTOLIC: 4.8 CM (ref 3.9–5.3)
ECHO LV INTERNAL DIMENSION SYSTOLIC: 3.2 CM
ECHO LV IVSD: 0.7 CM (ref 0.6–0.9)
ECHO LV MASS 2D: 126.7 G (ref 67–162)
ECHO LV POSTERIOR WALL DIASTOLIC: 0.9 CM (ref 0.6–0.9)
ECHO LV RELATIVE WALL THICKNESS RATIO: 0.38
ECHO LVOT AREA: 3.1 CM2
ECHO LVOT AV VTI INDEX: 0.84
ECHO LVOT DIAM: 2 CM
ECHO LVOT MEAN GRADIENT: 2 MMHG
ECHO LVOT PEAK GRADIENT: 4 MMHG
ECHO LVOT PEAK VELOCITY: 1.1 M/S
ECHO LVOT SV: 78.5 ML
ECHO LVOT VTI: 25 CM
ECHO MV A VELOCITY: 0.82 M/S
ECHO MV AREA PHT: 4.4 CM2
ECHO MV AREA VTI: 2.5 CM2
ECHO MV E DECELERATION TIME (DT): 207 MS
ECHO MV E VELOCITY: 0.92 M/S
ECHO MV E/A RATIO: 1.12
ECHO MV E/E' LATERAL: 8.77
ECHO MV E/E' RATIO (AVERAGED): 9.51
ECHO MV E/E' SEPTAL: 10.24
ECHO MV LVOT VTI INDEX: 1.26
ECHO MV MAX VELOCITY: 1 M/S
ECHO MV MEAN GRADIENT: 2 MMHG
ECHO MV MEAN VELOCITY: 0.7 M/S
ECHO MV PEAK GRADIENT: 4 MMHG
ECHO MV PRESSURE HALF TIME (PHT): 49.7 MS
ECHO MV VTI: 31.6 CM
ECHO PV MAX VELOCITY: 1 M/S
ECHO PV PEAK GRADIENT: 4 MMHG
ECHO RA VOLUME: 21 ML
ECHO RIGHT VENTRICULAR SYSTOLIC PRESSURE (RVSP): 32 MMHG
ECHO RV BASAL DIMENSION: 3.4 CM
ECHO RV LONGITUDINAL DIMENSION: 7.6 CM
ECHO RV MID DIMENSION: 2.4 CM
ECHO RV TAPSE: 2.7 CM (ref 1.7–?)
ECHO TV REGURGITANT MAX VELOCITY: 2.67 M/S
ECHO TV REGURGITANT PEAK GRADIENT: 29 MMHG
NUC STRESS EJECTION FRACTION: 73 %
NUC STRESS LV EDV: 65 ML (ref 56–104)
NUC STRESS LV ESV: 18 ML (ref 19–49)
NUC STRESS LV STROKE VOLUME: 47 ML
STRESS BASELINE DIAS BP: 76 MMHG
STRESS BASELINE HR: 73 BPM
STRESS BASELINE ST DEPRESSION: 0 MM
STRESS BASELINE SYS BP: 159 MMHG
STRESS ESTIMATED WORKLOAD: 1 METS
STRESS PEAK DIAS BP: 70 MMHG
STRESS PEAK SYS BP: 167 MMHG
STRESS PERCENT HR ACHIEVED: 60 %
STRESS POST PEAK HR: 86 BPM
STRESS RATE PRESSURE PRODUCT: ABNORMAL BPM*MMHG
STRESS ST DEPRESSION: 0 MM
STRESS TARGET HR: 143 BPM
TID: 1.1

## 2025-04-30 PROCEDURE — 78452 HT MUSCLE IMAGE SPECT MULT: CPT

## 2025-04-30 PROCEDURE — C8929 TTE W OR WO FOL WCON,DOPPLER: HCPCS

## 2025-04-30 PROCEDURE — 3430000000 HC RX DIAGNOSTIC RADIOPHARMACEUTICAL: Performed by: INTERNAL MEDICINE

## 2025-04-30 PROCEDURE — 93017 CV STRESS TEST TRACING ONLY: CPT

## 2025-04-30 PROCEDURE — 93018 CV STRESS TEST I&R ONLY: CPT | Performed by: INTERNAL MEDICINE

## 2025-04-30 PROCEDURE — 78452 HT MUSCLE IMAGE SPECT MULT: CPT | Performed by: INTERNAL MEDICINE

## 2025-04-30 PROCEDURE — A9500 TC99M SESTAMIBI: HCPCS | Performed by: INTERNAL MEDICINE

## 2025-04-30 PROCEDURE — 6360000004 HC RX CONTRAST MEDICATION: Performed by: INTERNAL MEDICINE

## 2025-04-30 PROCEDURE — 2500000003 HC RX 250 WO HCPCS: Performed by: INTERNAL MEDICINE

## 2025-04-30 PROCEDURE — 93016 CV STRESS TEST SUPVJ ONLY: CPT | Performed by: INTERNAL MEDICINE

## 2025-04-30 PROCEDURE — 6360000002 HC RX W HCPCS: Performed by: INTERNAL MEDICINE

## 2025-04-30 RX ORDER — TETRAKIS(2-METHOXYISOBUTYLISOCYANIDE)COPPER(I) TETRAFLUOROBORATE 1 MG/ML
30 INJECTION, POWDER, LYOPHILIZED, FOR SOLUTION INTRAVENOUS
Status: COMPLETED | OUTPATIENT
Start: 2025-04-30 | End: 2025-04-30

## 2025-04-30 RX ORDER — REGADENOSON 0.08 MG/ML
0.4 INJECTION, SOLUTION INTRAVENOUS
Status: COMPLETED | OUTPATIENT
Start: 2025-04-30 | End: 2025-04-30

## 2025-04-30 RX ORDER — AMINOPHYLLINE 25 MG/ML
50 INJECTION, SOLUTION INTRAVENOUS PRN
Status: DISPENSED | OUTPATIENT
Start: 2025-04-30 | End: 2025-04-30

## 2025-04-30 RX ORDER — TETRAKIS(2-METHOXYISOBUTYLISOCYANIDE)COPPER(I) TETRAFLUOROBORATE 1 MG/ML
10 INJECTION, POWDER, LYOPHILIZED, FOR SOLUTION INTRAVENOUS
Status: COMPLETED | OUTPATIENT
Start: 2025-04-30 | End: 2025-04-30

## 2025-04-30 RX ORDER — SODIUM CHLORIDE 0.9 % (FLUSH) 0.9 %
5-40 SYRINGE (ML) INJECTION PRN
Status: ACTIVE | OUTPATIENT
Start: 2025-04-30 | End: 2025-04-30

## 2025-04-30 RX ADMIN — SODIUM CHLORIDE, PRESERVATIVE FREE 10 ML: 5 INJECTION INTRAVENOUS at 10:24

## 2025-04-30 RX ADMIN — REGADENOSON 0.4 MG: 0.08 INJECTION, SOLUTION INTRAVENOUS at 10:24

## 2025-04-30 RX ADMIN — TETRAKIS(2-METHOXYISOBUTYLISOCYANIDE)COPPER(I) TETRAFLUOROBORATE 10 MILLICURIE: 1 INJECTION, POWDER, LYOPHILIZED, FOR SOLUTION INTRAVENOUS at 09:54

## 2025-04-30 RX ADMIN — TETRAKIS(2-METHOXYISOBUTYLISOCYANIDE)COPPER(I) TETRAFLUOROBORATE 30 MILLICURIE: 1 INJECTION, POWDER, LYOPHILIZED, FOR SOLUTION INTRAVENOUS at 10:59

## 2025-04-30 RX ADMIN — PERFLUTREN 1.5 ML: 6.52 INJECTION, SUSPENSION INTRAVENOUS at 12:20

## 2025-04-30 NOTE — NURSING NOTE
Testing complete - patient tolerated well.  Patient denies any pain/discomfort or shortness of breath.  Drink and snack provided.

## 2025-04-30 NOTE — NURSING NOTE
Allergies and home medications reviewed/updated with patient and family member.  Patient verbalized understanding of test/procedure.

## 2025-04-30 NOTE — NURSING NOTE
Patient continues to deny any pain/discomfort - reports tingling sensation around mouth.  Denies any shortness of breath

## 2025-05-02 ENCOUNTER — TELEPHONE (OUTPATIENT)
Dept: SURGERY | Age: 80
End: 2025-05-02

## 2025-05-02 ENCOUNTER — TELEPHONE (OUTPATIENT)
Dept: CARDIOLOGY CLINIC | Age: 80
End: 2025-05-02

## 2025-05-02 ENCOUNTER — TELEPHONE (OUTPATIENT)
Dept: PREADMISSION TESTING | Age: 80
End: 2025-05-02

## 2025-05-02 NOTE — TELEPHONE ENCOUNTER
Cardiology office returned call and advised that there is no provider in the office to results tests and give plavix instructions. Left message for patient to return call regarding rescheduling procedure for 5/22/25.

## 2025-05-02 NOTE — TELEPHONE ENCOUNTER
Left message for [place] [facility] to return call regarding cardiac clearance and plavix instructions. Clearance pending echo and stress test results.

## 2025-05-02 NOTE — TELEPHONE ENCOUNTER
[facility] contacted office to see if patient had been cleared for surgery by Dr Rashid. Reviewed chart. Could not find a clearance for after her new patient appointment. Testing is completed. [facility] plan to call pt and reschedule her EGD.     Please review cardiac testing and indicate whether patient is cleared for procedure and instructions on stopping Plavix.

## 2025-05-02 NOTE — TELEPHONE ENCOUNTER
Writer spoke with son, Arcadio, Arcadio states patient is on Plavix, verified with home medication list. Son states he was not instructed on when and if to stop Plavix. Patient also noted to have had cardiac work up. Im reaching out to see if we have clearance yet for patient from cardiology to have EGD procedure completed and if we have direction on Plavix. Please advise.

## 2025-05-02 NOTE — PROGRESS NOTES
[facility]   Preadmission Testing    Name: Ragini Regan  : 1945  Patient Phone: [phone] (home) [phone] (work)    Procedure: EGD  Date of Procedure:   Surgeon: Albert Toussaint MD    Ht:  157.5 cm (5' 2\")  Wt: 90.3 kg (199 lb)  Wt method: Stated    Allergies:   Allergies   Allergen Reactions    Codeine Itching    Hydrocodone-Acetaminophen Hives    Oxycodone-Acetaminophen Hives    Adhesive Tape Itching, Rash and Other (See Comments)     Tape \"takes the skin off\"    Lisinopril Other (See Comments)     cough    Norco [Hydrocodone-Acetaminophen] Nausea And Vomiting    Percocet [Oxycodone-Acetaminophen] Itching and Nausea Only                There were no vitals filed for this visit.    No LMP recorded (lmp unknown). Patient has had a hysterectomy.    Do you take blood thinners?   [x] Yes    [] No         Instructed to stop blood thinners prior to procedure?    [] Yes    [x] No      [] N/A    MESSAGE OUT TO OFFICE ABOUT PLAVIX   Do you have sleep apnea?   [] Yes    [x] No     Do you have acid reflux ?   [x] Yes    [] No     Do you have  hiatal hernia?   [] Yes    [] No    Do you ever experience motion sickness?   [] Yes    [x] No     Have you had a respiratory infection or sore throat in last 4 weeks before surgery?    [] Yes    [x] No     Do you have poorly controlled asthma or COPD?  Difficulty with intubation in past? [] Yes    [x] No      [] Yes    [] No       Do you have a history of angina in the last month or symptomatic arrhythmia?   [] Yes    [x] No     Do you have significant central nervous system disease?   [] Yes    [x] No     Have you had an EKG, labs, or chest xray in last 12 months?  If yes provide copies to anesthesia   [x] Yes    [] No       [] Lab    [x] EKG    [] CXR     Have you had a stress test?     [x] Yes    [] No    When/where:    Was it normal?    [x] Yes    [] No     Do you or your family have a history of Malignant Hyperthermia?   [] Yes    [x] No           Do

## 2025-05-05 ENCOUNTER — TELEPHONE (OUTPATIENT)
Dept: CARDIOLOGY CLINIC | Age: 80
End: 2025-05-05

## 2025-05-05 NOTE — TELEPHONE ENCOUNTER
Writer spoke with Dr. Toussaint who advised that he wants patient to hold her plavix for at least 5 days. Writer called patient and spoke with son. He was advised that procedure will need rescheduled due to having to hold the plavix. Son agreeable to rescheduling for 5/21/25. He also requested that Dr. Baker be consulted about holding the plavix since she is the prescribing doctor and patient is taking it for a stent that is placed in her lower extremity.

## 2025-05-05 NOTE — TELEPHONE ENCOUNTER
Per Dr. Watkins pt is at low risk for surgery   Number of Days of Plavix hold is per surgeon request  If surgeon is ok with holding now until wed  (Day of surgery) he is ok with that.   PER DR WATKINS

## 2025-05-06 NOTE — TELEPHONE ENCOUNTER
Spoke with Son, voiced knowledge and understanding for patient to hold Plavix 5 days prior to procedure.

## 2025-05-13 NOTE — TELEPHONE ENCOUNTER
Last visit:  4/3/2025  Next Visit Date:    Future Appointments   Date Time Provider Department Center   6/30/2025  9:00 AM Edwige Baker DO [facility]   10/23/2025  3:00 PM [facility]         Medication List:  Prior to Admission medications    Medication Sig Start Date End Date Taking? Authorizing Provider   senna-docusate (STIMULANT LAXATIVE) 8.6-50 MG per tablet Take 1 tablet by mouth daily 4/21/25   Edwige Baker DO   sucralfate (CARAFATE) 1 GM/10ML suspension Take 10 mLs by mouth 3 times daily  Patient not taking: Reported on 5/2/2025 4/16/25   Lesvia Mirza APRN - CNP   polyethylene glycol (MIRALAX) 17 g packet Take 1 packet by mouth daily 4/3/25   Edwige Baker DO   magnesium hydroxide (MILK OF MAGNESIA) 400 MG/5ML suspension Take 30 mLs by mouth daily as needed for Constipation Up to twice a week 4/3/25   Edwige Baker DO   trospium (SANCTURA) 20 MG tablet TAKE 1 TABLET BY MOUTH TWICE DAILY 3/20/25   Bartolo Preston PA-C   mirabegron (MYRBETRIQ) 50 MG TB24 TAKE 1 TABLET BY MOUTH DAILY 3/20/25   Bartolo Preston PA-C   gabapentin (NEURONTIN) 100 MG capsule TAKE 2 CAPSULES BY MOUTH TWICE  DAILY 2/28/25 8/29/25  Edwige Baker DO   clopidogrel (PLAVIX) 75 MG tablet Take 1 tablet by mouth daily 2/28/25   Edwige Baker DO   atorvastatin (LIPITOR) 40 MG tablet Take 1 tablet by mouth daily 2/28/25   Edwige Baker DO   allopurinol (ZYLOPRIM) 100 MG tablet Take 2 tablets by mouth daily 2/28/25   Edwige Baker DO   pantoprazole (PROTONIX) 40 MG tablet Take 1 tablet by mouth every morning (before breakfast) 2/28/25   Edwige Baker DO   silver sulfADIAZINE (SILVADENE) 1 % cream Apply topically daily. 2/28/25   Edwige Baker DO   magnesium lactate (MAGTAB) 84 MG (7MEQ) TBCR extended release tablet TAKE 2 TABLETS BY MOUTH IN THE MORNING and 1 tablet IN THE EVENING 11/6/24   Edwige Bkaer DO   sertraline

## 2025-05-14 ENCOUNTER — ANESTHESIA EVENT (OUTPATIENT)
Dept: ENDOSCOPY | Age: 80
End: 2025-05-14
Payer: MEDICARE

## 2025-05-21 ENCOUNTER — ANESTHESIA (OUTPATIENT)
Dept: ENDOSCOPY | Age: 80
End: 2025-05-21
Payer: MEDICARE

## 2025-05-21 ENCOUNTER — HOSPITAL ENCOUNTER (OUTPATIENT)
Age: 80
Setting detail: OUTPATIENT SURGERY
Discharge: HOME | End: 2025-05-21
Attending: SURGERY | Admitting: SURGERY
Payer: MEDICARE

## 2025-05-21 VITALS
TEMPERATURE: 97.3 F | SYSTOLIC BLOOD PRESSURE: 153 MMHG | DIASTOLIC BLOOD PRESSURE: 69 MMHG | RESPIRATION RATE: 16 BRPM | HEIGHT: 62 IN | HEART RATE: 77 BPM | WEIGHT: 184 LBS | OXYGEN SATURATION: 94 % | BODY MASS INDEX: 33.86 KG/M2

## 2025-05-21 DIAGNOSIS — R13.19 ESOPHAGEAL DYSPHAGIA: ICD-10-CM

## 2025-05-21 LAB — GLUCOSE BLD-MCNC: 95 MG/DL (ref 65–99)

## 2025-05-21 PROCEDURE — 3700000001 HC ADD 15 MINUTES (ANESTHESIA): Performed by: SURGERY

## 2025-05-21 PROCEDURE — 2580000003 HC RX 258: Performed by: SURGERY

## 2025-05-21 PROCEDURE — 7100000011 HC PHASE II RECOVERY - ADDTL 15 MIN: Performed by: SURGERY

## 2025-05-21 PROCEDURE — 82947 ASSAY GLUCOSE BLOOD QUANT: CPT

## 2025-05-21 PROCEDURE — 6360000002 HC RX W HCPCS: Performed by: NURSE ANESTHETIST, CERTIFIED REGISTERED

## 2025-05-21 PROCEDURE — 2709999900 HC NON-CHARGEABLE SUPPLY: Performed by: SURGERY

## 2025-05-21 PROCEDURE — 3700000000 HC ANESTHESIA ATTENDED CARE: Performed by: SURGERY

## 2025-05-21 PROCEDURE — 88305 TISSUE EXAM BY PATHOLOGIST: CPT

## 2025-05-21 PROCEDURE — 3609012400 HC EGD TRANSORAL BIOPSY SINGLE/MULTIPLE: Performed by: SURGERY

## 2025-05-21 PROCEDURE — 2580000003 HC RX 258: Performed by: NURSE ANESTHETIST, CERTIFIED REGISTERED

## 2025-05-21 PROCEDURE — 7100000010 HC PHASE II RECOVERY - FIRST 15 MIN: Performed by: SURGERY

## 2025-05-21 PROCEDURE — 88342 IMHCHEM/IMCYTCHM 1ST ANTB: CPT

## 2025-05-21 RX ORDER — LIDOCAINE HYDROCHLORIDE 10 MG/ML
INJECTION, SOLUTION EPIDURAL; INFILTRATION; INTRACAUDAL; PERINEURAL
Status: DISCONTINUED | OUTPATIENT
Start: 2025-05-21 | End: 2025-05-21 | Stop reason: SDUPTHER

## 2025-05-21 RX ORDER — SODIUM CHLORIDE, SODIUM LACTATE, POTASSIUM CHLORIDE, CALCIUM CHLORIDE 600; 310; 30; 20 MG/100ML; MG/100ML; MG/100ML; MG/100ML
INJECTION, SOLUTION INTRAVENOUS CONTINUOUS
Status: DISCONTINUED | OUTPATIENT
Start: 2025-05-21 | End: 2025-05-21 | Stop reason: HOSPADM

## 2025-05-21 RX ORDER — PROPOFOL 10 MG/ML
INJECTION, EMULSION INTRAVENOUS
Status: DISCONTINUED | OUTPATIENT
Start: 2025-05-21 | End: 2025-05-21 | Stop reason: SDUPTHER

## 2025-05-21 RX ORDER — SODIUM CHLORIDE, SODIUM LACTATE, POTASSIUM CHLORIDE, CALCIUM CHLORIDE 600; 310; 30; 20 MG/100ML; MG/100ML; MG/100ML; MG/100ML
INJECTION, SOLUTION INTRAVENOUS
Status: DISCONTINUED | OUTPATIENT
Start: 2025-05-21 | End: 2025-05-21 | Stop reason: SDUPTHER

## 2025-05-21 RX ADMIN — PROPOFOL 20 MG: 10 INJECTION, EMULSION INTRAVENOUS at 09:29

## 2025-05-21 RX ADMIN — PROPOFOL 140 MCG/KG/MIN: 10 INJECTION, EMULSION INTRAVENOUS at 09:20

## 2025-05-21 RX ADMIN — LIDOCAINE HYDROCHLORIDE 50 MG: 10 INJECTION, SOLUTION EPIDURAL; INFILTRATION; INTRACAUDAL; PERINEURAL at 09:19

## 2025-05-21 RX ADMIN — PROPOFOL 50 MG: 10 INJECTION, EMULSION INTRAVENOUS at 09:22

## 2025-05-21 RX ADMIN — SODIUM CHLORIDE, POTASSIUM CHLORIDE, SODIUM LACTATE AND CALCIUM CHLORIDE: 600; 310; 30; 20 INJECTION, SOLUTION INTRAVENOUS at 09:17

## 2025-05-21 RX ADMIN — SODIUM CHLORIDE, SODIUM LACTATE, POTASSIUM CHLORIDE, AND CALCIUM CHLORIDE: .6; .31; .03; .02 INJECTION, SOLUTION INTRAVENOUS at 07:51

## 2025-05-21 RX ADMIN — PROPOFOL 20 MG: 10 INJECTION, EMULSION INTRAVENOUS at 09:19

## 2025-05-21 ASSESSMENT — PAIN - FUNCTIONAL ASSESSMENT: PAIN_FUNCTIONAL_ASSESSMENT: NONE - DENIES PAIN

## 2025-05-21 NOTE — PROGRESS NOTES
IV Sedation Discharge Criteria    Inpatients must meet Criteria 1 through 7. All other patients are either YES or N/A. If a NO is chosen then Surgeon must be notified.      1.  Minimum 30 minutes after last dose of sedative medication, minimum 120 minutes after last dose of reversal agent.    Yes      2.  Systolic BP stable within 20 mmHg for 30 minutes & systolic BP between 90 & 180 or within 10 mmHg of baseline.    Yes      3.  Pulse between 60 and 100 or within 10 bpm of baseline.    Yes      4.  Spontaneous respiratory rate >/= 10 per minute.    Yes      5.  SaO2 >/= 95 or  >/= baseline.    Yes      6.  Able to cough and swallow or return to baseline function.    Yes      7.  Alert and oriented or return to baseline mental status.    Yes      8.  Demonstrates controlled, coordinated movements, ambulates with steady gait, or return to baseline activity function.    Yes      9.  Minimal or no pain or nausea, or at a level tolerable and acceptable to patient.    Yes      10. Takes and retains oral fluids as allowed.    Yes      11. Procedural / perioperative site stable.  Minimal or no bleeding.    Yes          12. If GI endoscopy procedure, minimal or no abdominal distention or passing flatus.    Yes      13. Written discharge instructions and emergency telephone number provided.    Yes      14. Accompanied by a responsible adult.    Yes

## 2025-05-21 NOTE — H&P
Name:  Ragini Regan  Age:  79 y.o.   :  1945    Physician: PATRICK LANZA MD       Chief Complaint: Dysphagia      HPI:  Chronic GERD.  Very poor historian.  Unable to relate specific foods she has difficulty swallowing.     Stopped Plavix 6 days ago according to her son.    MEDICAL HISTORY:    Past Medical History:        Diagnosis Date    Allergic rhinitis     Chronic kidney disease, stage III (moderate) (HCC)     Deep vein thrombosis (DVT) (HCC) 10/24/2012    Elbow fracture, left     Fall     Gastric ulcer     Gout     Hx of blood clots     Following last back surgery     Hypertension     Nausea & vomiting     Presence of IVC filter     Type II or unspecified type diabetes mellitus without mention of complication, not stated as uncontrolled        Past Surgical History:        Procedure Laterality Date    BACK SURGERY      BACK SURGERY      BACK SURGERY      BACK SURGERY      BACK SURGERY      BACK SURGERY      Fusion     BREAST BIOPSY Left     BREAST BIOPSY Right     CARDIAC CATHETERIZATION Left 2018    Dr. Larios @ [facility]--There is 30% disease of the origin of the lateral anterior descending.  Mild 10% -20% plaque disease in the circumflex & right coronary artery.  Normal left ventricular functino, ejection fraction of 60%.      CARPAL TUNNEL RELEASE Right     CATARACT REMOVAL Right     CATARACT REMOVAL Left     COLONOSCOPY      COLONOSCOPY N/A 2019    COLONOSCOPY POLYPECTOMY HOT BIOPSY performed by Doron Parsons MD at [facility] OR    COLONOSCOPY N/A 2024    COLONOSCOPY POLYPECTOMY SNARE/COLD BIOPSY performed by Xochitl Hawkins MD at [facility] ENDOSCOPY    CYSTOSCOPY      \"Multiple\"     EYE SURGERY Right     Removed fluid from behind eye    HYSTERECTOMY (CERVIX STATUS UNKNOWN)      KIDNEY SURGERY      left side 1/2 of kidney removed    PAIN MANAGEMENT PROCEDURE N/A 2022    CAUDAL EPIDURAL STEROID INJECTION performed by Ion Fuentes DO at [facility] OR    PAIN MANAGEMENT

## 2025-05-21 NOTE — ANESTHESIA PRE PROCEDURE
Department of Anesthesiology  Preprocedure Note       Name:  Ragini Regan   Age:  79 y.o.  :  1945                                          MRN:  [id number]         Date:  2025      Surgeon: Surgeon(s):  Albert Toussaint MD    Procedure: Procedure(s):  EGD    Medications prior to admission:   Prior to Admission medications    Medication Sig Start Date End Date Taking? Authorizing Provider   senna-docusate (STIMULANT LAXATIVE) 8.6-50 MG per tablet Take 1 tablet by mouth daily 25  Yes Edwige Baker DO   polyethylene glycol (MIRALAX) 17 g packet Take 1 packet by mouth daily 4/3/25  Yes Edwige Baker DO   magnesium hydroxide (MILK OF MAGNESIA) 400 MG/5ML suspension Take 30 mLs by mouth daily as needed for Constipation Up to twice a week 4/3/25  Yes Edwige Baker DO   trospium (SANCTURA) 20 MG tablet TAKE 1 TABLET BY MOUTH TWICE DAILY 3/20/25  Yes Bartolo Preston PA-C   mirabegron (MYRBETRIQ) 50 MG TB24 TAKE 1 TABLET BY MOUTH DAILY 3/20/25  Yes Bartolo Preston PA-C   gabapentin (NEURONTIN) 100 MG capsule TAKE 2 CAPSULES BY MOUTH TWICE  DAILY 25 Yes Edwige Baker DO   clopidogrel (PLAVIX) 75 MG tablet Take 1 tablet by mouth daily 25  Yes Edwige Baker DO   atorvastatin (LIPITOR) 40 MG tablet Take 1 tablet by mouth daily 25  Yes Edwige Baker DO   allopurinol (ZYLOPRIM) 100 MG tablet Take 2 tablets by mouth daily 25  Yes Edwige Baker DO   pantoprazole (PROTONIX) 40 MG tablet Take 1 tablet by mouth every morning (before breakfast) 25  Yes Edwige Baker DO   sertraline (ZOLOFT) 25 MG tablet Take 1 tablet in the evening. 10/18/24  Yes Edwige Baker DO   ASPIRIN LOW DOSE 81 MG EC tablet TAKE 1 TABLET BY MOUTH DAILY 10/10/24  Yes Edwige Baker DO   glipiZIDE (GLUCOTROL) 5 MG tablet TAKE 1 TABLET BY MOUTH DAILY WITH BREAKFAST 24  Yes Edwige Baker DO   folic acid (FOLVITE) 400 MCG tablet Take 1 tablet by mouth

## 2025-05-21 NOTE — DISCHARGE INSTRUCTIONS
1) Your scope looks fairly normal  2) You do have a small to moderated size hiatal hernia  3) No obvious inflammation.  Biopsy were done to look for underlying problems.  4) You could still have significant GERD with a normal scope  5) You will be called with the biopsy results.  6) Begin diet and lifestyle change to manage GERD  6) Follow up as desired.    Discharge Instructions for EGD     An EGD or upper GI endoscopy is a visual exam of the lining of the esophagus, stomach (gastric) and duodenum (the first part of the small intestine). An endoscopy is a flexible tube with a light and a viewing device. It allows the doctor to view the inside of the colon through a tiny video camera.   EGD is performed for many reasons: unexplained anemia , pain, bleeding, heart burn, among many other reasons.   Complications from a EGD are rare. Some possible serious complications include perforated bowel (which might require surgery) and bleeding (which could require blood transfusion ). Minor complications include bloating, gas, and cramping that can last for 1-2 days after the procedure.     Because air is put into your upper GI during the procedure, it is normal to pass large amounts of air from your rectum and burp a lot.     What You Will Need   Someone to drive you home after the procedure    Steps to Take   Home Care    Rest when you get home.    Because the sedative will make you drowsy, don't drive, operate machinery, or make important decisions the day of the procedure.      Feelings of bloating, gas, or cramping may persist for 24 hours.   Diet    Try sips of water first. If tolerated, resume regular diet or the diet recommended by your physician.    Do not drink alcohol for 24 hours.    Physical Activity    You may return to work tomorrow.    Do not drive, operate heavy machinery, or do activities that require coordination or balance until tomorrow  Otherwise, return to your normal routine as soon as you are

## 2025-05-21 NOTE — OP NOTE
Patient: Ragini Regan  YOB: 1945  MRN: [id number]    Date of Procedure: 5/21/2025    Pre-operative Diagnosis: Dysphagia, GERD    Post-operative Diagnosis:  Same + moderate size hiatal hernia (3 - 4 cm)    Procedure: EGD with biopsies of stomach and distal esophagus    Surgeon(s):  Patrick Toussaint MD    Assistant:   * No surgical staff found *    Anesthesia: Monitor Anesthesia Care    Estimated Blood Loss (mL): Minimal    Complications: None    Specimens:   ID Type Source Tests Collected by Time Destination   A : A. Antrum biopsy Tissue Gastric SURGICAL PATHOLOGY Patrick Toussaint MD 5/21/2025 0925    B : B. Biopsy of body of stomach Tissue Gastric SURGICAL PATHOLOGY Patrick Toussaint MD 5/21/2025 0926    C : C. Distal esophagus biopsy Tissue Esophagus SURGICAL PATHOLOGY Patrick Toussaint MD 5/21/2025 0927        Implants:  * No implants in log *      Drains: * No LDAs found *    Patient was brought to the endoscopy area and IV sedation was induced by the CRNA.  Scope was then put in patient's mouth on to her stomach and duodenum.  Her esophagus looks fairly normal she may have a Zenker's even though it did not show up on her esophagram.  She definitely has a hiatal hernia of moderate size.  Stomach itself looked fairly normal as did the duodenum well down the third portion bring the scope back and stomach and retroflexing it think she has a 3 to 4 cm hiatal hernia.  No obvious inflammation or ulcers were seen.  I did go ahead and do some biopsies for completeness.  Biopsies were done of the antrum and the body stomach and distal esophagus.  She could have gastroesophageal reflux even with a fairly normal scope and there are other functional reasons why she could have dysphagia.    Electronically signed by PATRICK TOUSSAINT MD on 5/21/2025 at 9:33 AM

## 2025-05-22 ENCOUNTER — RESULTS FOLLOW-UP (OUTPATIENT)
Dept: SURGERY | Age: 80
End: 2025-05-22

## 2025-05-22 ENCOUNTER — TELEPHONE (OUTPATIENT)
Dept: FAMILY MEDICINE CLINIC | Age: 80
End: 2025-05-22

## 2025-05-22 LAB — SURGICAL PATHOLOGY REPORT: NORMAL

## 2025-05-22 NOTE — TELEPHONE ENCOUNTER
Pt's son called stating that pt has a EGD completed and was advised to stay away from aspirin. Pt is currently taking 81 mg , should she continue taking the aspirin? Please advise

## 2025-05-22 NOTE — TELEPHONE ENCOUNTER
----- Message from Dr. Albert Toussaint MD sent at 5/22/2025 11:24 AM EDT -----  Biopsies do show some mild gastritis and mild esophagitis. Avoid NSAIDs (which she should already due with her cardiac and kidney problems), spicy food, fatty food.  Continue with pantoprazole and antireflux diet and lifestyle changes.

## 2025-05-27 RX ORDER — MAGNESIUM L-LACTATE 84 MG
TABLET, EXTENDED RELEASE ORAL
Qty: 270 TABLET | Refills: 1 | Status: SHIPPED | OUTPATIENT
Start: 2025-05-27

## 2025-06-17 ENCOUNTER — APPOINTMENT (OUTPATIENT)
Dept: CT IMAGING | Age: 80
End: 2025-06-17
Payer: MEDICARE

## 2025-06-17 ENCOUNTER — HOSPITAL ENCOUNTER (EMERGENCY)
Age: 80
Discharge: HOME | End: 2025-06-18
Attending: FAMILY MEDICINE
Payer: MEDICARE

## 2025-06-17 DIAGNOSIS — S22.41XA: Primary | ICD-10-CM
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
## 2025-06-17 PROCEDURE — 99284 EMERGENCY DEPT VISIT MOD MDM: CPT

## 2025-06-17 PROCEDURE — 71250 CT THORAX DX C-: CPT

## 2025-06-17 RX ORDER — MIRABEGRON 50 MG/1
50 TABLET, FILM COATED, EXTENDED RELEASE ORAL DAILY
Qty: 30 TABLET | Refills: 3 | Status: SHIPPED | OUTPATIENT
Start: 2025-06-17

## 2025-06-17 RX ORDER — TRAMADOL HYDROCHLORIDE 50 MG/1
50 TABLET ORAL EVERY 8 HOURS PRN
Qty: 9 TABLET | Refills: 0 | Status: SHIPPED | OUTPATIENT
Start: 2025-06-17 | End: 2025-06-20

## 2025-06-17 RX ORDER — TROSPIUM CHLORIDE 20 MG/1
20 TABLET, FILM COATED ORAL 2 TIMES DAILY
Qty: 180 TABLET | Refills: 0 | Status: SHIPPED | OUTPATIENT
Start: 2025-06-17

## 2025-06-17 ASSESSMENT — PAIN DESCRIPTION - ORIENTATION: ORIENTATION: RIGHT

## 2025-06-17 ASSESSMENT — PAIN DESCRIPTION - LOCATION: LOCATION: BACK

## 2025-06-17 ASSESSMENT — PAIN DESCRIPTION - PAIN TYPE: TYPE: ACUTE PAIN

## 2025-06-17 ASSESSMENT — PAIN DESCRIPTION - FREQUENCY: FREQUENCY: INTERMITTENT

## 2025-06-17 ASSESSMENT — PAIN SCALES - GENERAL: PAINLEVEL_OUTOF10: 5

## 2025-06-17 ASSESSMENT — PAIN - FUNCTIONAL ASSESSMENT: PAIN_FUNCTIONAL_ASSESSMENT: 0-10

## 2025-06-17 ASSESSMENT — ENCOUNTER SYMPTOMS: BACK PAIN: 1

## 2025-06-17 ASSESSMENT — PAIN DESCRIPTION - DESCRIPTORS: DESCRIPTORS: SHARP

## 2025-06-17 NOTE — TELEPHONE ENCOUNTER
Received a refill request from the pharmacy for Trospium and Myrbetriq.   Her last visit was 04/10/25 and has  a follow up on 10/23/25

## 2025-06-18 VITALS
TEMPERATURE: 98.1 F | OXYGEN SATURATION: 99 % | WEIGHT: 184 LBS | HEIGHT: 62 IN | BODY MASS INDEX: 33.86 KG/M2 | RESPIRATION RATE: 18 BRPM | SYSTOLIC BLOOD PRESSURE: 164 MMHG | HEART RATE: 79 BPM | DIASTOLIC BLOOD PRESSURE: 66 MMHG

## 2025-06-18 NOTE — ED PROVIDER NOTES
[facility]  EMERGENCY DEPARTMENT ENCOUNTER      Pt Name: Ragini Regan  MRN: [id number]  Birthdate 1945  Date of evaluation: 6/17/2025  Provider: Jose Cope MD    CHIEF COMPLAINT       Chief Complaint   Patient presents with    Back Pain     PT arrived via [facility] pt fell in bathroom at home. Denies hitting head or loss of consciousness. PT lives with son. Hit back on vanity and wants to make sure back is okay.          HISTORY OF PRESENT ILLNESS      Ragini Regan is a 79 y.o. female who presents to the emergency department via EMS from home, patient was in the bathroom at home when she think she tripped over the rug and fell, she did hit the corner of the vanity indicating her right upper back area, and then fell onto a chair next to it.  Patient denies striking her head, denies any loss of consciousness from the fall, denies any dizziness preceding the fall.  Patient's son states patient yelling out immediately after fall.    PCP: Samuel        REVIEW OF SYSTEMS       Review of Systems   Musculoskeletal:  Positive for back pain.   Neurological:  Negative for syncope and headaches.   All other systems reviewed and are negative.        PAST MEDICAL HISTORY     Past Medical History:   Diagnosis Date    Allergic rhinitis     Chronic kidney disease, stage III (moderate) ([facility])     Deep vein thrombosis (DVT) ([facility]) 10/24/2012    Elbow fracture, left     Fall     Gastric ulcer     Gout     Hx of blood clots     Following last back surgery     Hypertension     Nausea & vomiting     Presence of IVC filter     Type II or unspecified type diabetes mellitus without mention of complication, not stated as uncontrolled          SURGICAL HISTORY       Past Surgical History:   Procedure Laterality Date    BACK SURGERY      BACK SURGERY      BACK SURGERY      BACK SURGERY      BACK SURGERY      BACK SURGERY      Fusion     BREAST BIOPSY Left     BREAST BIOPSY Right     CARDIAC CATHETERIZATION Left 03/07/2018

## 2025-06-18 NOTE — ED NOTES
Pt ambulates to bathroom and back to bed with assist. Pt states that she feels better while ambulating back to bed. Pt denies any needs at this time. Pt son at bedside. Pt son denies any needs at this time. Pt call light within reach.

## 2025-06-30 ENCOUNTER — OFFICE VISIT (OUTPATIENT)
Dept: FAMILY MEDICINE CLINIC | Age: 80
End: 2025-06-30

## 2025-06-30 VITALS
BODY MASS INDEX: 33.31 KG/M2 | HEIGHT: 62 IN | OXYGEN SATURATION: 98 % | SYSTOLIC BLOOD PRESSURE: 124 MMHG | HEART RATE: 84 BPM | DIASTOLIC BLOOD PRESSURE: 60 MMHG | WEIGHT: 181 LBS

## 2025-06-30 DIAGNOSIS — K59.09 CHRONIC CONSTIPATION: ICD-10-CM
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
## 2025-06-30 DIAGNOSIS — R13.19 ESOPHAGEAL DYSPHAGIA: ICD-10-CM

## 2025-06-30 DIAGNOSIS — M54.50 ACUTE RIGHT-SIDED LOW BACK PAIN WITHOUT SCIATICA: ICD-10-CM

## 2025-06-30 RX ORDER — AMOXICILLIN 250 MG
1 CAPSULE ORAL DAILY PRN
Qty: 30 TABLET | Refills: 2
Start: 2025-06-30

## 2025-06-30 NOTE — PATIENT INSTRUCTIONS
[employer] SURVEY:    You may be receiving a survey from [employer] regarding your visit today.    You may get this in the mail, through your MyChart or in your email.     Please complete the survey to enable us to provide the highest quality of care to you and your family.      Thank you.    Clinical Care Team:   Dr. Edwige Baker, DO Vel Aguilar CMA                                     Triage: Afua Leslie CMA              Clerical Team:    Afua Ibrahim     Success [phone]           Billing questions: [phone]           Medical Records Request: [phone]

## 2025-06-30 NOTE — PROGRESS NOTES
Name: Ragini Regan  : 1945         Chief Complaint:     Chief Complaint   Patient presents with    Fall     25 ER, Fall. nondisplaced fractures of the right third through fifth   anterior ribs. Still having some pain in the butt. States that she was getting ready for bed, got dizzy and just fell.       Hypertension    Diabetes    Gastroesophageal Reflux       History of Present Illness:      Ragini Regan is a 79 y.o.  female who presents with Fall (25 ER, Fall. nondisplaced fractures of the right third through fifth /anterior ribs. Still having some pain in the butt. States that she was getting ready for bed, got dizzy and just fell. /), Hypertension, Diabetes, and Gastroesophageal Reflux      HPI    S/p fall as above, ER visit. Feeling better. CT poss R anterior rib fx but pt denies any pain in chest. Some pain low back and R buttock, had landed on that side. No skin injury there. Sitting on special pillow at [employer].     Swallowing ok, not bothering too much. Underwent EGD which was reassuring to her.    Chronic constipation better, taking miralax intermittently. Senokot-S daily.     Medical History:     Patient Active Problem List   Diagnosis    Essential hypertension, benign    Esophageal reflux    Irritable bowel syndrome    Seasonal allergies    Gout    Rectocele    Tubular adenoma of colon    Hiatal hernia    Sigmoid diverticulosis    Chronic back pain    Hypomagnesemia    Family history of colon cancer    History of recurrent deep vein thrombosis (DVT)    Hyperparathyroidism    Chronic renal disease, stage III ([facility]) [595419]    Type 2 diabetes mellitus without complication, without long-term current use of insulin (HCC)    Chronic diastolic congestive heart failure (HCC)    Type 2 diabetes mellitus with stage 3b chronic kidney disease, without long-term current use of insulin ([facility])    Mixed incontinence    Esophageal dysphagia       Medications:       Prior to Admission medications

## 2025-09-02 RX ORDER — GLIPIZIDE 5 MG/1
TABLET ORAL
Qty: 90 TABLET | Refills: 3 | Status: SHIPPED | OUTPATIENT
Start: 2025-09-02

## 2025-09-02 RX ORDER — FOLIC ACID 0.4 MG
400 TABLET ORAL DAILY
Qty: 90 TABLET | Refills: 3 | Status: SHIPPED | OUTPATIENT
Start: 2025-09-02

## 2025-09-02 RX ORDER — FUROSEMIDE 20 MG/1
TABLET ORAL
Qty: 90 TABLET | Refills: 3 | Status: SHIPPED | OUTPATIENT
Start: 2025-09-02

## 2025-09-02 RX ORDER — PANTOPRAZOLE SODIUM 40 MG/1
40 TABLET, DELAYED RELEASE ORAL
Qty: 90 TABLET | Refills: 1 | Status: SHIPPED | OUTPATIENT
Start: 2025-09-02

## 2025-09-02 RX ORDER — TRAZODONE HYDROCHLORIDE 50 MG/1
50 TABLET ORAL NIGHTLY
Qty: 90 TABLET | Refills: 3 | Status: SHIPPED | OUTPATIENT
Start: 2025-09-02

## 2025-09-05 ENCOUNTER — TELEPHONE (OUTPATIENT)
Dept: UROLOGY | Age: 80
End: 2025-09-05

## 2025-09-05 RX ORDER — TROSPIUM CHLORIDE 20 MG/1
20 TABLET, FILM COATED ORAL 2 TIMES DAILY
Qty: 180 TABLET | Refills: 0 | Status: SHIPPED | OUTPATIENT
Start: 2025-09-05

## (undated) DEVICE — TOWEL,OR,DSP,ST,NATURAL,DLX,4/PK,20PK/CS: Brand: MEDLINE

## (undated) DEVICE — Device: Brand: DISPOSABLE ELECTROSURGICAL SNARE

## (undated) DEVICE — STANDARD HYPODERMIC NEEDLE,POLYPROPYLENE HUB: Brand: MONOJECT

## (undated) DEVICE — BITE BLK ENDOSCP AD 54FR GRN POLYETH ENDOSCP W STRP SLD

## (undated) DEVICE — 4-PORT MANIFOLD: Brand: NEPTUNE 2

## (undated) DEVICE — SYRINGE MED 50ML LUERSLIP TIP

## (undated) DEVICE — APPLICATOR MEDICATED 10.5 CC SOLUTION HI LT ORNG CHLORAPREP

## (undated) DEVICE — NEEDLE, QUINCKE, 22GX3.5": Brand: MEDLINE

## (undated) DEVICE — ENDO KIT W/SYRINGE: Brand: MEDLINE INDUSTRIES, INC.

## (undated) DEVICE — Device: Brand: DEFENDO VALVE AND CONNECTOR KIT

## (undated) DEVICE — NEEDLE HYPO 18GA L1IN PNK POLYPR HUB S STL REG BVL STR W/O

## (undated) DEVICE — GLOVE SURG SZ 8 CRM LTX FREE POLYISOPRENE POLYMER BEAD ANTI

## (undated) DEVICE — SHEET,DRAPE,53X77,STERILE: Brand: MEDLINE

## (undated) DEVICE — HYBRID TUBING WITH CO2 CONNECTION FOR OLYMPUS 140/160/180/190 SERIES GI ENDOSCOPES WITH OLYMPUS AFU-100 , OLYMPUS OFP: Brand: ENDOGATOR HYBRID IRRIGATION TUBING

## (undated) DEVICE — SYRINGE FLSH IV 10 CC W/O CANN PREFILLED NACL POSIFLUSH

## (undated) DEVICE — SYRINGE MED 10ML LUERLOCK TIP W/O SFTY DISP

## (undated) DEVICE — JELLY LUBRICATING 4OZ FLIP TOP TB E Z

## (undated) DEVICE — SYRINGE 10CC LOSS OF RESISTANCE PLAS LS

## (undated) DEVICE — GOWN ISOLATN REG BLU POLYETH THMB LOOP PROTCT FOR VIVARIUM

## (undated) DEVICE — GLOVE SURG SZ 7 L12IN FNGR THK79MIL GRN LTX FREE

## (undated) DEVICE — PROVE COVER: Brand: UNBRANDED

## (undated) DEVICE — DRAPE,U/ SHT,SPLIT,PLAS,STERIL: Brand: MEDLINE

## (undated) DEVICE — NEEDLE SPNL 18GA L3.5IN W/ QNCKE SHARPER BVL DURA CLICK

## (undated) DEVICE — CUSTOM PACK: Brand: UNBRANDED

## (undated) DEVICE — GLOVE SURG SZ 7 CRM LTX FREE POLYISOPRENE POLYMER BEAD ANTI

## (undated) DEVICE — TRAP SUCT POLYPECTOMY ST 4 CHMBR

## (undated) DEVICE — FORCEPS BX L240CM JAW DIA2.2MM RAD JAW 4 HOT DISP

## (undated) DEVICE — GLOVE SURG SZ 75 CRM LTX FREE POLYISOPRENE POLYMER BEAD ANTI

## (undated) DEVICE — MEDI-VAC NON-CONDUCTIVE SUCTION TUBING 6MM X 6.1M (20 FT.) L: Brand: CARDINAL HEALTH

## (undated) DEVICE — FORCEPS BX L240CM JAW DIA22MM ORNG STD CAP W NDL RAD JAW 4

## (undated) DEVICE — GLOVE SURG SZ 75 L12IN FNGR THK79MIL GRN LTX FREE

## (undated) DEVICE — NERVE BLOCK TRAY: Brand: MEDLINE INDUSTRIES, INC.

## (undated) DEVICE — VALVE SUCTION AIR H2O HYDR H2O JET CONN STRL ORCA POD + DISP

## (undated) DEVICE — ELECTRODE ES AD CRD L15FT DISP FOR PT BELOW 30LB REM

## (undated) DEVICE — SYRINGE, LUER LOCK, 10ML: Brand: MEDLINE

## (undated) DEVICE — GOWN,SIRUS,POLYRNF,BRTHSLV,2XL,18/CS: Brand: MEDLINE

## (undated) DEVICE — SOLUTION IRRIG 1000ML STRL H2O USP PLAS POUR BTL

## (undated) DEVICE — SOLUTION IRRIG 1000ML H2O PIC PLAS SHATTERPROOF CONTAINER

## (undated) DEVICE — GOWN,AURORA,NONRNF,XL,30/CS: Brand: MEDLINE

## (undated) DEVICE — BANDAGE ADH W1XL3IN NAT FAB WVN FLX DURABLE N ADH PD SEAL

## (undated) DEVICE — FORCEPS BX JUMBO L240CM DIA2.8MM WRK CHN 3.2MM ORNG W/ NDL